# Patient Record
Sex: FEMALE | Race: ASIAN | NOT HISPANIC OR LATINO | URBAN - METROPOLITAN AREA
[De-identification: names, ages, dates, MRNs, and addresses within clinical notes are randomized per-mention and may not be internally consistent; named-entity substitution may affect disease eponyms.]

---

## 2023-09-11 ENCOUNTER — INPATIENT (INPATIENT)
Facility: HOSPITAL | Age: 68
LOS: 2 days | Discharge: HOME | DRG: 445 | End: 2023-09-14
Attending: SURGERY | Admitting: SURGERY
Payer: MEDICAID

## 2023-09-11 VITALS
OXYGEN SATURATION: 98 % | DIASTOLIC BLOOD PRESSURE: 81 MMHG | SYSTOLIC BLOOD PRESSURE: 156 MMHG | HEART RATE: 116 BPM | WEIGHT: 100.09 LBS | TEMPERATURE: 99 F | RESPIRATION RATE: 22 BRPM | HEIGHT: 63 IN

## 2023-09-11 DIAGNOSIS — K81.0 ACUTE CHOLECYSTITIS: ICD-10-CM

## 2023-09-11 LAB
ALBUMIN SERPL ELPH-MCNC: 2.6 G/DL — LOW (ref 3.3–5)
ALBUMIN SERPL ELPH-MCNC: 2.7 G/DL — LOW (ref 3.3–5)
ALBUMIN SERPL ELPH-MCNC: 3.1 G/DL — LOW (ref 3.3–5)
ALP SERPL-CCNC: 599 U/L — HIGH (ref 40–120)
ALP SERPL-CCNC: 601 U/L — HIGH (ref 40–120)
ALP SERPL-CCNC: 601 U/L — HIGH (ref 40–120)
ALT FLD-CCNC: 89 U/L — HIGH (ref 10–45)
ALT FLD-CCNC: 94 U/L — HIGH (ref 10–45)
ALT FLD-CCNC: 99 U/L — HIGH (ref 10–45)
ANION GAP SERPL CALC-SCNC: 12 MMOL/L — SIGNIFICANT CHANGE UP (ref 5–17)
ANION GAP SERPL CALC-SCNC: 15 MMOL/L — SIGNIFICANT CHANGE UP (ref 5–17)
ANION GAP SERPL CALC-SCNC: 18 MMOL/L — HIGH (ref 5–17)
ANISOCYTOSIS BLD QL: SLIGHT — SIGNIFICANT CHANGE UP
APPEARANCE UR: ABNORMAL
APTT BLD: 23.3 SEC — LOW (ref 24.5–35.6)
AST SERPL-CCNC: 192 U/L — HIGH (ref 10–40)
AST SERPL-CCNC: 215 U/L — HIGH (ref 10–40)
AST SERPL-CCNC: 247 U/L — HIGH (ref 10–40)
BACTERIA # UR AUTO: NEGATIVE — SIGNIFICANT CHANGE UP
BASE EXCESS BLDV CALC-SCNC: -0.9 MMOL/L — SIGNIFICANT CHANGE UP (ref -2–3)
BASE EXCESS BLDV CALC-SCNC: -4.9 MMOL/L — LOW (ref -2–3)
BASE EXCESS BLDV CALC-SCNC: -5.5 MMOL/L — LOW (ref -2–3)
BASOPHILS # BLD AUTO: 0 K/UL — SIGNIFICANT CHANGE UP (ref 0–0.2)
BASOPHILS NFR BLD AUTO: 0 % — SIGNIFICANT CHANGE UP (ref 0–2)
BILIRUB DIRECT SERPL-MCNC: 1.4 MG/DL — HIGH (ref 0–0.3)
BILIRUB DIRECT SERPL-MCNC: 2 MG/DL — HIGH (ref 0–0.3)
BILIRUB INDIRECT FLD-MCNC: 0.4 MG/DL — SIGNIFICANT CHANGE UP (ref 0.2–1)
BILIRUB INDIRECT FLD-MCNC: 0.5 MG/DL — SIGNIFICANT CHANGE UP (ref 0.2–1)
BILIRUB SERPL-MCNC: 1.8 MG/DL — HIGH (ref 0.2–1.2)
BILIRUB SERPL-MCNC: 1.9 MG/DL — HIGH (ref 0.2–1.2)
BILIRUB SERPL-MCNC: 2.4 MG/DL — HIGH (ref 0.2–1.2)
BILIRUB UR-MCNC: NEGATIVE — SIGNIFICANT CHANGE UP
BUN SERPL-MCNC: 12 MG/DL — SIGNIFICANT CHANGE UP (ref 7–23)
BUN SERPL-MCNC: 8 MG/DL — SIGNIFICANT CHANGE UP (ref 7–23)
BUN SERPL-MCNC: 9 MG/DL — SIGNIFICANT CHANGE UP (ref 7–23)
CA-I SERPL-SCNC: 1.09 MMOL/L — LOW (ref 1.15–1.33)
CA-I SERPL-SCNC: 1.1 MMOL/L — LOW (ref 1.15–1.33)
CA-I SERPL-SCNC: 1.17 MMOL/L — SIGNIFICANT CHANGE UP (ref 1.15–1.33)
CALCIUM SERPL-MCNC: 7.1 MG/DL — LOW (ref 8.4–10.5)
CALCIUM SERPL-MCNC: 7.5 MG/DL — LOW (ref 8.4–10.5)
CALCIUM SERPL-MCNC: 8.3 MG/DL — LOW (ref 8.4–10.5)
CHLORIDE BLDV-SCNC: 107 MMOL/L — SIGNIFICANT CHANGE UP (ref 96–108)
CHLORIDE BLDV-SCNC: 109 MMOL/L — HIGH (ref 96–108)
CHLORIDE BLDV-SCNC: 110 MMOL/L — HIGH (ref 96–108)
CHLORIDE SERPL-SCNC: 103 MMOL/L — SIGNIFICANT CHANGE UP (ref 96–108)
CHLORIDE SERPL-SCNC: 110 MMOL/L — HIGH (ref 96–108)
CHLORIDE SERPL-SCNC: 110 MMOL/L — HIGH (ref 96–108)
CO2 BLDV-SCNC: 21 MMOL/L — LOW (ref 22–26)
CO2 BLDV-SCNC: 22 MMOL/L — SIGNIFICANT CHANGE UP (ref 22–26)
CO2 BLDV-SCNC: 24 MMOL/L — SIGNIFICANT CHANGE UP (ref 22–26)
CO2 SERPL-SCNC: 17 MMOL/L — LOW (ref 22–31)
CO2 SERPL-SCNC: 18 MMOL/L — LOW (ref 22–31)
CO2 SERPL-SCNC: 19 MMOL/L — LOW (ref 22–31)
COLOR SPEC: YELLOW — SIGNIFICANT CHANGE UP
CREAT SERPL-MCNC: 0.49 MG/DL — LOW (ref 0.5–1.3)
CREAT SERPL-MCNC: 0.54 MG/DL — SIGNIFICANT CHANGE UP (ref 0.5–1.3)
CREAT SERPL-MCNC: 0.56 MG/DL — SIGNIFICANT CHANGE UP (ref 0.5–1.3)
DACRYOCYTES BLD QL SMEAR: SLIGHT — SIGNIFICANT CHANGE UP
DIFF PNL FLD: NEGATIVE — SIGNIFICANT CHANGE UP
EGFR: 100 ML/MIN/1.73M2 — SIGNIFICANT CHANGE UP
EGFR: 101 ML/MIN/1.73M2 — SIGNIFICANT CHANGE UP
EGFR: 103 ML/MIN/1.73M2 — SIGNIFICANT CHANGE UP
ELLIPTOCYTES BLD QL SMEAR: SIGNIFICANT CHANGE UP
EOSINOPHIL # BLD AUTO: 0 K/UL — SIGNIFICANT CHANGE UP (ref 0–0.5)
EOSINOPHIL NFR BLD AUTO: 0 % — SIGNIFICANT CHANGE UP (ref 0–6)
EPI CELLS # UR: 0 /HPF — SIGNIFICANT CHANGE UP
GAS PNL BLDV: 138 MMOL/L — SIGNIFICANT CHANGE UP (ref 136–145)
GAS PNL BLDV: 138 MMOL/L — SIGNIFICANT CHANGE UP (ref 136–145)
GAS PNL BLDV: 141 MMOL/L — SIGNIFICANT CHANGE UP (ref 136–145)
GAS PNL BLDV: SIGNIFICANT CHANGE UP
GLUCOSE BLDC GLUCOMTR-MCNC: 78 MG/DL — SIGNIFICANT CHANGE UP (ref 70–99)
GLUCOSE BLDC GLUCOMTR-MCNC: 78 MG/DL — SIGNIFICANT CHANGE UP (ref 70–99)
GLUCOSE BLDV-MCNC: 100 MG/DL — HIGH (ref 70–99)
GLUCOSE BLDV-MCNC: 133 MG/DL — HIGH (ref 70–99)
GLUCOSE BLDV-MCNC: 137 MG/DL — HIGH (ref 70–99)
GLUCOSE SERPL-MCNC: 140 MG/DL — HIGH (ref 70–99)
GLUCOSE SERPL-MCNC: 168 MG/DL — HIGH (ref 70–99)
GLUCOSE SERPL-MCNC: 98 MG/DL — SIGNIFICANT CHANGE UP (ref 70–99)
GLUCOSE UR QL: NEGATIVE — SIGNIFICANT CHANGE UP
HCO3 BLDV-SCNC: 20 MMOL/L — LOW (ref 22–29)
HCO3 BLDV-SCNC: 21 MMOL/L — LOW (ref 22–29)
HCO3 BLDV-SCNC: 23 MMOL/L — SIGNIFICANT CHANGE UP (ref 22–29)
HCT VFR BLD CALC: 24.9 % — LOW (ref 34.5–45)
HCT VFR BLD CALC: 26 % — LOW (ref 34.5–45)
HCT VFR BLD CALC: 26.6 % — LOW (ref 34.5–45)
HCT VFR BLDA CALC: 22 % — LOW (ref 34.5–46.5)
HCT VFR BLDA CALC: 24 % — LOW (ref 34.5–46.5)
HCT VFR BLDA CALC: 25 % — LOW (ref 34.5–46.5)
HGB BLD CALC-MCNC: 7.3 G/DL — LOW (ref 11.7–16.1)
HGB BLD CALC-MCNC: 7.9 G/DL — LOW (ref 11.7–16.1)
HGB BLD CALC-MCNC: 8.2 G/DL — LOW (ref 11.7–16.1)
HGB BLD-MCNC: 7.2 G/DL — LOW (ref 11.5–15.5)
HGB BLD-MCNC: 7.6 G/DL — LOW (ref 11.5–15.5)
HGB BLD-MCNC: 7.8 G/DL — LOW (ref 11.5–15.5)
HOROWITZ INDEX BLDV+IHG-RTO: 21 — SIGNIFICANT CHANGE UP
HOROWITZ INDEX BLDV+IHG-RTO: 28 — SIGNIFICANT CHANGE UP
HYALINE CASTS # UR AUTO: 3 /LPF — HIGH (ref 0–2)
INR BLD: 1.1 RATIO — SIGNIFICANT CHANGE UP (ref 0.85–1.18)
KETONES UR-MCNC: NEGATIVE — SIGNIFICANT CHANGE UP
LACTATE BLDV-MCNC: 1 MMOL/L — SIGNIFICANT CHANGE UP (ref 0.5–2)
LACTATE BLDV-MCNC: 1.7 MMOL/L — SIGNIFICANT CHANGE UP (ref 0.5–2)
LACTATE BLDV-MCNC: 3 MMOL/L — HIGH (ref 0.5–2)
LEUKOCYTE ESTERASE UR-ACNC: NEGATIVE — SIGNIFICANT CHANGE UP
LIDOCAIN IGE QN: 1031 U/L — HIGH (ref 7–60)
LYMPHOCYTES # BLD AUTO: 0 % — LOW (ref 13–44)
LYMPHOCYTES # BLD AUTO: 0 K/UL — LOW (ref 1–3.3)
MAGNESIUM SERPL-MCNC: 1.7 MG/DL — SIGNIFICANT CHANGE UP (ref 1.6–2.6)
MAGNESIUM SERPL-MCNC: 1.8 MG/DL — SIGNIFICANT CHANGE UP (ref 1.6–2.6)
MANUAL SMEAR VERIFICATION: SIGNIFICANT CHANGE UP
MCHC RBC-ENTMCNC: 22.6 PG — LOW (ref 27–34)
MCHC RBC-ENTMCNC: 22.8 PG — LOW (ref 27–34)
MCHC RBC-ENTMCNC: 23 PG — LOW (ref 27–34)
MCHC RBC-ENTMCNC: 28.9 GM/DL — LOW (ref 32–36)
MCHC RBC-ENTMCNC: 29.2 GM/DL — LOW (ref 32–36)
MCHC RBC-ENTMCNC: 29.3 GM/DL — LOW (ref 32–36)
MCV RBC AUTO: 77.4 FL — LOW (ref 80–100)
MCV RBC AUTO: 77.8 FL — LOW (ref 80–100)
MCV RBC AUTO: 79.6 FL — LOW (ref 80–100)
MICROCYTES BLD QL: SLIGHT — SIGNIFICANT CHANGE UP
MONOCYTES # BLD AUTO: 0.95 K/UL — HIGH (ref 0–0.9)
MONOCYTES NFR BLD AUTO: 6.2 % — SIGNIFICANT CHANGE UP (ref 2–14)
NEUTROPHILS # BLD AUTO: 14.32 K/UL — HIGH (ref 1.8–7.4)
NEUTROPHILS NFR BLD AUTO: 86.7 % — HIGH (ref 43–77)
NEUTS BAND # BLD: 7.1 % — SIGNIFICANT CHANGE UP (ref 0–8)
NITRITE UR-MCNC: NEGATIVE — SIGNIFICANT CHANGE UP
NRBC # BLD: 0 /100 WBCS — SIGNIFICANT CHANGE UP (ref 0–0)
NRBC # BLD: 0 /100 WBCS — SIGNIFICANT CHANGE UP (ref 0–0)
PCO2 BLDV: 36 MMHG — LOW (ref 39–42)
PCO2 BLDV: 39 MMHG — SIGNIFICANT CHANGE UP (ref 39–42)
PCO2 BLDV: 43 MMHG — HIGH (ref 39–42)
PH BLDV: 7.3 — LOW (ref 7.32–7.43)
PH BLDV: 7.32 — SIGNIFICANT CHANGE UP (ref 7.32–7.43)
PH BLDV: 7.42 — SIGNIFICANT CHANGE UP (ref 7.32–7.43)
PH UR: 6 — SIGNIFICANT CHANGE UP (ref 5–8)
PHOSPHATE SERPL-MCNC: 2.7 MG/DL — SIGNIFICANT CHANGE UP (ref 2.5–4.5)
PHOSPHATE SERPL-MCNC: 4 MG/DL — SIGNIFICANT CHANGE UP (ref 2.5–4.5)
PLAT MORPH BLD: NORMAL — SIGNIFICANT CHANGE UP
PLATELET # BLD AUTO: 253 K/UL — SIGNIFICANT CHANGE UP (ref 150–400)
PLATELET # BLD AUTO: 271 K/UL — SIGNIFICANT CHANGE UP (ref 150–400)
PLATELET # BLD AUTO: 342 K/UL — SIGNIFICANT CHANGE UP (ref 150–400)
PO2 BLDV: 28 MMHG — SIGNIFICANT CHANGE UP (ref 25–45)
PO2 BLDV: 38 MMHG — SIGNIFICANT CHANGE UP (ref 25–45)
PO2 BLDV: 46 MMHG — HIGH (ref 25–45)
POIKILOCYTOSIS BLD QL AUTO: SIGNIFICANT CHANGE UP
POTASSIUM BLDV-SCNC: 2.8 MMOL/L — CRITICAL LOW (ref 3.5–5.1)
POTASSIUM BLDV-SCNC: 3.1 MMOL/L — LOW (ref 3.5–5.1)
POTASSIUM BLDV-SCNC: 3.8 MMOL/L — SIGNIFICANT CHANGE UP (ref 3.5–5.1)
POTASSIUM SERPL-MCNC: 3 MMOL/L — LOW (ref 3.5–5.3)
POTASSIUM SERPL-MCNC: 3.8 MMOL/L — SIGNIFICANT CHANGE UP (ref 3.5–5.3)
POTASSIUM SERPL-MCNC: 3.8 MMOL/L — SIGNIFICANT CHANGE UP (ref 3.5–5.3)
POTASSIUM SERPL-SCNC: 3 MMOL/L — LOW (ref 3.5–5.3)
POTASSIUM SERPL-SCNC: 3.8 MMOL/L — SIGNIFICANT CHANGE UP (ref 3.5–5.3)
POTASSIUM SERPL-SCNC: 3.8 MMOL/L — SIGNIFICANT CHANGE UP (ref 3.5–5.3)
PROT SERPL-MCNC: 5.5 G/DL — LOW (ref 6–8.3)
PROT SERPL-MCNC: 5.7 G/DL — LOW (ref 6–8.3)
PROT SERPL-MCNC: 6.9 G/DL — SIGNIFICANT CHANGE UP (ref 6–8.3)
PROT UR-MCNC: ABNORMAL
PROTHROM AB SERPL-ACNC: 12.1 SEC — SIGNIFICANT CHANGE UP (ref 9.5–13)
RAPID RVP RESULT: SIGNIFICANT CHANGE UP
RBC # BLD: 3.13 M/UL — LOW (ref 3.8–5.2)
RBC # BLD: 3.36 M/UL — LOW (ref 3.8–5.2)
RBC # BLD: 3.42 M/UL — LOW (ref 3.8–5.2)
RBC # FLD: 18.3 % — HIGH (ref 10.3–14.5)
RBC # FLD: 18.4 % — HIGH (ref 10.3–14.5)
RBC # FLD: 18.6 % — HIGH (ref 10.3–14.5)
RBC BLD AUTO: ABNORMAL
RBC CASTS # UR COMP ASSIST: 2 /HPF — SIGNIFICANT CHANGE UP (ref 0–4)
RH IG SCN BLD-IMP: POSITIVE — SIGNIFICANT CHANGE UP
SAO2 % BLDV: 46.2 % — LOW (ref 67–88)
SAO2 % BLDV: 61 % — LOW (ref 67–88)
SAO2 % BLDV: 74.5 % — SIGNIFICANT CHANGE UP (ref 67–88)
SARS-COV-2 RNA SPEC QL NAA+PROBE: SIGNIFICANT CHANGE UP
SCHISTOCYTES BLD QL AUTO: SLIGHT — SIGNIFICANT CHANGE UP
SODIUM SERPL-SCNC: 138 MMOL/L — SIGNIFICANT CHANGE UP (ref 135–145)
SODIUM SERPL-SCNC: 141 MMOL/L — SIGNIFICANT CHANGE UP (ref 135–145)
SODIUM SERPL-SCNC: 143 MMOL/L — SIGNIFICANT CHANGE UP (ref 135–145)
SP GR SPEC: 1.01 — SIGNIFICANT CHANGE UP (ref 1.01–1.02)
SPHEROCYTES BLD QL SMEAR: SLIGHT — SIGNIFICANT CHANGE UP
UROBILINOGEN FLD QL: NEGATIVE — SIGNIFICANT CHANGE UP
WBC # BLD: 13.48 K/UL — HIGH (ref 3.8–10.5)
WBC # BLD: 15.27 K/UL — HIGH (ref 3.8–10.5)
WBC # BLD: 9.33 K/UL — SIGNIFICANT CHANGE UP (ref 3.8–10.5)
WBC # FLD AUTO: 13.48 K/UL — HIGH (ref 3.8–10.5)
WBC # FLD AUTO: 15.27 K/UL — HIGH (ref 3.8–10.5)
WBC # FLD AUTO: 9.33 K/UL — SIGNIFICANT CHANGE UP (ref 3.8–10.5)
WBC UR QL: 1 /HPF — SIGNIFICANT CHANGE UP (ref 0–5)

## 2023-09-11 PROCEDURE — ZZZZZ: CPT

## 2023-09-11 PROCEDURE — 74177 CT ABD & PELVIS W/CONTRAST: CPT | Mod: 26,MA

## 2023-09-11 PROCEDURE — 99285 EMERGENCY DEPT VISIT HI MDM: CPT

## 2023-09-11 PROCEDURE — 71045 X-RAY EXAM CHEST 1 VIEW: CPT | Mod: 26

## 2023-09-11 PROCEDURE — 47490 INCISION OF GALLBLADDER: CPT

## 2023-09-11 PROCEDURE — 99222 1ST HOSP IP/OBS MODERATE 55: CPT | Mod: GC

## 2023-09-11 RX ORDER — INFLUENZA VIRUS VACCINE 15; 15; 15; 15 UG/.5ML; UG/.5ML; UG/.5ML; UG/.5ML
0.7 SUSPENSION INTRAMUSCULAR ONCE
Refills: 0 | Status: DISCONTINUED | OUTPATIENT
Start: 2023-09-11 | End: 2023-09-14

## 2023-09-11 RX ORDER — MAGNESIUM SULFATE 500 MG/ML
2 VIAL (ML) INJECTION ONCE
Refills: 0 | Status: DISCONTINUED | OUTPATIENT
Start: 2023-09-11 | End: 2023-09-11

## 2023-09-11 RX ORDER — PIPERACILLIN AND TAZOBACTAM 4; .5 G/20ML; G/20ML
3.38 INJECTION, POWDER, LYOPHILIZED, FOR SOLUTION INTRAVENOUS EVERY 8 HOURS
Refills: 0 | Status: DISCONTINUED | OUTPATIENT
Start: 2023-09-11 | End: 2023-09-14

## 2023-09-11 RX ORDER — SODIUM CHLORIDE 9 MG/ML
1000 INJECTION INTRAMUSCULAR; INTRAVENOUS; SUBCUTANEOUS ONCE
Refills: 0 | Status: COMPLETED | OUTPATIENT
Start: 2023-09-11 | End: 2023-09-11

## 2023-09-11 RX ORDER — POTASSIUM CHLORIDE 20 MEQ
20 PACKET (EA) ORAL ONCE
Refills: 0 | Status: COMPLETED | OUTPATIENT
Start: 2023-09-11 | End: 2023-09-11

## 2023-09-11 RX ORDER — SODIUM CHLORIDE 9 MG/ML
1000 INJECTION, SOLUTION INTRAVENOUS
Refills: 0 | Status: DISCONTINUED | OUTPATIENT
Start: 2023-09-11 | End: 2023-09-13

## 2023-09-11 RX ORDER — INSULIN LISPRO 100/ML
VIAL (ML) SUBCUTANEOUS EVERY 6 HOURS
Refills: 0 | Status: DISCONTINUED | OUTPATIENT
Start: 2023-09-11 | End: 2023-09-11

## 2023-09-11 RX ORDER — ACETAMINOPHEN 500 MG
675 TABLET ORAL ONCE
Refills: 0 | Status: COMPLETED | OUTPATIENT
Start: 2023-09-11 | End: 2023-09-11

## 2023-09-11 RX ORDER — MAGNESIUM SULFATE 500 MG/ML
2 VIAL (ML) INJECTION ONCE
Refills: 0 | Status: COMPLETED | OUTPATIENT
Start: 2023-09-11 | End: 2023-09-11

## 2023-09-11 RX ORDER — POTASSIUM CHLORIDE 20 MEQ
10 PACKET (EA) ORAL
Refills: 0 | Status: COMPLETED | OUTPATIENT
Start: 2023-09-11 | End: 2023-09-11

## 2023-09-11 RX ORDER — VANCOMYCIN HCL 1 G
1000 VIAL (EA) INTRAVENOUS ONCE
Refills: 0 | Status: COMPLETED | OUTPATIENT
Start: 2023-09-11 | End: 2023-09-11

## 2023-09-11 RX ORDER — CALCIUM GLUCONATE 100 MG/ML
2 VIAL (ML) INTRAVENOUS ONCE
Refills: 0 | Status: COMPLETED | OUTPATIENT
Start: 2023-09-11 | End: 2023-09-11

## 2023-09-11 RX ORDER — CHLORHEXIDINE GLUCONATE 213 G/1000ML
1 SOLUTION TOPICAL
Refills: 0 | Status: DISCONTINUED | OUTPATIENT
Start: 2023-09-11 | End: 2023-09-14

## 2023-09-11 RX ORDER — PIPERACILLIN AND TAZOBACTAM 4; .5 G/20ML; G/20ML
3.38 INJECTION, POWDER, LYOPHILIZED, FOR SOLUTION INTRAVENOUS ONCE
Refills: 0 | Status: COMPLETED | OUTPATIENT
Start: 2023-09-11 | End: 2023-09-11

## 2023-09-11 RX ORDER — ACETAMINOPHEN 500 MG
500 TABLET ORAL EVERY 6 HOURS
Refills: 0 | Status: DISCONTINUED | OUTPATIENT
Start: 2023-09-11 | End: 2023-09-13

## 2023-09-11 RX ADMIN — Medication 100 MILLIEQUIVALENT(S): at 14:47

## 2023-09-11 RX ADMIN — Medication 675 MILLIGRAM(S): at 10:04

## 2023-09-11 RX ADMIN — Medication 25 GRAM(S): at 20:51

## 2023-09-11 RX ADMIN — Medication 100 MILLIEQUIVALENT(S): at 18:42

## 2023-09-11 RX ADMIN — PIPERACILLIN AND TAZOBACTAM 25 GRAM(S): 4; .5 INJECTION, POWDER, LYOPHILIZED, FOR SOLUTION INTRAVENOUS at 14:36

## 2023-09-11 RX ADMIN — SODIUM CHLORIDE 75 MILLILITER(S): 9 INJECTION, SOLUTION INTRAVENOUS at 20:52

## 2023-09-11 RX ADMIN — Medication 270 MILLIGRAM(S): at 08:22

## 2023-09-11 RX ADMIN — Medication 20 MILLIEQUIVALENT(S): at 23:50

## 2023-09-11 RX ADMIN — SODIUM CHLORIDE 1000 MILLILITER(S): 9 INJECTION INTRAMUSCULAR; INTRAVENOUS; SUBCUTANEOUS at 10:45

## 2023-09-11 RX ADMIN — PIPERACILLIN AND TAZOBACTAM 200 GRAM(S): 4; .5 INJECTION, POWDER, LYOPHILIZED, FOR SOLUTION INTRAVENOUS at 08:00

## 2023-09-11 RX ADMIN — Medication 100 MILLIEQUIVALENT(S): at 16:54

## 2023-09-11 RX ADMIN — PIPERACILLIN AND TAZOBACTAM 25 GRAM(S): 4; .5 INJECTION, POWDER, LYOPHILIZED, FOR SOLUTION INTRAVENOUS at 23:50

## 2023-09-11 RX ADMIN — Medication 200 GRAM(S): at 21:53

## 2023-09-11 RX ADMIN — SODIUM CHLORIDE 1000 MILLILITER(S): 9 INJECTION INTRAMUSCULAR; INTRAVENOUS; SUBCUTANEOUS at 06:32

## 2023-09-11 RX ADMIN — Medication 255 MILLIMOLE(S): at 18:42

## 2023-09-11 RX ADMIN — Medication 250 MILLIGRAM(S): at 09:59

## 2023-09-11 RX ADMIN — CHLORHEXIDINE GLUCONATE 1 APPLICATION(S): 213 SOLUTION TOPICAL at 14:37

## 2023-09-11 NOTE — ED PROVIDER NOTE - ATTENDING CONTRIBUTION TO CARE
67-year-old female history of scleroderma on meds from [place] doesnt remember name, pulmonary hypertension on oxygen at home presents to the ED D for 1 day of subjective fevers and chills, nausea and vomiting and diarrhea with generalized abdominal pain.  [place] recently moved [place] the [place] from [place] 2 months ago. patient denying any chest pain, shortness of breath, no diarrhea, no hematuria.    Const: nad  Eyes: no conjunctival injection, and symmetrical lids.  HEENT: Head NCAT, no lesions. Atraumatic external nose and ears. Moist MM.  Neck: Symmetric, trachea midline.   cards- rrr  RESP: Unlabored respiratory effort.    GI:   Midepigastric abdominal tenderness  MSK: Normocephalic/Atraumatic, Lower Extremities w/o calf tenderness or edema b/l. chronic lower extremity skin changes  Skin: facial telangiectasia  Neuro: CNs II-XII grossly intact. Motor & Sensation grossly intact.  Psych: Awake, Alert, & Oriented (AAO) x3. Appropriate mood and affect.    pt meets sepsis criteria, probable intra-abd source will get ct to evaluate, ua, cxr, will give fliuds, abx, cultures, check labs, pt possibly immunocompromised given ? meds for scleroderma, pt tba. will work up for fever of current uknown origin/    Pt [place] no PMD in the [place].

## 2023-09-11 NOTE — PROCEDURE NOTE - PROCEDURE FINDINGS AND DETAILS
technically successful US and fluoroscopic guided 8Fr percutaneous cholecystostomy catheter insertion. sample of fluid sent for microbiological analysis.

## 2023-09-11 NOTE — PRE-ANESTHESIA EVALUATION ADULT - NSANTHADDINFOFT_GEN_ALL_CORE
patient requesting that daughter be called and consent obtained through her daughter. Spoke to patient as well via .

## 2023-09-11 NOTE — ED PROVIDER NOTE - PHYSICAL EXAMINATION
Const: Well-nourished, Well-developed, appearing stated age.  Eyes: no conjunctival injection, and symmetrical lids.  HEENT: Head NCAT, no lesions. Atraumatic external nose and ears. Moist MM.  Neck: Symmetric, trachea midline.   RESP: Unlabored respiratory effort.    GI:   Midepigastric abdominal tenderness  MSK: Normocephalic/Atraumatic, Lower Extremities w/o calf tenderness or edema b/l.   Skin: Warm, dry and intact.  tell ectasias over face  Neuro: CNs II-XII grossly intact. Motor & Sensation grossly intact.  Psych: Awake, Alert, & Oriented (AAO) x3. Appropriate mood and affect.

## 2023-09-11 NOTE — ED ADULT NURSE NOTE - NS ED NURSE TRANSPORT WITH
Cardiac Monitor/Defib/ACLS/Rescue Kit/O2/BVM/oxygen/pulse ox/ACLS Rescue Kit SICU MD, EDT/Cardiac Monitor/Defib/ACLS/Rescue Kit/O2/BVM/oxygen/pulse ox/ACLS Rescue Kit

## 2023-09-11 NOTE — ED PROVIDER NOTE - OBJECTIVE STATEMENT
67-year-old female history of scleroderma, pulmonary hypertension on oxygen at home presents to the ED D for 1 day of subjective fevers and chills, nausea and vomiting and diarrhea with generalized abdominal pain.  Patient recently moved to the  from [place] 2 months ago. patient denying any chest pain, shortness of breath, no diarrhea, no hematuria.       #[id number] Judy

## 2023-09-11 NOTE — ED PROVIDER NOTE - CLINICAL SUMMARY MEDICAL DECISION MAKING FREE TEXT BOX
67-year-old female history of scleroderma, pulmonary hypertension on oxygen at home presents to the ED D for 1 day of subjective fevers and chills, nausea and vomiting and diarrhea with generalized abdominal pain.  Patient recently moved to the  from [place] 2 months ago. patient denying any chest pain, shortness of breath, no diarrhea, no hematuria.   we will get sepsis labs for patient as she is tachycardic and likely febrile.  We will hold on antibiotics to evaluate for viral versus  bacterial source.

## 2023-09-11 NOTE — ED ADULT TRIAGE NOTE - IDEAL BODY WEIGHT(KG)
ENDOSCOPY - EGD/COLONOSCOPY       ADULT CARE DISCHARGE  INSTRUCTIONS     Symptoms you may temporarily experience:      Sore Throat     Hoarseness     Bloating/Cramping     Dizziness     IV Irritation/tenderness     Gas or Belching     Slight fever     Small amount of blood in vomit or stool       Call Your Doctor for the following Problems: ______________________     ________________     Fever of 101 degrees or higher       Sharp abdominal  pain     Red streak up the arm from the IV site     Severe cramping        Large amount of blood in stool or vomit       Instructions for the next 24 hours after your Procedure:     Adult supervision     Do NOT drink any alcohol      Do not work today     NO important decisions     DO NOT sign any legal documents     You may shower/ bathe       DO NOT  DRIVE or operate machinery     Resume normal activity tomorrow       Discharge  Diet:     Avoid spicy/ greasy foods     Avoid any food that will cause more gas or bloating       *** Seek IMMEDIATE medical attention and call 911 if you develop symptoms such as:     Chest pain     Shortness of breath     Severe bleeding    
52

## 2023-09-11 NOTE — ED PROCEDURE NOTE - PROCEDURE ADDITIONAL DETAILS
Emergency Department Focused Ultrasound performed at patient's bedside for educational purposes. An appropriate follow up study is ordered. -Brielle Crespo MD

## 2023-09-11 NOTE — H&P ADULT - NSHPPHYSICALEXAM_GEN_ALL_CORE
GENERAL: Lethargic  HEENT: NC/AT  CV/LUNGS: Unlabored respirations, on 4L NC   ABDOMEN: Soft, tender to palpation in RUQ  EXTREMITIES: 2+ peripheral pulses bilaterally. Warm and well perfused

## 2023-09-11 NOTE — CONSULT NOTE ADULT - SUBJECTIVE AND OBJECTIVE BOX
Interventional Radiology  Evaluate for Procedure: perc lj    HPI: 67-year-old female history of scleroderma, pulmonary hypertension on oxygen at home presents to the ED for 1 day of subjective fevers and chills, nausea and vomiting and diarrhea with generalized abdominal pain. In the ED, patient febrile to 104, tachycardic to 116. Currently on 4L NC. Labs significant for WBC of 15, TBili 1.8, Alk Phos 601, Lipase 1000. Imaging demonstrating emphysematous cholecystitis. IR consulted for perc lj.     Allergies: No Known Allergies    Medications (Abx/Cardiac/Anticoagulation/Blood Products)  piperacillin/tazobactam IVPB...: 200 mL/Hr IV Intermittent (09-11 @ 08:00)  vancomycin  IVPB.: 250 mL/Hr IV Intermittent (09-11 @ 09:59)    Data:  144.8  37.8  T(C): 36.6  HR: 65  BP: 118/63  RR: 12  SpO2: 96%    -WBC 15.27 / HgB 7.8 / Hct 26.6 / Plt 342  -Na 138 / Cl 103 / BUN 12 / Glucose 168  -K 3.8 / CO2 17 / Cr 0.49  -ALT 89 / Alk Phos 601 / T.Bili 1.8    Radiology: CT reviewed    Assessment/Plan:  67-year-old female history of scleroderma, pulmonary hypertension on oxygen at home presents to the ED for 1 day of subjective fevers and chills, nausea and vomiting and diarrhea with generalized abdominal pain. In the ED, patient febrile to 104, tachycardic to 116. Currently on 4L NC. Labs significant for WBC of 15, TBili 1.8, Alk Phos 601, Lipase 1000. Imaging demonstrating emphysematous cholecystitis. IR consulted for perc lj.     - case reviewed and approved for perc lj today  - please place IR procedure order under NP Saleh  - please send STAT coags  - hold AC  - remain NPO  - d/w primary team

## 2023-09-11 NOTE — CHART NOTE - NSCHARTNOTEFT_GEN_A_CORE
Post Operative Check    Patient is post op from a percutaneous cholecystostomy tube placement with IR and is recovering well in the SICU. Patient reports feeling well. Only complaint is a "poking" feeling in the right upper quadrant (2/2 to tube). Patient tolerated 2 cups of water with no nausea or vomiting. Has not passed flatus or BM yet. Denies fevers, chills, SOB, CP.     Vitals    T(C): 36.7 (09-11-23 @ 20:00), Max: 40.3 (09-11-23 @ 06:34)  HR: 84 (09-11-23 @ 20:00) (49 - 116)  BP: 134/79 (09-11-23 @ 20:00) (99/58 - 156/81)  RR: 19 (09-11-23 @ 20:00) (12 - 23)  SpO2: 98% (09-11-23 @ 20:00) (96% - 100%)      09-11 @ 07:01  -  09-11 @ 20:18  --------------------------------------------------------  IN:    IV PiggyBack: 100 mL    IV PiggyBack: 550 mL    Lactated Ringers: 675 mL    Oral Fluid: 360 mL  Total IN: 1685 mL    OUT:    Drain (mL): 40 mL    Voided (mL): 500 mL  Total OUT: 540 mL    Total NET: 1145 mL      Labs                        7.2    13.48 )-----------( 253      ( 11 Sep 2023 13:10 )             24.9       CBC Full  -  ( 11 Sep 2023 13:10 )  WBC Count : 13.48 K/uL  Hemoglobin : 7.2 g/dL  Hematocrit : 24.9 %  Platelet Count - Automated : 253 K/uL  Mean Cell Volume : 79.6 fl  Mean Cell Hemoglobin : 23.0 pg  Mean Cell Hemoglobin Concentration : 28.9 gm/dL  Auto Neutrophil # : x  Auto Lymphocyte # : x  Auto Monocyte # : x  Auto Eosinophil # : x  Auto Basophil # : x  Auto Neutrophil % : x  Auto Lymphocyte % : x  Auto Monocyte % : x  Auto Eosinophil % : x  Auto Basophil % : x      Physical Exam  General: no acute distress  Abdomen: soft, minimal tenderness near perc lj tube, nondistended  Tube: 8Fr percutaneous cholecystostomy catheter in RUQ      Patient is a 67y old Female s/p percutaneous cholecystostomy tube placement by IR is now recovering well in SICU.    Plan:  - Care per SICU  - Green surgery following    Green surgery  [id number]

## 2023-09-11 NOTE — ED ADULT NURSE NOTE - NSFALLRISKINTERV_ED_ALL_ED

## 2023-09-11 NOTE — ED ADULT NURSE NOTE - ED STAT RN HANDOFF DETAILS
Report endorsed to justice lindsey RN. Safety checks completed this shift. Safety rounds completed hourly.  IV sites checked Q2+remains WDL. Medications administered as ordered with no signs/symptoms of adverse reactions. Fall & skin precautions in place. Any issues endorsed to oncoming RN for follow up.

## 2023-09-11 NOTE — ED ADULT NURSE NOTE - OBJECTIVE STATEMENT
Pt presents to the BIBA A&Ox4 primarily mandarin speaking complaining of nv x last night. As per daughter, pt ate a piece of bread and begain vomiting. Pt endorses abdominal pain, mid epigastric. Received on 2L NC by EMS, not usually on oxygen at home. Pt noted to be satting at 88% on 2L NC, increased o2 to 4L NC. Pt noted to hot to touch. Connected to on cardiac monitor at bedside at bedside. Denies chest pain, shortness of breath, bloody stools, hemoptysis.

## 2023-09-11 NOTE — PRE PROCEDURE NOTE - PRE PROCEDURE EVALUATION
Vascular & Interventional Radiology Pre-Procedure Note    Procedure Name: percutaneous cholecystostomy catheter insertion    HPI: 67y Female with acute cholecystitis and sepsis not a surgical candidate at this time presents for percutaneous cholecystostomy catheter insertion.    Allergies: No Known Allergies      Medications:   piperacillin/tazobactam IVPB..: 25 mL/Hr IV Intermittent (09-11 @ 14:36)  piperacillin/tazobactam IVPB...: 200 mL/Hr IV Intermittent (09-11 @ 08:00)  vancomycin  IVPB.: 250 mL/Hr IV Intermittent (09-11 @ 09:59)      Data:  Vital Signs Last 24 Hrs  T(C): 36.6 (11 Sep 2023 15:00), Max: 40.3 (11 Sep 2023 06:34)  T(F): 97.9 (11 Sep 2023 15:00), Max: 104.5 (11 Sep 2023 06:34)  HR: 59 (11 Sep 2023 15:00) (51 - 116)  BP: 138/79 (11 Sep 2023 15:00) (99/58 - 156/81)  BP(mean): 99 (11 Sep 2023 15:00) (70 - 99)  RR: 16 (11 Sep 2023 15:00) (12 - 23)  SpO2: 99% (11 Sep 2023 15:00) (96% - 100%)    Parameters below as of 11 Sep 2023 12:16  Patient On (Oxygen Delivery Method): nasal cannula  O2 Flow (L/min): 2      LABS:                        7.2    13.48 )-----------( 253      ( 11 Sep 2023 13:10 )             24.9     09-11    141  |  110<H>  |  9   ----------------------------<  140<H>  3.0<L>   |  19<L>  |  0.56    Ca    7.5<L>      11 Sep 2023 13:10  Phos  2.7     09-11  Mg     1.8     09-11    TPro  5.7<L>  /  Alb  2.6<L>  /  TBili  1.9<H>  /  DBili  1.4<H>  /  AST  215<H>  /  ALT  94<H>  /  AlkPhos  599<H>  09-11    PT/INR - ( 11 Sep 2023 13:10 )   PT: 12.1 sec;   INR: 1.10 ratio         PTT - ( 11 Sep 2023 13:10 )  PTT:23.3 sec    Plan:   -67y Female presents for percutaneous cholecystostomy catheter insertion  -Risks/Benefits/alternatives explained with the patient and HCP and witnessed informed consent obtained.

## 2023-09-11 NOTE — ED ADULT NURSE NOTE - CADM POA CENTRAL LINE
Patient calling about orders wanting to know when the orders for her Xray of her back will be in. Patient would like to know which labs she should have done first.      Please call back. Thank you.   No

## 2023-09-11 NOTE — CONSULT NOTE ADULT - SUBJECTIVE AND OBJECTIVE BOX
SICU Consultation Note  =====================================================  HPI: 67-year-old female history of scleroderma, pulmonary hypertension on 2L oxygen at home presents to the ED for 1 day of subjective fevers and chills, nausea and vomiting and diarrhea with generalized abdominal pain.  Patient denies chest pain, SOB, melana, dizziness, jaundice. Patient recently moved to the  from [place] 2 months ago.     In the ED, patient febrile to 104, tachycardic to 116.  on 4L NC. Labs significant for WBC of 15, TBili 1.8, Alk Phos 601, Lipase 1000, blood pressure soft, planned for perc lj with IR. SICU consulted for HD monitoring.          PAST MEDICAL & SURGICAL HISTORY:  H/O pulmonary hypertension      H/O scleroderma        Home Meds:   Allergies: No Known Allergies    Soc:   Advanced Directives: Presumed Full Code     ROS:    General: Non-Contributory  Skin/Breast: Non-Contributory  Ophthalmologic: Non-Contributory  ENMT: Non-Contributory  Respiratory and Thorax: Non-Contributory  Cardiovascular: Non-Contributory  Gastrointestinal: Non-Contributory  Genitourinary: Non-Contributory  Musculoskeletal: Non-Contributory  Neurological: Non-Contributory  Psychiatric: Non-Contributory  Hematology/Lymphatics: Non-Contributory  Endocrine: Non-Contributory  Allergic/Immunologic: Non-Contributory    CURRENT MEDICATIONS:   --------------------------------------------------------------------------------------  Neurologic Medications  acetaminophen   IVPB .. 500 milliGRAM(s) IV Intermittent every 6 hours PRN Temp greater or equal to 38C (100.4F), Mild Pain (1 - 3)    Respiratory Medications    Cardiovascular Medications    Gastrointestinal Medications  lactated ringers. 1000 milliLiter(s) IV Continuous <Continuous>  potassium chloride  10 mEq/100 mL IVPB 10 milliEquivalent(s) IV Intermittent every 1 hour  sodium phosphate 15 milliMole(s)/250 mL IVPB 15 milliMole(s) IV Intermittent once    Genitourinary Medications    Hematologic/Oncologic Medications  influenza  Vaccine (HIGH DOSE) 0.7 milliLiter(s) IntraMuscular once    Antimicrobial/Immunologic Medications  piperacillin/tazobactam IVPB.. 3.375 Gram(s) IV Intermittent every 8 hours    Endocrine/Metabolic Medications  insulin lispro (ADMELOG) corrective regimen sliding scale   SubCutaneous every 6 hours    Topical/Other Medications  chlorhexidine 2% Cloths 1 Application(s) Topical <User Schedule>    --------------------------------------------------------------------------------------    VITAL SIGNS, INS/OUTS (last 24 hours):  --------------------------------------------------------------------------------------  T(C): 36.6 (09-11-23 @ 15:15), Max: 40.3 (09-11-23 @ 06:34)  HR: 59 (09-11-23 @ 15:15) (51 - 116)  BP: 138/79 (09-11-23 @ 15:15) (99/58 - 156/81)  BP(mean): 99 (09-11-23 @ 15:15) (70 - 99)  ABP: --  ABP(mean): --  RR: 16 (09-11-23 @ 15:15) (12 - 23)  SpO2: 99% (09-11-23 @ 15:15) (96% - 100%)  Wt(kg): --  CVP(mm Hg): --  CI: --  CAPILLARY BLOOD GLUCOSE       N/A      09-11 @ 07:01  -  09-11 @ 16:03  --------------------------------------------------------  IN:    IV PiggyBack: 50 mL    IV PiggyBack: 100 mL    Lactated Ringers: 300 mL  Total IN: 450 mL    OUT:    Oral Fluid: 0 mL    Voided (mL): 300 mL  Total OUT: 300 mL    Total NET: 150 mL        --------------------------------------------------------------------------------------    EXAM:  General/Neuro    Exam: Normal, NAD, alert, oriented x 3, no focal deficits. PERRLA     Respiratory  Exam: Lungs clear to auscultation, Normal expansion/effort. on 4L NC  Mechanical Ventilation:     Cardiovascular  Exam: S1, S2.  Regular rate and rhythm.   Cardiac Rhythm: Normal Sinus Rhythm  ECHO:     GI  Exam: Abdomen soft, TTP in RUQ, no guarding/rebound  Current Diet:  NPO      Tubes/Lines/Drains   [x] Peripheral IV  [] Central Venous Line     	[] R	[] L	[] IJ	[] Fem	[] SC        Type:	    Date Placed:   [] Arterial Line		[] R	[] L	[] Fem	[] Rad	[] Ax	Date Placed:   [] PICC:         	[] Midline		[] Mediport           [] Urinary Catheter		Date Placed:     Extremities  Exam: Extremities warm, pink, well-perfused.        Derm:  Exam: Good skin turgor, no skin breakdown.     --------------------------------------------------------------------------------------  LABS:                        7.2    13.48 )-----------( 253      ( 11 Sep 2023 13:10 )             24.9     09-11    141  |  110<H>  |  9   ----------------------------<  140<H>  3.0<L>   |  19<L>  |  0.56    Ca    7.5<L>      11 Sep 2023 13:10  Phos  2.7     09-11  Mg     1.8     09-11    TPro  5.7<L>  /  Alb  2.6<L>  /  TBili  1.9<H>  /  DBili  1.4<H>  /  AST  215<H>  /  ALT  94<H>  /  AlkPhos  599<H>  09-11    Lactate:  09-11 @ 12:32  1.0  09-11 @ 08:26  1.7    PT/INR - ( 11 Sep 2023 13:10 )   PT: 12.1 sec;   INR: 1.10 ratio         PTT - ( 11 Sep 2023 13:10 )  PTT:23.3 sec          Urinalysis Basic - ( 11 Sep 2023 13:10 )    Color: x / Appearance: x / SG: x / pH: x  Gluc: 140 mg/dL / Ketone: x  / Bili: x / Urobili: x   Blood: x / Protein: x / Nitrite: x   Leuk Esterase: x / RBC: x / WBC x   Sq Epi: x / Non Sq Epi: x / Bacteria: x        IMAGING:    --------------------------------------------------------------------------------------    OTHER LABS    IMAGING RESULTS      ASSESSMENT:  67-year-old female history of scleroderma, pulmonary hypertension on oxygen at home presents to the ED for 1 day of subjective fevers and chills, nausea and vomiting and diarrhea with generalized abdominal pain. In the ED, patient febrile to 104, tachycardic to 116. Currently on 4L NC. Labs significant for WBC of 15, TBili 1.8, Alk Phos 601, Lipase 1000. Imaging demonstrating emphysematous cholecystitis. Planned for perc lj with IR. SICU consulted for HD monitoring.    PLAN:     Neurologic:   - AxOx3  - Pain control PRN    Respiratory:   - 4LNC, sat >92%  - Home O2 baseline 2L    Cardiovascular:   - MAPs > 65  - will require med rec post-IR  - no pressor requirements    Gastrointestinal/Nutrition: Emphysematous cholecytitis  - NPO for IR procedure  - Daily CMP    Renal/Genitourinary:   - LR @ 75    Hematologic:   - hold chem DVT ppx for IR   - SCDs    Infectious Disease:   - ertapenem 1g qd  - f/u bcx  - f/u Ucx    Lines/Tubes:  - PIV x2    Endocrine:   - ISS    Disposition: SICU    --------------------------------------------------------------------------------------    Critical Care Diagnoses:

## 2023-09-11 NOTE — ED ADULT NURSE REASSESSMENT NOTE - NS ED NURSE REASSESS COMMENT FT1
Report received from Arjun MEHTA. Patient changed into a gown, 2nd PIV obtained 20G RFA. Patient started on abx. ALEXIS Gutierrez made aware that patient has yet to receive tylenol. Patient straight cathed for urine using sterile technique. Second RN present to confirm sterility. Explained procedure as it was being done - Pt tolerated procedure well. Sterile specimens collected and sent to lab as ordered. drained aprox 300ml of urine. Comfort and safety provided.

## 2023-09-11 NOTE — H&P ADULT - NSHPLABSRESULTS_GEN_ALL_CORE
7.8    15.27 )-----------( 342      ( 11 Sep 2023 07:01 )             26.6     09-11    138  |  103  |  12  ----------------------------<  168<H>  3.8   |  17<L>  |  0.49<L>    Ca    8.3<L>      11 Sep 2023 07:01    TPro  6.9  /  Alb  3.1<L>  /  TBili  1.8<H>  /  DBili  x   /  AST  247<H>  /  ALT  89<H>  /  AlkPhos  601<H>  09-11      CAPILLARY BLOOD GLUCOSE      < from: CT Abdomen and Pelvis w/ IV Cont (09.11.23 @ 09:54) >    FINDINGS:  LOWER CHEST: Atherosclerotic changes of the aorta and coronary arteries.   Mild bibasilar subsegmental atelectasis. Small left pleural effusion.    LIVER: Normal morphology. Scattered hepatic cysts.  BILE DUCTS: Moderate amount of air within the cystic duct.  GALLBLADDER: Thickening/edema of the gallbladder wall. Small amount of   gas in the gallbladder lumen. Antidependent gas in the gallbladder wall   in the gallbladder at the fundus.    Within normal limits.  PANCREAS: Within normal limits.  ADRENALS:Within normal limits.  KIDNEYS/URETERS: Within normal limits.    Limited evaluation of the pelvis due to streak artifact.  BLADDER: Within normal limits.  REPRODUCTIVE ORGANS: Uterus and adnexa within normal limits.    BOWEL: No bowel obstruction. Appendix is normal. Mild colonic wall   thickening, likely reactive in etiology.  PERITONEUM: Small volume pelvic ascites.  VESSELS: Atherosclerotic changes.  RETROPERITONEUM/LYMPH NODES: No lymphadenopathy.  ABDOMINAL WALL: Within normal limits.  BONES: Degenerative changes. Bilateral total hip arthroplasties.    IMPRESSION:  Emphysematous cholecystitis.    Wall thickening of colon at the hepatic flexure, likely reactive.    Small volume pelvic ascites.    Findings were discussed by Dr. Barker with Dr. Feliciano on 9/11/2023   10:20 AM with read back confirmation.    < end of copied text >

## 2023-09-11 NOTE — H&P ADULT - ASSESSMENT
67-year-old female history of scleroderma, pulmonary hypertension on oxygen at home presents to the ED for 1 day of subjective fevers and chills, nausea and vomiting and diarrhea with generalized abdominal pain. In the ED, patient febrile to 104, tachycardic to 116. Currently on 4L NC. Labs significant for WBC of 15, TBili 1.8, Alk Phos 601, Lipase 1000. Imaging demonstrating emphysematous cholecystitis.     PLAN:  -Admit to Green Team surgery   -SICU consult   -Patient poor operative candidate, pulmonary HTN on O2 at home   -Needs medical and cardiac evaluation   -IR consult for perc lj   -NPO, IVF   -Abx: Zosyn   -Discussed with Dr. Robinson [place] Team, [id number]

## 2023-09-12 LAB
A1C WITH ESTIMATED AVERAGE GLUCOSE RESULT: 5.3 % — SIGNIFICANT CHANGE UP (ref 4–5.6)
ALBUMIN SERPL ELPH-MCNC: 2.2 G/DL — LOW (ref 3.3–5)
ALBUMIN SERPL ELPH-MCNC: 2.4 G/DL — LOW (ref 3.3–5)
ALP SERPL-CCNC: 507 U/L — HIGH (ref 40–120)
ALP SERPL-CCNC: 543 U/L — HIGH (ref 40–120)
ALT FLD-CCNC: 77 U/L — HIGH (ref 10–45)
ALT FLD-CCNC: 94 U/L — HIGH (ref 10–45)
ANION GAP SERPL CALC-SCNC: 11 MMOL/L — SIGNIFICANT CHANGE UP (ref 5–17)
ANION GAP SERPL CALC-SCNC: 12 MMOL/L — SIGNIFICANT CHANGE UP (ref 5–17)
APTT BLD: 28.9 SEC — SIGNIFICANT CHANGE UP (ref 24.5–35.6)
AST SERPL-CCNC: 109 U/L — HIGH (ref 10–40)
AST SERPL-CCNC: 172 U/L — HIGH (ref 10–40)
BASE EXCESS BLDV CALC-SCNC: -2.1 MMOL/L — LOW (ref -2–3)
BASE EXCESS BLDV CALC-SCNC: -3.3 MMOL/L — LOW (ref -2–3)
BASE EXCESS BLDV CALC-SCNC: -5.2 MMOL/L — LOW (ref -2–3)
BILIRUB DIRECT SERPL-MCNC: 2 MG/DL — HIGH (ref 0–0.3)
BILIRUB DIRECT SERPL-MCNC: 2.8 MG/DL — HIGH (ref 0–0.3)
BILIRUB INDIRECT FLD-MCNC: 0.6 MG/DL — SIGNIFICANT CHANGE UP (ref 0.2–1)
BILIRUB INDIRECT FLD-MCNC: 0.6 MG/DL — SIGNIFICANT CHANGE UP (ref 0.2–1)
BILIRUB SERPL-MCNC: 2.6 MG/DL — HIGH (ref 0.2–1.2)
BILIRUB SERPL-MCNC: 3.4 MG/DL — HIGH (ref 0.2–1.2)
BUN SERPL-MCNC: 6 MG/DL — LOW (ref 7–23)
BUN SERPL-MCNC: 7 MG/DL — SIGNIFICANT CHANGE UP (ref 7–23)
CA-I SERPL-SCNC: 1.18 MMOL/L — SIGNIFICANT CHANGE UP (ref 1.15–1.33)
CA-I SERPL-SCNC: 1.22 MMOL/L — SIGNIFICANT CHANGE UP (ref 1.15–1.33)
CA-I SERPL-SCNC: 1.22 MMOL/L — SIGNIFICANT CHANGE UP (ref 1.15–1.33)
CALCIUM SERPL-MCNC: 7.7 MG/DL — LOW (ref 8.4–10.5)
CALCIUM SERPL-MCNC: 7.8 MG/DL — LOW (ref 8.4–10.5)
CHLORIDE BLDV-SCNC: 108 MMOL/L — SIGNIFICANT CHANGE UP (ref 96–108)
CHLORIDE BLDV-SCNC: 109 MMOL/L — HIGH (ref 96–108)
CHLORIDE BLDV-SCNC: 109 MMOL/L — HIGH (ref 96–108)
CHLORIDE SERPL-SCNC: 107 MMOL/L — SIGNIFICANT CHANGE UP (ref 96–108)
CHLORIDE SERPL-SCNC: 108 MMOL/L — SIGNIFICANT CHANGE UP (ref 96–108)
CO2 BLDV-SCNC: 21 MMOL/L — LOW (ref 22–26)
CO2 BLDV-SCNC: 22 MMOL/L — SIGNIFICANT CHANGE UP (ref 22–26)
CO2 BLDV-SCNC: 24 MMOL/L — SIGNIFICANT CHANGE UP (ref 22–26)
CO2 SERPL-SCNC: 18 MMOL/L — LOW (ref 22–31)
CO2 SERPL-SCNC: 20 MMOL/L — LOW (ref 22–31)
CREAT SERPL-MCNC: 0.49 MG/DL — LOW (ref 0.5–1.3)
CREAT SERPL-MCNC: 0.57 MG/DL — SIGNIFICANT CHANGE UP (ref 0.5–1.3)
CULTURE RESULTS: NO GROWTH — SIGNIFICANT CHANGE UP
EGFR: 100 ML/MIN/1.73M2 — SIGNIFICANT CHANGE UP
EGFR: 103 ML/MIN/1.73M2 — SIGNIFICANT CHANGE UP
ESTIMATED AVERAGE GLUCOSE: 105 MG/DL — SIGNIFICANT CHANGE UP (ref 68–114)
GAS PNL BLDV: 135 MMOL/L — LOW (ref 136–145)
GAS PNL BLDV: 136 MMOL/L — SIGNIFICANT CHANGE UP (ref 136–145)
GAS PNL BLDV: 137 MMOL/L — SIGNIFICANT CHANGE UP (ref 136–145)
GAS PNL BLDV: SIGNIFICANT CHANGE UP
GLUCOSE BLDV-MCNC: 107 MG/DL — HIGH (ref 70–99)
GLUCOSE BLDV-MCNC: 115 MG/DL — HIGH (ref 70–99)
GLUCOSE BLDV-MCNC: 93 MG/DL — SIGNIFICANT CHANGE UP (ref 70–99)
GLUCOSE SERPL-MCNC: 102 MG/DL — HIGH (ref 70–99)
GLUCOSE SERPL-MCNC: 120 MG/DL — HIGH (ref 70–99)
GRAM STN FLD: SIGNIFICANT CHANGE UP
HCO3 BLDV-SCNC: 20 MMOL/L — LOW (ref 22–29)
HCO3 BLDV-SCNC: 21 MMOL/L — LOW (ref 22–29)
HCO3 BLDV-SCNC: 23 MMOL/L — SIGNIFICANT CHANGE UP (ref 22–29)
HCT VFR BLD CALC: 22.7 % — LOW (ref 34.5–45)
HCT VFR BLD CALC: 26.5 % — LOW (ref 34.5–45)
HCT VFR BLDA CALC: 21 % — CRITICAL LOW (ref 34.5–46.5)
HCT VFR BLDA CALC: 25 % — LOW (ref 34.5–46.5)
HCT VFR BLDA CALC: 27 % — LOW (ref 34.5–46.5)
HGB BLD CALC-MCNC: 6.9 G/DL — CRITICAL LOW (ref 11.7–16.1)
HGB BLD CALC-MCNC: 8.3 G/DL — LOW (ref 11.7–16.1)
HGB BLD CALC-MCNC: 9 G/DL — LOW (ref 11.7–16.1)
HGB BLD-MCNC: 6.8 G/DL — CRITICAL LOW (ref 11.5–15.5)
HGB BLD-MCNC: 7.8 G/DL — LOW (ref 11.5–15.5)
HOROWITZ INDEX BLDV+IHG-RTO: 21 — SIGNIFICANT CHANGE UP
HOROWITZ INDEX BLDV+IHG-RTO: 21 — SIGNIFICANT CHANGE UP
INR BLD: 1.17 RATIO — SIGNIFICANT CHANGE UP (ref 0.85–1.18)
LACTATE BLDV-MCNC: 0.8 MMOL/L — SIGNIFICANT CHANGE UP (ref 0.5–2)
LACTATE BLDV-MCNC: 2.9 MMOL/L — HIGH (ref 0.5–2)
LACTATE BLDV-MCNC: 5.7 MMOL/L — CRITICAL HIGH (ref 0.5–2)
LIDOCAIN IGE QN: 189 U/L — HIGH (ref 7–60)
MAGNESIUM SERPL-MCNC: 1.8 MG/DL — SIGNIFICANT CHANGE UP (ref 1.6–2.6)
MAGNESIUM SERPL-MCNC: 2.3 MG/DL — SIGNIFICANT CHANGE UP (ref 1.6–2.6)
MCHC RBC-ENTMCNC: 22.7 PG — LOW (ref 27–34)
MCHC RBC-ENTMCNC: 23.2 PG — LOW (ref 27–34)
MCHC RBC-ENTMCNC: 29.4 GM/DL — LOW (ref 32–36)
MCHC RBC-ENTMCNC: 30 GM/DL — LOW (ref 32–36)
MCV RBC AUTO: 75.7 FL — LOW (ref 80–100)
MCV RBC AUTO: 78.9 FL — LOW (ref 80–100)
NRBC # BLD: 0 /100 WBCS — SIGNIFICANT CHANGE UP (ref 0–0)
NRBC # BLD: 0 /100 WBCS — SIGNIFICANT CHANGE UP (ref 0–0)
PCO2 BLDV: 32 MMHG — LOW (ref 39–42)
PCO2 BLDV: 36 MMHG — LOW (ref 39–42)
PCO2 BLDV: 41 MMHG — SIGNIFICANT CHANGE UP (ref 39–42)
PH BLDV: 7.35 — SIGNIFICANT CHANGE UP (ref 7.32–7.43)
PH BLDV: 7.36 — SIGNIFICANT CHANGE UP (ref 7.32–7.43)
PH BLDV: 7.42 — SIGNIFICANT CHANGE UP (ref 7.32–7.43)
PHOSPHATE SERPL-MCNC: 1.5 MG/DL — LOW (ref 2.5–4.5)
PHOSPHATE SERPL-MCNC: 2.2 MG/DL — LOW (ref 2.5–4.5)
PLATELET # BLD AUTO: 247 K/UL — SIGNIFICANT CHANGE UP (ref 150–400)
PLATELET # BLD AUTO: 292 K/UL — SIGNIFICANT CHANGE UP (ref 150–400)
PO2 BLDV: 18 MMHG — LOW (ref 25–45)
PO2 BLDV: 20 MMHG — LOW (ref 25–45)
PO2 BLDV: 58 MMHG — HIGH (ref 25–45)
POTASSIUM BLDV-SCNC: 3.8 MMOL/L — SIGNIFICANT CHANGE UP (ref 3.5–5.1)
POTASSIUM BLDV-SCNC: 3.9 MMOL/L — SIGNIFICANT CHANGE UP (ref 3.5–5.1)
POTASSIUM BLDV-SCNC: 3.9 MMOL/L — SIGNIFICANT CHANGE UP (ref 3.5–5.1)
POTASSIUM SERPL-MCNC: 3.9 MMOL/L — SIGNIFICANT CHANGE UP (ref 3.5–5.3)
POTASSIUM SERPL-MCNC: 3.9 MMOL/L — SIGNIFICANT CHANGE UP (ref 3.5–5.3)
POTASSIUM SERPL-SCNC: 3.9 MMOL/L — SIGNIFICANT CHANGE UP (ref 3.5–5.3)
POTASSIUM SERPL-SCNC: 3.9 MMOL/L — SIGNIFICANT CHANGE UP (ref 3.5–5.3)
PROT SERPL-MCNC: 4.7 G/DL — LOW (ref 6–8.3)
PROT SERPL-MCNC: 5.2 G/DL — LOW (ref 6–8.3)
PROTHROM AB SERPL-ACNC: 12.2 SEC — SIGNIFICANT CHANGE UP (ref 9.5–13)
RBC # BLD: 3 M/UL — LOW (ref 3.8–5.2)
RBC # BLD: 3.36 M/UL — LOW (ref 3.8–5.2)
RBC # FLD: 18.3 % — HIGH (ref 10.3–14.5)
RBC # FLD: 18.4 % — HIGH (ref 10.3–14.5)
SAO2 % BLDV: 22.7 % — LOW (ref 67–88)
SAO2 % BLDV: 24.9 % — LOW (ref 67–88)
SAO2 % BLDV: 91.6 % — HIGH (ref 67–88)
SODIUM SERPL-SCNC: 138 MMOL/L — SIGNIFICANT CHANGE UP (ref 135–145)
SODIUM SERPL-SCNC: 138 MMOL/L — SIGNIFICANT CHANGE UP (ref 135–145)
SPECIMEN SOURCE: SIGNIFICANT CHANGE UP
SPECIMEN SOURCE: SIGNIFICANT CHANGE UP
WBC # BLD: 10.02 K/UL — SIGNIFICANT CHANGE UP (ref 3.8–10.5)
WBC # BLD: 8.22 K/UL — SIGNIFICANT CHANGE UP (ref 3.8–10.5)
WBC # FLD AUTO: 10.02 K/UL — SIGNIFICANT CHANGE UP (ref 3.8–10.5)
WBC # FLD AUTO: 8.22 K/UL — SIGNIFICANT CHANGE UP (ref 3.8–10.5)

## 2023-09-12 PROCEDURE — 71045 X-RAY EXAM CHEST 1 VIEW: CPT | Mod: 26

## 2023-09-12 RX ORDER — MAGNESIUM SULFATE 500 MG/ML
2 VIAL (ML) INJECTION ONCE
Refills: 0 | Status: COMPLETED | OUTPATIENT
Start: 2023-09-12 | End: 2023-09-12

## 2023-09-12 RX ORDER — ENOXAPARIN SODIUM 100 MG/ML
30 INJECTION SUBCUTANEOUS EVERY 24 HOURS
Refills: 0 | Status: DISCONTINUED | OUTPATIENT
Start: 2023-09-12 | End: 2023-09-14

## 2023-09-12 RX ORDER — POTASSIUM CHLORIDE 20 MEQ
40 PACKET (EA) ORAL ONCE
Refills: 0 | Status: COMPLETED | OUTPATIENT
Start: 2023-09-12 | End: 2023-09-12

## 2023-09-12 RX ORDER — ACETAMINOPHEN 500 MG
550 TABLET ORAL EVERY 6 HOURS
Refills: 0 | Status: COMPLETED | OUTPATIENT
Start: 2023-09-12 | End: 2023-09-12

## 2023-09-12 RX ADMIN — Medication 25 GRAM(S): at 15:15

## 2023-09-12 RX ADMIN — Medication 40 MILLIEQUIVALENT(S): at 15:16

## 2023-09-12 RX ADMIN — Medication 500 MILLIGRAM(S): at 06:00

## 2023-09-12 RX ADMIN — SODIUM CHLORIDE 75 MILLILITER(S): 9 INJECTION, SOLUTION INTRAVENOUS at 15:14

## 2023-09-12 RX ADMIN — Medication 255 MILLIMOLE(S): at 15:15

## 2023-09-12 RX ADMIN — Medication 200 MILLIGRAM(S): at 04:48

## 2023-09-12 RX ADMIN — CHLORHEXIDINE GLUCONATE 1 APPLICATION(S): 213 SOLUTION TOPICAL at 05:40

## 2023-09-12 RX ADMIN — PIPERACILLIN AND TAZOBACTAM 25 GRAM(S): 4; .5 INJECTION, POWDER, LYOPHILIZED, FOR SOLUTION INTRAVENOUS at 08:04

## 2023-09-12 RX ADMIN — PIPERACILLIN AND TAZOBACTAM 25 GRAM(S): 4; .5 INJECTION, POWDER, LYOPHILIZED, FOR SOLUTION INTRAVENOUS at 15:15

## 2023-09-12 NOTE — PROGRESS NOTE ADULT - SUBJECTIVE AND OBJECTIVE BOX
24 HOUR EVENTS  - IR perc lj  - Lipase downtrending      SUBJECTIVE/ROS:  [ ] A ten-point review of systems was otherwise negative except as noted.  [ ] Due to altered mental status/intubation, subjective information were not able to be obtained from the patient. History was obtained, to the extent possible, from review of the chart and collateral sources of information.      NEURO  Exam: awake, alert, oriented  Meds: acetaminophen   IVPB .. 500 milliGRAM(s) IV Intermittent every 6 hours PRN Temp greater or equal to 38C (100.4F), Mild Pain (1 - 3)  acetaminophen   IVPB .. 550 milliGRAM(s) IV Intermittent every 6 hours    [x] Adequacy of sedation and pain control has been assessed and adjusted      RESPIRATORY  RR: 17 (09-12-23 @ 04:00) (12 - 26)  SpO2: 94% (09-12-23 @ 04:00) (92% - 100%)  Wt(kg): --  Exam: unlabored, clear to auscultation bilaterally  Mechanical Ventilation:     [N/A] Extubation Readiness Assessed  Meds:       CARDIOVASCULAR  HR: 91 (09-12-23 @ 04:00) (49 - 116)  BP: 112/62 (09-12-23 @ 04:00) (99/58 - 156/81)  BP(mean): 83 (09-12-23 @ 04:00) (70 - 109)  ABP: --  ABP(mean): --  Wt(kg): --  CVP(cm H2O): --  VBG - ( 12 Sep 2023 02:45 )  pH: 7.42  /  pCO2: 32    /  pO2: 58    / HCO3: 21    / Base Excess: -3.3  /  SaO2: 91.6   Lactate: 0.8      Exam: regular rate and rhythm  Cardiac Rhythm: sinus  Perfusion     [x]Adequate   [ ]Inadequate  Mentation   [x]Normal       [ ]Reduced  Extremities  [x]Warm         [ ]Cool  Volume Status [ ]Hypervolemic [x]Euvolemic [ ]Hypovolemic  Meds:       GI/NUTRITION  Exam: soft, some tenderness at perc lj site, nondistended  Diet: CLD  Meds:     GENITOURINARY  I&O's Detail    09-11 @ 07:01  -  09-12 @ 04:53  --------------------------------------------------------  IN:    IV PiggyBack: 150 mL    IV PiggyBack: 650 mL    IV PiggyBack: 100 mL    Lactated Ringers: 1275 mL    Oral Fluid: 660 mL  Total IN: 2835 mL    OUT:    Drain (mL): 40 mL    Voided (mL): 1375 mL  Total OUT: 1415 mL    Total NET: 1420 mL        Weight (kg): 37.8 (09-11 @ 15:15)  09-12    138  |  107  |  7   ----------------------------<  120<H>  3.9   |  20<L>  |  0.57    Ca    7.8<L>      12 Sep 2023 03:06  Phos  2.2     09-12  Mg     2.3     09-12    TPro  5.2<L>  /  Alb  2.4<L>  /  TBili  2.6<H>  /  DBili  2.0<H>  /  AST  172<H>  /  ALT  94<H>  /  AlkPhos  543<H>  09-12    [ ] [place] catheter, indication: N/A  Meds: lactated ringers. 1000 milliLiter(s) IV Continuous <Continuous>        HEMATOLOGIC  Meds:   [x] VTE Prophylaxis                        6.8    10.02 )-----------( 292      ( 12 Sep 2023 03:07 )             22.7     PT/INR - ( 12 Sep 2023 03:06 )   PT: 12.2 sec;   INR: 1.17 ratio         PTT - ( 12 Sep 2023 03:06 )  PTT:28.9 sec  Transfusion     [ ] PRBC   [ ] Platelets   [ ] FFP   [ ] Cryoprecipitate      INFECTIOUS DISEASES  WBC Count: 10.02 K/uL (09-12 @ 03:07)  WBC Count: 9.33 K/uL (09-11 @ 21:13)  WBC Count: 13.48 K/uL (09-11 @ 13:10)  WBC Count: 15.27 K/uL (09-11 @ 07:01)    RECENT CULTURES:  Specimen Source: Bile Bile Fluid  Date/Time: 09-11 @ 17:44  Culture Results: --  Gram Stain:   polymorphonuclear leukocytes seen  Gram Variable Rods seen  by cytocentrifuge  Organism: --    Meds: influenza  Vaccine (HIGH DOSE) 0.7 milliLiter(s) IntraMuscular once  piperacillin/tazobactam IVPB.. 3.375 Gram(s) IV Intermittent every 8 hours        ENDOCRINE  CAPILLARY BLOOD GLUCOSE      POCT Blood Glucose.: 78 mg/dL (11 Sep 2023 19:21)  POCT Blood Glucose.: 78 mg/dL (11 Sep 2023 18:44)    Meds:       ACCESS DEVICES:  [ ] Peripheral IV  [ ] Central Venous Line	[ ] R	[ ] L	[ ] IJ	[ ] Fem	[ ] SC	Placed:   [ ] Arterial Line		[ ] R	[ ] L	[ ] Fem	[ ] Rad	[ ] Ax	Placed:   [ ] PICC:					[ ] Mediport  [ ] Urinary Catheter, Date Placed:   [x] Necessity of urinary, arterial, and venous catheters discussed    OTHER MEDICATIONS:  chlorhexidine 2% Cloths 1 Application(s) Topical <User Schedule>      CODE STATUS:      IMAGING:  < from: CT Abdomen and Pelvis w/ IV Cont (09.11.23 @ 09:54) >    ACC: [id number] EXAM:  CT ABDOMEN AND PELVIS IC   ORDERED BY: YIMI NY     PROCEDURE DATE:  09/11/2023          INTERPRETATION:  CLINICAL INFORMATION: Mid epigastric pain.    COMPARISON: None.    CONTRAST/COMPLICATIONS:  IV Contrast: Omnipaque 300  90 cc administered   10 cc discarded  Oral Contrast: NONE  Complications: None reported at time of study completion    PROCEDURE:  CT of the Abdomen and Pelvis was performed.  Sagittal and coronal reformats were performed.    FINDINGS:  LOWER CHEST: Atherosclerotic changes of the aorta and coronary arteries.   Mild bibasilar subsegmental atelectasis. Small left pleural effusion.    LIVER: Normal morphology. Scattered hepatic cysts.  BILE DUCTS: Moderate amount of air within the cystic duct.  GALLBLADDER: Thickening/edema of the gallbladder wall. Small amount of   gas in the gallbladder lumen. Antidependent gas in the gallbladder wall   in the gallbladder at the fundus.    Within normal limits.  PANCREAS: Within normal limits.  ADRENALS:Within normal limits.  KIDNEYS/URETERS: Within normal limits.    Limited evaluation of the pelvis due to streak artifact.  BLADDER: Within normal limits.  REPRODUCTIVE ORGANS: Uterus and adnexa within normal limits.    BOWEL: No bowel obstruction. Appendix is normal. Mild colonic wall   thickening, likely reactive in etiology.  PERITONEUM: Small volume pelvic ascites.  VESSELS: Atherosclerotic changes.  RETROPERITONEUM/LYMPH NODES: No lymphadenopathy.  ABDOMINAL WALL: Within normal limits.  BONES: Degenerative changes. Bilateral total hip arthroplasties.    IMPRESSION:  Emphysematous cholecystitis.    Wall thickening of colon at the hepatic flexure, likely reactive.    Small volume pelvic ascites.    Findings were discussed by Dr. Badillo with Dr. Feliciano on 9/11/2023   10:20 AM with read back confirmation.    --- End of Report ---          MONTY BADILLO MD; Resident Radiologist  This document has been electronically signed.  MARIYA HAHN MD; Attending Radiologist  This document has been electronically signed. Sep 11 2023 11:05AM    < end of copied text >  < from: Xray Chest 1 View- PORTABLE-Urgent (Xray Chest 1 View- PORTABLE-Urgent .) (09.11.23 @ 12:43) >    ACC: [id number] EXAM:  XR CHEST PORTABLE URGENT 1V   ORDERED BY: NATHANIEL CAIN     PROCEDURE DATE:  09/11/2023          INTERPRETATION:  EXAMINATION: XR CHEST URGENT    CLINICAL INDICATION: sepsis    TECHNIQUE: Single frontal, portable view of the chest was obtained.    COMPARISON: Chest x-ray None.    FINDINGS:  Thoracic aortic calcifications.  The heart is normal in size.  Left lower lung patchy opacity. Right lung is clear.  There is no pneumothorax or pleural effusion.  No acute bony abnormality.    IMPRESSION:  Left lower lung atelectasis or pneumonia    --- End of Report ---          BLAYNE WATT DO; Resident Radiologist  This document has been electronically signed.  CLIVE PIMENTEL MD; Attending Radiologist  This document has been electronically signed. Sep 11 2023  3:43PM    < end of copied text >

## 2023-09-12 NOTE — OCCUPATIONAL THERAPY INITIAL EVALUATION ADULT - PERTINENT HX OF CURRENT PROBLEM, REHAB EVAL
67-year-old female history of scleroderma, pulmonary hypertension on oxygen at home presents to the ED for 1 day of subjective fevers and chills, nausea and vomiting and diarrhea with generalized abdominal pain. In the ED, patient febrile to 104, tachycardic to 116. Currently on 4L NC. Labs significant for WBC of 15, TBili 1.8, Alk Phos 601, Lipase 1000. Imaging demonstrating emphysematous cholecystitis. Now s/p perc lj with IR on 9/11. Admitted to SICU for HD monitoring.  9/11 Chest Xray: Left lower lung atelectasis or pneumonia. 9/11 CT abd/pelvis: Emphysematous cholecystitis.Wall thickening of colon at the hepatic flexure, likely reactive.Small volume pelvic ascites. 67-year-old female history of scleroderma, pulmonary hypertension on oxygen at home presents to the ED for 1 day of subjective fevers and chills, nausea and vomiting and diarrhea with generalized abdominal pain. In the ED, patient febrile to 104, tachycardic to 116. Currently on 4L NC. Labs significant for WBC of 15, TBili 1.8, Alk Phos 601, Lipase 1000. Imaging demonstrating emphysematous cholecystitis. Now s/p perc lj with IR on 9/11. Admitted to SICU for HD monitoring.  9/11 Chest Xray: Left lower lung atelectasis or pneumonia. 9/11 CT abd/pelvis: Emphysematous cholecystitis. Wall thickening of colon at the hepatic flexure, likely reactive. Small volume pelvic ascites.

## 2023-09-12 NOTE — CHART NOTE - NSCHARTNOTEFT_GEN_A_CORE
SURGERY    Pt seen and examined after downgrade from SICU.  used. Patient denies any complaints. States that sometimes right side soreness near perc lj site when moving/coughing . Tolerating CLD with no nausea or vomiting. + flatus. - BM. Voiding.     ICU Vital Signs Last 24 Hrs  T(C): 36.4 (12 Sep 2023 18:00), Max: 38.6 (12 Sep 2023 04:00)  T(F): 97.5 (12 Sep 2023 18:00), Max: 101.5 (12 Sep 2023 04:00)  HR: 82 (12 Sep 2023 18:00) (67 - 101)  BP: 160/83 (12 Sep 2023 18:00) (96/56 - 172/79)  BP(mean): 109 (12 Sep 2023 17:00) (71 - 118)  ABP: --  ABP(mean): --  RR: 22 (12 Sep 2023 18:00) (14 - 32)  SpO2: 93% (12 Sep 2023 18:00) (90% - 98%)      I&O's Detail    11 Sep 2023 07:01  -  12 Sep 2023 07:00  --------------------------------------------------------  IN:    IV PiggyBack: 650 mL    IV PiggyBack: 100 mL    IV PiggyBack: 200 mL    Lactated Ringers: 1500 mL    Oral Fluid: 660 mL    PRBCs (Packed Red Blood Cells): 300 mL  Total IN: 3410 mL    OUT:    Drain (mL): 40 mL    Voided (mL): 1375 mL  Total OUT: 1415 mL    Total NET: 1995 mL      12 Sep 2023 07:01  -  12 Sep 2023 19:32  --------------------------------------------------------  IN:    IV PiggyBack: 50 mL    IV PiggyBack: 250 mL    IV PiggyBack: 175 mL    Lactated Ringers: 750 mL    Oral Fluid: 700 mL  Total IN: 1925 mL    OUT:    Drain (mL): 20 mL    Voided (mL): 403 mL  Total OUT: 423 mL    Total NET: 1502 mL          Physical exam: Pt sitting comfortably in bed in NAD  Chest- CTA bilaterally   CV- S1 & S2, RRR  Abdomen- Soft, non-tender, non-distended. (  ) BS    LABS:                        7.8    8.22  )-----------( 247      ( 12 Sep 2023 13:55 )             26.5     09-12    138  |  108  |  6<L>  ----------------------------<  102<H>  3.9   |  18<L>  |  0.49<L>    Ca    7.7<L>      12 Sep 2023 13:55  Phos  1.5     09-12  Mg     1.8     09-12    TPro  4.7<L>  /  Alb  2.2<L>  /  TBili  3.4<H>  /  DBili  2.8<H>  /  AST  109<H>  /  ALT  77<H>  /  AlkPhos  507<H>  09-12    PT/INR - ( 12 Sep 2023 03:06 )   PT: 12.2 sec;   INR: 1.17 ratio         PTT - ( 12 Sep 2023 03:06 )  PTT:28.9 sec  Urinalysis Basic - ( 12 Sep 2023 13:55 )    Color: x / Appearance: x / SG: x / pH: x  Gluc: 102 mg/dL / Ketone: x  / Bili: x / Urobili: x   Blood: x / Protein: x / Nitrite: x   Leuk Esterase: x / RBC: x / WBC x   Sq Epi: x / Non Sq Epi: x / Bacteria: x    Assessment and Plan:     67-year-old female history of scleroderma, pulmonary hypertension on oxygen at home presents to the ED for 1 day of subjective fevers and chills, nausea and vomiting and diarrhea with generalized abdominal pain. Labs significant for WBC of 15, TBili 1.8, Alk Phos 601, Lipase 1000. Imaging demonstrating emphysematous cholecystitis. Now s/p 9/11 percutaneous cholecystostomy tube placement by IR. Patient downgraded to 2 marj on 9/12 and is recovering well.     Plan:  - Zosyn  - Diet: CLD  - LR @ 75  - pain control  - Monitor GI function    Green surgery  [id number]. SURGERY    Pt seen and examined after downgrade from SICU.  used. Patient denies any complaints. States that sometimes right side soreness near perc lj site when moving/coughing . Tolerating CLD with no nausea or vomiting. + flatus. - BM. Voiding.     ICU Vital Signs Last 24 Hrs  T(C): 36.4 (12 Sep 2023 18:00), Max: 38.6 (12 Sep 2023 04:00)  T(F): 97.5 (12 Sep 2023 18:00), Max: 101.5 (12 Sep 2023 04:00)  HR: 82 (12 Sep 2023 18:00) (67 - 101)  BP: 160/83 (12 Sep 2023 18:00) (96/56 - 172/79)  BP(mean): 109 (12 Sep 2023 17:00) (71 - 118)  ABP: --  ABP(mean): --  RR: 22 (12 Sep 2023 18:00) (14 - 32)  SpO2: 93% (12 Sep 2023 18:00) (90% - 98%)      I&O's Detail    11 Sep 2023 07:01  -  12 Sep 2023 07:00  --------------------------------------------------------  IN:    IV PiggyBack: 650 mL    IV PiggyBack: 100 mL    IV PiggyBack: 200 mL    Lactated Ringers: 1500 mL    Oral Fluid: 660 mL    PRBCs (Packed Red Blood Cells): 300 mL  Total IN: 3410 mL    OUT:    Drain (mL): 40 mL    Voided (mL): 1375 mL  Total OUT: 1415 mL    Total NET: 1995 mL      12 Sep 2023 07:01  -  12 Sep 2023 19:32  --------------------------------------------------------  IN:    IV PiggyBack: 50 mL    IV PiggyBack: 250 mL    IV PiggyBack: 175 mL    Lactated Ringers: 750 mL    Oral Fluid: 700 mL  Total IN: 1925 mL    OUT:    Drain (mL): 20 mL    Voided (mL): 403 mL  Total OUT: 423 mL    Total NET: 1502 mL          Physical exam: Pt sitting comfortably in bed in NAD  Chest- CTA bilaterally   CV- S1 & S2, RRR  Abdomen- Soft, non-tender, non-distended. (  ) BS    LABS:                        7.8    8.22  )-----------( 247      ( 12 Sep 2023 13:55 )             26.5     09-12    138  |  108  |  6<L>  ----------------------------<  102<H>  3.9   |  18<L>  |  0.49<L>    Ca    7.7<L>      12 Sep 2023 13:55  Phos  1.5     09-12  Mg     1.8     09-12    TPro  4.7<L>  /  Alb  2.2<L>  /  TBili  3.4<H>  /  DBili  2.8<H>  /  AST  109<H>  /  ALT  77<H>  /  AlkPhos  507<H>  09-12    PT/INR - ( 12 Sep 2023 03:06 )   PT: 12.2 sec;   INR: 1.17 ratio         PTT - ( 12 Sep 2023 03:06 )  PTT:28.9 sec  Urinalysis Basic - ( 12 Sep 2023 13:55 )    Color: x / Appearance: x / SG: x / pH: x  Gluc: 102 mg/dL / Ketone: x  / Bili: x / Urobili: x   Blood: x / Protein: x / Nitrite: x   Leuk Esterase: x / RBC: x / WBC x   Sq Epi: x / Non Sq Epi: x / Bacteria: x    Assessment and Plan:     67-year-old female history of scleroderma, pulmonary hypertension on oxygen at home presents to the ED for 1 day of subjective fevers and chills, nausea and vomiting and diarrhea with generalized abdominal pain. Labs significant for WBC of 15, TBili 1.8, Alk Phos 601, Lipase 1000. Imaging demonstrating emphysematous cholecystitis. Now s/p 9/11 percutaneous cholecystostomy tube placement by IR. Patient downgraded to 2 marj on 9/12 and is recovering well.     Plan:  - Zosyn  - Diet: CLD  - LR @ 75  - pain control  - Monitor GI function  - restarted dvt ppx (ok per IR after 24 hours)    Green surgery  [id number].

## 2023-09-12 NOTE — PHYSICAL THERAPY INITIAL EVALUATION ADULT - GAIT DEVIATIONS NOTED, PT EVAL
antalgic gait pattern patient states 2/2 right ankle swelling started 1 month ago/decreased jeanette

## 2023-09-12 NOTE — PROGRESS NOTE ADULT - ASSESSMENT
67-year-old female history of scleroderma, pulmonary hypertension on oxygen at home presents to the ED for 1 day of subjective fevers and chills, nausea and vomiting and diarrhea with generalized abdominal pain. Labs significant for WBC of 15, TBili 1.8, Alk Phos 601, Lipase 1000. Imaging demonstrating emphysematous cholecystitis. Now s/p 9/11 percutaneous cholecystostomy tube placement by IR is now recovering well in SICU.    Plan:  - Zosyn  - Diet: CLD  - LR @ 75  - pain control  - rest of excellent care per SICU    Green surgery  [id number]. 67-year-old female history of scleroderma, pulmonary hypertension on oxygen at home presents to the ED for 1 day of subjective fevers and chills, nausea and vomiting and diarrhea with generalized abdominal pain. Labs significant for WBC of 15, TBili 1.8, Alk Phos 601, Lipase 1000. Imaging demonstrating emphysematous cholecystitis. Now s/p 9/11 percutaneous cholecystostomy tube placement by IR is now recovering well in SICU.    Plan:  - Zosyn  - Diet: CLD  - LR @ 75  - pain control  - Monitor GI function  - F/u CBC s/p transfusion  - Trend fever  - rest of excellent care per SICU    Green surgery  [id number].

## 2023-09-12 NOTE — PROGRESS NOTE ADULT - ASSESSMENT
67-year-old female history of scleroderma, pulmonary hypertension on oxygen at home presents to the ED for 1 day of subjective fevers and chills, nausea and vomiting and diarrhea with generalized abdominal pain. In the ED, patient febrile to 104, tachycardic to 116. Currently on 4L NC. Labs significant for WBC of 15, TBili 1.8, Alk Phos 601, Lipase 1000. Imaging demonstrating emphysematous cholecystitis. Now s/p perc lj with IR on 9/11. Admitted to SICU for HD monitoring.    PLAN  Neuro:  - pain: IV Tylenol   - lactate uptrending:3.0 (9/11)    Resp: pulmonary HTN on home O2  - 2L NC     CV:  - HDS    GI: emphysematous cholecystitis s/p IR perc lj 9/11  - CLD  - transaminitis, elevated Alkphos, Tbili, Lipase 1000+; improving    :  - LR 75cc/h  - staight cath x1 in the ED    Heme:  - holding chemical DVT ppx  - mechanical VTE ppx: SCDs    ID:  - empiric zosyn    Endo:  - SSI  - f/u HbA1c    Lines/Tubes/Drains:  PIV    Dispo: SICU  - will need  for emergency medicaid coverage    Daughter's phone #: [phone] (Mandarin-speaking only)  Medication list (limited because no English equivalent to Chinese medication)  - HTN medication (continues to take)  - Rheumatoid arthritis medication (ran out of recently, needs refill)  - Sclerosis maintenance medication (hasn't taken for 6 months)  - Pulmonary HTN medication (doesn't take anymore)  - Osteoporosis Ca and Vit D supplements (continues to take)  - Sleep medication (continues to take)  Patient is scheduled to have initial Family Medicine appointment on 11/29/2023 where [place]

## 2023-09-12 NOTE — OCCUPATIONAL THERAPY INITIAL EVALUATION ADULT - GENERAL OBSERVATIONS, REHAB EVAL
pt received semisupine in bed+ICU, IV, drain pt received semisupine in bed+ICU monitoring, IV, drain

## 2023-09-12 NOTE — PHYSICAL THERAPY INITIAL EVALUATION ADULT - ADDITIONAL COMMENTS
Patient lives in house with spouse and daughter, no stairs to enter, no stairs inside. Patient was independent with ADLS/IADLs, ambulates without assistive device, states she has a rolling walker at home she can use upon discharge if necessary

## 2023-09-12 NOTE — PHYSICAL THERAPY INITIAL EVALUATION ADULT - PERTINENT HX OF CURRENT PROBLEM, REHAB EVAL
67-year-old female history of scleroderma, pulmonary hypertension on oxygen at home presents to the ED for 1 day of subjective fevers and chills, nausea and vomiting and diarrhea with generalized abdominal pain. Labs significant for WBC of 15, TBili 1.8, Alk Phos 601, Lipase 1000. Imaging demonstrating emphysematous cholecystitis. Now s/p 9/11 percutaneous cholecystostomy tube placement by IR is now recovering well in SICU.  9/11/23: CT abdomen/pelvis: Emphysematous cholecystitis. Wall thickening of colon at the hepatic flexure, likely reactive. Small volume pelvic ascites.  Chest xray: Left lower lung atelectasis or pneumonia  IR s/p lj drain placement

## 2023-09-12 NOTE — PROGRESS NOTE ADULT - TIME BILLING
I saw and evaluated patient and agree with above note  s/p cholecystostomy tube yesterday  no sign of systemic toxicity  coordinating with primary team for possible transfer to the floor

## 2023-09-12 NOTE — PHYSICAL THERAPY INITIAL EVALUATION ADULT - CRITERIA FOR SKILLED THERAPEUTIC INTERVENTIONS
Appt Needed  
Attempt # 1  Outcome: Left Message   Comment: lvm with pt stating needs to make appt to see pcp for med review    
Due for an appointment with Dr Borja  
Patient scheduled for 7/27  
impairments found

## 2023-09-12 NOTE — PROGRESS NOTE ADULT - SUBJECTIVE AND OBJECTIVE BOX
Overnight: POC completed.     SUBJECTIVE: Patient seen and examined on Am rounds.  used. Patient endorses feeling well. Only complaint is a poking feeling on right side of abdomen. Tolerating CLD with no nausea or vomiting. - Flatus. - Bm. Voiding appropriately.     Vital Signs Last 24 Hrs  T(C): 36.7 (12 Sep 2023 00:00), Max: 40.3 (11 Sep 2023 06:34)  T(F): 98.1 (12 Sep 2023 00:00), Max: 104.5 (11 Sep 2023 06:34)  HR: 97 (12 Sep 2023 00:00) (49 - 116)  BP: 130/64 (12 Sep 2023 00:00) (99/58 - 156/81)  BP(mean): 92 (12 Sep 2023 00:00) (70 - 109)  RR: 19 (12 Sep 2023 00:00) (12 - 26)  SpO2: 94% (12 Sep 2023 00:00) (93% - 100%)    Parameters below as of 11 Sep 2023 20:00  Patient On (Oxygen Delivery Method): room air        I&O's Detail    11 Sep 2023 07:01  -  12 Sep 2023 00:49  --------------------------------------------------------  IN:    IV PiggyBack: 150 mL    IV PiggyBack: 650 mL    Lactated Ringers: 975 mL    Oral Fluid: 660 mL  Total IN: 2435 mL    OUT:    Drain (mL): 40 mL    Voided (mL): 1050 mL  Total OUT: 1090 mL    Total NET: 1345 mL      Physical Exam:  General Appearance: Appears well, NAD  Respiratory: No acute resp distress, no accesory muscle use  Abdomen: soft, minimal tenderness near perc lj tube, nondistended  Extremities: Grossly symmetric, SCD's in place   drains: 8Fr percutaneous cholecystostomy catheter in RUQ    LABS:                        7.6    9.33  )-----------( 271      ( 11 Sep 2023 21:13 )             26.0     09-11    143  |  110<H>  |  8   ----------------------------<  98  3.8   |  18<L>  |  0.54    Ca    7.1<L>      11 Sep 2023 21:13  Phos  4.0     09-11  Mg     1.7     09-11    TPro  5.5<L>  /  Alb  2.7<L>  /  TBili  2.4<H>  /  DBili  2.0<H>  /  AST  192<H>  /  ALT  99<H>  /  AlkPhos  601<H>  09-11    PT/INR - ( 11 Sep 2023 13:10 )   PT: 12.1 sec;   INR: 1.10 ratio         PTT - ( 11 Sep 2023 13:10 )  PTT:23.3 sec  Urinalysis Basic - ( 11 Sep 2023 21:13 )    Color: x / Appearance: x / SG: x / pH: x  Gluc: 98 mg/dL / Ketone: x  / Bili: x / Urobili: x   Blood: x / Protein: x / Nitrite: x   Leuk Esterase: x / RBC: x / WBC x   Sq Epi: x / Non Sq Epi: x / Bacteria: x        RADIOLOGY & ADDITIONAL STUDIES:   Overnight: POC completed. Patient Hgb downtrend 7.6 -> 6.8. SICU ordered 1 unit transfusion.    SUBJECTIVE: Patient seen and examined on Am rounds.  used. Patient endorses feeling well. Only complaint is a poking feeling on right side of abdomen. Tolerating CLD with no nausea or vomiting. - Flatus. - Bm. Voiding appropriately.     Vital Signs Last 24 Hrs  T(C): 36.7 (12 Sep 2023 00:00), Max: 40.3 (11 Sep 2023 06:34)  T(F): 98.1 (12 Sep 2023 00:00), Max: 104.5 (11 Sep 2023 06:34)  HR: 97 (12 Sep 2023 00:00) (49 - 116)  BP: 130/64 (12 Sep 2023 00:00) (99/58 - 156/81)  BP(mean): 92 (12 Sep 2023 00:00) (70 - 109)  RR: 19 (12 Sep 2023 00:00) (12 - 26)  SpO2: 94% (12 Sep 2023 00:00) (93% - 100%)    Parameters below as of 11 Sep 2023 20:00  Patient On (Oxygen Delivery Method): room air        I&O's Detail    11 Sep 2023 07:01  -  12 Sep 2023 00:49  --------------------------------------------------------  IN:    IV PiggyBack: 150 mL    IV PiggyBack: 650 mL    Lactated Ringers: 975 mL    Oral Fluid: 660 mL  Total IN: 2435 mL    OUT:    Drain (mL): 40 mL    Voided (mL): 1050 mL  Total OUT: 1090 mL    Total NET: 1345 mL      Physical Exam:  General Appearance: Appears well, NAD  Respiratory: No acute resp distress, no accesory muscle use  Abdomen: soft, minimal tenderness near perc lj tube, nondistended  Extremities: Grossly symmetric, SCD's in place   drains: 8Fr percutaneous cholecystostomy catheter in RUQ    LABS:                        7.6    9.33  )-----------( 271      ( 11 Sep 2023 21:13 )             26.0     09-11    143  |  110<H>  |  8   ----------------------------<  98  3.8   |  18<L>  |  0.54    Ca    7.1<L>      11 Sep 2023 21:13  Phos  4.0     09-11  Mg     1.7     09-11    TPro  5.5<L>  /  Alb  2.7<L>  /  TBili  2.4<H>  /  DBili  2.0<H>  /  AST  192<H>  /  ALT  99<H>  /  AlkPhos  601<H>  09-11    PT/INR - ( 11 Sep 2023 13:10 )   PT: 12.1 sec;   INR: 1.10 ratio         PTT - ( 11 Sep 2023 13:10 )  PTT:23.3 sec  Urinalysis Basic - ( 11 Sep 2023 21:13 )    Color: x / Appearance: x / SG: x / pH: x  Gluc: 98 mg/dL / Ketone: x  / Bili: x / Urobili: x   Blood: x / Protein: x / Nitrite: x   Leuk Esterase: x / RBC: x / WBC x   Sq Epi: x / Non Sq Epi: x / Bacteria: x        RADIOLOGY & ADDITIONAL STUDIES:   Overnight: POC completed. Patient Hgb downtrend 7.6 -> 6.8. SICU ordered 1 unit transfusion. Febrile 101.5.    SUBJECTIVE: Patient seen and examined on Am rounds. Patient appears well. Only complaint overnight is a poking feeling on right side of abdomen. Tolerating CLD with no nausea or vomiting.     Vital Signs Last 24 Hrs  T(C): 36.7 (12 Sep 2023 00:00), Max: 40.3 (11 Sep 2023 06:34)  T(F): 98.1 (12 Sep 2023 00:00), Max: 104.5 (11 Sep 2023 06:34)  HR: 97 (12 Sep 2023 00:00) (49 - 116)  BP: 130/64 (12 Sep 2023 00:00) (99/58 - 156/81)  BP(mean): 92 (12 Sep 2023 00:00) (70 - 109)  RR: 19 (12 Sep 2023 00:00) (12 - 26)  SpO2: 94% (12 Sep 2023 00:00) (93% - 100%)    Parameters below as of 11 Sep 2023 20:00  Patient On (Oxygen Delivery Method): room air        I&O's Detail    11 Sep 2023 07:01  -  12 Sep 2023 00:49  --------------------------------------------------------  IN:    IV PiggyBack: 150 mL    IV PiggyBack: 650 mL    Lactated Ringers: 975 mL    Oral Fluid: 660 mL  Total IN: 2435 mL    OUT:    Drain (mL): 40 mL    Voided (mL): 1050 mL  Total OUT: 1090 mL    Total NET: 1345 mL      Physical Exam:  General Appearance: Appears well, NAD  Respiratory: No acute resp distress, no accesory muscle use  Abdomen: soft, nontender, nondistended  Extremities: Grossly symmetric, SCD's in place   drains: 8Fr percutaneous cholecystostomy catheter in RUQ with serous fluid    LABS:                        7.6    9.33  )-----------( 271      ( 11 Sep 2023 21:13 )             26.0     09-11    143  |  110<H>  |  8   ----------------------------<  98  3.8   |  18<L>  |  0.54    Ca    7.1<L>      11 Sep 2023 21:13  Phos  4.0     09-11  Mg     1.7     09-11    TPro  5.5<L>  /  Alb  2.7<L>  /  TBili  2.4<H>  /  DBili  2.0<H>  /  AST  192<H>  /  ALT  99<H>  /  AlkPhos  601<H>  09-11    PT/INR - ( 11 Sep 2023 13:10 )   PT: 12.1 sec;   INR: 1.10 ratio         PTT - ( 11 Sep 2023 13:10 )  PTT:23.3 sec  Urinalysis Basic - ( 11 Sep 2023 21:13 )    Color: x / Appearance: x / SG: x / pH: x  Gluc: 98 mg/dL / Ketone: x  / Bili: x / Urobili: x   Blood: x / Protein: x / Nitrite: x   Leuk Esterase: x / RBC: x / WBC x   Sq Epi: x / Non Sq Epi: x / Bacteria: x        RADIOLOGY & ADDITIONAL STUDIES:

## 2023-09-12 NOTE — OCCUPATIONAL THERAPY INITIAL EVALUATION ADULT - ADDITIONAL COMMENTS
pt reports lives in private home with spouse and daughter, 0 steps to enter, no stairs inside, tub shower. Prior to admission Ind with ADLs/ambulation, no AD/DME. Owns RW.

## 2023-09-13 DIAGNOSIS — E78.00 PURE HYPERCHOLESTEROLEMIA, UNSPECIFIED: ICD-10-CM

## 2023-09-13 DIAGNOSIS — Z29.9 ENCOUNTER FOR PROPHYLACTIC MEASURES, UNSPECIFIED: ICD-10-CM

## 2023-09-13 DIAGNOSIS — M06.9 RHEUMATOID ARTHRITIS, UNSPECIFIED: ICD-10-CM

## 2023-09-13 DIAGNOSIS — I10 ESSENTIAL (PRIMARY) HYPERTENSION: ICD-10-CM

## 2023-09-13 DIAGNOSIS — K81.0 ACUTE CHOLECYSTITIS: ICD-10-CM

## 2023-09-13 DIAGNOSIS — Z87.39 PERSONAL HISTORY OF OTHER DISEASES OF THE MUSCULOSKELETAL SYSTEM AND CONNECTIVE TISSUE: ICD-10-CM

## 2023-09-13 LAB
-  AMIKACIN: SIGNIFICANT CHANGE UP
-  AMOXICILLIN/CLAVULANIC ACID: SIGNIFICANT CHANGE UP
-  AMPICILLIN/SULBACTAM: SIGNIFICANT CHANGE UP
-  AMPICILLIN: SIGNIFICANT CHANGE UP
-  AZTREONAM: SIGNIFICANT CHANGE UP
-  CEFAZOLIN: SIGNIFICANT CHANGE UP
-  CEFEPIME: SIGNIFICANT CHANGE UP
-  CEFOXITIN: SIGNIFICANT CHANGE UP
-  CEFTRIAXONE: SIGNIFICANT CHANGE UP
-  CIPROFLOXACIN: SIGNIFICANT CHANGE UP
-  ERTAPENEM: SIGNIFICANT CHANGE UP
-  GENTAMICIN: SIGNIFICANT CHANGE UP
-  IMIPENEM: SIGNIFICANT CHANGE UP
-  LEVOFLOXACIN: SIGNIFICANT CHANGE UP
-  MEROPENEM: SIGNIFICANT CHANGE UP
-  PIPERACILLIN/TAZOBACTAM: SIGNIFICANT CHANGE UP
-  TOBRAMYCIN: SIGNIFICANT CHANGE UP
-  TRIMETHOPRIM/SULFAMETHOXAZOLE: SIGNIFICANT CHANGE UP
ALBUMIN SERPL ELPH-MCNC: 2.4 G/DL — LOW (ref 3.3–5)
ALP SERPL-CCNC: 517 U/L — HIGH (ref 40–120)
ALT FLD-CCNC: 64 U/L — HIGH (ref 10–45)
ANION GAP SERPL CALC-SCNC: 10 MMOL/L — SIGNIFICANT CHANGE UP (ref 5–17)
AST SERPL-CCNC: 63 U/L — HIGH (ref 10–40)
BASE EXCESS BLDV CALC-SCNC: -1 MMOL/L — SIGNIFICANT CHANGE UP (ref -2–3)
BILIRUB SERPL-MCNC: 1.8 MG/DL — HIGH (ref 0.2–1.2)
BUN SERPL-MCNC: 8 MG/DL — SIGNIFICANT CHANGE UP (ref 7–23)
CA-I SERPL-SCNC: 1.15 MMOL/L — SIGNIFICANT CHANGE UP (ref 1.15–1.33)
CALCIUM SERPL-MCNC: 7.7 MG/DL — LOW (ref 8.4–10.5)
CHLORIDE BLDV-SCNC: 107 MMOL/L — SIGNIFICANT CHANGE UP (ref 96–108)
CHLORIDE SERPL-SCNC: 106 MMOL/L — SIGNIFICANT CHANGE UP (ref 96–108)
CO2 BLDV-SCNC: 25 MMOL/L — SIGNIFICANT CHANGE UP (ref 22–26)
CO2 SERPL-SCNC: 21 MMOL/L — LOW (ref 22–31)
CREAT SERPL-MCNC: 0.62 MG/DL — SIGNIFICANT CHANGE UP (ref 0.5–1.3)
EGFR: 98 ML/MIN/1.73M2 — SIGNIFICANT CHANGE UP
GAS PNL BLDV: 136 MMOL/L — SIGNIFICANT CHANGE UP (ref 136–145)
GAS PNL BLDV: SIGNIFICANT CHANGE UP
GAS PNL BLDV: SIGNIFICANT CHANGE UP
GLUCOSE BLDV-MCNC: 75 MG/DL — SIGNIFICANT CHANGE UP (ref 70–99)
GLUCOSE SERPL-MCNC: 82 MG/DL — SIGNIFICANT CHANGE UP (ref 70–99)
HCO3 BLDV-SCNC: 24 MMOL/L — SIGNIFICANT CHANGE UP (ref 22–29)
HCT VFR BLD CALC: 26.8 % — LOW (ref 34.5–45)
HCT VFR BLDA CALC: 24 % — LOW (ref 34.5–46.5)
HCV AB S/CO SERPL IA: 0.19 S/CO — SIGNIFICANT CHANGE UP (ref 0–0.99)
HCV AB SERPL-IMP: SIGNIFICANT CHANGE UP
HGB BLD CALC-MCNC: 8.1 G/DL — LOW (ref 11.7–16.1)
HGB BLD-MCNC: 8.1 G/DL — LOW (ref 11.5–15.5)
LACTATE BLDV-MCNC: 0.9 MMOL/L — SIGNIFICANT CHANGE UP (ref 0.5–2)
MAGNESIUM SERPL-MCNC: 2.4 MG/DL — SIGNIFICANT CHANGE UP (ref 1.6–2.6)
MCHC RBC-ENTMCNC: 23.6 PG — LOW (ref 27–34)
MCHC RBC-ENTMCNC: 30.2 GM/DL — LOW (ref 32–36)
MCV RBC AUTO: 78.1 FL — LOW (ref 80–100)
METHOD TYPE: SIGNIFICANT CHANGE UP
NRBC # BLD: 0 /100 WBCS — SIGNIFICANT CHANGE UP (ref 0–0)
PCO2 BLDV: 37 MMHG — LOW (ref 39–42)
PH BLDV: 7.41 — SIGNIFICANT CHANGE UP (ref 7.32–7.43)
PHOSPHATE SERPL-MCNC: 1.8 MG/DL — LOW (ref 2.5–4.5)
PLATELET # BLD AUTO: 268 K/UL — SIGNIFICANT CHANGE UP (ref 150–400)
PO2 BLDV: 95 MMHG — HIGH (ref 25–45)
POTASSIUM BLDV-SCNC: 4 MMOL/L — SIGNIFICANT CHANGE UP (ref 3.5–5.1)
POTASSIUM SERPL-MCNC: 4.2 MMOL/L — SIGNIFICANT CHANGE UP (ref 3.5–5.3)
POTASSIUM SERPL-SCNC: 4.2 MMOL/L — SIGNIFICANT CHANGE UP (ref 3.5–5.3)
PROT SERPL-MCNC: 5.4 G/DL — LOW (ref 6–8.3)
RBC # BLD: 3.43 M/UL — LOW (ref 3.8–5.2)
RBC # FLD: 18.2 % — HIGH (ref 10.3–14.5)
SAO2 % BLDV: 98.4 % — HIGH (ref 67–88)
SODIUM SERPL-SCNC: 137 MMOL/L — SIGNIFICANT CHANGE UP (ref 135–145)
WBC # BLD: 7 K/UL — SIGNIFICANT CHANGE UP (ref 3.8–10.5)
WBC # FLD AUTO: 7 K/UL — SIGNIFICANT CHANGE UP (ref 3.8–10.5)

## 2023-09-13 PROCEDURE — 99223 1ST HOSP IP/OBS HIGH 75: CPT

## 2023-09-13 RX ORDER — ACETAMINOPHEN 500 MG
650 TABLET ORAL EVERY 6 HOURS
Refills: 0 | Status: DISCONTINUED | OUTPATIENT
Start: 2023-09-13 | End: 2023-09-14

## 2023-09-13 RX ORDER — LOSARTAN POTASSIUM 100 MG/1
25 TABLET, FILM COATED ORAL DAILY
Refills: 0 | Status: DISCONTINUED | OUTPATIENT
Start: 2023-09-13 | End: 2023-09-14

## 2023-09-13 RX ORDER — SODIUM,POTASSIUM PHOSPHATES 278-250MG
1 POWDER IN PACKET (EA) ORAL ONCE
Refills: 0 | Status: DISCONTINUED | OUTPATIENT
Start: 2023-09-13 | End: 2023-09-13

## 2023-09-13 RX ADMIN — Medication 85 MILLIMOLE(S): at 10:47

## 2023-09-13 RX ADMIN — PIPERACILLIN AND TAZOBACTAM 25 GRAM(S): 4; .5 INJECTION, POWDER, LYOPHILIZED, FOR SOLUTION INTRAVENOUS at 00:08

## 2023-09-13 RX ADMIN — ENOXAPARIN SODIUM 30 MILLIGRAM(S): 100 INJECTION SUBCUTANEOUS at 06:42

## 2023-09-13 RX ADMIN — PIPERACILLIN AND TAZOBACTAM 25 GRAM(S): 4; .5 INJECTION, POWDER, LYOPHILIZED, FOR SOLUTION INTRAVENOUS at 06:41

## 2023-09-13 RX ADMIN — PIPERACILLIN AND TAZOBACTAM 25 GRAM(S): 4; .5 INJECTION, POWDER, LYOPHILIZED, FOR SOLUTION INTRAVENOUS at 21:46

## 2023-09-13 RX ADMIN — CHLORHEXIDINE GLUCONATE 1 APPLICATION(S): 213 SOLUTION TOPICAL at 06:00

## 2023-09-13 RX ADMIN — PIPERACILLIN AND TAZOBACTAM 25 GRAM(S): 4; .5 INJECTION, POWDER, LYOPHILIZED, FOR SOLUTION INTRAVENOUS at 13:12

## 2023-09-13 RX ADMIN — LOSARTAN POTASSIUM 25 MILLIGRAM(S): 100 TABLET, FILM COATED ORAL at 15:52

## 2023-09-13 RX ADMIN — Medication 4 MILLIGRAM(S): at 15:52

## 2023-09-13 RX ADMIN — Medication 650 MILLIGRAM(S): at 10:29

## 2023-09-13 RX ADMIN — Medication 650 MILLIGRAM(S): at 11:30

## 2023-09-13 NOTE — PROGRESS NOTE ADULT - ASSESSMENT
67-year-old female history of scleroderma, pulmonary hypertension on oxygen at home presents to the ED for 1 day of subjective fevers and chills, nausea and vomiting and diarrhea with generalized abdominal pain. Labs significant for WBC of 15, TBili 1.8, Alk Phos 601, Lipase 1000. Imaging demonstrating emphysematous cholecystitis. Now s/p 9/11 percutaneous cholecystostomy tube placement by IR. Downgraded from SICU on 9/12    Plan:  - Zosyn  - Diet: CLD, advance as tolerating  - LR @ 75  - pain control  - Monitor GI function  - OOB as tolerating    Green surgery  [id number].   67-year-old female history of scleroderma, pulmonary hypertension on oxygen at home presents to the ED for 1 day of subjective fevers and chills, nausea and vomiting and diarrhea with generalized abdominal pain. Labs significant for WBC of 15, TBili 1.8, Alk Phos 601, Lipase 1000. Imaging demonstrating emphysematous cholecystitis. Now s/p 9/11 percutaneous cholecystostomy tube placement by IR. Downgraded from SICU on 9/12.     Plan:  - Zosyn  - Diet: CLD, advance as tolerating  - LR @ 75, IVL as tolerating  - pain control  - Monitor GI function  - OOB as tolerating  - Trend t bili  - Formal med rec    Green surgery  [id number].

## 2023-09-13 NOTE — CONSULT NOTE ADULT - PROBLEM SELECTOR RECOMMENDATION 3
states she was on a methylprednisolone taper and now on once daily dosing  if this is true likely low dose at this point - primary team to clarify home medications   patient states if not taking medication she gets ankle swelling and joint pain - no active symptoms at this time  if on steroids would need to continue. if medrol dose pack and truly only taking once daily now then it may be at 4mg qd.  monitor bp states she was on a methylprednisolone taper and now on once daily dosing  if this is true likely low dose at this point - primary team to clarify home medications   patient states if not taking medication she gets ankle swelling and joint pain - no active symptoms at this time  if on steroids would need to continue. if medrol dose pack and truly only taking once daily now then it may be at 4mg qd. However, if patient on higher dose and for a prolonged time may need higher dosing in the setting of infection. currently bp not low. if hypotensive, would consider stress steroids   monitor bp

## 2023-09-13 NOTE — CONSULT NOTE ADULT - NSCONSULTADDITIONALINFOA_GEN_ALL_CORE
can restart vitamin c 500mg qd and folic acid 1mg qd can restart vitamin c 500mg qd and folic acid 1mg qd    please call with any questions  [phone] can restart vitamin c 500mg qd and folic acid 1mg qd    please call with any questions  [phone]

## 2023-09-13 NOTE — PROGRESS NOTE ADULT - SUBJECTIVE AND OBJECTIVE BOX
Overnight: Patient transferred down from SICU. Restarted DVT ppx after confirming with IR.     SUBJECTIVE: Patient seen and examined on Am rounds. Patient appears well. Endorses minimal abdominal soreness. Tolerating CLD with no nausea or vomiting. + flatus. - BM. Voiding appropriately.    Vital Signs Last 24 Hrs  T(C): 36.7 (13 Sep 2023 00:25), Max: 38.6 (12 Sep 2023 04:00)  T(F): 98 (13 Sep 2023 00:25), Max: 101.5 (12 Sep 2023 04:00)  HR: 90 (13 Sep 2023 00:25) (67 - 92)  BP: 159/89 (13 Sep 2023 00:25) (96/56 - 172/79)  BP(mean): 109 (12 Sep 2023 17:00) (71 - 118)  RR: 18 (13 Sep 2023 00:25) (15 - 32)  SpO2: 94% (13 Sep 2023 00:25) (90% - 96%)    Parameters below as of 13 Sep 2023 00:25  Patient On (Oxygen Delivery Method): room air        I&O's Detail    11 Sep 2023 07:01  -  12 Sep 2023 07:00  --------------------------------------------------------  IN:    IV PiggyBack: 650 mL    IV PiggyBack: 100 mL    IV PiggyBack: 200 mL    Lactated Ringers: 1500 mL    Oral Fluid: 660 mL    PRBCs (Packed Red Blood Cells): 300 mL  Total IN: 3410 mL    OUT:    Drain (mL): 40 mL    Voided (mL): 1375 mL  Total OUT: 1415 mL    Total NET: 1995 mL      12 Sep 2023 07:01  -  13 Sep 2023 01:21  --------------------------------------------------------  IN:    IV PiggyBack: 50 mL    IV PiggyBack: 250 mL    IV PiggyBack: 275 mL    Lactated Ringers: 750 mL    Oral Fluid: 800 mL  Total IN: 2125 mL    OUT:    Drain (mL): 20 mL    Voided (mL): 703 mL  Total OUT: 723 mL    Total NET: 1402 mL    Physical Exam:  General Appearance: Appears well, NAD  Respiratory: No acute resp distress, no accesory muscle use  Abdomen: soft, nontender, nondistended  Extremities: Grossly symmetric, SCD's in place   drains: 8Fr percutaneous cholecystostomy catheter in RUQ with serous fluid    LABS:                        7.8    8.22  )-----------( 247      ( 12 Sep 2023 13:55 )             26.5     09-12    138  |  108  |  6<L>  ----------------------------<  102<H>  3.9   |  18<L>  |  0.49<L>    Ca    7.7<L>      12 Sep 2023 13:55  Phos  1.5     09-12  Mg     1.8     09-12    TPro  4.7<L>  /  Alb  2.2<L>  /  TBili  3.4<H>  /  DBili  2.8<H>  /  AST  109<H>  /  ALT  77<H>  /  AlkPhos  507<H>  09-12    PT/INR - ( 12 Sep 2023 03:06 )   PT: 12.2 sec;   INR: 1.17 ratio         PTT - ( 12 Sep 2023 03:06 )  PTT:28.9 sec  Urinalysis Basic - ( 12 Sep 2023 13:55 )    Color: x / Appearance: x / SG: x / pH: x  Gluc: 102 mg/dL / Ketone: x  / Bili: x / Urobili: x   Blood: x / Protein: x / Nitrite: x   Leuk Esterase: x / RBC: x / WBC x   Sq Epi: x / Non Sq Epi: x / Bacteria: x        RADIOLOGY & ADDITIONAL STUDIES:   Overnight: Patient transferred down from SICU. Restarted DVT ppx after confirming with IR.     SUBJECTIVE: Patient seen and examined on Am rounds. Patient appears well. Endorses minimal abdominal soreness. Tolerating CLD with no nausea or vomiting.    Vital Signs Last 24 Hrs  T(C): 36.7 (13 Sep 2023 00:25), Max: 38.6 (12 Sep 2023 04:00)  T(F): 98 (13 Sep 2023 00:25), Max: 101.5 (12 Sep 2023 04:00)  HR: 90 (13 Sep 2023 00:25) (67 - 92)  BP: 159/89 (13 Sep 2023 00:25) (96/56 - 172/79)  BP(mean): 109 (12 Sep 2023 17:00) (71 - 118)  RR: 18 (13 Sep 2023 00:25) (15 - 32)  SpO2: 94% (13 Sep 2023 00:25) (90% - 96%)    Parameters below as of 13 Sep 2023 00:25  Patient On (Oxygen Delivery Method): room air        I&O's Detail    11 Sep 2023 07:01  -  12 Sep 2023 07:00  --------------------------------------------------------  IN:    IV PiggyBack: 650 mL    IV PiggyBack: 100 mL    IV PiggyBack: 200 mL    Lactated Ringers: 1500 mL    Oral Fluid: 660 mL    PRBCs (Packed Red Blood Cells): 300 mL  Total IN: 3410 mL    OUT:    Drain (mL): 40 mL    Voided (mL): 1375 mL  Total OUT: 1415 mL    Total NET: 1995 mL      12 Sep 2023 07:01  -  13 Sep 2023 01:21  --------------------------------------------------------  IN:    IV PiggyBack: 50 mL    IV PiggyBack: 250 mL    IV PiggyBack: 275 mL    Lactated Ringers: 750 mL    Oral Fluid: 800 mL  Total IN: 2125 mL    OUT:    Drain (mL): 20 mL    Voided (mL): 703 mL  Total OUT: 723 mL    Total NET: 1402 mL    Physical Exam:  General Appearance: Appears well, NAD  Respiratory: No acute resp distress, no accesory muscle use  Abdomen: soft, nontender, nondistended  Extremities: Grossly symmetric, SCD's in place   drains: 8Fr percutaneous cholecystostomy catheter in RUQ with serobilious fluid    LABS:                        7.8    8.22  )-----------( 247      ( 12 Sep 2023 13:55 )             26.5     09-12    138  |  108  |  6<L>  ----------------------------<  102<H>  3.9   |  18<L>  |  0.49<L>    Ca    7.7<L>      12 Sep 2023 13:55  Phos  1.5     09-12  Mg     1.8     09-12    TPro  4.7<L>  /  Alb  2.2<L>  /  TBili  3.4<H>  /  DBili  2.8<H>  /  AST  109<H>  /  ALT  77<H>  /  AlkPhos  507<H>  09-12    PT/INR - ( 12 Sep 2023 03:06 )   PT: 12.2 sec;   INR: 1.17 ratio         PTT - ( 12 Sep 2023 03:06 )  PTT:28.9 sec  Urinalysis Basic - ( 12 Sep 2023 13:55 )    Color: x / Appearance: x / SG: x / pH: x  Gluc: 102 mg/dL / Ketone: x  / Bili: x / Urobili: x   Blood: x / Protein: x / Nitrite: x   Leuk Esterase: x / RBC: x / WBC x   Sq Epi: x / Non Sq Epi: x / Bacteria: x        RADIOLOGY & ADDITIONAL STUDIES:

## 2023-09-13 NOTE — CONSULT NOTE ADULT - NSPROBSELRECBLANK_5_GEN
Body Location Override (Optional - Billing Will Still Be Based On Selected Body Map Location If Applicable): Right Back
Detail Level: Simple
Add 58644 Cpt? (Important Note: In 2017 The Use Of 95932 Is Being Tracked By Cms To Determine Future Global Period Reimbursement For Global Periods): no
DISPLAY PLAN FREE TEXT

## 2023-09-13 NOTE — CONSULT NOTE ADULT - PROBLEM SELECTOR RECOMMENDATION 2
unclear if being actively treated   primary team to clarify home medications - awaiting daughter to bring in med list

## 2023-09-13 NOTE — CONSULT NOTE ADULT - SUBJECTIVE AND OBJECTIVE BOX
[facility] Division of Hospital Medicine  Afia Cross DO  [employer]     ID Eddie José [id number]  HPI:   67F h/o scleroderma, rheumatoid arthritis, pulmonary hypertension but pt states not on O2 at home, high cholesterol presented with abdominal pain, nausea, vomiting, diarrhea with fevers and chills that began after eating "bad cake". She was found to have emphasematous cholecystitis and had a cholecystostomy tube placed by IR on 9/11 and started on Zosyn. Currently, she states she has no pain after taking the pain medication. She does admit to pain with eating earlier but has improved. She still has a poor appetite. She denies any nausea and has not vomited since admission. T101.5 yesturday since then has been afebrile. She does not know the name of her medications but was able to tell me she is on methylprednisolone (that was being tapered as outpatient) and a cholesterol medication at home. also takes vitamin c and folic acid. She came from [place] and brought her medications from there. She has not seen any physician in the [place].         PAST MEDICAL & SURGICAL HISTORY:  H/O pulmonary hypertension  H/O scleroderma  rheumatoid arthritis  high cholesterol           Review of Systems:   CONSTITUTIONAL: +fevers, +chills   EYES: No eye pain, visual disturbances  ENMT:  No difficulty hearing,   NECK: No pain or stiffness  RESPIRATORY: No cough, No shortness of breath  CARDIOVASCULAR: No chest pain, palpitations,  GASTROINTESTINAL: + RUQ abdominal pain. + nausea, +vomiting, poor appetite  GENITOURINARY: No dysuria,   NEUROLOGICAL: No headaches,   SKIN: No rashes  ENDOCRINE: No heat or cold intolerance  MUSCULOSKELETAL: No joint pain or swelling;   PSYCHIATRIC: No depression,   ALLERY AND IMMUNOLOGIC: No hives or eczema    Allergies    No Known Allergies        Social History: denies smoking or etoh use    FAMILY HISTORY:  Grandfather with "stomach cancer"  mother and father had heart disease       MEDICATIONS  (STANDING):  chlorhexidine 2% Cloths 1 Application(s) Topical <User Schedule>  enoxaparin Injectable 30 milliGRAM(s) SubCutaneous every 24 hours  influenza  Vaccine (HIGH DOSE) 0.7 milliLiter(s) IntraMuscular once  lactated ringers. 1000 milliLiter(s) (75 mL/Hr) IV Continuous <Continuous>  piperacillin/tazobactam IVPB.. 3.375 Gram(s) IV Intermittent every 8 hours    MEDICATIONS  (PRN):  acetaminophen     Tablet .. 650 milliGRAM(s) Oral every 6 hours PRN Mild Pain (1 - 3)      Vital Signs Last 24 Hrs  T(C): 37.1 (13 Sep 2023 08:59), Max: 37.2 (12 Sep 2023 22:00)  T(F): 98.7 (13 Sep 2023 08:59), Max: 98.9 (12 Sep 2023 22:00)  HR: 77 (13 Sep 2023 08:59) (72 - 90)  BP: 124/73 (13 Sep 2023 08:59) (124/73 - 172/79)  BP(mean): 109 (12 Sep 2023 17:00) (109 - 118)  RR: 18 (13 Sep 2023 08:59) (18 - 32)  SpO2: 97% (13 Sep 2023 08:59) (93% - 97%)    Parameters below as of 13 Sep 2023 08:59  Patient On (Oxygen Delivery Method): room air      CAPILLARY BLOOD GLUCOSE        I&O's Summary    12 Sep 2023 07:01  -  13 Sep 2023 07:00  --------------------------------------------------------  IN: 3325 mL / OUT: 1243 mL / NET: 2082 mL    13 Sep 2023 07:01  -  13 Sep 2023 11:50  --------------------------------------------------------  IN: 340 mL / OUT: 300 mL / NET: 40 mL        PHYSICAL EXAM:  GENERAL: NAD, breathing normal  EYES: conjunctiva and sclera clear  NECK: supple, No JVD  CHEST/LUNG: CTA b/l  HEART: S1 S2 RRR  ABDOMEN: +BS Soft, RUQ tenderness, no rebound or guarding, +cholecystostomy tube in place - without erythema  EXTREMITIES:  2+ DP Pulses, No c/c. no LE edema  NEUROLOGY: AAOx3, no facial droop, moving all extremities       LABS:                        8.1    7.00  )-----------( 268      ( 13 Sep 2023 07:12 )             26.8     09-13    137  |  106  |  8   ----------------------------<  82  4.2   |  21<L>  |  0.62    Ca    7.7<L>      13 Sep 2023 07:09  Phos  1.8     09-13  Mg     2.4     09-13    TPro  5.4<L>  /  Alb  2.4<L>  /  TBili  1.8<H>  /  DBili  x   /  AST  63<H>  /  ALT  64<H>  /  AlkPhos  517<H>  09-13    PT/INR - ( 12 Sep 2023 03:06 )   PT: 12.2 sec;   INR: 1.17 ratio         PTT - ( 12 Sep 2023 03:06 )  PTT:28.9 sec      Urinalysis Basic - ( 13 Sep 2023 07:09 )    Color: x / Appearance: x / SG: x / pH: x  Gluc: 82 mg/dL / Ketone: x  / Bili: x / Urobili: x   Blood: x / Protein: x / Nitrite: x   Leuk Esterase: x / RBC: x / WBC x   Sq Epi: x / Non Sq Epi: x / Bacteria: x        RADIOLOGY & ADDITIONAL TESTS:    Imaging Personally Reviewed: EKG tracing reviewed - NSR @91bpm, no acute ischemic changes  CT a/p reviewed - Emphysematous cholecystitis.  Wall thickening of colon at the hepatic flexure, likely reactive.  Small volume pelvic ascites.      Consultant(s) Notes Reviewed:      Care Discussed with Consultants/Other Providers:   [facility] Division of Hospital Medicine  Afia Cross DO  [employer]     ID Eddie José [phone]  HPI:   67F h/o scleroderma, rheumatoid arthritis, pulmonary hypertension but pt states not on O2 at home, high cholesterol presented with abdominal pain, nausea, vomiting, diarrhea with fevers and chills that began after eating "bad cake". She was found to have emphasematous cholecystitis and had a cholecystostomy tube placed by IR on 9/11 and started on Zosyn. Currently, she states she has no pain after taking the pain medication. She does admit to pain with eating earlier but has improved. She still has a poor appetite. She denies any nausea and has not vomited since admission. T101.5 yesturday since then has been afebrile. She does not know the name of her medications but was able to tell me she is on methylprednisolone (that was being tapered as outpatient) and a cholesterol medication at home. also takes vitamin c and folic acid. She came from [place] and brought her medications from there. She has not seen any physician in the [place]..        PAST MEDICAL & SURGICAL HISTORY:  H/O pulmonary hypertension  H/O scleroderma  rheumatoid arthritis  high cholesterol           Review of Systems:   CONSTITUTIONAL: +fevers, +chills   EYES: No eye pain, visual disturbances  ENMT:  No difficulty hearing,   NECK: No pain or stiffness  RESPIRATORY: No cough, No shortness of breath  CARDIOVASCULAR: No chest pain, palpitations,  GASTROINTESTINAL: + RUQ abdominal pain. + nausea, +vomiting, poor appetite  GENITOURINARY: No dysuria,   NEUROLOGICAL: No headaches,   SKIN: No rashes  ENDOCRINE: No heat or cold intolerance  MUSCULOSKELETAL: No joint pain or swelling;   PSYCHIATRIC: No depression,   ALLERY AND IMMUNOLOGIC: No hives or eczema    Allergies    No Known Allergies        Social History: denies smoking or etoh use    FAMILY HISTORY:  Grandfather with "stomach cancer"  mother and father had heart disease       MEDICATIONS  (STANDING):  chlorhexidine 2% Cloths 1 Application(s) Topical <User Schedule>  enoxaparin Injectable 30 milliGRAM(s) SubCutaneous every 24 hours  influenza  Vaccine (HIGH DOSE) 0.7 milliLiter(s) IntraMuscular once  lactated ringers. 1000 milliLiter(s) (75 mL/Hr) IV Continuous <Continuous>  piperacillin/tazobactam IVPB.. 3.375 Gram(s) IV Intermittent every 8 hours    MEDICATIONS  (PRN):  acetaminophen     Tablet .. 650 milliGRAM(s) Oral every 6 hours PRN Mild Pain (1 - 3)      Vital Signs Last 24 Hrs  T(C): 37.1 (13 Sep 2023 08:59), Max: 37.2 (12 Sep 2023 22:00)  T(F): 98.7 (13 Sep 2023 08:59), Max: 98.9 (12 Sep 2023 22:00)  HR: 77 (13 Sep 2023 08:59) (72 - 90)  BP: 124/73 (13 Sep 2023 08:59) (124/73 - 172/79)  BP(mean): 109 (12 Sep 2023 17:00) (109 - 118)  RR: 18 (13 Sep 2023 08:59) (18 - 32)  SpO2: 97% (13 Sep 2023 08:59) (93% - 97%)    Parameters below as of 13 Sep 2023 08:59  Patient On (Oxygen Delivery Method): room air      CAPILLARY BLOOD GLUCOSE        I&O's Summary    12 Sep 2023 07:01  -  13 Sep 2023 07:00  --------------------------------------------------------  IN: 3325 mL / OUT: 1243 mL / NET: 2082 mL    13 Sep 2023 07:01  -  13 Sep 2023 11:50  --------------------------------------------------------  IN: 340 mL / OUT: 300 mL / NET: 40 mL        PHYSICAL EXAM:  GENERAL: NAD, breathing normal  EYES: conjunctiva and sclera clear  NECK: supple, No JVD  CHEST/LUNG: CTA b/l  HEART: S1 S2 RRR  ABDOMEN: +BS Soft, RUQ tenderness, no rebound or guarding, +cholecystostomy tube in place - without erythema  EXTREMITIES:  2+ DP Pulses, No c/c. no LE edema  NEUROLOGY: AAOx3, no facial droop, moving all extremities       LABS:                        8.1    7.00  )-----------( 268      ( 13 Sep 2023 07:12 )             26.8     09-13    137  |  106  |  8   ----------------------------<  82  4.2   |  21<L>  |  0.62    Ca    7.7<L>      13 Sep 2023 07:09  Phos  1.8     09-13  Mg     2.4     09-13    TPro  5.4<L>  /  Alb  2.4<L>  /  TBili  1.8<H>  /  DBili  x   /  AST  63<H>  /  ALT  64<H>  /  AlkPhos  517<H>  09-13    PT/INR - ( 12 Sep 2023 03:06 )   PT: 12.2 sec;   INR: 1.17 ratio         PTT - ( 12 Sep 2023 03:06 )  PTT:28.9 sec      Urinalysis Basic - ( 13 Sep 2023 07:09 )    Color: x / Appearance: x / SG: x / pH: x  Gluc: 82 mg/dL / Ketone: x  / Bili: x / Urobili: x   Blood: x / Protein: x / Nitrite: x   Leuk Esterase: x / RBC: x / WBC x   Sq Epi: x / Non Sq Epi: x / Bacteria: x        RADIOLOGY & ADDITIONAL TESTS:    Imaging Personally Reviewed: EKG tracing reviewed - NSR @91bpm, no acute ischemic changes  CT a/p reviewed - Emphysematous cholecystitis.  Wall thickening of colon at the hepatic flexure, likely reactive.  Small volume pelvic ascites.      Consultant(s) Notes Reviewed:      Care Discussed with Consultants/Other Providers:   [facility] Division of Hospital Medicine  Afia Cross DO  [employer]     ID Eddie José [phone]  HPI:   67F h/o scleroderma, rheumatoid arthritis, pulmonary hypertension but pt states not on O2 at home, high cholesterol presented with abdominal pain, nausea, vomiting, diarrhea with fevers and chills that began after eating "bad cake". She was found to have emphasematous cholecystitis and had a cholecystostomy tube placed by IR on 9/11 and started on Zosyn. Currently, she states she has no pain after taking the pain medication. She does admit to pain with eating earlier but has improved. She still has a poor appetite. She denies any nausea and has not vomited since admission. T101.5 yesturday since then has been afebrile. She does not know the name of her medications but was able to tell me she is on methylprednisolone (that was being tapered as outpatient) and a cholesterol medication at home. also takes vitamin c and folic acid. She came from [place] and brought her medications from there. She has not seen any physician in the [place]..        PAST MEDICAL & SURGICAL HISTORY:  H/O pulmonary hypertension  H/O scleroderma  rheumatoid arthritis  high cholesterol           Review of Systems:   CONSTITUTIONAL: +fevers, +chills   EYES: No eye pain, visual disturbances  ENMT:  No difficulty hearing,   NECK: No pain or stiffness  RESPIRATORY: No cough, No shortness of breath  CARDIOVASCULAR: No chest pain, palpitations,  GASTROINTESTINAL: + RUQ abdominal pain. + nausea, +vomiting, poor appetite  GENITOURINARY: No dysuria,   NEUROLOGICAL: No headaches,   SKIN: No rashes  ENDOCRINE: No heat or cold intolerance  MUSCULOSKELETAL: No joint pain or swelling;   PSYCHIATRIC: No depression,   ALLERY AND IMMUNOLOGIC: No hives or eczema    Allergies    No Known Allergies        Social History: denies smoking or etoh use    FAMILY HISTORY:  Grandfather with "stomach cancer"  mother and father had heart disease       MEDICATIONS  (STANDING):  chlorhexidine 2% Cloths 1 Application(s) Topical <User Schedule>  enoxaparin Injectable 30 milliGRAM(s) SubCutaneous every 24 hours  influenza  Vaccine (HIGH DOSE) 0.7 milliLiter(s) IntraMuscular once  lactated ringers. 1000 milliLiter(s) (75 mL/Hr) IV Continuous <Continuous>  piperacillin/tazobactam IVPB.. 3.375 Gram(s) IV Intermittent every 8 hours    MEDICATIONS  (PRN):  acetaminophen     Tablet .. 650 milliGRAM(s) Oral every 6 hours PRN Mild Pain (1 - 3)      Vital Signs Last 24 Hrs  T(C): 37.1 (13 Sep 2023 08:59), Max: 37.2 (12 Sep 2023 22:00)  T(F): 98.7 (13 Sep 2023 08:59), Max: 98.9 (12 Sep 2023 22:00)  HR: 77 (13 Sep 2023 08:59) (72 - 90)  BP: 124/73 (13 Sep 2023 08:59) (124/73 - 172/79)  BP(mean): 109 (12 Sep 2023 17:00) (109 - 118)  RR: 18 (13 Sep 2023 08:59) (18 - 32)  SpO2: 97% (13 Sep 2023 08:59) (93% - 97%)    Parameters below as of 13 Sep 2023 08:59  Patient On (Oxygen Delivery Method): room air      CAPILLARY BLOOD GLUCOSE        I&O's Summary    12 Sep 2023 07:01  -  13 Sep 2023 07:00  --------------------------------------------------------  IN: 3325 mL / OUT: 1243 mL / NET: 2082 mL    13 Sep 2023 07:01  -  13 Sep 2023 11:50  --------------------------------------------------------  IN: 340 mL / OUT: 300 mL / NET: 40 mL        PHYSICAL EXAM:  GENERAL: NAD, breathing normal  EYES: conjunctiva and sclera clear  NECK: supple, No JVD  CHEST/LUNG: CTA b/l  HEART: S1 S2 RRR  ABDOMEN: +BS Soft, RUQ tenderness, no rebound or guarding, +cholecystostomy tube in place - without erythema  EXTREMITIES:  2+ DP Pulses, No c/c. no LE edema  NEUROLOGY: AAOx3, no facial droop, moving all extremities       LABS:                        8.1    7.00  )-----------( 268      ( 13 Sep 2023 07:12 )             26.8     09-13    137  |  106  |  8   ----------------------------<  82  4.2   |  21<L>  |  0.62    Ca    7.7<L>      13 Sep 2023 07:09  Phos  1.8     09-13  Mg     2.4     09-13    TPro  5.4<L>  /  Alb  2.4<L>  /  TBili  1.8<H>  /  DBili  x   /  AST  63<H>  /  ALT  64<H>  /  AlkPhos  517<H>  09-13    PT/INR - ( 12 Sep 2023 03:06 )   PT: 12.2 sec;   INR: 1.17 ratio         PTT - ( 12 Sep 2023 03:06 )  PTT:28.9 sec      Urinalysis Basic - ( 13 Sep 2023 07:09 )    Color: x / Appearance: x / SG: x / pH: x  Gluc: 82 mg/dL / Ketone: x  / Bili: x / Urobili: x   Blood: x / Protein: x / Nitrite: x   Leuk Esterase: x / RBC: x / WBC x   Sq Epi: x / Non Sq Epi: x / Bacteria: x        RADIOLOGY & ADDITIONAL TESTS:    Imaging Personally Reviewed: EKG tracing reviewed - NSR @91bpm, no acute ischemic changes  CT a/p reviewed - Emphysematous cholecystitis.  Wall thickening of colon at the hepatic flexure, likely reactive.  Small volume pelvic ascites.      Consultant(s) Notes Reviewed:      Care Discussed with Consultants/Other Providers: d/w Surgery resident clarifying home medications who states daughter is supposed to bring in medication list

## 2023-09-13 NOTE — CONSULT NOTE ADULT - ASSESSMENT
67F h/o scleroderma, rheumatoid arthritis, pulmonary hypertension but pt states not on O2 at home, high cholesterol a/w emphysematous cholecystis.    home medications she stated were her only medications:   methylprednisolone (does not know dose but states she takes once daily and was being tapered)  a cholesterol medication  vitamin c   folic acid    later when asked if she takes anything for high blood pressure she said yes but could not remember name of medication

## 2023-09-14 ENCOUNTER — TRANSCRIPTION ENCOUNTER (OUTPATIENT)
Age: 68
End: 2023-09-14

## 2023-09-14 VITALS
HEART RATE: 66 BPM | SYSTOLIC BLOOD PRESSURE: 150 MMHG | DIASTOLIC BLOOD PRESSURE: 77 MMHG | RESPIRATION RATE: 18 BRPM | OXYGEN SATURATION: 93 % | TEMPERATURE: 98 F

## 2023-09-14 LAB
ALBUMIN SERPL ELPH-MCNC: 2.4 G/DL — LOW (ref 3.3–5)
ALP SERPL-CCNC: 417 U/L — HIGH (ref 40–120)
ALT FLD-CCNC: 45 U/L — SIGNIFICANT CHANGE UP (ref 10–45)
ANION GAP SERPL CALC-SCNC: 10 MMOL/L — SIGNIFICANT CHANGE UP (ref 5–17)
AST SERPL-CCNC: 25 U/L — SIGNIFICANT CHANGE UP (ref 10–40)
BILIRUB DIRECT SERPL-MCNC: 0.3 MG/DL — SIGNIFICANT CHANGE UP (ref 0–0.3)
BILIRUB INDIRECT FLD-MCNC: 0.4 MG/DL — SIGNIFICANT CHANGE UP (ref 0.2–1)
BILIRUB SERPL-MCNC: 0.7 MG/DL — SIGNIFICANT CHANGE UP (ref 0.2–1.2)
BUN SERPL-MCNC: 8 MG/DL — SIGNIFICANT CHANGE UP (ref 7–23)
CALCIUM SERPL-MCNC: 7.7 MG/DL — LOW (ref 8.4–10.5)
CHLORIDE SERPL-SCNC: 113 MMOL/L — HIGH (ref 96–108)
CHOLEST SERPL-MCNC: 111 MG/DL — SIGNIFICANT CHANGE UP
CO2 SERPL-SCNC: 19 MMOL/L — LOW (ref 22–31)
CREAT SERPL-MCNC: 0.57 MG/DL — SIGNIFICANT CHANGE UP (ref 0.5–1.3)
EGFR: 100 ML/MIN/1.73M2 — SIGNIFICANT CHANGE UP
GLUCOSE SERPL-MCNC: 130 MG/DL — HIGH (ref 70–99)
HCT VFR BLD CALC: 29 % — LOW (ref 34.5–45)
HDLC SERPL-MCNC: 22 MG/DL — LOW
HGB BLD-MCNC: 8.5 G/DL — LOW (ref 11.5–15.5)
LIPID PNL WITH DIRECT LDL SERPL: 72 MG/DL — SIGNIFICANT CHANGE UP
MAGNESIUM SERPL-MCNC: 2.3 MG/DL — SIGNIFICANT CHANGE UP (ref 1.6–2.6)
MCHC RBC-ENTMCNC: 23.4 PG — LOW (ref 27–34)
MCHC RBC-ENTMCNC: 29.3 GM/DL — LOW (ref 32–36)
MCV RBC AUTO: 79.7 FL — LOW (ref 80–100)
NON HDL CHOLESTEROL: 89 MG/DL — SIGNIFICANT CHANGE UP
NRBC # BLD: 0 /100 WBCS — SIGNIFICANT CHANGE UP (ref 0–0)
PHOSPHATE SERPL-MCNC: 2.8 MG/DL — SIGNIFICANT CHANGE UP (ref 2.5–4.5)
PLATELET # BLD AUTO: 271 K/UL — SIGNIFICANT CHANGE UP (ref 150–400)
POTASSIUM SERPL-MCNC: 4.1 MMOL/L — SIGNIFICANT CHANGE UP (ref 3.5–5.3)
POTASSIUM SERPL-SCNC: 4.1 MMOL/L — SIGNIFICANT CHANGE UP (ref 3.5–5.3)
PROT SERPL-MCNC: 5.3 G/DL — LOW (ref 6–8.3)
RBC # BLD: 3.64 M/UL — LOW (ref 3.8–5.2)
RBC # FLD: 18.6 % — HIGH (ref 10.3–14.5)
SODIUM SERPL-SCNC: 142 MMOL/L — SIGNIFICANT CHANGE UP (ref 135–145)
TRIGL SERPL-MCNC: 85 MG/DL — SIGNIFICANT CHANGE UP
WBC # BLD: 6.26 K/UL — SIGNIFICANT CHANGE UP (ref 3.8–10.5)
WBC # FLD AUTO: 6.26 K/UL — SIGNIFICANT CHANGE UP (ref 3.8–10.5)

## 2023-09-14 PROCEDURE — 99238 HOSP IP/OBS DSCHRG MGMT 30/<: CPT | Mod: GC

## 2023-09-14 RX ORDER — ACETAMINOPHEN 500 MG
2 TABLET ORAL
Qty: 0 | Refills: 0 | DISCHARGE
Start: 2023-09-14

## 2023-09-14 RX ORDER — SODIUM,POTASSIUM PHOSPHATES 278-250MG
1 POWDER IN PACKET (EA) ORAL ONCE
Refills: 0 | Status: COMPLETED | OUTPATIENT
Start: 2023-09-14 | End: 2023-09-14

## 2023-09-14 RX ORDER — LOSARTAN POTASSIUM 100 MG/1
1 TABLET, FILM COATED ORAL
Qty: 0 | Refills: 0 | DISCHARGE
Start: 2023-09-14

## 2023-09-14 RX ORDER — IRBESARTAN 75 MG/1
1 TABLET ORAL
Qty: 0 | Refills: 0 | DISCHARGE
Start: 2023-09-14

## 2023-09-14 RX ADMIN — Medication 1 PACKET(S): at 10:34

## 2023-09-14 RX ADMIN — Medication 4 MILLIGRAM(S): at 05:38

## 2023-09-14 RX ADMIN — LOSARTAN POTASSIUM 25 MILLIGRAM(S): 100 TABLET, FILM COATED ORAL at 05:38

## 2023-09-14 RX ADMIN — PIPERACILLIN AND TAZOBACTAM 25 GRAM(S): 4; .5 INJECTION, POWDER, LYOPHILIZED, FOR SOLUTION INTRAVENOUS at 05:39

## 2023-09-14 RX ADMIN — ENOXAPARIN SODIUM 30 MILLIGRAM(S): 100 INJECTION SUBCUTANEOUS at 05:57

## 2023-09-14 NOTE — PROGRESS NOTE ADULT - ATTENDING COMMENTS
Comfortable.  Abdomen soft, NT, ND.  Gisele tube in place.  Stable.  D/c home on ABX x 1 week.  RTO in 3 weeks.
No events overnight.  Abdomen soft, NT, ND.  Gisele tube in place.   ADAT.   Medical consult.
Perc lj done for gangrenous cholecystitis causing sepsis.  Comfortable today.  Abdomen soft, ND, with mild RLQ tenderness.  Tube in place.  Continue ABBX.  Advance diet as tolerated.

## 2023-09-14 NOTE — DISCHARGE NOTE PROVIDER - NSDCMRMEDTOKEN_GEN_ALL_CORE_FT
irbesartan 75 mg oral tablet: 1 tab(s) orally once a day  methylPREDNISolone 4 mg oral tablet: 1 tab(s) orally once a day   acetaminophen 325 mg oral tablet: 2 tab(s) orally every 6 hours As needed Mild Pain (1 - 3)  losartan 25 mg oral tablet: 1 tab(s) orally once a day  methylPREDNISolone 4 mg oral tablet: 1 tab(s) orally once a day   acetaminophen 325 mg oral tablet: 2 tab(s) orally every 6 hours As needed Mild Pain (1 - 3)  amoxicillin-clavulanate 875 mg-125 mg oral tablet: 1 tab(s) orally 2 times a day  losartan 25 mg oral tablet: 1 tab(s) orally once a day  methylPREDNISolone 4 mg oral tablet: 1 tab(s) orally once a day   acetaminophen 325 mg oral tablet: 2 tab(s) orally every 6 hours As needed Mild Pain (1 - 3)  amoxicillin-clavulanate 875 mg-125 mg oral tablet: 1 tab(s) orally 2 times a day  irbesartan 75 mg oral tablet: 1 tab(s) orally once a day  methylPREDNISolone 4 mg oral tablet: 1 tab(s) orally once a day

## 2023-09-14 NOTE — DISCHARGE NOTE NURSING/CASE MANAGEMENT/SOCIAL WORK - PATIENT PORTAL LINK FT
You can access the FollowMyHealth Patient Portal offered by [facility] by registering at the following website: [email or web address]. By joining [facility]’s FollowMyHealth portal, you will also be able to view your health information using other applications (apps) compatible with our system.

## 2023-09-14 NOTE — DISCHARGE NOTE PROVIDER - NSDCCPCAREPLAN_GEN_ALL_CORE_FT
PRINCIPAL DISCHARGE DIAGNOSIS  Diagnosis: Emphysematous cholecystitis  Assessment and Plan of Treatment: antibiotics as prescribed  You had a percutaneous cholecysostomy tube placed by Dr. Franklin on 9/11 in Interventional Radiology (IR).   Monitor site for any sign or symptoms of infection (painful red skin, green/ yellow foul smelling discharge from the insertion site, fever, chills, leakage around drain site).   Flush drain with 5mL daily. If you meet resistance upon flushing, STOP and contact IR.   Empty drainage bag or bulb daily and record output. Once output is <10mL in a 24hr period or if there is a sudden decrease in output please contact IR to schedule  an appointment.  Drain care:  -Disconnect tubing (tube attached to bag/ bulb)  from the catheter (catheter going into skin)  -Clean catheter with alcohol swab  -Twist on the flush syringe to the catheter (be sure to push the air out of syringe)  -Flush catheter with 5mL (DO NOT pull back on syringe). If you meet resistance upon flushing, STOP and contact IR.   -Disconnect syringe from catheter and reconnect drainage bag or bulb.  Dressing care:  -Wash hands thoroughly with water and soap for at least 20 seconds.   -take off old dressing and clean around drain site with gauze soaked with warm water  -After cleaning the site, dry and replace dressing with a new gauze and tegaderm.   -Place one piece of gauze underneath the drain and another piece of gauze on top of drain.   -Apply tegaderm (clear dressing) on top.  -Change dressing every 3 days or when soiled  Follow up with surgeon to determine surgical candidacy for gallbladder surgery. If instructed by surgeon follow up in 4-6 weeks for cholecystostomy drain evaluation. Otherwise, follow up every 3 months for routine exchange. Call to make appt at [phone]  Please follow up with Dr. Bowers in 2 weeks please call to schedule an appointment  Please follow up with your regular medical doctor in 1 week, please call to schedule an appointment to discuss recent hospitalization       PRINCIPAL DISCHARGE DIAGNOSIS  Diagnosis: Emphysematous cholecystitis  Assessment and Plan of Treatment: antibiotics as prescribed  You had a percutaneous cholecysostomy tube placed by Dr. Franklin on 9/11 in Interventional Radiology (IR).   Monitor site for any sign or symptoms of infection (painful red skin, green/ yellow foul smelling discharge from the insertion site, fever, chills, leakage around drain site).   Flush drain with 5mL daily. If you meet resistance upon flushing, STOP and contact IR.   Empty drainage bag or bulb daily and record output. Once output is <10mL in a 24hr period or if there is a sudden decrease in output please contact IR to schedule  an appointment.  Drain care:  -Disconnect tubing (tube attached to bag/ bulb)  from the catheter (catheter going into skin)  -Clean catheter with alcohol swab  -Twist on the flush syringe to the catheter (be sure to push the air out of syringe)  -Flush catheter with 5mL (DO NOT pull back on syringe). If you meet resistance upon flushing, STOP and contact IR.   -Disconnect syringe from catheter and reconnect drainage bag or bulb.  Dressing care:  -Wash hands thoroughly with water and soap for at least 20 seconds.   -take off old dressing and clean around drain site with gauze soaked with warm water  -After cleaning the site, dry and replace dressing with a new gauze and tegaderm.   -Place one piece of gauze underneath the drain and another piece of gauze on top of drain.   -Apply tegaderm (clear dressing) on top.  -Change dressing every 3 days or when soiled  Follow up with surgeon to determine surgical candidacy for gallbladder surgery. If instructed by surgeon follow up in 4-6 weeks for cholecystostomy drain evaluation. Otherwise, follow up every 3 months for routine exchange. Call to make appt at [phone]  Please follow up in the surgery clinic [phone] in 2 weeks please call to schedule an appointment   Please follow up with your regular medical doctor in 1 week, please call to schedule an appointment to discuss recent hospitalization

## 2023-09-14 NOTE — DISCHARGE NOTE PROVIDER - NSFOLLOWUPCLINICS_GEN_ALL_ED_FT
[facility]  General Surgery  [address] [place] [address] - [address]rd [place] [address]  Phone: [phone]  Fax:   Follow Up Time: 2 weeks

## 2023-09-14 NOTE — PROGRESS NOTE ADULT - SUBJECTIVE AND OBJECTIVE BOX
Overnight: bile fluid prelim read + e coli    SUBJECTIVE: Patient seen and examined on AM rounds. Patient denies any complaints with abdominal soreness improving. Tolerating regular diet without nausea or vomiting. Voiding appropriately. Ambulating well. Denies fevers, chills, SOB, CP.      Vital Signs Last 24 Hrs  T(C): 36.7 (14 Sep 2023 00:15), Max: 37.1 (13 Sep 2023 08:59)  T(F): 98.1 (14 Sep 2023 00:15), Max: 98.7 (13 Sep 2023 08:59)  HR: 66 (14 Sep 2023 00:15) (61 - 77)  BP: 146/78 (14 Sep 2023 00:15) (124/73 - 146/78)  BP(mean): --  RR: 18 (14 Sep 2023 00:15) (18 - 18)  SpO2: 91% (14 Sep 2023 00:15) (91% - 97%)    Parameters below as of 14 Sep 2023 00:15  Patient On (Oxygen Delivery Method): room air        I&O's Detail    12 Sep 2023 07:01  -  13 Sep 2023 07:00  --------------------------------------------------------  IN:    IV PiggyBack: 50 mL    IV PiggyBack: 250 mL    IV PiggyBack: 375 mL    Lactated Ringers: 1650 mL    Oral Fluid: 1000 mL  Total IN: 3325 mL    OUT:    Drain (mL): 40 mL    Voided (mL): 1203 mL  Total OUT: 1243 mL    Total NET: 2082 mL      13 Sep 2023 07:01  -  14 Sep 2023 02:18  --------------------------------------------------------  IN:    IV PiggyBack: 500 mL    IV PiggyBack: 200 mL    Oral Fluid: 880 mL  Total IN: 1580 mL    OUT:    Drain (mL): 90 mL    Voided (mL): 2000 mL  Total OUT: 2090 mL    Total NET: -510 mL      Physical Exam:  General Appearance: Appears well, NAD  Respiratory: No acute resp distress, no accesory muscle use  Abdomen: soft, nontender, nondistended  Extremities: Grossly symmetric, SCD's in place   drains: 8Fr percutaneous cholecystostomy catheter in RUQ with serobilious fluid    LABS:                        8.1    7.00  )-----------( 268      ( 13 Sep 2023 07:12 )             26.8     09-13    137  |  106  |  8   ----------------------------<  82  4.2   |  21<L>  |  0.62    Ca    7.7<L>      13 Sep 2023 07:09  Phos  1.8     09-13  Mg     2.4     09-13    TPro  5.4<L>  /  Alb  2.4<L>  /  TBili  1.8<H>  /  DBili  x   /  AST  63<H>  /  ALT  64<H>  /  AlkPhos  517<H>  09-13    PT/INR - ( 12 Sep 2023 03:06 )   PT: 12.2 sec;   INR: 1.17 ratio         PTT - ( 12 Sep 2023 03:06 )  PTT:28.9 sec  Urinalysis Basic - ( 13 Sep 2023 07:09 )    Color: x / Appearance: x / SG: x / pH: x  Gluc: 82 mg/dL / Ketone: x  / Bili: x / Urobili: x   Blood: x / Protein: x / Nitrite: x   Leuk Esterase: x / RBC: x / WBC x   Sq Epi: x / Non Sq Epi: x / Bacteria: x        RADIOLOGY & ADDITIONAL STUDIES:   Overnight: bile fluid prelim read + e coli    SUBJECTIVE: Patient seen and examined on AM rounds. Patient denies pain. Tolerating regular diet without nausea or vomiting. Voiding appropriately. Ambulating well. Denies fevers, chills, SOB, CP.      Vital Signs Last 24 Hrs  T(C): 36.7 (14 Sep 2023 00:15), Max: 37.1 (13 Sep 2023 08:59)  T(F): 98.1 (14 Sep 2023 00:15), Max: 98.7 (13 Sep 2023 08:59)  HR: 66 (14 Sep 2023 00:15) (61 - 77)  BP: 146/78 (14 Sep 2023 00:15) (124/73 - 146/78)  BP(mean): --  RR: 18 (14 Sep 2023 00:15) (18 - 18)  SpO2: 91% (14 Sep 2023 00:15) (91% - 97%)    Parameters below as of 14 Sep 2023 00:15  Patient On (Oxygen Delivery Method): room air        I&O's Detail    12 Sep 2023 07:01  -  13 Sep 2023 07:00  --------------------------------------------------------  IN:    IV PiggyBack: 50 mL    IV PiggyBack: 250 mL    IV PiggyBack: 375 mL    Lactated Ringers: 1650 mL    Oral Fluid: 1000 mL  Total IN: 3325 mL    OUT:    Drain (mL): 40 mL    Voided (mL): 1203 mL  Total OUT: 1243 mL    Total NET: 2082 mL      13 Sep 2023 07:01  -  14 Sep 2023 02:18  --------------------------------------------------------  IN:    IV PiggyBack: 500 mL    IV PiggyBack: 200 mL    Oral Fluid: 880 mL  Total IN: 1580 mL    OUT:    Drain (mL): 90 mL    Voided (mL): 2000 mL  Total OUT: 2090 mL    Total NET: -510 mL      Physical Exam:  General Appearance: Appears well, NAD  Respiratory: No acute resp distress, no accessory muscle use  Abdomen: soft, nontender, nondistended  Extremities: Grossly symmetric, SCD's in place   drains: 8Fr percutaneous cholecystostomy catheter in RUQ with bilious fluid    LABS:                        8.1    7.00  )-----------( 268      ( 13 Sep 2023 07:12 )             26.8     09-13    137  |  106  |  8   ----------------------------<  82  4.2   |  21<L>  |  0.62    Ca    7.7<L>      13 Sep 2023 07:09  Phos  1.8     09-13  Mg     2.4     09-13    TPro  5.4<L>  /  Alb  2.4<L>  /  TBili  1.8<H>  /  DBili  x   /  AST  63<H>  /  ALT  64<H>  /  AlkPhos  517<H>  09-13    PT/INR - ( 12 Sep 2023 03:06 )   PT: 12.2 sec;   INR: 1.17 ratio         PTT - ( 12 Sep 2023 03:06 )  PTT:28.9 sec  Urinalysis Basic - ( 13 Sep 2023 07:09 )    Color: x / Appearance: x / SG: x / pH: x  Gluc: 82 mg/dL / Ketone: x  / Bili: x / Urobili: x   Blood: x / Protein: x / Nitrite: x   Leuk Esterase: x / RBC: x / WBC x   Sq Epi: x / Non Sq Epi: x / Bacteria: x        RADIOLOGY & ADDITIONAL STUDIES:

## 2023-09-14 NOTE — DISCHARGE NOTE PROVIDER - HOSPITAL COURSE
67-year-old female history of scleroderma, pulmonary hypertension on oxygen at home presents to the ED for 1 day of subjective fevers and chills, nausea and vomiting and diarrhea with generalized abdominal pain.  Patient denies chest pain, SOB, melana, dizziness, jaundice. Patient recently moved to the  from [place] 2 months ago.     In the ED, patient febrile to 104, tachycardic to 116. Currently on 4L NC. Labs significant for WBC of 15, TBili 1.8, Alk Phos 601, Lipase 1000      CT Emphysematous cholecystitis.    Wall thickening of colon at the hepatic flexure, likely reactive.    Small volume pelvic ascites.        Patient admitted to SICU for sepsis. IV ABx started.. Underwent percutaneous cholecystostomy tube by IR. Post procedure did well, pain resolved diet advanced. Patient tx to regular surgical floor. Seen by PT- no skilled needs  Home care arranged for drain care/ flushing   Patient discharged home to follow up with surgery, IR, medicine 67-year-old female history of scleroderma, pulmonary hypertension on oxygen at home presents to the ED for 1 day of subjective fevers and chills, nausea and vomiting and diarrhea with generalized abdominal pain.  Patient denies chest pain, SOB, melana, dizziness, jaundice. Patient recently moved to the  from [place] 2 months ago.     In the ED, patient febrile to 104, tachycardic to 116. Currently on 4L NC. Labs significant for WBC of 15, TBili 1.8, Alk Phos 601, Lipase 1000    CT Emphysematous cholecystitis.  Wall thickening of colon at the hepatic flexure, likely reactive.  Small volume pelvic ascites.    Patient was admitted under General Surgery for further evaluation and management. Patient was accepted into SICU for sepsis. IV ABx started. Underwent percutaneous cholecystostomy tube by IR on 9/11/23. Post procedure did well, pain resolved diet advanced. Patient tx to regular surgical floor on 9/12/23. Seen by PT- no skilled needs    Patient discharged home to follow up with surgery, IR, medicine 67-year-old female history of scleroderma, pulmonary hypertension on oxygen at home presents to the ED for 1 day of subjective fevers and chills, nausea and vomiting and diarrhea with generalized abdominal pain.  Patient denies chest pain, SOB, melana, dizziness, jaundice. Patient recently moved to the  from [place] 2 months ago.     In the ED, patient febrile to 104, tachycardic to 116. Currently on 4L NC. Labs significant for WBC of 15, TBili 1.8, Alk Phos 601, Lipase 1000    CT Emphysematous cholecystitis.  Wall thickening of colon at the hepatic flexure, likely reactive.  Small volume pelvic ascites.    Patient was admitted under General Surgery for further evaluation and management. Patient was accepted into SICU for sepsis. IV ABx started, and will be discharged with oral antibiotics. Underwent percutaneous cholecystostomy tube by IR on 9/11/23. Post procedure did well, pain resolved diet advanced. Patient tx to regular surgical floor on 9/12/23. Seen by PT- no skilled needs    Patient discharged home to follow up with surgery, IR, medicine

## 2023-09-14 NOTE — DISCHARGE NOTE NURSING/CASE MANAGEMENT/SOCIAL WORK - NSDCFUADDAPPT_GEN_ALL_CORE_FT
Please follow up with interventional radiology in about 4 weeks for a tube check please call [phone] to schedule

## 2023-09-14 NOTE — PROGRESS NOTE ADULT - ASSESSMENT
67-year-old female history of scleroderma, pulmonary hypertension on oxygen at home presents to the ED for 1 day of subjective fevers and chills, nausea and vomiting and diarrhea with generalized abdominal pain. Labs significant for WBC of 15, TBili 1.8, Alk Phos 601, Lipase 1000. Imaging demonstrating emphysematous cholecystitis. Now s/p 9/11 percutaneous cholecystostomy tube placement by IR. Downgraded from SICU on 9/12.     Plan:  - Zosyn  - Diet: reg  - DVT ppx Lov  - pain control  - Monitor GI function  - OOB as tolerating  - cont Trend t bili  - c/w home meds  - dispo: home    Green surgery  [id number].

## 2023-09-14 NOTE — DISCHARGE NOTE PROVIDER - NSDCFUADDAPPT_GEN_ALL_CORE_FT
Please follow up with interventional radiology in about 4 weeks for a tube check please call [phone] to schedule

## 2023-09-14 NOTE — DISCHARGE NOTE PROVIDER - NSFOLLOWUPCLINICSTOKEN_GEN_ALL_ED_FT
[facility] Hospitalist Progress Note      Subjective     Patient sleeping soundly. Did not awaken.    RN denies new issues. Appetite has been good. + BM. Pain well controlled    Review Of Systems    Unable to obtain    Objective     I/O's           Intake/Output Summary (Last 24 hours) at 9/7/2020 0632  Last data filed at 9/7/2020 0600  Gross per 24 hour   Intake 960 ml   Output 1300 ml   Net -340 ml       Last Recorded Vitals    Vitals with min/max:      Vital Last Value 24 Hour Range   Temperature 97.5 °F (36.4 °C) (09/07/20 0557) Temp  Min: 97.2 °F (36.2 °C)  Max: 97.5 °F (36.4 °C)   Pulse (!) 53 (09/07/20 0557) Pulse  Min: 53  Max: 58   Respiratory 17 (09/07/20 0557) Resp  Min: 16  Max: 17   Non-Invasive  Blood Pressure 128/77 (09/07/20 0557) BP  Min: 124/73  Max: 136/74   Pulse Oximetry 94 % (09/07/20 0557) SpO2  Min: 94 %  Max: 97 %   Arterial   Blood Pressure   No data recorded        Physical Exam  Vitals signs and nursing note reviewed.   Constitutional:       General: She is not in acute distress (sleeping soundly).     Appearance: Normal appearance.   Cardiovascular:      Rate and Rhythm: Normal rate and regular rhythm.      Pulses: Normal pulses.      Heart sounds: Normal heart sounds.   Pulmonary:      Effort: No respiratory distress (snoring quietly).   Abdominal:      General: There is no distension.      Palpations: Abdomen is soft.      Tenderness: There is no abdominal tenderness.         Labs       Recent Results (from the past 24 hour(s))   Prothrombin Time    Collection Time: 09/07/20 12:25 AM   Result Value Ref Range    PROTIME 27.8 (H) 9.7 - 11.8 sec    INR 2.6    Hemoglobin and Hematocrit    Collection Time: 09/07/20  5:14 AM   Result Value Ref Range    HGB 9.7 (L) 12.0 - 15.5 g/dL    HCT 30.6 (L) 36.0 - 46.5 %       Imaging      XR CHEST PA OR AP 1 VIEW   Final Result       As above         Electronically Signed by: JULIAN HAAS M.D.    Signed on: 8/18/2020 6:51 PM          XR TIBIA FIBULA 2 VIEWS  LEFT   Final Result       No acute injury suspected.         Electronically Signed by: JULIAN HAAS M.D.    Signed on: 8/18/2020 6:52 PM          XR KNEE MIN 4 VIEWS LEFT   Final Result   1.   No visualized new fracture or dislocation.   2.   Proximal tibial instrumentation as described.   3.   Osteopenia.         Electronically Signed by: JAG AMARO MD    Signed on: 8/18/2020 6:57 PM               Medications  Current Facility-Administered Medications   Medication Dose Route Frequency Provider Last Rate Last Dose   • vancomycin 1,250 mg in sodium chloride 0.9% 250 mL IVPB  1,250 mg Intravenous Q48H Nisa Holder  mL/hr at 09/07/20 0020 1,250 mg at 09/07/20 0020   • meropenem (MERREM) 1 g in sodium chloride 0.9 % 100 mL IVPB  1 g Intravenous Q12H COLTON Nisa Holder MD 33.3 mL/hr at 09/06/20 2059 1 g at 09/06/20 2059   • polyethylene glycol (MIRALAX) packet 17 g  17 g Oral Daily Marion Gibson MD   17 g at 09/06/20 0921   • senna (SENOKOT) 17.2 mg  2 tablet Oral Nightly Marion Gibson MD   17.2 mg at 09/06/20 2050   • tolterodine (DETROL LA) 24 hr capsule 2 mg  2 mg Oral Daily Harshad Bolden MD   2 mg at 09/06/20 0920   • WARFARIN - PHARMACIST MONITORED   Does not apply See Admin Instructions Harshad Bolden MD       • VANCOMYCIN - PHARMACIST MONITORED   Does not apply See Admin Instructions Marni Murillo DO       • acetaminophen (TYLENOL) tablet 650 mg  650 mg Oral Q6H PRN Harshad Bolden MD   650 mg at 09/06/20 0918   • traMADol (ULTRAM) tablet 50 mg  50 mg Oral Q6H PRN Harshad Bolden MD   50 mg at 09/07/20 0628   • sodium chloride 0.9 % injector flush 20 mL  20 mL Injection PRN Oma Wing MD       • sodium chloride 0.9 % injector flush 10 mL  10 mL Injection Q12H Oma Wing MD   10 mL at 09/06/20 2050   • sodium chloride 0.9 % flush bag 25 mL  25 mL Intravenous PRN Francesca Palomares, DO 25 mL/hr at 08/22/20 1658 25 mL at 08/22/20 1658   • atorvastatin (LIPITOR) tablet  10 mg  10 mg Oral Daily Francesca Ryan Marielle, DO   10 mg at 09/06/20 0921   • gabapentin (NEURONTIN) capsule 300 mg  300 mg Oral BID Francesca Ryan Palomares, DO   300 mg at 09/06/20 2050   • pantoprazole (PROTONIX) EC tablet 40 mg  40 mg Oral QAM AC Francesca Ryan Marielle, DO   40 mg at 09/07/20 0628   • rOPINIRole (REQUIP) tablet 1 mg  1 mg Oral QHS Francesca Ryan Palomares, DO   1 mg at 09/06/20 2050   • timolol (TIMOPTIC) 0.5 % ophthalmic solution 1 drop  1 drop Both Eyes Daily Francesca Ryan Marielle, DO   1 drop at 09/06/20 0921   • latanoprost (XALATAN) 0.005 % ophthalmic solution 1 drop  1 drop Both Eyes Nightly Francesca Palomares, DO   1 drop at 09/06/20 2050   • allopurinol (ZYLOPRIM) tablet 300 mg  300 mg Oral Daily Francesca Palomares, DO   300 mg at 09/06/20 0919          Assessment & Plan      * Exposed orthopaedic hardware (CMS/[facility])  Assessment & Plan  ID and wound care on board  Sp plastic surgery intervention      Anemia  Assessment & Plan  Stable on current regimen    Sacral decubitus ulcer  Assessment & Plan  Stable on current regimen  Wound care on board    Discharge planning issues  Assessment & Plan  Patient requires BABAK discharge but no more medicare days  SW working with son to get medicaid paperwork through  Patient is medically stable for discharge      Hyperlipidemia  Assessment & Plan  Continue atorvastatin    Benign hypertension  Assessment & Plan  Stable on current regimen    History of DVT (deep vein thrombosis)  Assessment & Plan  Continue monitoring INR  Pharmacy managing dosing            Betty Garcia DO, FACOI [facility] Hospitalist  9/7/2020 6:32 AM     [id number]:2 weeks|| ||00\01||False;

## 2023-09-14 NOTE — DISCHARGE NOTE PROVIDER - CARE PROVIDER_API CALL
Stacey Bowers  Colon/Rectal Surgery  [address] [place] [address], [address]  [place] [address]  Phone: [phone]  Fax: [phone]  Follow Up Time: 2 weeks

## 2023-09-16 LAB
CULTURE RESULTS: SIGNIFICANT CHANGE UP
ORGANISM # SPEC MICROSCOPIC CNT: SIGNIFICANT CHANGE UP
ORGANISM # SPEC MICROSCOPIC CNT: SIGNIFICANT CHANGE UP
SPECIMEN SOURCE: SIGNIFICANT CHANGE UP

## 2023-10-08 PROCEDURE — 82435 ASSAY OF BLOOD CHLORIDE: CPT

## 2023-10-08 PROCEDURE — 84295 ASSAY OF SERUM SODIUM: CPT

## 2023-10-08 PROCEDURE — 80061 LIPID PANEL: CPT

## 2023-10-08 PROCEDURE — 83735 ASSAY OF MAGNESIUM: CPT

## 2023-10-08 PROCEDURE — 83036 HEMOGLOBIN GLYCOSYLATED A1C: CPT

## 2023-10-08 PROCEDURE — 85610 PROTHROMBIN TIME: CPT

## 2023-10-08 PROCEDURE — 80048 BASIC METABOLIC PNL TOTAL CA: CPT

## 2023-10-08 PROCEDURE — 86850 RBC ANTIBODY SCREEN: CPT

## 2023-10-08 PROCEDURE — 87040 BLOOD CULTURE FOR BACTERIA: CPT

## 2023-10-08 PROCEDURE — 82803 BLOOD GASES ANY COMBINATION: CPT

## 2023-10-08 PROCEDURE — 87205 SMEAR GRAM STAIN: CPT

## 2023-10-08 PROCEDURE — 85730 THROMBOPLASTIN TIME PARTIAL: CPT

## 2023-10-08 PROCEDURE — 97166 OT EVAL MOD COMPLEX 45 MIN: CPT

## 2023-10-08 PROCEDURE — 36415 COLL VENOUS BLD VENIPUNCTURE: CPT

## 2023-10-08 PROCEDURE — 85014 HEMATOCRIT: CPT

## 2023-10-08 PROCEDURE — 87075 CULTR BACTERIA EXCEPT BLOOD: CPT

## 2023-10-08 PROCEDURE — 82248 BILIRUBIN DIRECT: CPT

## 2023-10-08 PROCEDURE — 96374 THER/PROPH/DIAG INJ IV PUSH: CPT

## 2023-10-08 PROCEDURE — 97162 PT EVAL MOD COMPLEX 30 MIN: CPT

## 2023-10-08 PROCEDURE — 80076 HEPATIC FUNCTION PANEL: CPT

## 2023-10-08 PROCEDURE — 84100 ASSAY OF PHOSPHORUS: CPT

## 2023-10-08 PROCEDURE — 82330 ASSAY OF CALCIUM: CPT

## 2023-10-08 PROCEDURE — 74177 CT ABD & PELVIS W/CONTRAST: CPT | Mod: MA

## 2023-10-08 PROCEDURE — 85025 COMPLETE CBC W/AUTO DIFF WBC: CPT

## 2023-10-08 PROCEDURE — 36430 TRANSFUSION BLD/BLD COMPNT: CPT

## 2023-10-08 PROCEDURE — 82962 GLUCOSE BLOOD TEST: CPT

## 2023-10-08 PROCEDURE — 0225U NFCT DS DNA&RNA 21 SARSCOV2: CPT

## 2023-10-08 PROCEDURE — 99285 EMERGENCY DEPT VISIT HI MDM: CPT | Mod: 25

## 2023-10-08 PROCEDURE — 83690 ASSAY OF LIPASE: CPT

## 2023-10-08 PROCEDURE — 87077 CULTURE AEROBIC IDENTIFY: CPT

## 2023-10-08 PROCEDURE — 87086 URINE CULTURE/COLONY COUNT: CPT

## 2023-10-08 PROCEDURE — 86900 BLOOD TYPING SEROLOGIC ABO: CPT

## 2023-10-08 PROCEDURE — 86923 COMPATIBILITY TEST ELECTRIC: CPT

## 2023-10-08 PROCEDURE — 87070 CULTURE OTHR SPECIMN AEROBIC: CPT

## 2023-10-08 PROCEDURE — 81001 URINALYSIS AUTO W/SCOPE: CPT

## 2023-10-08 PROCEDURE — 85027 COMPLETE CBC AUTOMATED: CPT

## 2023-10-08 PROCEDURE — C1729: CPT

## 2023-10-08 PROCEDURE — 87186 SC STD MICRODIL/AGAR DIL: CPT

## 2023-10-08 PROCEDURE — 85018 HEMOGLOBIN: CPT

## 2023-10-08 PROCEDURE — 71045 X-RAY EXAM CHEST 1 VIEW: CPT

## 2023-10-08 PROCEDURE — C1769: CPT

## 2023-10-08 PROCEDURE — 96375 TX/PRO/DX INJ NEW DRUG ADDON: CPT

## 2023-10-08 PROCEDURE — 86901 BLOOD TYPING SEROLOGIC RH(D): CPT

## 2023-10-08 PROCEDURE — 80053 COMPREHEN METABOLIC PANEL: CPT

## 2023-10-08 PROCEDURE — 86803 HEPATITIS C AB TEST: CPT

## 2023-10-08 PROCEDURE — 47490 INCISION OF GALLBLADDER: CPT

## 2023-10-08 PROCEDURE — P9016: CPT

## 2023-10-08 PROCEDURE — 82947 ASSAY GLUCOSE BLOOD QUANT: CPT

## 2023-10-08 PROCEDURE — 84132 ASSAY OF SERUM POTASSIUM: CPT

## 2023-10-08 PROCEDURE — 83605 ASSAY OF LACTIC ACID: CPT

## 2023-10-22 ENCOUNTER — INPATIENT (INPATIENT)
Facility: HOSPITAL | Age: 68
LOS: 14 days | Discharge: HOME | DRG: 357 | End: 2023-11-06
Attending: SURGERY | Admitting: STUDENT IN AN ORGANIZED HEALTH CARE EDUCATION/TRAINING PROGRAM
Payer: MEDICAID

## 2023-10-22 VITALS
HEIGHT: 57 IN | TEMPERATURE: 98 F | SYSTOLIC BLOOD PRESSURE: 149 MMHG | HEART RATE: 61 BPM | RESPIRATION RATE: 18 BRPM | DIASTOLIC BLOOD PRESSURE: 76 MMHG | OXYGEN SATURATION: 99 %

## 2023-10-22 DIAGNOSIS — T85.528A DISPLACEMENT OF OTHER GASTROINTESTINAL PROSTHETIC DEVICES, IMPLANTS AND GRAFTS, INITIAL ENCOUNTER: Chronic | ICD-10-CM

## 2023-10-22 DIAGNOSIS — I10 ESSENTIAL (PRIMARY) HYPERTENSION: ICD-10-CM

## 2023-10-22 DIAGNOSIS — T85.528A DISPLACEMENT OF OTHER GASTROINTESTINAL PROSTHETIC DEVICES, IMPLANTS AND GRAFTS, INITIAL ENCOUNTER: ICD-10-CM

## 2023-10-22 DIAGNOSIS — M34.9 SYSTEMIC SCLEROSIS, UNSPECIFIED: ICD-10-CM

## 2023-10-22 DIAGNOSIS — K63.89 OTHER SPECIFIED DISEASES OF INTESTINE: ICD-10-CM

## 2023-10-22 DIAGNOSIS — K81.0 ACUTE CHOLECYSTITIS: ICD-10-CM

## 2023-10-22 PROBLEM — Z87.39 PERSONAL HISTORY OF OTHER DISEASES OF THE MUSCULOSKELETAL SYSTEM AND CONNECTIVE TISSUE: Chronic | Status: ACTIVE | Noted: 2023-09-11

## 2023-10-22 PROBLEM — Z86.79 PERSONAL HISTORY OF OTHER DISEASES OF THE CIRCULATORY SYSTEM: Chronic | Status: ACTIVE | Noted: 2023-09-11

## 2023-10-22 LAB
ALBUMIN SERPL ELPH-MCNC: 3.2 G/DL — LOW (ref 3.3–5)
ALBUMIN SERPL ELPH-MCNC: 3.2 G/DL — LOW (ref 3.3–5)
ALP SERPL-CCNC: 98 U/L — SIGNIFICANT CHANGE UP (ref 40–120)
ALP SERPL-CCNC: 98 U/L — SIGNIFICANT CHANGE UP (ref 40–120)
ALT FLD-CCNC: 12 U/L — SIGNIFICANT CHANGE UP (ref 10–45)
ALT FLD-CCNC: 12 U/L — SIGNIFICANT CHANGE UP (ref 10–45)
ANION GAP SERPL CALC-SCNC: 14 MMOL/L — SIGNIFICANT CHANGE UP (ref 5–17)
ANION GAP SERPL CALC-SCNC: 14 MMOL/L — SIGNIFICANT CHANGE UP (ref 5–17)
ANISOCYTOSIS BLD QL: SLIGHT — SIGNIFICANT CHANGE UP
ANISOCYTOSIS BLD QL: SLIGHT — SIGNIFICANT CHANGE UP
APTT BLD: 26.3 SEC — SIGNIFICANT CHANGE UP (ref 24.5–35.6)
APTT BLD: 26.3 SEC — SIGNIFICANT CHANGE UP (ref 24.5–35.6)
AST SERPL-CCNC: 18 U/L — SIGNIFICANT CHANGE UP (ref 10–40)
AST SERPL-CCNC: 18 U/L — SIGNIFICANT CHANGE UP (ref 10–40)
BASOPHILS # BLD AUTO: 0 K/UL — SIGNIFICANT CHANGE UP (ref 0–0.2)
BASOPHILS # BLD AUTO: 0 K/UL — SIGNIFICANT CHANGE UP (ref 0–0.2)
BASOPHILS NFR BLD AUTO: 0 % — SIGNIFICANT CHANGE UP (ref 0–2)
BASOPHILS NFR BLD AUTO: 0 % — SIGNIFICANT CHANGE UP (ref 0–2)
BILIRUB SERPL-MCNC: 0.4 MG/DL — SIGNIFICANT CHANGE UP (ref 0.2–1.2)
BILIRUB SERPL-MCNC: 0.4 MG/DL — SIGNIFICANT CHANGE UP (ref 0.2–1.2)
BLD GP AB SCN SERPL QL: NEGATIVE — SIGNIFICANT CHANGE UP
BLD GP AB SCN SERPL QL: NEGATIVE — SIGNIFICANT CHANGE UP
BUN SERPL-MCNC: 19 MG/DL — SIGNIFICANT CHANGE UP (ref 7–23)
BUN SERPL-MCNC: 19 MG/DL — SIGNIFICANT CHANGE UP (ref 7–23)
CALCIUM SERPL-MCNC: 9.5 MG/DL — SIGNIFICANT CHANGE UP (ref 8.4–10.5)
CALCIUM SERPL-MCNC: 9.5 MG/DL — SIGNIFICANT CHANGE UP (ref 8.4–10.5)
CHLORIDE SERPL-SCNC: 107 MMOL/L — SIGNIFICANT CHANGE UP (ref 96–108)
CHLORIDE SERPL-SCNC: 107 MMOL/L — SIGNIFICANT CHANGE UP (ref 96–108)
CO2 SERPL-SCNC: 22 MMOL/L — SIGNIFICANT CHANGE UP (ref 22–31)
CO2 SERPL-SCNC: 22 MMOL/L — SIGNIFICANT CHANGE UP (ref 22–31)
CREAT SERPL-MCNC: 0.59 MG/DL — SIGNIFICANT CHANGE UP (ref 0.5–1.3)
CREAT SERPL-MCNC: 0.59 MG/DL — SIGNIFICANT CHANGE UP (ref 0.5–1.3)
DACRYOCYTES BLD QL SMEAR: SLIGHT — SIGNIFICANT CHANGE UP
DACRYOCYTES BLD QL SMEAR: SLIGHT — SIGNIFICANT CHANGE UP
EGFR: 98 ML/MIN/1.73M2 — SIGNIFICANT CHANGE UP
EGFR: 98 ML/MIN/1.73M2 — SIGNIFICANT CHANGE UP
EOSINOPHIL # BLD AUTO: 0.12 K/UL — SIGNIFICANT CHANGE UP (ref 0–0.5)
EOSINOPHIL # BLD AUTO: 0.12 K/UL — SIGNIFICANT CHANGE UP (ref 0–0.5)
EOSINOPHIL NFR BLD AUTO: 1.7 % — SIGNIFICANT CHANGE UP (ref 0–6)
EOSINOPHIL NFR BLD AUTO: 1.7 % — SIGNIFICANT CHANGE UP (ref 0–6)
GLUCOSE SERPL-MCNC: 94 MG/DL — SIGNIFICANT CHANGE UP (ref 70–99)
GLUCOSE SERPL-MCNC: 94 MG/DL — SIGNIFICANT CHANGE UP (ref 70–99)
HCT VFR BLD CALC: 24.9 % — LOW (ref 34.5–45)
HCT VFR BLD CALC: 24.9 % — LOW (ref 34.5–45)
HGB BLD-MCNC: 7.2 G/DL — LOW (ref 11.5–15.5)
HGB BLD-MCNC: 7.2 G/DL — LOW (ref 11.5–15.5)
INR BLD: 0.93 RATIO — SIGNIFICANT CHANGE UP (ref 0.85–1.18)
INR BLD: 0.93 RATIO — SIGNIFICANT CHANGE UP (ref 0.85–1.18)
LYMPHOCYTES # BLD AUTO: 0.86 K/UL — LOW (ref 1–3.3)
LYMPHOCYTES # BLD AUTO: 0.86 K/UL — LOW (ref 1–3.3)
LYMPHOCYTES # BLD AUTO: 12.2 % — LOW (ref 13–44)
LYMPHOCYTES # BLD AUTO: 12.2 % — LOW (ref 13–44)
MAGNESIUM SERPL-MCNC: 2 MG/DL — SIGNIFICANT CHANGE UP (ref 1.6–2.6)
MAGNESIUM SERPL-MCNC: 2 MG/DL — SIGNIFICANT CHANGE UP (ref 1.6–2.6)
MANUAL SMEAR VERIFICATION: SIGNIFICANT CHANGE UP
MANUAL SMEAR VERIFICATION: SIGNIFICANT CHANGE UP
MCHC RBC-ENTMCNC: 23 PG — LOW (ref 27–34)
MCHC RBC-ENTMCNC: 23 PG — LOW (ref 27–34)
MCHC RBC-ENTMCNC: 28.9 GM/DL — LOW (ref 32–36)
MCHC RBC-ENTMCNC: 28.9 GM/DL — LOW (ref 32–36)
MCV RBC AUTO: 79.6 FL — LOW (ref 80–100)
MCV RBC AUTO: 79.6 FL — LOW (ref 80–100)
MICROCYTES BLD QL: SLIGHT — SIGNIFICANT CHANGE UP
MICROCYTES BLD QL: SLIGHT — SIGNIFICANT CHANGE UP
MONOCYTES # BLD AUTO: 0.25 K/UL — SIGNIFICANT CHANGE UP (ref 0–0.9)
MONOCYTES # BLD AUTO: 0.25 K/UL — SIGNIFICANT CHANGE UP (ref 0–0.9)
MONOCYTES NFR BLD AUTO: 3.5 % — SIGNIFICANT CHANGE UP (ref 2–14)
MONOCYTES NFR BLD AUTO: 3.5 % — SIGNIFICANT CHANGE UP (ref 2–14)
MYELOCYTES NFR BLD: 0.9 % — HIGH (ref 0–0)
MYELOCYTES NFR BLD: 0.9 % — HIGH (ref 0–0)
NEUTROPHILS # BLD AUTO: 5.77 K/UL — SIGNIFICANT CHANGE UP (ref 1.8–7.4)
NEUTROPHILS # BLD AUTO: 5.77 K/UL — SIGNIFICANT CHANGE UP (ref 1.8–7.4)
NEUTROPHILS NFR BLD AUTO: 81.7 % — HIGH (ref 43–77)
NEUTROPHILS NFR BLD AUTO: 81.7 % — HIGH (ref 43–77)
OVALOCYTES BLD QL SMEAR: SLIGHT — SIGNIFICANT CHANGE UP
OVALOCYTES BLD QL SMEAR: SLIGHT — SIGNIFICANT CHANGE UP
PLAT MORPH BLD: NORMAL — SIGNIFICANT CHANGE UP
PLAT MORPH BLD: NORMAL — SIGNIFICANT CHANGE UP
PLATELET # BLD AUTO: 250 K/UL — SIGNIFICANT CHANGE UP (ref 150–400)
PLATELET # BLD AUTO: 250 K/UL — SIGNIFICANT CHANGE UP (ref 150–400)
POIKILOCYTOSIS BLD QL AUTO: SLIGHT — SIGNIFICANT CHANGE UP
POIKILOCYTOSIS BLD QL AUTO: SLIGHT — SIGNIFICANT CHANGE UP
POTASSIUM SERPL-MCNC: 4.9 MMOL/L — SIGNIFICANT CHANGE UP (ref 3.5–5.3)
POTASSIUM SERPL-MCNC: 4.9 MMOL/L — SIGNIFICANT CHANGE UP (ref 3.5–5.3)
POTASSIUM SERPL-SCNC: 4.9 MMOL/L — SIGNIFICANT CHANGE UP (ref 3.5–5.3)
POTASSIUM SERPL-SCNC: 4.9 MMOL/L — SIGNIFICANT CHANGE UP (ref 3.5–5.3)
PROT SERPL-MCNC: 6.7 G/DL — SIGNIFICANT CHANGE UP (ref 6–8.3)
PROT SERPL-MCNC: 6.7 G/DL — SIGNIFICANT CHANGE UP (ref 6–8.3)
PROTHROM AB SERPL-ACNC: 10.3 SEC — SIGNIFICANT CHANGE UP (ref 9.5–13)
PROTHROM AB SERPL-ACNC: 10.3 SEC — SIGNIFICANT CHANGE UP (ref 9.5–13)
RBC # BLD: 3.13 M/UL — LOW (ref 3.8–5.2)
RBC # BLD: 3.13 M/UL — LOW (ref 3.8–5.2)
RBC # FLD: 20.4 % — HIGH (ref 10.3–14.5)
RBC # FLD: 20.4 % — HIGH (ref 10.3–14.5)
RBC BLD AUTO: SIGNIFICANT CHANGE UP
RBC BLD AUTO: SIGNIFICANT CHANGE UP
RH IG SCN BLD-IMP: POSITIVE — SIGNIFICANT CHANGE UP
RH IG SCN BLD-IMP: POSITIVE — SIGNIFICANT CHANGE UP
SODIUM SERPL-SCNC: 143 MMOL/L — SIGNIFICANT CHANGE UP (ref 135–145)
SODIUM SERPL-SCNC: 143 MMOL/L — SIGNIFICANT CHANGE UP (ref 135–145)
WBC # BLD: 7.06 K/UL — SIGNIFICANT CHANGE UP (ref 3.8–10.5)
WBC # BLD: 7.06 K/UL — SIGNIFICANT CHANGE UP (ref 3.8–10.5)
WBC # FLD AUTO: 7.06 K/UL — SIGNIFICANT CHANGE UP (ref 3.8–10.5)
WBC # FLD AUTO: 7.06 K/UL — SIGNIFICANT CHANGE UP (ref 3.8–10.5)

## 2023-10-22 PROCEDURE — 71250 CT THORAX DX C-: CPT | Mod: 26

## 2023-10-22 PROCEDURE — 99285 EMERGENCY DEPT VISIT HI MDM: CPT

## 2023-10-22 PROCEDURE — 70450 CT HEAD/BRAIN W/O DYE: CPT | Mod: 26

## 2023-10-22 PROCEDURE — 76705 ECHO EXAM OF ABDOMEN: CPT | Mod: 26

## 2023-10-22 PROCEDURE — 99233 SBSQ HOSP IP/OBS HIGH 50: CPT

## 2023-10-22 PROCEDURE — 74177 CT ABD & PELVIS W/CONTRAST: CPT | Mod: 26,MA

## 2023-10-22 RX ORDER — IRBESARTAN 75 MG/1
1 TABLET ORAL
Refills: 0 | DISCHARGE

## 2023-10-22 RX ORDER — INFLUENZA VIRUS VACCINE 15; 15; 15; 15 UG/.5ML; UG/.5ML; UG/.5ML; UG/.5ML
0.7 SUSPENSION INTRAMUSCULAR ONCE
Refills: 0 | Status: DISCONTINUED | OUTPATIENT
Start: 2023-10-22 | End: 2023-11-06

## 2023-10-22 RX ORDER — LOSARTAN POTASSIUM 100 MG/1
25 TABLET, FILM COATED ORAL DAILY
Refills: 0 | Status: DISCONTINUED | OUTPATIENT
Start: 2023-10-22 | End: 2023-10-30

## 2023-10-22 RX ORDER — ONDANSETRON 8 MG/1
4 TABLET, FILM COATED ORAL EVERY 8 HOURS
Refills: 0 | Status: DISCONTINUED | OUTPATIENT
Start: 2023-10-22 | End: 2023-11-01

## 2023-10-22 RX ORDER — ACETAMINOPHEN 500 MG
650 TABLET ORAL EVERY 6 HOURS
Refills: 0 | Status: DISCONTINUED | OUTPATIENT
Start: 2023-10-22 | End: 2023-11-01

## 2023-10-22 RX ORDER — LANOLIN ALCOHOL/MO/W.PET/CERES
3 CREAM (GRAM) TOPICAL AT BEDTIME
Refills: 0 | Status: DISCONTINUED | OUTPATIENT
Start: 2023-10-22 | End: 2023-11-01

## 2023-10-22 RX ADMIN — LOSARTAN POTASSIUM 25 MILLIGRAM(S): 100 TABLET, FILM COATED ORAL at 18:34

## 2023-10-22 NOTE — H&P ADULT - HISTORY OF PRESENT ILLNESS
68F PMH scleroderma, pHTN, HTN with recent emphysematous cholecystitis s/p percutaneous cholecystectomy with IR on 9/11 presents following dislodged tube. Patient was discharged on 9/14 after procedure and course of zosyn, and has had unremarkable outpatient course. CTAP performed in ED revealed cholecystostomy tube with tip in region of contracted gallbladder Incidentally CT A/P revealed thickening of the ascending colon, with concern for underlying mass or malignancy. Surgery consult requested in ED, and recommending medicine admission.

## 2023-10-22 NOTE — H&P ADULT - NSHPPHYSICALEXAM_GEN_ALL_CORE
VITAL SIGNS:  T(C): 36.9 (10-22-23 @ 13:47), Max: 36.9 (10-22-23 @ 13:47)  T(F): 98.4 (10-22-23 @ 13:47), Max: 98.4 (10-22-23 @ 13:47)  HR: 55 (10-22-23 @ 10:04) (52 - 61)  BP: 127/79 (10-22-23 @ 10:04) (124/72 - 150/74)  RR: 19 (10-22-23 @ 10:04) (18 - 19)  SpO2: 95% (10-22-23 @ 10:04) (95% - 99%)  PHYSICAL EXAM:    Constitutional: WDWN resting comfortably in bed; NAD  Head: NC/AT  Eyes: PERRL, EOMI, anicteric sclera  ENT: no nasal discharge; uvula midline, no oropharyngeal erythema or exudates; MMM  Neck: supple; no JVD or thyromegaly  Respiratory: CTA B/L; no W/R/R, no retractions  Cardiac: +S1/S2; RRR; no M/R/G; PMI non-displaced  Gastrointestinal: abdomen soft, NT/ND; no rebound or guarding; +BSx4  Back: spine midline, no bony tenderness or step-offs; no CVAT B/L  Extremities: WWP, no clubbing or cyanosis; no peripheral edema  Musculoskeletal: NROM x4; no joint swelling, tenderness or erythema  Vascular: 2+ radial, femoral, DP/PT pulses B/L  Dermatologic: skin warm, dry and intact; no rashes, wounds, or scars  Lymphatic: no submandibular or cervical LAD  Neurologic: AAOx3; CNII-XII grossly intact; no focal deficits  Psychiatric: affect and characteristics of appearance, verbalizations, behaviors are appropriate

## 2023-10-22 NOTE — CONSULT NOTE ADULT - ASSESSMENT
68F hx scleroderma and pulmonary hypertension, s/p percutaneous cholecystostomy with IR on 9/11 for emphysematous cholecystitis, now presenting with partially dislodged per lj tube. CTAP with cholecystostomy tube with tip in the region of the contracted gallbladder, no acute intraabdominal pathology.    Recommendations:  - No acute surgical intervention at this time.  - Medicine admission.   - IR consult for tube check.  - Surgery will follow.    To be discussed with Dr. Young.    Green Team Surgery  Please page [phone] for all questions. Not available on [employer] Teams.   68F hx scleroderma and pulmonary hypertension, s/p percutaneous cholecystostomy with IR on 9/11 for emphysematous cholecystitis, now presenting with partially dislodged per lj tube. CTAP with cholecystostomy tube with tip in the region of the contracted gallbladder, no acute intraabdominal pathology. Of note, also noted was a short segment of marked colonic bowel wall thickening involving the ascending colon concerning for underlying mass/malignancy.     Recommendations:  - No acute surgical intervention at this time.  - Medicine admission.   - IR consult for tube check.  - Colonoscopy planning.  - Surgery will follow.    To be discussed with Dr. Young.    [facility]лекс[facility]р Team Surgery  Please page [phone] for all questions. Not available on [employer] Teams.

## 2023-10-22 NOTE — ED ADULT NURSE NOTE - NSHOSCREENINGQ1_ED_ALL_ED
Andrew Ortega \"Cliff\" is a 32 y.o. male evaluated via telephone on 9/7/2022 for follow up    [address] [place], [place] [address]   Below is the assessment and plan developed based on review of pertinent history, physical exam, labs, studies, and medications. 1. Adult ADHD  - Chronic, uncontrolled  - Start stimulant therapy with Adderall XR  - Monitor for medication SE including but not limited to headaches, appetite suppression and sleep disturbances  -     amphetamine-dextroamphetamine (ADDERALL XR) 10 MG extended release capsule; Take 1 capsule by mouth every morning for 7 days. , Disp-7 capsule, R-0Normal    2. Anxiety  - Worsening with Wellbutrin use  - D/c Wellbutrin  - Declines additional treatment at this time  - Consider use of quetiapine for treatment going forward given family hx of bipolar disorder and patient's reported history of frequent mood swings    Return in about 1 week (around 9/14/2022) for ADHD. PDMP Monitoring:    Last PDMP Kara March as Reviewed:  Review User Review Instant Review Result   Josiah Uday 9/7/2022  2:08 PM Reviewed PDMP [1]     Last Controlled Substance Monitoring Documentation      Flowsheet Row Telemedicine from 9/7/2022 in [facility]   Periodic Controlled Substance Monitoring No signs of potential drug abuse or diversion identified. , Assessed functional status. , Possible medication side effects, risk of tolerance/dependence & alternative treatments discussed. filed at 09/07/2022 1408          Urine Drug Screenings (1 yr)    No resulted procedures found. Medication Contract and Consent for Opioid Use Documents Filed        No documents found                    Subjective   2 week follow up of chronic conditions. Was started on Wellbutrin during last visit. Feels attention and reading comprehension has improved. Describes presence of mood swings and hot flashes over the last 4 days. Worsening anxiety throughout the day yesterday. \"Mental instability. \" Tremors. Clammy. Spouse reported concern to Rip Sissygenet. Morning illness has improved. Duration and intensity has improved. Zofran resolves nausea/vomiting after approximately 30 minutes. Review of Systems   Constitutional:  Positive for diaphoresis. Gastrointestinal:  Positive for nausea and vomiting. Neurological:  Positive for tremors. Psychiatric/Behavioral:  Positive for agitation, confusion and decreased concentration. Negative for self-injury, sleep disturbance and suicidal ideas. The patient is nervous/anxious. Objective   Patient-Reported Vitals  Full set of vital signs was not obtained d/t lack of proper equipment. Total Time: minutes: 11-20 minutes    Francisco Piper was evaluated through a synchronous (real-time) audio encounter. Patient identification was verified at the start of the visit. He (or guardian if applicable) is aware that this is a billable service, which includes applicable co-pays. This visit was conducted with the patient's (and/or legal guardian's) verbal consent. He has not had a related appointment within my department in the past 7 days or scheduled within the next 24 hours. The patient was located at Home: [address]. The provider was located at [facility] ([address] [place], [place] [address]): [phone]. Ba [address] [place] [address],  [address] [place] [address].     Note: not billable if this call serves to triage the patient into an appointment for the relevant concern    ORESTES Mendiola - CNP No

## 2023-10-22 NOTE — ED PROVIDER NOTE - PROGRESS NOTE DETAILS
CT abdomen pelvis shows flattened gallbladder, unable to elucidate whether percutaneous cholecystostomy tube is in place.  Will require IR tube study.  Notably new finding of mass of the ascending colon.  Admission for further oncologic work-up as well as IR to study.  ED admit submitted to Tamra

## 2023-10-22 NOTE — PATIENT PROFILE ADULT - FUNCTIONAL ASSESSMENT - BASIC MOBILITY 6.
3-calculated by average/Not able to assess (calculate score using [facility] averaging method)

## 2023-10-22 NOTE — ED ADULT NURSE NOTE - OBJECTIVE STATEMENT
Pt is a 67 yo f coming from home c/o gallbladder tube falling out. As per pt daughter at bedside, pt states that she had gallbladder surgery and was supposed to fu with outpatient within 2 weeks, but has not been able to see outpatient yet. Pt daughter states that her gallbladder tube fell out and the "area is red and swollen". PMH of pulmonary HTN, scleroderma. PT A,Ox4, ambulatory at baseline. Respirations even and unlabored, abd soft, nondistended and nontender, skin warm, dry, erythema noted to incision site, GIBSON. Denies HA, CP, SOB, n/v/d, fever, chills and urinary symptoms. Stretcher locked in lowest position, appropriate side rails up for safety, pt instructed to call for RN if anything needed.

## 2023-10-22 NOTE — PATIENT PROFILE ADULT - FALL HARM RISK - HARM RISK INTERVENTIONS
Assistance with ambulation/Assistance OOB with selected safe patient handling equipment/Communicate Risk of Fall with Harm to all staff/Discuss with provider need for PT consult/Monitor gait and stability/Reinforce activity limits and safety measures with patient and family/Tailored Fall Risk Interventions/Visual Cue: Yellow wristband and red socks/Bed in lowest position, wheels locked, appropriate side rails in place/Call bell, personal items and telephone in reach/Instruct patient to call for assistance before getting out of bed or chair/Non-slip footwear when patient is out of bed/[place] to call system/Physically safe environment - no spills, clutter or unnecessary equipment/Purposeful Proactive Rounding/Room/bathroom lighting operational, light cord in reach

## 2023-10-22 NOTE — ED ADULT NURSE REASSESSMENT NOTE - NS ED NURSE REASSESS COMMENT FT1
ID #[id number] name: Lj: Informed pt that she cannot eat at this time due to her diagnosis. ALEXIS Saldaña informed this morning that pt wants to eat. No distress. Alert and orientedX4.

## 2023-10-22 NOTE — H&P ADULT - TIME BILLING
History and physical  Review of labs  Personal review of CT A/P  Review of surgery recommendations and note  Personally consulted gastroenterology  Medication reconciliation  Use of

## 2023-10-22 NOTE — H&P ADULT - NSHPLABSRESULTS_GEN_ALL_CORE
I have personally reviewed the CT A/P. Drain appears to be in gall blader. Large stool burden. Possible ascending colon thickening behind stool burden of ascending colon with region of hypoattenuation in the liver (as marked)

## 2023-10-22 NOTE — ED PROVIDER NOTE - CLINICAL SUMMARY MEDICAL DECISION MAKING FREE TEXT BOX
68-year-old female with history of emphysematous cholecystitis presents with concern for percutaneous cholecystostomy tube displacement.  Labs, CT A/P ordered.  Surgery consulted.

## 2023-10-22 NOTE — ED ADULT NURSE REASSESSMENT NOTE - NS ED NURSE REASSESS COMMENT FT1
Pt is able to move herself on the stretcher. Pt is alert and orientedX4. Spoke to her via . Pt's sacral area Intact. Percutaneous cholecystomy draining yellowish greenish fluid.

## 2023-10-22 NOTE — ED PROVIDER NOTE - PHYSICAL EXAMINATION
GENERAL: NAD  HEAD: normocephalic, atraumatic  HEENT: normal conjunctiva, oral mucosa moist, uvula midline, neck supple  CARDIAC: regular rate and rhythm, normal S1S2, no appreciable murmurs  PULM: speaking in full sentences, normal breath sounds, clear to ascultation bilaterally, no rales, rhonchi, wheezing  GI: abdomen nondistended, soft, nontender, perc lj in place  : no CVA tenderness b/l, no suprapubic tenderness  NEURO: moving all 4 extremities, no focal deficits, normal speech, AOx3  MSK: no peripheral edema, no calf tenderness b/l  SKIN: well-perfused, extremities warm  PSYCH: appropriate mood and affect

## 2023-10-22 NOTE — H&P ADULT - PROBLEM SELECTOR PLAN 1
Incidentally found to have thickening of the ascending colon wall on admission, as well as possible mass in liver.   - GI consulted for possible colonoscopy, will see patient in AM

## 2023-10-22 NOTE — ED PROVIDER NOTE - OBJECTIVE STATEMENT
68-year-old female with a history of emphysematous cholecystitis presents with concern for percutaneous cholecystostomy tube displacement.  Patient presented to the hospital in September of this year with sepsis and emphysematous cholecystitis.  At that time IR placed a percutaneous cholecystostomy tube that was followed by surgery.  Patient's daughter and patient were changing dressing on her percutaneous cholecystostomy tube earlier this evening when they noticed that the suture had become  from the skin and that the tube was about 4 cm outside of the body.  They were concerned that the tube had come out of her abdomen and presented to the emergency department for further evaluation.  They note that the tube had been draining about 200 cc of fluid earlier today before it was displaced.  Otherwise patient has no headache, chest pain, shortness of breath, N/V/D/abdominal pain, dysuria, peripheral edema, fever/chills.

## 2023-10-22 NOTE — ED PROVIDER NOTE - ATTENDING CONTRIBUTION TO CARE
MD Resendez:  patient seen and evaluated personally.   I agree with the History & Physical,  Impression & Plan other than what was detailed in my note.  MD Resendez  68-year-old female history of scleroderma, pulmonary hypertension on oxygen, recent dx of emphesematous cholecystitis, s/p per lj placed is now here because string became loose and she noticed that the tube came out about three inches, no f/c no vomiting, no pain, afebrile vitals stable  non toxic well appearing, NC/AT,  conjunctiva non conjected, sclera anicteric, moist mucous membranes, neck supple, heart sounds, normal, no mrg, lungs cta b/l no wrr, abd soft non distended w/ no tenderness, pinkish tissue surrounding tube in ruq, , no visual deformities of extremities, axox3, , normal mood and affect, plan for cbc, cmp, ct abd pelvis, likely will need surg consult readmission.

## 2023-10-22 NOTE — ED ADULT NURSE NOTE - MUSCULOSKELETAL ASSESSMENT
[facility] team:  Please address positive urine culture.  Please treat UTI if patient is symptomatic or has an upcoming procedure. Please remind patient to have INR checked if they are on Coumadin. Please do not use quinolone antibiotic if patient is on theophy  lline. Please order a proof of cure followup urine culture in 3-4 weeks if patient is treated.  
- - -

## 2023-10-22 NOTE — H&P ADULT - PROBLEM SELECTOR PLAN 2
As seen on September hospitalization. Status post course of zosyn at that time, and with perc lj. Drain partially removed and subsequently replaced prior to admission  - IR consult to ensure no further measures needed

## 2023-10-22 NOTE — H&P ADULT - PROBLEM SELECTOR PLAN 3
Patient takes irbesartan 75 mg PO Qd as an outpatient   - Continue as losartan 25 mg PO Qd while inpatient

## 2023-10-22 NOTE — CONSULT NOTE ADULT - SUBJECTIVE AND OBJECTIVE BOX
General Surgery Consult  Consulting surgical team: green surgery  Consulting attending: SIRENA    HPI:  68F hx scleroderma and pulmonary hypertension, s/p percutaneous cholecystostomy with IR on 9/11 for emphysematous cholecystitis, now presenting with partially dislodged per lj tube. The patient and her daughter report the tube had been in place as initially placed until yesterday, when they noticed it had been partially dislodged. They pushed the tube back in partially, however they state it was always within the abdominal wall. They report that the quality and quantity of discharge have not changed. Deny fever, chills, vomiting, nausea, diarrhea, pain.    In ED, vital signs stable. CTAP with cholecystostomy tube with tip in the region of the contracted gallbladder, no acute intraabdominal pathology.    PAST MEDICAL HISTORY:  H/O pulmonary hypertension  H/O scleroderma    PAST SURGICAL HISTORY:  B/l hip replacements    MEDICATIONS:    ALLERGIES:  No Known Allergies      VITALS & I/Os:  Vital Signs Last 24 Hrs  T(C): 36.6 (22 Oct 2023 03:25), Max: 36.6 (22 Oct 2023 03:25)  T(F): 97.9 (22 Oct 2023 03:25), Max: 97.9 (22 Oct 2023 03:25)  HR: 56 (22 Oct 2023 03:25) (56 - 61)  BP: 150/74 (22 Oct 2023 03:25) (149/76 - 150/74)  BP(mean): --  RR: 18 (22 Oct 2023 03:25) (18 - 18)  SpO2: 95% (22 Oct 2023 03:25) (95% - 99%)    Parameters below as of 22 Oct 2023 03:25  Patient On (Oxygen Delivery Method): room air        I&O's Summary      PHYSICAL EXAM:  General: Not acutely distressed  Respiratory: Nonlabored respirations on room air  Cardiovascular: Pulse present  Abdominal: Soft, nondistended, nontender. No rebound or guarding. No organomegaly, no palpable mass. Perc lj tube in place with straw colored output, protruding past point where had previously been sutured in place to skin, with suture still wrapped around tube, however not in skin. Surrounding skin minimally erythematous   Extremities: Warm    LABS:                        7.2    7.06  )-----------( 250      ( 22 Oct 2023 02:40 )             24.9     10-22    143  |  107  |  19  ----------------------------<  94  4.9   |  22  |  0.59    Ca    9.5      22 Oct 2023 02:40  Mg     2.0     10-22    TPro  6.7  /  Alb  3.2<L>  /  TBili  0.4  /  DBili  x   /  AST  18  /  ALT  12  /  AlkPhos  98  10-22    Lactate:              Urinalysis Basic - ( 22 Oct 2023 02:40 )    Color: x / Appearance: x / SG: x / pH: x  Gluc: 94 mg/dL / Ketone: x  / Bili: x / Urobili: x   Blood: x / Protein: x / Nitrite: x   Leuk Esterase: x / RBC: x / WBC x   Sq Epi: x / Non Sq Epi: x / Bacteria: x        IMAGING:                                                                                               General Surgery Consult  Consulting surgical team: green surgery  Consulting attending: SIRENA    HPI:  68F hx scleroderma and pulmonary hypertension, s/p percutaneous cholecystostomy with IR on 9/11 for emphysematous cholecystitis, now presenting with partially dislodged per lj tube. The patient and her daughter report ( ID no. [id number]) that the tube had been in place as initially placed until yesterday, when they noticed it had been partially dislodged. They pushed the tube back in partially, however they state it was always within the abdominal wall. They report that the quality and quantity of discharge have not changed. Deny fever, chills, vomiting, nausea, diarrhea, pain.    In ED, vital signs stable. CTAP with cholecystostomy tube with tip in the region of the contracted gallbladder, no acute intraabdominal pathology.    PAST MEDICAL HISTORY:  H/O pulmonary hypertension  H/O scleroderma    PAST SURGICAL HISTORY:  B/l hip replacements    MEDICATIONS:    ALLERGIES:  No Known Allergies      VITALS & I/Os:  Vital Signs Last 24 Hrs  T(C): 36.6 (22 Oct 2023 03:25), Max: 36.6 (22 Oct 2023 03:25)  T(F): 97.9 (22 Oct 2023 03:25), Max: 97.9 (22 Oct 2023 03:25)  HR: 56 (22 Oct 2023 03:25) (56 - 61)  BP: 150/74 (22 Oct 2023 03:25) (149/76 - 150/74)  BP(mean): --  RR: 18 (22 Oct 2023 03:25) (18 - 18)  SpO2: 95% (22 Oct 2023 03:25) (95% - 99%)    Parameters below as of 22 Oct 2023 03:25  Patient On (Oxygen Delivery Method): room air        I&O's Summary      PHYSICAL EXAM:  General: Not acutely distressed  Respiratory: Nonlabored respirations on room air  Cardiovascular: Pulse present  Abdominal: Soft, nondistended, nontender. No rebound or guarding. No organomegaly, no palpable mass. Perc lj tube in place with straw colored output, protruding past point where had previously been sutured in place to skin, with suture still wrapped around tube, however not in skin. Surrounding skin minimally erythematous   Extremities: Warm    LABS:                        7.2    7.06  )-----------( 250      ( 22 Oct 2023 02:40 )             24.9     10-22    143  |  107  |  19  ----------------------------<  94  4.9   |  22  |  0.59    Ca    9.5      22 Oct 2023 02:40  Mg     2.0     10-22    TPro  6.7  /  Alb  3.2<L>  /  TBili  0.4  /  DBili  x   /  AST  18  /  ALT  12  /  AlkPhos  98  10-22    Lactate:              Urinalysis Basic - ( 22 Oct 2023 02:40 )    Color: x / Appearance: x / SG: x / pH: x  Gluc: 94 mg/dL / Ketone: x  / Bili: x / Urobili: x   Blood: x / Protein: x / Nitrite: x   Leuk Esterase: x / RBC: x / WBC x   Sq Epi: x / Non Sq Epi: x / Bacteria: x        IMAGING:                                                                                               General Surgery Consult  Consulting surgical team: green surgery  Consulting attending: SIRENA    HPI:  68F hx scleroderma and pulmonary hypertension, s/p percutaneous cholecystostomy with IR on 9/11 for emphysematous cholecystitis, now presenting with partially dislodged per lj tube. The patient and her daughter report ( ID no. [id number]) that the tube had been in place as initially placed until yesterday, when they noticed it had been partially dislodged. They pushed the tube back in partially, however they state it was always within the abdominal wall. They report that the quality and quantity of discharge have not changed. Reports having a daily BM, well formed, without blood. Also passing flatus. Denies recent unintentional weight loss, fatigue, or shortness of breath. Denies fever, chills, vomiting, nausea, diarrhea, pain. Of note, the patient currently has emergency medicaid only, and is concerned that some of her treatment may not be covered. She mentioned she has never had a colonoscopy before, because of lack of insurance coverage.    In ED, vital signs stable. CTAP with cholecystostomy tube with tip in the region of the contracted gallbladder, as well ascending colon inflammation c/f potential malignancy, no acute intraabdominal pathology.    PAST MEDICAL HISTORY:  H/O pulmonary hypertension  H/O scleroderma    PAST SURGICAL HISTORY:  B/l hip replacements    MEDICATIONS:    ALLERGIES:  No Known Allergies      VITALS & I/Os:  Vital Signs Last 24 Hrs  T(C): 36.6 (22 Oct 2023 03:25), Max: 36.6 (22 Oct 2023 03:25)  T(F): 97.9 (22 Oct 2023 03:25), Max: 97.9 (22 Oct 2023 03:25)  HR: 56 (22 Oct 2023 03:25) (56 - 61)  BP: 150/74 (22 Oct 2023 03:25) (149/76 - 150/74)  BP(mean): --  RR: 18 (22 Oct 2023 03:25) (18 - 18)  SpO2: 95% (22 Oct 2023 03:25) (95% - 99%)    Parameters below as of 22 Oct 2023 03:25  Patient On (Oxygen Delivery Method): room air        I&O's Summary      PHYSICAL EXAM:  General: Not acutely distressed  Respiratory: Nonlabored respirations on room air  Cardiovascular: Pulse present  Abdominal: Soft, nondistended, nontender. No rebound or guarding. No organomegaly, no palpable mass. Perc lj tube in place with straw colored output, protruding past point where had previously been sutured in place to skin, with suture still wrapped around tube, however not in skin. Surrounding skin minimally erythematous   Extremities: Warm    LABS:                        7.2    7.06  )-----------( 250      ( 22 Oct 2023 02:40 )             24.9     10-22    143  |  107  |  19  ----------------------------<  94  4.9   |  22  |  0.59    Ca    9.5      22 Oct 2023 02:40  Mg     2.0     10-22    TPro  6.7  /  Alb  3.2<L>  /  TBili  0.4  /  DBili  x   /  AST  18  /  ALT  12  /  AlkPhos  98  10-22    Lactate:              Urinalysis Basic - ( 22 Oct 2023 02:40 )    Color: x / Appearance: x / SG: x / pH: x  Gluc: 94 mg/dL / Ketone: x  / Bili: x / Urobili: x   Blood: x / Protein: x / Nitrite: x   Leuk Esterase: x / RBC: x / WBC x   Sq Epi: x / Non Sq Epi: x / Bacteria: x        IMAGING:                                                                                               General Surgery Consult  Consulting surgical team: green surgery  Consulting attending: SIRENA    HPI:  68F hx scleroderma and pulmonary hypertension, s/p percutaneous cholecystostomy with IR on 9/11 for emphysematous cholecystitis, now presenting with partially dislodged per lj tube. The patient and her daughter report ( ID no. [id number]) that the tube had been in place as initially placed until yesterday, when they noticed it had been partially dislodged. They pushed the tube back in partially, however they state it was always within the abdominal wall. They report that the quality and quantity of discharge have not changed. Reports having a daily BM, well formed, without blood. Also passing flatus. Denies recent unintentional weight loss, fatigue, or shortness of breath. Denies fever, chills, vomiting, nausea, diarrhea, pain. Of note, the patient currently has emergency Medicaid only, and is concerned that some of her treatment may not be covered. She mentioned she has never had a colonoscopy before, because it wasn't covered by her insurance.    In ED, vital signs stable. WBC, Tbili, LFTs wnl. CTAP with cholecystostomy tube with tip in the region of the contracted gallbladder, as well ascending colon inflammation c/f potential malignancy, no acute intraabdominal pathology.    PAST MEDICAL HISTORY:  H/O pulmonary hypertension  H/O scleroderma    PAST SURGICAL HISTORY:  B/l hip replacements    MEDICATIONS:    ALLERGIES:  No Known Allergies      VITALS & I/Os:  Vital Signs Last 24 Hrs  T(C): 36.6 (22 Oct 2023 03:25), Max: 36.6 (22 Oct 2023 03:25)  T(F): 97.9 (22 Oct 2023 03:25), Max: 97.9 (22 Oct 2023 03:25)  HR: 56 (22 Oct 2023 03:25) (56 - 61)  BP: 150/74 (22 Oct 2023 03:25) (149/76 - 150/74)  BP(mean): --  RR: 18 (22 Oct 2023 03:25) (18 - 18)  SpO2: 95% (22 Oct 2023 03:25) (95% - 99%)    Parameters below as of 22 Oct 2023 03:25  Patient On (Oxygen Delivery Method): room air        I&O's Summary      PHYSICAL EXAM:  General: Not acutely distressed  Respiratory: Nonlabored respirations on room air  Cardiovascular: Pulse present  Abdominal: Soft, nondistended, nontender. No rebound or guarding. No organomegaly, no palpable mass. Perc lj tube in place with straw colored output, protruding past point where had previously been sutured in place to skin, with suture still wrapped around tube, however not in skin. Surrounding skin minimally erythematous   Extremities: Warm    LABS:                        7.2    7.06  )-----------( 250      ( 22 Oct 2023 02:40 )             24.9     10-22    143  |  107  |  19  ----------------------------<  94  4.9   |  22  |  0.59    Ca    9.5      22 Oct 2023 02:40  Mg     2.0     10-22    TPro  6.7  /  Alb  3.2<L>  /  TBili  0.4  /  DBili  x   /  AST  18  /  ALT  12  /  AlkPhos  98  10-22    Lactate:              Urinalysis Basic - ( 22 Oct 2023 02:40 )    Color: x / Appearance: x / SG: x / pH: x  Gluc: 94 mg/dL / Ketone: x  / Bili: x / Urobili: x   Blood: x / Protein: x / Nitrite: x   Leuk Esterase: x / RBC: x / WBC x   Sq Epi: x / Non Sq Epi: x / Bacteria: x        IMAGING:

## 2023-10-22 NOTE — ED PROVIDER NOTE - CARE PLAN
1 Principal Discharge DX:	Migration of percutaneous cholecystostomy tube  Secondary Diagnosis:	Colonic mass

## 2023-10-22 NOTE — H&P ADULT - ASSESSMENT
68F PMH scleroderma, pHTN, HTN, HLD, presents with partially dislodged percutaneous cholecystostomy tube, which was replaced, however found to have significant thickening of the ascending colon and possible lesion in the liver. Now admitted for possible colonoscopy for workup.

## 2023-10-23 DIAGNOSIS — D63.8 ANEMIA IN OTHER CHRONIC DISEASES CLASSIFIED ELSEWHERE: ICD-10-CM

## 2023-10-23 LAB
ANION GAP SERPL CALC-SCNC: 10 MMOL/L — SIGNIFICANT CHANGE UP (ref 5–17)
ANION GAP SERPL CALC-SCNC: 10 MMOL/L — SIGNIFICANT CHANGE UP (ref 5–17)
BUN SERPL-MCNC: 17 MG/DL — SIGNIFICANT CHANGE UP (ref 7–23)
BUN SERPL-MCNC: 17 MG/DL — SIGNIFICANT CHANGE UP (ref 7–23)
CALCIUM SERPL-MCNC: 8.7 MG/DL — SIGNIFICANT CHANGE UP (ref 8.4–10.5)
CALCIUM SERPL-MCNC: 8.7 MG/DL — SIGNIFICANT CHANGE UP (ref 8.4–10.5)
CHLORIDE SERPL-SCNC: 104 MMOL/L — SIGNIFICANT CHANGE UP (ref 96–108)
CHLORIDE SERPL-SCNC: 104 MMOL/L — SIGNIFICANT CHANGE UP (ref 96–108)
CHOLEST SERPL-MCNC: 119 MG/DL — SIGNIFICANT CHANGE UP
CHOLEST SERPL-MCNC: 119 MG/DL — SIGNIFICANT CHANGE UP
CO2 SERPL-SCNC: 26 MMOL/L — SIGNIFICANT CHANGE UP (ref 22–31)
CO2 SERPL-SCNC: 26 MMOL/L — SIGNIFICANT CHANGE UP (ref 22–31)
CREAT SERPL-MCNC: 0.94 MG/DL — SIGNIFICANT CHANGE UP (ref 0.5–1.3)
CREAT SERPL-MCNC: 0.94 MG/DL — SIGNIFICANT CHANGE UP (ref 0.5–1.3)
EGFR: 66 ML/MIN/1.73M2 — SIGNIFICANT CHANGE UP
EGFR: 66 ML/MIN/1.73M2 — SIGNIFICANT CHANGE UP
GLUCOSE SERPL-MCNC: 80 MG/DL — SIGNIFICANT CHANGE UP (ref 70–99)
GLUCOSE SERPL-MCNC: 80 MG/DL — SIGNIFICANT CHANGE UP (ref 70–99)
HCT VFR BLD CALC: 24.6 % — LOW (ref 34.5–45)
HCT VFR BLD CALC: 24.6 % — LOW (ref 34.5–45)
HCT VFR BLD CALC: 24.8 % — LOW (ref 34.5–45)
HCT VFR BLD CALC: 24.8 % — LOW (ref 34.5–45)
HDLC SERPL-MCNC: 40 MG/DL — LOW
HDLC SERPL-MCNC: 40 MG/DL — LOW
HGB BLD-MCNC: 7 G/DL — CRITICAL LOW (ref 11.5–15.5)
HGB BLD-MCNC: 7 G/DL — CRITICAL LOW (ref 11.5–15.5)
HGB BLD-MCNC: 7.1 G/DL — LOW (ref 11.5–15.5)
HGB BLD-MCNC: 7.1 G/DL — LOW (ref 11.5–15.5)
LIPID PNL WITH DIRECT LDL SERPL: 54 MG/DL — SIGNIFICANT CHANGE UP
LIPID PNL WITH DIRECT LDL SERPL: 54 MG/DL — SIGNIFICANT CHANGE UP
MCHC RBC-ENTMCNC: 22.6 PG — LOW (ref 27–34)
MCHC RBC-ENTMCNC: 22.6 PG — LOW (ref 27–34)
MCHC RBC-ENTMCNC: 22.7 PG — LOW (ref 27–34)
MCHC RBC-ENTMCNC: 22.7 PG — LOW (ref 27–34)
MCHC RBC-ENTMCNC: 28.2 GM/DL — LOW (ref 32–36)
MCHC RBC-ENTMCNC: 28.2 GM/DL — LOW (ref 32–36)
MCHC RBC-ENTMCNC: 28.9 GM/DL — LOW (ref 32–36)
MCHC RBC-ENTMCNC: 28.9 GM/DL — LOW (ref 32–36)
MCV RBC AUTO: 78.3 FL — LOW (ref 80–100)
MCV RBC AUTO: 78.3 FL — LOW (ref 80–100)
MCV RBC AUTO: 80.5 FL — SIGNIFICANT CHANGE UP (ref 80–100)
MCV RBC AUTO: 80.5 FL — SIGNIFICANT CHANGE UP (ref 80–100)
NON HDL CHOLESTEROL: 78 MG/DL — SIGNIFICANT CHANGE UP
NON HDL CHOLESTEROL: 78 MG/DL — SIGNIFICANT CHANGE UP
NRBC # BLD: 0 /100 WBCS — SIGNIFICANT CHANGE UP (ref 0–0)
PLATELET # BLD AUTO: 238 K/UL — SIGNIFICANT CHANGE UP (ref 150–400)
PLATELET # BLD AUTO: 238 K/UL — SIGNIFICANT CHANGE UP (ref 150–400)
PLATELET # BLD AUTO: 239 K/UL — SIGNIFICANT CHANGE UP (ref 150–400)
PLATELET # BLD AUTO: 239 K/UL — SIGNIFICANT CHANGE UP (ref 150–400)
POTASSIUM SERPL-MCNC: 4 MMOL/L — SIGNIFICANT CHANGE UP (ref 3.5–5.3)
POTASSIUM SERPL-MCNC: 4 MMOL/L — SIGNIFICANT CHANGE UP (ref 3.5–5.3)
POTASSIUM SERPL-SCNC: 4 MMOL/L — SIGNIFICANT CHANGE UP (ref 3.5–5.3)
POTASSIUM SERPL-SCNC: 4 MMOL/L — SIGNIFICANT CHANGE UP (ref 3.5–5.3)
RBC # BLD: 3.08 M/UL — LOW (ref 3.8–5.2)
RBC # BLD: 3.08 M/UL — LOW (ref 3.8–5.2)
RBC # BLD: 3.14 M/UL — LOW (ref 3.8–5.2)
RBC # BLD: 3.14 M/UL — LOW (ref 3.8–5.2)
RBC # FLD: 20.4 % — HIGH (ref 10.3–14.5)
SODIUM SERPL-SCNC: 140 MMOL/L — SIGNIFICANT CHANGE UP (ref 135–145)
SODIUM SERPL-SCNC: 140 MMOL/L — SIGNIFICANT CHANGE UP (ref 135–145)
TRIGL SERPL-MCNC: 140 MG/DL — SIGNIFICANT CHANGE UP
TRIGL SERPL-MCNC: 140 MG/DL — SIGNIFICANT CHANGE UP
WBC # BLD: 6.92 K/UL — SIGNIFICANT CHANGE UP (ref 3.8–10.5)
WBC # BLD: 6.92 K/UL — SIGNIFICANT CHANGE UP (ref 3.8–10.5)
WBC # BLD: 6.99 K/UL — SIGNIFICANT CHANGE UP (ref 3.8–10.5)
WBC # BLD: 6.99 K/UL — SIGNIFICANT CHANGE UP (ref 3.8–10.5)
WBC # FLD AUTO: 6.92 K/UL — SIGNIFICANT CHANGE UP (ref 3.8–10.5)
WBC # FLD AUTO: 6.92 K/UL — SIGNIFICANT CHANGE UP (ref 3.8–10.5)
WBC # FLD AUTO: 6.99 K/UL — SIGNIFICANT CHANGE UP (ref 3.8–10.5)
WBC # FLD AUTO: 6.99 K/UL — SIGNIFICANT CHANGE UP (ref 3.8–10.5)

## 2023-10-23 PROCEDURE — 99223 1ST HOSP IP/OBS HIGH 75: CPT

## 2023-10-23 PROCEDURE — 99232 SBSQ HOSP IP/OBS MODERATE 35: CPT

## 2023-10-23 RX ORDER — SOD SULF/SODIUM/NAHCO3/KCL/PEG
2000 SOLUTION, RECONSTITUTED, ORAL ORAL ONCE
Refills: 0 | Status: COMPLETED | OUTPATIENT
Start: 2023-10-23 | End: 2023-10-23

## 2023-10-23 RX ADMIN — LOSARTAN POTASSIUM 25 MILLIGRAM(S): 100 TABLET, FILM COATED ORAL at 05:01

## 2023-10-23 RX ADMIN — Medication 2000 MILLILITER(S): at 21:00

## 2023-10-23 NOTE — CONSULT NOTE ADULT - SUBJECTIVE AND OBJECTIVE BOX
Chief Complaint:  Patient is a 68y old  Female who presents with a chief complaint of Dislodged percutaneous lj tube (23 Oct 2023 10:13)      Date of service: 10-23-23 @ 16:56    HPI:    The patient is a 68 year old female with scleroderma presenting with dislodged cholecystostomy tube.    The patient denies dysphagia, nausea and vomiting, abdominal pain, diarrhea, unintentional weight loss, change in bowel habits or NSAID use.    GI consulted for abnormal CT of the colon.    Allergies:  No Known Allergies      Home Medications:    Hospital Medications:  acetaminophen     Tablet .. 650 milliGRAM(s) Oral every 6 hours PRN  aluminum hydroxide/magnesium hydroxide/simethicone Suspension 30 milliLiter(s) Oral every 4 hours PRN  influenza  Vaccine (HIGH DOSE) 0.7 milliLiter(s) IntraMuscular once  losartan 25 milliGRAM(s) Oral daily  melatonin 3 milliGRAM(s) Oral at bedtime PRN  ondansetron Injectable 4 milliGRAM(s) IV Push every 8 hours PRN  polyethylene glycol/electrolyte Solution 2000 milliLiter(s) Oral once      PMHX/PSHX:  No pertinent past medical history    H/O pulmonary hypertension    H/O pulmonary hypertension    H/O scleroderma    Migration of percutaneous cholecystostomy tube        Family history:      Social History:   Denies ethanol use.  Denies illicit drug use.    ROS:     General:  No wt loss, fevers, chills, night sweats, fatigue,   Eyes:  Good vision, no reported pain  ENT:  No sore throat, pain, runny nose, dysphagia  CV:  No pain, palpitations, hypo/hypertension  Resp:  No dyspnea, cough, tachypnea, wheezing  GI:  See HPI  :  No pain, bleeding, incontinence, nocturia  Muscle:  No pain, weakness  Neuro:  No weakness, tingling, memory problems  Psych:  No fatigue, insomnia, mood problems, depression  Endocrine:  No polyuria, polydipsia, cold/heat intolerance  Heme:  No petechiae, ecchymosis, easy bruisability  Integumentary:  No rash, edema      PHYSICAL EXAM:     GENERAL:  Appears stated age, well-groomed, well-nourished, no distress  HEENT:  NC/AT,  conjunctivae anicteric, clear and pink,   NECK: supple, trachea midline  CHEST:  Full & symmetric excursion, no increased effort, breath sounds clear  HEART:  Regular rhythm, no JVD  ABDOMEN:  Soft, non-tender, non-distended, normoactive bowel sounds,  no masses , no hepatosplenomegaly  EXTREMITIES:  no cyanosis,clubbing or edema  SKIN:  No rash, erythema, or, ecchymoses, no jaundice  NEURO:  Alert, non-focal, no asterixis  PSYCH: Appropriate affect, oriented to place and time  RECTAL: Deferred      Vital Signs:  Vital Signs Last 24 Hrs  T(C): 37.2 (23 Oct 2023 16:53), Max: 37.4 (23 Oct 2023 12:00)  T(F): 99 (23 Oct 2023 16:53), Max: 99.3 (23 Oct 2023 12:00)  HR: 85 (23 Oct 2023 16:53) (72 - 85)  BP: 112/71 (23 Oct 2023 16:53) (108/62 - 126/76)  BP(mean): --  RR: 18 (23 Oct 2023 16:53) (18 - 18)  SpO2: 96% (23 Oct 2023 16:53) (95% - 97%)    Parameters below as of 23 Oct 2023 16:53  Patient On (Oxygen Delivery Method): room air      Daily Height in cm: 144.78 (23 Oct 2023 08:52)    Daily     LABS: Labs personally reviewed by me:                        7.1    6.92  )-----------( 239      ( 23 Oct 2023 10:54 )             24.6     10-23    140  |  104  |  17  ----------------------------<  80  4.0   |  26  |  0.94    Ca    8.7      23 Oct 2023 07:04  Mg     2.0     10-22    TPro  6.7  /  Alb  3.2<L>  /  TBili  0.4  /  DBili  x   /  AST  18  /  ALT  12  /  AlkPhos  98  10-22    LIVER FUNCTIONS - ( 22 Oct 2023 02:40 )  Alb: 3.2 g/dL / Pro: 6.7 g/dL / ALK PHOS: 98 U/L / ALT: 12 U/L / AST: 18 U/L / GGT: x           PT/INR - ( 22 Oct 2023 04:09 )   PT: 10.3 sec;   INR: 0.93 ratio         PTT - ( 22 Oct 2023 04:09 )  PTT:26.3 sec  Urinalysis Basic - ( 23 Oct 2023 07:04 )    Color: x / Appearance: x / SG: x / pH: x  Gluc: 80 mg/dL / Ketone: x  / Bili: x / Urobili: x   Blood: x / Protein: x / Nitrite: x   Leuk Esterase: x / RBC: x / WBC x   Sq Epi: x / Non Sq Epi: x / Bacteria: x          Imaging personally reviewed by me:

## 2023-10-23 NOTE — PROGRESS NOTE ADULT - NSPROGADDITIONALINFOA_GEN_ALL_CORE
Time-based billing (NON-critical care).     45 minutes spent on total encounter. The necessity of the time spent during the encounter on this date of service was due to:     - Reviewing, and interpreting labs, testing, and imaging.  - Independently obtaining a review of systems and performing a physical exam  - Reviewing prior records and where necessary, outpatient records.  - Counselling and educating patient regarding interpretation of aforementioned items and plan of care.        d/w ACP

## 2023-10-23 NOTE — PROGRESS NOTE ADULT - SUBJECTIVE AND OBJECTIVE BOX
[facility] Daily Progress Note  =====================================================    OVERNIGHT EVENTS: NAEO    SUBJECTIVE: Patient seen and examined at bedside on AM rounds.     ALLERGIES:  No Known Allergies      --------------------------------------------------------------------------------------    MEDICATIONS:    Neurologic Medications  acetaminophen     Tablet .. 650 milliGRAM(s) Oral every 6 hours PRN Temp greater or equal to 38C (100.4F), Mild Pain (1 - 3)  melatonin 3 milliGRAM(s) Oral at bedtime PRN Insomnia  ondansetron Injectable 4 milliGRAM(s) IV Push every 8 hours PRN Nausea and/or Vomiting    Respiratory Medications    Cardiovascular Medications  losartan 25 milliGRAM(s) Oral daily    Gastrointestinal Medications  aluminum hydroxide/magnesium hydroxide/simethicone Suspension 30 milliLiter(s) Oral every 4 hours PRN Dyspepsia    Genitourinary Medications    Hematologic/Oncologic Medications  influenza  Vaccine (HIGH DOSE) 0.7 milliLiter(s) IntraMuscular once    Antimicrobial/Immunologic Medications    Endocrine/Metabolic Medications    Topical/Other Medications    --------------------------------------------------------------------------------------    VITAL SIGNS:  T(C): 36.6 (10-23-23 @ 04:18), Max: 36.9 (10-22-23 @ 13:47)  HR: 72 (10-23-23 @ 04:18) (52 - 72)  BP: 118/74 (10-23-23 @ 04:18) (118/74 - 147/81)  RR: 18 (10-23-23 @ 04:18) (18 - 19)  SpO2: 95% (10-23-23 @ 04:18) (95% - 100%)  --------------------------------------------------------------------------------------    INS AND OUTS:    10-22-23 @ 07:01  -  10-23-23 @ 05:56  --------------------------------------------------------  IN: 720 mL / OUT: 100 mL / NET: 620 mL      --------------------------------------------------------------------------------------      EXAM    General: NAD, resting in bed comfortably.  Cardiac: regular rate, warm and well perfused  Respiratory: Nonlabored respirations, normal cw expansion.  Abdomen: soft, nontender, nondistended. ___ incision is c/d/i, ostomy, mickey MENDEZ.   Extremities: normal strength, FROM, no deformities    --------------------------------------------------------------------------------------    LABS

## 2023-10-23 NOTE — PROGRESS NOTE ADULT - SUBJECTIVE AND OBJECTIVE BOX
[facility] DAILY PROGRESS NOTE    OVERNIGHT EVENTS: NAEO    SUBJECTIVE: Patient seen and examined at bedside on AM rounds. Patient otherwise denies nausea, vomiting, chest pain, shortness of breath.     OBJECTIVE:  Vital Signs Last 24 Hrs  T(C): 36.6 (23 Oct 2023 04:18), Max: 36.9 (22 Oct 2023 13:47)  T(F): 97.8 (23 Oct 2023 04:18), Max: 98.4 (22 Oct 2023 13:47)  HR: 72 (23 Oct 2023 04:18) (57 - 72)  BP: 118/74 (23 Oct 2023 04:18) (118/74 - 147/81)  BP(mean): --  RR: 18 (23 Oct 2023 04:18) (18 - 18)  SpO2: 95% (23 Oct 2023 04:18) (95% - 100%)    Parameters below as of 23 Oct 2023 04:18  Patient On (Oxygen Delivery Method): room air      Daily Height in cm: 144.78 (23 Oct 2023 08:52)    Daily   SONY:   OUT: 100 mL    STANDING  influenza  Vaccine (HIGH DOSE) 0.7 milliLiter(s) IntraMuscular once  losartan 25 milliGRAM(s) Oral daily    PRN  acetaminophen     Tablet .. 650 milliGRAM(s) Oral every 6 hours PRN Temp greater or equal to 38C (100.4F), Mild Pain (1 - 3)  aluminum hydroxide/magnesium hydroxide/simethicone Suspension 30 milliLiter(s) Oral every 4 hours PRN Dyspepsia  melatonin 3 milliGRAM(s) Oral at bedtime PRN Insomnia  ondansetron Injectable 4 milliGRAM(s) IV Push every 8 hours PRN Nausea and/or Vomiting      Labs:  140  |  104  |  17  ----------------------------<  80    (10-23)  4.0   |  26  |  0.94          Ca    8.7      10-23  Mg    x  Phos  x          Urinalysis Basic - ( 23 Oct 2023 07:04 )    Color: x / Appearance: x / SG: x / pH: x  Gluc: 80 mg/dL / Ketone: x  / Bili: x / Urobili: x   Blood: x / Protein: x / Nitrite: x   Leuk Esterase: x / RBC: x / WBC x   Sq Epi: x / Non Sq Epi: x / Bacteria: x    Urinalysis Basic - ( 23 Oct 2023 07:04 )    Color: x / Appearance: x / SG: x / pH: x  Gluc: 80 mg/dL / Ketone: x  / Bili: x / Urobili: x   Blood: x / Protein: x / Nitrite: x   Leuk Esterase: x / RBC: x / WBC x   Sq Epi: x / Non Sq Epi: x / Bacteria: x    Physical Exam:  General: Not acutely distressed  Respiratory: Nonlabored respirations on room air  Cardiovascular: Pulse present  Abdominal: Soft, nondistended, nontender. No rebound or guarding. No organomegaly, no palpable mass. Perc lj tube in place with straw colored output, protruding past point where had previously been sutured in place to skin, with suture still wrapped around tube, however not in skin. Surrounding skin minimally erythematous   Extremities: Warm   [facility] DAILY PROGRESS NOTE    OVERNIGHT EVENTS: NAEO    SUBJECTIVE: Patient seen and examined at bedside on AM rounds. [employer] translation services used. Patient denies ongoing abdominal pain, nausea, vomiting, diarrhea, fevers, chills, chest pain, or shortness of breath, but endorses constipation. Is currently (+/+). Denies any recent weight loss or bloody bowel movements. States that she has chronic anemia and was told years ago it was due to iron deficiency. Endorses a colonoscopy that was done back in [place] over 10 years ago.      OBJECTIVE:  Vital Signs Last 24 Hrs  T(C): 36.6 (23 Oct 2023 04:18), Max: 36.9 (22 Oct 2023 13:47)  T(F): 97.8 (23 Oct 2023 04:18), Max: 98.4 (22 Oct 2023 13:47)  HR: 72 (23 Oct 2023 04:18) (57 - 72)  BP: 118/74 (23 Oct 2023 04:18) (118/74 - 147/81)  BP(mean): --  RR: 18 (23 Oct 2023 04:18) (18 - 18)  SpO2: 95% (23 Oct 2023 04:18) (95% - 100%)    Parameters below as of 23 Oct 2023 04:18  Patient On (Oxygen Delivery Method): room air      Daily Height in cm: 144.78 (23 Oct 2023 08:52)    Daily   SONY:   OUT: 100 mL    STANDING  influenza  Vaccine (HIGH DOSE) 0.7 milliLiter(s) IntraMuscular once  losartan 25 milliGRAM(s) Oral daily    PRN  acetaminophen     Tablet .. 650 milliGRAM(s) Oral every 6 hours PRN Temp greater or equal to 38C (100.4F), Mild Pain (1 - 3)  aluminum hydroxide/magnesium hydroxide/simethicone Suspension 30 milliLiter(s) Oral every 4 hours PRN Dyspepsia  melatonin 3 milliGRAM(s) Oral at bedtime PRN Insomnia  ondansetron Injectable 4 milliGRAM(s) IV Push every 8 hours PRN Nausea and/or Vomiting      Labs:  140  |  104  |  17  ----------------------------<  80    (10-23)  4.0   |  26  |  0.94          Ca    8.7      10-23  Mg    x  Phos  x          Urinalysis Basic - ( 23 Oct 2023 07:04 )    Color: x / Appearance: x / SG: x / pH: x  Gluc: 80 mg/dL / Ketone: x  / Bili: x / Urobili: x   Blood: x / Protein: x / Nitrite: x   Leuk Esterase: x / RBC: x / WBC x   Sq Epi: x / Non Sq Epi: x / Bacteria: x    Urinalysis Basic - ( 23 Oct 2023 07:04 )    Color: x / Appearance: x / SG: x / pH: x  Gluc: 80 mg/dL / Ketone: x  / Bili: x / Urobili: x   Blood: x / Protein: x / Nitrite: x   Leuk Esterase: x / RBC: x / WBC x   Sq Epi: x / Non Sq Epi: x / Bacteria: x    Physical Exam:  General: Not acutely distressed  Respiratory: Nonlabored respirations on room air  Cardiovascular: Pulse present  Abdominal: Soft, nondistended, nontender. No rebound or guarding. No organomegaly, no palpable mass. R abdominal perc lj tube in place with brown liquid output. Surrounding skin minimally erythematous with no evidence of fluctuance or purulence.  Extremities: Warm   [facility] DAILY PROGRESS NOTE    OVERNIGHT EVENTS: NAEO    SUBJECTIVE: Patient seen and examined at bedside on AM rounds. Mandarin translation services used. Patient denies ongoing abdominal pain, nausea, vomiting, diarrhea, fevers, chills, chest pain, or shortness of breath, but endorses constipation. Is currently (+/+). Denies any recent weight loss or bloody bowel movements. States that she has chronic anemia and was told years ago it was due to iron deficiency. Endorses an EGD that was done back in [place] over 10 years ago.      OBJECTIVE:  Vital Signs Last 24 Hrs  T(C): 36.6 (23 Oct 2023 04:18), Max: 36.9 (22 Oct 2023 13:47)  T(F): 97.8 (23 Oct 2023 04:18), Max: 98.4 (22 Oct 2023 13:47)  HR: 72 (23 Oct 2023 04:18) (57 - 72)  BP: 118/74 (23 Oct 2023 04:18) (118/74 - 147/81)  BP(mean): --  RR: 18 (23 Oct 2023 04:18) (18 - 18)  SpO2: 95% (23 Oct 2023 04:18) (95% - 100%)    Parameters below as of 23 Oct 2023 04:18  Patient On (Oxygen Delivery Method): room air      Daily Height in cm: 144.78 (23 Oct 2023 08:52)    Daily   SONY:   OUT: 100 mL    STANDING  influenza  Vaccine (HIGH DOSE) 0.7 milliLiter(s) IntraMuscular once  losartan 25 milliGRAM(s) Oral daily    PRN  acetaminophen     Tablet .. 650 milliGRAM(s) Oral every 6 hours PRN Temp greater or equal to 38C (100.4F), Mild Pain (1 - 3)  aluminum hydroxide/magnesium hydroxide/simethicone Suspension 30 milliLiter(s) Oral every 4 hours PRN Dyspepsia  melatonin 3 milliGRAM(s) Oral at bedtime PRN Insomnia  ondansetron Injectable 4 milliGRAM(s) IV Push every 8 hours PRN Nausea and/or Vomiting      Labs:  140  |  104  |  17  ----------------------------<  80    (10-23)  4.0   |  26  |  0.94          Ca    8.7      10-23  Mg    x  Phos  x          Urinalysis Basic - ( 23 Oct 2023 07:04 )    Color: x / Appearance: x / SG: x / pH: x  Gluc: 80 mg/dL / Ketone: x  / Bili: x / Urobili: x   Blood: x / Protein: x / Nitrite: x   Leuk Esterase: x / RBC: x / WBC x   Sq Epi: x / Non Sq Epi: x / Bacteria: x    Urinalysis Basic - ( 23 Oct 2023 07:04 )    Color: x / Appearance: x / SG: x / pH: x  Gluc: 80 mg/dL / Ketone: x  / Bili: x / Urobili: x   Blood: x / Protein: x / Nitrite: x   Leuk Esterase: x / RBC: x / WBC x   Sq Epi: x / Non Sq Epi: x / Bacteria: x    Physical Exam:  General: Not acutely distressed  Respiratory: Nonlabored respirations on room air  Cardiovascular: Pulse present  Abdominal: Soft, nondistended, nontender. No rebound or guarding. No organomegaly, no palpable mass. R abdominal perc lj tube in place with brown liquid output. Surrounding skin minimally erythematous with no evidence of fluctuance or purulence.  Extremities: Warm

## 2023-10-23 NOTE — PROGRESS NOTE ADULT - PROBLEM SELECTOR PLAN 5
Initial Hgb 7.2. On prior hospitalization, Hgb 6-8  No sign of active bleeding.   Maintain active T&S.  Transfuse if Hgb <7.  Work-up of abdominal mass as above.

## 2023-10-23 NOTE — PROGRESS NOTE ADULT - PROBLEM SELECTOR PLAN 2
As seen on September hospitalization. Status post course of zosyn at that time, and with perc lj. Drain partially removed and subsequently replaced prior to admission  - IR consult to ensure no further measures needed As seen on September hospitalization. Status post course of Zosyn at that time, and with perc lj. Drain partially removed and subsequently replaced prior to admission  - IR consult to ensure no further measures needed

## 2023-10-23 NOTE — PROGRESS NOTE ADULT - SUBJECTIVE AND OBJECTIVE BOX
[facility] Division of Hospital Medicine  Jennifer Nicolas DO  Pager (M-F, 8A-5P): MS Teams PREFERRED      SUBJECTIVE / OVERNIGHT EVENTS:  ADDITIONAL REVIEW OF SYSTEMS:    MEDICATIONS  (STANDING):  influenza  Vaccine (HIGH DOSE) 0.7 milliLiter(s) IntraMuscular once  losartan 25 milliGRAM(s) Oral daily    MEDICATIONS  (PRN):  acetaminophen     Tablet .. 650 milliGRAM(s) Oral every 6 hours PRN Temp greater or equal to 38C (100.4F), Mild Pain (1 - 3)  aluminum hydroxide/magnesium hydroxide/simethicone Suspension 30 milliLiter(s) Oral every 4 hours PRN Dyspepsia  melatonin 3 milliGRAM(s) Oral at bedtime PRN Insomnia  ondansetron Injectable 4 milliGRAM(s) IV Push every 8 hours PRN Nausea and/or Vomiting      I&O's Summary    22 Oct 2023 07:01  -  23 Oct 2023 07:00  --------------------------------------------------------  IN: 720 mL / OUT: 100 mL / NET: 620 mL        PHYSICAL EXAM:  Vital Signs Last 24 Hrs  T(C): 36.6 (23 Oct 2023 04:18), Max: 36.9 (22 Oct 2023 13:47)  T(F): 97.8 (23 Oct 2023 04:18), Max: 98.4 (22 Oct 2023 13:47)  HR: 72 (23 Oct 2023 04:18) (55 - 72)  BP: 118/74 (23 Oct 2023 04:18) (118/74 - 147/81)  BP(mean): --  RR: 18 (23 Oct 2023 04:18) (18 - 19)  SpO2: 95% (23 Oct 2023 04:18) (95% - 100%)    Parameters below as of 23 Oct 2023 04:18  Patient On (Oxygen Delivery Method): room air          LABS:                        7.0    6.99  )-----------( 238      ( 23 Oct 2023 07:04 )             24.8     10-23    140  |  104  |  17  ----------------------------<  80  4.0   |  26  |  0.94    Ca    8.7      23 Oct 2023 07:04  Mg     2.0     10-22    TPro  6.7  /  Alb  3.2<L>  /  TBili  0.4  /  DBili  x   /  AST  18  /  ALT  12  /  AlkPhos  98  10-22    PT/INR - ( 22 Oct 2023 04:09 )   PT: 10.3 sec;   INR: 0.93 ratio         PTT - ( 22 Oct 2023 04:09 )  PTT:26.3 sec      Urinalysis Basic - ( 23 Oct 2023 07:04 )    Color: x / Appearance: x / SG: x / pH: x  Gluc: 80 mg/dL / Ketone: x  / Bili: x / Urobili: x   Blood: x / Protein: x / Nitrite: x   Leuk Esterase: x / RBC: x / WBC x   Sq Epi: x / Non Sq Epi: x / Bacteria: x          RADIOLOGY & ADDITIONAL TESTS:  Results Reviewed:   Imaging Personally Reviewed:  Electrocardiogram Personally Reviewed:    COORDINATION OF CARE:  Care Discussed with Consultants/Other Providers [Y/N]: Y  Prior or Outpatient Records Reviewed [Y/N]: Y   [facility] Division of Hospital Medicine  Jennifer DO Fidencio  Pager (M-F, 8A-5P): MS Teams PREFERRED      SUBJECTIVE / OVERNIGHT EVENTS: Patient appears comfortable - denies fever, chills, chest pain, palpitations, SOB, N/V, abdominal pain. She was able to tolerate solid food. Denies melena, hematochezia, hematuria, hemoptysis.   ADDITIONAL REVIEW OF SYSTEMS:    MEDICATIONS  (STANDING):  influenza  Vaccine (HIGH DOSE) 0.7 milliLiter(s) IntraMuscular once  losartan 25 milliGRAM(s) Oral daily    MEDICATIONS  (PRN):  acetaminophen     Tablet .. 650 milliGRAM(s) Oral every 6 hours PRN Temp greater or equal to 38C (100.4F), Mild Pain (1 - 3)  aluminum hydroxide/magnesium hydroxide/simethicone Suspension 30 milliLiter(s) Oral every 4 hours PRN Dyspepsia  melatonin 3 milliGRAM(s) Oral at bedtime PRN Insomnia  ondansetron Injectable 4 milliGRAM(s) IV Push every 8 hours PRN Nausea and/or Vomiting      I&O's Summary    22 Oct 2023 07:01  -  23 Oct 2023 07:00  --------------------------------------------------------  IN: 720 mL / OUT: 100 mL / NET: 620 mL        PHYSICAL EXAM:  Vital Signs Last 24 Hrs  T(C): 36.6 (23 Oct 2023 04:18), Max: 36.9 (22 Oct 2023 13:47)  T(F): 97.8 (23 Oct 2023 04:18), Max: 98.4 (22 Oct 2023 13:47)  HR: 72 (23 Oct 2023 04:18) (55 - 72)  BP: 118/74 (23 Oct 2023 04:18) (118/74 - 147/81)  BP(mean): --  RR: 18 (23 Oct 2023 04:18) (18 - 19)  SpO2: 95% (23 Oct 2023 04:18) (95% - 100%)    Parameters below as of 23 Oct 2023 04:18  Patient On (Oxygen Delivery Method): room air    Constitutional: WDWN resting comfortably in bed; NAD  Head: NC/AT  Eyes: Anicteric sclera  ENT: MMM  Neck: Supple, midline  Respiratory: CTA B/L; no W/R/R   Cardiac: +S1/S2; RRR; no M/R/G   Gastrointestinal: abdomen soft, NT/ND; no rebound or guarding; +BSx4  Extremities: No peripheral edema  Neurologic: AAOx3; CNII-XII grossly intact; no focal deficits  Psychiatric: affect and characteristics of appearance, verbalizations, behaviors are appropriate      LABS:                        7.0    6.99  )-----------( 238      ( 23 Oct 2023 07:04 )             24.8     10-23    140  |  104  |  17  ----------------------------<  80  4.0   |  26  |  0.94    Ca    8.7      23 Oct 2023 07:04  Mg     2.0     10-22    TPro  6.7  /  Alb  3.2<L>  /  TBili  0.4  /  DBili  x   /  AST  18  /  ALT  12  /  AlkPhos  98  10-22    PT/INR - ( 22 Oct 2023 04:09 )   PT: 10.3 sec;   INR: 0.93 ratio         PTT - ( 22 Oct 2023 04:09 )  PTT:26.3 sec      Urinalysis Basic - ( 23 Oct 2023 07:04 )    Color: x / Appearance: x / SG: x / pH: x  Gluc: 80 mg/dL / Ketone: x  / Bili: x / Urobili: x   Blood: x / Protein: x / Nitrite: x   Leuk Esterase: x / RBC: x / WBC x   Sq Epi: x / Non Sq Epi: x / Bacteria: x          RADIOLOGY & ADDITIONAL TESTS:  Results Reviewed:   Imaging Personally Reviewed:  Electrocardiogram Personally Reviewed:    COORDINATION OF CARE:  Care Discussed with Consultants/Other Providers [Y/N]: Y  Prior or Outpatient Records Reviewed [Y/N]: Y

## 2023-10-23 NOTE — PROGRESS NOTE ADULT - ATTENDING COMMENTS
I saw and examined the pt today at 10:30 am with the team present using the  above.  Denies abd pain.  Denies rectal bleeding.  Reports hard stool, hard to evacuate.  + weight loss over several months which she associates with the scleroderma.  Abdomen soft, NT, ND.  Perc lj tube in place draining bile.  CT with abnormal area of the ascending colon and liver lesion.  GI consult.  MRI of the liver.  CEA level.  Will follow.

## 2023-10-23 NOTE — PROGRESS NOTE ADULT - ASSESSMENT
68F hx scleroderma and pulmonary hypertension, s/p percutaneous cholecystostomy with IR on 9/11 for emphysematous cholecystitis, now presenting with partially dislodged per lj tube. CTAP with cholecystostomy tube with tip in the region of the contracted gallbladder, no acute intraabdominal pathology. Of note, also noted was a short segment of marked colonic bowel wall thickening involving the ascending colon concerning for underlying mass/malignancy.     Recommendations:  - No acute surgical intervention at this time.  - IR consult for tube check.  - Colonoscopy planning.  - Appreciate care per primary team (Medicine)  - Surgery will follow.    [facility] Surgery  [id number] 68F hx scleroderma and pulmonary hypertension, s/p percutaneous cholecystostomy with IR on 9/11 for emphysematous cholecystitis, now presenting with partially dislodged per lj tube. CTAP with cholecystostomy tube with tip in the region of the contracted gallbladder, no acute intraabdominal pathology. Of note, also noted was a short segment of marked colonic bowel wall thickening involving the ascending colon concerning for underlying mass/malignancy.     Recommendations:  - No acute surgical intervention at this time  - Obtain CA level  - Please obtain Liver MRI  - GI recommendations appreciated  - Appreciate care per primary team (Medicine)  - Surgery will follow  - Colonoscopy planning    [facility] Surgery  [id number] 68F hx scleroderma and pulmonary hypertension, s/p percutaneous cholecystostomy with IR on 9/11 for emphysematous cholecystitis, now presenting with partially dislodged per lj tube. CTAP with cholecystostomy tube with tip in the region of the contracted gallbladder, no acute intraabdominal pathology. Of note, also noted was a short segment of marked colonic bowel wall thickening involving the ascending colon concerning for underlying mass/malignancy.     Recommendations:  - No acute surgical intervention at this time  - Obtain CEA level  - Please obtain Liver MRI  - GI recommendations appreciated  - Appreciate care per primary team (Medicine)  - Surgery will follow  - Colonoscopy planning    [facility] Surgery  [id number]

## 2023-10-23 NOTE — CONSULT NOTE ADULT - ASSESSMENT
68 year old female with scleroderma, anemia, abnormal CT of the colon  -plan for colonoscopy tomorrow  -would transfuse 1 unit RBC for anesthesia threshold hemoglobin prior to procedure

## 2023-10-23 NOTE — PROGRESS NOTE ADULT - PROBLEM SELECTOR PLAN 1
Incidentally found to have thickening of the ascending colon wall on admission, as well as possible mass in liver.   - GI consulted for possible colonoscopy, will see patient in AM Incidentally found to have thickening of the ascending colon wall on admission, as well as possible mass in liver.   - GI consulted for possible colonoscopy, follow-up recommendations

## 2023-10-23 NOTE — PROGRESS NOTE ADULT - ASSESSMENT
68F PMH scleroderma, pHTN, HTN, HLD, presents with partially dislodged percutaneous cholecystostomy tube, which was replaced, however found to have significant thickening of the ascending colon and possible lesion in the liver. Now admitted for possible colonoscopy for workup.     Plan:   - GI consulted for possible colonoscopy  - IR consult to ensure no further measures for perc lj tube needed  - Continue home BP medications  - Regular diet  - Care per primary  - Surgery will follow    Green Team

## 2023-10-23 NOTE — CHART NOTE - NSCHARTNOTEFT_GEN_A_CORE
68F PMH scleroderma and pHTN s/p perc lj 9/11 for emphysematous cholecystitis presenting for partially dislodged cholecystostomy.     - CT chest 10/22 reviewed, cholecystostomy in place, draining bilious fluid, flushing appropriately.   - Record drainage   - Forward flush only 10cc NS q24h    --  Arnel Henry MD, PGY-3  Vascular and Interventional Radiology   Available on [employer] Teams    - Non-emergent consults: Place IR consult order in [place]  - Emergent issues (pager): [facility] [phone]; [facility] [phone]; [phone]  - Scheduling questions: [facility] [phone]; [facility] [phone]  - Clinic/outpatient booking: [facility] [phone]; [facility] [phone]

## 2023-10-24 ENCOUNTER — RESULT REVIEW (OUTPATIENT)
Age: 68
End: 2023-10-24

## 2023-10-24 LAB
ALBUMIN SERPL ELPH-MCNC: 3.2 G/DL — LOW (ref 3.3–5)
ALBUMIN SERPL ELPH-MCNC: 3.2 G/DL — LOW (ref 3.3–5)
ALP SERPL-CCNC: 119 U/L — SIGNIFICANT CHANGE UP (ref 40–120)
ALP SERPL-CCNC: 119 U/L — SIGNIFICANT CHANGE UP (ref 40–120)
ALT FLD-CCNC: 14 U/L — SIGNIFICANT CHANGE UP (ref 10–45)
ALT FLD-CCNC: 14 U/L — SIGNIFICANT CHANGE UP (ref 10–45)
ANION GAP SERPL CALC-SCNC: 13 MMOL/L — SIGNIFICANT CHANGE UP (ref 5–17)
ANION GAP SERPL CALC-SCNC: 13 MMOL/L — SIGNIFICANT CHANGE UP (ref 5–17)
AST SERPL-CCNC: 16 U/L — SIGNIFICANT CHANGE UP (ref 10–40)
AST SERPL-CCNC: 16 U/L — SIGNIFICANT CHANGE UP (ref 10–40)
BILIRUB SERPL-MCNC: 0.7 MG/DL — SIGNIFICANT CHANGE UP (ref 0.2–1.2)
BILIRUB SERPL-MCNC: 0.7 MG/DL — SIGNIFICANT CHANGE UP (ref 0.2–1.2)
BUN SERPL-MCNC: 13 MG/DL — SIGNIFICANT CHANGE UP (ref 7–23)
BUN SERPL-MCNC: 13 MG/DL — SIGNIFICANT CHANGE UP (ref 7–23)
CALCIUM SERPL-MCNC: 8.7 MG/DL — SIGNIFICANT CHANGE UP (ref 8.4–10.5)
CALCIUM SERPL-MCNC: 8.7 MG/DL — SIGNIFICANT CHANGE UP (ref 8.4–10.5)
CEA SERPL-MCNC: 57.5 NG/ML — HIGH (ref 0–3.8)
CEA SERPL-MCNC: 57.5 NG/ML — HIGH (ref 0–3.8)
CHLORIDE SERPL-SCNC: 108 MMOL/L — SIGNIFICANT CHANGE UP (ref 96–108)
CHLORIDE SERPL-SCNC: 108 MMOL/L — SIGNIFICANT CHANGE UP (ref 96–108)
CO2 SERPL-SCNC: 24 MMOL/L — SIGNIFICANT CHANGE UP (ref 22–31)
CO2 SERPL-SCNC: 24 MMOL/L — SIGNIFICANT CHANGE UP (ref 22–31)
CREAT SERPL-MCNC: 0.68 MG/DL — SIGNIFICANT CHANGE UP (ref 0.5–1.3)
CREAT SERPL-MCNC: 0.68 MG/DL — SIGNIFICANT CHANGE UP (ref 0.5–1.3)
EGFR: 95 ML/MIN/1.73M2 — SIGNIFICANT CHANGE UP
EGFR: 95 ML/MIN/1.73M2 — SIGNIFICANT CHANGE UP
GLUCOSE SERPL-MCNC: 97 MG/DL — SIGNIFICANT CHANGE UP (ref 70–99)
GLUCOSE SERPL-MCNC: 97 MG/DL — SIGNIFICANT CHANGE UP (ref 70–99)
HCT VFR BLD CALC: 30.5 % — LOW (ref 34.5–45)
HCT VFR BLD CALC: 30.5 % — LOW (ref 34.5–45)
HCT VFR BLD CALC: 32.5 % — LOW (ref 34.5–45)
HCT VFR BLD CALC: 32.5 % — LOW (ref 34.5–45)
HGB BLD-MCNC: 9.1 G/DL — LOW (ref 11.5–15.5)
HGB BLD-MCNC: 9.1 G/DL — LOW (ref 11.5–15.5)
HGB BLD-MCNC: 9.7 G/DL — LOW (ref 11.5–15.5)
HGB BLD-MCNC: 9.7 G/DL — LOW (ref 11.5–15.5)
MAGNESIUM SERPL-MCNC: 2.1 MG/DL — SIGNIFICANT CHANGE UP (ref 1.6–2.6)
MAGNESIUM SERPL-MCNC: 2.1 MG/DL — SIGNIFICANT CHANGE UP (ref 1.6–2.6)
MCHC RBC-ENTMCNC: 23.7 PG — LOW (ref 27–34)
MCHC RBC-ENTMCNC: 23.7 PG — LOW (ref 27–34)
MCHC RBC-ENTMCNC: 23.8 PG — LOW (ref 27–34)
MCHC RBC-ENTMCNC: 23.8 PG — LOW (ref 27–34)
MCHC RBC-ENTMCNC: 29.8 GM/DL — LOW (ref 32–36)
MCV RBC AUTO: 79.5 FL — LOW (ref 80–100)
MCV RBC AUTO: 79.5 FL — LOW (ref 80–100)
MCV RBC AUTO: 79.6 FL — LOW (ref 80–100)
MCV RBC AUTO: 79.6 FL — LOW (ref 80–100)
NRBC # BLD: 0 /100 WBCS — SIGNIFICANT CHANGE UP (ref 0–0)
PHOSPHATE SERPL-MCNC: 3.5 MG/DL — SIGNIFICANT CHANGE UP (ref 2.5–4.5)
PHOSPHATE SERPL-MCNC: 3.5 MG/DL — SIGNIFICANT CHANGE UP (ref 2.5–4.5)
PLATELET # BLD AUTO: 231 K/UL — SIGNIFICANT CHANGE UP (ref 150–400)
PLATELET # BLD AUTO: 231 K/UL — SIGNIFICANT CHANGE UP (ref 150–400)
PLATELET # BLD AUTO: 257 K/UL — SIGNIFICANT CHANGE UP (ref 150–400)
PLATELET # BLD AUTO: 257 K/UL — SIGNIFICANT CHANGE UP (ref 150–400)
POTASSIUM SERPL-MCNC: 3.9 MMOL/L — SIGNIFICANT CHANGE UP (ref 3.5–5.3)
POTASSIUM SERPL-MCNC: 3.9 MMOL/L — SIGNIFICANT CHANGE UP (ref 3.5–5.3)
POTASSIUM SERPL-SCNC: 3.9 MMOL/L — SIGNIFICANT CHANGE UP (ref 3.5–5.3)
POTASSIUM SERPL-SCNC: 3.9 MMOL/L — SIGNIFICANT CHANGE UP (ref 3.5–5.3)
PROT SERPL-MCNC: 6.3 G/DL — SIGNIFICANT CHANGE UP (ref 6–8.3)
PROT SERPL-MCNC: 6.3 G/DL — SIGNIFICANT CHANGE UP (ref 6–8.3)
RBC # BLD: 3.83 M/UL — SIGNIFICANT CHANGE UP (ref 3.8–5.2)
RBC # BLD: 3.83 M/UL — SIGNIFICANT CHANGE UP (ref 3.8–5.2)
RBC # BLD: 4.09 M/UL — SIGNIFICANT CHANGE UP (ref 3.8–5.2)
RBC # BLD: 4.09 M/UL — SIGNIFICANT CHANGE UP (ref 3.8–5.2)
RBC # FLD: 19 % — HIGH (ref 10.3–14.5)
SODIUM SERPL-SCNC: 145 MMOL/L — SIGNIFICANT CHANGE UP (ref 135–145)
SODIUM SERPL-SCNC: 145 MMOL/L — SIGNIFICANT CHANGE UP (ref 135–145)
WBC # BLD: 6.59 K/UL — SIGNIFICANT CHANGE UP (ref 3.8–10.5)
WBC # BLD: 6.59 K/UL — SIGNIFICANT CHANGE UP (ref 3.8–10.5)
WBC # BLD: 7.12 K/UL — SIGNIFICANT CHANGE UP (ref 3.8–10.5)
WBC # BLD: 7.12 K/UL — SIGNIFICANT CHANGE UP (ref 3.8–10.5)
WBC # FLD AUTO: 6.59 K/UL — SIGNIFICANT CHANGE UP (ref 3.8–10.5)
WBC # FLD AUTO: 6.59 K/UL — SIGNIFICANT CHANGE UP (ref 3.8–10.5)
WBC # FLD AUTO: 7.12 K/UL — SIGNIFICANT CHANGE UP (ref 3.8–10.5)
WBC # FLD AUTO: 7.12 K/UL — SIGNIFICANT CHANGE UP (ref 3.8–10.5)

## 2023-10-24 PROCEDURE — 99232 SBSQ HOSP IP/OBS MODERATE 35: CPT

## 2023-10-24 PROCEDURE — 88305 TISSUE EXAM BY PATHOLOGIST: CPT | Mod: 26

## 2023-10-24 DEVICE — NET RETRV ROT ROTH 2.5MMX230CM: Type: IMPLANTABLE DEVICE | Status: FUNCTIONAL

## 2023-10-24 RX ADMIN — LOSARTAN POTASSIUM 25 MILLIGRAM(S): 100 TABLET, FILM COATED ORAL at 06:25

## 2023-10-24 NOTE — PROGRESS NOTE ADULT - SUBJECTIVE AND OBJECTIVE BOX
[facility] Division of Hospital Medicine  Jennifer Nicolas DO  Pager (M-F, 8A-5P): MS Teams PREFERRED      SUBJECTIVE / OVERNIGHT EVENTS:  ADDITIONAL REVIEW OF SYSTEMS:    MEDICATIONS  (STANDING):  influenza  Vaccine (HIGH DOSE) 0.7 milliLiter(s) IntraMuscular once  losartan 25 milliGRAM(s) Oral daily    MEDICATIONS  (PRN):  acetaminophen     Tablet .. 650 milliGRAM(s) Oral every 6 hours PRN Temp greater or equal to 38C (100.4F), Mild Pain (1 - 3)  aluminum hydroxide/magnesium hydroxide/simethicone Suspension 30 milliLiter(s) Oral every 4 hours PRN Dyspepsia  melatonin 3 milliGRAM(s) Oral at bedtime PRN Insomnia  ondansetron Injectable 4 milliGRAM(s) IV Push every 8 hours PRN Nausea and/or Vomiting      I&O's Summary    23 Oct 2023 07:01  -  24 Oct 2023 07:00  --------------------------------------------------------  IN: 520 mL / OUT: 275 mL / NET: 245 mL        PHYSICAL EXAM:  Vital Signs Last 24 Hrs  T(C): 36.7 (24 Oct 2023 05:43), Max: 37.4 (23 Oct 2023 12:00)  T(F): 98.1 (24 Oct 2023 05:43), Max: 99.3 (23 Oct 2023 12:00)  HR: 73 (24 Oct 2023 05:43) (73 - 90)  BP: 122/72 (24 Oct 2023 05:43) (108/62 - 123/74)  BP(mean): --  RR: 18 (24 Oct 2023 05:43) (18 - 18)  SpO2: 96% (24 Oct 2023 05:43) (95% - 97%)    Parameters below as of 24 Oct 2023 05:43  Patient On (Oxygen Delivery Method): room air    Constitutional: WDWN resting comfortably in bed; NAD  Head: NC/AT  Eyes: Anicteric sclera  ENT: MMM  Neck: Supple, midline  Respiratory: CTA B/L; no W/R/R   Cardiac: +S1/S2; RRR; no M/R/G   Gastrointestinal: abdomen soft, NT/ND; no rebound or guarding; +BSx4  Extremities: No peripheral edema  Neurologic: AAOx3; CNII-XII grossly intact; no focal deficits  Psychiatric: affect and characteristics of appearance, verbalizations, behaviors are appropriate          LABS:                        9.7    7.12  )-----------( 257      ( 24 Oct 2023 00:18 )             32.5     10-23    140  |  104  |  17  ----------------------------<  80  4.0   |  26  |  0.94    Ca    8.7      23 Oct 2023 07:04            Urinalysis Basic - ( 23 Oct 2023 07:04 )    Color: x / Appearance: x / SG: x / pH: x  Gluc: 80 mg/dL / Ketone: x  / Bili: x / Urobili: x   Blood: x / Protein: x / Nitrite: x   Leuk Esterase: x / RBC: x / WBC x   Sq Epi: x / Non Sq Epi: x / Bacteria: x          RADIOLOGY & ADDITIONAL TESTS:  Results Reviewed:   Imaging Personally Reviewed:  Electrocardiogram Personally Reviewed:    COORDINATION OF CARE:  Care Discussed with Consultants/Other Providers [Y/N]: Y  Prior or Outpatient Records Reviewed [Y/N]: Y   [facility] Division of Hospital Medicine  Jennifer DO Fidencio  Pager (M-F, 8A-5P): MS Teams PREFERRED      SUBJECTIVE / OVERNIGHT EVENTS: No acute events overnight. Patient with acute complaints - she had bowel prep for colonoscopy yesterday. Denies hematochezia, melena.   ADDITIONAL REVIEW OF SYSTEMS:    MEDICATIONS  (STANDING):  influenza  Vaccine (HIGH DOSE) 0.7 milliLiter(s) IntraMuscular once  losartan 25 milliGRAM(s) Oral daily    MEDICATIONS  (PRN):  acetaminophen     Tablet .. 650 milliGRAM(s) Oral every 6 hours PRN Temp greater or equal to 38C (100.4F), Mild Pain (1 - 3)  aluminum hydroxide/magnesium hydroxide/simethicone Suspension 30 milliLiter(s) Oral every 4 hours PRN Dyspepsia  melatonin 3 milliGRAM(s) Oral at bedtime PRN Insomnia  ondansetron Injectable 4 milliGRAM(s) IV Push every 8 hours PRN Nausea and/or Vomiting      I&O's Summary    23 Oct 2023 07:01  -  24 Oct 2023 07:00  --------------------------------------------------------  IN: 520 mL / OUT: 275 mL / NET: 245 mL        PHYSICAL EXAM:  Vital Signs Last 24 Hrs  T(C): 36.7 (24 Oct 2023 05:43), Max: 37.4 (23 Oct 2023 12:00)  T(F): 98.1 (24 Oct 2023 05:43), Max: 99.3 (23 Oct 2023 12:00)  HR: 73 (24 Oct 2023 05:43) (73 - 90)  BP: 122/72 (24 Oct 2023 05:43) (108/62 - 123/74)  BP(mean): --  RR: 18 (24 Oct 2023 05:43) (18 - 18)  SpO2: 96% (24 Oct 2023 05:43) (95% - 97%)    Parameters below as of 24 Oct 2023 05:43  Patient On (Oxygen Delivery Method): room air    Constitutional: WDWN resting comfortably in bed; NAD  Head: NC/AT  Eyes: Anicteric sclera  ENT: MMM  Neck: Supple, midline  Respiratory: CTA B/L; no W/R/R   Cardiac: +S1/S2; RRR; no M/R/G   Gastrointestinal: abdomen soft, NT/ND; no rebound or guarding; +BSx4  Extremities: No peripheral edema  Neurologic: AAOx3; CNII-XII grossly intact; no focal deficits  Psychiatric: affect and characteristics of appearance, verbalizations, behaviors are appropriate          LABS:                        9.7    7.12  )-----------( 257      ( 24 Oct 2023 00:18 )             32.5     10-23    140  |  104  |  17  ----------------------------<  80  4.0   |  26  |  0.94    Ca    8.7      23 Oct 2023 07:04            Urinalysis Basic - ( 23 Oct 2023 07:04 )    Color: x / Appearance: x / SG: x / pH: x  Gluc: 80 mg/dL / Ketone: x  / Bili: x / Urobili: x   Blood: x / Protein: x / Nitrite: x   Leuk Esterase: x / RBC: x / WBC x   Sq Epi: x / Non Sq Epi: x / Bacteria: x          RADIOLOGY & ADDITIONAL TESTS:  Results Reviewed:   Imaging Personally Reviewed:  Electrocardiogram Personally Reviewed:    COORDINATION OF CARE:  Care Discussed with Consultants/Other Providers [Y/N]: Y  Prior or Outpatient Records Reviewed [Y/N]: Y   [facility] Division of Hospital Medicine  Jennifer DO Fidencio  Pager (M-F, 8A-5P): MS Teams PREFERRED      SUBJECTIVE / OVERNIGHT EVENTS: No acute events overnight. Patient with acute complaints - she had bowel prep for colonoscopy yesterday. Denies hematochezia, melena.   ADDITIONAL REVIEW OF SYSTEMS:    MEDICATIONS  (STANDING):  influenza  Vaccine (HIGH DOSE) 0.7 milliLiter(s) IntraMuscular once  losartan 25 milliGRAM(s) Oral daily    MEDICATIONS  (PRN):  acetaminophen     Tablet .. 650 milliGRAM(s) Oral every 6 hours PRN Temp greater or equal to 38C (100.4F), Mild Pain (1 - 3)  aluminum hydroxide/magnesium hydroxide/simethicone Suspension 30 milliLiter(s) Oral every 4 hours PRN Dyspepsia  melatonin 3 milliGRAM(s) Oral at bedtime PRN Insomnia  ondansetron Injectable 4 milliGRAM(s) IV Push every 8 hours PRN Nausea and/or Vomiting      I&O's Summary    23 Oct 2023 07:01  -  24 Oct 2023 07:00  --------------------------------------------------------  IN: 520 mL / OUT: 275 mL / NET: 245 mL        PHYSICAL EXAM:  Vital Signs Last 24 Hrs  T(C): 36.7 (24 Oct 2023 05:43), Max: 37.4 (23 Oct 2023 12:00)  T(F): 98.1 (24 Oct 2023 05:43), Max: 99.3 (23 Oct 2023 12:00)  HR: 73 (24 Oct 2023 05:43) (73 - 90)  BP: 122/72 (24 Oct 2023 05:43) (108/62 - 123/74)  BP(mean): --  RR: 18 (24 Oct 2023 05:43) (18 - 18)  SpO2: 96% (24 Oct 2023 05:43) (95% - 97%)    Parameters below as of 24 Oct 2023 05:43  Patient On (Oxygen Delivery Method): room air    Constitutional: WDWN resting comfortably in bed; NAD  Head: NC/AT  Eyes: Anicteric sclera  ENT: MMM  Neck: Supple, midline  Respiratory: CTA B/L; no W/R/R   Cardiac: +S1/S2; RRR; no M/R/G   Gastrointestinal: Abdomen soft, NT/ND; no rebound or guarding; +BSx4; percutaneous cholecystostomy tube in place   Extremities: No peripheral edema  Neurologic: AAOx3; CNII-XII grossly intact; no focal deficits  Psychiatric: affect and characteristics of appearance, verbalizations, behaviors are appropriate          LABS:                        9.7    7.12  )-----------( 257      ( 24 Oct 2023 00:18 )             32.5     10-23    140  |  104  |  17  ----------------------------<  80  4.0   |  26  |  0.94    Ca    8.7      23 Oct 2023 07:04            Urinalysis Basic - ( 23 Oct 2023 07:04 )    Color: x / Appearance: x / SG: x / pH: x  Gluc: 80 mg/dL / Ketone: x  / Bili: x / Urobili: x   Blood: x / Protein: x / Nitrite: x   Leuk Esterase: x / RBC: x / WBC x   Sq Epi: x / Non Sq Epi: x / Bacteria: x          RADIOLOGY & ADDITIONAL TESTS:  Results Reviewed:   Imaging Personally Reviewed:  Electrocardiogram Personally Reviewed:    COORDINATION OF CARE:  Care Discussed with Consultants/Other Providers [Y/N]: Y  Prior or Outpatient Records Reviewed [Y/N]: Y

## 2023-10-24 NOTE — PROGRESS NOTE ADULT - PROBLEM SELECTOR PLAN 1
Incidentally found to have thickening of the ascending colon wall on admission, as well as possible mass in liver.   - GI consulted - appreciated recommendations. Planning for colonoscopy today.

## 2023-10-24 NOTE — PRE PROCEDURE NOTE - PRE PROCEDURE EVALUATION
Attending Physician:   toyin                         Procedure:colon    Indication for Procedure:abnormal ct colon  ________________________________________________________  PAST MEDICAL & SURGICAL HISTORY:  H/O pulmonary hypertension      H/O scleroderma      Migration of percutaneous cholecystostomy tube        ALLERGIES:  No Known Allergies    HOME MEDICATIONS:  irbesartan 75 mg oral tablet: 1 tab(s) orally once a day    AICD/PPM: [ ] yes   [ ] no    PERTINENT LAB DATA:                        9.1    6.59  )-----------( 231      ( 24 Oct 2023 07:13 )             30.5     10-24    145  |  108  |  13  ----------------------------<  97  3.9   |  24  |  0.68    Ca    8.7      24 Oct 2023 07:14  Phos  3.5     10-24  Mg     2.1     10-24    TPro  6.3  /  Alb  3.2<L>  /  TBili  0.7  /  DBili  x   /  AST  16  /  ALT  14  /  AlkPhos  119  10-24                PHYSICAL EXAMINATION:    Height (cm): 144.8  Weight (kg): 38.6  BMI (kg/m2): 18.4  BSA (m2): 1.25T(C): 36.1  HR: 69  BP: 128/69  RR: 28  SpO2: 98%    Constitutional: NAD  HEENT: PERRLA, EOMI,    Neck:  No JVD  Respiratory: CTAB/L  Cardiovascular: S1 and S2  Gastrointestinal: BS+, soft, NT/ND  Extremities: No peripheral edema  Neurological: A/O x 3, no focal deficits  Psychiatric: Normal mood, normal affect  Skin: No rashes    ASA Class: I [ ]  II [ ]  III x[ ]  IV [ ]    COMMENTS:    The patient is a suitable candidate for the planned procedure unless box checked [ ]  No, explain:

## 2023-10-24 NOTE — PROGRESS NOTE ADULT - PROBLEM SELECTOR PLAN 5
Initial Hgb 7.2. On prior hospitalization, Hgb 6-8  No sign of active bleeding.   Maintain active T&S.  Transfuse if Hgb <7.  s/p 1 unit pRBC on 10/24 in anticipation of colonoscopy. Hgb responded appropriately.   Work-up of abdominal mass as above.

## 2023-10-24 NOTE — PROGRESS NOTE ADULT - SUBJECTIVE AND OBJECTIVE BOX
Chief Complaint:  Patient is a 68y old  Female who presents with a chief complaint of Dislodged percutaneous jl tube (24 Oct 2023 07:48)      Date of service 10-24-23 @ 17:08      Interval Events:   s/p bowel prep  Hospital Medications:  acetaminophen     Tablet .. 650 milliGRAM(s) Oral every 6 hours PRN  aluminum hydroxide/magnesium hydroxide/simethicone Suspension 30 milliLiter(s) Oral every 4 hours PRN  influenza  Vaccine (HIGH DOSE) 0.7 milliLiter(s) IntraMuscular once  losartan 25 milliGRAM(s) Oral daily  melatonin 3 milliGRAM(s) Oral at bedtime PRN  ondansetron Injectable 4 milliGRAM(s) IV Push every 8 hours PRN        Review of Systems:  General:  No wt loss, fevers, chills, night sweats, fatigue,   Eyes:  Good vision, no reported pain  ENT:  No sore throat, pain, runny nose, dysphagia  CV:  No pain, palpitations, hypo/hypertension  Resp:  No dyspnea, cough, tachypnea, wheezing  GI:  See HPI  :  No pain, bleeding, incontinence, nocturia  Muscle:  No pain, weakness  Neuro:  No weakness, tingling, memory problems  Psych:  No fatigue, insomnia, mood problems, depression  Endocrine:  No polyuria, polydipsia, cold/heat intolerance  Heme:  No petechiae, ecchymosis, easy bruisability  Integumentary:  No rash, edema    PHYSICAL EXAM:   Vital Signs:  Vital Signs Last 24 Hrs  T(C): 36.1 (24 Oct 2023 16:26), Max: 37.4 (23 Oct 2023 20:44)  T(F): 96.9 (24 Oct 2023 14:37), Max: 99.3 (23 Oct 2023 20:44)  HR: 67 (24 Oct 2023 16:27) (64 - 90)  BP: 119/58 (24 Oct 2023 16:27) (100/65 - 128/69)  BP(mean): --  RR: 13 (24 Oct 2023 16:27) (12 - 28)  SpO2: 98% (24 Oct 2023 16:27) (95% - 100%)    Parameters below as of 24 Oct 2023 16:27  Patient On (Oxygen Delivery Method): room air      Daily Height in cm: 144.78 (24 Oct 2023 16:26)    Daily       PHYSICAL EXAM:     GENERAL:  Appears stated age, well-groomed, well-nourished, no distress  HEENT:  NC/AT,  conjunctivae anicteric, clear and pink,   NECK: supple, trachea midline  CHEST:  Full & symmetric excursion, no increased effort, breath sounds clear  HEART:  Regular rhythm, no JVD  ABDOMEN:  Soft, non-tender, non-distended, normoactive bowel sounds,  no masses , no hepatosplenomegaly  EXTREMITIES:  no cyanosis,clubbing or edema  SKIN:  No rash, erythema, or, ecchymoses, no jaundice  NEURO:  Alert, non-focal, no asterixis  PSYCH: Appropriate affect, oriented to place and time  RECTAL: Deferred      LABS Personally reviewed by me:                        9.1    6.59  )-----------( 231      ( 24 Oct 2023 07:13 )             30.5     Mean Cell Volume: 79.6 fl (10-24-23 @ 07:13)    10-24    145  |  108  |  13  ----------------------------<  97  3.9   |  24  |  0.68    Ca    8.7      24 Oct 2023 07:14  Phos  3.5     10-24  Mg     2.1     10-24    TPro  6.3  /  Alb  3.2<L>  /  TBili  0.7  /  DBili  x   /  AST  16  /  ALT  14  /  AlkPhos  119  10-24    LIVER FUNCTIONS - ( 24 Oct 2023 07:14 )  Alb: 3.2 g/dL / Pro: 6.3 g/dL / ALK PHOS: 119 U/L / ALT: 14 U/L / AST: 16 U/L / GGT: x             Urinalysis Basic - ( 24 Oct 2023 07:14 )    Color: x / Appearance: x / SG: x / pH: x  Gluc: 97 mg/dL / Ketone: x  / Bili: x / Urobili: x   Blood: x / Protein: x / Nitrite: x   Leuk Esterase: x / RBC: x / WBC x   Sq Epi: x / Non Sq Epi: x / Bacteria: x                              9.1    6.59  )-----------( 231      ( 24 Oct 2023 07:13 )             30.5                         9.7    7.12  )-----------( 257      ( 24 Oct 2023 00:18 )             32.5                         7.1    6.92  )-----------( 239      ( 23 Oct 2023 10:54 )             24.6                         7.0    6.99  )-----------( 238      ( 23 Oct 2023 07:04 )             24.8                         7.2    7.06  )-----------( 250      ( 22 Oct 2023 02:40 )             24.9       Imaging personally reviewed by me:

## 2023-10-24 NOTE — PROGRESS NOTE ADULT - NSPROGADDITIONALINFOA_GEN_ALL_CORE
Time-based billing (NON-critical care).      minutes spent on total encounter. The necessity of the time spent during the encounter on this date of service was due to:     - Reviewing, and interpreting labs, testing, and imaging.  - Independently obtaining a review of systems and performing a physical exam  - Reviewing prior records and where necessary, outpatient records.  - Counselling and educating patient regarding interpretation of aforementioned items and plan of care.        d/w ACP Time-based billing (NON-critical care).     39 minutes spent on total encounter. The necessity of the time spent during the encounter on this date of service was due to:     - Reviewing, and interpreting labs, testing, and imaging.  - Independently obtaining a review of systems and performing a physical exam  - Reviewing prior records and where necessary, outpatient records.  - Counselling and educating patient regarding interpretation of aforementioned items and plan of care.        d/w ACP

## 2023-10-24 NOTE — PROGRESS NOTE ADULT - PROBLEM SELECTOR PLAN 4
NTD at this time. Given comorbid pHTN, likely will need cardiac evaluation if surgery is indicated. NTD at this time. Given comorbid pHTN, likely will need cardiac evaluation if surgery is indicated.  Check TTE

## 2023-10-24 NOTE — PROGRESS NOTE ADULT - PROBLEM SELECTOR PLAN 2
As seen on September hospitalization. Status post course of Zosyn at that time, and with perc lj. Drain partially removed and subsequently replaced prior to admission  - Appreciate IR evaluation.

## 2023-10-24 NOTE — PROGRESS NOTE ADULT - ASSESSMENT
Comfortable.  Abdomen soft, NT, ND.  Perc lj tube in place.  Awaiting colonoscopy.  Recommend liver MRI.  METHOD 6/>             Q7   []Yes    []No                Q8   []Yes    []No                     Q9   [x]Yes    []No    Plan:   Pt was evaluated and examined. Patient was given personalized discharge instructions. Nails 1-10 were debrided sharply in length and thickness with a nipper and , without incident. Pt will follow up in 9 weeks or sooner if any problems arise. Diagnosis was discussed with the pt and all of their questions were answered in detail. Proper foot hygiene and care was discussed with the pt. Informed patient on proper diabetic foot care and importance of tight glycemic control. Patient to check feet daily and contact the office with any questions/problems/concerns. Other comorbidity noted and will be managed by PCP. Diabetic foot examination performed this visit. The exam included neurological sensory exam, a 10-g monofilament and pinprick sensation, vibration using a 128-Hz tuning fork, ankle reflexes, visual skin inspection, vascular exam including assessment of pedal pulses, orthopedic exam for deformities, and shoe inspection. Increased risk factors noted on the diabetic foot exam include decreased sensory exam and peripheral neuropathy. Shoegear inspected and found to be appropriate size and wear.       Electronically signed by Pricila Grady DPM on 10/11/2023 at 12:02 PM

## 2023-10-25 ENCOUNTER — NON-APPOINTMENT (OUTPATIENT)
Age: 68
End: 2023-10-25

## 2023-10-25 DIAGNOSIS — K76.9 LIVER DISEASE, UNSPECIFIED: ICD-10-CM

## 2023-10-25 LAB
ANION GAP SERPL CALC-SCNC: 12 MMOL/L — SIGNIFICANT CHANGE UP (ref 5–17)
ANION GAP SERPL CALC-SCNC: 12 MMOL/L — SIGNIFICANT CHANGE UP (ref 5–17)
BUN SERPL-MCNC: 15 MG/DL — SIGNIFICANT CHANGE UP (ref 7–23)
BUN SERPL-MCNC: 15 MG/DL — SIGNIFICANT CHANGE UP (ref 7–23)
CALCIUM SERPL-MCNC: 9 MG/DL — SIGNIFICANT CHANGE UP (ref 8.4–10.5)
CALCIUM SERPL-MCNC: 9 MG/DL — SIGNIFICANT CHANGE UP (ref 8.4–10.5)
CHLORIDE SERPL-SCNC: 106 MMOL/L — SIGNIFICANT CHANGE UP (ref 96–108)
CHLORIDE SERPL-SCNC: 106 MMOL/L — SIGNIFICANT CHANGE UP (ref 96–108)
CO2 SERPL-SCNC: 22 MMOL/L — SIGNIFICANT CHANGE UP (ref 22–31)
CO2 SERPL-SCNC: 22 MMOL/L — SIGNIFICANT CHANGE UP (ref 22–31)
CREAT SERPL-MCNC: 0.7 MG/DL — SIGNIFICANT CHANGE UP (ref 0.5–1.3)
CREAT SERPL-MCNC: 0.7 MG/DL — SIGNIFICANT CHANGE UP (ref 0.5–1.3)
EGFR: 94 ML/MIN/1.73M2 — SIGNIFICANT CHANGE UP
EGFR: 94 ML/MIN/1.73M2 — SIGNIFICANT CHANGE UP
GLUCOSE SERPL-MCNC: 80 MG/DL — SIGNIFICANT CHANGE UP (ref 70–99)
GLUCOSE SERPL-MCNC: 80 MG/DL — SIGNIFICANT CHANGE UP (ref 70–99)
HCT VFR BLD CALC: 29.8 % — LOW (ref 34.5–45)
HCT VFR BLD CALC: 29.8 % — LOW (ref 34.5–45)
HGB BLD-MCNC: 8.8 G/DL — LOW (ref 11.5–15.5)
HGB BLD-MCNC: 8.8 G/DL — LOW (ref 11.5–15.5)
MCHC RBC-ENTMCNC: 23.4 PG — LOW (ref 27–34)
MCHC RBC-ENTMCNC: 23.4 PG — LOW (ref 27–34)
MCHC RBC-ENTMCNC: 29.5 GM/DL — LOW (ref 32–36)
MCHC RBC-ENTMCNC: 29.5 GM/DL — LOW (ref 32–36)
MCV RBC AUTO: 79.3 FL — LOW (ref 80–100)
MCV RBC AUTO: 79.3 FL — LOW (ref 80–100)
NRBC # BLD: 0 /100 WBCS — SIGNIFICANT CHANGE UP (ref 0–0)
NRBC # BLD: 0 /100 WBCS — SIGNIFICANT CHANGE UP (ref 0–0)
PLATELET # BLD AUTO: 220 K/UL — SIGNIFICANT CHANGE UP (ref 150–400)
PLATELET # BLD AUTO: 220 K/UL — SIGNIFICANT CHANGE UP (ref 150–400)
POTASSIUM SERPL-MCNC: 4.1 MMOL/L — SIGNIFICANT CHANGE UP (ref 3.5–5.3)
POTASSIUM SERPL-MCNC: 4.1 MMOL/L — SIGNIFICANT CHANGE UP (ref 3.5–5.3)
POTASSIUM SERPL-SCNC: 4.1 MMOL/L — SIGNIFICANT CHANGE UP (ref 3.5–5.3)
POTASSIUM SERPL-SCNC: 4.1 MMOL/L — SIGNIFICANT CHANGE UP (ref 3.5–5.3)
RBC # BLD: 3.76 M/UL — LOW (ref 3.8–5.2)
RBC # BLD: 3.76 M/UL — LOW (ref 3.8–5.2)
RBC # FLD: 19.2 % — HIGH (ref 10.3–14.5)
RBC # FLD: 19.2 % — HIGH (ref 10.3–14.5)
SODIUM SERPL-SCNC: 140 MMOL/L — SIGNIFICANT CHANGE UP (ref 135–145)
SODIUM SERPL-SCNC: 140 MMOL/L — SIGNIFICANT CHANGE UP (ref 135–145)
WBC # BLD: 5.89 K/UL — SIGNIFICANT CHANGE UP (ref 3.8–10.5)
WBC # BLD: 5.89 K/UL — SIGNIFICANT CHANGE UP (ref 3.8–10.5)
WBC # FLD AUTO: 5.89 K/UL — SIGNIFICANT CHANGE UP (ref 3.8–10.5)
WBC # FLD AUTO: 5.89 K/UL — SIGNIFICANT CHANGE UP (ref 3.8–10.5)

## 2023-10-25 PROCEDURE — 99233 SBSQ HOSP IP/OBS HIGH 50: CPT

## 2023-10-25 PROCEDURE — 47531 INJECTION FOR CHOLANGIOGRAM: CPT

## 2023-10-25 PROCEDURE — 99232 SBSQ HOSP IP/OBS MODERATE 35: CPT

## 2023-10-25 PROCEDURE — 72197 MRI PELVIS W/O & W/DYE: CPT | Mod: 26

## 2023-10-25 PROCEDURE — 74183 MRI ABD W/O CNTR FLWD CNTR: CPT | Mod: 26

## 2023-10-25 PROCEDURE — 93306 TTE W/DOPPLER COMPLETE: CPT | Mod: 26

## 2023-10-25 RX ADMIN — LOSARTAN POTASSIUM 25 MILLIGRAM(S): 100 TABLET, FILM COATED ORAL at 05:21

## 2023-10-25 NOTE — PROGRESS NOTE ADULT - ASSESSMENT
68F PMH scleroderma, pHTN, HTN, HLD, presents with partially dislodged percutaneous cholecystostomy tube, which was replaced, however found to have significant thickening of the ascending colon and possible lesion in the liver. Now admitted for colonoscopy/malignancy work-up.

## 2023-10-25 NOTE — PROCEDURE NOTE - PLAN
- gravity drainage, monitor output   - f/u with surgery re potential capping trial   - forward flush 10cc NS qD

## 2023-10-25 NOTE — PROGRESS NOTE ADULT - PROBLEM SELECTOR PLAN 1
Incidentally found to have thickening of the ascending colon wall on admission, as well as possible mass in liver.   - GI consulted - appreciated recommendations. Colonoscopy showed a large malignant appearing mass was found in the ascending colon, approximately 5cm distal to the cecum. The mass was circumferential. The mass measured four cm in length.  Follow-up biopsy     Appreciate colorectal surgery - anticipate colectomy and laparoscopic cholecystectomy.

## 2023-10-25 NOTE — PROGRESS NOTE ADULT - ASSESSMENT
Colonoscopy findings of malignant appearing ascending colon mass noted.  No events overnight.  Comfortable.  She needs Cardiology evaluation for perioperative risk assessment.  Looking to do laparoscopic right colectomy ++ lap lj) in this admission.  Colonoscopy findings of malignant appearing ascending colon mass noted.  No events overnight.  Comfortable.  She needs Cardiology evaluation for perioperative risk assessment.  Looking to do laparoscopic right colectomy (+ lap lj) in this admission.

## 2023-10-25 NOTE — PROGRESS NOTE ADULT - SUBJECTIVE AND OBJECTIVE BOX
[facility] Division of Hospital Medicine  Jennifer Nicolas DO  Pager (M-F, 8A-5P): MS Teams PREFERRED      SUBJECTIVE / OVERNIGHT EVENTS:  ADDITIONAL REVIEW OF SYSTEMS:    MEDICATIONS  (STANDING):  influenza  Vaccine (HIGH DOSE) 0.7 milliLiter(s) IntraMuscular once  losartan 25 milliGRAM(s) Oral daily    MEDICATIONS  (PRN):  acetaminophen     Tablet .. 650 milliGRAM(s) Oral every 6 hours PRN Temp greater or equal to 38C (100.4F), Mild Pain (1 - 3)  aluminum hydroxide/magnesium hydroxide/simethicone Suspension 30 milliLiter(s) Oral every 4 hours PRN Dyspepsia  melatonin 3 milliGRAM(s) Oral at bedtime PRN Insomnia  ondansetron Injectable 4 milliGRAM(s) IV Push every 8 hours PRN Nausea and/or Vomiting      I&O's Summary    24 Oct 2023 07:01  -  25 Oct 2023 07:00  --------------------------------------------------------  IN: 200 mL / OUT: 110 mL / NET: 90 mL        PHYSICAL EXAM:  Vital Signs Last 24 Hrs  T(C): 36.8 (25 Oct 2023 04:17), Max: 36.8 (24 Oct 2023 11:44)  T(F): 98.2 (25 Oct 2023 04:17), Max: 98.3 (24 Oct 2023 11:44)  HR: 63 (25 Oct 2023 04:17) (63 - 73)  BP: 131/73 (25 Oct 2023 04:17) (100/65 - 131/73)  BP(mean): --  RR: 18 (25 Oct 2023 04:17) (12 - 28)  SpO2: 98% (25 Oct 2023 04:17) (96% - 100%)    Parameters below as of 25 Oct 2023 04:17  Patient On (Oxygen Delivery Method): room air    Constitutional: WDWN resting comfortably in bed; NAD  Head: NC/AT  Eyes: Anicteric sclera  ENT: MMM  Neck: Supple, midline  Respiratory: CTA B/L; no W/R/R   Cardiac: +S1/S2; RRR; no M/R/G   Gastrointestinal: Abdomen soft, NT/ND; no rebound or guarding; +BSx4; percutaneous cholecystostomy tube in place   Extremities: No peripheral edema  Neurologic: AAOx3; CNII-XII grossly intact; no focal deficits  Psychiatric: affect and characteristics of appearance, verbalizations, behaviors are appropriate        LABS:                        8.8    5.89  )-----------( 220      ( 25 Oct 2023 07:26 )             29.8     10-25    140  |  106  |  15  ----------------------------<  80  4.1   |  22  |  0.70    Ca    9.0      25 Oct 2023 07:33  Phos  3.5     10-24  Mg     2.1     10-24    TPro  6.3  /  Alb  3.2<L>  /  TBili  0.7  /  DBili  x   /  AST  16  /  ALT  14  /  AlkPhos  119  10-24          Urinalysis Basic - ( 25 Oct 2023 07:33 )    Color: x / Appearance: x / SG: x / pH: x  Gluc: 80 mg/dL / Ketone: x  / Bili: x / Urobili: x   Blood: x / Protein: x / Nitrite: x   Leuk Esterase: x / RBC: x / WBC x   Sq Epi: x / Non Sq Epi: x / Bacteria: x          RADIOLOGY & ADDITIONAL TESTS:  Results Reviewed:   Imaging Personally Reviewed:  Electrocardiogram Personally Reviewed:    COORDINATION OF CARE:  Care Discussed with Consultants/Other Providers [Y/N]: Y  Prior or Outpatient Records Reviewed [Y/N]: IRASEMA   [facility] Division of Hospital Medicine  Jennifer Nicolas DO  Pager (M-F, 8A-5P): MS Teams PREFERRED    History obtained in Mandarin as this is a shared language with provider.   SUBJECTIVE / OVERNIGHT EVENTS: Patient denies fever, chills, chest pain, palpitations, N/V, abdominal pain, hematochezia, melena.  ADDITIONAL REVIEW OF SYSTEMS:    MEDICATIONS  (STANDING):  influenza  Vaccine (HIGH DOSE) 0.7 milliLiter(s) IntraMuscular once  losartan 25 milliGRAM(s) Oral daily    MEDICATIONS  (PRN):  acetaminophen     Tablet .. 650 milliGRAM(s) Oral every 6 hours PRN Temp greater or equal to 38C (100.4F), Mild Pain (1 - 3)  aluminum hydroxide/magnesium hydroxide/simethicone Suspension 30 milliLiter(s) Oral every 4 hours PRN Dyspepsia  melatonin 3 milliGRAM(s) Oral at bedtime PRN Insomnia  ondansetron Injectable 4 milliGRAM(s) IV Push every 8 hours PRN Nausea and/or Vomiting      I&O's Summary    24 Oct 2023 07:01  -  25 Oct 2023 07:00  --------------------------------------------------------  IN: 200 mL / OUT: 110 mL / NET: 90 mL        PHYSICAL EXAM:  Vital Signs Last 24 Hrs  T(C): 36.8 (25 Oct 2023 04:17), Max: 36.8 (24 Oct 2023 11:44)  T(F): 98.2 (25 Oct 2023 04:17), Max: 98.3 (24 Oct 2023 11:44)  HR: 63 (25 Oct 2023 04:17) (63 - 73)  BP: 131/73 (25 Oct 2023 04:17) (100/65 - 131/73)  BP(mean): --  RR: 18 (25 Oct 2023 04:17) (12 - 28)  SpO2: 98% (25 Oct 2023 04:17) (96% - 100%)    Parameters below as of 25 Oct 2023 04:17  Patient On (Oxygen Delivery Method): room air    Constitutional: WDWN resting comfortably in bed; NAD  Head: NC/AT  Eyes: Anicteric sclera  ENT: MMM  Neck: Supple, midline  Respiratory: CTA B/L; no W/R/R   Cardiac: +S1/S2; RRR; no M/R/G   Gastrointestinal: Abdomen soft, NT/ND; no rebound or guarding; +BSx4; percutaneous cholecystostomy tube in place   Extremities: No peripheral edema  Neurologic: AAOx3; CNII-XII grossly intact; no focal deficits  Psychiatric: affect and characteristics of appearance, verbalizations, behaviors are appropriate        LABS:                        8.8    5.89  )-----------( 220      ( 25 Oct 2023 07:26 )             29.8     10-25    140  |  106  |  15  ----------------------------<  80  4.1   |  22  |  0.70    Ca    9.0      25 Oct 2023 07:33  Phos  3.5     10-24  Mg     2.1     10-24    TPro  6.3  /  Alb  3.2<L>  /  TBili  0.7  /  DBili  x   /  AST  16  /  ALT  14  /  AlkPhos  119  10-24          Urinalysis Basic - ( 25 Oct 2023 07:33 )    Color: x / Appearance: x / SG: x / pH: x  Gluc: 80 mg/dL / Ketone: x  / Bili: x / Urobili: x   Blood: x / Protein: x / Nitrite: x   Leuk Esterase: x / RBC: x / WBC x   Sq Epi: x / Non Sq Epi: x / Bacteria: x          RADIOLOGY & ADDITIONAL TESTS:  Results Reviewed:   Imaging Personally Reviewed:  Electrocardiogram Personally Reviewed:    COORDINATION OF CARE:  Care Discussed with Consultants/Other Providers [Y/N]: Y  Prior or Outpatient Records Reviewed [Y/N]: Y

## 2023-10-25 NOTE — PROGRESS NOTE ADULT - SUBJECTIVE AND OBJECTIVE BOX
Chief Complaint:  Patient is a 68y old  Female who presents with a chief complaint of Dislodged percutaneous lj tube (24 Oct 2023 07:48)      Date of service 10-24-23 @ 17:08      Interval Events:   no complaints   Hospital Medications:  acetaminophen     Tablet .. 650 milliGRAM(s) Oral every 6 hours PRN  aluminum hydroxide/magnesium hydroxide/simethicone Suspension 30 milliLiter(s) Oral every 4 hours PRN  influenza  Vaccine (HIGH DOSE) 0.7 milliLiter(s) IntraMuscular once  losartan 25 milliGRAM(s) Oral daily  melatonin 3 milliGRAM(s) Oral at bedtime PRN  ondansetron Injectable 4 milliGRAM(s) IV Push every 8 hours PRN        Review of Systems:  General:  No wt loss, fevers, chills, night sweats, fatigue,   Eyes:  Good vision, no reported pain  ENT:  No sore throat, pain, runny nose, dysphagia  CV:  No pain, palpitations, hypo/hypertension  Resp:  No dyspnea, cough, tachypnea, wheezing  GI:  See HPI  :  No pain, bleeding, incontinence, nocturia  Muscle:  No pain, weakness  Neuro:  No weakness, tingling, memory problems  Psych:  No fatigue, insomnia, mood problems, depression  Endocrine:  No polyuria, polydipsia, cold/heat intolerance  Heme:  No petechiae, ecchymosis, easy bruisability  Integumentary:  No rash, edema    PHYSICAL EXAM:   Vital Signs:  Vital Signs Last 24 Hrs  T(C): 36.1 (24 Oct 2023 16:26), Max: 37.4 (23 Oct 2023 20:44)  T(F): 96.9 (24 Oct 2023 14:37), Max: 99.3 (23 Oct 2023 20:44)  HR: 67 (24 Oct 2023 16:27) (64 - 90)  BP: 119/58 (24 Oct 2023 16:27) (100/65 - 128/69)  BP(mean): --  RR: 13 (24 Oct 2023 16:27) (12 - 28)  SpO2: 98% (24 Oct 2023 16:27) (95% - 100%)    Parameters below as of 24 Oct 2023 16:27  Patient On (Oxygen Delivery Method): room air      Daily Height in cm: 144.78 (24 Oct 2023 16:26)    Daily       PHYSICAL EXAM:     GENERAL:  Appears stated age, well-groomed, well-nourished, no distress  HEENT:  NC/AT,  conjunctivae anicteric, clear and pink,   NECK: supple, trachea midline  CHEST:  Full & symmetric excursion, no increased effort, breath sounds clear  HEART:  Regular rhythm, no JVD  ABDOMEN:  Soft, non-tender, non-distended, normoactive bowel sounds,  no masses , no hepatosplenomegaly  EXTREMITIES:  no cyanosis,clubbing or edema  SKIN:  No rash, erythema, or, ecchymoses, no jaundice  NEURO:  Alert, non-focal, no asterixis  PSYCH: Appropriate affect, oriented to place and time  RECTAL: Deferred      LABS Personally reviewed by me:                        9.1    6.59  )-----------( 231      ( 24 Oct 2023 07:13 )             30.5     Mean Cell Volume: 79.6 fl (10-24-23 @ 07:13)    10-24    145  |  108  |  13  ----------------------------<  97  3.9   |  24  |  0.68    Ca    8.7      24 Oct 2023 07:14  Phos  3.5     10-24  Mg     2.1     10-24    TPro  6.3  /  Alb  3.2<L>  /  TBili  0.7  /  DBili  x   /  AST  16  /  ALT  14  /  AlkPhos  119  10-24    LIVER FUNCTIONS - ( 24 Oct 2023 07:14 )  Alb: 3.2 g/dL / Pro: 6.3 g/dL / ALK PHOS: 119 U/L / ALT: 14 U/L / AST: 16 U/L / GGT: x             Urinalysis Basic - ( 24 Oct 2023 07:14 )    Color: x / Appearance: x / SG: x / pH: x  Gluc: 97 mg/dL / Ketone: x  / Bili: x / Urobili: x   Blood: x / Protein: x / Nitrite: x   Leuk Esterase: x / RBC: x / WBC x   Sq Epi: x / Non Sq Epi: x / Bacteria: x                              9.1    6.59  )-----------( 231      ( 24 Oct 2023 07:13 )             30.5                         9.7    7.12  )-----------( 257      ( 24 Oct 2023 00:18 )             32.5                         7.1    6.92  )-----------( 239      ( 23 Oct 2023 10:54 )             24.6                         7.0    6.99  )-----------( 238      ( 23 Oct 2023 07:04 )             24.8                         7.2    7.06  )-----------( 250      ( 22 Oct 2023 02:40 )             24.9       Imaging personally reviewed by me:

## 2023-10-25 NOTE — PROCEDURE NOTE - PROCEDURE FINDINGS AND DETAILS
Successful fluoroscopic guided cholecystostomy tube check. Contrast injection demonstrated a patent cystic and common bile duct. Suture fell out of skin so drain was resutured using silk. Pt tolerated the procedure well without immediate complication.

## 2023-10-25 NOTE — PROGRESS NOTE ADULT - PROBLEM SELECTOR PLAN 4
NTD at this time. Given comorbid pHTN, preoperative risk assessment prior to surgery.  Cardiology consulted for preoperative asseessment, follow-up recommendations.  Check TTE

## 2023-10-25 NOTE — PRE PROCEDURE NOTE - PRE PROCEDURE EVALUATION
Interventional Radiology    HPI: 68F PMH scleroderma and pHTN s/p perc lj 9/11 for emphysematous cholecystitis presenting for partially dislodged cholecystostomy. In place on recent CT and flushing, continuing to drain. IR consulted for routine tube check.     Allergies: No Known Allergies    Medications (Abx/Cardiac/Anticoagulation/Blood Products)  losartan: 25 milliGRAM(s) Oral (10-25 @ 05:21)    Data:  144.8  38.6  T(C): 37.1  HR: 61  BP: 133/78  RR: 18  SpO2: 97%    Exam  General: No acute distress  Chest: Non labored breathing  Abdomen: Non-distended  Extremities: No swelling, warm    -WBC 5.89 / HgB 8.8 / Hct 29.8 / Plt 220  -Na 140 / Cl 106 / BUN 15 / Glucose 80  -K 4.1 / CO2 22 / Cr 0.70  -ALT -- / Alk Phos -- / T.Bili --  -INR0.93    Imaging:   - relevant imaging reviewed     Plan:     -- IR will plan for percutaneous cholecystostomy tube check   -- Relevant imaging and labs were reviewed.   -- Risks, benefits, and alternatives were explained to the patient and informed consent was obtained.

## 2023-10-25 NOTE — PROGRESS NOTE ADULT - NSPROGADDITIONALINFOA_GEN_ALL_CORE
Time-based billing (NON-critical care).      minutes spent on total encounter. The necessity of the time spent during the encounter on this date of service was due to:     - Reviewing, and interpreting labs, testing, and imaging.  - Independently obtaining a review of systems and performing a physical exam  - Reviewing prior records and where necessary, outpatient records.  - Counselling and educating patient regarding interpretation of aforementioned items and plan of care.        d/w ACP Time-based billing (NON-critical care).     51 minutes spent on total encounter. The necessity of the time spent during the encounter on this date of service was due to:     - Reviewing, and interpreting labs, testing, and imaging.  - Independently obtaining a review of systems and performing a physical exam  - Reviewing prior records and where necessary, outpatient records.  - Counselling and educating patient regarding interpretation of aforementioned items and plan of care.    Discussed findings of colonoscopy extensively with patient.     d/w ACP Time-based billing (NON-critical care).     51 minutes spent on total encounter. The necessity of the time spent during the encounter on this date of service was due to:     - Reviewing, and interpreting labs, testing, and imaging.  - Independently obtaining a review of systems and performing a physical exam  - Reviewing prior records and where necessary, outpatient records.  - Counselling and educating patient regarding interpretation of aforementioned items and plan of care.    10/25: Discussed findings of colonoscopy extensively with patient. Discussed plan of care going forward - answered all questions appropriately. Offered to discuss with her daughter - patient deferred at this time as her daughter is at work.     d/w ACP

## 2023-10-25 NOTE — CHART NOTE - NSCHARTNOTEFT_GEN_A_CORE
The covering night team confirmed with pt's daughter that the b/l hip replacements (2018, and 2021) completed in [place] are MRI compatible.  (Shadi Maradiaga, daughter, [phone]) The covering night team confirmed with pt's daughter that the patient's b/l hip replacements (2018, and 2021) completed in [place] are MRI compatible.  (Shadi Maradiaga, daughter, [phone])

## 2023-10-25 NOTE — CONSULT NOTE ADULT - SUBJECTIVE AND OBJECTIVE BOX
DATE OF SERVICE: 10-25-23 @ 16:26    CHIEF COMPLAINT:Patient is a 68y old  Female who presents with a chief complaint of Dislodged percutaneous lj tube (25 Oct 2023 08:56)      HISTORY OF PRESENT ILLNESS:  68F PMH scleroderma, pHTN, HTN with recent emphysematous cholecystitis s/p percutaneous cholecystectomy with IR on 9/11 presents following dislodged tube. Patient was discharged on 9/14 after procedure and course of zosyn, and has had unremarkable outpatient course. CTAP performed in ED revealed cholecystostomy tube with tip in region of contracted gallbladder Incidentally CT A/P revealed thickening of the ascending colon, with concern for underlying mass or malignancy. Surgery consult requested in ED, and recommending medicine admission.  (22 Oct 2023 14:09)      PAST MEDICAL & SURGICAL HISTORY:  H/O pulmonary hypertension      H/O scleroderma      Migration of percutaneous cholecystostomy tube              MEDICATIONS:  losartan 25 milliGRAM(s) Oral daily        acetaminophen     Tablet .. 650 milliGRAM(s) Oral every 6 hours PRN  melatonin 3 milliGRAM(s) Oral at bedtime PRN  ondansetron Injectable 4 milliGRAM(s) IV Push every 8 hours PRN    aluminum hydroxide/magnesium hydroxide/simethicone Suspension 30 milliLiter(s) Oral every 4 hours PRN      influenza  Vaccine (HIGH DOSE) 0.7 milliLiter(s) IntraMuscular once      FAMILY HISTORY:      Non-contributory    SOCIAL HISTORY:    [-  ] Tobacco  [- ] Drugs  [- ] Alcohol    Allergies    No Known Allergies    Intolerances    	    REVIEW OF SYSTEMS:  CONSTITUTIONAL: No fever  EYES: No eye pain, visual disturbances, or discharge  ENMT:  No difficulty hearing, tinnitus  NECK: No pain or stiffness  RESPIRATORY: No cough, wheezing,  CARDIOVASCULAR: No chest pain, palpitations, passing out, dizziness, or leg swelling  GASTROINTESTINAL:  No nausea, vomiting, diarrhea or constipation. No melena.  GENITOURINARY: No dysuria, hematuria  NEUROLOGICAL: No stroke like symptoms  SKIN: No burning or lesions   ENDOCRINE: No heat or cold intolerance  MUSCULOSKELETAL: No joint pain or swelling  PSYCHIATRIC: No  anxiety, mood swings  HEME/LYMPH: No bleeding gums  ALLERGY AND IMMUNOLOGIC: No hives or eczema	    All other ROS negative    PHYSICAL EXAM:  T(C): 37.1 (10-25-23 @ 15:40), Max: 37.3 (10-25-23 @ 11:27)  HR: 61 (10-25-23 @ 15:40) (60 - 67)  BP: 133/78 (10-25-23 @ 15:40) (115/65 - 133/78)  RR: 18 (10-25-23 @ 15:40) (13 - 18)  SpO2: 97% (10-25-23 @ 15:40) (96% - 98%)  Wt(kg): --  I&O's Summary    24 Oct 2023 07:01  -  25 Oct 2023 07:00  --------------------------------------------------------  IN: 200 mL / OUT: 110 mL / NET: 90 mL    25 Oct 2023 07:01  -  25 Oct 2023 16:26  --------------------------------------------------------  IN: 480 mL / OUT: 0 mL / NET: 480 mL        Appearance: Normal	  HEENT:   Normal oral mucosa, EOMI	  Cardiovascular:  S1 S2, No JVD,    Respiratory: Lungs clear to auscultation	  Psychiatry: Alert  Gastrointestinal:  Soft, Non-tender, + BS	  Skin: No rashes   Neurologic: Non-focal  Extremities:  No edema  Vascular: Peripheral pulses palpable    	    	  	  CARDIAC MARKERS:  Labs personally reviewed by me                                  8.8    5.89  )-----------( 220      ( 25 Oct 2023 07:26 )             29.8     10-25    140  |  106  |  15  ----------------------------<  80  4.1   |  22  |  0.70    Ca    9.0      25 Oct 2023 07:33  Phos  3.5     10-24  Mg     2.1     10-24    TPro  6.3  /  Alb  3.2<L>  /  TBili  0.7  /  DBili  x   /  AST  16  /  ALT  14  /  AlkPhos  119  10-24          EKG: Personally reviewed by me - SB at 55bpm  Radiology: Personally reviewed by me -     < from: TTE W or WO Ultrasound Enhancing Agent (10.25.23 @ 13:09) >   1. Left ventricular systolic function is normal with an ejection fraction of 66 % by Lundberg's method of disks.   2. Normal left ventricular diastolic function.   3. Normal right ventricular cavity size and systolic function.   4. Fibrocalcific aortic valve sclerosis without stenosis.   5. No prior echocardiogram is available for comparison.    < end of copied text >      Assessment /Plan: Mandarin ID# [id number]    68F PMH scleroderma, pHTN, HTN with recent emphysematous cholecystitis s/p percutaneous cholecystectomy with IR on 9/11 presents following dislodged tube. Patient was discharged on 9/14 after procedure and course of zosyn, and has had unremarkable outpatient course. CT A/P revealed thickening of the ascending colon, with concern for underlying mass or malignancy. s/p colonoscopy also suspicious for malignancy. Patient denies CP, SOB or palpitations.    1. Cardiac Risk Stratification  - Patient with hx of scleroderma and pulm HTN but reports average to excellent functional capacity.   - Does not utilize home oxygen. Denies CP or SOB.  - Not in decompensated HF  - No hx of tachy ang arrhythmia. ECG SB with no ischemia noted.  - No hx of severe AS/MS. TTE with no valvular dysfunction.  - TTE with preserved EF, no WMA, normal LV and RV function and size  - Patient is mod risk for mod risk colorectal surgery if needed. No contraindication to proceed.    2. Pulmonary Hypertension  - TTE shows preserved EF, no pulm HTN and normal RV size and function  - Does not use supplemental oxygen    3. Scleroderma  - Not currently on therapy    4. Hypertension  - c/w losartan 25mg PO daily (therapeutic interchange for irbesartan 75mg PO daily)    Differential diagnosis and plan of care discussed with patient after the evaluation. Counseling on diet, nutritional counseling, weight management, exercise and medication compliance was done.   Advanced care planning/advanced directives discussed with patient/family. DNR status including forceful chest compressions to attempt to restart the heart, ventilator support/artificial breathing, electric shock, artificial nutrition, health care proxy, Molst form all discussed with pt. Pt wishes to consider. More than fifteen minutes spent on discussing advanced directives.     America LERNER-LESLIE Moreno, DO [facility]  Cardiovascular Medicine  [address], [address]  Available for call or text via [employer] TE[employer]  Office [phone]   DATE OF SERVICE: 10-25-23 @ 16:26    CHIEF COMPLAINT:Patient is a 68y old  Female who presents with a chief complaint of Dislodged percutaneous lj tube (25 Oct 2023 08:56)      HISTORY OF PRESENT ILLNESS:  68F PMH scleroderma, pHTN, HTN with recent emphysematous cholecystitis s/p percutaneous cholecystectomy with IR on 9/11 presents following dislodged tube. Patient was discharged on 9/14 after procedure and course of zosyn, and has had unremarkable outpatient course. CTAP performed in ED revealed cholecystostomy tube with tip in region of contracted gallbladder Incidentally CT A/P revealed thickening of the ascending colon, with concern for underlying mass or malignancy. Surgery consult requested in ED, and recommending medicine admission.  (22 Oct 2023 14:09)      PAST MEDICAL & SURGICAL HISTORY:  H/O pulmonary hypertension      H/O scleroderma      Migration of percutaneous cholecystostomy tube              MEDICATIONS:  losartan 25 milliGRAM(s) Oral daily        acetaminophen     Tablet .. 650 milliGRAM(s) Oral every 6 hours PRN  melatonin 3 milliGRAM(s) Oral at bedtime PRN  ondansetron Injectable 4 milliGRAM(s) IV Push every 8 hours PRN    aluminum hydroxide/magnesium hydroxide/simethicone Suspension 30 milliLiter(s) Oral every 4 hours PRN      influenza  Vaccine (HIGH DOSE) 0.7 milliLiter(s) IntraMuscular once      FAMILY HISTORY:      Non-contributory    SOCIAL HISTORY:    [-  ] Tobacco  [- ] Drugs  [- ] Alcohol    Allergies    No Known Allergies    Intolerances    	    REVIEW OF SYSTEMS:  CONSTITUTIONAL: No fever  EYES: No eye pain, visual disturbances, or discharge  ENMT:  No difficulty hearing, tinnitus  NECK: No pain or stiffness  RESPIRATORY: No cough, wheezing,  CARDIOVASCULAR: No chest pain, palpitations, passing out, dizziness, or leg swelling  GASTROINTESTINAL:  No nausea, vomiting, diarrhea or constipation. No melena.  GENITOURINARY: No dysuria, hematuria  NEUROLOGICAL: No stroke like symptoms  SKIN: No burning or lesions   ENDOCRINE: No heat or cold intolerance  MUSCULOSKELETAL: No joint pain or swelling  PSYCHIATRIC: No  anxiety, mood swings  HEME/LYMPH: No bleeding gums  ALLERGY AND IMMUNOLOGIC: No hives or eczema	    All other ROS negative    PHYSICAL EXAM:  T(C): 37.1 (10-25-23 @ 15:40), Max: 37.3 (10-25-23 @ 11:27)  HR: 61 (10-25-23 @ 15:40) (60 - 67)  BP: 133/78 (10-25-23 @ 15:40) (115/65 - 133/78)  RR: 18 (10-25-23 @ 15:40) (13 - 18)  SpO2: 97% (10-25-23 @ 15:40) (96% - 98%)  Wt(kg): --  I&O's Summary    24 Oct 2023 07:01  -  25 Oct 2023 07:00  --------------------------------------------------------  IN: 200 mL / OUT: 110 mL / NET: 90 mL    25 Oct 2023 07:01  -  25 Oct 2023 16:26  --------------------------------------------------------  IN: 480 mL / OUT: 0 mL / NET: 480 mL        Appearance: Normal	  HEENT:   Normal oral mucosa, EOMI	  Cardiovascular:  S1 S2, No JVD,    Respiratory: Lungs clear to auscultation	  Psychiatry: Alert  Gastrointestinal:  Soft, Non-tender, + BS	  Skin: No rashes   Neurologic: Non-focal  Extremities:  No edema  Vascular: Peripheral pulses palpable    	    	  	  CARDIAC MARKERS:  Labs personally reviewed by me                                  8.8    5.89  )-----------( 220      ( 25 Oct 2023 07:26 )             29.8     10-25    140  |  106  |  15  ----------------------------<  80  4.1   |  22  |  0.70    Ca    9.0      25 Oct 2023 07:33  Phos  3.5     10-24  Mg     2.1     10-24    TPro  6.3  /  Alb  3.2<L>  /  TBili  0.7  /  DBili  x   /  AST  16  /  ALT  14  /  AlkPhos  119  10-24          EKG: Personally reviewed by me - SB at 55bpm  Radiology: Personally reviewed by me -     < from: TTE W or WO Ultrasound Enhancing Agent (10.25.23 @ 13:09) >   1. Left ventricular systolic function is normal with an ejection fraction of 66 % by Lundberg's method of disks.   2. Normal left ventricular diastolic function.   3. Normal right ventricular cavity size and systolic function.   4. Fibrocalcific aortic valve sclerosis without stenosis.   5. No prior echocardiogram is available for comparison.    < end of copied text >      Assessment /Plan: Mandarin ID# [id number]    68F PMH scleroderma, pHTN, HTN with recent emphysematous cholecystitis s/p percutaneous cholecystectomy with IR on 9/11 presents following dislodged tube. Patient was discharged on 9/14 after procedure and course of zosyn, and has had unremarkable outpatient course. CT A/P revealed thickening of the ascending colon, with concern for underlying mass or malignancy. s/p colonoscopy also suspicious for malignancy. Patient denies CP, SOB or palpitations.    1. Cardiac Risk Stratification  - Patient with hx of scleroderma and pulm HTN but reports average to excellent functional capacity.   - Does not utilize home oxygen. Denies CP or SOB.  - Not in decompensated HF  - No hx of tachy ang arrhythmia. ECG SB with no ischemia noted.  - No hx of severe AS/MS. TTE with no valvular dysfunction.  - TTE with preserved EF, no WMA, normal LV and RV function and size  - Patient is mod risk for mod risk colorectal surgery if needed. No contraindication to proceed.    2. Pulmonary Hypertension  - TTE shows preserved EF, no pulm HTN and normal RV size and function  - Does not use supplemental oxygen    3. Scleroderma  - Not currently on therapy    4. Hypertension  - c/w losartan 25mg PO daily (therapeutic interchange for irbesartan 75mg PO daily)          Differential diagnosis and plan of care discussed with patient after the evaluation. Counseling on diet, nutritional counseling, weight management, exercise and medication compliance was done.   Advanced care planning/advanced directives discussed with patient/family. DNR status including forceful chest compressions to attempt to restart the heart, ventilator support/artificial breathing, electric shock, artificial nutrition, health care proxy, Molst form all discussed with pt. Pt wishes to consider. More than fifteen minutes spent on discussing advanced directives.     America LERNER-LESLIE Moreno, DO [facility]  Cardiovascular Medicine  [address], [address]  Available for call or text via [employer] TEAMs  Office [phone]

## 2023-10-26 ENCOUNTER — NON-APPOINTMENT (OUTPATIENT)
Age: 68
End: 2023-10-26

## 2023-10-26 LAB
BILIRUB SERPL-MCNC: 0.6 MG/DL — SIGNIFICANT CHANGE UP (ref 0.2–1.2)
BILIRUB SERPL-MCNC: 0.6 MG/DL — SIGNIFICANT CHANGE UP (ref 0.2–1.2)
CREAT SERPL-MCNC: 0.69 MG/DL — SIGNIFICANT CHANGE UP (ref 0.5–1.3)
CREAT SERPL-MCNC: 0.69 MG/DL — SIGNIFICANT CHANGE UP (ref 0.5–1.3)
EGFR: 94 ML/MIN/1.73M2 — SIGNIFICANT CHANGE UP
EGFR: 94 ML/MIN/1.73M2 — SIGNIFICANT CHANGE UP
HCT VFR BLD CALC: 31.4 % — LOW (ref 34.5–45)
HCT VFR BLD CALC: 31.4 % — LOW (ref 34.5–45)
HGB BLD-MCNC: 9.3 G/DL — LOW (ref 11.5–15.5)
HGB BLD-MCNC: 9.3 G/DL — LOW (ref 11.5–15.5)
INR BLD: 0.93 RATIO — SIGNIFICANT CHANGE UP (ref 0.85–1.18)
INR BLD: 0.93 RATIO — SIGNIFICANT CHANGE UP (ref 0.85–1.18)
MCHC RBC-ENTMCNC: 23.8 PG — LOW (ref 27–34)
MCHC RBC-ENTMCNC: 23.8 PG — LOW (ref 27–34)
MCHC RBC-ENTMCNC: 29.6 GM/DL — LOW (ref 32–36)
MCHC RBC-ENTMCNC: 29.6 GM/DL — LOW (ref 32–36)
MCV RBC AUTO: 80.5 FL — SIGNIFICANT CHANGE UP (ref 80–100)
MCV RBC AUTO: 80.5 FL — SIGNIFICANT CHANGE UP (ref 80–100)
MELD SCORE WITH DIALYSIS: 20 POINTS — SIGNIFICANT CHANGE UP
MELD SCORE WITH DIALYSIS: 20 POINTS — SIGNIFICANT CHANGE UP
MELD SCORE WITHOUT DIALYSIS: 6 POINTS — SIGNIFICANT CHANGE UP
MELD SCORE WITHOUT DIALYSIS: 6 POINTS — SIGNIFICANT CHANGE UP
NRBC # BLD: 0 /100 WBCS — SIGNIFICANT CHANGE UP (ref 0–0)
NRBC # BLD: 0 /100 WBCS — SIGNIFICANT CHANGE UP (ref 0–0)
PLATELET # BLD AUTO: 218 K/UL — SIGNIFICANT CHANGE UP (ref 150–400)
PLATELET # BLD AUTO: 218 K/UL — SIGNIFICANT CHANGE UP (ref 150–400)
PROTHROM AB SERPL-ACNC: 9.8 SEC — SIGNIFICANT CHANGE UP (ref 9.5–13)
PROTHROM AB SERPL-ACNC: 9.8 SEC — SIGNIFICANT CHANGE UP (ref 9.5–13)
RBC # BLD: 3.9 M/UL — SIGNIFICANT CHANGE UP (ref 3.8–5.2)
RBC # BLD: 3.9 M/UL — SIGNIFICANT CHANGE UP (ref 3.8–5.2)
RBC # FLD: 19.3 % — HIGH (ref 10.3–14.5)
RBC # FLD: 19.3 % — HIGH (ref 10.3–14.5)
SODIUM SERPL-SCNC: 139 MMOL/L — SIGNIFICANT CHANGE UP (ref 135–145)
SODIUM SERPL-SCNC: 139 MMOL/L — SIGNIFICANT CHANGE UP (ref 135–145)
WBC # BLD: 6.42 K/UL — SIGNIFICANT CHANGE UP (ref 3.8–10.5)
WBC # BLD: 6.42 K/UL — SIGNIFICANT CHANGE UP (ref 3.8–10.5)
WBC # FLD AUTO: 6.42 K/UL — SIGNIFICANT CHANGE UP (ref 3.8–10.5)
WBC # FLD AUTO: 6.42 K/UL — SIGNIFICANT CHANGE UP (ref 3.8–10.5)

## 2023-10-26 PROCEDURE — 93016 CV STRESS TEST SUPVJ ONLY: CPT

## 2023-10-26 PROCEDURE — 99233 SBSQ HOSP IP/OBS HIGH 50: CPT

## 2023-10-26 PROCEDURE — 78452 HT MUSCLE IMAGE SPECT MULT: CPT | Mod: 26

## 2023-10-26 PROCEDURE — 93018 CV STRESS TEST I&R ONLY: CPT

## 2023-10-26 RX ADMIN — LOSARTAN POTASSIUM 25 MILLIGRAM(S): 100 TABLET, FILM COATED ORAL at 05:05

## 2023-10-26 NOTE — PROGRESS NOTE ADULT - SUBJECTIVE AND OBJECTIVE BOX
Chief Complaint:  Patient is a 68y old  Female who presents with a chief complaint of Dislodged percutaneous lj tube (24 Oct 2023 07:48)      Date of service 10-24-23 @ 17:08      Interval Events:   resting comfortably   no new gi complaints           Hospital Medications:  acetaminophen     Tablet .. 650 milliGRAM(s) Oral every 6 hours PRN  aluminum hydroxide/magnesium hydroxide/simethicone Suspension 30 milliLiter(s) Oral every 4 hours PRN  influenza  Vaccine (HIGH DOSE) 0.7 milliLiter(s) IntraMuscular once  losartan 25 milliGRAM(s) Oral daily  melatonin 3 milliGRAM(s) Oral at bedtime PRN  ondansetron Injectable 4 milliGRAM(s) IV Push every 8 hours PRN        Review of Systems:  General:  No wt loss, fevers, chills, night sweats, fatigue,   Eyes:  Good vision, no reported pain  ENT:  No sore throat, pain, runny nose, dysphagia  CV:  No pain, palpitations, hypo/hypertension  Resp:  No dyspnea, cough, tachypnea, wheezing  GI:  See HPI  :  No pain, bleeding, incontinence, nocturia  Muscle:  No pain, weakness  Neuro:  No weakness, tingling, memory problems  Psych:  No fatigue, insomnia, mood problems, depression  Endocrine:  No polyuria, polydipsia, cold/heat intolerance  Heme:  No petechiae, ecchymosis, easy bruisability  Integumentary:  No rash, edema    PHYSICAL EXAM:   Vital Signs Last 24 Hrs  T(C): 36.9 (26 Oct 2023 12:14), Max: 37.2 (26 Oct 2023 04:27)  T(F): 98.4 (26 Oct 2023 12:14), Max: 99 (26 Oct 2023 04:27)  HR: 74 (26 Oct 2023 12:14) (61 - 90)  BP: 111/72 (26 Oct 2023 12:14) (111/72 - 133/78)  BP(mean): --  RR: 18 (26 Oct 2023 12:14) (18 - 18)  SpO2: 99% (26 Oct 2023 12:14) (97% - 99%)    Parameters below as of 26 Oct 2023 12:14  Patient On (Oxygen Delivery Method): room air          PHYSICAL EXAM:     GENERAL:  Appears stated age, well-groomed, well-nourished, no distress  HEENT:  NC/AT,  conjunctivae anicteric, clear and pink,   NECK: supple, trachea midline  CHEST:  Full & symmetric excursion, no increased effort, breath sounds clear  HEART:  Regular rhythm, no JVD  ABDOMEN:  Soft, non-tender, non-distended, normoactive bowel sounds,  no masses , no hepatosplenomegaly  EXTREMITIES:  no cyanosis,clubbing or edema  SKIN:  No rash, erythema, or, ecchymoses, no jaundice  NEURO:  Alert, non-focal, no asterixis  PSYCH: Appropriate affect, oriented to place and time  RECTAL: Deferred      LABS Personally reviewed by me:  LABS:                        9.3    6.42  )-----------( 218      ( 26 Oct 2023 07:00 )             31.4     10-26    139  |  x   |  x   ----------------------------<  x   x    |  x   |  0.69    Ca    9.0      25 Oct 2023 07:33    TPro  x   /  Alb  x   /  TBili  0.6  /  DBili  x   /  AST  x   /  ALT  x   /  AlkPhos  x   10-26    PT/INR - ( 26 Oct 2023 06:57 )   PT: 9.8 sec;   INR: 0.93 ratio           Urinalysis Basic - ( 25 Oct 2023 07:33 )    Color: x / Appearance: x / SG: x / pH: x  Gluc: 80 mg/dL / Ketone: x  / Bili: x / Urobili: x   Blood: x / Protein: x / Nitrite: x   Leuk Esterase: x / RBC: x / WBC x   Sq Epi: x / Non Sq Epi: x / Bacteria: x        Imaging personally reviewed by me:

## 2023-10-26 NOTE — PROGRESS NOTE ADULT - NSPROGADDITIONALINFOA_GEN_ALL_CORE
Time-based billing (NON-critical care).      minutes spent on total encounter. The necessity of the time spent during the encounter on this date of service was due to:     - Reviewing, and interpreting labs, testing, and imaging.  - Independently obtaining a review of systems and performing a physical exam  - Reviewing prior records and where necessary, outpatient records.  - Counselling and educating patient regarding interpretation of aforementioned items and plan of care.      d/w ACP Time-based billing (NON-critical care).     52 minutes spent on total encounter. The necessity of the time spent during the encounter on this date of service was due to:     - Reviewing, and interpreting labs, testing, and imaging.  - Independently obtaining a review of systems and performing a physical exam  - Reviewing prior records and where necessary, outpatient records.  - Counselling and educating patient regarding interpretation of aforementioned items and plan of care.      d/w ACP

## 2023-10-26 NOTE — PROGRESS NOTE ADULT - SUBJECTIVE AND OBJECTIVE BOX
[facility] Division of Hospital Medicine  Jennifer Nicolas DO  Pager (M-F, 8A-5P): MS Teams PREFERRED      SUBJECTIVE / OVERNIGHT EVENTS:  ADDITIONAL REVIEW OF SYSTEMS:    MEDICATIONS  (STANDING):  influenza  Vaccine (HIGH DOSE) 0.7 milliLiter(s) IntraMuscular once  losartan 25 milliGRAM(s) Oral daily    MEDICATIONS  (PRN):  acetaminophen     Tablet .. 650 milliGRAM(s) Oral every 6 hours PRN Temp greater or equal to 38C (100.4F), Mild Pain (1 - 3)  aluminum hydroxide/magnesium hydroxide/simethicone Suspension 30 milliLiter(s) Oral every 4 hours PRN Dyspepsia  melatonin 3 milliGRAM(s) Oral at bedtime PRN Insomnia  ondansetron Injectable 4 milliGRAM(s) IV Push every 8 hours PRN Nausea and/or Vomiting      I&O's Summary    25 Oct 2023 07:01  -  26 Oct 2023 07:00  --------------------------------------------------------  IN: 630 mL / OUT: 50 mL / NET: 580 mL        PHYSICAL EXAM:  Vital Signs Last 24 Hrs  T(C): 37.2 (26 Oct 2023 04:27), Max: 37.3 (25 Oct 2023 11:27)  T(F): 99 (26 Oct 2023 04:27), Max: 99.1 (25 Oct 2023 11:27)  HR: 65 (26 Oct 2023 04:27) (60 - 90)  BP: 120/65 (26 Oct 2023 04:27) (120/65 - 133/78)  BP(mean): --  RR: 18 (26 Oct 2023 04:27) (18 - 18)  SpO2: 97% (26 Oct 2023 04:27) (97% - 98%)    Parameters below as of 26 Oct 2023 04:27  Patient On (Oxygen Delivery Method): room air    Constitutional: WDWN resting comfortably in bed; NAD  Head: NC/AT  Eyes: Anicteric sclera  ENT: MMM  Neck: Supple, midline  Respiratory: CTA B/L; no W/R/R   Cardiac: +S1/S2; RRR; no M/R/G   Gastrointestinal: Abdomen soft, NT/ND; no rebound or guarding; +BSx4; percutaneous cholecystostomy tube in place   Extremities: No peripheral edema  Neurologic: AAOx3; CNII-XII grossly intact; no focal deficits  Psychiatric: affect and characteristics of appearance, verbalizations, behaviors are appropriate      LABS:                        9.3    6.42  )-----------( 218      ( 26 Oct 2023 07:00 )             31.4     10-26    139  |  x   |  x   ----------------------------<  x   x    |  x   |  0.69    Ca    9.0      25 Oct 2023 07:33    TPro  x   /  Alb  x   /  TBili  0.6  /  DBili  x   /  AST  x   /  ALT  x   /  AlkPhos  x   10-26    PT/INR - ( 26 Oct 2023 06:57 )   PT: 9.8 sec;   INR: 0.93 ratio               Urinalysis Basic - ( 25 Oct 2023 07:33 )    Color: x / Appearance: x / SG: x / pH: x  Gluc: 80 mg/dL / Ketone: x  / Bili: x / Urobili: x   Blood: x / Protein: x / Nitrite: x   Leuk Esterase: x / RBC: x / WBC x   Sq Epi: x / Non Sq Epi: x / Bacteria: x          RADIOLOGY & ADDITIONAL TESTS:  Results Reviewed:   Imaging Personally Reviewed:  Electrocardiogram Personally Reviewed:    COORDINATION OF CARE:  Care Discussed with Consultants/Other Providers [Y/N]: Y  Prior or Outpatient Records Reviewed [Y/N]: Y   [facility] Division of Hospital Medicine  Jennifer Nicolas DO  Pager (M-F, 8A-5P): MS Teams PREFERRED    History obtained in Mandarin as shared language with provider.   SUBJECTIVE / OVERNIGHT EVENTS: No acute events overnight. Patient was tearful - stressed about findings of colonoscopy. She states that only her daughter knows of her hospitalization - she has not told any of her family back in [place] due to fear of worrying them. She is understandably overwhelmed with medical issues so far.     ADDITIONAL REVIEW OF SYSTEMS:    MEDICATIONS  (STANDING):  influenza  Vaccine (HIGH DOSE) 0.7 milliLiter(s) IntraMuscular once  losartan 25 milliGRAM(s) Oral daily    MEDICATIONS  (PRN):  acetaminophen     Tablet .. 650 milliGRAM(s) Oral every 6 hours PRN Temp greater or equal to 38C (100.4F), Mild Pain (1 - 3)  aluminum hydroxide/magnesium hydroxide/simethicone Suspension 30 milliLiter(s) Oral every 4 hours PRN Dyspepsia  melatonin 3 milliGRAM(s) Oral at bedtime PRN Insomnia  ondansetron Injectable 4 milliGRAM(s) IV Push every 8 hours PRN Nausea and/or Vomiting      I&O's Summary    25 Oct 2023 07:01  -  26 Oct 2023 07:00  --------------------------------------------------------  IN: 630 mL / OUT: 50 mL / NET: 580 mL        PHYSICAL EXAM:  Vital Signs Last 24 Hrs  T(C): 37.2 (26 Oct 2023 04:27), Max: 37.3 (25 Oct 2023 11:27)  T(F): 99 (26 Oct 2023 04:27), Max: 99.1 (25 Oct 2023 11:27)  HR: 65 (26 Oct 2023 04:27) (60 - 90)  BP: 120/65 (26 Oct 2023 04:27) (120/65 - 133/78)  BP(mean): --  RR: 18 (26 Oct 2023 04:27) (18 - 18)  SpO2: 97% (26 Oct 2023 04:27) (97% - 98%)    Parameters below as of 26 Oct 2023 04:27  Patient On (Oxygen Delivery Method): room air    Constitutional: WDWN resting comfortably in bed; NAD  Head: NC/AT  Eyes: Anicteric sclera  ENT: MMM  Neck: Supple, midline  Respiratory: CTA B/L; no W/R/R   Cardiac: +S1/S2; RRR; no M/R/G   Gastrointestinal: Abdomen soft, NT/ND; no rebound or guarding; +BSx4; percutaneous cholecystostomy tube in place   Extremities: No peripheral edema  Neurologic: AAOx3; CNII-XII grossly intact; no focal deficits  Psychiatric: affect and characteristics of appearance, verbalizations, behaviors are appropriate      LABS:                        9.3    6.42  )-----------( 218      ( 26 Oct 2023 07:00 )             31.4     10-26    139  |  x   |  x   ----------------------------<  x   x    |  x   |  0.69    Ca    9.0      25 Oct 2023 07:33    TPro  x   /  Alb  x   /  TBili  0.6  /  DBili  x   /  AST  x   /  ALT  x   /  AlkPhos  x   10-26    PT/INR - ( 26 Oct 2023 06:57 )   PT: 9.8 sec;   INR: 0.93 ratio               Urinalysis Basic - ( 25 Oct 2023 07:33 )    Color: x / Appearance: x / SG: x / pH: x  Gluc: 80 mg/dL / Ketone: x  / Bili: x / Urobili: x   Blood: x / Protein: x / Nitrite: x   Leuk Esterase: x / RBC: x / WBC x   Sq Epi: x / Non Sq Epi: x / Bacteria: x          RADIOLOGY & ADDITIONAL TESTS:  Results Reviewed:   Imaging Personally Reviewed:  Electrocardiogram Personally Reviewed:    COORDINATION OF CARE:  Care Discussed with Consultants/Other Providers [Y/N]: Y  Prior or Outpatient Records Reviewed [Y/N]: Y

## 2023-10-26 NOTE — PROGRESS NOTE ADULT - SUBJECTIVE AND OBJECTIVE BOX
DATE OF SERVICE: 10-26-23 @ 16:02    Patient is a 68y old  Female who presents with a chief complaint of Dislodged percutaneous lj tube (26 Oct 2023 12:57)      INTERVAL HISTORY: Feels ok.     REVIEW OF SYSTEMS:  CONSTITUTIONAL: No weakness  EYES/ENT: No visual changes;  No throat pain   NECK: No pain or stiffness  RESPIRATORY: No cough, wheezing; No shortness of breath  CARDIOVASCULAR: No chest pain or palpitations  GASTROINTESTINAL: No abdominal  pain. No nausea, vomiting, or hematemesis  GENITOURINARY: No dysuria, frequency or hematuria  NEUROLOGICAL: No stroke like symptoms  SKIN: No rashes    	  MEDICATIONS:  losartan 25 milliGRAM(s) Oral daily        PHYSICAL EXAM:  T(C): 36.9 (10-26-23 @ 12:14), Max: 37.2 (10-26-23 @ 04:27)  HR: 74 (10-26-23 @ 12:14) (63 - 90)  BP: 111/72 (10-26-23 @ 12:14) (111/72 - 128/72)  RR: 18 (10-26-23 @ 12:14) (18 - 18)  SpO2: 99% (10-26-23 @ 12:14) (97% - 99%)  Wt(kg): --  I&O's Summary    25 Oct 2023 07:01  -  26 Oct 2023 07:00  --------------------------------------------------------  IN: 630 mL / OUT: 50 mL / NET: 580 mL    26 Oct 2023 07:01  -  26 Oct 2023 16:02  --------------------------------------------------------  IN: 480 mL / OUT: 70 mL / NET: 410 mL      Height (cm): 144.8 (10-26 @ 08:15)  Weight (kg): 38.6 (10-26 @ 08:15)  BMI (kg/m2): 18.4 (10-26 @ 08:15)  BSA (m2): 1.25 (10-26 @ 08:15)    Appearance: In no distress	  HEENT:    PERRL, EOMI	  Cardiovascular:  S1 S2, No JVD  Respiratory: Lungs clear to auscultation	  Gastrointestinal:  Soft, Non-tender, + BS	  Vascularature:  No edema of LE  Psychiatric: Appropriate affect   Neuro: no acute focal deficits                               9.3    6.42  )-----------( 218      ( 26 Oct 2023 07:00 )             31.4     10-26    139  |  x   |  x   ----------------------------<  x   x    |  x   |  0.69    Ca    9.0      25 Oct 2023 07:33    TPro  x   /  Alb  x   /  TBili  0.6  /  DBili  x   /  AST  x   /  ALT  x   /  AlkPhos  x   10-26        Labs personally reviewed      ASSESSMENT/PLAN: 	    68F PMH scleroderma, pHTN, HTN with recent emphysematous cholecystitis s/p percutaneous cholecystectomy with IR on 9/11 presents following dislodged tube. Patient was discharged on 9/14 after procedure and course of zosyn, and has had unremarkable outpatient course. CT A/P revealed thickening of the ascending colon, with concern for underlying mass or malignancy. s/p colonoscopy also suspicious for malignancy. Patient denies CP, SOB or palpitations.    1. Cardiac Risk Stratification  - Patient with hx of scleroderma and pulm HTN but reports average to excellent functional capacity.   - Does not utilize home oxygen. Denies CP or SOB.  - Not in decompensated HF  - No hx of tachy ang arrhythmia. ECG SB with no ischemia noted.  - No hx of severe AS/MS. TTE with no valvular dysfunction.  - TTE with preserved EF, no WMA, normal LV and RV function and size  - Patient is mod risk for mod risk colorectal surgery if needed. No contraindication to proceed pending ischemic eval with NST.    2. Pulmonary Hypertension  - TTE shows preserved EF, no pulm HTN and normal RV size and function  - Does not use supplemental oxygen    3. Scleroderma  - Not currently on therapy    4. Hypertension  - c/w losartan 25mg PO daily (therapeutic interchange for irbesartan 75mg PO daily)          America Gerber, AG-NP   Ramsey Moreno DO [facility]  Cardiovascular Medicine  [address], [address]  Available through call or text on [employer] TEAMs  Office: [phone]

## 2023-10-26 NOTE — PROGRESS NOTE ADULT - PROBLEM SELECTOR PLAN 1
Incidentally found to have thickening of the ascending colon wall on admission, as well as possible mass in liver.   - GI consulted - appreciated recommendations. Colonoscopy showed a large malignant appearing mass was found in the ascending colon, approximately 5cm distal to the cecum. The mass was circumferential. The mass measured four cm in length.  Follow-up biopsy     Appreciate colorectal surgery - anticipate colectomy and laparoscopic cholecystectomy on Monday 10/30

## 2023-10-26 NOTE — PROGRESS NOTE ADULT - PROBLEM SELECTOR PLAN 6
CT: 1.8 cm hypoattenuating right hepatic lesion measuring IV than fluid density and additional low-attenuation lesions too small to accurately characterize.  Follow-up MR Abdomen w/ and w/o contrast    DVT Ppx: Hold pharmacologic prophylaxis due to anemia CT: 1.8 cm hypoattenuating right hepatic lesion measuring IV than fluid density and additional low-attenuation lesions too small to accurately characterize.  MR Abdomen:  A few T2 hyperintense, rim-enhancing liver lesions are suspicious for mucinous liver metastases. Largest lesion measures 2.5 x 2.0 cm within the right hepatic lobe and demonstrates heterogeneous internal   enhancement. The lesion within segment 4A/8 measures 0.8 cm (5, 8) and a lesion within segment 2/3 measures 0.9 cm (5, 9).      DVT Ppx: Hold pharmacologic prophylaxis due to anemia

## 2023-10-26 NOTE — PROGRESS NOTE ADULT - ATTENDING COMMENTS
I saw and examined the pt on 10/26 at 10:05 am using .  I discussed dx of colon mass and recommended lap right colectomy, plus concomitant cholecystectomy for the chronic cholecystitis/episode of gangrenous cholecystitis back in September.   BMI 18 - pt with malnutrition - The pt reports some weight loss although the amount is unclear.   She also reports she is able to ambulate w/o issues however she has been staying in bed for the last month or so after reading on the Internet that she should be resting regarding her legs (?? arthritis, ? scleroderma).  Plan is for surgery in this admission with preop optimization - pt transferred to the surgical service - I instructed the pt to ambulate as much as possible.  PT consult.  Protein shakes tid.   All questions answered.

## 2023-10-26 NOTE — PROGRESS NOTE ADULT - ASSESSMENT
68F hx scleroderma and pulmonary hypertension, s/p percutaneous cholecystostomy with IR on 9/11 for emphysematous cholecystitis, now presenting with partially dislodged per lj tube. CTAP with cholecystostomy tube with tip in the region of the contracted gallbladder, no acute intraabdominal pathology. Of note, also noted was a short segment of marked colonic bowel wall thickening involving the ascending colon concerning for underlying mass/malignancy.     Recommendations:  - Appreciate cardiology recs: patient is moderate risk for moderate risk surgery, no contraindication to surgery  - Surgical planning for possible operation Monday  - F/u liver MRI final read  - Perc lj tube placement confirmed by IR tube study on 10/25  - Colonoscopy shows likely malignancy in ascending colon  - CEA level elevated: 57  - GI recommendations appreciated  - Appreciate care per primary team (Medicine)      [facility]  [id number]   68F hx scleroderma and pulmonary hypertension, s/p percutaneous cholecystostomy with IR on 9/11 for emphysematous cholecystitis, now presenting with partially dislodged per lj tube. CTAP with cholecystostomy tube with tip in the region of the contracted gallbladder, no acute intraabdominal pathology. Of note, also noted was a short segment of marked colonic bowel wall thickening involving the ascending colon concerning for underlying mass/malignancy.     Recommendations:  - Appreciate cardiology recs: patient is moderate risk for moderate risk surgery, no contraindication to surgery  - Await nuclear Stress Test Results   - Surgical planning for possible operation Monday  - F/u liver MRI final read  - Perc lj tube placement confirmed by IR tube study on 10/25  - Colonoscopy shows likely malignancy in ascending colon  - CEA level elevated: 57  - GI recommendations appreciated  - Appreciate care per primary team (Medicine)      [place] [facility]  [id number]

## 2023-10-26 NOTE — PROGRESS NOTE ADULT - PROBLEM SELECTOR PLAN 4
NTD at this time. Not on any therapy at home for scleroderma.   Given comorbid pHTN, preoperative risk assessment prior to surgery.  Cardiology consulted for preoperative assessment, appreciate recommendations. No contraindications to proceed with surgery.  TTE - Normal LVSF, EF 66% NTD at this time. Not on any therapy at home for scleroderma.   Given comorbid pHTN, preoperative risk assessment prior to surgery.  Cardiology consulted for preoperative assessment, appreciate recommendations. No contraindications to proceed with surgery pending nuclear stress test.   Patient denies chest pain, dyspnea, palpitations. Able to tolerate brisk walking and activities of daily living.   TTE - Normal LVSF, EF 66%

## 2023-10-26 NOTE — PROGRESS NOTE ADULT - SUBJECTIVE AND OBJECTIVE BOX
Surgery Progress Note    S: Patient seen and examined. No acute events overnight. Reports tolerating diet without nausea, vomiting, passing flatus, having bowel movements, voiding without issues, have been ambulating and out of bed. Denies fever, chills, SOB, chest pain.     O:  Physical Exam:  General: Not acutely distressed  Respiratory: Nonlabored respirations on room air  Cardiovascular: Pulse present  Abdominal: Soft, nondistended, nontender. No rebound or guarding. No organomegaly, no palpable mass. R abdominal perc lj tube in place with brown liquid output. Surrounding skin minimally erythematous with no evidence of fluctuance or purulence.  Extremities: Warm    Vital Signs Last 24 Hrs  T(C): 37.2 (26 Oct 2023 04:27), Max: 37.3 (25 Oct 2023 11:27)  T(F): 99 (26 Oct 2023 04:27), Max: 99.1 (25 Oct 2023 11:27)  HR: 65 (26 Oct 2023 04:27) (60 - 90)  BP: 120/65 (26 Oct 2023 04:27) (120/65 - 133/78)  BP(mean): --  RR: 18 (26 Oct 2023 04:27) (18 - 18)  SpO2: 97% (26 Oct 2023 04:27) (97% - 98%)    Parameters below as of 26 Oct 2023 04:27  Patient On (Oxygen Delivery Method): room air        I&O's Detail    24 Oct 2023 07:01  -  25 Oct 2023 07:00  --------------------------------------------------------  IN:    Oral Fluid: 200 mL  Total IN: 200 mL    OUT:    Drain (mL): 110 mL  Total OUT: 110 mL    Total NET: 90 mL      25 Oct 2023 07:01  -  26 Oct 2023 05:39  --------------------------------------------------------  IN:    Oral Fluid: 480 mL  Total IN: 480 mL    OUT:  Total OUT: 0 mL    Total NET: 480 mL                                8.8    5.89  )-----------( 220      ( 25 Oct 2023 07:26 )             29.8       10-25    140  |  106  |  15  ----------------------------<  80  4.1   |  22  |  0.70    Ca    9.0      25 Oct 2023 07:33  Phos  3.5     10-24  Mg     2.1     10-24    TPro  6.3  /  Alb  3.2<L>  /  TBili  0.7  /  DBili  x   /  AST  16  /  ALT  14  /  AlkPhos  119  10-24

## 2023-10-26 NOTE — PROGRESS NOTE ADULT - PROBLEM SELECTOR PLAN 2
As seen on September hospitalization. Status post course of Zosyn at that time, and with perc lj. Drain partially removed and subsequently replaced prior to admission  - Appreciate IR evaluation -s/p percutaneous cholecystostomy tube check 10/25.

## 2023-10-26 NOTE — CHART NOTE - NSCHARTNOTEFT_GEN_A_CORE
68F hx scleroderma and pulmonary hypertension, s/p percutaneous cholecystostomy with IR on 9/11 for emphysematous cholecystitis, now presenting with partially dislodged per lj tube. CTAP with cholecystostomy tube with tip in the region of the contracted gallbladder, no acute intraabdominal pathology. Of note, also noted was a short segment of marked colonic bowel wall thickening involving the ascending colon concerning for underlying mass/malignancy.     A med rec was done in the afternoon with [facility] . According to patient, she takes 3 medications at home, some of which do not have American equivalents:  1. dexamethasone-acetate, 2 tabs every day, unknown dosage, for her arthritis pain, last dose was Saturday, had been taking for 10 days prior to Saturday, self-prescribed by daughter who had some lying around  2. irbesartan, 1 tab a day, unknown dosage, for her blood pressure, last taken prior to hospital admission  3. esteren, 1 tab every other day, unknown dosage, to help her sleep, last taken prior to hospital admission    Patient also reports that she was unable to have her cardiac stress test today because she accidentally drank 2 sips of coffee in the morning. Patient made aware that she is not to drink any coffee tomorrow and will likely have stress test rescheduled for tomorrow.    Patient is in no acute distress, denies arthritic pain, passing flatus, having bowel movements, no nausea or vomiting, no pain.    [facility]  [id number]

## 2023-10-27 LAB
ANION GAP SERPL CALC-SCNC: 11 MMOL/L — SIGNIFICANT CHANGE UP (ref 5–17)
ANION GAP SERPL CALC-SCNC: 11 MMOL/L — SIGNIFICANT CHANGE UP (ref 5–17)
BUN SERPL-MCNC: 18 MG/DL — SIGNIFICANT CHANGE UP (ref 7–23)
BUN SERPL-MCNC: 18 MG/DL — SIGNIFICANT CHANGE UP (ref 7–23)
CALCIUM SERPL-MCNC: 8.6 MG/DL — SIGNIFICANT CHANGE UP (ref 8.4–10.5)
CALCIUM SERPL-MCNC: 8.6 MG/DL — SIGNIFICANT CHANGE UP (ref 8.4–10.5)
CHLORIDE SERPL-SCNC: 104 MMOL/L — SIGNIFICANT CHANGE UP (ref 96–108)
CHLORIDE SERPL-SCNC: 104 MMOL/L — SIGNIFICANT CHANGE UP (ref 96–108)
CO2 SERPL-SCNC: 22 MMOL/L — SIGNIFICANT CHANGE UP (ref 22–31)
CO2 SERPL-SCNC: 22 MMOL/L — SIGNIFICANT CHANGE UP (ref 22–31)
CREAT SERPL-MCNC: 0.66 MG/DL — SIGNIFICANT CHANGE UP (ref 0.5–1.3)
CREAT SERPL-MCNC: 0.66 MG/DL — SIGNIFICANT CHANGE UP (ref 0.5–1.3)
EGFR: 95 ML/MIN/1.73M2 — SIGNIFICANT CHANGE UP
EGFR: 95 ML/MIN/1.73M2 — SIGNIFICANT CHANGE UP
GLUCOSE SERPL-MCNC: 92 MG/DL — SIGNIFICANT CHANGE UP (ref 70–99)
GLUCOSE SERPL-MCNC: 92 MG/DL — SIGNIFICANT CHANGE UP (ref 70–99)
HCT VFR BLD CALC: 30.2 % — LOW (ref 34.5–45)
HCT VFR BLD CALC: 30.2 % — LOW (ref 34.5–45)
HCT VFR BLD CALC: 30.5 % — LOW (ref 34.5–45)
HCT VFR BLD CALC: 30.5 % — LOW (ref 34.5–45)
HGB BLD-MCNC: 9.1 G/DL — LOW (ref 11.5–15.5)
MAGNESIUM SERPL-MCNC: 1.9 MG/DL — SIGNIFICANT CHANGE UP (ref 1.6–2.6)
MAGNESIUM SERPL-MCNC: 1.9 MG/DL — SIGNIFICANT CHANGE UP (ref 1.6–2.6)
MCHC RBC-ENTMCNC: 23.6 PG — LOW (ref 27–34)
MCHC RBC-ENTMCNC: 23.6 PG — LOW (ref 27–34)
MCHC RBC-ENTMCNC: 23.8 PG — LOW (ref 27–34)
MCHC RBC-ENTMCNC: 23.8 PG — LOW (ref 27–34)
MCHC RBC-ENTMCNC: 29.8 GM/DL — LOW (ref 32–36)
MCHC RBC-ENTMCNC: 29.8 GM/DL — LOW (ref 32–36)
MCHC RBC-ENTMCNC: 30.1 GM/DL — LOW (ref 32–36)
MCHC RBC-ENTMCNC: 30.1 GM/DL — LOW (ref 32–36)
MCV RBC AUTO: 78.9 FL — LOW (ref 80–100)
MCV RBC AUTO: 78.9 FL — LOW (ref 80–100)
MCV RBC AUTO: 79.2 FL — LOW (ref 80–100)
MCV RBC AUTO: 79.2 FL — LOW (ref 80–100)
NRBC # BLD: 0 /100 WBCS — SIGNIFICANT CHANGE UP (ref 0–0)
PHOSPHATE SERPL-MCNC: 2.7 MG/DL — SIGNIFICANT CHANGE UP (ref 2.5–4.5)
PHOSPHATE SERPL-MCNC: 2.7 MG/DL — SIGNIFICANT CHANGE UP (ref 2.5–4.5)
PLATELET # BLD AUTO: 218 K/UL — SIGNIFICANT CHANGE UP (ref 150–400)
PLATELET # BLD AUTO: 218 K/UL — SIGNIFICANT CHANGE UP (ref 150–400)
PLATELET # BLD AUTO: 227 K/UL — SIGNIFICANT CHANGE UP (ref 150–400)
PLATELET # BLD AUTO: 227 K/UL — SIGNIFICANT CHANGE UP (ref 150–400)
POTASSIUM SERPL-MCNC: 4.1 MMOL/L — SIGNIFICANT CHANGE UP (ref 3.5–5.3)
POTASSIUM SERPL-MCNC: 4.1 MMOL/L — SIGNIFICANT CHANGE UP (ref 3.5–5.3)
POTASSIUM SERPL-SCNC: 4.1 MMOL/L — SIGNIFICANT CHANGE UP (ref 3.5–5.3)
POTASSIUM SERPL-SCNC: 4.1 MMOL/L — SIGNIFICANT CHANGE UP (ref 3.5–5.3)
RBC # BLD: 3.83 M/UL — SIGNIFICANT CHANGE UP (ref 3.8–5.2)
RBC # BLD: 3.83 M/UL — SIGNIFICANT CHANGE UP (ref 3.8–5.2)
RBC # BLD: 3.85 M/UL — SIGNIFICANT CHANGE UP (ref 3.8–5.2)
RBC # BLD: 3.85 M/UL — SIGNIFICANT CHANGE UP (ref 3.8–5.2)
RBC # FLD: 19.4 % — HIGH (ref 10.3–14.5)
RBC # FLD: 19.4 % — HIGH (ref 10.3–14.5)
RBC # FLD: 19.5 % — HIGH (ref 10.3–14.5)
RBC # FLD: 19.5 % — HIGH (ref 10.3–14.5)
SODIUM SERPL-SCNC: 137 MMOL/L — SIGNIFICANT CHANGE UP (ref 135–145)
SODIUM SERPL-SCNC: 137 MMOL/L — SIGNIFICANT CHANGE UP (ref 135–145)
SURGICAL PATHOLOGY STUDY: SIGNIFICANT CHANGE UP
SURGICAL PATHOLOGY STUDY: SIGNIFICANT CHANGE UP
WBC # BLD: 7.1 K/UL — SIGNIFICANT CHANGE UP (ref 3.8–10.5)
WBC # BLD: 7.1 K/UL — SIGNIFICANT CHANGE UP (ref 3.8–10.5)
WBC # BLD: 8.95 K/UL — SIGNIFICANT CHANGE UP (ref 3.8–10.5)
WBC # BLD: 8.95 K/UL — SIGNIFICANT CHANGE UP (ref 3.8–10.5)
WBC # FLD AUTO: 7.1 K/UL — SIGNIFICANT CHANGE UP (ref 3.8–10.5)
WBC # FLD AUTO: 7.1 K/UL — SIGNIFICANT CHANGE UP (ref 3.8–10.5)
WBC # FLD AUTO: 8.95 K/UL — SIGNIFICANT CHANGE UP (ref 3.8–10.5)
WBC # FLD AUTO: 8.95 K/UL — SIGNIFICANT CHANGE UP (ref 3.8–10.5)

## 2023-10-27 PROCEDURE — 71045 X-RAY EXAM CHEST 1 VIEW: CPT | Mod: 26

## 2023-10-27 PROCEDURE — 99223 1ST HOSP IP/OBS HIGH 75: CPT | Mod: GC

## 2023-10-27 PROCEDURE — 99232 SBSQ HOSP IP/OBS MODERATE 35: CPT

## 2023-10-27 RX ORDER — POLYETHYLENE GLYCOL 3350 17 G/17G
17 POWDER, FOR SOLUTION ORAL
Refills: 0 | Status: DISCONTINUED | OUTPATIENT
Start: 2023-10-27 | End: 2023-10-27

## 2023-10-27 RX ORDER — ENOXAPARIN SODIUM 100 MG/ML
40 INJECTION SUBCUTANEOUS EVERY 24 HOURS
Refills: 0 | Status: DISCONTINUED | OUTPATIENT
Start: 2023-10-27 | End: 2023-10-30

## 2023-10-27 RX ORDER — POLYETHYLENE GLYCOL 3350 17 G/17G
17 POWDER, FOR SOLUTION ORAL
Refills: 0 | Status: DISCONTINUED | OUTPATIENT
Start: 2023-10-27 | End: 2023-10-31

## 2023-10-27 RX ADMIN — ENOXAPARIN SODIUM 40 MILLIGRAM(S): 100 INJECTION SUBCUTANEOUS at 13:35

## 2023-10-27 RX ADMIN — Medication 650 MILLIGRAM(S): at 18:18

## 2023-10-27 RX ADMIN — POLYETHYLENE GLYCOL 3350 17 GRAM(S): 17 POWDER, FOR SOLUTION ORAL at 17:31

## 2023-10-27 RX ADMIN — LOSARTAN POTASSIUM 25 MILLIGRAM(S): 100 TABLET, FILM COATED ORAL at 06:08

## 2023-10-27 NOTE — PROGRESS NOTE ADULT - SUBJECTIVE AND OBJECTIVE BOX
DATE OF SERVICE: 10-27-23 @ 14:46    Patient is a 68y old  Female who presents with a chief complaint of Dislodged percutaneous lj tube (27 Oct 2023 12:55)      INTERVAL HISTORY: Feels ok. [place]  ID#[id number] [place]    REVIEW OF SYSTEMS:  CONSTITUTIONAL: No weakness  EYES/ENT: No visual changes;  No throat pain   NECK: No pain or stiffness  RESPIRATORY: No cough, wheezing; No shortness of breath  CARDIOVASCULAR: No chest pain or palpitations  GASTROINTESTINAL: No abdominal  pain. No nausea, vomiting, or hematemesis  GENITOURINARY: No dysuria, frequency or hematuria  NEUROLOGICAL: No stroke like symptoms  SKIN: No rashes    	  MEDICATIONS:  losartan 25 milliGRAM(s) Oral daily        PHYSICAL EXAM:  T(C): 36.4 (10-27-23 @ 12:40), Max: 37.6 (10-26-23 @ 19:33)  HR: 101 (10-27-23 @ 14:24) (72 - 101)  BP: 115/74 (10-27-23 @ 14:24) (115/74 - 127/74)  RR: 18 (10-27-23 @ 12:40) (18 - 18)  SpO2: 98% (10-27-23 @ 14:24) (93% - 99%)  Wt(kg): --  I&O's Summary    26 Oct 2023 07:01  -  27 Oct 2023 07:00  --------------------------------------------------------  IN: 720 mL / OUT: 150 mL / NET: 570 mL    27 Oct 2023 07:01  -  27 Oct 2023 14:46  --------------------------------------------------------  IN: 240 mL / OUT: 40 mL / NET: 200 mL      Height (cm): 144.8 (10-27 @ 12:55)  Weight (kg): 38.6 (10-27 @ 12:55)  BMI (kg/m2): 18.4 (10-27 @ 12:55)  BSA (m2): 1.25 (10-27 @ 12:55)    Appearance: In no distress	  HEENT:    PERRL, EOMI	  Cardiovascular:  S1 S2, No JVD  Respiratory: Lungs clear to auscultation	  Gastrointestinal:  Soft, Non-tender, + BS	  Vascularature:  No edema of LE  Psychiatric: Appropriate affect   Neuro: no acute focal deficits                               9.1    7.10  )-----------( 218      ( 27 Oct 2023 07:06 )             30.5     10-27    137  |  104  |  18  ----------------------------<  92  4.1   |  22  |  0.66    Ca    8.6      27 Oct 2023 07:06  Phos  2.7     10-27  Mg     1.9     10-27    TPro  x   /  Alb  x   /  TBili  0.6  /  DBili  x   /  AST  x   /  ALT  x   /  AlkPhos  x   10-26        Labs personally reviewed      ASSESSMENT/PLAN: 	      68F PMH scleroderma, pHTN, HTN with recent emphysematous cholecystitis s/p percutaneous cholecystectomy with IR on 9/11 presents following dislodged tube. Patient was discharged on 9/14 after procedure and course of zosyn, and has had unremarkable outpatient course. CT A/P revealed thickening of the ascending colon, with concern for underlying mass or malignancy. s/p colonoscopy also suspicious for malignancy. Patient denies CP, SOB or palpitations.    1. Cardiac Risk Stratification  - Patient with hx of scleroderma and pulm HTN but reports average to excellent functional capacity.   - Does not utilize home oxygen. Denies CP or SOB.  - Not in decompensated HF  - No hx of tachy ang arrhythmia. ECG SB with no ischemia noted.  - No hx of severe AS/MS. TTE with no valvular dysfunction.  - TTE with preserved EF, no WMA, normal LV and RV function and size  - Patient is mod risk for mod risk colorectal surgery if needed. No contraindication to proceed pending ischemic eval with NST.    2. Pulmonary Hypertension  - TTE shows preserved EF, no pulm HTN and normal RV size and function  - Does not use supplemental oxygen    3. Scleroderma  - Not currently on therapy    4. Hypertension  - c/w losartan 25mg PO daily (therapeutic interchange for irbesartan 75mg PO daily)        NATA José,  [facility]  Cardiovascular Medicine  [address] [place] [address], [address]  Available through call or text on [employer] TEAM[employer]  Office: [phone]

## 2023-10-27 NOTE — PHYSICAL THERAPY INITIAL EVALUATION ADULT - PERTINENT HX OF CURRENT PROBLEM, REHAB EVAL
67 yo F with recent emphysematous cholecystitis s/p percutaneous cholecystectomy with IR on 9/11 presents following dislodged tube. Patient was discharged on 9/14 after procedure and course of zosyn, and has had unremarkable outpatient course. CTAP performed in ED revealed cholecystostomy tube with tip in region of contracted gallbladder Incidentally CT A/P revealed thickening of the ascending colon, with concern for underlying mass or malignancy. Surgery consult requested in ED, and recommending medicine admission. CT Abd 10/22: Cholecystostomy tube with tip in the region of the contracted gallbladder fundus. If clinical suspicion for dislodgment is present follow-up tube check should be obtained. Short segment marked colonic bowel wall thickening involving the ascending colon concerning for underlying mass/malignancy. Follow-up colonoscopy is recommended for definitive diagnosis.1.8 cm hepatic lesion, indeterminant. Additional low-attenuation lesions too small to clearly characterize. Dr. Box discussed the above findings with Dr. Charles at 3:56 AM on 10/22/2023 with read back. 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 Ischemic white matter disease and atrophy not atypical for age. Intracranial atherosclerosis. No lesion is identified strongly suspicious for metastasis. Note that contrast-enhanced imaging CT or MR technique would be required to evaluate for metastatic disease at sufficient sensitivity. CT Chest 10/22: Nonspecific 0.9 cm short axis high right paratracheal node; attention on follow-up imaging. No suspicious pulmonary nodules. Indeterminate ill-defined hepatic lesions, as better assessed on earlier same day abdominal CT. MRI Abd 10/25: A few liver lesions are highly suspicious for mucinous hepatic metastases, largest measuring 2.5 cm within the right hepatic lobe.

## 2023-10-27 NOTE — CONSULT NOTE ADULT - ASSESSMENT
This patient is a 67yo lady with PHM of scleroderma, pulmonary htn, htn, hld, recent hospitalization for cholecystitis s/p percutaneous cholecystostomy by IR, who presented with dislodged biliary tube, found to have imaging findings concerning for ascending colon mass with hepatic metastases, s/p perc lj tube check on 10/23, and s/p colonoscopy and biopsy on 10/24/23, pending OR for resection of colonic mass.     #Colon mass with liver metastases:  - We discussed with the patient and her daughter at bedside that presence of colonic mass and liver lesions are concerning for metastatic colon cancer. We are awaiting her pathology results for confirmation.   - Patient is planned for OR for surgical resection of colonic mass on 10/24/23.  - Patient will require close follow up upon discharge from the hospital with [facility]. Patient will not be initiated on systemic therapy during this hospitalization.    Note not finalized until signed by attending.   Please do not hesitate to page with questions.     Avril Crespo MD PGY5   [phone]  Hematology-Oncology Fellow  WEEKENDS- Please call  to page on-call fellow The patient is a 69 yo F with PMH of scleroderma, pulmonary htn, htn, hld, recent hospitalization for cholecystitis s/p percutaneous cholecystostomy by IR, who presented with dislodged biliary tube, found to have imaging findings concerning for ascending colon mass with hepatic metastases, s/p perc lj tube check on 10/23, and s/p colonoscopy and biopsy on 10/24/23, pending OR for resection of colonic mass.     #Colon mass with liver metastases:  - We discussed with the patient and her daughter at bedside that presence of colonic mass and liver lesions are concerning for metastatic colon cancer. We are awaiting her pathology results for confirmation.   - Patient is planned for OR for surgical resection of colonic mass on 10/31/23.  - Patient will require close follow up upon discharge from the hospital with [facility]. Patient will not be initiated on systemic therapy during this hospitalization.    Please do not hesitate to page with questions.     Avril Crespo MD PGY5   [phone]  Hematology-Oncology Fellow  WEEKENDS- Please call  to page on-call fellow

## 2023-10-27 NOTE — DIETITIAN INITIAL EVALUATION ADULT - HEIGHT FOR BMI (INCHES)
Pts sat on 2LPM was 94% at rest. Pt stood sat dropped to 92%  on 2lpm . Pt then placed in chair sat dropped to 90% . 11

## 2023-10-27 NOTE — CONSULT NOTE ADULT - SUBJECTIVE AND OBJECTIVE BOX
HPI:  PAST MEDICAL & SURGICAL HISTORY:  H/O pulmonary hypertension      H/O scleroderma      Migration of percutaneous cholecystostomy tube        FAMILY HISTORY:    Social History:  Denies smoking or drinking.   Has daughter. (22 Oct 2023 14:09)        REVIEW OF SYSTEMS  CONSTITUTIONAL: No fever, no chills, no fatigue  EYES: No eye pain, no vision changes  ENMT:  No difficulty hearing, no throat pain  RESPIRATORY: No cough,  No shortness of breath  CARDIOVASCULAR: No chest pain, no palpitations  GASTROINTESTINAL: No abdominal pain, no nausea, no vomiting, no diarrhea, no constipation,  GENITOURINARY: No dysuria, no hematuria  NEUROLOGICAL: No numbness , no loss of strength  SKIN: No itching, no rashes,  HEME/LYMPH: No easy bruising, bleeding      >>> <<<>>> <<<  >>> <<<  Allergies  Allergies    No Known Allergies    Intolerances        Medications  MEDICATIONS  (STANDING):  enoxaparin Injectable 40 milliGRAM(s) SubCutaneous every 24 hours  influenza  Vaccine (HIGH DOSE) 0.7 milliLiter(s) IntraMuscular once  losartan 25 milliGRAM(s) Oral daily  polyethylene glycol 3350 17 Gram(s) Oral two times a day    MEDICATIONS  (PRN):  acetaminophen     Tablet .. 650 milliGRAM(s) Oral every 6 hours PRN Temp greater or equal to 38C (100.4F), Mild Pain (1 - 3)  aluminum hydroxide/magnesium hydroxide/simethicone Suspension 30 milliLiter(s) Oral every 4 hours PRN Dyspepsia  melatonin 3 milliGRAM(s) Oral at bedtime PRN Insomnia  ondansetron Injectable 4 milliGRAM(s) IV Push every 8 hours PRN Nausea and/or Vomiting      PHYSICAL EXAM:  GENERAL: NAD, well-groomed  HEAD:  Atraumatic, Normocephalic  EYES: EOMI, PERRLA, conjunctiva and sclera clear  ENMT: No oropharyngeal exudates, Moist mucous membranes  NECK: Supple, no cervical lymphadenopathy  NERVOUS SYSTEM:  alert and conversant, moving all extremities spontaneously   CHEST/LUNG: Clear to auscultation bilaterally; no rhonchi  HEART: Regular rate and rhythm; No murmurs  ABDOMEN: Soft, Nontender, Nondistended  EXTREMITIES:  2+ radial Pulses, No cyanosis or edema  SKIN: warm, dry    LABS:                        9.1    7.10  )-----------( 218      ( 27 Oct 2023 07:06 )             30.5     10-27    137  |  104  |  18  ----------------------------<  92  4.1   |  22  |  0.66    Ca    8.6      27 Oct 2023 07:06  Phos  2.7     10-27  Mg     1.9     10-27    TPro  x   /  Alb  x   /  TBili  0.6  /  DBili  x   /  AST  x   /  ALT  x   /  AlkPhos  x   10-26    PT/INR - ( 26 Oct 2023 06:57 )   PT: 9.8 sec;   INR: 0.93 ratio           Urinalysis Basic - ( 27 Oct 2023 07:06 )    Color: x / Appearance: x / SG: x / pH: x  Gluc: 92 mg/dL / Ketone: x  / Bili: x / Urobili: x   Blood: x / Protein: x / Nitrite: x   Leuk Esterase: x / RBC: x / WBC x   Sq Epi: x / Non Sq Epi: x / Bacteria: x        RADIOLOGY & ADDITIONAL STUDIES:  PATHOLOGY:

## 2023-10-27 NOTE — DIETITIAN INITIAL EVALUATION ADULT - REASON
Patient reports eating well PTA w/ stable BW for several years PTA w/ no significant changes reported, currently w/ fair-good PO intake/appetite during admission, will monitor parameters

## 2023-10-27 NOTE — DIETITIAN INITIAL EVALUATION ADULT - ENERGY INTAKE
Patient endorses fair-good PO intake/appetite currently, reports being hungry this AM after needing to undergo colonoscopy and need for stress test. Patient reports did not try Ensure Plus HP yet (was added in afternoon yesterday, currently on her tray this AM) but wants to try and consume Ensure Plus HP. Fair (50-75%)

## 2023-10-27 NOTE — DIETITIAN NUTRITION RISK NOTIFICATION - TREATMENT: THE FOLLOWING DIET HAS BEEN RECOMMENDED
Diet, DASH/TLC:   Sodium & Cholesterol Restricted  Caffeine Free (NOCAFF)  Supplement Feeding Modality:  Oral  Ensure Enlive Cans or Servings Per Day:  1       Frequency:  Three Times a day (10-26-23 @ 15:01) [Active]

## 2023-10-27 NOTE — DIETITIAN INITIAL EVALUATION ADULT - NS FNS DIET ORDER
Diet, DASH/TLC:   Sodium & Cholesterol Restricted  Caffeine Free (NOCAFF)  Supplement Feeding Modality:  Oral  Ensure Enlive Cans or Servings Per Day:  1       Frequency:  Three Times a day (10-26-23 @ 15:01)

## 2023-10-27 NOTE — PROGRESS NOTE ADULT - PROBLEM SELECTOR PLAN 4
NTD at this time. Not on any therapy at home for scleroderma.   Given comorbid pHTN, preoperative risk assessment prior to surgery.  Cardiology consulted for preoperative assessment, appreciate recommendations.   TTE - Normal LVSF, EF 66%    Patient denies chest pain, dyspnea, palpitations. Able to tolerate brisk walking and activities of daily living.   Based on RCRI, class II risk, 6.0 % 30-day risk of death, MI, or cardiac arrest  No contraindications to proceed with surgery pending nuclear stress test.

## 2023-10-27 NOTE — DIETITIAN INITIAL EVALUATION ADULT - REASON FOR ADMISSION
Displacement of other gastrointestinal prosthetic devices, implants and grafts, initial encounter    Per chart, patient is a 69 y/o female with PMH including scleroderma, pHTN, recent emphysematous cholecystitis (s/p percutaneous cholecystectomy w/ IR 9/11/2023). Patient presents to [facility] w/ dislodged perc lj tube. Upon CT A/P during initila presentation, revealed thickening of ascending colon, c/f underlying mass or malignancy, surgery consulted and recommending medicine admission per MD.

## 2023-10-27 NOTE — DIETITIAN INITIAL EVALUATION ADULT - ADD RECOMMEND
1. continue current diet as tolerated of: DASH/TLC, no caffeine diet  2. encourage PO intake, protein source with each meal as tolerated  3. continue Ensure Plus HP TID as ordered to help provide extra calories and protein  4. monitor PO intake, weight trend, electrolytes, blood glucose levels, labs, BMs

## 2023-10-27 NOTE — CONSULT NOTE ADULT - SUBJECTIVE AND OBJECTIVE BOX
HPI:  Mandarin  used.    This patient is a 67yo lady with PHM of scleroderma, pulmonary htn, htn, hld, recent hospitalization for cholecystitis s/p percutaneous cholecystostomy by IR, who presented with dislodged biliary tube.   From 9/11-9/14/23, patient was hospitalized for emphysematous cholecystitis. She was treated with zosyn and percutaneous cholecystostomy tube drainage. She returned because the tube was dislodged. While in the ED, CT CAP with IVC was performed which found a short segment of marked colonic bowel wall thickening involving the ascending colon concerning for underlying mass/ malignancy, and 1.8cm hepatic lesion which was indeterminate, and 0.9 cm nonspecific R paratracheal node, and ill defined hepatic lesions.  On 10/24/23, patient had tube check by IR and itw as flushing appropriately.   The patient underwent colonoscopy on 10/24/23 which found large malignant appearing mass in ascending colon 5cm distal to cecum. Mass is circumferential- 4cm. Biopsy result is pending.   MRI w/wo contrast found a few liver lesions are highly suspicious for mucinous hepatic metastases, largest measuring 2.5 cm within the right hepatic lobe.  Surgical oncology team was consulted and patient is planned for OR Monday.    Patient is accompanied today by  her daughter. Patient denies any complaints at this time.   CEA was 57 high!!     CT AP w/ IVC:   IMPRESSION:  Cholecystostomy tube with tip in the region of the contracted gallbladder   fundus. If clinical suspicion for dislodgment is present follow-up tube   check should be obtained.    Short segment marked colonic bowel wall thickening involving the   ascending colon concerning for underlying mass/malignancy. Follow-up   colonoscopy is recommended for definitive diagnosis.    1.8 cm hepatic lesion, indeterminant. Additionallow-attenuation lesions   too small to clearly characterize.    CT chest:   Nonspecific 0.9 cm short axis high right paratracheal node; attention on   follow-up imaging.    No suspicious pulmonary nodules.    Indeterminate ill-defined hepatic lesions, as better assessed on earlier   same day abdominal CT.    CTH: 1. 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 Ischemic white matter disease and atrophy not atypical for age.    3. Intracranial atherosclerosis    4. Nolesion is identified strongly suspicious for metastasis. Note that   contrast-enhanced imaging CT or MR technique would be required to   evaluate for metastatic disease at sufficient sensitivity    MRI Abd/pelvis: 10/25/23  IMPRESSION:  A few liver lesions are highly suspicious for mucinous hepatic   metastases, largest measuring 2.5 cm within the right hepatic lobe.      PAST MEDICAL & SURGICAL HISTORY:  H/O pulmonary hypertension  H/O scleroderma  Migration of percutaneous cholecystostomy tube    FAMILY HISTORY:    Social History:  Denies smoking or drinking.   Has daughter. (22 Oct 2023 14:09)    REVIEW OF SYSTEMS  CONSTITUTIONAL: No fever, no chills, no fatigue  EYES: No eye pain, no vision changes  ENMT:  No difficulty hearing, no throat pain  RESPIRATORY: No cough,  No shortness of breath  CARDIOVASCULAR: No chest pain, no palpitations  GASTROINTESTINAL: No abdominal pain now, no nausea, no vomiting, no diarrhea, no constipation,  GENITOURINARY: No dysuria, no hematuria  NEUROLOGICAL: No numbness , no loss of strength  SKIN: No itching, no rashes,  HEME/LYMPH: No easy bruising, bleeding    >>> <<<>>> <<<  Allergies    No Known Allergies    Intolerances    Medications  MEDICATIONS  (STANDING):  enoxaparin Injectable 40 milliGRAM(s) SubCutaneous every 24 hours  influenza  Vaccine (HIGH DOSE) 0.7 milliLiter(s) IntraMuscular once  losartan 25 milliGRAM(s) Oral daily  polyethylene glycol 3350 17 Gram(s) Oral two times a day    MEDICATIONS  (PRN):  acetaminophen     Tablet .. 650 milliGRAM(s) Oral every 6 hours PRN Temp greater or equal to 38C (100.4F), Mild Pain (1 - 3)  aluminum hydroxide/magnesium hydroxide/simethicone Suspension 30 milliLiter(s) Oral every 4 hours PRN Dyspepsia  melatonin 3 milliGRAM(s) Oral at bedtime PRN Insomnia  ondansetron Injectable 4 milliGRAM(s) IV Push every 8 hours PRN Nausea and/or Vomiting    PHYSICAL EXAM:  GENERAL: NAD, well-groomed  HEAD:  Atraumatic, Normocephalic  EYES: EOMI, PERRLA, conjunctiva and sclera clear  ENMT: No oropharyngeal exudates, Moist mucous membranes  NECK: Supple, no cervical lymphadenopathy  NERVOUS SYSTEM:  alert and conversant, moving all extremities spontaneously   CHEST/LUNG: Clear to auscultation bilaterally; no rhonchi  HEART: Regular rate and rhythm; No murmurs  ABDOMEN: Soft, Nontender, + perc biliary drain in place collecting dark greenish bile  EXTREMITIES:  2+ radial Pulses, No cyanosis or edema  SKIN: warm, dry    LABS:                        9.1    8.95  )-----------( 227      ( 27 Oct 2023 18:50 )             30.2     10-27    137  |  104  |  18  ----------------------------<  92  4.1   |  22  |  0.66    Ca    8.6      27 Oct 2023 07:06  Phos  2.7     10-27  Mg     1.9     10-27    TPro  x   /  Alb  x   /  TBili  0.6  /  DBili  x   /  AST  x   /  ALT  x   /  AlkPhos  x   10-26    PT/INR - ( 26 Oct 2023 06:57 )   PT: 9.8 sec;   INR: 0.93 ratio           Urinalysis Basic - ( 27 Oct 2023 07:06 )    Color: x / Appearance: x / SG: x / pH: x  Gluc: 92 mg/dL / Ketone: x  / Bili: x / Urobili: x   Blood: x / Protein: x / Nitrite: x   Leuk Esterase: x / RBC: x / WBC x   Sq Epi: x / Non Sq Epi: x / Bacteria: x        RADIOLOGY & ADDITIONAL STUDIES:  PATHOLOGY:     HPI:  Mandarin  used.    This patient is a 69 yo F with PMH of scleroderma, pulmonary htn, htn, hld, recent hospitalization for cholecystitis s/p percutaneous cholecystostomy by IR, who presented with dislodged biliary tube.   From 9/11-9/14/23, patient was hospitalized for emphysematous cholecystitis. She was treated with zosyn and percutaneous cholecystostomy tube drainage. She returned because the tube was dislodged. While in the ED, CT CAP with IVC was performed which found a short segment of marked colonic bowel wall thickening involving the ascending colon concerning for underlying mass/ malignancy, and 1.8cm hepatic lesion which was indeterminate, and 0.9 cm nonspecific R paratracheal node, and ill defined hepatic lesions.  On 10/24/23, patient had tube check by IR and itw as flushing appropriately.   The patient underwent colonoscopy on 10/24/23 which found large malignant appearing mass in ascending colon 5cm distal to cecum. Mass is circumferential- 4cm. Biopsy result is pending.   MRI w/wo contrast found a few liver lesions are highly suspicious for mucinous hepatic metastases, largest measuring 2.5 cm within the right hepatic lobe.  Surgical oncology team was consulted and patient is planned for OR Monday.    Patient is accompanied today by  her daughter. Patient denies any complaints at this time.   CEA was 57 high!!     CT AP w/ IVC:   IMPRESSION:  Cholecystostomy tube with tip in the region of the contracted gallbladder   fundus. If clinical suspicion for dislodgment is present follow-up tube   check should be obtained.    Short segment marked colonic bowel wall thickening involving the   ascending colon concerning for underlying mass/malignancy. Follow-up   colonoscopy is recommended for definitive diagnosis.    1.8 cm hepatic lesion, indeterminant. Additionallow-attenuation lesions   too small to clearly characterize.    CT chest:   Nonspecific 0.9 cm short axis high right paratracheal node; attention on   follow-up imaging.    No suspicious pulmonary nodules.    Indeterminate ill-defined hepatic lesions, as better assessed on earlier   same day abdominal CT.    CTH: 1. 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 Ischemic white matter disease and atrophy not atypical for age.    3. Intracranial atherosclerosis    4. Nolesion is identified strongly suspicious for metastasis. Note that   contrast-enhanced imaging CT or MR technique would be required to   evaluate for metastatic disease at sufficient sensitivity    MRI Abd/pelvis: 10/25/23  IMPRESSION:  A few liver lesions are highly suspicious for mucinous hepatic   metastases, largest measuring 2.5 cm within the right hepatic lobe.      PAST MEDICAL & SURGICAL HISTORY:  H/O pulmonary hypertension  H/O scleroderma  Migration of percutaneous cholecystostomy tube    FAMILY HISTORY:    Social History:  Denies smoking or drinking.   Has daughter. (22 Oct 2023 14:09)    REVIEW OF SYSTEMS  CONSTITUTIONAL: No fever, no chills, no fatigue  EYES: No eye pain, no vision changes  ENMT:  No difficulty hearing, no throat pain  RESPIRATORY: No cough,  No shortness of breath  CARDIOVASCULAR: No chest pain, no palpitations  GASTROINTESTINAL: No abdominal pain now, no nausea, no vomiting, no diarrhea, no constipation,  GENITOURINARY: No dysuria, no hematuria  NEUROLOGICAL: No numbness , no loss of strength  SKIN: No itching, no rashes,  HEME/LYMPH: No easy bruising, bleeding    >>> <<<>>> <<<  Allergies    No Known Allergies    Intolerances    Medications  MEDICATIONS  (STANDING):  enoxaparin Injectable 40 milliGRAM(s) SubCutaneous every 24 hours  influenza  Vaccine (HIGH DOSE) 0.7 milliLiter(s) IntraMuscular once  losartan 25 milliGRAM(s) Oral daily  polyethylene glycol 3350 17 Gram(s) Oral two times a day    MEDICATIONS  (PRN):  acetaminophen     Tablet .. 650 milliGRAM(s) Oral every 6 hours PRN Temp greater or equal to 38C (100.4F), Mild Pain (1 - 3)  aluminum hydroxide/magnesium hydroxide/simethicone Suspension 30 milliLiter(s) Oral every 4 hours PRN Dyspepsia  melatonin 3 milliGRAM(s) Oral at bedtime PRN Insomnia  ondansetron Injectable 4 milliGRAM(s) IV Push every 8 hours PRN Nausea and/or Vomiting    PHYSICAL EXAM:  GENERAL: NAD, well-groomed  HEAD:  Atraumatic, Normocephalic  EYES: EOMI, PERRLA, conjunctiva and sclera clear  ENMT: No oropharyngeal exudates, Moist mucous membranes  NECK: Supple, no cervical lymphadenopathy  NERVOUS SYSTEM:  alert and conversant, moving all extremities spontaneously   CHEST/LUNG: Clear to auscultation bilaterally; no rhonchi  HEART: Regular rate and rhythm; No murmurs  ABDOMEN: Soft, Nontender, + perc biliary drain in place collecting dark greenish bile  EXTREMITIES:  2+ radial Pulses, No cyanosis or edema  SKIN: warm, dry    LABS:                        9.1    8.95  )-----------( 227      ( 27 Oct 2023 18:50 )             30.2     10-27    137  |  104  |  18  ----------------------------<  92  4.1   |  22  |  0.66    Ca    8.6      27 Oct 2023 07:06  Phos  2.7     10-27  Mg     1.9     10-27    TPro  x   /  Alb  x   /  TBili  0.6  /  DBili  x   /  AST  x   /  ALT  x   /  AlkPhos  x   10-26    PT/INR - ( 26 Oct 2023 06:57 )   PT: 9.8 sec;   INR: 0.93 ratio           Urinalysis Basic - ( 27 Oct 2023 07:06 )    Color: x / Appearance: x / SG: x / pH: x  Gluc: 92 mg/dL / Ketone: x  / Bili: x / Urobili: x   Blood: x / Protein: x / Nitrite: x   Leuk Esterase: x / RBC: x / WBC x   Sq Epi: x / Non Sq Epi: x / Bacteria: x        RADIOLOGY & ADDITIONAL STUDIES:  PATHOLOGY:

## 2023-10-27 NOTE — DIETITIAN INITIAL EVALUATION ADULT - PERTINENT MEDS FT
MEDICATIONS  (STANDING):  enoxaparin Injectable 40 milliGRAM(s) SubCutaneous every 24 hours  influenza  Vaccine (HIGH DOSE) 0.7 milliLiter(s) IntraMuscular once  losartan 25 milliGRAM(s) Oral daily  polyethylene glycol 3350 17 Gram(s) Oral two times a day    MEDICATIONS  (PRN):  acetaminophen     Tablet .. 650 milliGRAM(s) Oral every 6 hours PRN Temp greater or equal to 38C (100.4F), Mild Pain (1 - 3)  aluminum hydroxide/magnesium hydroxide/simethicone Suspension 30 milliLiter(s) Oral every 4 hours PRN Dyspepsia  melatonin 3 milliGRAM(s) Oral at bedtime PRN Insomnia  ondansetron Injectable 4 milliGRAM(s) IV Push every 8 hours PRN Nausea and/or Vomiting

## 2023-10-27 NOTE — PROGRESS NOTE ADULT - ASSESSMENT
68F hx scleroderma and pulmonary hypertension, s/p percutaneous cholecystostomy with IR on 9/11 for emphysematous cholecystitis, now presenting with partially dislodged per lj tube. CTAP with cholecystostomy tube with tip in the region of the contracted gallbladder, no acute intraabdominal pathology. Of note, also noted was a short segment of marked colonic bowel wall thickening involving the ascending colon concerning for underlying mass/malignancy.     Recommendations:  - Appreciate cardiology recs: patient is moderate risk for moderate risk surgery, no contraindication to surgery  - Await nuclear Stress Test Results   - Surgical planning for possible operation Monday  - F/u liver MRI final read  - Perc lj tube placement confirmed by IR tube study on 10/25  - Colonoscopy shows likely malignancy in ascending colon  - CEA level elevated: 57  - GI recommendations appreciated  - Appreciate care per primary team (Medicine)      [facility] Surgery  [id number]   68F hx scleroderma and pulmonary hypertension, s/p percutaneous cholecystostomy with IR on 9/11 for emphysematous cholecystitis, now presenting with partially dislodged per lj tube. CTAP with cholecystostomy tube with tip in the region of the contracted gallbladder, no acute intraabdominal pathology. Of note, also noted was a short segment of marked colonic bowel wall thickening involving the ascending colon concerning for underlying mass/malignancy.     Recommendations:  - Appreciate cardiology recs: patient is moderate risk for moderate risk surgery, no contraindication to surgery  - Nuclear Stress Test today, f/u results  - Surgical planning for OR Monday: BID Miralax  - Consult Oncology for liver mets seen on MRI  - Perc lj tube placement confirmed by IR tube study on 10/25  - Colonoscopy shows likely malignancy in ascending colon  - CEA level elevated: 57  - GI recommendations appreciated  - Appreciate care per primary team (Medicine)      [facility] Surgery  [id number]

## 2023-10-27 NOTE — CHART NOTE - NSCHARTNOTEFT_GEN_A_CORE
I discussed the pt with Dr Juares (head of GI Oncology).  Pt with ascending colon Ca causing anemia (Hgb 7 on admission) and liver mets.  Plan is for resection in this admission and then outpt Oncology f/u to address the liver mets.

## 2023-10-27 NOTE — PROGRESS NOTE ADULT - SUBJECTIVE AND OBJECTIVE BOX
[facility] TE[facility] Surgery Daily Progress Note  =====================================================    INTERVAL EVENTS: NAEO    SUBJECTIVE: Patient seen and examined at bedside on AM rounds. Patient reports that they're feeling well. Denies fever, chills, N/V, chest pain, SOB    ALLERGIES:  No Known Allergies      --------------------------------------------------------------------------------------    MEDICATIONS:    Neurologic Medications  acetaminophen     Tablet .. 650 milliGRAM(s) Oral every 6 hours PRN Temp greater or equal to 38C (100.4F), Mild Pain (1 - 3)  melatonin 3 milliGRAM(s) Oral at bedtime PRN Insomnia  ondansetron Injectable 4 milliGRAM(s) IV Push every 8 hours PRN Nausea and/or Vomiting    Respiratory Medications    Cardiovascular Medications  losartan 25 milliGRAM(s) Oral daily    Gastrointestinal Medications  aluminum hydroxide/magnesium hydroxide/simethicone Suspension 30 milliLiter(s) Oral every 4 hours PRN Dyspepsia    Genitourinary Medications    Hematologic/Oncologic Medications  influenza  Vaccine (HIGH DOSE) 0.7 milliLiter(s) IntraMuscular once    Antimicrobial/Immunologic Medications    Endocrine/Metabolic Medications    Topical/Other Medications    --------------------------------------------------------------------------------------    VITAL SIGNS:  T(C): 36.8 (10-27-23 @ 05:38), Max: 37.6 (10-26-23 @ 19:33)  HR: 82 (10-27-23 @ 05:38) (72 - 82)  BP: 120/71 (10-27-23 @ 05:38) (111/72 - 127/74)  RR: 18 (10-27-23 @ 05:38) (18 - 18)  SpO2: 98% (10-27-23 @ 05:38) (98% - 99%)  --------------------------------------------------------------------------------------    INS AND OUTS:    10-25-23 @ 07:01  -  10-26-23 @ 07:00  --------------------------------------------------------  IN: 630 mL / OUT: 50 mL / NET: 580 mL    10-26-23 @ 07:01  -  10-27-23 @ 05:53  --------------------------------------------------------  IN: 720 mL / OUT: 150 mL / NET: 570 mL      --------------------------------------------------------------------------------------      EXAM    General: NAD, resting in bed comfortably.  Cardiac: regular rate, warm and well perfused  Respiratory: Nonlabored respirations, normal cw expansion.  Abdomen: soft, nontender, nondistended. ___ incision is c/d/i, ostomy, NGT, arevalo.   Extremities: normal strength, FROM, no deformities    --------------------------------------------------------------------------------------    LABS       [facility] TE[facility] Surgery Daily Progress Note  =====================================================    INTERVAL EVENTS: NAEO    SUBJECTIVE: Patient seen and examined at bedside on AM rounds. Patient reports that they're feeling well. Denies fever, chills, N/V, chest pain, SOB    ALLERGIES:  No Known Allergies      --------------------------------------------------------------------------------------    MEDICATIONS:    Neurologic Medications  acetaminophen     Tablet .. 650 milliGRAM(s) Oral every 6 hours PRN Temp greater or equal to 38C (100.4F), Mild Pain (1 - 3)  melatonin 3 milliGRAM(s) Oral at bedtime PRN Insomnia  ondansetron Injectable 4 milliGRAM(s) IV Push every 8 hours PRN Nausea and/or Vomiting    Respiratory Medications    Cardiovascular Medications  losartan 25 milliGRAM(s) Oral daily    Gastrointestinal Medications  aluminum hydroxide/magnesium hydroxide/simethicone Suspension 30 milliLiter(s) Oral every 4 hours PRN Dyspepsia    Genitourinary Medications    Hematologic/Oncologic Medications  influenza  Vaccine (HIGH DOSE) 0.7 milliLiter(s) IntraMuscular once    Antimicrobial/Immunologic Medications    Endocrine/Metabolic Medications    Topical/Other Medications    --------------------------------------------------------------------------------------    VITAL SIGNS:  T(C): 36.8 (10-27-23 @ 05:38), Max: 37.6 (10-26-23 @ 19:33)  HR: 82 (10-27-23 @ 05:38) (72 - 82)  BP: 120/71 (10-27-23 @ 05:38) (111/72 - 127/74)  RR: 18 (10-27-23 @ 05:38) (18 - 18)  SpO2: 98% (10-27-23 @ 05:38) (98% - 99%)  --------------------------------------------------------------------------------------    INS AND OUTS:    10-25-23 @ 07:01  -  10-26-23 @ 07:00  --------------------------------------------------------  IN: 630 mL / OUT: 50 mL / NET: 580 mL    10-26-23 @ 07:01  -  10-27-23 @ 05:53  --------------------------------------------------------  IN: 720 mL / OUT: 150 mL / NET: 570 mL      --------------------------------------------------------------------------------------      EXAM    General: NAD, resting in bed comfortably.  Cardiac: regular rate, warm and well perfused  Respiratory: Nonlabored respirations, normal cw expansion.  Abdomen: soft, nontender, nondistended. Perc lj tube continues to drain fluid  Extremities: normal strength, FROM, no deformities    --------------------------------------------------------------------------------------    LABS

## 2023-10-27 NOTE — PROGRESS NOTE ADULT - SUBJECTIVE AND OBJECTIVE BOX
[facility] Division of Hospital Medicine  Jennifer Nicolas DO  Pager (M-F, 8A-5P): MS Teams PREFERRED      SUBJECTIVE / OVERNIGHT EVENTS:  ADDITIONAL REVIEW OF SYSTEMS:    MEDICATIONS  (STANDING):  enoxaparin Injectable 40 milliGRAM(s) SubCutaneous every 24 hours  influenza  Vaccine (HIGH DOSE) 0.7 milliLiter(s) IntraMuscular once  losartan 25 milliGRAM(s) Oral daily  polyethylene glycol 3350 17 Gram(s) Oral two times a day    MEDICATIONS  (PRN):  acetaminophen     Tablet .. 650 milliGRAM(s) Oral every 6 hours PRN Temp greater or equal to 38C (100.4F), Mild Pain (1 - 3)  aluminum hydroxide/magnesium hydroxide/simethicone Suspension 30 milliLiter(s) Oral every 4 hours PRN Dyspepsia  melatonin 3 milliGRAM(s) Oral at bedtime PRN Insomnia  ondansetron Injectable 4 milliGRAM(s) IV Push every 8 hours PRN Nausea and/or Vomiting      I&O's Summary    26 Oct 2023 07:01  -  27 Oct 2023 07:00  --------------------------------------------------------  IN: 720 mL / OUT: 150 mL / NET: 570 mL    27 Oct 2023 07:01  -  27 Oct 2023 12:56  --------------------------------------------------------  IN: 0 mL / OUT: 40 mL / NET: -40 mL        PHYSICAL EXAM:  Vital Signs Last 24 Hrs  T(C): 36.4 (27 Oct 2023 12:40), Max: 37.6 (26 Oct 2023 19:33)  T(F): 97.5 (27 Oct 2023 12:40), Max: 99.7 (26 Oct 2023 19:33)  HR: 90 (27 Oct 2023 12:40) (72 - 90)  BP: 120/70 (27 Oct 2023 12:40) (120/70 - 127/74)  BP(mean): --  RR: 18 (27 Oct 2023 12:40) (18 - 18)  SpO2: 99% (27 Oct 2023 12:40) (93% - 99%)    Parameters below as of 27 Oct 2023 12:40  Patient On (Oxygen Delivery Method): room air    Constitutional: WDWN resting comfortably in bed; NAD  Head: NC/AT  Eyes: Anicteric sclera  ENT: MMM  Neck: Supple, midline  Respiratory: CTA B/L; no W/R/R   Cardiac: +S1/S2; RRR; no M/R/G   Gastrointestinal: Abdomen soft, NT/ND; no rebound or guarding; +BSx4; percutaneous cholecystostomy tube in place   Extremities: No peripheral edema  Neurologic: AAOx3; CNII-XII grossly intact; no focal deficits  Psychiatric: Affect and characteristics of appearance, verbalizations, behaviors are appropriate          LABS:                        9.1    7.10  )-----------( 218      ( 27 Oct 2023 07:06 )             30.5     10-27    137  |  104  |  18  ----------------------------<  92  4.1   |  22  |  0.66    Ca    8.6      27 Oct 2023 07:06  Phos  2.7     10-27  Mg     1.9     10-27    TPro  x   /  Alb  x   /  TBili  0.6  /  DBili  x   /  AST  x   /  ALT  x   /  AlkPhos  x   10-26    PT/INR - ( 26 Oct 2023 06:57 )   PT: 9.8 sec;   INR: 0.93 ratio               Urinalysis Basic - ( 27 Oct 2023 07:06 )    Color: x / Appearance: x / SG: x / pH: x  Gluc: 92 mg/dL / Ketone: x  / Bili: x / Urobili: x   Blood: x / Protein: x / Nitrite: x   Leuk Esterase: x / RBC: x / WBC x   Sq Epi: x / Non Sq Epi: x / Bacteria: x          RADIOLOGY & ADDITIONAL TESTS:  Results Reviewed:   Imaging Personally Reviewed:  Electrocardiogram Personally Reviewed:    COORDINATION OF CARE:  Care Discussed with Consultants/Other Providers [Y/N]: Y  Prior or Outpatient Records Reviewed [Y/N]: Y   [facility] Division of Hospital Medicine  Jennifer DO Fidencio  Pager (M-F, 8A-5P): MS Teams PREFERRED      SUBJECTIVE / OVERNIGHT EVENTS: No acute events overnight. Patient just returned from stress test. She reports she had bowel movement - no hematochezia. Denies abdominal pain, N/V, chest pain, dyspnea.   ADDITIONAL REVIEW OF SYSTEMS:    MEDICATIONS  (STANDING):  enoxaparin Injectable 40 milliGRAM(s) SubCutaneous every 24 hours  influenza  Vaccine (HIGH DOSE) 0.7 milliLiter(s) IntraMuscular once  losartan 25 milliGRAM(s) Oral daily  polyethylene glycol 3350 17 Gram(s) Oral two times a day    MEDICATIONS  (PRN):  acetaminophen     Tablet .. 650 milliGRAM(s) Oral every 6 hours PRN Temp greater or equal to 38C (100.4F), Mild Pain (1 - 3)  aluminum hydroxide/magnesium hydroxide/simethicone Suspension 30 milliLiter(s) Oral every 4 hours PRN Dyspepsia  melatonin 3 milliGRAM(s) Oral at bedtime PRN Insomnia  ondansetron Injectable 4 milliGRAM(s) IV Push every 8 hours PRN Nausea and/or Vomiting      I&O's Summary    26 Oct 2023 07:01  -  27 Oct 2023 07:00  --------------------------------------------------------  IN: 720 mL / OUT: 150 mL / NET: 570 mL    27 Oct 2023 07:01  -  27 Oct 2023 12:56  --------------------------------------------------------  IN: 0 mL / OUT: 40 mL / NET: -40 mL        PHYSICAL EXAM:  Vital Signs Last 24 Hrs  T(C): 36.4 (27 Oct 2023 12:40), Max: 37.6 (26 Oct 2023 19:33)  T(F): 97.5 (27 Oct 2023 12:40), Max: 99.7 (26 Oct 2023 19:33)  HR: 90 (27 Oct 2023 12:40) (72 - 90)  BP: 120/70 (27 Oct 2023 12:40) (120/70 - 127/74)  BP(mean): --  RR: 18 (27 Oct 2023 12:40) (18 - 18)  SpO2: 99% (27 Oct 2023 12:40) (93% - 99%)    Parameters below as of 27 Oct 2023 12:40  Patient On (Oxygen Delivery Method): room air    Constitutional: WDWN resting comfortably in bed; NAD  Head: NC/AT  Eyes: Anicteric sclera  ENT: MMM  Neck: Supple, midline  Respiratory: CTA B/L; no W/R/R   Cardiac: +S1/S2; RRR; no M/R/G   Gastrointestinal: Abdomen soft, NT/ND; no rebound or guarding; +BSx4; percutaneous cholecystostomy tube in place   Extremities: No peripheral edema  Neurologic: AAOx3; CNII-XII grossly intact; no focal deficits  Psychiatric: Affect and characteristics of appearance, verbalizations, behaviors are appropriate          LABS:                        9.1    7.10  )-----------( 218      ( 27 Oct 2023 07:06 )             30.5     10-27    137  |  104  |  18  ----------------------------<  92  4.1   |  22  |  0.66    Ca    8.6      27 Oct 2023 07:06  Phos  2.7     10-27  Mg     1.9     10-27    TPro  x   /  Alb  x   /  TBili  0.6  /  DBili  x   /  AST  x   /  ALT  x   /  AlkPhos  x   10-26    PT/INR - ( 26 Oct 2023 06:57 )   PT: 9.8 sec;   INR: 0.93 ratio               Urinalysis Basic - ( 27 Oct 2023 07:06 )    Color: x / Appearance: x / SG: x / pH: x  Gluc: 92 mg/dL / Ketone: x  / Bili: x / Urobili: x   Blood: x / Protein: x / Nitrite: x   Leuk Esterase: x / RBC: x / WBC x   Sq Epi: x / Non Sq Epi: x / Bacteria: x          RADIOLOGY & ADDITIONAL TESTS:  Results Reviewed:   Imaging Personally Reviewed:  Electrocardiogram Personally Reviewed:    COORDINATION OF CARE:  Care Discussed with Consultants/Other Providers [Y/N]: Y  Prior or Outpatient Records Reviewed [Y/N]: Y

## 2023-10-27 NOTE — DIETITIAN INITIAL EVALUATION ADULT - NUTRITIONGOAL OUTCOME1
- patient will consume >75% of meals during hospital admission to help meet estimated nutritional needs and work towards getting to normal weight range for BMI

## 2023-10-27 NOTE — PROGRESS NOTE ADULT - NSPROGADDITIONALINFOA_GEN_ALL_CORE
Time-based billing (NON-critical care).     41 minutes spent on total encounter. The necessity of the time spent during the encounter on this date of service was due to:     - Reviewing, and interpreting labs, testing, and imaging.  - Independently obtaining a review of systems and performing a physical exam  - Reviewing prior records and where necessary, outpatient records.  - Counselling and educating patient regarding interpretation of aforementioned items and plan of care.      d/w primary surgical team.

## 2023-10-27 NOTE — DIETITIAN INITIAL EVALUATION ADULT - OTHER INFO
NKFA per patient. Patient reports her UBW is normally 40kg w/ no significant changes for several years now. Current BW of 38.6kg on admission noted (though is a stated weight), request updated weight of patient when appropriate. Height of 4'9" on file noted; patient reports her height is normally around 150cm/4'11".    Elevated CEA noted. Undergoing w/u for ascending colon mass c/f malignancy, for anticipated surgery on Monday 10/30 per chart. Electrolytes currently WNL. Zofran noted.

## 2023-10-27 NOTE — DIETITIAN INITIAL EVALUATION ADULT - PERTINENT LABORATORY DATA
10-27    137  |  104  |  18  ----------------------------<  92  4.1   |  22  |  0.66    Ca    8.6      27 Oct 2023 07:06  Phos  2.7     10-27  Mg     1.9     10-27    TPro  x   /  Alb  x   /  TBili  0.6  /  DBili  x   /  AST  x   /  ALT  x   /  AlkPhos  x   10-26  A1C with Estimated Average Glucose Result: 5.3 % (09-11-23 @ 13:10)

## 2023-10-27 NOTE — DIETITIAN INITIAL EVALUATION ADULT - ORAL INTAKE PTA/DIET HISTORY
Patient reports she has been eating well PTA w/ no recent issues. Denied chewing/swallowing impairment or nausea/vomiting. Reports she has had a stable (but low for her height) bodyweight for several years PTA that has remained unchanged. No digestive disruptions/changes reported w/ recent cholecystitis and perc lj tube placement.

## 2023-10-27 NOTE — DIETITIAN INITIAL EVALUATION ADULT - PERSON TAUGHT/METHOD
adequate caloric/protein intake w/ food sources reviewed, role of oral nutrition supplements, nutrition goals during admission/and Mandarin  [facility] #[id number] for Mandarin translation during educational part of interview/verbal instruction/teach back - (Patient repeats in own words)/patient instructed

## 2023-10-27 NOTE — DIETITIAN INITIAL EVALUATION ADULT - REASON INDICATOR FOR ASSESSMENT
Consult for "assessment, education"  Source: chart, patient, Mandarin interpreters Neha #[id number] and Sarah #[id number] for Mandarin translation (connection dropped prison through interview, accessed second  for second half of interview)  Chart reviewed, events noted

## 2023-10-27 NOTE — PHYSICAL THERAPY INITIAL EVALUATION ADULT - ADDITIONAL COMMENTS
pt lives in private home with family. Pt denies any stairs to enter or inside. Prior to admission, pt was I with all functional mobility and ADLs without AD.

## 2023-10-27 NOTE — PROGRESS NOTE ADULT - PROBLEM SELECTOR PLAN 6
CT: 1.8 cm hypoattenuating right hepatic lesion measuring IV than fluid density and additional low-attenuation lesions too small to accurately characterize.  MR Abdomen:  A few T2 hyperintense, rim-enhancing liver lesions are suspicious for mucinous liver metastases. Largest lesion measures 2.5 x 2.0 cm within the right hepatic lobe and demonstrates heterogeneous internal   enhancement. The lesion within segment 4A/8 measures 0.8 cm (5, 8) and a lesion within segment 2/3 measures 0.9 cm (5, 9).  Heme-Onc consulted, follow-up recommendations.    DVT Ppx: Hold pharmacologic prophylaxis due to anemia

## 2023-10-28 DIAGNOSIS — C18.9 MALIGNANT NEOPLASM OF COLON, UNSPECIFIED: ICD-10-CM

## 2023-10-28 LAB
ANION GAP SERPL CALC-SCNC: 11 MMOL/L — SIGNIFICANT CHANGE UP (ref 5–17)
ANION GAP SERPL CALC-SCNC: 11 MMOL/L — SIGNIFICANT CHANGE UP (ref 5–17)
APPEARANCE UR: CLEAR — SIGNIFICANT CHANGE UP
APPEARANCE UR: CLEAR — SIGNIFICANT CHANGE UP
BILIRUB UR-MCNC: NEGATIVE — SIGNIFICANT CHANGE UP
BILIRUB UR-MCNC: NEGATIVE — SIGNIFICANT CHANGE UP
BUN SERPL-MCNC: 22 MG/DL — SIGNIFICANT CHANGE UP (ref 7–23)
BUN SERPL-MCNC: 22 MG/DL — SIGNIFICANT CHANGE UP (ref 7–23)
CALCIUM SERPL-MCNC: 8.8 MG/DL — SIGNIFICANT CHANGE UP (ref 8.4–10.5)
CALCIUM SERPL-MCNC: 8.8 MG/DL — SIGNIFICANT CHANGE UP (ref 8.4–10.5)
CHLORIDE SERPL-SCNC: 104 MMOL/L — SIGNIFICANT CHANGE UP (ref 96–108)
CHLORIDE SERPL-SCNC: 104 MMOL/L — SIGNIFICANT CHANGE UP (ref 96–108)
CO2 SERPL-SCNC: 20 MMOL/L — LOW (ref 22–31)
CO2 SERPL-SCNC: 20 MMOL/L — LOW (ref 22–31)
COLOR SPEC: YELLOW — SIGNIFICANT CHANGE UP
COLOR SPEC: YELLOW — SIGNIFICANT CHANGE UP
CREAT SERPL-MCNC: 0.68 MG/DL — SIGNIFICANT CHANGE UP (ref 0.5–1.3)
CREAT SERPL-MCNC: 0.68 MG/DL — SIGNIFICANT CHANGE UP (ref 0.5–1.3)
DIFF PNL FLD: NEGATIVE — SIGNIFICANT CHANGE UP
DIFF PNL FLD: NEGATIVE — SIGNIFICANT CHANGE UP
EGFR: 95 ML/MIN/1.73M2 — SIGNIFICANT CHANGE UP
EGFR: 95 ML/MIN/1.73M2 — SIGNIFICANT CHANGE UP
GLUCOSE SERPL-MCNC: 89 MG/DL — SIGNIFICANT CHANGE UP (ref 70–99)
GLUCOSE SERPL-MCNC: 89 MG/DL — SIGNIFICANT CHANGE UP (ref 70–99)
GLUCOSE UR QL: NEGATIVE — SIGNIFICANT CHANGE UP
GLUCOSE UR QL: NEGATIVE — SIGNIFICANT CHANGE UP
HCT VFR BLD CALC: 32.2 % — LOW (ref 34.5–45)
HCT VFR BLD CALC: 32.2 % — LOW (ref 34.5–45)
HGB BLD-MCNC: 8.9 G/DL — LOW (ref 11.5–15.5)
HGB BLD-MCNC: 8.9 G/DL — LOW (ref 11.5–15.5)
KETONES UR-MCNC: NEGATIVE — SIGNIFICANT CHANGE UP
KETONES UR-MCNC: NEGATIVE — SIGNIFICANT CHANGE UP
LEUKOCYTE ESTERASE UR-ACNC: NEGATIVE — SIGNIFICANT CHANGE UP
LEUKOCYTE ESTERASE UR-ACNC: NEGATIVE — SIGNIFICANT CHANGE UP
MAGNESIUM SERPL-MCNC: 2.2 MG/DL — SIGNIFICANT CHANGE UP (ref 1.6–2.6)
MAGNESIUM SERPL-MCNC: 2.2 MG/DL — SIGNIFICANT CHANGE UP (ref 1.6–2.6)
MCHC RBC-ENTMCNC: 23.5 PG — LOW (ref 27–34)
MCHC RBC-ENTMCNC: 23.5 PG — LOW (ref 27–34)
MCHC RBC-ENTMCNC: 27.6 GM/DL — LOW (ref 32–36)
MCHC RBC-ENTMCNC: 27.6 GM/DL — LOW (ref 32–36)
MCV RBC AUTO: 85.2 FL — SIGNIFICANT CHANGE UP (ref 80–100)
MCV RBC AUTO: 85.2 FL — SIGNIFICANT CHANGE UP (ref 80–100)
NITRITE UR-MCNC: NEGATIVE — SIGNIFICANT CHANGE UP
NITRITE UR-MCNC: NEGATIVE — SIGNIFICANT CHANGE UP
NRBC # BLD: 0 /100 WBCS — SIGNIFICANT CHANGE UP (ref 0–0)
NRBC # BLD: 0 /100 WBCS — SIGNIFICANT CHANGE UP (ref 0–0)
PH UR: 5 — SIGNIFICANT CHANGE UP (ref 5–8)
PH UR: 5 — SIGNIFICANT CHANGE UP (ref 5–8)
PHOSPHATE SERPL-MCNC: 4 MG/DL — SIGNIFICANT CHANGE UP (ref 2.5–4.5)
PHOSPHATE SERPL-MCNC: 4 MG/DL — SIGNIFICANT CHANGE UP (ref 2.5–4.5)
PLATELET # BLD AUTO: 193 K/UL — SIGNIFICANT CHANGE UP (ref 150–400)
PLATELET # BLD AUTO: 193 K/UL — SIGNIFICANT CHANGE UP (ref 150–400)
POTASSIUM SERPL-MCNC: 5.2 MMOL/L — SIGNIFICANT CHANGE UP (ref 3.5–5.3)
POTASSIUM SERPL-MCNC: 5.2 MMOL/L — SIGNIFICANT CHANGE UP (ref 3.5–5.3)
POTASSIUM SERPL-SCNC: 5.2 MMOL/L — SIGNIFICANT CHANGE UP (ref 3.5–5.3)
POTASSIUM SERPL-SCNC: 5.2 MMOL/L — SIGNIFICANT CHANGE UP (ref 3.5–5.3)
PROT UR-MCNC: NEGATIVE — SIGNIFICANT CHANGE UP
PROT UR-MCNC: NEGATIVE — SIGNIFICANT CHANGE UP
RBC # BLD: 3.78 M/UL — LOW (ref 3.8–5.2)
RBC # BLD: 3.78 M/UL — LOW (ref 3.8–5.2)
RBC # FLD: 19.6 % — HIGH (ref 10.3–14.5)
RBC # FLD: 19.6 % — HIGH (ref 10.3–14.5)
SODIUM SERPL-SCNC: 135 MMOL/L — SIGNIFICANT CHANGE UP (ref 135–145)
SODIUM SERPL-SCNC: 135 MMOL/L — SIGNIFICANT CHANGE UP (ref 135–145)
SP GR SPEC: 1.02 — SIGNIFICANT CHANGE UP (ref 1.01–1.02)
SP GR SPEC: 1.02 — SIGNIFICANT CHANGE UP (ref 1.01–1.02)
UROBILINOGEN FLD QL: NEGATIVE — SIGNIFICANT CHANGE UP
UROBILINOGEN FLD QL: NEGATIVE — SIGNIFICANT CHANGE UP
WBC # BLD: 8.11 K/UL — SIGNIFICANT CHANGE UP (ref 3.8–10.5)
WBC # BLD: 8.11 K/UL — SIGNIFICANT CHANGE UP (ref 3.8–10.5)
WBC # FLD AUTO: 8.11 K/UL — SIGNIFICANT CHANGE UP (ref 3.8–10.5)
WBC # FLD AUTO: 8.11 K/UL — SIGNIFICANT CHANGE UP (ref 3.8–10.5)

## 2023-10-28 PROCEDURE — 99232 SBSQ HOSP IP/OBS MODERATE 35: CPT

## 2023-10-28 PROCEDURE — 99223 1ST HOSP IP/OBS HIGH 75: CPT | Mod: GC

## 2023-10-28 PROCEDURE — 93970 EXTREMITY STUDY: CPT | Mod: 26

## 2023-10-28 RX ORDER — PIPERACILLIN AND TAZOBACTAM 4; .5 G/20ML; G/20ML
3.38 INJECTION, POWDER, LYOPHILIZED, FOR SOLUTION INTRAVENOUS EVERY 8 HOURS
Refills: 0 | Status: DISCONTINUED | OUTPATIENT
Start: 2023-10-28 | End: 2023-10-30

## 2023-10-28 RX ORDER — PIPERACILLIN AND TAZOBACTAM 4; .5 G/20ML; G/20ML
3.38 INJECTION, POWDER, LYOPHILIZED, FOR SOLUTION INTRAVENOUS ONCE
Refills: 0 | Status: COMPLETED | OUTPATIENT
Start: 2023-10-28 | End: 2023-10-28

## 2023-10-28 RX ADMIN — PIPERACILLIN AND TAZOBACTAM 200 GRAM(S): 4; .5 INJECTION, POWDER, LYOPHILIZED, FOR SOLUTION INTRAVENOUS at 14:44

## 2023-10-28 RX ADMIN — POLYETHYLENE GLYCOL 3350 17 GRAM(S): 17 POWDER, FOR SOLUTION ORAL at 06:46

## 2023-10-28 RX ADMIN — PIPERACILLIN AND TAZOBACTAM 25 GRAM(S): 4; .5 INJECTION, POWDER, LYOPHILIZED, FOR SOLUTION INTRAVENOUS at 18:49

## 2023-10-28 RX ADMIN — ENOXAPARIN SODIUM 40 MILLIGRAM(S): 100 INJECTION SUBCUTANEOUS at 13:49

## 2023-10-28 RX ADMIN — POLYETHYLENE GLYCOL 3350 17 GRAM(S): 17 POWDER, FOR SOLUTION ORAL at 18:52

## 2023-10-28 RX ADMIN — LOSARTAN POTASSIUM 25 MILLIGRAM(S): 100 TABLET, FILM COATED ORAL at 06:46

## 2023-10-28 NOTE — PROGRESS NOTE ADULT - SUBJECTIVE AND OBJECTIVE BOX
DATE OF SERVICE: 10-28-23 @ 12:01    Patient is a 68y old  Female who presents with a chief complaint of Dislodged percutaneous lj tube (28 Oct 2023 10:48)      INTERVAL HISTORY:     REVIEW OF SYSTEMS:  CONSTITUTIONAL: No weakness  EYES/ENT: No visual changes;  No throat pain   NECK: No pain or stiffness  RESPIRATORY: No cough, wheezing; No shortness of breath  CARDIOVASCULAR: No chest pain or palpitations  GASTROINTESTINAL: No abdominal  pain. No nausea, vomiting, or hematemesis  GENITOURINARY: No dysuria, frequency or hematuria  NEUROLOGICAL: No stroke like symptoms  SKIN: No rashes    TELEMETRY Personally reviewed:  	  MEDICATIONS:  losartan 25 milliGRAM(s) Oral daily        PHYSICAL EXAM:  T(C): 36.9 (10-28-23 @ 08:45), Max: 38.6 (10-27-23 @ 17:47)  HR: 71 (10-28-23 @ 08:45) (71 - 101)  BP: 101/66 (10-28-23 @ 08:45) (101/66 - 120/70)  RR: 18 (10-28-23 @ 08:45) (17 - 18)  SpO2: 96% (10-28-23 @ 08:45) (96% - 99%)  Wt(kg): --  I&O's Summary    27 Oct 2023 07:01  -  28 Oct 2023 07:00  --------------------------------------------------------  IN: 720 mL / OUT: 141 mL / NET: 579 mL    28 Oct 2023 07:01  -  28 Oct 2023 12:01  --------------------------------------------------------  IN: 240 mL / OUT: 0 mL / NET: 240 mL      Height (cm): 144.8 (10-28 @ 08:03)  Weight (kg): 38.6 (10-28 @ 08:03)  BMI (kg/m2): 18.4 (10-28 @ 08:03)  BSA (m2): 1.25 (10-28 @ 08:03)    Appearance: In no distress	  HEENT:    PERRL, EOMI	  Cardiovascular:  S1 S2, No JVD  Respiratory: Lungs clear to auscultation	  Gastrointestinal:  Soft, Non-tender, + BS	  Vascularature:  No edema of LE  Psychiatric: Appropriate affect   Neuro: no acute focal deficits                               8.9    8.11  )-----------( 193      ( 28 Oct 2023 06:51 )             32.2     10-28    135  |  104  |  22  ----------------------------<  89  5.2   |  20<L>  |  0.68    Ca    8.8      28 Oct 2023 06:51  Phos  4.0     10-28  Mg     2.2     10-28    Echo 10/25/23   1. Left ventricular systolic function is normal with an ejection fraction of 66 % by Lundberg's method of disks.   2. Normal left ventricular diastolic function.   3. Normal right ventricular cavity size and systolic function.   4. Fibrocalcific aortic valve sclerosis without stenosis.   5. No prior echocardiogram is available for comparison.      NST 10/26/23    1. Normal myocardial perfusion scan, with no evidence of infarction or inducible ischemia.   2. Stress electrocardiogram: No ischemic ST segment changes.   3. Normal left ventricular regional wall motion.        Labs personally reviewed      ASSESSMENT/PLAN: 	    68F PMH scleroderma, pHTN, HTN with recent emphysematous cholecystitis s/p percutaneous cholecystectomy with IR on 9/11 presents following dislodged tube. Patient was discharged on 9/14 after procedure and course of zosyn, and has had unremarkable outpatient course. CT A/P revealed thickening of the ascending colon, with concern for underlying mass or malignancy. s/p colonoscopy also suspicious for malignancy. Patient denies CP, SOB or palpitations.    1. Cardiac Risk Stratification  - Patient with hx of scleroderma and pulm HTN but reports average to excellent functional capacity.   - Does not utilize home oxygen. Denies CP or SOB.  - Not in decompensated HF  - No hx of tachy ang arrhythmia. ECG SB with no ischemia noted.  - No hx of severe AS/MS. TTE with no valvular dysfunction.  - TTE with preserved EF, no WMA, normal LV and RV function and size  - Patient is mod risk for mod risk colorectal surgery if needed. No contraindication to proceed pending ischemic eval with NST.  - NST wnl as above     2. Pulmonary Hypertension  - TTE as above shows preserved EF, no pulm HTN and normal RV size and function  - Does not use supplemental oxygen    3. Scleroderma  - Not currently on therapy    4. Hypertension  - c/w losartan 25mg PO daily (therapeutic interchange for irbesartan 75mg PO daily)    5. Consult Oncology for liver mets seen on MRI  - Perc lj tube placement confirmed by IR tube study on 10/25  - Colonoscopy shows likely malignancy in ascending colon  - CEA level elevated: 57              JONATAN Juárez DO [facility]  Cardiovascular Medicine  [address], [address]  Available through call or text on [employer] TEAMs  Office: [phone]   DATE OF SERVICE: 10-28-23 @ 12:01    Patient is a 68y old  Female who presents with a chief complaint of Dislodged percutaneous lj tube (28 Oct 2023 10:48)      INTERVAL HISTORY:     REVIEW OF SYSTEMS:  CONSTITUTIONAL: No weakness  EYES/ENT: No visual changes;  No throat pain   NECK: No pain or stiffness  RESPIRATORY: No cough, wheezing; No shortness of breath  CARDIOVASCULAR: No chest pain or palpitations  GASTROINTESTINAL: No abdominal  pain. No nausea, vomiting, or hematemesis  GENITOURINARY: No dysuria, frequency or hematuria  NEUROLOGICAL: No stroke like symptoms  SKIN: No rashes    TELEMETRY Personally reviewed:  	  MEDICATIONS:  losartan 25 milliGRAM(s) Oral daily        PHYSICAL EXAM:  T(C): 36.9 (10-28-23 @ 08:45), Max: 38.6 (10-27-23 @ 17:47)  HR: 71 (10-28-23 @ 08:45) (71 - 101)  BP: 101/66 (10-28-23 @ 08:45) (101/66 - 120/70)  RR: 18 (10-28-23 @ 08:45) (17 - 18)  SpO2: 96% (10-28-23 @ 08:45) (96% - 99%)  Wt(kg): --  I&O's Summary    27 Oct 2023 07:01  -  28 Oct 2023 07:00  --------------------------------------------------------  IN: 720 mL / OUT: 141 mL / NET: 579 mL    28 Oct 2023 07:01  -  28 Oct 2023 12:01  --------------------------------------------------------  IN: 240 mL / OUT: 0 mL / NET: 240 mL      Height (cm): 144.8 (10-28 @ 08:03)  Weight (kg): 38.6 (10-28 @ 08:03)  BMI (kg/m2): 18.4 (10-28 @ 08:03)  BSA (m2): 1.25 (10-28 @ 08:03)    Appearance: In no distress	  HEENT:    PERRL, EOMI	  Cardiovascular:  S1 S2, No JVD  Respiratory: Lungs clear to auscultation	  Gastrointestinal:  Soft, Non-tender, + BS	  Vascularature:  No edema of LE  Psychiatric: Appropriate affect   Neuro: no acute focal deficits                               8.9    8.11  )-----------( 193      ( 28 Oct 2023 06:51 )             32.2     10-28    135  |  104  |  22  ----------------------------<  89  5.2   |  20<L>  |  0.68    Ca    8.8      28 Oct 2023 06:51  Phos  4.0     10-28  Mg     2.2     10-28    Echo 10/25/23   1. Left ventricular systolic function is normal with an ejection fraction of 66 % by Lundberg's method of disks.   2. Normal left ventricular diastolic function.   3. Normal right ventricular cavity size and systolic function.   4. Fibrocalcific aortic valve sclerosis without stenosis.   5. No prior echocardiogram is available for comparison.      NST 10/26/23    1. Normal myocardial perfusion scan, with no evidence of infarction or inducible ischemia.   2. Stress electrocardiogram: No ischemic ST segment changes.   3. Normal left ventricular regional wall motion.        Labs personally reviewed      ASSESSMENT/PLAN: 	    68F PMH scleroderma, pHTN, HTN with recent emphysematous cholecystitis s/p percutaneous cholecystectomy with IR on 9/11 presents following dislodged tube. Patient was discharged on 9/14 after procedure and course of zosyn, and has had unremarkable outpatient course. CT A/P revealed thickening of the ascending colon, with concern for underlying mass or malignancy. s/p colonoscopy also suspicious for malignancy. Patient denies CP, SOB or palpitations.    1. Cardiac Risk Stratification  - Patient with hx of scleroderma and pulm HTN but reports average to excellent functional capacity.   - Does not utilize home oxygen. Denies CP or SOB.  - Not in decompensated HF  - No hx of tachy ang arrhythmia. ECG SB with no ischemia noted.  - No hx of severe AS/MS. TTE with no valvular dysfunction.  - TTE with preserved EF, no WMA, normal LV and RV function and size  - Patient is mod risk for mod risk colorectal surgery if needed. No contraindication to proceed pending ischemic eval with NST.  - NST wnl as above     2. Pulmonary Hypertension  - TTE as above shows preserved EF, no pulm HTN and normal RV size and function  - Does not use supplemental oxygen    3. Scleroderma  - Not currently on therapy    4. Hypertension  - c/w losartan 25mg PO daily (therapeutic interchange for irbesartan 75mg PO daily)    5. Consult Oncology for liver mets seen on MRI  - Perc lj tube placement confirmed by IR tube study on 10/25  - Colonoscopy shows likely malignancy in ascending colon  - CEA level elevated: 57              JONATAN Juárez DO [facility]  Cardiovascular Medicine  [address], [address]  Available through call or text on [employer] TEAMs  Office: [phone]

## 2023-10-28 NOTE — PROGRESS NOTE ADULT - PROBLEM SELECTOR PLAN 5
NTD at this time. Not on any therapy at home for scleroderma.   Given comorbid pHTN, preoperative risk assessment prior to surgery.  Cardiology consulted for preoperative assessment, appreciate recommendations.   TTE - Normal LVSF, EF 66%  Nuclear Stress: Normal myocardial perfusion scan, with no evidence of infarction or inducible ischemia.  Patient denies chest pain, dyspnea, palpitations. Able to tolerate brisk walking and activities of daily living.   Based on RCRI, class II risk, 6.0 % 30-day risk of death, MI, or cardiac arrest  No contraindications to proceed with colectomy/lap cholecystectomy. NTD at this time. Not on any therapy at home for scleroderma.   Given comorbid pHTN, preoperative risk assessment prior to surgery.  Cardiology consulted for preoperative assessment, appreciate recommendations.   TTE - Normal LVSF, EF 66%  Nuclear Stress: Normal myocardial perfusion scan, with no evidence of infarction or inducible ischemia.  Patient denies chest pain, dyspnea, palpitations. Able to tolerate brisk walking and activities of daily living.   Based on RCRI, class II risk, 6.0 % 30-day risk of death, MI, or cardiac arrest    No contraindications to proceed with colectomy/lap cholecystectomy.

## 2023-10-28 NOTE — PROGRESS NOTE ADULT - NSPROGADDITIONALINFOA_GEN_ALL_CORE
Time-based billing (NON-critical care).      minutes spent on total encounter. The necessity of the time spent during the encounter on this date of service was due to:     - Reviewing, and interpreting labs, testing, and imaging.  - Independently obtaining a review of systems and performing a physical exam  - Reviewing prior records and where necessary, outpatient records.  - Counselling and educating patient regarding interpretation of aforementioned items and plan of care.      d/w primary surgical team. Time-based billing (NON-critical care).     40 minutes spent on total encounter. The necessity of the time spent during the encounter on this date of service was due to:     - Reviewing, and interpreting labs, testing, and imaging.  - Independently obtaining a review of systems and performing a physical exam  - Reviewing prior records and where necessary, outpatient records.  - Counselling and educating patient regarding interpretation of aforementioned items and plan of care.    10/28- Discussed pathology results with patient - adenocarcinoma in colon with metastasis in liver. Patient reports she told the rest of her family about the malignancy and feels better now that she does not have to keep it a secret. Emotional support provided.

## 2023-10-28 NOTE — CONSULT NOTE ADULT - CONSULT REASON
colonic mass
Perc lj tube dislodgement
Colon cancer
Scleroderma management
Hx of Scleroderma, Pulm HTN, Cardiac Risk Stratification
Abnormal CT

## 2023-10-28 NOTE — PROGRESS NOTE ADULT - ASSESSMENT
68F hx scleroderma and pulmonary hypertension, s/p percutaneous cholecystostomy with IR on 9/11 for emphysematous cholecystitis, now presenting with partially dislodged per lj tube. CTAP with cholecystostomy tube with tip in the region of the contracted gallbladder, no acute intraabdominal pathology. Of note, also noted was a short segment of marked colonic bowel wall thickening involving the ascending colon concerning for underlying mass/malignancy.     Recommendations:  - Appreciate cardiology recs: patient is moderate risk for moderate risk surgery, no contraindication to surgery  - Nuclear Stress Test completed -> No ischemic ST segment changes  - Surgical planning for OR Weds: BID Miralax  - Consult Oncology for liver mets seen on MRI  - Perc lj tube placement confirmed by IR tube study on 10/25  - Colonoscopy shows likely malignancy in ascending colon  - CEA level elevated: 57  - Abx: Zosyn  - f/u UA and Urine culture results    [employer] [facility]  [id number]   68F hx scleroderma and pulmonary hypertension, s/p percutaneous cholecystostomy with IR on 9/11 for emphysematous cholecystitis, now presenting with partially dislodged per lj tube. CTAP with cholecystostomy tube with tip in the region of the contracted gallbladder, no acute intraabdominal pathology. Of note, also noted was a short segment of marked colonic bowel wall thickening involving the ascending colon concerning for underlying mass/malignancy.     Recommendations:  - Appreciate cardiology recs: patient is moderate risk for moderate risk surgery, no contraindication to surgery  - Nuclear Stress Test completed -> No ischemic ST segment changes  - Surgical planning for OR Weds: BID Miralax  - Consult Oncology for liver mets seen on MRI  - Perc lj tube placement confirmed by IR tube study on 10/25  - Colonoscopy shows likely malignancy in ascending colon  - CEA level elevated: 57  - Abx: Zosyn  - f/u UA and Urine culture results  - Rheumatology recommendations appreciated regarding patient's scleroderma    [employer] [facility]  [id number]

## 2023-10-28 NOTE — PROGRESS NOTE ADULT - SUBJECTIVE AND OBJECTIVE BOX
Chief Complaint:  Patient is a 68y old  Female who presents with a chief complaint of Dislodged percutaneous lj tube       Date of service 10-28-23        Interval Events:    no new gi complaints       Review of Systems:  General:  No wt loss, fevers, chills, night sweats, fatigue,   Eyes:  Good vision, no reported pain  ENT:  No sore throat, pain, runny nose, dysphagia  CV:  No pain, palpitations, hypo/hypertension  Resp:  No dyspnea, cough, tachypnea, wheezing  GI:  See HPI  :  No pain, bleeding, incontinence, nocturia  Muscle:  No pain, weakness  Neuro:  No weakness, tingling, memory problems  Psych:  No fatigue, insomnia, mood problems, depression  Endocrine:  No polyuria, polydipsia, cold/heat intolerance  Heme:  No petechiae, ecchymosis, easy bruisability  Integumentary:  No rash, edema         acetaminophen     Tablet .. 650 milliGRAM(s) Oral every 6 hours PRN  aluminum hydroxide/magnesium hydroxide/simethicone Suspension 30 milliLiter(s) Oral every 4 hours PRN  enoxaparin Injectable 40 milliGRAM(s) SubCutaneous every 24 hours  influenza  Vaccine (HIGH DOSE) 0.7 milliLiter(s) IntraMuscular once  losartan 25 milliGRAM(s) Oral daily  melatonin 3 milliGRAM(s) Oral at bedtime PRN  ondansetron Injectable 4 milliGRAM(s) IV Push every 8 hours PRN  piperacillin/tazobactam IVPB. 3.375 Gram(s) IV Intermittent once  piperacillin/tazobactam IVPB.- 3.375 Gram(s) IV Intermittent once  piperacillin/tazobactam IVPB.. 3.375 Gram(s) IV Intermittent every 8 hours  polyethylene glycol 3350 17 Gram(s) Oral two times a day                            8.9    8.11  )-----------( 193      ( 28 Oct 2023 06:51 )             32.2       Hemoglobin: 8.9 g/dL (10-28 @ 06:51)  Hemoglobin: 9.1 g/dL (10-27 @ 18:50)  Hemoglobin: 9.1 g/dL (10-27 @ 07:06)  Hemoglobin: 9.3 g/dL (10-26 @ 07:00)  Hemoglobin: 8.8 g/dL (10-25 @ 07:26)      10-28    135  |  104  |  22  ----------------------------<  89  5.2   |  20<L>  |  0.68    Ca    8.8      28 Oct 2023 06:51  Phos  4.0     10-28  Mg     2.2     10-28      Creatinine Trend: 0.68<--, 0.66<--, 0.69<--, 0.70<--, 0.68<--, 0.94<--    COAGS:           T(C): 36.9 (10-28-23 @ 08:45), Max: 38.6 (10-27-23 @ 17:47)  HR: 71 (10-28-23 @ 08:45) (71 - 101)  BP: 101/66 (10-28-23 @ 08:45) (101/66 - 120/70)  RR: 18 (10-28-23 @ 08:45) (17 - 18)  SpO2: 96% (10-28-23 @ 08:45) (96% - 99%)  Wt(kg): --    I&O's Summary    27 Oct 2023 07:01  -  28 Oct 2023 07:00  --------------------------------------------------------  IN: 720 mL / OUT: 141 mL / NET: 579 mL      PHYSICAL EXAM:     GENERAL:  Appears stated age, well-groomed, well-nourished, no distress  HEENT:  NC/AT,  conjunctivae anicteric, clear and pink,   NECK: supple, trachea midline  CHEST:  Full & symmetric excursion, no increased effort, breath sounds clear  HEART:  Regular rhythm, no JVD  ABDOMEN:  Soft, non-tender, non-distended, normoactive bowel sounds,  no masses , no hepatosplenomegaly  EXTREMITIES:  no cyanosis,clubbing or edema  SKIN:  No rash, erythema, or, ecchymoses, no jaundice  NEURO:  Alert, non-focal, no asterixis  PSYCH: Appropriate affect, oriented to place and time  RECTAL: Deferred

## 2023-10-28 NOTE — PROGRESS NOTE ADULT - PROBLEM SELECTOR PLAN 1
Incidentally found to have thickening of the ascending colon wall on admission, as well as possible mass in liver.   - GI consulted - appreciated recommendations. Colonoscopy showed a large malignant appearing mass was found in the ascending colon, approximately 5cm distal to the cecum. The mass was circumferential. The mass measured four cm in length.  Pathology: Invasive adenocarcinoma, moderately differentiated with mucinous feature  MR Abdomen:  A few T2 hyperintense, rim-enhancing liver lesions are suspicious for mucinous liver metastases. Largest lesion measures 2.5 x 2.0 cm within the right hepatic lobe and demonstrates heterogeneous internal   enhancement. The lesion within segment 4A/8 measures 0.8 cm (5, 8) and a lesion within segment 2/3 measures 0.9 cm (5, 9).  Heme-Onc consulted, appreciate recs - Patient will not be initiated on systemic therapy during this hospitalization.  Appreciate colorectal surgery - anticipate colectomy and laparoscopic cholecystectomy on Monday 10/30

## 2023-10-28 NOTE — CONSULT NOTE ADULT - REASON FOR ADMISSION
Dislodged percutaneous lj tube

## 2023-10-28 NOTE — CONSULT NOTE ADULT - ASSESSMENT
68F PMH scleroderma, pHTN, HTN with recent emphysematous cholecystitis s/p percutaneous cholecystectomy with IR on 9/11 presents following dislodged tube, now found to have ascending colon mass c/f invasive carcinoma requiring surgical intervention. Rheumatology consulted for scleroderma management.     #Scleroderma  #Hx of Pulm HTN      #Colon Cancer  -Management per primary  68F PMH scleroderma, pHTN, HTN with recent emphysematous cholecystitis s/p percutaneous cholecystectomy with IR on 9/11 presents following dislodged tube, now found to have ascending colon mass c/f invasive carcinoma requiring surgical intervention. Rheumatology consulted for scleroderma management.     #Scleroderma  #Hx of Pulm HTN    Plan  -Will order scleroderma abs, none on file since pt does not follow with [facility] for scleroderma management  -After surgery, please monitor pt's blood pressure. Though scleroderma renal crisis is rare, can be precipitated by surgery. If pt noted to have increasing BP and/or hematuria     #Colon Cancer  -Management per primary  68F PMH scleroderma, pHTN, HTN with recent emphysematous cholecystitis s/p percutaneous cholecystectomy with IR on 9/11 presents following dislodged tube, now found to have ascending colon mass c/f invasive carcinoma requiring surgical intervention. Rheumatology consulted for scleroderma management.     #Scleroderma  #Hx of Pulm HTN  -Physical exam evidence of scleroderma with  telangiectasias and digital ulceration from Raynaud's   -Underwent TTE and cardiology evaluation for pulmonary HTN. Showed preserved EF, no pulm HTN and normal RV size and function. Pt does not require supplemental oxygen.   -Scleroderma can pose complications with airway access due to narrow aperture, wound healing if pt has skin thickening involvement, raynaud's complications during procedure in setting of colder temperatures, and potential (but rare) sclerodermal renal crisis     Plan  -Will order scleroderma abs, none on file since pt does not follow with [facility] for scleroderma management  -Would recommend pt continue on losartan sal[place]-operatively. Would ultimately recommend a CCB for Raynaud's treatment, however due to soft BPs, will hold off for now.   -After surgery, please monitor pt's blood pressure. Though sclerodermal renal crisis is rare, can be precipitated by surgery. If pt noted to have increasing BP, increase in Cr, hematuria, please page rheumatology service.   -Please avoid steroids in this pt to avoid precipitation sclerodermal renal crisis   -Unfortunately, there are no disease modifying treatments for scleroderma- treatment options are based off of symptoms management (I.e CCB for Raynaud's, pulm HTN treatment). Thus, no further immunosuppressive therapy recommended at this time    KHOA Moore., WOLFGANG Adair., Chapo, ANA MARÍAJ. et al. An Update on Systemic Sclerosis and its Perioperative Management. Curr Anesthesiol Rep 10, 512–521 (2020). [email or web address]    #Colon Cancer  -Management per primary     Discussed with Dr. Valerie Vasquez MD   PGY-4  Reachable on TEAMS  Pager [phone]  Rheumatology Fellow 68F PMH scleroderma, pHTN, HTN with recent emphysematous cholecystitis s/p percutaneous cholecystectomy with IR on 9/11 presents following dislodged tube, now found to have ascending colon mass c/f invasive carcinoma requiring surgical intervention. Rheumatology consulted for scleroderma management.     #Scleroderma  #Hx of Pulm HTN  -Physical exam evidence of scleroderma with  telangiectasias and digital ulceration from Raynaud's   -Underwent TTE and cardiology evaluation for pulmonary HTN. Showed preserved EF, no pulm HTN and normal RV size and function. Pt does not require supplemental oxygen.   -Scleroderma can pose complications with airway access due to narrow aperture, wound healing if pt has skin thickening involvement, raynaud's complications during procedure in setting of colder temperatures, and potential (but rare) sclerodermal renal crisis     Plan  -Will order scleroderma abs, none on file since pt does not follow with [facility] for scleroderma management  -Would recommend pt continue on losartan salina-operatively. Would ultimately recommend a CCB for Raynaud's treatment, however due to low BPs, will hold off for now.    - goal bp in scleroderm is sbp < 110 given arterial htn is a know risk for scl renal crisis.   -After surgery, please monitor pt's blood pressure. Though sclerodermal renal crisis is rare, can be precipitated by surgery. If pt noted to have increasing BP, increase in Cr, hematuria, please page rheumatology service.   -Please avoid steroids in this pt to avoid precipitation sclerodermal renal crisis   -Unfortunately, there are no disease modifying treatments for scleroderma- treatment options are based off of symptoms management (I.e CCB for Raynaud's, pulm HTN treatment). Thus, no further immunosuppressive therapy recommended at this time    KHOA Moore., IAIN Adair, KARYN Cowan. et al. An Update on Systemic Sclerosis and its Perioperative Management. Curr Anesthesiol Rep 10, 512–521 (2020). [email or web address]    #Colon Cancer  -Management per primary     Discussed with Dr. Valerie Vasquez MD   PGY-4  Reachable on TEAMS  Pager [phone]  Rheumatology Fellow

## 2023-10-28 NOTE — CONSULT NOTE ADULT - CONSULT REQUESTED DATE/TIME
23-Oct-2023 16:56
25-Oct-2023 16:25
27-Oct-2023 09:01
22-Oct-2023 04:46
27-Oct-2023 19:40
28-Oct-2023 12:48

## 2023-10-28 NOTE — PROGRESS NOTE ADULT - SUBJECTIVE AND OBJECTIVE BOX
[facility] Division of Hospital Medicine  Jennifer Nicolas DO  Pager (M-F, 8A-5P): MS Teams PREFERRED      SUBJECTIVE / OVERNIGHT EVENTS:  ADDITIONAL REVIEW OF SYSTEMS:    MEDICATIONS  (STANDING):  enoxaparin Injectable 40 milliGRAM(s) SubCutaneous every 24 hours  influenza  Vaccine (HIGH DOSE) 0.7 milliLiter(s) IntraMuscular once  losartan 25 milliGRAM(s) Oral daily  polyethylene glycol 3350 17 Gram(s) Oral two times a day    MEDICATIONS  (PRN):  acetaminophen     Tablet .. 650 milliGRAM(s) Oral every 6 hours PRN Temp greater or equal to 38C (100.4F), Mild Pain (1 - 3)  aluminum hydroxide/magnesium hydroxide/simethicone Suspension 30 milliLiter(s) Oral every 4 hours PRN Dyspepsia  melatonin 3 milliGRAM(s) Oral at bedtime PRN Insomnia  ondansetron Injectable 4 milliGRAM(s) IV Push every 8 hours PRN Nausea and/or Vomiting      I&O's Summary    27 Oct 2023 07:01  -  28 Oct 2023 07:00  --------------------------------------------------------  IN: 720 mL / OUT: 141 mL / NET: 579 mL        PHYSICAL EXAM:  Vital Signs Last 24 Hrs  T(C): 36.6 (28 Oct 2023 04:39), Max: 38.6 (27 Oct 2023 17:47)  T(F): 97.9 (28 Oct 2023 04:39), Max: 101.4 (27 Oct 2023 17:47)  HR: 80 (28 Oct 2023 04:39) (80 - 101)  BP: 102/66 (28 Oct 2023 04:39) (102/66 - 126/76)  BP(mean): --  RR: 18 (28 Oct 2023 04:39) (17 - 18)  SpO2: 98% (28 Oct 2023 04:39) (93% - 99%)    Parameters below as of 28 Oct 2023 04:39  Patient On (Oxygen Delivery Method): room air    Constitutional: WDWN resting comfortably in bed; NAD  Head: NC/AT  Eyes: Anicteric sclera  ENT: MMM  Neck: Supple, midline  Respiratory: CTA B/L; no W/R/R   Cardiac: +S1/S2; RRR; no M/R/G   Gastrointestinal: Abdomen soft, NT/ND; no rebound or guarding; +BSx4; percutaneous cholecystostomy tube in place   Extremities: No peripheral edema  Neurologic: AAOx3; CNII-XII grossly intact; no focal deficits  Psychiatric: Affect and characteristics of appearance, verbalizations, behaviors are appropriate        LABS:                        8.9    8.11  )-----------( 193      ( 28 Oct 2023 06:51 )             32.2     10-28    135  |  104  |  22  ----------------------------<  89  5.2   |  20<L>  |  0.68    Ca    8.8      28 Oct 2023 06:51  Phos  4.0     10-28  Mg     2.2     10-28            Urinalysis Basic - ( 28 Oct 2023 06:51 )    Color: x / Appearance: x / SG: x / pH: x  Gluc: 89 mg/dL / Ketone: x  / Bili: x / Urobili: x   Blood: x / Protein: x / Nitrite: x   Leuk Esterase: x / RBC: x / WBC x   Sq Epi: x / Non Sq Epi: x / Bacteria: x          RADIOLOGY & ADDITIONAL TESTS:  Results Reviewed:   Imaging Personally Reviewed:  Electrocardiogram Personally Reviewed:    COORDINATION OF CARE:  Care Discussed with Consultants/Other Providers [Y/N]: Y  Prior or Outpatient Records Reviewed [Y/N]: Y   [facility] Division of Hospital Medicine  Jennifer Nicolas DO  Pager (M-F, 8A-5P): MS Teams PREFERRED      SUBJECTIVE / OVERNIGHT EVENTS: No acute events overnight. Patient tolerating regular diet. No acute complaints.   ADDITIONAL REVIEW OF SYSTEMS:    MEDICATIONS  (STANDING):  enoxaparin Injectable 40 milliGRAM(s) SubCutaneous every 24 hours  influenza  Vaccine (HIGH DOSE) 0.7 milliLiter(s) IntraMuscular once  losartan 25 milliGRAM(s) Oral daily  polyethylene glycol 3350 17 Gram(s) Oral two times a day    MEDICATIONS  (PRN):  acetaminophen     Tablet .. 650 milliGRAM(s) Oral every 6 hours PRN Temp greater or equal to 38C (100.4F), Mild Pain (1 - 3)  aluminum hydroxide/magnesium hydroxide/simethicone Suspension 30 milliLiter(s) Oral every 4 hours PRN Dyspepsia  melatonin 3 milliGRAM(s) Oral at bedtime PRN Insomnia  ondansetron Injectable 4 milliGRAM(s) IV Push every 8 hours PRN Nausea and/or Vomiting      I&O's Summary    27 Oct 2023 07:01  -  28 Oct 2023 07:00  --------------------------------------------------------  IN: 720 mL / OUT: 141 mL / NET: 579 mL        PHYSICAL EXAM:  Vital Signs Last 24 Hrs  T(C): 36.6 (28 Oct 2023 04:39), Max: 38.6 (27 Oct 2023 17:47)  T(F): 97.9 (28 Oct 2023 04:39), Max: 101.4 (27 Oct 2023 17:47)  HR: 80 (28 Oct 2023 04:39) (80 - 101)  BP: 102/66 (28 Oct 2023 04:39) (102/66 - 126/76)  BP(mean): --  RR: 18 (28 Oct 2023 04:39) (17 - 18)  SpO2: 98% (28 Oct 2023 04:39) (93% - 99%)    Parameters below as of 28 Oct 2023 04:39  Patient On (Oxygen Delivery Method): room air    Constitutional: WDWN resting comfortably in bed; NAD  Head: NC/AT  Eyes: Anicteric sclera  ENT: MMM  Neck: Supple, midline  Respiratory: CTA B/L; no W/R/R   Cardiac: +S1/S2; RRR; no M/R/G   Gastrointestinal: Abdomen soft, NT/ND; no rebound or guarding; +BSx4; percutaneous cholecystostomy tube in place   Extremities: No peripheral edema  Neurologic: AAOx3; CNII-XII grossly intact; no focal deficits  Psychiatric: Affect and characteristics of appearance, verbalizations, behaviors are appropriate        LABS:                        8.9    8.11  )-----------( 193      ( 28 Oct 2023 06:51 )             32.2     10-28    135  |  104  |  22  ----------------------------<  89  5.2   |  20<L>  |  0.68    Ca    8.8      28 Oct 2023 06:51  Phos  4.0     10-28  Mg     2.2     10-28            Urinalysis Basic - ( 28 Oct 2023 06:51 )    Color: x / Appearance: x / SG: x / pH: x  Gluc: 89 mg/dL / Ketone: x  / Bili: x / Urobili: x   Blood: x / Protein: x / Nitrite: x   Leuk Esterase: x / RBC: x / WBC x   Sq Epi: x / Non Sq Epi: x / Bacteria: x          RADIOLOGY & ADDITIONAL TESTS:  Results Reviewed:   Imaging Personally Reviewed:  Electrocardiogram Personally Reviewed:    COORDINATION OF CARE:  Care Discussed with Consultants/Other Providers [Y/N]: Y  Prior or Outpatient Records Reviewed [Y/N]: Y

## 2023-10-28 NOTE — PROGRESS NOTE ADULT - SUBJECTIVE AND OBJECTIVE BOX
[facility] DAILY PROGRESS NOTE    INTERVAL EVENTS: NAEO    SUBJECTIVE: Patient seen and examined at bedside on AM rounds. Patient reports that they're feeling well. Denies fever, chills, N/V, chest pain, SOB. Denies any pain with urination.    OBJECTIVE:  Vital Signs Last 24 Hrs  T(C): 36.6 (28 Oct 2023 04:39), Max: 38.6 (27 Oct 2023 17:47)  T(F): 97.9 (28 Oct 2023 04:39), Max: 101.4 (27 Oct 2023 17:47)  HR: 80 (28 Oct 2023 04:39) (80 - 101)  BP: 102/66 (28 Oct 2023 04:39) (102/66 - 120/70)  BP(mean): --  RR: 18 (28 Oct 2023 04:39) (17 - 18)  SpO2: 98% (28 Oct 2023 04:39) (96% - 99%)    Parameters below as of 28 Oct 2023 04:39  Patient On (Oxygen Delivery Method): room air    STANDING  enoxaparin Injectable 40 milliGRAM(s) SubCutaneous every 24 hours  influenza  Vaccine (HIGH DOSE) 0.7 milliLiter(s) IntraMuscular once  losartan 25 milliGRAM(s) Oral daily  piperacillin/tazobactam IVPB. 3.375 Gram(s) IV Intermittent once  piperacillin/tazobactam IVPB.- 3.375 Gram(s) IV Intermittent once  piperacillin/tazobactam IVPB.. 3.375 Gram(s) IV Intermittent every 8 hours  polyethylene glycol 3350 17 Gram(s) Oral two times a day    PRN  acetaminophen     Tablet .. 650 milliGRAM(s) Oral every 6 hours PRN Temp greater or equal to 38C (100.4F), Mild Pain (1 - 3)  aluminum hydroxide/magnesium hydroxide/simethicone Suspension 30 milliLiter(s) Oral every 4 hours PRN Dyspepsia  melatonin 3 milliGRAM(s) Oral at bedtime PRN Insomnia  ondansetron Injectable 4 milliGRAM(s) IV Push every 8 hours PRN Nausea and/or Vomiting      Labs:  135  |  104  |  22  ----------------------------<  89    (10-28)  5.2   |  20<L>  |  0.68          Ca    8.8      10-28  Mg    2.2  Phos  4.0          Urinalysis Basic - ( 28 Oct 2023 06:51 )    Color: x / Appearance: x / SG: x / pH: x  Gluc: 89 mg/dL / Ketone: x  / Bili: x / Urobili: x   Blood: x / Protein: x / Nitrite: x   Leuk Esterase: x / RBC: x / WBC x   Sq Epi: x / Non Sq Epi: x / Bacteria: x      Urinalysis Basic - ( 28 Oct 2023 06:51 )    Color: x / Appearance: x / SG: x / pH: x  Gluc: 89 mg/dL / Ketone: x  / Bili: x / Urobili: x   Blood: x / Protein: x / Nitrite: x   Leuk Esterase: x / RBC: x / WBC x   Sq Epi: x / Non Sq Epi: x / Bacteria: x    Physical Exam:  General: NAD, resting in bed comfortably.  Cardiac: regular rate, warm and well perfused  Respiratory: Nonlabored respirations, normal cw expansion.  Abdomen: soft, nontender, nondistended. Perc lj tube continues to drain fluid  Extremities: normal strength, FROM, no deformities

## 2023-10-28 NOTE — CONSULT NOTE ADULT - SUBJECTIVE AND OBJECTIVE BOX
NATALIA VELA  [id number]    HISTORY OF PRESENT ILLNESS:  HPI:  68F PMH scleroderma, pHTN, HTN with recent emphysematous cholecystitis s/p percutaneous cholecystectomy with IR on 9/11 presents following dislodged tube. Patient was discharged on 9/14 after procedure and course of zosyn, and has had unremarkable outpatient course. CTAP performed in ED revealed cholecystostomy tube with tip in region of contracted gallbladder Incidentally CT A/P revealed thickening of the ascending colon, with concern for underlying mass or malignancy. Surgery consult requested in ED, and recommending medicine admission.  (22 Oct 2023 14:09)    Rheumatology consulted for scleroderma management.     Brief hospital course  Found to have CT imaging c/f colon mass. Underwent colonoscopy that shows ascending colon mass, pathology c/f invasive adenocarcinoma  MRI with c/f Liver mets  CEA level elevated 57  colorectal surgery following, plan for bowel resection and cholecystectomy.   Underwent TTE and cardiology evaluation for pulmonary HTN. Showed preserved EF, no pulm HTN and normal RV size and function. Pt does not require supplemental oxygen.   Pt currently not on therapy for scleroderma       Rheum ROS  See above    MEDICATIONS  (STANDING):  enoxaparin Injectable 40 milliGRAM(s) SubCutaneous every 24 hours  influenza  Vaccine (HIGH DOSE) 0.7 milliLiter(s) IntraMuscular once  losartan 25 milliGRAM(s) Oral daily  piperacillin/tazobactam IVPB. 3.375 Gram(s) IV Intermittent once  piperacillin/tazobactam IVPB.- 3.375 Gram(s) IV Intermittent once  piperacillin/tazobactam IVPB.. 3.375 Gram(s) IV Intermittent every 8 hours  polyethylene glycol 3350 17 Gram(s) Oral two times a day    MEDICATIONS  (PRN):  acetaminophen     Tablet .. 650 milliGRAM(s) Oral every 6 hours PRN Temp greater or equal to 38C (100.4F), Mild Pain (1 - 3)  aluminum hydroxide/magnesium hydroxide/simethicone Suspension 30 milliLiter(s) Oral every 4 hours PRN Dyspepsia  melatonin 3 milliGRAM(s) Oral at bedtime PRN Insomnia  ondansetron Injectable 4 milliGRAM(s) IV Push every 8 hours PRN Nausea and/or Vomiting      Allergies    No Known Allergies    Intolerances        PERTINENT MEDICATION HISTORY:    SOCIAL HISTORY:  OCCUPATION:  TRAVEL HISTORY:    FAMILY HISTORY:      Vital Signs Last 24 Hrs  T(C): 36.9 (28 Oct 2023 08:45), Max: 38.6 (27 Oct 2023 17:47)  T(F): 98.4 (28 Oct 2023 08:45), Max: 101.4 (27 Oct 2023 17:47)  HR: 71 (28 Oct 2023 08:45) (71 - 101)  BP: 101/66 (28 Oct 2023 08:45) (101/66 - 115/74)  BP(mean): --  RR: 18 (28 Oct 2023 08:45) (17 - 18)  SpO2: 96% (28 Oct 2023 08:45) (96% - 98%)    Parameters below as of 28 Oct 2023 08:45  Patient On (Oxygen Delivery Method): room air        Physical Exam:  General: No apparent distress  HEENT: EOMI, MMM  CVS: +S1/S2, RRR, no murmurs/rubs/gallops  Resp: CTA b/l. No crackles/wheezing  GI: Soft, NT/ND +BS  MSK: no swelling/warmth/erythema of the joints of the UE/LE  Neuro: AAOx3  Skin: no visible rashes    LABS:                        8.9    8.11  )-----------( 193      ( 28 Oct 2023 06:51 )             32.2     10-28    135  |  104  |  22  ----------------------------<  89  5.2   |  20<L>  |  0.68    Ca    8.8      28 Oct 2023 06:51  Phos  4.0     10-28  Mg     2.2     10-28        Urinalysis Basic - ( 28 Oct 2023 06:51 )    Color: x / Appearance: x / SG: x / pH: x  Gluc: 89 mg/dL / Ketone: x  / Bili: x / Urobili: x   Blood: x / Protein: x / Nitrite: x   Leuk Esterase: x / RBC: x / WBC x   Sq Epi: x / Non Sq Epi: x / Bacteria: x      Rheumatology Work Up      RADIOLOGY & ADDITIONAL STUDIES:  < from: CT Abdomen and Pelvis w/ IV Cont (10.22.23 @ 03:03) >  IMPRESSION:  Cholecystostomy tube with tip in the region of the contracted gallbladder   fundus. If clinical suspicion for dislodgment is present follow-up tube   check should be obtained.    Short segment marked colonic bowel wall thickening involving the   ascending colon concerning for underlying mass/malignancy. Follow-up   colonoscopy is recommended for definitive diagnosis.    1.8 cm hepatic lesion, indeterminant. Additionallow-attenuation lesions   too small to clearly characterize.    < end of copied text >     NATALIA VELA  [id number]    HISTORY OF PRESENT ILLNESS:  HPI:  68F PMH scleroderma, pHTN, HTN with recent emphysematous cholecystitis s/p percutaneous cholecystectomy with IR on 9/11 presents following dislodged tube. Patient was discharged on 9/14 after procedure and course of zosyn, and has had unremarkable outpatient course. CTAP performed in ED revealed cholecystostomy tube with tip in region of contracted gallbladder Incidentally CT A/P revealed thickening of the ascending colon, with concern for underlying mass or malignancy. Surgery consult requested in ED, and recommending medicine admission.  (22 Oct 2023 14:09)    Mandarin  Archana ID # [id number] used for history     Rheumatology consulted for scleroderma management.     Brief hospital course  Found to have CT imaging c/f colon mass. Underwent colonoscopy that shows ascending colon mass, pathology c/f invasive adenocarcinoma  MRI with c/f Liver mets  CEA level elevated 57  colorectal surgery following, plan for bowel resection and cholecystectomy.   Underwent TTE and cardiology evaluation for pulmonary HTN. Showed preserved EF, no pulm HTN and normal RV size and function. Pt does not require supplemental oxygen.   Pt currently not on therapy for scleroderma     Per note left bedside by pt's daughter and pt:  Scleroderma hx  -dx 20 years ago, presented with pulmonary HTN and arthritis. Also has telangiectasias, significant Raynaud's c/b digital ulcers.   -Was previously following with a rheumatologist in [place], however can't remember the last time she saw them. Also was on a medication in [place], but states production was stopped for it? As well, it did not improve her symptoms.     Endorsing R knee pain, ankle pain, Raynaud's symptoms with ulceration, and thinning of her lips. Denies skin thickening on her face or digits, denies SOB. Pt previously had acid reflux symptoms, which she on longer endorses  Denies hx of scleroderma renal crisis, currently on losartan for BP management.       Rheum ROS  See above    MEDICATIONS  (STANDING):  enoxaparin Injectable 40 milliGRAM(s) SubCutaneous every 24 hours  influenza  Vaccine (HIGH DOSE) 0.7 milliLiter(s) IntraMuscular once  losartan 25 milliGRAM(s) Oral daily  piperacillin/tazobactam IVPB. 3.375 Gram(s) IV Intermittent once  piperacillin/tazobactam IVPB.- 3.375 Gram(s) IV Intermittent once  piperacillin/tazobactam IVPB.. 3.375 Gram(s) IV Intermittent every 8 hours  polyethylene glycol 3350 17 Gram(s) Oral two times a day    MEDICATIONS  (PRN):  acetaminophen     Tablet .. 650 milliGRAM(s) Oral every 6 hours PRN Temp greater or equal to 38C (100.4F), Mild Pain (1 - 3)  aluminum hydroxide/magnesium hydroxide/simethicone Suspension 30 milliLiter(s) Oral every 4 hours PRN Dyspepsia  melatonin 3 milliGRAM(s) Oral at bedtime PRN Insomnia  ondansetron Injectable 4 milliGRAM(s) IV Push every 8 hours PRN Nausea and/or Vomiting      Allergies    No Known Allergies    Intolerances        PERTINENT MEDICATION HISTORY:    SOCIAL HISTORY:  OCCUPATION:  TRAVEL HISTORY:    FAMILY HISTORY:      Vital Signs Last 24 Hrs  T(C): 36.9 (28 Oct 2023 08:45), Max: 38.6 (27 Oct 2023 17:47)  T(F): 98.4 (28 Oct 2023 08:45), Max: 101.4 (27 Oct 2023 17:47)  HR: 71 (28 Oct 2023 08:45) (71 - 101)  BP: 101/66 (28 Oct 2023 08:45) (101/66 - 115/74)  BP(mean): --  RR: 18 (28 Oct 2023 08:45) (17 - 18)  SpO2: 96% (28 Oct 2023 08:45) (96% - 98%)    Parameters below as of 28 Oct 2023 08:45  Patient On (Oxygen Delivery Method): room air        Physical Exam:  General: NAD, cachectic   HEENT: EOMI, narrow oral aperture   CVS: +S1/S2, RRR, no murmurs  Resp: CTA b/l  GI: non-distended   MSK: no synovitis on exam   Neuro: AAOx3  Skin:  telangiectasias on face, chest and digits. Tips of digits with evidence of healed ulceration, no significant skin thickening c/f sclerodactyly     LABS:                        8.9    8.11  )-----------( 193      ( 28 Oct 2023 06:51 )             32.2     10-28    135  |  104  |  22  ----------------------------<  89  5.2   |  20<L>  |  0.68    Ca    8.8      28 Oct 2023 06:51  Phos  4.0     10-28  Mg     2.2     10-28        Urinalysis Basic - ( 28 Oct 2023 06:51 )    Color: x / Appearance: x / SG: x / pH: x  Gluc: 89 mg/dL / Ketone: x  / Bili: x / Urobili: x   Blood: x / Protein: x / Nitrite: x   Leuk Esterase: x / RBC: x / WBC x   Sq Epi: x / Non Sq Epi: x / Bacteria: x      Rheumatology Work Up      RADIOLOGY & ADDITIONAL STUDIES:  < from: CT Abdomen and Pelvis w/ IV Cont (10.22.23 @ 03:03) >  IMPRESSION:  Cholecystostomy tube with tip in the region of the contracted gallbladder   fundus. If clinical suspicion for dislodgment is present follow-up tube   check should be obtained.    Short segment marked colonic bowel wall thickening involving the   ascending colon concerning for underlying mass/malignancy. Follow-up   colonoscopy is recommended for definitive diagnosis.    1.8 cm hepatic lesion, indeterminant. Additionallow-attenuation lesions   too small to clearly characterize.    < end of copied text >     NATALIA VELA  [id number]    HISTORY OF PRESENT ILLNESS:  HPI:  68F PMH scleroderma, pHTN, HTN with recent emphysematous cholecystitis s/p percutaneous cholecystectomy with IR on 9/11 presents following dislodged tube. Patient was discharged on 9/14 after procedure and course of zosyn, and has had unremarkable outpatient course. CTAP performed in ED revealed cholecystostomy tube with tip in region of contracted gallbladder Incidentally CT A/P revealed thickening of the ascending colon, with concern for underlying mass or malignancy. Surgery consult requested in ED, and recommending medicine admission.  (22 Oct 2023 14:09)    Mandarin  Archana ID # [id number] used for history     Rheumatology consulted for scleroderma management.     Brief hospital course  Found to have CT imaging c/f colon mass. Underwent colonoscopy that shows ascending colon mass, pathology c/f invasive adenocarcinoma  MRI with c/f Liver mets  CEA level elevated 57  colorectal surgery following, plan for bowel resection and cholecystectomy.   Underwent TTE and cardiology evaluation for pulmonary HTN. Showed preserved EF, no pulm HTN and normal RV size and function. Pt does not require supplemental oxygen.   Pt currently not on therapy for scleroderma     Per note left bedside by pt's daughter and pt:  Scleroderma hx  -dx 20 years ago, presented with pulmonary HTN and arthritis. Also has telangiectasias, significant Raynaud's c/b digital ulcers.   -Was previously following with a rheumatologist in [place], however can't remember the last time she saw them. Also was on a medication in [place], but states production was stopped for it? As well, it did not improve her symptoms.  - denies kidney involvement of scleroderma     Endorsing R knee pain, ankle pain, Raynaud's symptoms with ulceration, and thinning of her lips. Denies skin thickening on her face or digits, denies SOB. Pt previously had acid reflux symptoms, which she on longer endorses  Denies hx of scleroderma renal crisis, currently on losartan for BP management.       Rheum ROS  See above    MEDICATIONS  (STANDING):  enoxaparin Injectable 40 milliGRAM(s) SubCutaneous every 24 hours  influenza  Vaccine (HIGH DOSE) 0.7 milliLiter(s) IntraMuscular once  losartan 25 milliGRAM(s) Oral daily  piperacillin/tazobactam IVPB. 3.375 Gram(s) IV Intermittent once  piperacillin/tazobactam IVPB.- 3.375 Gram(s) IV Intermittent once  piperacillin/tazobactam IVPB.. 3.375 Gram(s) IV Intermittent every 8 hours  polyethylene glycol 3350 17 Gram(s) Oral two times a day    MEDICATIONS  (PRN):  acetaminophen     Tablet .. 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       Vital Signs Last 24 Hrs  T(C): 36.9 (28 Oct 2023 08:45), Max: 38.6 (27 Oct 2023 17:47)  T(F): 98.4 (28 Oct 2023 08:45), Max: 101.4 (27 Oct 2023 17:47)  HR: 71 (28 Oct 2023 08:45) (71 - 101)  BP: 101/66 (28 Oct 2023 08:45) (101/66 - 115/74)  RR: 18 (28 Oct 2023 08:45) (17 - 18)  SpO2: 96% (28 Oct 2023 08:45) (96% - 98%)    Parameters below as of 28 Oct 2023 08:45  Patient On (Oxygen Delivery Method): room air        Physical Exam:  General: NAD, cachectic   HEENT: EOMI, narrow oral aperture   CVS: +S1/S2, RRR, no murmurs  Resp: CTA b/l  GI: non-distended   MSK: no synovitis on exam   Neuro: AAOx3  Skin:  telangiectasias on face, chest and digits. Tips of digits with evidence of healed ulceration, no significant skin thickening c/f sclerodactyly     LABS:                        8.9    8.11  )-----------( 193      ( 28 Oct 2023 06:51 )             32.2     10-28    135  |  104  |  22  ----------------------------<  89  5.2   |  20<L>  |  0.68    Ca    8.8      28 Oct 2023 06:51  Phos  4.0     10-28  Mg     2.2     10-28        Urinalysis Basic - ( 28 Oct 2023 06:51 )    Color: x / Appearance: x / SG: x / pH: x  Gluc: 89 mg/dL / Ketone: x  / Bili: x / Urobili: x   Blood: x / Protein: x / Nitrite: x   Leuk Esterase: x / RBC: x / WBC x   Sq Epi: x / Non Sq Epi: x / Bacteria: x      Rheumatology Work Up      RADIOLOGY & ADDITIONAL STUDIES:  < from: CT Abdomen and Pelvis w/ IV Cont (10.22.23 @ 03:03) >  IMPRESSION:  Cholecystostomy tube with tip in the region of the contracted gallbladder   fundus. If clinical suspicion for dislodgment is present follow-up tube   check should be obtained.    Short segment marked colonic bowel wall thickening involving the   ascending colon concerning for underlying mass/malignancy. Follow-up   colonoscopy is recommended for definitive diagnosis.    1.8 cm hepatic lesion, indeterminant. Additionallow-attenuation lesions   too small to clearly characterize.    < end of copied text >

## 2023-10-28 NOTE — PROGRESS NOTE ADULT - ASSESSMENT
Ascending colon mass  -noted on colonoscopy   -Planned for resection on wed 11/1  -surgical care appreciated

## 2023-10-28 NOTE — CONSULT NOTE ADULT - ATTENDING COMMENTS
67 yo F with PMH of scleroderma, pulmonary htn, htn, hld, recent hospitalization for cholecystitis s/p percutaneous cholecystostomy by IR, who presented with dislodged biliary tube, found to have imaging findings concerning for ascending colon mass with hepatic metastases, s/p perc lj tube check on 10/23, and s/p colonoscopy and biopsy on 10/24/23. Patient is planned for OR for surgical resection of colonic mass on 10/31/23.  Patient will be referred for further management at the [facility] following discharge.
agree with above with modifications     Amalia Padilla MD  [phone]

## 2023-10-29 LAB
ANION GAP SERPL CALC-SCNC: 11 MMOL/L — SIGNIFICANT CHANGE UP (ref 5–17)
ANION GAP SERPL CALC-SCNC: 11 MMOL/L — SIGNIFICANT CHANGE UP (ref 5–17)
BUN SERPL-MCNC: 27 MG/DL — HIGH (ref 7–23)
BUN SERPL-MCNC: 27 MG/DL — HIGH (ref 7–23)
CALCIUM SERPL-MCNC: 8.7 MG/DL — SIGNIFICANT CHANGE UP (ref 8.4–10.5)
CALCIUM SERPL-MCNC: 8.7 MG/DL — SIGNIFICANT CHANGE UP (ref 8.4–10.5)
CHLORIDE SERPL-SCNC: 102 MMOL/L — SIGNIFICANT CHANGE UP (ref 96–108)
CHLORIDE SERPL-SCNC: 102 MMOL/L — SIGNIFICANT CHANGE UP (ref 96–108)
CO2 SERPL-SCNC: 24 MMOL/L — SIGNIFICANT CHANGE UP (ref 22–31)
CO2 SERPL-SCNC: 24 MMOL/L — SIGNIFICANT CHANGE UP (ref 22–31)
CREAT SERPL-MCNC: 0.86 MG/DL — SIGNIFICANT CHANGE UP (ref 0.5–1.3)
CREAT SERPL-MCNC: 0.86 MG/DL — SIGNIFICANT CHANGE UP (ref 0.5–1.3)
CULTURE RESULTS: SIGNIFICANT CHANGE UP
CULTURE RESULTS: SIGNIFICANT CHANGE UP
EGFR: 74 ML/MIN/1.73M2 — SIGNIFICANT CHANGE UP
EGFR: 74 ML/MIN/1.73M2 — SIGNIFICANT CHANGE UP
GLUCOSE SERPL-MCNC: 91 MG/DL — SIGNIFICANT CHANGE UP (ref 70–99)
GLUCOSE SERPL-MCNC: 91 MG/DL — SIGNIFICANT CHANGE UP (ref 70–99)
HCT VFR BLD CALC: 24.4 % — LOW (ref 34.5–45)
HCT VFR BLD CALC: 24.4 % — LOW (ref 34.5–45)
HCT VFR BLD CALC: 28.1 % — LOW (ref 34.5–45)
HCT VFR BLD CALC: 28.1 % — LOW (ref 34.5–45)
HGB BLD-MCNC: 7.5 G/DL — LOW (ref 11.5–15.5)
HGB BLD-MCNC: 7.5 G/DL — LOW (ref 11.5–15.5)
HGB BLD-MCNC: 8.1 G/DL — LOW (ref 11.5–15.5)
HGB BLD-MCNC: 8.1 G/DL — LOW (ref 11.5–15.5)
MAGNESIUM SERPL-MCNC: 2.2 MG/DL — SIGNIFICANT CHANGE UP (ref 1.6–2.6)
MAGNESIUM SERPL-MCNC: 2.2 MG/DL — SIGNIFICANT CHANGE UP (ref 1.6–2.6)
MCHC RBC-ENTMCNC: 23.3 PG — LOW (ref 27–34)
MCHC RBC-ENTMCNC: 23.3 PG — LOW (ref 27–34)
MCHC RBC-ENTMCNC: 24.3 PG — LOW (ref 27–34)
MCHC RBC-ENTMCNC: 24.3 PG — LOW (ref 27–34)
MCHC RBC-ENTMCNC: 28.8 GM/DL — LOW (ref 32–36)
MCHC RBC-ENTMCNC: 28.8 GM/DL — LOW (ref 32–36)
MCHC RBC-ENTMCNC: 30.7 GM/DL — LOW (ref 32–36)
MCHC RBC-ENTMCNC: 30.7 GM/DL — LOW (ref 32–36)
MCV RBC AUTO: 79 FL — LOW (ref 80–100)
MCV RBC AUTO: 79 FL — LOW (ref 80–100)
MCV RBC AUTO: 81 FL — SIGNIFICANT CHANGE UP (ref 80–100)
MCV RBC AUTO: 81 FL — SIGNIFICANT CHANGE UP (ref 80–100)
NRBC # BLD: 0 /100 WBCS — SIGNIFICANT CHANGE UP (ref 0–0)
PHOSPHATE SERPL-MCNC: 3.2 MG/DL — SIGNIFICANT CHANGE UP (ref 2.5–4.5)
PHOSPHATE SERPL-MCNC: 3.2 MG/DL — SIGNIFICANT CHANGE UP (ref 2.5–4.5)
PLATELET # BLD AUTO: 194 K/UL — SIGNIFICANT CHANGE UP (ref 150–400)
PLATELET # BLD AUTO: 194 K/UL — SIGNIFICANT CHANGE UP (ref 150–400)
PLATELET # BLD AUTO: 206 K/UL — SIGNIFICANT CHANGE UP (ref 150–400)
PLATELET # BLD AUTO: 206 K/UL — SIGNIFICANT CHANGE UP (ref 150–400)
POTASSIUM SERPL-MCNC: 4.5 MMOL/L — SIGNIFICANT CHANGE UP (ref 3.5–5.3)
POTASSIUM SERPL-MCNC: 4.5 MMOL/L — SIGNIFICANT CHANGE UP (ref 3.5–5.3)
POTASSIUM SERPL-SCNC: 4.5 MMOL/L — SIGNIFICANT CHANGE UP (ref 3.5–5.3)
POTASSIUM SERPL-SCNC: 4.5 MMOL/L — SIGNIFICANT CHANGE UP (ref 3.5–5.3)
RBC # BLD: 3.09 M/UL — LOW (ref 3.8–5.2)
RBC # BLD: 3.09 M/UL — LOW (ref 3.8–5.2)
RBC # BLD: 3.47 M/UL — LOW (ref 3.8–5.2)
RBC # BLD: 3.47 M/UL — LOW (ref 3.8–5.2)
RBC # FLD: 19.2 % — HIGH (ref 10.3–14.5)
RBC # FLD: 19.2 % — HIGH (ref 10.3–14.5)
RBC # FLD: 19.4 % — HIGH (ref 10.3–14.5)
RBC # FLD: 19.4 % — HIGH (ref 10.3–14.5)
SODIUM SERPL-SCNC: 137 MMOL/L — SIGNIFICANT CHANGE UP (ref 135–145)
SODIUM SERPL-SCNC: 137 MMOL/L — SIGNIFICANT CHANGE UP (ref 135–145)
SPECIMEN SOURCE: SIGNIFICANT CHANGE UP
SPECIMEN SOURCE: SIGNIFICANT CHANGE UP
WBC # BLD: 6.79 K/UL — SIGNIFICANT CHANGE UP (ref 3.8–10.5)
WBC # BLD: 6.79 K/UL — SIGNIFICANT CHANGE UP (ref 3.8–10.5)
WBC # BLD: 6.86 K/UL — SIGNIFICANT CHANGE UP (ref 3.8–10.5)
WBC # BLD: 6.86 K/UL — SIGNIFICANT CHANGE UP (ref 3.8–10.5)
WBC # FLD AUTO: 6.79 K/UL — SIGNIFICANT CHANGE UP (ref 3.8–10.5)
WBC # FLD AUTO: 6.79 K/UL — SIGNIFICANT CHANGE UP (ref 3.8–10.5)
WBC # FLD AUTO: 6.86 K/UL — SIGNIFICANT CHANGE UP (ref 3.8–10.5)
WBC # FLD AUTO: 6.86 K/UL — SIGNIFICANT CHANGE UP (ref 3.8–10.5)

## 2023-10-29 PROCEDURE — 99232 SBSQ HOSP IP/OBS MODERATE 35: CPT

## 2023-10-29 PROCEDURE — 93010 ELECTROCARDIOGRAM REPORT: CPT

## 2023-10-29 PROCEDURE — 74018 RADEX ABDOMEN 1 VIEW: CPT | Mod: 26

## 2023-10-29 RX ADMIN — POLYETHYLENE GLYCOL 3350 17 GRAM(S): 17 POWDER, FOR SOLUTION ORAL at 19:41

## 2023-10-29 RX ADMIN — PIPERACILLIN AND TAZOBACTAM 25 GRAM(S): 4; .5 INJECTION, POWDER, LYOPHILIZED, FOR SOLUTION INTRAVENOUS at 10:52

## 2023-10-29 RX ADMIN — LOSARTAN POTASSIUM 25 MILLIGRAM(S): 100 TABLET, FILM COATED ORAL at 05:40

## 2023-10-29 RX ADMIN — PIPERACILLIN AND TAZOBACTAM 25 GRAM(S): 4; .5 INJECTION, POWDER, LYOPHILIZED, FOR SOLUTION INTRAVENOUS at 19:36

## 2023-10-29 RX ADMIN — ENOXAPARIN SODIUM 40 MILLIGRAM(S): 100 INJECTION SUBCUTANEOUS at 15:10

## 2023-10-29 RX ADMIN — POLYETHYLENE GLYCOL 3350 17 GRAM(S): 17 POWDER, FOR SOLUTION ORAL at 05:40

## 2023-10-29 RX ADMIN — PIPERACILLIN AND TAZOBACTAM 25 GRAM(S): 4; .5 INJECTION, POWDER, LYOPHILIZED, FOR SOLUTION INTRAVENOUS at 01:33

## 2023-10-29 NOTE — PROGRESS NOTE ADULT - NSPROGADDITIONALINFOA_GEN_ALL_CORE
Time-based billing (NON-critical care).      minutes spent on total encounter. The necessity of the time spent during the encounter on this date of service was due to:     - Reviewing, and interpreting labs, testing, and imaging.  - Independently obtaining a review of systems and performing a physical exam  - Reviewing prior records and where necessary, outpatient records.  - Counselling and educating patient regarding interpretation of aforementioned items and plan of care. Time-based billing (NON-critical care).     40 minutes spent on total encounter. The necessity of the time spent during the encounter on this date of service was due to:     - Reviewing, and interpreting labs, testing, and imaging.  - Independently obtaining a review of systems and performing a physical exam  - Reviewing prior records and where necessary, outpatient records.  - Counselling and educating patient regarding interpretation of aforementioned items and plan of care.    Will sign off. Please call if any further questions/concerns.   Discussed with surgical team.

## 2023-10-29 NOTE — PROGRESS NOTE ADULT - SUBJECTIVE AND OBJECTIVE BOX
GREEN TEAM SURGERY DAILY PROGRESS NOTE    INTERVAL EVENTS: NAEO    SUBJECTIVE: Patient seen and examined at bedside on AM rounds. Patient reports that they're feeling well. Denies fever, chills, N/V, chest pain, SOB.     OBJECTIVE:  Vital Signs Last 24 Hrs  T(C): 37.1 (28 Oct 2023 20:54), Max: 37.7 (28 Oct 2023 16:13)  T(F): 98.7 (28 Oct 2023 20:54), Max: 99.8 (28 Oct 2023 16:13)  HR: 89 (28 Oct 2023 20:54) (71 - 92)  BP: 104/61 (28 Oct 2023 20:54) (91/57 - 104/61)  BP(mean): --  RR: 18 (28 Oct 2023 20:54) (18 - 18)  SpO2: 96% (28 Oct 2023 20:54) (95% - 98%)    Parameters below as of 28 Oct 2023 20:54  Patient On (Oxygen Delivery Method): room air    STANDING  enoxaparin Injectable 40 milliGRAM(s) SubCutaneous every 24 hours  influenza  Vaccine (HIGH DOSE) 0.7 milliLiter(s) IntraMuscular once  losartan 25 milliGRAM(s) Oral daily  piperacillin/tazobactam IVPB.. 3.375 Gram(s) IV Intermittent every 8 hours  polyethylene glycol 3350 17 Gram(s) Oral two times a day    PRN  acetaminophen     Tablet .. 650 milliGRAM(s) Oral every 6 hours PRN Temp greater or equal to 38C (100.4F), Mild Pain (1 - 3)  aluminum hydroxide/magnesium hydroxide/simethicone Suspension 30 milliLiter(s) Oral every 4 hours PRN Dyspepsia  melatonin 3 milliGRAM(s) Oral at bedtime PRN Insomnia  ondansetron Injectable 4 milliGRAM(s) IV Push every 8 hours PRN Nausea and/or Vomiting      Labs:  135  |  104  |  22  ----------------------------<  89    (10-28)  5.2   |  20<L>  |  0.68          Ca    8.8      10-28  Mg    2.2  Phos  4.0          Urinalysis Basic - ( 28 Oct 2023 15:35 )    Color: Yellow / Appearance: Clear / S.016 / pH: x  Gluc: x / Ketone: Negative  / Bili: Negative / Urobili: Negative   Blood: x / Protein: Negative / Nitrite: Negative   Leuk Esterase: Negative / RBC: x / WBC x   Sq Epi: x / Non Sq Epi: x / Bacteria: x    Physical Exam:  General: NAD, resting in bed comfortably.  Cardiac: regular rate, warm and well perfused  Respiratory: Nonlabored respirations, normal cw expansion.  Abdomen: soft, nontender, nondistended. Perc lj tube continues to drain fluid  Extremities: normal strength, FROM, no deformities   GREEN TEAM SURGERY DAILY PROGRESS NOTE    INTERVAL EVENTS: NAEO    SUBJECTIVE: Patient seen and examined at bedside on AM rounds. Patient reports that they're feeling well. She continues to pass flatus and have non-bloody bowel movements and denies abdominal pain. Patient asked if surgery would still be Monday, but team informed patient that it had been moved to Wednesday, patient expressed understanding. Denies fever, chills, N/V, chest pain, SOB.     OBJECTIVE:  Vital Signs Last 24 Hrs  T(C): 37.1 (28 Oct 2023 20:54), Max: 37.7 (28 Oct 2023 16:13)  T(F): 98.7 (28 Oct 2023 20:54), Max: 99.8 (28 Oct 2023 16:13)  HR: 89 (28 Oct 2023 20:54) (71 - 92)  BP: 104/61 (28 Oct 2023 20:54) (91/57 - 104/61)  BP(mean): --  RR: 18 (28 Oct 2023 20:54) (18 - 18)  SpO2: 96% (28 Oct 2023 20:54) (95% - 98%)    Parameters below as of 28 Oct 2023 20:54  Patient On (Oxygen Delivery Method): room air    STANDING  enoxaparin Injectable 40 milliGRAM(s) SubCutaneous every 24 hours  influenza  Vaccine (HIGH DOSE) 0.7 milliLiter(s) IntraMuscular once  losartan 25 milliGRAM(s) Oral daily  piperacillin/tazobactam IVPB.. 3.375 Gram(s) IV Intermittent every 8 hours  polyethylene glycol 3350 17 Gram(s) Oral two times a day    PRN  acetaminophen     Tablet .. 650 milliGRAM(s) Oral every 6 hours PRN Temp greater or equal to 38C (100.4F), Mild Pain (1 - 3)  aluminum hydroxide/magnesium hydroxide/simethicone Suspension 30 milliLiter(s) Oral every 4 hours PRN Dyspepsia  melatonin 3 milliGRAM(s) Oral at bedtime PRN Insomnia  ondansetron Injectable 4 milliGRAM(s) IV Push every 8 hours PRN Nausea and/or Vomiting      Labs:  135  |  104  |  22  ----------------------------<  89    (10-28)  5.2   |  20<L>  |  0.68          Ca    8.8      10-28  Mg    2.2  Phos  4.0          Urinalysis Basic - ( 28 Oct 2023 15:35 )    Color: Yellow / Appearance: Clear / S.016 / pH: x  Gluc: x / Ketone: Negative  / Bili: Negative / Urobili: Negative   Blood: x / Protein: Negative / Nitrite: Negative   Leuk Esterase: Negative / RBC: x / WBC x   Sq Epi: x / Non Sq Epi: x / Bacteria: x    Physical Exam:  General: NAD, resting in bed comfortably.  Cardiac: regular rate, warm and well perfused  Respiratory: Nonlabored respirations, normal cw expansion.  Abdomen: soft, nontender, nondistended. Perc lj tube continues to drain fluid  Extremities: normal strength, FROM, no deformities

## 2023-10-29 NOTE — PROGRESS NOTE ADULT - PROBLEM SELECTOR PLAN 3
As seen on September hospitalization. Status post course of Zosyn at that time, and with perc lj. Drain partially removed and subsequently replaced prior to admission  - Appreciate IR evaluation -s/p percutaneous cholecystostomy tube check 10/25.
Patient takes irbesartan 75 mg PO Qd as an outpatient   - Continue as losartan 25 mg PO Qd while inpatient
As seen on September hospitalization. Status post course of Zosyn at that time, and with perc lj. Drain partially removed and subsequently replaced prior to admission  - Appreciate IR evaluation -s/p percutaneous cholecystostomy tube check 10/25.
Patient takes irbesartan 75 mg PO Qd as an outpatient   - Continue as losartan 25 mg PO Qd while inpatient
Patient takes irbesartan 75 mg PO Qd as an outpatient   - Continue as losartan 25 mg PO Qd while inpatient

## 2023-10-29 NOTE — PROGRESS NOTE ADULT - SUBJECTIVE AND OBJECTIVE BOX
DATE OF SERVICE: 10-29-23 @ 09:48    Patient is a 68y old  Female who presents with a chief complaint of Dislodged percutaneous lj tube (29 Oct 2023 09:27)      INTERVAL HISTORY:     REVIEW OF SYSTEMS:  CONSTITUTIONAL: No weakness  EYES/ENT: No visual changes;  No throat pain   NECK: No pain or stiffness  RESPIRATORY: No cough, wheezing; No shortness of breath  CARDIOVASCULAR: No chest pain or palpitations  GASTROINTESTINAL: No abdominal  pain. No nausea, vomiting, or hematemesis  GENITOURINARY: No dysuria, frequency or hematuria  NEUROLOGICAL: No stroke like symptoms  SKIN: No rashes    TELEMETRY Personally reviewed:  	  MEDICATIONS:  losartan 25 milliGRAM(s) Oral daily        PHYSICAL EXAM:  T(C): 36.9 (10-29-23 @ 09:13), Max: 37.7 (10-28-23 @ 16:13)  HR: 81 (10-29-23 @ 09:13) (81 - 92)  BP: 108/71 (10-29-23 @ 09:14) (91/57 - 108/71)  RR: 18 (10-29-23 @ 09:13) (18 - 18)  SpO2: 94% (10-29-23 @ 09:13) (94% - 97%)  Wt(kg): --  I&O's Summary    28 Oct 2023 07:01  -  29 Oct 2023 07:00  --------------------------------------------------------  IN: 1260 mL / OUT: 80 mL / NET: 1180 mL    29 Oct 2023 07:01  -  29 Oct 2023 09:48  --------------------------------------------------------  IN: 240 mL / OUT: 0 mL / NET: 240 mL      Height (cm): 144.8 (10-29 @ 08:07)  Weight (kg): 38.6 (10-29 @ 08:07)  BMI (kg/m2): 18.4 (10-29 @ 08:07)  BSA (m2): 1.25 (10-29 @ 08:07)    Appearance: In no distress	  HEENT:    PERRL, EOMI	  Cardiovascular:  S1 S2, No JVD  Respiratory: Lungs clear to auscultation	  Gastrointestinal:  Soft, Non-tender, + BS	  Vascularature:  No edema of LE  Psychiatric: Appropriate affect   Neuro: no acute focal deficits                               8.1    6.79  )-----------( 206      ( 29 Oct 2023 07:12 )             28.1     10-29    137  |  102  |  27<H>  ----------------------------<  91  4.5   |  24  |  0.86    Ca    8.7      29 Oct 2023 07:09  Phos  3.2     10-29  Mg     2.2     10-29          Labs personally reviewed      ASSESSMENT/PLAN: 	  Echo 10/25/23   1. Left ventricular systolic function is normal with an ejection fraction of 66 % by Lundberg's method of disks.   2. Normal left ventricular diastolic function.   3. Normal right ventricular cavity size and systolic function.   4. Fibrocalcific aortic valve sclerosis without stenosis.   5. No prior echocardiogram is available for comparison.      NST 10/26/23    1. Normal myocardial perfusion scan, with no evidence of infarction or inducible ischemia.   2. Stress electrocardiogram: No ischemic ST segment changes.   3. Normal left ventricular regional wall motion.        Labs personally reviewed      ASSESSMENT/PLAN: 	    68F PMH scleroderma, pHTN, HTN with recent emphysematous cholecystitis s/p percutaneous cholecystectomy with IR on 9/11 presents following dislodged tube. Patient was discharged on 9/14 after procedure and course of zosyn, and has had unremarkable outpatient course. CT A/P revealed thickening of the ascending colon, with concern for underlying mass or malignancy. s/p colonoscopy also suspicious for malignancy. Patient denies CP, SOB or palpitations.    1. Cardiac Risk Stratification  - Patient with hx of scleroderma and pulm HTN but reports average to excellent functional capacity.   - Does not utilize home oxygen. Denies CP or SOB.  - Not in decompensated HF  - No hx of tachy ang arrhythmia. ECG SB with no ischemia noted.  - No hx of severe AS/MS. TTE with no valvular dysfunction.  - TTE with preserved EF, no WMA, normal LV and RV function and size  - Patient is mod risk for mod risk colorectal surgery if needed. No contraindication to proceed pending ischemic eval with NST.  - NST wnl as above     2. Pulmonary Hypertension  - TTE as above shows preserved EF, no pulm HTN and normal RV size and function  - Does not use supplemental oxygen    3. Scleroderma  - Not currently on therapy    4. Hypertension  - c/w losartan 25mg PO daily (therapeutic interchange for irbesartan 75mg PO daily)    5. Ascending Colon Mass  Perc lj tube placement confirmed by IR tube study on 10/25  Recent Colonoscopy shows likely malignancy in ascending colon  CEA level elevated: 57  Ascending colon mass, path c/w adenocarcinoma  -Planned for resection on wed 11/1 as well as cholecystectomy  -surgical care appreciated                        JONATAN Juárez DO [facility]  Cardiovascular Medicine  [address] [place] [address], [address]  Available through call or text on [employer] TEAMs  Office: [phone]   DATE OF SERVICE: 10-29-23 @ 09:48    Patient is a 68y old  Female who presents with a chief complaint of Dislodged percutaneous lj tube (29 Oct 2023 09:27)      INTERVAL HISTORY:     REVIEW OF SYSTEMS:  CONSTITUTIONAL: No weakness  EYES/ENT: No visual changes;  No throat pain   NECK: No pain or stiffness  RESPIRATORY: No cough, wheezing; No shortness of breath  CARDIOVASCULAR: No chest pain or palpitations  GASTROINTESTINAL: No abdominal  pain. No nausea, vomiting, or hematemesis  GENITOURINARY: No dysuria, frequency or hematuria  NEUROLOGICAL: No stroke like symptoms  SKIN: No rashes    TELEMETRY Personally reviewed:  	  MEDICATIONS:  losartan 25 milliGRAM(s) Oral daily        PHYSICAL EXAM:  T(C): 36.9 (10-29-23 @ 09:13), Max: 37.7 (10-28-23 @ 16:13)  HR: 81 (10-29-23 @ 09:13) (81 - 92)  BP: 108/71 (10-29-23 @ 09:14) (91/57 - 108/71)  RR: 18 (10-29-23 @ 09:13) (18 - 18)  SpO2: 94% (10-29-23 @ 09:13) (94% - 97%)  Wt(kg): --  I&O's Summary    28 Oct 2023 07:01  -  29 Oct 2023 07:00  --------------------------------------------------------  IN: 1260 mL / OUT: 80 mL / NET: 1180 mL    29 Oct 2023 07:01  -  29 Oct 2023 09:48  --------------------------------------------------------  IN: 240 mL / OUT: 0 mL / NET: 240 mL      Height (cm): 144.8 (10-29 @ 08:07)  Weight (kg): 38.6 (10-29 @ 08:07)  BMI (kg/m2): 18.4 (10-29 @ 08:07)  BSA (m2): 1.25 (10-29 @ 08:07)    Appearance: In no distress	  HEENT:    PERRL, EOMI	  Cardiovascular:  S1 S2, No JVD  Respiratory: Lungs clear to auscultation	  Gastrointestinal:  Soft, Non-tender, + BS	  Vascularature:  No edema of LE  Psychiatric: Appropriate affect   Neuro: no acute focal deficits                               8.1    6.79  )-----------( 206      ( 29 Oct 2023 07:12 )             28.1     10-29    137  |  102  |  27<H>  ----------------------------<  91  4.5   |  24  |  0.86    Ca    8.7      29 Oct 2023 07:09  Phos  3.2     10-29  Mg     2.2     10-29          Labs personally reviewed      ASSESSMENT/PLAN: 	  Echo 10/25/23   1. Left ventricular systolic function is normal with an ejection fraction of 66 % by Lundberg's method of disks.   2. Normal left ventricular diastolic function.   3. Normal right ventricular cavity size and systolic function.   4. Fibrocalcific aortic valve sclerosis without stenosis.   5. No prior echocardiogram is available for comparison.      NST 10/26/23    1. Normal myocardial perfusion scan, with no evidence of infarction or inducible ischemia.   2. Stress electrocardiogram: No ischemic ST segment changes.   3. Normal left ventricular regional wall motion.        Labs personally reviewed      ASSESSMENT/PLAN: 	    68F PMH scleroderma, pHTN, HTN with recent emphysematous cholecystitis s/p percutaneous cholecystectomy with IR on 9/11 presents following dislodged tube. Patient was discharged on 9/14 after procedure and course of zosyn, and has had unremarkable outpatient course. CT A/P revealed thickening of the ascending colon, with concern for underlying mass or malignancy. s/p colonoscopy also suspicious for malignancy. Patient denies CP, SOB or palpitations.    1. Cardiac Risk Stratification  - Patient with hx of scleroderma and pulm HTN but reports average to excellent functional capacity.   - Does not utilize home oxygen. Denies CP or SOB.  - Not in decompensated HF  - No hx of tachy ang arrhythmia. ECG SB with no ischemia noted.  - No hx of severe AS/MS. TTE with no valvular dysfunction.  - TTE with preserved EF, no WMA, normal LV and RV function and size  - Patient is mod risk for mod risk colorectal surgery if needed. No contraindication to proceed pending ischemic eval with NST.  - NST normal    2. Pulmonary Hypertension  - TTE as above shows preserved EF, no pulm HTN and normal RV size and function  - Does not use supplemental oxygen    3. Scleroderma  - Not currently on therapy    4. Hypertension  - c/w losartan 25mg PO daily (therapeutic interchange for irbesartan 75mg PO daily)    5. Ascending Colon Mass  Perc lj tube placement confirmed by IR tube study on 10/25  Recent Colonoscopy shows likely malignancy in ascending colon  CEA level elevated: 57  Ascending colon mass, path c/w adenocarcinoma  -Planned for resection on wed 11/1 as well as cholecystectomy  -surgical care appreciated              JONATAN Juárez DO [facility]  Cardiovascular Medicine  [address] [facility], [address]  Available through call or text on [employer] TEAMs  Office: [phone]

## 2023-10-29 NOTE — PROGRESS NOTE ADULT - PROBLEM SELECTOR PROBLEM 1
Intestinal mass
Adenocarcinoma of colon metastatic to liver
Intestinal mass
Adenocarcinoma of colon metastatic to liver

## 2023-10-29 NOTE — PROGRESS NOTE ADULT - ASSESSMENT
Ascending colon mass  -noted on colonoscopy   -Planned for resection on wed 11/1 as well as cholecystectomy  -surgical care appreciated Ascending colon mass, path c/w adenocarcinoma  -noted on colonoscopy   -Planned for resection on wed 11/1 as well as cholecystectomy  -surgical care appreciated

## 2023-10-29 NOTE — PROGRESS NOTE ADULT - PROBLEM SELECTOR PROBLEM 3
HTN (hypertension)
HTN (hypertension)
Emphysematous cholecystitis
Emphysematous cholecystitis
HTN (hypertension)

## 2023-10-29 NOTE — PROGRESS NOTE ADULT - PROBLEM SELECTOR PROBLEM 2
Intestinal mass
Intestinal mass
Emphysematous cholecystitis

## 2023-10-29 NOTE — PROGRESS NOTE ADULT - PROBLEM SELECTOR PROBLEM 5
Anemia of chronic disease
Scleroderma
Anemia of chronic disease
Scleroderma
Anemia of chronic disease

## 2023-10-29 NOTE — PROGRESS NOTE ADULT - ASSESSMENT
68F hx scleroderma and pulmonary hypertension, s/p percutaneous cholecystostomy with IR on 9/11 for emphysematous cholecystitis, now presenting with partially dislodged per lj tube. CTAP with cholecystostomy tube with tip in the region of the contracted gallbladder, no acute intraabdominal pathology. Of note, also noted was a short segment of marked colonic bowel wall thickening involving the ascending colon concerning for underlying mass/malignancy.     Recommendations:  - Surgical planning for OR Weds(11/1) for colectomy and laparoscopic cholecystectomy  - Cardiology reccomendations appreciated -> patient is moderate risk for moderate risk surgery, no contraindication to surgery  - Oncology recommendations appreciated -> OR for surgical resection w/ surgical team and [facility]. No systemic therapy this admission  - Perc lj tube placement confirmed by IR tube study on 10/25  - c/w Zosyn  - Rheumatology recommendations appreciated -> Goal BP SBP <110, avoid steroids to prevent precipitation sclerodermal renal crisis. No further immunosuppressive therapy at this time    [employer] [facility]  [id number] 68F hx scleroderma and pulmonary hypertension, s/p percutaneous cholecystostomy with IR on 9/11 for emphysematous cholecystitis, now presenting with partially dislodged per lj tube. CTAP with cholecystostomy tube with tip in the region of the contracted gallbladder, no acute intraabdominal pathology. Of note, also noted was a short segment of marked colonic bowel wall thickening involving the ascending colon concerning for underlying mass/malignancy.     Recommendations:  - Surgical planning for OR Weds(11/1) for colectomy and laparoscopic cholecystectomy  - Cardiology recommendations appreciated -> patient is moderate risk for moderate risk surgery, no contraindication to surgery (echo and cardiac stress test show no abnormalities)  - Medicine recommendations appreciated -> patient cleared for surgery from medicine perspective, signed off 10/29  - Oncology recommendations appreciated -> OR for surgical resection w/ surgical team and close outpatient follow-up with [facility]. No systemic therapy this admission  - Perc lj tube placement confirmed by IR tube study on 10/25  - c/w Zosyn  - Rheumatology recommendations appreciated -> Goal BP SBP <110, avoid steroids to prevent precipitation sclerodermal renal crisis. No further immunosuppressive therapy at this time  - Dispo: patient's family currently working with  to obtain emergency medicaid, which may affect where patient may follow-up upon discharge, TBD    [facility]  [id number]

## 2023-10-29 NOTE — PROGRESS NOTE ADULT - SUBJECTIVE AND OBJECTIVE BOX
[facility] Division of Hospital Medicine  Jennifer Nicolas DO  Pager (M-F, 8A-5P): MS Teams PREFERRED      SUBJECTIVE / OVERNIGHT EVENTS:  ADDITIONAL REVIEW OF SYSTEMS:    MEDICATIONS  (STANDING):  enoxaparin Injectable 40 milliGRAM(s) SubCutaneous every 24 hours  influenza  Vaccine (HIGH DOSE) 0.7 milliLiter(s) IntraMuscular once  losartan 25 milliGRAM(s) Oral daily  piperacillin/tazobactam IVPB.. 3.375 Gram(s) IV Intermittent every 8 hours  polyethylene glycol 3350 17 Gram(s) Oral two times a day    MEDICATIONS  (PRN):  acetaminophen     Tablet .. 650 milliGRAM(s) Oral every 6 hours PRN Temp greater or equal to 38C (100.4F), Mild Pain (1 - 3)  aluminum hydroxide/magnesium hydroxide/simethicone Suspension 30 milliLiter(s) Oral every 4 hours PRN Dyspepsia  melatonin 3 milliGRAM(s) Oral at bedtime PRN Insomnia  ondansetron Injectable 4 milliGRAM(s) IV Push every 8 hours PRN Nausea and/or Vomiting      I&O's Summary    28 Oct 2023 07:01  -  29 Oct 2023 07:00  --------------------------------------------------------  IN: 1260 mL / OUT: 80 mL / NET: 1180 mL        PHYSICAL EXAM:  Vital Signs Last 24 Hrs  T(C): 36.7 (29 Oct 2023 04:47), Max: 37.7 (28 Oct 2023 16:13)  T(F): 98.1 (29 Oct 2023 04:47), Max: 99.8 (28 Oct 2023 16:13)  HR: 85 (29 Oct 2023 04:47) (71 - 92)  BP: 104/72 (29 Oct 2023 04:47) (91/57 - 104/72)  BP(mean): --  RR: 18 (29 Oct 2023 04:47) (18 - 18)  SpO2: 96% (29 Oct 2023 04:47) (95% - 97%)    Parameters below as of 29 Oct 2023 04:47  Patient On (Oxygen Delivery Method): room air    Constitutional: WDWN resting comfortably in bed; NAD  Head: NC/AT  Eyes: Anicteric sclera  ENT: MMM  Neck: Supple, midline  Respiratory: CTA B/L; no W/R/R   Cardiac: +S1/S2; RRR; no M/R/G   Gastrointestinal: Abdomen soft, NT/ND; no rebound or guarding; +BSx4; percutaneous cholecystostomy tube in place   Extremities: No peripheral edema  Neurologic: AAOx3; CNII-XII grossly intact; no focal deficits  Psychiatric: Affect and characteristics of appearance, verbalizations, behaviors are appropriate          LABS:                        8.1    6.79  )-----------( 206      ( 29 Oct 2023 07:12 )             28.1     10-29    137  |  102  |  27<H>  ----------------------------<  91  4.5   |  24  |  0.86    Ca    8.7      29 Oct 2023 07:09  Phos  3.2     10-29  Mg     2.2     10-29            Urinalysis Basic - ( 29 Oct 2023 07:09 )    Color: x / Appearance: x / SG: x / pH: x  Gluc: 91 mg/dL / Ketone: x  / Bili: x / Urobili: x   Blood: x / Protein: x / Nitrite: x   Leuk Esterase: x / RBC: x / WBC x   Sq Epi: x / Non Sq Epi: x / Bacteria: x        Culture - Blood (collected 27 Oct 2023 18:42)  Source: .Blood Blood  Preliminary Report (28 Oct 2023 22:02):    No growth at 24 hours    Culture - Blood (collected 27 Oct 2023 18:37)  Source: .Blood Blood-Peripheral  Preliminary Report (28 Oct 2023 22:02):    No growth at 24 hours        RADIOLOGY & ADDITIONAL TESTS:  Results Reviewed:   Imaging Personally Reviewed:  Electrocardiogram Personally Reviewed:    COORDINATION OF CARE:  Care Discussed with Consultants/Other Providers [Y/N]: Y  Prior or Outpatient Records Reviewed [Y/N]: Y   [facility] Division of Hospital Medicine  Jennifer DO Fidencio  Pager (M-F, 8A-5P): MS Teams PREFERRED      SUBJECTIVE / OVERNIGHT EVENTS: No acute events overnight. Patient with no acute complaints - denies chest pain, palpitations, dyspnea, N/V, abdominal pain, hematuria, hematochezia, melena. She said she had one bowel movement yesterday.   ADDITIONAL REVIEW OF SYSTEMS:    MEDICATIONS  (STANDING):  enoxaparin Injectable 40 milliGRAM(s) SubCutaneous every 24 hours  influenza  Vaccine (HIGH DOSE) 0.7 milliLiter(s) IntraMuscular once  losartan 25 milliGRAM(s) Oral daily  piperacillin/tazobactam IVPB.. 3.375 Gram(s) IV Intermittent every 8 hours  polyethylene glycol 3350 17 Gram(s) Oral two times a day    MEDICATIONS  (PRN):  acetaminophen     Tablet .. 650 milliGRAM(s) Oral every 6 hours PRN Temp greater or equal to 38C (100.4F), Mild Pain (1 - 3)  aluminum hydroxide/magnesium hydroxide/simethicone Suspension 30 milliLiter(s) Oral every 4 hours PRN Dyspepsia  melatonin 3 milliGRAM(s) Oral at bedtime PRN Insomnia  ondansetron Injectable 4 milliGRAM(s) IV Push every 8 hours PRN Nausea and/or Vomiting      I&O's Summary    28 Oct 2023 07:01  -  29 Oct 2023 07:00  --------------------------------------------------------  IN: 1260 mL / OUT: 80 mL / NET: 1180 mL        PHYSICAL EXAM:  Vital Signs Last 24 Hrs  T(C): 36.7 (29 Oct 2023 04:47), Max: 37.7 (28 Oct 2023 16:13)  T(F): 98.1 (29 Oct 2023 04:47), Max: 99.8 (28 Oct 2023 16:13)  HR: 85 (29 Oct 2023 04:47) (71 - 92)  BP: 104/72 (29 Oct 2023 04:47) (91/57 - 104/72)  BP(mean): --  RR: 18 (29 Oct 2023 04:47) (18 - 18)  SpO2: 96% (29 Oct 2023 04:47) (95% - 97%)    Parameters below as of 29 Oct 2023 04:47  Patient On (Oxygen Delivery Method): room air    Constitutional: WDWN resting comfortably in bed; NAD  Head: NC/AT  Eyes: Anicteric sclera  ENT: MMM  Neck: Supple, midline  Respiratory: CTA B/L; no W/R/R   Cardiac: +S1/S2; RRR; no M/R/G   Gastrointestinal: Abdomen soft, NT/ND; no rebound or guarding; +BSx4; percutaneous cholecystostomy tube in place   Extremities: No peripheral edema  Neurologic: AAOx3; CNII-XII grossly intact; no focal deficits  Psychiatric: Affect and characteristics of appearance, verbalizations, behaviors are appropriate          LABS:                        8.1    6.79  )-----------( 206      ( 29 Oct 2023 07:12 )             28.1     10-29    137  |  102  |  27<H>  ----------------------------<  91  4.5   |  24  |  0.86    Ca    8.7      29 Oct 2023 07:09  Phos  3.2     10-29  Mg     2.2     10-29            Urinalysis Basic - ( 29 Oct 2023 07:09 )    Color: x / Appearance: x / SG: x / pH: x  Gluc: 91 mg/dL / Ketone: x  / Bili: x / Urobili: x   Blood: x / Protein: x / Nitrite: x   Leuk Esterase: x / RBC: x / WBC x   Sq Epi: x / Non Sq Epi: x / Bacteria: x        Culture - Blood (collected 27 Oct 2023 18:42)  Source: .Blood Blood  Preliminary Report (28 Oct 2023 22:02):    No growth at 24 hours    Culture - Blood (collected 27 Oct 2023 18:37)  Source: .Blood Blood-Peripheral  Preliminary Report (28 Oct 2023 22:02):    No growth at 24 hours        RADIOLOGY & ADDITIONAL TESTS:  Results Reviewed:   Imaging Personally Reviewed:  Electrocardiogram Personally Reviewed:    COORDINATION OF CARE:  Care Discussed with Consultants/Other Providers [Y/N]: Y  Prior or Outpatient Records Reviewed [Y/N]: Y

## 2023-10-29 NOTE — PROGRESS NOTE ADULT - PROBLEM SELECTOR PLAN 1
Incidentally found to have thickening of the ascending colon wall on admission, as well as possible mass in liver.   - GI consulted - appreciated recommendations. Colonoscopy showed a large malignant appearing mass was found in the ascending colon, approximately 5cm distal to the cecum. The mass was circumferential. The mass measured four cm in length.  Pathology: Invasive adenocarcinoma, moderately differentiated with mucinous feature  MR Abdomen:  A few T2 hyperintense, rim-enhancing liver lesions are suspicious for mucinous liver metastases. Largest lesion measures 2.5 x 2.0 cm within the right hepatic lobe and demonstrates heterogeneous internal   enhancement. The lesion within segment 4A/8 measures 0.8 cm (5, 8) and a lesion within segment 2/3 measures 0.9 cm (5, 9).  Heme-Onc consulted, appreciate recs - Patient will not be initiated on systemic therapy during this hospitalization.  Appreciate colorectal surgery - anticipate colectomy and laparoscopic cholecystectomy. Incidentally found to have thickening of the ascending colon wall on admission, as well as possible mass in liver.   GI consulted - appreciated recommendations. Colonoscopy showed a large malignant appearing mass was found in the ascending colon, approximately 5cm distal to the cecum. The mass was circumferential. The mass measured four cm in length.  Pathology: Invasive adenocarcinoma, moderately differentiated with mucinous feature  MR Abdomen:  A few T2 hyperintense, rim-enhancing liver lesions are suspicious for mucinous liver metastases. Largest lesion measures 2.5 x 2.0 cm within the right hepatic lobe and demonstrates heterogeneous internal   enhancement. The lesion within segment 4A/8 measures 0.8 cm (5, 8) and a lesion within segment 2/3 measures 0.9 cm (5, 9).  Heme-Onc consulted, appreciate recs - Patient will not be initiated on systemic therapy during this hospitalization.  Appreciate colorectal surgery - anticipate colectomy and laparoscopic cholecystectomy.

## 2023-10-29 NOTE — PROGRESS NOTE ADULT - PROBLEM SELECTOR PLAN 5
NTD at this time. Not on any therapy at home for scleroderma.   Given comorbid pHTN, preoperative risk assessment prior to surgery.  Cardiology consulted for preoperative assessment, appreciate recommendations.   TTE - Normal LVSF, EF 66%  Nuclear Stress: Normal myocardial perfusion scan, with no evidence of infarction or inducible ischemia.  Patient denies chest pain, dyspnea, palpitations. Able to tolerate brisk walking and activities of daily living.   Based on RCRI, class II risk, 6.0 % 30-day risk of death, MI, or cardiac arrest    No contraindications to proceed with colectomy/lap cholecystectomy.

## 2023-10-30 LAB
ANION GAP SERPL CALC-SCNC: 10 MMOL/L — SIGNIFICANT CHANGE UP (ref 5–17)
ANION GAP SERPL CALC-SCNC: 10 MMOL/L — SIGNIFICANT CHANGE UP (ref 5–17)
ANION GAP SERPL CALC-SCNC: 11 MMOL/L — SIGNIFICANT CHANGE UP (ref 5–17)
ANION GAP SERPL CALC-SCNC: 11 MMOL/L — SIGNIFICANT CHANGE UP (ref 5–17)
BUN SERPL-MCNC: 27 MG/DL — HIGH (ref 7–23)
BUN SERPL-MCNC: 27 MG/DL — HIGH (ref 7–23)
BUN SERPL-MCNC: 30 MG/DL — HIGH (ref 7–23)
BUN SERPL-MCNC: 30 MG/DL — HIGH (ref 7–23)
CALCIUM SERPL-MCNC: 8.2 MG/DL — LOW (ref 8.4–10.5)
CALCIUM SERPL-MCNC: 8.2 MG/DL — LOW (ref 8.4–10.5)
CALCIUM SERPL-MCNC: 8.8 MG/DL — SIGNIFICANT CHANGE UP (ref 8.4–10.5)
CALCIUM SERPL-MCNC: 8.8 MG/DL — SIGNIFICANT CHANGE UP (ref 8.4–10.5)
CHLORIDE SERPL-SCNC: 102 MMOL/L — SIGNIFICANT CHANGE UP (ref 96–108)
CHLORIDE SERPL-SCNC: 102 MMOL/L — SIGNIFICANT CHANGE UP (ref 96–108)
CHLORIDE SERPL-SCNC: 103 MMOL/L — SIGNIFICANT CHANGE UP (ref 96–108)
CHLORIDE SERPL-SCNC: 103 MMOL/L — SIGNIFICANT CHANGE UP (ref 96–108)
CO2 SERPL-SCNC: 23 MMOL/L — SIGNIFICANT CHANGE UP (ref 22–31)
CREAT SERPL-MCNC: 0.77 MG/DL — SIGNIFICANT CHANGE UP (ref 0.5–1.3)
CREAT SERPL-MCNC: 0.77 MG/DL — SIGNIFICANT CHANGE UP (ref 0.5–1.3)
CREAT SERPL-MCNC: 0.89 MG/DL — SIGNIFICANT CHANGE UP (ref 0.5–1.3)
CREAT SERPL-MCNC: 0.89 MG/DL — SIGNIFICANT CHANGE UP (ref 0.5–1.3)
EGFR: 71 ML/MIN/1.73M2 — SIGNIFICANT CHANGE UP
EGFR: 71 ML/MIN/1.73M2 — SIGNIFICANT CHANGE UP
EGFR: 84 ML/MIN/1.73M2 — SIGNIFICANT CHANGE UP
EGFR: 84 ML/MIN/1.73M2 — SIGNIFICANT CHANGE UP
ENA SCL70 AB SER-ACNC: <0.2 AI — SIGNIFICANT CHANGE UP
ENA SCL70 AB SER-ACNC: <0.2 AI — SIGNIFICANT CHANGE UP
GLUCOSE SERPL-MCNC: 103 MG/DL — HIGH (ref 70–99)
GLUCOSE SERPL-MCNC: 103 MG/DL — HIGH (ref 70–99)
GLUCOSE SERPL-MCNC: 92 MG/DL — SIGNIFICANT CHANGE UP (ref 70–99)
GLUCOSE SERPL-MCNC: 92 MG/DL — SIGNIFICANT CHANGE UP (ref 70–99)
HCT VFR BLD CALC: 25.8 % — LOW (ref 34.5–45)
HCT VFR BLD CALC: 25.8 % — LOW (ref 34.5–45)
HGB BLD-MCNC: 7.8 G/DL — LOW (ref 11.5–15.5)
HGB BLD-MCNC: 7.8 G/DL — LOW (ref 11.5–15.5)
LACTATE BLDV-MCNC: 1.4 MMOL/L — SIGNIFICANT CHANGE UP (ref 0.5–2)
LACTATE BLDV-MCNC: 1.4 MMOL/L — SIGNIFICANT CHANGE UP (ref 0.5–2)
MAGNESIUM SERPL-MCNC: 2.2 MG/DL — SIGNIFICANT CHANGE UP (ref 1.6–2.6)
MAGNESIUM SERPL-MCNC: 2.2 MG/DL — SIGNIFICANT CHANGE UP (ref 1.6–2.6)
MAGNESIUM SERPL-MCNC: 2.5 MG/DL — SIGNIFICANT CHANGE UP (ref 1.6–2.6)
MAGNESIUM SERPL-MCNC: 2.5 MG/DL — SIGNIFICANT CHANGE UP (ref 1.6–2.6)
MCHC RBC-ENTMCNC: 23.9 PG — LOW (ref 27–34)
MCHC RBC-ENTMCNC: 23.9 PG — LOW (ref 27–34)
MCHC RBC-ENTMCNC: 30.2 GM/DL — LOW (ref 32–36)
MCHC RBC-ENTMCNC: 30.2 GM/DL — LOW (ref 32–36)
MCV RBC AUTO: 79.1 FL — LOW (ref 80–100)
MCV RBC AUTO: 79.1 FL — LOW (ref 80–100)
NRBC # BLD: 0 /100 WBCS — SIGNIFICANT CHANGE UP (ref 0–0)
NRBC # BLD: 0 /100 WBCS — SIGNIFICANT CHANGE UP (ref 0–0)
PHOSPHATE SERPL-MCNC: 3 MG/DL — SIGNIFICANT CHANGE UP (ref 2.5–4.5)
PHOSPHATE SERPL-MCNC: 3 MG/DL — SIGNIFICANT CHANGE UP (ref 2.5–4.5)
PHOSPHATE SERPL-MCNC: 3.5 MG/DL — SIGNIFICANT CHANGE UP (ref 2.5–4.5)
PHOSPHATE SERPL-MCNC: 3.5 MG/DL — SIGNIFICANT CHANGE UP (ref 2.5–4.5)
PLATELET # BLD AUTO: 211 K/UL — SIGNIFICANT CHANGE UP (ref 150–400)
PLATELET # BLD AUTO: 211 K/UL — SIGNIFICANT CHANGE UP (ref 150–400)
POTASSIUM SERPL-MCNC: 4.3 MMOL/L — SIGNIFICANT CHANGE UP (ref 3.5–5.3)
POTASSIUM SERPL-MCNC: 4.3 MMOL/L — SIGNIFICANT CHANGE UP (ref 3.5–5.3)
POTASSIUM SERPL-MCNC: 4.6 MMOL/L — SIGNIFICANT CHANGE UP (ref 3.5–5.3)
POTASSIUM SERPL-MCNC: 4.6 MMOL/L — SIGNIFICANT CHANGE UP (ref 3.5–5.3)
POTASSIUM SERPL-SCNC: 4.3 MMOL/L — SIGNIFICANT CHANGE UP (ref 3.5–5.3)
POTASSIUM SERPL-SCNC: 4.3 MMOL/L — SIGNIFICANT CHANGE UP (ref 3.5–5.3)
POTASSIUM SERPL-SCNC: 4.6 MMOL/L — SIGNIFICANT CHANGE UP (ref 3.5–5.3)
POTASSIUM SERPL-SCNC: 4.6 MMOL/L — SIGNIFICANT CHANGE UP (ref 3.5–5.3)
RBC # BLD: 3.26 M/UL — LOW (ref 3.8–5.2)
RBC # BLD: 3.26 M/UL — LOW (ref 3.8–5.2)
RBC # FLD: 19.3 % — HIGH (ref 10.3–14.5)
RBC # FLD: 19.3 % — HIGH (ref 10.3–14.5)
SODIUM SERPL-SCNC: 135 MMOL/L — SIGNIFICANT CHANGE UP (ref 135–145)
SODIUM SERPL-SCNC: 135 MMOL/L — SIGNIFICANT CHANGE UP (ref 135–145)
SODIUM SERPL-SCNC: 137 MMOL/L — SIGNIFICANT CHANGE UP (ref 135–145)
SODIUM SERPL-SCNC: 137 MMOL/L — SIGNIFICANT CHANGE UP (ref 135–145)
WBC # BLD: 5.44 K/UL — SIGNIFICANT CHANGE UP (ref 3.8–10.5)
WBC # BLD: 5.44 K/UL — SIGNIFICANT CHANGE UP (ref 3.8–10.5)
WBC # FLD AUTO: 5.44 K/UL — SIGNIFICANT CHANGE UP (ref 3.8–10.5)
WBC # FLD AUTO: 5.44 K/UL — SIGNIFICANT CHANGE UP (ref 3.8–10.5)

## 2023-10-30 PROCEDURE — 99232 SBSQ HOSP IP/OBS MODERATE 35: CPT | Mod: 57

## 2023-10-30 RX ORDER — LOSARTAN POTASSIUM 100 MG/1
25 TABLET, FILM COATED ORAL DAILY
Refills: 0 | Status: DISCONTINUED | OUTPATIENT
Start: 2023-10-30 | End: 2023-10-30

## 2023-10-30 RX ORDER — NEOMYCIN SULFATE 500 MG/1
500 TABLET ORAL EVERY 8 HOURS
Refills: 0 | Status: COMPLETED | OUTPATIENT
Start: 2023-10-31 | End: 2023-11-01

## 2023-10-30 RX ORDER — LOSARTAN POTASSIUM 100 MG/1
25 TABLET, FILM COATED ORAL DAILY
Refills: 0 | Status: DISCONTINUED | OUTPATIENT
Start: 2023-10-30 | End: 2023-11-01

## 2023-10-30 RX ORDER — METRONIDAZOLE 500 MG
250 TABLET ORAL EVERY 8 HOURS
Refills: 0 | Status: COMPLETED | OUTPATIENT
Start: 2023-10-31 | End: 2023-11-01

## 2023-10-30 RX ORDER — POLYETHYLENE GLYCOL 3350 17 G/17G
17 POWDER, FOR SOLUTION ORAL
Refills: 0 | Status: COMPLETED | OUTPATIENT
Start: 2023-10-31 | End: 2023-10-31

## 2023-10-30 RX ADMIN — POLYETHYLENE GLYCOL 3350 17 GRAM(S): 17 POWDER, FOR SOLUTION ORAL at 06:23

## 2023-10-30 RX ADMIN — POLYETHYLENE GLYCOL 3350 17 GRAM(S): 17 POWDER, FOR SOLUTION ORAL at 18:06

## 2023-10-30 RX ADMIN — PIPERACILLIN AND TAZOBACTAM 25 GRAM(S): 4; .5 INJECTION, POWDER, LYOPHILIZED, FOR SOLUTION INTRAVENOUS at 03:17

## 2023-10-30 RX ADMIN — LOSARTAN POTASSIUM 25 MILLIGRAM(S): 100 TABLET, FILM COATED ORAL at 06:22

## 2023-10-30 RX ADMIN — PIPERACILLIN AND TAZOBACTAM 25 GRAM(S): 4; .5 INJECTION, POWDER, LYOPHILIZED, FOR SOLUTION INTRAVENOUS at 18:05

## 2023-10-30 RX ADMIN — ENOXAPARIN SODIUM 40 MILLIGRAM(S): 100 INJECTION SUBCUTANEOUS at 15:56

## 2023-10-30 RX ADMIN — PIPERACILLIN AND TAZOBACTAM 25 GRAM(S): 4; .5 INJECTION, POWDER, LYOPHILIZED, FOR SOLUTION INTRAVENOUS at 10:57

## 2023-10-30 NOTE — PROGRESS NOTE ADULT - SUBJECTIVE AND OBJECTIVE BOX
[facility] Surgery Daily Progress Note  =====================================================    INTERVAL EVENTS: Patient tachycardic to the low 100's in the evening, BP 98/52. Patient reports no chest pain, shortness of breath or light-headedness. EKG performed x2 and showed sinus tachycardia. CBC demonstrated hemoglobin trending down from 10/29.    SUBJECTIVE: Patient seen and examined at bedside on AM rounds. Patient reports that they're feeling well. Denies fever, chills, N/V, chest pain, SOB    ALLERGIES:  No Known Allergies      --------------------------------------------------------------------------------------    MEDICATIONS:    Neurologic Medications  acetaminophen     Tablet .. 650 milliGRAM(s) Oral every 6 hours PRN Temp greater or equal to 38C (100.4F), Mild Pain (1 - 3)  melatonin 3 milliGRAM(s) Oral at bedtime PRN Insomnia  ondansetron Injectable 4 milliGRAM(s) IV Push every 8 hours PRN Nausea and/or Vomiting    Respiratory Medications    Cardiovascular Medications  losartan 25 milliGRAM(s) Oral daily    Gastrointestinal Medications  aluminum hydroxide/magnesium hydroxide/simethicone Suspension 30 milliLiter(s) Oral every 4 hours PRN Dyspepsia  polyethylene glycol 3350 17 Gram(s) Oral two times a day    Genitourinary Medications    Hematologic/Oncologic Medications  enoxaparin Injectable 40 milliGRAM(s) SubCutaneous every 24 hours  influenza  Vaccine (HIGH DOSE) 0.7 milliLiter(s) IntraMuscular once    Antimicrobial/Immunologic Medications  piperacillin/tazobactam IVPB.. 3.375 Gram(s) IV Intermittent every 8 hours    Endocrine/Metabolic Medications    Topical/Other Medications    --------------------------------------------------------------------------------------    VITAL SIGNS:  T(C): 36.9 (10-29-23 @ 21:41), Max: 37 (10-29-23 @ 19:31)  HR: 108 (10-29-23 @ 21:41) (81 - 108)  BP: 98/62 (10-29-23 @ 21:41) (80/40 - 108/71)  RR: 18 (10-29-23 @ 21:41) (18 - 18)  SpO2: 94% (10-29-23 @ 21:41) (93% - 99%)  --------------------------------------------------------------------------------------    INS AND OUTS:    10-28-23 @ 07:01  -  10-29-23 @ 07:00  --------------------------------------------------------  IN: 1260 mL / OUT: 80 mL / NET: 1180 mL    10-29-23 @ 07:01  -  10-30-23 @ 00:43  --------------------------------------------------------  IN: 880 mL / OUT: 105 mL / NET: 775 mL      --------------------------------------------------------------------------------------      EXAM    Physical Exam:  General: NAD, resting in bed comfortably.  Cardiac: regular rate, warm and well perfused  Respiratory: Nonlabored respirations, normal cw expansion.  Abdomen: soft, nontender, nondistended. Perc lj tube continues to drain fluid  Extremities: normal strength, FROM, no deformities    --------------------------------------------------------------------------------------    LABS

## 2023-10-30 NOTE — PROGRESS NOTE ADULT - ATTENDING COMMENTS
No events overnight.  Abdomen soft, NT, ND.  Complete 3 days of empiric Zosyn for episode of fever of unknown etiology.  Scheduled for lap right colectomy and lj Wedn 7:30.  Prep tomorrow

## 2023-10-30 NOTE — PROGRESS NOTE ADULT - ASSESSMENT
Ascending colon mass, path c/w adenocarcinoma  -noted on colonoscopy   -Planned for resection on wed 11/1 as well as cholecystectomy  -surgical care appreciated

## 2023-10-30 NOTE — PROGRESS NOTE ADULT - ASSESSMENT
68F hx scleroderma and pulmonary hypertension, s/p percutaneous cholecystostomy with IR on 9/11 for emphysematous cholecystitis, now presenting with partially dislodged per lj tube. CTAP with cholecystostomy tube with tip in the region of the contracted gallbladder, no acute intraabdominal pathology. Of note, also noted was a short segment of marked colonic bowel wall thickening involving the ascending colon concerning for underlying mass/malignancy.     Recommendations:  - Regular/DASH diet  - Surgical planning for OR Weds (11/1) for colectomy and laparoscopic cholecystectomy  - Cardiology recommendations appreciated -> patient is moderate risk for moderate risk surgery, no contraindication to surgery (echo and cardiac stress test show no abnormalities)  - Medicine recommendations appreciated -> patient cleared for surgery from medicine perspective, signed off 10/29  - Oncology recommendations appreciated -> OR for surgical resection w/ surgical team and close outpatient follow-up with [email or web address] No systemic therapy this admission  - Perc lj tube placement confirmed by IR tube study on 10/25  - c/w Zosyn  - Rheumatology recommendations appreciated -> Goal BP SBP <110, avoid steroids to prevent precipitation sclerodermal renal crisis. No further immunosuppressive therapy at this time  - Dispo: patient's family currently working with  to obtain emergency medicaid, which may affect where patient may follow-up upon discharge, ANSLEY    [facility]  [id number]

## 2023-10-30 NOTE — PROGRESS NOTE ADULT - SUBJECTIVE AND OBJECTIVE BOX
DATE OF SERVICE: 10-30-23 @ 11:09    Patient is a 68y old  Female who presents with a chief complaint of Dislodged percutaneous lj tube (30 Oct 2023 00:43)      INTERVAL HISTORY: Feels well, denies SOB palpitations (mandarin  used)    REVIEW OF SYSTEMS:  CONSTITUTIONAL: No weakness  EYES/ENT: No visual changes;  No throat pain   NECK: No pain or stiffness  RESPIRATORY: No cough, wheezing; No shortness of breath  CARDIOVASCULAR: No chest pain or palpitations  GASTROINTESTINAL: No abdominal  pain. No nausea, vomiting, or hematemesis  GENITOURINARY: No dysuria, frequency or hematuria  NEUROLOGICAL: No stroke like symptoms  SKIN: No rashes      	  MEDICATIONS:  losartan 25 milliGRAM(s) Oral daily        PHYSICAL EXAM:  T(C): 36.8 (10-30-23 @ 09:27), Max: 37 (10-29-23 @ 19:31)  HR: 82 (10-30-23 @ 09:27) (81 - 108)  BP: 99/65 (10-30-23 @ 09:27) (80/40 - 104/69)  RR: 18 (10-30-23 @ 09:27) (18 - 18)  SpO2: 97% (10-30-23 @ 09:27) (93% - 99%)  Wt(kg): --  I&O's Summary    29 Oct 2023 07:01  -  30 Oct 2023 07:00  --------------------------------------------------------  IN: 880 mL / OUT: 705 mL / NET: 175 mL    30 Oct 2023 07:01  -  30 Oct 2023 11:09  --------------------------------------------------------  IN: 440 mL / OUT: 200 mL / NET: 240 mL          Appearance: In no distress	  HEENT:    PERRL, EOMI	  Cardiovascular:  S1 S2, No JVD  Respiratory: Lungs clear to auscultation	  Gastrointestinal:  Soft, Non-tender, + BS	  Vascularature:  No edema of LE  Psychiatric: Appropriate affect   Neuro: no acute focal deficits                               7.8    5.44  )-----------( 211      ( 30 Oct 2023 07:16 )             25.8     10-30    137  |  103  |  27<H>  ----------------------------<  92  4.3   |  23  |  0.77    Ca    8.8      30 Oct 2023 07:12  Phos  3.5     10-30  Mg     2.5     10-30          Labs personally reviewed      ASSESSMENT/PLAN: 	  68F PMH scleroderma, pHTN, HTN with recent emphysematous cholecystitis s/p percutaneous cholecystectomy with IR on 9/11 presents following dislodged tube. Patient was discharged on 9/14 after procedure and course of zosyn, and has had unremarkable outpatient course. CT A/P revealed thickening of the ascending colon, with concern for underlying mass or malignancy. s/p colonoscopy also suspicious for malignancy. Patient denies CP, SOB or palpitations.    1. Cardiac Risk Stratification  - Patient with hx of scleroderma and pulm HTN but reports average to excellent functional capacity.   - Does not utilize home oxygen. Denies CP or SOB.  - Not in decompensated HF  - No hx of tachy ang arrhythmia. ECG SB with no ischemia noted.  - No hx of severe AS/MS. TTE with no valvular dysfunction.  - TTE with preserved EF, no WMA, normal LV and RV function and size  - Patient is mod risk for mod risk colorectal surgery if needed. No contraindication to proceed pending ischemic eval with NST.  - NST normal    2. Pulmonary Hypertension  - TTE as above shows preserved EF, no pulm HTN and normal RV size and function  - Does not use supplemental oxygen    3. Scleroderma  - Not currently on therapy    4. Hypertension  - c/w losartan 25mg PO daily (therapeutic interchange for irbesartan 75mg PO daily)    5. Ascending Colon Mass  Perc lj tube placement confirmed by IR tube study on 10/25  Recent Colonoscopy shows likely malignancy in ascending colon  CEA level elevated: 57  Ascending colon mass, path c/w adenocarcinoma  -Planned for resection on wed 11/1 as well as cholecystectomy  -surgical care appreciated            NICO Levine DO [facility]  Cardiovascular Medicine  [address], [address]  Available via call or text on [employer]  Office: [phone]

## 2023-10-31 LAB
ANION GAP SERPL CALC-SCNC: 10 MMOL/L — SIGNIFICANT CHANGE UP (ref 5–17)
ANION GAP SERPL CALC-SCNC: 10 MMOL/L — SIGNIFICANT CHANGE UP (ref 5–17)
APTT BLD: 33.2 SEC — SIGNIFICANT CHANGE UP (ref 24.5–35.6)
APTT BLD: 33.2 SEC — SIGNIFICANT CHANGE UP (ref 24.5–35.6)
BLD GP AB SCN SERPL QL: NEGATIVE — SIGNIFICANT CHANGE UP
BLD GP AB SCN SERPL QL: NEGATIVE — SIGNIFICANT CHANGE UP
BLD GP AB SCN SERPL QL: POSITIVE — SIGNIFICANT CHANGE UP
BLD GP AB SCN SERPL QL: POSITIVE — SIGNIFICANT CHANGE UP
BUN SERPL-MCNC: 26 MG/DL — HIGH (ref 7–23)
BUN SERPL-MCNC: 26 MG/DL — HIGH (ref 7–23)
CALCIUM SERPL-MCNC: 9 MG/DL — SIGNIFICANT CHANGE UP (ref 8.4–10.5)
CALCIUM SERPL-MCNC: 9 MG/DL — SIGNIFICANT CHANGE UP (ref 8.4–10.5)
CENTROMERE AB SER-ACNC: <0.2 AI — SIGNIFICANT CHANGE UP
CENTROMERE AB SER-ACNC: <0.2 AI — SIGNIFICANT CHANGE UP
CHLORIDE SERPL-SCNC: 104 MMOL/L — SIGNIFICANT CHANGE UP (ref 96–108)
CHLORIDE SERPL-SCNC: 104 MMOL/L — SIGNIFICANT CHANGE UP (ref 96–108)
CO2 SERPL-SCNC: 23 MMOL/L — SIGNIFICANT CHANGE UP (ref 22–31)
CO2 SERPL-SCNC: 23 MMOL/L — SIGNIFICANT CHANGE UP (ref 22–31)
CREAT SERPL-MCNC: 0.79 MG/DL — SIGNIFICANT CHANGE UP (ref 0.5–1.3)
CREAT SERPL-MCNC: 0.79 MG/DL — SIGNIFICANT CHANGE UP (ref 0.5–1.3)
EGFR: 81 ML/MIN/1.73M2 — SIGNIFICANT CHANGE UP
EGFR: 81 ML/MIN/1.73M2 — SIGNIFICANT CHANGE UP
FERRITIN SERPL-MCNC: 95 NG/ML — SIGNIFICANT CHANGE UP (ref 13–330)
FERRITIN SERPL-MCNC: 95 NG/ML — SIGNIFICANT CHANGE UP (ref 13–330)
GLUCOSE SERPL-MCNC: 90 MG/DL — SIGNIFICANT CHANGE UP (ref 70–99)
GLUCOSE SERPL-MCNC: 90 MG/DL — SIGNIFICANT CHANGE UP (ref 70–99)
HAPTOGLOB SERPL-MCNC: 365 MG/DL — HIGH (ref 34–200)
HAPTOGLOB SERPL-MCNC: 365 MG/DL — HIGH (ref 34–200)
HCT VFR BLD CALC: 28 % — LOW (ref 34.5–45)
HCT VFR BLD CALC: 28 % — LOW (ref 34.5–45)
HGB BLD-MCNC: 8.2 G/DL — LOW (ref 11.5–15.5)
HGB BLD-MCNC: 8.2 G/DL — LOW (ref 11.5–15.5)
INR BLD: 0.9 RATIO — SIGNIFICANT CHANGE UP (ref 0.85–1.18)
INR BLD: 0.9 RATIO — SIGNIFICANT CHANGE UP (ref 0.85–1.18)
IRON SATN MFR SERPL: 21 UG/DL — LOW (ref 30–160)
IRON SATN MFR SERPL: 21 UG/DL — LOW (ref 30–160)
IRON SATN MFR SERPL: 8 % — LOW (ref 14–50)
IRON SATN MFR SERPL: 8 % — LOW (ref 14–50)
LDH SERPL L TO P-CCNC: 142 U/L — SIGNIFICANT CHANGE UP (ref 50–242)
LDH SERPL L TO P-CCNC: 142 U/L — SIGNIFICANT CHANGE UP (ref 50–242)
MAGNESIUM SERPL-MCNC: 2.4 MG/DL — SIGNIFICANT CHANGE UP (ref 1.6–2.6)
MAGNESIUM SERPL-MCNC: 2.4 MG/DL — SIGNIFICANT CHANGE UP (ref 1.6–2.6)
MCHC RBC-ENTMCNC: 23.8 PG — LOW (ref 27–34)
MCHC RBC-ENTMCNC: 23.8 PG — LOW (ref 27–34)
MCHC RBC-ENTMCNC: 29.3 GM/DL — LOW (ref 32–36)
MCHC RBC-ENTMCNC: 29.3 GM/DL — LOW (ref 32–36)
MCV RBC AUTO: 81.2 FL — SIGNIFICANT CHANGE UP (ref 80–100)
MCV RBC AUTO: 81.2 FL — SIGNIFICANT CHANGE UP (ref 80–100)
NRBC # BLD: 0 /100 WBCS — SIGNIFICANT CHANGE UP (ref 0–0)
NRBC # BLD: 0 /100 WBCS — SIGNIFICANT CHANGE UP (ref 0–0)
PHOSPHATE SERPL-MCNC: 3.6 MG/DL — SIGNIFICANT CHANGE UP (ref 2.5–4.5)
PHOSPHATE SERPL-MCNC: 3.6 MG/DL — SIGNIFICANT CHANGE UP (ref 2.5–4.5)
PLATELET # BLD AUTO: 235 K/UL — SIGNIFICANT CHANGE UP (ref 150–400)
PLATELET # BLD AUTO: 235 K/UL — SIGNIFICANT CHANGE UP (ref 150–400)
POTASSIUM SERPL-MCNC: 4.9 MMOL/L — SIGNIFICANT CHANGE UP (ref 3.5–5.3)
POTASSIUM SERPL-MCNC: 4.9 MMOL/L — SIGNIFICANT CHANGE UP (ref 3.5–5.3)
POTASSIUM SERPL-SCNC: 4.9 MMOL/L — SIGNIFICANT CHANGE UP (ref 3.5–5.3)
POTASSIUM SERPL-SCNC: 4.9 MMOL/L — SIGNIFICANT CHANGE UP (ref 3.5–5.3)
PROTHROM AB SERPL-ACNC: 9.9 SEC — SIGNIFICANT CHANGE UP (ref 9.5–13)
PROTHROM AB SERPL-ACNC: 9.9 SEC — SIGNIFICANT CHANGE UP (ref 9.5–13)
RBC # BLD: 3.45 M/UL — LOW (ref 3.8–5.2)
RBC # BLD: 3.45 M/UL — LOW (ref 3.8–5.2)
RBC # FLD: 19.3 % — HIGH (ref 10.3–14.5)
RBC # FLD: 19.3 % — HIGH (ref 10.3–14.5)
RH IG SCN BLD-IMP: POSITIVE — SIGNIFICANT CHANGE UP
SODIUM SERPL-SCNC: 137 MMOL/L — SIGNIFICANT CHANGE UP (ref 135–145)
SODIUM SERPL-SCNC: 137 MMOL/L — SIGNIFICANT CHANGE UP (ref 135–145)
TIBC SERPL-MCNC: 248 UG/DL — SIGNIFICANT CHANGE UP (ref 220–430)
TIBC SERPL-MCNC: 248 UG/DL — SIGNIFICANT CHANGE UP (ref 220–430)
UIBC SERPL-MCNC: 227 UG/DL — SIGNIFICANT CHANGE UP (ref 110–370)
UIBC SERPL-MCNC: 227 UG/DL — SIGNIFICANT CHANGE UP (ref 110–370)
WBC # BLD: 5.58 K/UL — SIGNIFICANT CHANGE UP (ref 3.8–10.5)
WBC # BLD: 5.58 K/UL — SIGNIFICANT CHANGE UP (ref 3.8–10.5)
WBC # FLD AUTO: 5.58 K/UL — SIGNIFICANT CHANGE UP (ref 3.8–10.5)
WBC # FLD AUTO: 5.58 K/UL — SIGNIFICANT CHANGE UP (ref 3.8–10.5)

## 2023-10-31 PROCEDURE — 99233 SBSQ HOSP IP/OBS HIGH 50: CPT | Mod: 57

## 2023-10-31 RX ADMIN — POLYETHYLENE GLYCOL 3350 17 GRAM(S): 17 POWDER, FOR SOLUTION ORAL at 06:38

## 2023-10-31 RX ADMIN — NEOMYCIN SULFATE 500 MILLIGRAM(S): 500 TABLET ORAL at 20:04

## 2023-10-31 RX ADMIN — POLYETHYLENE GLYCOL 3350 17 GRAM(S): 17 POWDER, FOR SOLUTION ORAL at 14:13

## 2023-10-31 RX ADMIN — POLYETHYLENE GLYCOL 3350 17 GRAM(S): 17 POWDER, FOR SOLUTION ORAL at 16:37

## 2023-10-31 RX ADMIN — POLYETHYLENE GLYCOL 3350 17 GRAM(S): 17 POWDER, FOR SOLUTION ORAL at 21:01

## 2023-10-31 RX ADMIN — POLYETHYLENE GLYCOL 3350 17 GRAM(S): 17 POWDER, FOR SOLUTION ORAL at 11:29

## 2023-10-31 RX ADMIN — Medication 250 MILLIGRAM(S): at 06:37

## 2023-10-31 RX ADMIN — POLYETHYLENE GLYCOL 3350 17 GRAM(S): 17 POWDER, FOR SOLUTION ORAL at 17:38

## 2023-10-31 RX ADMIN — NEOMYCIN SULFATE 500 MILLIGRAM(S): 500 TABLET ORAL at 06:39

## 2023-10-31 RX ADMIN — POLYETHYLENE GLYCOL 3350 17 GRAM(S): 17 POWDER, FOR SOLUTION ORAL at 05:22

## 2023-10-31 RX ADMIN — LOSARTAN POTASSIUM 25 MILLIGRAM(S): 100 TABLET, FILM COATED ORAL at 05:22

## 2023-10-31 RX ADMIN — POLYETHYLENE GLYCOL 3350 17 GRAM(S): 17 POWDER, FOR SOLUTION ORAL at 19:26

## 2023-10-31 RX ADMIN — POLYETHYLENE GLYCOL 3350 17 GRAM(S): 17 POWDER, FOR SOLUTION ORAL at 09:26

## 2023-10-31 RX ADMIN — Medication 250 MILLIGRAM(S): at 20:04

## 2023-10-31 NOTE — PROGRESS NOTE ADULT - ASSESSMENT
Ascending colon mass, path c/w adenocarcinoma  -noted on colonoscopy   -Planned for resection on wed 11/1 as well as cholecystectomy  -rectal bleed d/t colon mass, transfuse as needed  -surgical care appreciated

## 2023-10-31 NOTE — PROGRESS NOTE ADULT - ASSESSMENT
68F hx scleroderma and pulmonary hypertension, s/p percutaneous cholecystostomy with IR on 9/11 for emphysematous cholecystitis, now presenting with partially dislodged per lj tube. CTAP with cholecystostomy tube with tip in the region of the contracted gallbladder, no acute intraabdominal pathology. Of note, also noted was a short segment of marked colonic bowel wall thickening involving the ascending colon concerning for underlying mass/malignancy.     Recommendations:  - Regular/DASH diet  - Surgical planning for OR Weds (11/1) for colectomy and laparoscopic cholecystectomy  - Cardiology recommendations appreciated -> patient is moderate risk for moderate risk surgery, no contraindication to surgery (echo and cardiac stress test show no abnormalities)  - Medicine recommendations appreciated -> patient cleared for surgery from medicine perspective, signed off 10/29  - Oncology recommendations appreciated -> OR for surgical resection w/ surgical team and close outpatient follow-up with [facility]. No systemic therapy this admission  - Perc lj tube placement confirmed by IR tube study on 10/25  - c/w Zosyn  - Rheumatology recommendations appreciated -> Goal BP SBP <110, avoid steroids to prevent precipitation sclerodermal renal crisis. No further immunosuppressive therapy at this time  - Dispo: patient's family currently working with  to obtain emergency medicaid, which may affect where patient may follow-up upon discharge, ANSLEY    [facility]  [id number] 68F hx scleroderma and pulmonary hypertension, s/p percutaneous cholecystostomy with IR on 9/11 for emphysematous cholecystitis, now presenting with partially dislodged per lj tube. CTAP with cholecystostomy tube with tip in the region of the contracted gallbladder, no acute intraabdominal pathology. Of note, also noted was a short segment of marked colonic bowel wall thickening involving the ascending colon concerning for underlying mass/malignancy.     Recommendations:  - ERP pre-operative management for OR Weds (11/1) for colectomy and laparoscopic cholecystectomy       - 8 doses of miralax       - Full liquid diet       - PO neomycin and flagyl  - Cardiology recommendations: patient is moderate risk for moderate risk surgery, no contraindication to surgery (echo and cardiac stress test show no abnormalities)  - Medicine recommendations appreciated: patient cleared for surgery from medicine perspective, signed off 10/29  - Oncology recommendations appreciated: OR for surgical resection w/ surgical team and close outpatient follow-up with [facility]. No systemic therapy this admission  - Perc lj tube placement confirmed by IR tube study on 10/25  - Rheumatology recommendations appreciated -> Goal BP SBP <110, avoid steroids to prevent precipitation sclerodermal renal crisis. No further immunosuppressive therapy at this time  - Dispo: patient's family currently working with  to obtain emergency medicaid, which may affect where patient may follow-up upon discharge, ANSLEY    [facility]  [id number]

## 2023-10-31 NOTE — PROGRESS NOTE ADULT - SUBJECTIVE AND OBJECTIVE BOX
[facility] Daily Progress Note  =====================================================    SUMMARY: 68F w/ colon mass going to OR Wednesday for colon resection and cholecystectomy    INTERVAL EVENTS: Patient had two loose bowel movements, and the second one appeared bloody. Patient asymptomatic, vital signs stable. Antibiotics and DVT prophylaxis discontinued.     SUBJECTIVE: Patient seen and examined at bedside on AM rounds. Patient reports that they're feeling well. Denies fever, chills, N/V, chest pain, SOB    ALLERGIES:  No Known Allergies      --------------------------------------------------------------------------------------    MEDICATIONS:    Neurologic Medications  acetaminophen     Tablet .. 650 milliGRAM(s) Oral every 6 hours PRN Temp greater or equal to 38C (100.4F), Mild Pain (1 - 3)  melatonin 3 milliGRAM(s) Oral at bedtime PRN Insomnia  ondansetron Injectable 4 milliGRAM(s) IV Push every 8 hours PRN Nausea and/or Vomiting    Respiratory Medications    Cardiovascular Medications  losartan 25 milliGRAM(s) Oral daily    Gastrointestinal Medications  aluminum hydroxide/magnesium hydroxide/simethicone Suspension 30 milliLiter(s) Oral every 4 hours PRN Dyspepsia  polyethylene glycol 3350 17 Gram(s) Oral two times a day  polyethylene glycol 3350 17 Gram(s) Oral <User Schedule>    Genitourinary Medications    Hematologic/Oncologic Medications  influenza  Vaccine (HIGH DOSE) 0.7 milliLiter(s) IntraMuscular once    Antimicrobial/Immunologic Medications  metroNIDAZOLE    Tablet 250 milliGRAM(s) Oral every 8 hours  neomycin 500 milliGRAM(s) Oral every 8 hours    Endocrine/Metabolic Medications    Topical/Other Medications    --------------------------------------------------------------------------------------    VITAL SIGNS:  T(C): 37.1 (10-31-23 @ 00:37), Max: 37.1 (10-31-23 @ 00:37)  HR: 86 (10-31-23 @ 00:37) (78 - 90)  BP: 110/69 (10-31-23 @ 00:37) (99/65 - 119/55)  RR: 18 (10-31-23 @ 00:37) (18 - 18)  SpO2: 96% (10-31-23 @ 00:37) (96% - 98%)  --------------------------------------------------------------------------------------    INS AND OUTS:    10-29-23 @ 07:01  -  10-30-23 @ 07:00  --------------------------------------------------------  IN: 880 mL / OUT: 705 mL / NET: 175 mL    10-30-23 @ 07:01  -  10-31-23 @ 03:33  --------------------------------------------------------  IN: 900 mL / OUT: 430 mL / NET: 470 mL      --------------------------------------------------------------------------------------      EXAM    General: NAD, resting in bed comfortably.  Cardiac: regular rate, warm and well perfused  Respiratory: Nonlabored respirations, normal cw expansion.  Abdomen: soft, nontender, nondistended  Extremities: normal strength, FROM, no deformities    --------------------------------------------------------------------------------------    LABS

## 2023-10-31 NOTE — PROGRESS NOTE ADULT - SUBJECTIVE AND OBJECTIVE BOX
DATE OF SERVICE: 10-31-23 @ 12:14    Patient is a 68y old  Female who presents with a chief complaint of Dislodged percutaneous lj tube (31 Oct 2023 09:40)      INTERVAL HISTORY: Feels well, reports abdominal pain improved    REVIEW OF SYSTEMS:  CONSTITUTIONAL: No weakness  EYES/ENT: No visual changes;  No throat pain   NECK: No pain or stiffness  RESPIRATORY: No cough, wheezing; No shortness of breath  CARDIOVASCULAR: No chest pain or palpitations  GASTROINTESTINAL: No abdominal  pain. No nausea, vomiting, or hematemesis  GENITOURINARY: No dysuria, frequency or hematuria  NEUROLOGICAL: No stroke like symptoms  SKIN: No rashes      	  MEDICATIONS:  losartan 25 milliGRAM(s) Oral daily        PHYSICAL EXAM:  T(C): 36.3 (10-31-23 @ 09:19), Max: 37.1 (10-31-23 @ 00:37)  HR: 82 (10-31-23 @ 09:19) (78 - 90)  BP: 117/72 (10-31-23 @ 09:19) (101/66 - 119/55)  RR: 18 (10-31-23 @ 09:19) (18 - 18)  SpO2: 97% (10-31-23 @ 09:19) (96% - 98%)  Wt(kg): --  I&O's Summary    30 Oct 2023 07:01  -  31 Oct 2023 07:00  --------------------------------------------------------  IN: 1000 mL / OUT: 430 mL / NET: 570 mL    31 Oct 2023 07:01  -  31 Oct 2023 12:14  --------------------------------------------------------  IN: 200 mL / OUT: 50 mL / NET: 150 mL          Appearance: In no distress	  HEENT:    PERRL, EOMI	  Cardiovascular:  S1 S2, No JVD  Respiratory: Lungs clear to auscultation	  Gastrointestinal:  Soft, Non-tender, + BS	  Vascularature:  No edema of LE  Psychiatric: Appropriate affect   Neuro: no acute focal deficits                               8.2    5.58  )-----------( 235      ( 31 Oct 2023 06:41 )             28.0     10-31    137  |  104  |  26<H>  ----------------------------<  90  4.9   |  23  |  0.79    Ca    9.0      31 Oct 2023 06:43  Phos  3.6     10-31  Mg     2.4     10-31          Labs personally reviewed      ASSESSMENT/PLAN: 	  68F PMH scleroderma, pHTN, HTN with recent emphysematous cholecystitis s/p percutaneous cholecystectomy with IR on 9/11 presents following dislodged tube. Patient was discharged on 9/14 after procedure and course of zosyn, and has had unremarkable outpatient course. CT A/P revealed thickening of the ascending colon, with concern for underlying mass or malignancy. s/p colonoscopy also suspicious for malignancy. Patient denies CP, SOB or palpitations.    1. Cardiac Risk Stratification  - Patient with hx of scleroderma and pulm HTN but reports average to excellent functional capacity.   - Does not utilize home oxygen. Denies CP or SOB.  - Not in decompensated HF  - No hx of tachy ang arrhythmia. ECG SB with no ischemia noted.  - No hx of severe AS/MS. TTE with no valvular dysfunction.  - TTE with preserved EF, no WMA, normal LV and RV function and size  - Patient is mod risk for mod risk colorectal surgery if needed. No contraindication to proceed pending ischemic eval with NST.  - NST normal    2. Pulmonary Hypertension  - TTE as above shows preserved EF, no pulm HTN and normal RV size and function  - Does not use supplemental oxygen    3. Scleroderma  - Not currently on therapy    4. Hypertension  - c/w losartan 25mg PO daily (therapeutic interchange for irbesartan 75mg PO daily)    5. Ascending Colon Mass  Perc lj tube placement confirmed by IR tube study on 10/25  Recent Colonoscopy shows likely malignancy in ascending colon  CEA level elevated: 57  Ascending colon mass, path c/w adenocarcinoma  -Planned for resection on wed 11/1 as well as cholecystectomy  -surgical care appreciated          NICO Levine DO [facility]  Cardiovascular Medicine  [address], [address]  Available via call or text on [employer]  Office: [phone]

## 2023-10-31 NOTE — PROGRESS NOTE ADULT - ATTENDING COMMENTS
I saw and examined the pt today at 8:05 am using video  name Familia, # [id number].  Feels well. Denies abd pain. Had BM with BRB on TP.  Abdomen soft, NT, ND.  Gisele tube in place.  I discussed plan for OR tomorrow, lap R colectomy, lap gisele, possible open. I discussed details of the procedure, R/B/A and expectations of recovery. All questions answered.  Will do ERP prep today with Miralax and oral ABX prep.   Hold pharmaceutical DVT prophylaxis for possibility of intrathecal Duramorph tomorrow. I saw and examined the pt today at 8:05 am using video  name Familia, # [id number].  Feels well. Denies abd pain. Had BM with BRB on TP.  Abdomen soft, NT, ND.  Gisele tube in place.  I discussed plan for OR tomorrow, lap R colectomy, lap gisele, possible open. I discussed details of the procedure, R/B/A and expectations of recovery. All questions answered.  Will do ERP prep today with Miralax and oral ABX prep.   Hold pharmaceutical DVT prophylaxis for possibility of intrathecal Duramorph tomorrow.  H/h down trending d/t blood loss from the pt's colon malignancy - will transfuse one unit of PRBCs in preparation for OR tomorrow (team also d/w Anesthesia).

## 2023-10-31 NOTE — PRE PROCEDURE NOTE - PRE PROCEDURE EVALUATION
PRE-OPERATIVE PROCEDURE NOTE    Patient Age: 68y    Patient Gender: female    Procedure: Laparoscopic right hemicolectomy and cholecystectomy, possible open    Attending Surgeon: Dr. Stacey Bowers    Diagnosis/Indication: Colonic mass    Pertinent labs:                      8.2    5.58  )-----------( 235      ( 31 Oct 2023 06:41 )             28.0       10-31    137  |  104  |  26<H>  ----------------------------<  90  4.9   |  23  |  0.79    Ca    9.0      31 Oct 2023 06:43  Phos  3.6     10-31  Mg     2.4     10-31        PT/INR - ( 31 Oct 2023 11:54 )   PT: 9.9 sec;   INR: 0.90 ratio         PTT - ( 31 Oct 2023 11:54 )  PTT:33.2 sec    Patient and Family Aware ? Yes    Plan:  - Consent obtained  - Preop labs ordered  - NPO at midnight, will start IVF @ 80cc/hr   - COVID Negative    [facility]  [id number]                                       PRE-OPERATIVE PROCEDURE NOTE    Patient Age: 68y    Patient Gender: female    Procedure: Laparoscopic right hemicolectomy and cholecystectomy, possible open    Attending Surgeon: Dr. Stacey Bowers    Diagnosis/Indication: Colonic mass    Pertinent labs:                      8.2    5.58  )-----------( 235      ( 31 Oct 2023 06:41 )             28.0       10-31    137  |  104  |  26<H>  ----------------------------<  90  4.9   |  23  |  0.79    Ca    9.0      31 Oct 2023 06:43  Phos  3.6     10-31  Mg     2.4     10-31        PT/INR - ( 31 Oct 2023 11:54 )   PT: 9.9 sec;   INR: 0.90 ratio         PTT - ( 31 Oct 2023 11:54 )  PTT:33.2 sec    Patient and Family Aware ? Yes    Plan:  - Consent obtained  - Preop labs ordered  - NPO at midnight, will start IVF @ 80cc/hr       [facility]  [id number]    Addendum  I saw and examined the pt, agree with above, preop for lap R colectomy and lj.

## 2023-10-31 NOTE — PROVIDER CONTACT NOTE (OTHER) - BACKGROUND
9/11 had a cholecystomy dislodged. pt came in 10/20 with abd pain, colonoscopy was done and mass biopsied.

## 2023-10-31 NOTE — PROGRESS NOTE ADULT - SUBJECTIVE AND OBJECTIVE BOX
INTERVAL HPI/OVERNIGHT EVENTS:    pt with bloody stools overnight, hgb stable   free of abdominal pain    MEDICATIONS  (STANDING):  influenza  Vaccine (HIGH DOSE) 0.7 milliLiter(s) IntraMuscular once  losartan 25 milliGRAM(s) Oral daily  metroNIDAZOLE    Tablet 250 milliGRAM(s) Oral every 8 hours  neomycin 500 milliGRAM(s) Oral every 8 hours  polyethylene glycol 3350 17 Gram(s) Oral two times a day  polyethylene glycol 3350 17 Gram(s) Oral <User Schedule>    MEDICATIONS  (PRN):  acetaminophen     Tablet .. 650 milliGRAM(s) Oral every 6 hours PRN Temp greater or equal to 38C (100.4F), Mild Pain (1 - 3)  aluminum hydroxide/magnesium hydroxide/simethicone Suspension 30 milliLiter(s) Oral every 4 hours PRN Dyspepsia  melatonin 3 milliGRAM(s) Oral at bedtime PRN Insomnia  ondansetron Injectable 4 milliGRAM(s) IV Push every 8 hours PRN Nausea and/or Vomiting      Allergies    No Known Allergies    Intolerances        Review of Systems:    General:  No wt loss, fevers, chills, night sweats, fatigue   Eyes:  Good vision, no reported pain  ENT:  No sore throat, pain, runny nose, dysphagia  CV:  No pain, palpitations, hypo/hypertension  Resp:  No dyspnea, cough, tachypnea, wheezing  GI:  No pain, No nausea, No vomiting, No diarrhea, No constipation, No weight loss, No fever, No pruritis, No rectal bleeding, No melena, No dysphagia  :  No pain, bleeding, incontinence, nocturia  Muscle:  No pain, weakness  Neuro:  No weakness, tingling, memory problems  Psych:  No fatigue, insomnia, mood problems, depression  Endocrine:  No polyuria, polydypsia, cold/heat intolerance  Heme:  No petechiae, ecchymosis, easy bruisability  Skin:  No rash, tattoos, scars, edema      Vital Signs Last 24 Hrs  T(C): 36.3 (31 Oct 2023 09:19), Max: 37.1 (31 Oct 2023 00:37)  T(F): 97.3 (31 Oct 2023 09:19), Max: 98.7 (31 Oct 2023 00:37)  HR: 82 (31 Oct 2023 09:19) (78 - 90)  BP: 117/72 (31 Oct 2023 09:19) (101/66 - 119/55)  BP(mean): --  RR: 18 (31 Oct 2023 09:19) (18 - 18)  SpO2: 97% (31 Oct 2023 09:19) (96% - 98%)    Parameters below as of 31 Oct 2023 09:19  Patient On (Oxygen Delivery Method): room air        PHYSICAL EXAM:    Constitutional: NAD  HEENT: EOMI, throat clear  Neck: No LAD, supple  Respiratory: CTA and P  Cardiovascular: S1 and S2, RRR, no M  Gastrointestinal: BS+, soft, NT/ND, neg HSM,  Extremities: No peripheral edema, neg clubbing, cyanosis  Vascular: 2+ peripheral pulses  Neurological: A/O x 3, no focal deficits  Psychiatric: Normal mood, normal affect  Skin: No rashes      LABS:                        8.2    5.58  )-----------( 235      ( 31 Oct 2023 06:41 )             28.0     10-31    137  |  104  |  26<H>  ----------------------------<  90  4.9   |  23  |  0.79    Ca    9.0      31 Oct 2023 06:43  Phos  3.6     10-31  Mg     2.4     10-31        Urinalysis Basic - ( 31 Oct 2023 06:43 )    Color: x / Appearance: x / SG: x / pH: x  Gluc: 90 mg/dL / Ketone: x  / Bili: x / Urobili: x   Blood: x / Protein: x / Nitrite: x   Leuk Esterase: x / RBC: x / WBC x   Sq Epi: x / Non Sq Epi: x / Bacteria: x        RADIOLOGY & ADDITIONAL TESTS:

## 2023-11-01 ENCOUNTER — RESULT REVIEW (OUTPATIENT)
Age: 68
End: 2023-11-01

## 2023-11-01 ENCOUNTER — TRANSCRIPTION ENCOUNTER (OUTPATIENT)
Age: 68
End: 2023-11-01

## 2023-11-01 ENCOUNTER — APPOINTMENT (OUTPATIENT)
Dept: SURGERY | Facility: HOSPITAL | Age: 68
End: 2023-11-01

## 2023-11-01 LAB
ANION GAP SERPL CALC-SCNC: 11 MMOL/L — SIGNIFICANT CHANGE UP (ref 5–17)
ANION GAP SERPL CALC-SCNC: 11 MMOL/L — SIGNIFICANT CHANGE UP (ref 5–17)
ANION GAP SERPL CALC-SCNC: 16 MMOL/L — SIGNIFICANT CHANGE UP (ref 5–17)
ANION GAP SERPL CALC-SCNC: 16 MMOL/L — SIGNIFICANT CHANGE UP (ref 5–17)
BUN SERPL-MCNC: 18 MG/DL — SIGNIFICANT CHANGE UP (ref 7–23)
BUN SERPL-MCNC: 18 MG/DL — SIGNIFICANT CHANGE UP (ref 7–23)
BUN SERPL-MCNC: 22 MG/DL — SIGNIFICANT CHANGE UP (ref 7–23)
BUN SERPL-MCNC: 22 MG/DL — SIGNIFICANT CHANGE UP (ref 7–23)
CALCIUM SERPL-MCNC: 8.7 MG/DL — SIGNIFICANT CHANGE UP (ref 8.4–10.5)
CALCIUM SERPL-MCNC: 8.7 MG/DL — SIGNIFICANT CHANGE UP (ref 8.4–10.5)
CALCIUM SERPL-MCNC: 9.3 MG/DL — SIGNIFICANT CHANGE UP (ref 8.4–10.5)
CALCIUM SERPL-MCNC: 9.3 MG/DL — SIGNIFICANT CHANGE UP (ref 8.4–10.5)
CHLORIDE SERPL-SCNC: 104 MMOL/L — SIGNIFICANT CHANGE UP (ref 96–108)
CHLORIDE SERPL-SCNC: 104 MMOL/L — SIGNIFICANT CHANGE UP (ref 96–108)
CHLORIDE SERPL-SCNC: 105 MMOL/L — SIGNIFICANT CHANGE UP (ref 96–108)
CHLORIDE SERPL-SCNC: 105 MMOL/L — SIGNIFICANT CHANGE UP (ref 96–108)
CO2 SERPL-SCNC: 19 MMOL/L — LOW (ref 22–31)
CO2 SERPL-SCNC: 19 MMOL/L — LOW (ref 22–31)
CO2 SERPL-SCNC: 22 MMOL/L — SIGNIFICANT CHANGE UP (ref 22–31)
CO2 SERPL-SCNC: 22 MMOL/L — SIGNIFICANT CHANGE UP (ref 22–31)
CREAT SERPL-MCNC: 0.63 MG/DL — SIGNIFICANT CHANGE UP (ref 0.5–1.3)
CREAT SERPL-MCNC: 0.63 MG/DL — SIGNIFICANT CHANGE UP (ref 0.5–1.3)
CREAT SERPL-MCNC: 0.68 MG/DL — SIGNIFICANT CHANGE UP (ref 0.5–1.3)
CREAT SERPL-MCNC: 0.68 MG/DL — SIGNIFICANT CHANGE UP (ref 0.5–1.3)
CULTURE RESULTS: SIGNIFICANT CHANGE UP
EGFR: 95 ML/MIN/1.73M2 — SIGNIFICANT CHANGE UP
EGFR: 95 ML/MIN/1.73M2 — SIGNIFICANT CHANGE UP
EGFR: 97 ML/MIN/1.73M2 — SIGNIFICANT CHANGE UP
EGFR: 97 ML/MIN/1.73M2 — SIGNIFICANT CHANGE UP
GLUCOSE SERPL-MCNC: 170 MG/DL — HIGH (ref 70–99)
GLUCOSE SERPL-MCNC: 170 MG/DL — HIGH (ref 70–99)
GLUCOSE SERPL-MCNC: 92 MG/DL — SIGNIFICANT CHANGE UP (ref 70–99)
GLUCOSE SERPL-MCNC: 92 MG/DL — SIGNIFICANT CHANGE UP (ref 70–99)
HCT VFR BLD CALC: 30.4 % — LOW (ref 34.5–45)
HCT VFR BLD CALC: 30.4 % — LOW (ref 34.5–45)
HCT VFR BLD CALC: 35.6 % — SIGNIFICANT CHANGE UP (ref 34.5–45)
HCT VFR BLD CALC: 35.6 % — SIGNIFICANT CHANGE UP (ref 34.5–45)
HGB BLD-MCNC: 10.7 G/DL — LOW (ref 11.5–15.5)
HGB BLD-MCNC: 10.7 G/DL — LOW (ref 11.5–15.5)
HGB BLD-MCNC: 9.3 G/DL — LOW (ref 11.5–15.5)
HGB BLD-MCNC: 9.3 G/DL — LOW (ref 11.5–15.5)
MAGNESIUM SERPL-MCNC: 2.1 MG/DL — SIGNIFICANT CHANGE UP (ref 1.6–2.6)
MAGNESIUM SERPL-MCNC: 2.1 MG/DL — SIGNIFICANT CHANGE UP (ref 1.6–2.6)
MAGNESIUM SERPL-MCNC: 2.3 MG/DL — SIGNIFICANT CHANGE UP (ref 1.6–2.6)
MAGNESIUM SERPL-MCNC: 2.3 MG/DL — SIGNIFICANT CHANGE UP (ref 1.6–2.6)
MCHC RBC-ENTMCNC: 24.5 PG — LOW (ref 27–34)
MCHC RBC-ENTMCNC: 30.1 GM/DL — LOW (ref 32–36)
MCHC RBC-ENTMCNC: 30.1 GM/DL — LOW (ref 32–36)
MCHC RBC-ENTMCNC: 30.6 GM/DL — LOW (ref 32–36)
MCHC RBC-ENTMCNC: 30.6 GM/DL — LOW (ref 32–36)
MCV RBC AUTO: 80.2 FL — SIGNIFICANT CHANGE UP (ref 80–100)
MCV RBC AUTO: 80.2 FL — SIGNIFICANT CHANGE UP (ref 80–100)
MCV RBC AUTO: 81.7 FL — SIGNIFICANT CHANGE UP (ref 80–100)
MCV RBC AUTO: 81.7 FL — SIGNIFICANT CHANGE UP (ref 80–100)
NRBC # BLD: 0 /100 WBCS — SIGNIFICANT CHANGE UP (ref 0–0)
PHOSPHATE SERPL-MCNC: 4.1 MG/DL — SIGNIFICANT CHANGE UP (ref 2.5–4.5)
PHOSPHATE SERPL-MCNC: 4.1 MG/DL — SIGNIFICANT CHANGE UP (ref 2.5–4.5)
PHOSPHATE SERPL-MCNC: 4.9 MG/DL — HIGH (ref 2.5–4.5)
PHOSPHATE SERPL-MCNC: 4.9 MG/DL — HIGH (ref 2.5–4.5)
PLATELET # BLD AUTO: 241 K/UL — SIGNIFICANT CHANGE UP (ref 150–400)
PLATELET # BLD AUTO: 241 K/UL — SIGNIFICANT CHANGE UP (ref 150–400)
PLATELET # BLD AUTO: 250 K/UL — SIGNIFICANT CHANGE UP (ref 150–400)
PLATELET # BLD AUTO: 250 K/UL — SIGNIFICANT CHANGE UP (ref 150–400)
POTASSIUM SERPL-MCNC: 4.3 MMOL/L — SIGNIFICANT CHANGE UP (ref 3.5–5.3)
POTASSIUM SERPL-MCNC: 4.3 MMOL/L — SIGNIFICANT CHANGE UP (ref 3.5–5.3)
POTASSIUM SERPL-MCNC: 4.7 MMOL/L — SIGNIFICANT CHANGE UP (ref 3.5–5.3)
POTASSIUM SERPL-MCNC: 4.7 MMOL/L — SIGNIFICANT CHANGE UP (ref 3.5–5.3)
POTASSIUM SERPL-SCNC: 4.3 MMOL/L — SIGNIFICANT CHANGE UP (ref 3.5–5.3)
POTASSIUM SERPL-SCNC: 4.3 MMOL/L — SIGNIFICANT CHANGE UP (ref 3.5–5.3)
POTASSIUM SERPL-SCNC: 4.7 MMOL/L — SIGNIFICANT CHANGE UP (ref 3.5–5.3)
POTASSIUM SERPL-SCNC: 4.7 MMOL/L — SIGNIFICANT CHANGE UP (ref 3.5–5.3)
RBC # BLD: 3.79 M/UL — LOW (ref 3.8–5.2)
RBC # BLD: 3.79 M/UL — LOW (ref 3.8–5.2)
RBC # BLD: 4.36 M/UL — SIGNIFICANT CHANGE UP (ref 3.8–5.2)
RBC # BLD: 4.36 M/UL — SIGNIFICANT CHANGE UP (ref 3.8–5.2)
RBC # FLD: 18.1 % — HIGH (ref 10.3–14.5)
RBC # FLD: 18.1 % — HIGH (ref 10.3–14.5)
RBC # FLD: 18.3 % — HIGH (ref 10.3–14.5)
RBC # FLD: 18.3 % — HIGH (ref 10.3–14.5)
SODIUM SERPL-SCNC: 138 MMOL/L — SIGNIFICANT CHANGE UP (ref 135–145)
SODIUM SERPL-SCNC: 138 MMOL/L — SIGNIFICANT CHANGE UP (ref 135–145)
SODIUM SERPL-SCNC: 139 MMOL/L — SIGNIFICANT CHANGE UP (ref 135–145)
SODIUM SERPL-SCNC: 139 MMOL/L — SIGNIFICANT CHANGE UP (ref 135–145)
SPECIMEN SOURCE: SIGNIFICANT CHANGE UP
WBC # BLD: 4.77 K/UL — SIGNIFICANT CHANGE UP (ref 3.8–10.5)
WBC # BLD: 4.77 K/UL — SIGNIFICANT CHANGE UP (ref 3.8–10.5)
WBC # BLD: 8.07 K/UL — SIGNIFICANT CHANGE UP (ref 3.8–10.5)
WBC # BLD: 8.07 K/UL — SIGNIFICANT CHANGE UP (ref 3.8–10.5)
WBC # FLD AUTO: 4.77 K/UL — SIGNIFICANT CHANGE UP (ref 3.8–10.5)
WBC # FLD AUTO: 4.77 K/UL — SIGNIFICANT CHANGE UP (ref 3.8–10.5)
WBC # FLD AUTO: 8.07 K/UL — SIGNIFICANT CHANGE UP (ref 3.8–10.5)
WBC # FLD AUTO: 8.07 K/UL — SIGNIFICANT CHANGE UP (ref 3.8–10.5)

## 2023-11-01 PROCEDURE — 44205 LAP COLECTOMY PART W/ILEUM: CPT

## 2023-11-01 PROCEDURE — 88309 TISSUE EXAM BY PATHOLOGIST: CPT | Mod: 26

## 2023-11-01 PROCEDURE — 86077 PHYS BLOOD BANK SERV XMATCH: CPT

## 2023-11-01 PROCEDURE — 88307 TISSUE EXAM BY PATHOLOGIST: CPT | Mod: 26

## 2023-11-01 PROCEDURE — 88341 IMHCHEM/IMCYTCHM EA ADD ANTB: CPT | Mod: 26

## 2023-11-01 PROCEDURE — 88342 IMHCHEM/IMCYTCHM 1ST ANTB: CPT | Mod: 26

## 2023-11-01 PROCEDURE — 47562 LAPAROSCOPIC CHOLECYSTECTOMY: CPT

## 2023-11-01 PROCEDURE — 88304 TISSUE EXAM BY PATHOLOGIST: CPT | Mod: 26

## 2023-11-01 DEVICE — LIGATING CLIPS WECK HEMOLOK POLYMER MEDIUM-LARGE (GREEN) 6: Type: IMPLANTABLE DEVICE | Site: ABDOMINAL | Status: FUNCTIONAL

## 2023-11-01 DEVICE — CLIP APPLIER ETHICON LIGAMAX 5MM: Type: IMPLANTABLE DEVICE | Site: ABDOMINAL | Status: FUNCTIONAL

## 2023-11-01 DEVICE — STAPLER COVIDIEN TRI-STAPLE 60MM TAN RELOAD: Type: IMPLANTABLE DEVICE | Site: ABDOMINAL | Status: FUNCTIONAL

## 2023-11-01 DEVICE — VISTASEAL FIBRIN HUMAN 4ML: Type: IMPLANTABLE DEVICE | Site: ABDOMINAL | Status: FUNCTIONAL

## 2023-11-01 DEVICE — STAPLER COVIDIEN TRI-STAPLE 45MM TAN RELOAD: Type: IMPLANTABLE DEVICE | Site: ABDOMINAL | Status: FUNCTIONAL

## 2023-11-01 DEVICE — STAPLER COVIDIEN GIA 80-3.0MM PURPLE RELOAD: Type: IMPLANTABLE DEVICE | Site: ABDOMINAL | Status: FUNCTIONAL

## 2023-11-01 DEVICE — LIGATING CLIPS WECK HEMOLOK POLYMER LARGE (PURPLE) 6: Type: IMPLANTABLE DEVICE | Site: ABDOMINAL | Status: FUNCTIONAL

## 2023-11-01 DEVICE — STAPLER COVIDIEN GIA 80-3.0MM PURPLE: Type: IMPLANTABLE DEVICE | Site: ABDOMINAL | Status: FUNCTIONAL

## 2023-11-01 DEVICE — STAPLER COVIDIEN TA 90 BLUE: Type: IMPLANTABLE DEVICE | Site: ABDOMINAL | Status: FUNCTIONAL

## 2023-11-01 DEVICE — ARISTA 1GR: Type: IMPLANTABLE DEVICE | Site: ABDOMINAL | Status: FUNCTIONAL

## 2023-11-01 RX ORDER — CELECOXIB 200 MG/1
400 CAPSULE ORAL ONCE
Refills: 0 | Status: COMPLETED | OUTPATIENT
Start: 2023-11-01 | End: 2024-09-29

## 2023-11-01 RX ORDER — ONDANSETRON 8 MG/1
4 TABLET, FILM COATED ORAL EVERY 6 HOURS
Refills: 0 | Status: DISCONTINUED | OUTPATIENT
Start: 2023-11-01 | End: 2023-11-06

## 2023-11-01 RX ORDER — ONDANSETRON 8 MG/1
4 TABLET, FILM COATED ORAL EVERY 6 HOURS
Refills: 0 | Status: DISCONTINUED | OUTPATIENT
Start: 2023-11-01 | End: 2023-11-01

## 2023-11-01 RX ORDER — NALBUPHINE HYDROCHLORIDE 10 MG/ML
2.5 INJECTION, SOLUTION INTRAMUSCULAR; INTRAVENOUS; SUBCUTANEOUS EVERY 6 HOURS
Refills: 0 | Status: DISCONTINUED | OUTPATIENT
Start: 2023-11-01 | End: 2023-11-02

## 2023-11-01 RX ORDER — MORPHINE SULFATE 50 MG/1
0.1 CAPSULE, EXTENDED RELEASE ORAL ONCE
Refills: 0 | Status: DISCONTINUED | OUTPATIENT
Start: 2023-11-01 | End: 2023-11-02

## 2023-11-01 RX ORDER — LANOLIN ALCOHOL/MO/W.PET/CERES
3 CREAM (GRAM) TOPICAL AT BEDTIME
Refills: 0 | Status: DISCONTINUED | OUTPATIENT
Start: 2023-11-01 | End: 2023-11-06

## 2023-11-01 RX ORDER — LOSARTAN POTASSIUM 100 MG/1
25 TABLET, FILM COATED ORAL DAILY
Refills: 0 | Status: DISCONTINUED | OUTPATIENT
Start: 2023-11-01 | End: 2023-11-04

## 2023-11-01 RX ORDER — NALOXONE HYDROCHLORIDE 4 MG/.1ML
0.1 SPRAY NASAL
Refills: 0 | Status: DISCONTINUED | OUTPATIENT
Start: 2023-11-01 | End: 2023-11-02

## 2023-11-01 RX ORDER — OXYCODONE HYDROCHLORIDE 5 MG/1
2.5 TABLET ORAL EVERY 6 HOURS
Refills: 0 | Status: DISCONTINUED | OUTPATIENT
Start: 2023-11-01 | End: 2023-11-06

## 2023-11-01 RX ORDER — HYDROMORPHONE HYDROCHLORIDE 2 MG/ML
0.25 INJECTION INTRAMUSCULAR; INTRAVENOUS; SUBCUTANEOUS
Refills: 0 | Status: DISCONTINUED | OUTPATIENT
Start: 2023-11-01 | End: 2023-11-01

## 2023-11-01 RX ORDER — OXYCODONE HYDROCHLORIDE 5 MG/1
5 TABLET ORAL EVERY 4 HOURS
Refills: 0 | Status: DISCONTINUED | OUTPATIENT
Start: 2023-11-01 | End: 2023-11-06

## 2023-11-01 RX ORDER — ACETAMINOPHEN 500 MG
1000 TABLET ORAL EVERY 6 HOURS
Refills: 0 | Status: DISCONTINUED | OUTPATIENT
Start: 2023-11-01 | End: 2023-11-01

## 2023-11-01 RX ORDER — HEPARIN SODIUM 5000 [USP'U]/ML
5000 INJECTION INTRAVENOUS; SUBCUTANEOUS EVERY 8 HOURS
Refills: 0 | Status: DISCONTINUED | OUTPATIENT
Start: 2023-11-01 | End: 2023-11-02

## 2023-11-01 RX ORDER — CELECOXIB 200 MG/1
400 CAPSULE ORAL ONCE
Refills: 0 | Status: DISCONTINUED | OUTPATIENT
Start: 2023-11-01 | End: 2023-11-01

## 2023-11-01 RX ORDER — OXYCODONE HYDROCHLORIDE 5 MG/1
2.5 TABLET ORAL EVERY 4 HOURS
Refills: 0 | Status: DISCONTINUED | OUTPATIENT
Start: 2023-11-01 | End: 2023-11-01

## 2023-11-01 RX ORDER — ONDANSETRON 8 MG/1
4 TABLET, FILM COATED ORAL ONCE
Refills: 0 | Status: DISCONTINUED | OUTPATIENT
Start: 2023-11-01 | End: 2023-11-01

## 2023-11-01 RX ORDER — SODIUM CHLORIDE 9 MG/ML
1000 INJECTION, SOLUTION INTRAVENOUS
Refills: 0 | Status: DISCONTINUED | OUTPATIENT
Start: 2023-11-01 | End: 2023-11-02

## 2023-11-01 RX ORDER — ACETAMINOPHEN 500 MG
600 TABLET ORAL EVERY 6 HOURS
Refills: 0 | Status: COMPLETED | OUTPATIENT
Start: 2023-11-01 | End: 2023-11-02

## 2023-11-01 RX ORDER — CELECOXIB 200 MG/1
40 CAPSULE ORAL ONCE
Refills: 0 | Status: DISCONTINUED | OUTPATIENT
Start: 2023-11-01 | End: 2023-11-01

## 2023-11-01 RX ADMIN — SODIUM CHLORIDE 40 MILLILITER(S): 9 INJECTION, SOLUTION INTRAVENOUS at 15:47

## 2023-11-01 RX ADMIN — Medication 250 MILLIGRAM(S): at 04:08

## 2023-11-01 RX ADMIN — HEPARIN SODIUM 5000 UNIT(S): 5000 INJECTION INTRAVENOUS; SUBCUTANEOUS at 15:47

## 2023-11-01 RX ADMIN — LOSARTAN POTASSIUM 25 MILLIGRAM(S): 100 TABLET, FILM COATED ORAL at 05:12

## 2023-11-01 RX ADMIN — Medication 600 MILLIGRAM(S): at 23:37

## 2023-11-01 RX ADMIN — NEOMYCIN SULFATE 500 MILLIGRAM(S): 500 TABLET ORAL at 04:08

## 2023-11-01 RX ADMIN — Medication 240 MILLIGRAM(S): at 23:07

## 2023-11-01 RX ADMIN — Medication 1 ENEMA: at 06:09

## 2023-11-01 RX ADMIN — HEPARIN SODIUM 5000 UNIT(S): 5000 INJECTION INTRAVENOUS; SUBCUTANEOUS at 23:07

## 2023-11-01 NOTE — BRIEF OPERATIVE NOTE - OPERATION/FINDINGS
perc lj tube removed, lap cholecystectomy preformed in standard fashion, critical view obtained and green hemolocks triply clipped cystic artery and purple hemolocks clipped triply cystic duct  gallbladder removed in endocatch bag  R colon mobilized along white line of toldt, lesser sac entered laparoscopically  R colon exteriorized through small extended periumbilical incision  transverse colon and terminal ileum divided with endo MISHA stapler  mesentery including high ligation of ileocolic vessels with gold endo MISHA staplers  ileocolic anastomosis with MISHA 80mm purple and TA 90 blue  additional metal clips placed along staple line with good hemostasis  crotch stitch x 3 with 3-0 silk suture  kiel placed in GB fossa and R paracolic gutter  vistaseal on anastomosis staple line  fascia closed with running #1 looped PDS  skin closed primarily with penrose for periumbilical incision

## 2023-11-01 NOTE — BRIEF OPERATIVE NOTE - NSICDXBRIEFPROCEDURE_GEN_ALL_CORE_FT
PROCEDURES:  Laparoscopic cholecystectomy 01-Nov-2023 13:09:28  Tammie Franklin  Right hemicolectomy 01-Nov-2023 13:09:37  Tammie Franklin

## 2023-11-01 NOTE — BRIEF OPERATIVE NOTE - NSICDXBRIEFPREOP_GEN_ALL_CORE_FT
PRE-OP DIAGNOSIS:  Colon cancer 01-Nov-2023 13:09:49  Tammie Franklin  Cholecystitis 01-Nov-2023 13:09:57  Tammie Franklin

## 2023-11-01 NOTE — PRE-ANESTHESIA EVALUATION ADULT - NSANTHOSAYNRD_GEN_A_CORE
No. JOSE LUIS screening performed.  STOP BANG Legend: 0-2 = LOW Risk; 3-4 = INTERMEDIATE Risk; 5-8 = HIGH Risk

## 2023-11-01 NOTE — PROGRESS NOTE ADULT - ASSESSMENT
68F hx scleroderma and pulmonary hypertension, s/p percutaneous cholecystostomy with IR on 9/11 for emphysematous cholecystitis, now presenting with partially dislodged per lj tube. CTAP with cholecystostomy tube with tip in the region of the contracted gallbladder, no acute intraabdominal pathology. Of note, also noted was a short segment of marked colonic bowel wall thickening involving the ascending colon concerning for underlying mass/malignancy.     Plan:  - CLD until 2 hours prior to OR per ERP  - ERP pre-operative management for OR Weds (11/1) for colectomy and laparoscopic cholecystectomy       - Fleet enema x2 this AM  - Cardiology recommendations: patient is moderate risk for moderate risk surgery, no contraindication to surgery (echo and cardiac stress test show no abnormalities)  - Medicine recommendations appreciated: patient cleared for surgery from medicine perspective, signed off 10/29  - Oncology recommendations appreciated: OR for surgical resection w/ surgical team and close outpatient follow-up with [facility]. No systemic therapy this admission  - Perc lj tube placement confirmed by IR tube study on 10/25  - Rheumatology recommendations appreciated -> Goal BP SBP <110, avoid steroids to prevent precipitation sclerodermal renal crisis. No further immunosuppressive therapy at this time  - Dispo: patient's family currently working with  to obtain emergency medicaid, which may affect where patient may follow-up upon discharge, TBD    [facility]  [id number]

## 2023-11-01 NOTE — CHART NOTE - NSCHARTNOTEFT_GEN_A_CORE
SURGERY POST-OPERATIVE PROCEDURE NOTE    PROCEDURE:     SUBJECTIVE:  Patient seen and evaluated at the bedside.     SOB:  [ ] YES [ ] NO  Chest Discomfort: [ ] YES [ ] NO    Nausea: [ ] YES [ ] NO           Vomiting: [ ] YES [ ] NO  Flatus: [ ] YES [ ] NO             Bowel Movement: [ ] YES [ ] NO  Void: [ ]YES [ ]No         Pain Control Adequate: [ ] YES [ ] NO    OBJECTIVE:   Vital Signs Last 24 Hrs  T(C): 36 (01 Nov 2023 16:45), Max: 36.9 (01 Nov 2023 03:35)  T(F): 96.8 (01 Nov 2023 16:45), Max: 98.4 (01 Nov 2023 03:35)  HR: 58 (01 Nov 2023 16:45) (57 - 89)  BP: 109/57 (01 Nov 2023 16:45) (95/54 - 130/79)  BP(mean): 78 (01 Nov 2023 16:45) (71 - 98)  RR: 15 (01 Nov 2023 16:45) (14 - 18)  SpO2: 100% (01 Nov 2023 16:45) (95% - 100%)    Parameters below as of 01 Nov 2023 16:45  Patient On (Oxygen Delivery Method): nasal cannula      I&O's Summary    31 Oct 2023 07:01  -  01 Nov 2023 07:00  --------------------------------------------------------  IN: 950 mL / OUT: 815 mL / NET: 135 mL    01 Nov 2023 07:01  -  01 Nov 2023 17:02  --------------------------------------------------------  IN: 120 mL / OUT: 230 mL / NET: -110 mL      I&O's Detail    31 Oct 2023 07:01  -  01 Nov 2023 07:00  --------------------------------------------------------  IN:    Oral Fluid: 650 mL    PRBCs (Packed Red Blood Cells): 300 mL  Total IN: 950 mL    OUT:    Drain (mL): 140 mL    Voided (mL): 675 mL  Total OUT: 815 mL    Total NET: 135 mL      01 Nov 2023 07:01  -  01 Nov 2023 17:02  --------------------------------------------------------  IN:    Lactated Ringers: 120 mL  Total IN: 120 mL    OUT:    Indwelling Catheter - Urethral (mL): 230 mL  Total OUT: 230 mL    Total NET: -110 mL            Labs:                        10.7   8.07  )-----------( 250      ( 01 Nov 2023 14:30 )             35.6     11-01    139  |  104  |  18  ----------------------------<  170<H>  4.3   |  19<L>  |  0.68    Ca    8.7      01 Nov 2023 14:30  Phos  4.9     11-01  Mg     2.3     11-01      PT/INR - ( 31 Oct 2023 11:54 )   PT: 9.9 sec;   INR: 0.90 ratio         PTT - ( 31 Oct 2023 11:54 )  PTT:33.2 sec  Urinalysis Basic - ( 01 Nov 2023 14:30 )    Color: x / Appearance: x / SG: x / pH: x  Gluc: 170 mg/dL / Ketone: x  / Bili: x / Urobili: x   Blood: x / Protein: x / Nitrite: x   Leuk Esterase: x / RBC: x / WBC x   Sq Epi: x / Non Sq Epi: x / Bacteria: x        MEDICATIONS  (STANDING):  acetaminophen   IVPB .. 600 milliGRAM(s) IV Intermittent every 6 hours  heparin   Injectable 5000 Unit(s) SubCutaneous every 8 hours  influenza  Vaccine (HIGH DOSE) 0.7 milliLiter(s) IntraMuscular once  lactated ringers. 1000 milliLiter(s) (40 mL/Hr) IV Continuous <Continuous>  losartan 25 milliGRAM(s) Oral daily  morphine PF Spinal 0.1 milliGRAM(s) IntraThecal. once    MEDICATIONS  (PRN):  melatonin 3 milliGRAM(s) Oral at bedtime PRN Insomnia  nalbuphine Injectable 2.5 milliGRAM(s) IV Push every 6 hours PRN Pruritus  naloxone Injectable 0.1 milliGRAM(s) IV Push every 3 minutes PRN For ANY of the following changes in patient status:  A. RR LESS THAN 10 breaths per minute, B. Oxygen saturation LESS THAN 90%, C. Sedation score of 6  ondansetron Injectable 4 milliGRAM(s) IV Push every 6 hours PRN Nausea and/or Vomiting  oxyCODONE    IR 2.5 milliGRAM(s) Oral every 6 hours PRN Moderate Pain (4 - 6)  oxyCODONE    IR 5 milliGRAM(s) Oral every 4 hours PRN Severe Pain (7 - 10)        Physical Exam:  General: well developed, well nourished, NAD  Cardiovascular: appears well perfused  Respiratory: respirations non labored  Gastrointestinal: soft, nontender, nondistended  Extremities: FROM, warm  Neurological: A+Ox3    ASSESSMENT AND PLAN:  ASSESSMENT:  68y Female  s/p       PLAN:   - Labs:  - Diet:   - IVF:   - Activity- OOB with assistance  - Pain medication as needed   - Incentive spirometry   - DVT PPX:   - Dispo: SURGERY POST-OPERATIVE PROCEDURE NOTE    PROCEDURE: Laparoscopic cholecystectomy and right hemicolectomy for cholecystitis and colonic mass.    SUBJECTIVE:  Patient seen and evaluated at the bedside. Resting comfortably in bed, family at bedside. [facility]    SOB:  [ ] YES [x] NO  Chest Discomfort: [ ] YES [x] NO    Nausea: [ ] YES [x] NO           Vomiting: [ ] YES [x] NO  Flatus: [ ] YES [x] NO             Bowel Movement: [ ] YES [x] NO  Void: [ ]YES [x]No         Pain Control Adequate: [x] YES [ ] NO    OBJECTIVE:   Vital Signs Last 24 Hrs  T(C): 36 (01 Nov 2023 16:45), Max: 36.9 (01 Nov 2023 03:35)  T(F): 96.8 (01 Nov 2023 16:45), Max: 98.4 (01 Nov 2023 03:35)  HR: 58 (01 Nov 2023 16:45) (57 - 89)  BP: 109/57 (01 Nov 2023 16:45) (95/54 - 130/79)  BP(mean): 78 (01 Nov 2023 16:45) (71 - 98)  RR: 15 (01 Nov 2023 16:45) (14 - 18)  SpO2: 100% (01 Nov 2023 16:45) (95% - 100%)    Parameters below as of 01 Nov 2023 16:45  Patient On (Oxygen Delivery Method): nasal cannula      I&O's Summary    31 Oct 2023 07:01  -  01 Nov 2023 07:00  --------------------------------------------------------  IN: 950 mL / OUT: 815 mL / NET: 135 mL    01 Nov 2023 07:01  -  01 Nov 2023 17:02  --------------------------------------------------------  IN: 120 mL / OUT: 230 mL / NET: -110 mL      I&O's Detail    31 Oct 2023 07:01  -  01 Nov 2023 07:00  --------------------------------------------------------  IN:    Oral Fluid: 650 mL    PRBCs (Packed Red Blood Cells): 300 mL  Total IN: 950 mL    OUT:    Drain (mL): 140 mL    Voided (mL): 675 mL  Total OUT: 815 mL    Total NET: 135 mL      01 Nov 2023 07:01  -  01 Nov 2023 17:02  --------------------------------------------------------  IN:    Lactated Ringers: 120 mL  Total IN: 120 mL    OUT:    Indwelling Catheter - Urethral (mL): 230 mL  Total OUT: 230 mL    Total NET: -110 mL        Labs:                        10.7   8.07  )-----------( 250      ( 01 Nov 2023 14:30 )             35.6     11-01    139  |  104  |  18  ----------------------------<  170<H>  4.3   |  19<L>  |  0.68    Ca    8.7      01 Nov 2023 14:30  Phos  4.9     11-01  Mg     2.3     11-01      PT/INR - ( 31 Oct 2023 11:54 )   PT: 9.9 sec;   INR: 0.90 ratio         PTT - ( 31 Oct 2023 11:54 )  PTT:33.2 sec  Urinalysis Basic - ( 01 Nov 2023 14:30 )    Color: x / Appearance: x / SG: x / pH: x  Gluc: 170 mg/dL / Ketone: x  / Bili: x / Urobili: x   Blood: x / Protein: x / Nitrite: x   Leuk Esterase: x / RBC: x / WBC x   Sq Epi: x / Non Sq Epi: x / Bacteria: x      MEDICATIONS  (STANDING):  acetaminophen   IVPB .. 600 milliGRAM(s) IV Intermittent every 6 hours  heparin   Injectable 5000 Unit(s) SubCutaneous every 8 hours  influenza  Vaccine (HIGH DOSE) 0.7 milliLiter(s) IntraMuscular once  lactated ringers. 1000 milliLiter(s) (40 mL/Hr) IV Continuous <Continuous>  losartan 25 milliGRAM(s) Oral daily  morphine PF Spinal 0.1 milliGRAM(s) IntraThecal. once    MEDICATIONS  (PRN):  melatonin 3 milliGRAM(s) Oral at bedtime PRN Insomnia  nalbuphine Injectable 2.5 milliGRAM(s) IV Push every 6 hours PRN Pruritus  naloxone Injectable 0.1 milliGRAM(s) IV Push every 3 minutes PRN For ANY of the following changes in patient status:  A. RR LESS THAN 10 breaths per minute, B. Oxygen saturation LESS THAN 90%, C. Sedation score of 6  ondansetron Injectable 4 milliGRAM(s) IV Push every 6 hours PRN Nausea and/or Vomiting  oxyCODONE    IR 2.5 milliGRAM(s) Oral every 6 hours PRN Moderate Pain (4 - 6)  oxyCODONE    IR 5 milliGRAM(s) Oral every 4 hours PRN Severe Pain (7 - 10)      Physical Exam:  General: well developed, well nourished, NAD  Cardiovascular: appears well perfused  Respiratory: respirations non labored. On 2L-NC. Equal chest rise bilaterally.  Gastrointestinal: soft, nondistended, appropriately tender to palpation. Port site incision c/d/i. Umbilical incision with sanguinous strikethrough. dressing intact w/o any seepage.  Extremities: FROM, warm  Neurological: A+Ox3    ASSESSMENT:  68y Female s/p Laparoscopic cholecystectomy and right hemicolectomy for cholecystitis and colonic mass.    PLAN:   - ERP  - Labs: am Labs  - Diet: CLD  - IVF: LR 40mL/hr  - Activity- OOB with assistance  - Pain medication as needed   - Incentive spirometry   - DVT PPX: SQH  - Dispo: PACU to floor    [facility]  [id number]

## 2023-11-01 NOTE — PROGRESS NOTE ADULT - SUBJECTIVE AND OBJECTIVE BOX
DATE OF SERVICE: 11-01-23 @ 17:49    Patient is a 68y old  Female who presents with a chief complaint of Dislodged percutaneous lj tube (01 Nov 2023 01:31)      INTERVAL HISTORY:   In no acute distress.     REVIEW OF SYSTEMS:  CONSTITUTIONAL: No weakness  EYES/ENT: No visual changes;  No throat pain   NECK: No pain or stiffness  RESPIRATORY: No cough, wheezing; No shortness of breath  CARDIOVASCULAR: No chest pain or palpitations  GASTROINTESTINAL: No abdominal  pain. No nausea, vomiting, or hematemesis  GENITOURINARY: No dysuria, frequency or hematuria  NEUROLOGICAL: No stroke like symptoms  SKIN: No rashes    	  MEDICATIONS:  losartan 25 milliGRAM(s) Oral daily        PHYSICAL EXAM:  T(C): 36 (11-01-23 @ 16:45), Max: 36.9 (11-01-23 @ 03:35)  HR: 58 (11-01-23 @ 16:45) (57 - 89)  BP: 109/57 (11-01-23 @ 16:45) (95/54 - 130/79)  RR: 15 (11-01-23 @ 16:45) (14 - 18)  SpO2: 100% (11-01-23 @ 16:45) (95% - 100%)  Wt(kg): --  I&O's Summary    31 Oct 2023 07:01  -  01 Nov 2023 07:00  --------------------------------------------------------  IN: 950 mL / OUT: 815 mL / NET: 135 mL    01 Nov 2023 07:01  -  01 Nov 2023 17:49  --------------------------------------------------------  IN: 120 mL / OUT: 230 mL / NET: -110 mL      Height (cm): 144.8 (11-01 @ 08:48)  Weight (kg): 38.6 (11-01 @ 08:48)  BMI (kg/m2): 18.4 (11-01 @ 08:48)  BSA (m2): 1.25 (11-01 @ 08:48)    Appearance: In no distress	  HEENT:    PERRL, EOMI	  Cardiovascular:  S1 S2, No JVD  Respiratory: Lungs clear to auscultation	  Gastrointestinal:  Soft, Non-tender, + BS	  Vascularature:  No edema of LE  Psychiatric: Appropriate affect   Neuro: no acute focal deficits                               10.7   8.07  )-----------( 250      ( 01 Nov 2023 14:30 )             35.6     11-01    139  |  104  |  18  ----------------------------<  170<H>  4.3   |  19<L>  |  0.68    Ca    8.7      01 Nov 2023 14:30  Phos  4.9     11-01  Mg     2.3     11-01          Labs personally reviewed      ASSESSMENT/PLAN: 	    68F PMH scleroderma, pHTN, HTN with recent emphysematous cholecystitis s/p percutaneous cholecystectomy with IR on 9/11 presents following dislodged tube. Patient was discharged on 9/14 after procedure and course of zosyn, and has had unremarkable outpatient course. CT A/P revealed thickening of the ascending colon, with concern for underlying mass or malignancy. s/p colonoscopy also suspicious for malignancy. Patient denies CP, SOB or palpitations.    1. Cardiac Risk Stratification  - Patient with hx of scleroderma and pulm HTN but reports average to excellent functional capacity.   - Does not utilize home oxygen. Denies CP or SOB.  - Not in decompensated HF  - No hx of tachy ang arrhythmia. ECG SB with no ischemia noted.  - No hx of severe AS/MS. TTE with no valvular dysfunction.  - TTE with preserved EF, no WMA, normal LV and RV function and size  - Patient is mod risk for mod risk colorectal surgery if needed. No contraindication to proceed.   - Tolerated surgery well.     2. Pulmonary Hypertension  - TTE as above shows preserved EF, no pulm HTN and normal RV size and function  - Does not use supplemental oxygen    3. Scleroderma  - Not currently on therapy    4. Hypertension  - c/w losartan 25mg PO daily (therapeutic interchange for irbesartan 75mg PO daily)    5. Ascending Colon Mass  Perc lj tube placement confirmed by IR tube study on 10/25  Recent Colonoscopy shows likely malignancy in ascending colon  CEA level elevated: 57  Ascending colon mass, path c/w adenocarcinoma  -s/p resection on wed 11/1 as well as cholecystectomy          LUIZ José-JONATAN Moreno DO [facility]  Cardiovascular Medicine  [address] [place] [address], [address]  Available through call or text on [employer]  Office: [phone]

## 2023-11-01 NOTE — PROGRESS NOTE ADULT - SUBJECTIVE AND OBJECTIVE BOX
[facility] TE[facility] Surgery Daily Progress Note  =====================================================  SUMMARY: 68F w/ colon mass going to OR Wednesday for colon resection and cholecystectomy    INTERVAL EVENTS: NAEO    SUBJECTIVE: Patient seen and examined at bedside on AM rounds. Patient reports that they're feeling well. Denies fever, chills, N/V, chest pain, SOB    ALLERGIES:  No Known Allergies      --------------------------------------------------------------------------------------    MEDICATIONS:    Neurologic Medications  acetaminophen     Tablet .. 650 milliGRAM(s) Oral every 6 hours PRN Temp greater or equal to 38C (100.4F), Mild Pain (1 - 3)  melatonin 3 milliGRAM(s) Oral at bedtime PRN Insomnia  ondansetron Injectable 4 milliGRAM(s) IV Push every 8 hours PRN Nausea and/or Vomiting    Respiratory Medications    Cardiovascular Medications  losartan 25 milliGRAM(s) Oral daily    Gastrointestinal Medications  aluminum hydroxide/magnesium hydroxide/simethicone Suspension 30 milliLiter(s) Oral every 4 hours PRN Dyspepsia    Genitourinary Medications    Hematologic/Oncologic Medications  influenza  Vaccine (HIGH DOSE) 0.7 milliLiter(s) IntraMuscular once    Antimicrobial/Immunologic Medications  metroNIDAZOLE    Tablet 250 milliGRAM(s) Oral every 8 hours  neomycin 500 milliGRAM(s) Oral every 8 hours    Endocrine/Metabolic Medications    Topical/Other Medications    --------------------------------------------------------------------------------------    VITAL SIGNS:  T(C): 36.8 (11-01-23 @ 01:00), Max: 36.9 (10-31-23 @ 04:13)  HR: 85 (11-01-23 @ 01:00) (81 - 89)  BP: 107/66 (11-01-23 @ 01:00) (100/61 - 120/70)  RR: 16 (11-01-23 @ 01:00) (16 - 18)  SpO2: 98% (11-01-23 @ 01:00) (96% - 98%)  --------------------------------------------------------------------------------------    INS AND OUTS:    10-30-23 @ 07:01  -  10-31-23 @ 07:00  --------------------------------------------------------  IN: 1000 mL / OUT: 430 mL / NET: 570 mL    10-31-23 @ 07:01  -  11-01-23 @ 01:31  --------------------------------------------------------  IN: 500 mL / OUT: 775 mL / NET: -275 mL      --------------------------------------------------------------------------------------      EXAM    General: NAD, resting in bed comfortably.  Cardiac: regular rate, warm and well perfused  Respiratory: Nonlabored respirations, normal cw expansion.  Abdomen: soft, nontender, nondistended  Extremities: normal strength, FROM, no deformities    --------------------------------------------------------------------------------------    LABS

## 2023-11-02 LAB
ANION GAP SERPL CALC-SCNC: 15 MMOL/L — SIGNIFICANT CHANGE UP (ref 5–17)
ANION GAP SERPL CALC-SCNC: 15 MMOL/L — SIGNIFICANT CHANGE UP (ref 5–17)
BUN SERPL-MCNC: 17 MG/DL — SIGNIFICANT CHANGE UP (ref 7–23)
BUN SERPL-MCNC: 17 MG/DL — SIGNIFICANT CHANGE UP (ref 7–23)
CALCIUM SERPL-MCNC: 8.4 MG/DL — SIGNIFICANT CHANGE UP (ref 8.4–10.5)
CALCIUM SERPL-MCNC: 8.4 MG/DL — SIGNIFICANT CHANGE UP (ref 8.4–10.5)
CHLORIDE SERPL-SCNC: 104 MMOL/L — SIGNIFICANT CHANGE UP (ref 96–108)
CHLORIDE SERPL-SCNC: 104 MMOL/L — SIGNIFICANT CHANGE UP (ref 96–108)
CO2 SERPL-SCNC: 20 MMOL/L — LOW (ref 22–31)
CO2 SERPL-SCNC: 20 MMOL/L — LOW (ref 22–31)
CREAT SERPL-MCNC: 0.65 MG/DL — SIGNIFICANT CHANGE UP (ref 0.5–1.3)
CREAT SERPL-MCNC: 0.65 MG/DL — SIGNIFICANT CHANGE UP (ref 0.5–1.3)
EGFR: 96 ML/MIN/1.73M2 — SIGNIFICANT CHANGE UP
EGFR: 96 ML/MIN/1.73M2 — SIGNIFICANT CHANGE UP
GLUCOSE SERPL-MCNC: 93 MG/DL — SIGNIFICANT CHANGE UP (ref 70–99)
GLUCOSE SERPL-MCNC: 93 MG/DL — SIGNIFICANT CHANGE UP (ref 70–99)
HCT VFR BLD CALC: 33 % — LOW (ref 34.5–45)
HCT VFR BLD CALC: 33 % — LOW (ref 34.5–45)
HGB BLD-MCNC: 9.6 G/DL — LOW (ref 11.5–15.5)
HGB BLD-MCNC: 9.6 G/DL — LOW (ref 11.5–15.5)
MAGNESIUM SERPL-MCNC: 2.3 MG/DL — SIGNIFICANT CHANGE UP (ref 1.6–2.6)
MAGNESIUM SERPL-MCNC: 2.3 MG/DL — SIGNIFICANT CHANGE UP (ref 1.6–2.6)
MCHC RBC-ENTMCNC: 24.4 PG — LOW (ref 27–34)
MCHC RBC-ENTMCNC: 24.4 PG — LOW (ref 27–34)
MCHC RBC-ENTMCNC: 29.1 GM/DL — LOW (ref 32–36)
MCHC RBC-ENTMCNC: 29.1 GM/DL — LOW (ref 32–36)
MCV RBC AUTO: 83.8 FL — SIGNIFICANT CHANGE UP (ref 80–100)
MCV RBC AUTO: 83.8 FL — SIGNIFICANT CHANGE UP (ref 80–100)
NRBC # BLD: 0 /100 WBCS — SIGNIFICANT CHANGE UP (ref 0–0)
NRBC # BLD: 0 /100 WBCS — SIGNIFICANT CHANGE UP (ref 0–0)
PHOSPHATE SERPL-MCNC: 4.5 MG/DL — SIGNIFICANT CHANGE UP (ref 2.5–4.5)
PHOSPHATE SERPL-MCNC: 4.5 MG/DL — SIGNIFICANT CHANGE UP (ref 2.5–4.5)
PLATELET # BLD AUTO: 251 K/UL — SIGNIFICANT CHANGE UP (ref 150–400)
PLATELET # BLD AUTO: 251 K/UL — SIGNIFICANT CHANGE UP (ref 150–400)
POTASSIUM SERPL-MCNC: 4.7 MMOL/L — SIGNIFICANT CHANGE UP (ref 3.5–5.3)
POTASSIUM SERPL-MCNC: 4.7 MMOL/L — SIGNIFICANT CHANGE UP (ref 3.5–5.3)
POTASSIUM SERPL-SCNC: 4.7 MMOL/L — SIGNIFICANT CHANGE UP (ref 3.5–5.3)
POTASSIUM SERPL-SCNC: 4.7 MMOL/L — SIGNIFICANT CHANGE UP (ref 3.5–5.3)
RBC # BLD: 3.94 M/UL — SIGNIFICANT CHANGE UP (ref 3.8–5.2)
RBC # BLD: 3.94 M/UL — SIGNIFICANT CHANGE UP (ref 3.8–5.2)
RBC # FLD: 18.4 % — HIGH (ref 10.3–14.5)
RBC # FLD: 18.4 % — HIGH (ref 10.3–14.5)
RNAP III AB SER-ACNC: 25 UNITS — HIGH (ref 0–19)
RNAP III AB SER-ACNC: 25 UNITS — HIGH (ref 0–19)
SODIUM SERPL-SCNC: 139 MMOL/L — SIGNIFICANT CHANGE UP (ref 135–145)
SODIUM SERPL-SCNC: 139 MMOL/L — SIGNIFICANT CHANGE UP (ref 135–145)
WBC # BLD: 7.19 K/UL — SIGNIFICANT CHANGE UP (ref 3.8–10.5)
WBC # BLD: 7.19 K/UL — SIGNIFICANT CHANGE UP (ref 3.8–10.5)
WBC # FLD AUTO: 7.19 K/UL — SIGNIFICANT CHANGE UP (ref 3.8–10.5)
WBC # FLD AUTO: 7.19 K/UL — SIGNIFICANT CHANGE UP (ref 3.8–10.5)

## 2023-11-02 RX ORDER — ACETAMINOPHEN 500 MG
600 TABLET ORAL EVERY 6 HOURS
Refills: 0 | Status: DISCONTINUED | OUTPATIENT
Start: 2023-11-02 | End: 2023-11-03

## 2023-11-02 RX ORDER — ACETAMINOPHEN 500 MG
1000 TABLET ORAL EVERY 6 HOURS
Refills: 0 | Status: DISCONTINUED | OUTPATIENT
Start: 2023-11-02 | End: 2023-11-02

## 2023-11-02 RX ORDER — HEPARIN SODIUM 5000 [USP'U]/ML
5000 INJECTION INTRAVENOUS; SUBCUTANEOUS EVERY 12 HOURS
Refills: 0 | Status: DISCONTINUED | OUTPATIENT
Start: 2023-11-02 | End: 2023-11-06

## 2023-11-02 RX ADMIN — Medication 240 MILLIGRAM(S): at 23:17

## 2023-11-02 RX ADMIN — Medication 240 MILLIGRAM(S): at 11:15

## 2023-11-02 RX ADMIN — OXYCODONE HYDROCHLORIDE 2.5 MILLIGRAM(S): 5 TABLET ORAL at 20:20

## 2023-11-02 RX ADMIN — HEPARIN SODIUM 5000 UNIT(S): 5000 INJECTION INTRAVENOUS; SUBCUTANEOUS at 17:16

## 2023-11-02 RX ADMIN — Medication 240 MILLIGRAM(S): at 05:08

## 2023-11-02 RX ADMIN — OXYCODONE HYDROCHLORIDE 2.5 MILLIGRAM(S): 5 TABLET ORAL at 20:58

## 2023-11-02 RX ADMIN — LOSARTAN POTASSIUM 25 MILLIGRAM(S): 100 TABLET, FILM COATED ORAL at 05:08

## 2023-11-02 RX ADMIN — Medication 600 MILLIGRAM(S): at 05:38

## 2023-11-02 RX ADMIN — Medication 600 MILLIGRAM(S): at 23:35

## 2023-11-02 RX ADMIN — Medication 240 MILLIGRAM(S): at 17:10

## 2023-11-02 NOTE — PROGRESS NOTE ADULT - SUBJECTIVE AND OBJECTIVE BOX
Day 1 of Anesthesia Pain Management Service    SUBJECTIVE: OK  Pain Scale Score:          [X] Refer to charted pain scores    THERAPY:    s/p    100 mcg PF morphine on 11\1\2023      MEDICATIONS  (STANDING):  acetaminophen   IVPB .. 600 milliGRAM(s) IV Intermittent every 6 hours  heparin   Injectable 5000 Unit(s) SubCutaneous every 12 hours  influenza  Vaccine (HIGH DOSE) 0.7 milliLiter(s) IntraMuscular once  lactated ringers. 1000 milliLiter(s) (40 mL/Hr) IV Continuous <Continuous>  losartan 25 milliGRAM(s) Oral daily  morphine PF Spinal 0.1 milliGRAM(s) IntraThecal. once    MEDICATIONS  (PRN):  melatonin 3 milliGRAM(s) Oral at bedtime PRN Insomnia  nalbuphine Injectable 2.5 milliGRAM(s) IV Push every 6 hours PRN Pruritus  naloxone Injectable 0.1 milliGRAM(s) IV Push every 3 minutes PRN For ANY of the following changes in patient status:  A. RR LESS THAN 10 breaths per minute, B. Oxygen saturation LESS THAN 90%, C. Sedation score of 6  ondansetron Injectable 4 milliGRAM(s) IV Push every 6 hours PRN Nausea and/or Vomiting  oxyCODONE    IR 5 milliGRAM(s) Oral every 4 hours PRN Severe Pain (7 - 10)  oxyCODONE    IR 2.5 milliGRAM(s) Oral every 6 hours PRN Moderate Pain (4 - 6)      OBJECTIVE:    Sedation:        	[X] Alert	 [ ] Drowsy	[ ] Arousable      [ ] Asleep       [ ] Unresponsive    Side Effects:	[X] None 	[ ] Nausea	[ ] Vomiting         [ ] Pruritus  		[ ] Weakness            [ ] Numbness	          [ ] Other:    Vital Signs Last 24 Hrs  T(C): 36.7 (02 Nov 2023 05:04), Max: 36.7 (02 Nov 2023 05:04)  T(F): 98.1 (02 Nov 2023 05:04), Max: 98.1 (02 Nov 2023 05:04)  HR: 68 (02 Nov 2023 05:04) (57 - 75)  BP: 114/66 (02 Nov 2023 05:04) (95/54 - 133/75)  BP(mean): 78 (01 Nov 2023 16:45) (71 - 98)  RR: 18 (02 Nov 2023 05:04) (14 - 18)  SpO2: 100% (02 Nov 2023 05:04) (95% - 100%)    Parameters below as of 02 Nov 2023 05:04  Patient On (Oxygen Delivery Method): nasal cannula  O2 Flow (L/min): 2      ASSESSMENT/ PLAN  [X] Patient transitioned to prn analgesics  [X] Pain management per primary service, pain service to sign off   [X]Documentation and Verification of current medications

## 2023-11-02 NOTE — PROGRESS NOTE ADULT - SUBJECTIVE AND OBJECTIVE BOX
INTERVAL HPI/OVERNIGHT EVENTS:    sitting comfortably in chair this morning   pain well controlled  no gi fxn    MEDICATIONS  (STANDING):  heparin   Injectable 5000 Unit(s) SubCutaneous every 12 hours  influenza  Vaccine (HIGH DOSE) 0.7 milliLiter(s) IntraMuscular once  lactated ringers. 1000 milliLiter(s) (40 mL/Hr) IV Continuous <Continuous>  losartan 25 milliGRAM(s) Oral daily    MEDICATIONS  (PRN):  melatonin 3 milliGRAM(s) Oral at bedtime PRN Insomnia  ondansetron Injectable 4 milliGRAM(s) IV Push every 6 hours PRN Nausea and/or Vomiting  oxyCODONE    IR 5 milliGRAM(s) Oral every 4 hours PRN Severe Pain (7 - 10)  oxyCODONE    IR 2.5 milliGRAM(s) Oral every 6 hours PRN Moderate Pain (4 - 6)      Allergies    No Known Allergies    Intolerances        Review of Systems:    General:  No wt loss, fevers, chills, night sweats, fatigue   Eyes:  Good vision, no reported pain  ENT:  No sore throat, pain, runny nose, dysphagia  CV:  No pain, palpitations, hypo/hypertension  Resp:  No dyspnea, cough, tachypnea, wheezing  GI:  No pain, No nausea, No vomiting, No diarrhea, No constipation, No weight loss, No fever, No pruritis, No rectal bleeding, No melena, No dysphagia  :  No pain, bleeding, incontinence, nocturia  Muscle:  No pain, weakness  Neuro:  No weakness, tingling, memory problems  Psych:  No fatigue, insomnia, mood problems, depression  Endocrine:  No polyuria, polydypsia, cold/heat intolerance  Heme:  No petechiae, ecchymosis, easy bruisability  Skin:  No rash, tattoos, scars, edema      Vital Signs Last 24 Hrs  T(C): 36.6 (02 Nov 2023 09:39), Max: 36.7 (02 Nov 2023 05:04)  T(F): 97.8 (02 Nov 2023 09:39), Max: 98.1 (02 Nov 2023 05:04)  HR: 63 (02 Nov 2023 09:39) (57 - 75)  BP: 151/78 (02 Nov 2023 09:39) (95/54 - 151/78)  BP(mean): 78 (01 Nov 2023 16:45) (71 - 98)  RR: 18 (02 Nov 2023 09:39) (14 - 18)  SpO2: 100% (02 Nov 2023 09:39) (96% - 100%)    Parameters below as of 02 Nov 2023 09:39  Patient On (Oxygen Delivery Method): nasal cannula  O2 Flow (L/min): 2      PHYSICAL EXAM:    Constitutional: NAD  HEENT: EOMI, throat clear  Neck: No LAD, supple  Respiratory: CTA and P  Cardiovascular: S1 and S2, RRR, no M  Gastrointestinal: BS+, soft, NT/ND, neg HSM, +ostomy  Extremities: No peripheral edema, neg clubbing, cyanosis  Vascular: 2+ peripheral pulses  Neurological: A/O x 3, no focal deficits  Psychiatric: Normal mood, normal affect  Skin: No rashes      LABS:                        9.6    7.19  )-----------( 251      ( 02 Nov 2023 06:55 )             33.0     11-02    139  |  104  |  17  ----------------------------<  93  4.7   |  20<L>  |  0.65    Ca    8.4      02 Nov 2023 06:55  Phos  4.5     11-02  Mg     2.3     11-02      PT/INR - ( 31 Oct 2023 11:54 )   PT: 9.9 sec;   INR: 0.90 ratio         PTT - ( 31 Oct 2023 11:54 )  PTT:33.2 sec  Urinalysis Basic - ( 02 Nov 2023 06:55 )    Color: x / Appearance: x / SG: x / pH: x  Gluc: 93 mg/dL / Ketone: x  / Bili: x / Urobili: x   Blood: x / Protein: x / Nitrite: x   Leuk Esterase: x / RBC: x / WBC x   Sq Epi: x / Non Sq Epi: x / Bacteria: x        RADIOLOGY & ADDITIONAL TESTS:

## 2023-11-02 NOTE — PROGRESS NOTE ADULT - SUBJECTIVE AND OBJECTIVE BOX
POST OP DAY  1  s/p    SUBJECTIVE:  I'm ok.    PAIN SCALE SCORE: [x] Refer to charted pain scores    THERAPY:  [ x ] Spinal morphine   [  ] Epidural morphine   [  ] IV PCA Hydromorphone 1 mg/ml    OBJECTIVE:  Comfortable Appearing    SEDATION SCORE:	  [ x ] Alert	    [  ] Drowsy        [  ] Arousable	[  ] Asleep	[  ] Unresponsive    Side Effects:	  [ x ] None	     [  ] Nausea        [  ] Pruritus        [  ] Weakness   [  ] Numbness        ASSESSMENT/ PLAN   [   ] Discontinue         [  ] Continue    [ x ] Change to prn Analgesics as per primary service.    DOCUMENTATION & VERIFICATION OF CURRENT MEDS [ x ] Done    COMMENTS: No Headache.

## 2023-11-02 NOTE — PROGRESS NOTE ADULT - SUBJECTIVE AND OBJECTIVE BOX
DATE OF SERVICE: 11-02-23 @ 17:56    Patient is a 68y old  Female who presents with a chief complaint of Dislodged percutaneous lj tube (02 Nov 2023 11:39)      INTERVAL HISTORY:     REVIEW OF SYSTEMS:  CONSTITUTIONAL: No weakness  EYES/ENT: No visual changes;  No throat pain   NECK: No pain or stiffness  RESPIRATORY: No cough, wheezing; No shortness of breath  CARDIOVASCULAR: No chest pain or palpitations  GASTROINTESTINAL: No abdominal  pain. No nausea, vomiting, or hematemesis  GENITOURINARY: No dysuria, frequency or hematuria  NEUROLOGICAL: No stroke like symptoms  SKIN: No rashes      MEDICATIONS:  losartan 25 milliGRAM(s) Oral daily        PHYSICAL EXAM:  T(C): 37.1 (11-02-23 @ 16:20), Max: 37.1 (11-02-23 @ 16:20)  HR: 67 (11-02-23 @ 16:20) (59 - 69)  BP: 158/76 (11-02-23 @ 16:20) (101/54 - 158/76)  RR: 18 (11-02-23 @ 16:20) (18 - 18)  SpO2: 98% (11-02-23 @ 16:20) (98% - 100%)  Wt(kg): --  I&O's Summary    01 Nov 2023 07:01  -  02 Nov 2023 07:00  --------------------------------------------------------  IN: 120 mL / OUT: 730 mL / NET: -610 mL    02 Nov 2023 07:01  -  02 Nov 2023 17:56  --------------------------------------------------------  IN: 360 mL / OUT: 650 mL / NET: -290 mL          Appearance: In no distress	  HEENT:    PERRL, EOMI	  Cardiovascular:  S1 S2, No JVD  Respiratory: Lungs clear to auscultation	  Gastrointestinal:  Soft, Non-tender, + BS	  Vascularature:  No edema of LE  Psychiatric: Appropriate affect   Neuro: no acute focal deficits                               9.6    7.19  )-----------( 251      ( 02 Nov 2023 06:55 )             33.0     11-02    139  |  104  |  17  ----------------------------<  93  4.7   |  20<L>  |  0.65    Ca    8.4      02 Nov 2023 06:55  Phos  4.5     11-02  Mg     2.3     11-02          Labs personally reviewed  ASSESSMENT/PLAN: 	  68F PMH scleroderma, pHTN, HTN with recent emphysematous cholecystitis s/p percutaneous cholecystectomy with IR on 9/11 presents following dislodged tube. Patient was discharged on 9/14 after procedure and course of zosyn, and has had unremarkable outpatient course. CT A/P revealed thickening of the ascending colon, with concern for underlying mass or malignancy. s/p colonoscopy also suspicious for malignancy. Patient currently denies all/any CP, SOB or palpitations.    1. Cardiac Risk Stratification  - Patient with hx of scleroderma and pulm HTN but reports average to excellent functional capacity.   - Does not utilize home oxygen. Denies CP or SOB.  - Not in decompensated HF  - No hx of tachy ang arrhythmia. ECG SB with no ischemia noted.  - No hx of severe AS/MS. TTE with no valvular dysfunction.  - TTE with preserved EF, no WMA, normal LV and RV function and size  - Patient is mod risk for mod risk colorectal surgery if needed. No contraindication to proceed.   - Tolerated surgery well, abdominal dressing C/D/I     2. Pulmonary Hypertension  - TTE as above shows preserved EF, no pulm HTN and normal RV size and function  - Does not use supplemental oxygen  -RA 02 Sat 98%     3. Scleroderma  - Not currently on therapy    4. Hypertension - uncontrolled   - If b/p uncontrolled with current home b/p medication consider up titrating Losartan to 50mg   - c/w losartan 25mg PO daily (therapeutic interchange for irbesartan 75mg PO daily)  -Potassium WNL     5. Ascending Colon Mass  Perc lj tube placement confirmed by IR tube study on 10/25  Recent Colonoscopy shows likely malignancy in ascending colon  CEA level elevated: 57  Ascending colon mass, path c/w adenocarcinoma  -s/p resection on wed 11/1 as well as cholecystectomy  - Patient doing well at this time , dressing c/d/i            JONATAN Juárez, DO [facility]  Cardiovascular Medicine  [address], [address]  Available through call or text on [employer] TEAM[employer]  Office: [phone]

## 2023-11-02 NOTE — PROGRESS NOTE ADULT - SUBJECTIVE AND OBJECTIVE BOX
[facility] TE[facility] Surgery Daily Progress Note  =====================================================  SUMMARY: 68F w/ colon mass going to OR Wednesday for colon resection and cholecystectomy    INTERVAL EVENTS: NAEO    SUBJECTIVE: Patient seen and examined at bedside on AM rounds. Patient reports that they're feeling well. Denies fever, chills, N/V, chest pain, SOB  ALLERGIES:  No Known Allergies      --------------------------------------------------------------------------------------    MEDICATIONS:    Neurologic Medications  acetaminophen   IVPB .. 600 milliGRAM(s) IV Intermittent every 6 hours  melatonin 3 milliGRAM(s) Oral at bedtime PRN Insomnia  morphine PF Spinal 0.1 milliGRAM(s) IntraThecal. once  nalbuphine Injectable 2.5 milliGRAM(s) IV Push every 6 hours PRN Pruritus  ondansetron Injectable 4 milliGRAM(s) IV Push every 6 hours PRN Nausea and/or Vomiting  oxyCODONE    IR 5 milliGRAM(s) Oral every 4 hours PRN Severe Pain (7 - 10)  oxyCODONE    IR 2.5 milliGRAM(s) Oral every 6 hours PRN Moderate Pain (4 - 6)    Respiratory Medications    Cardiovascular Medications  losartan 25 milliGRAM(s) Oral daily    Gastrointestinal Medications  lactated ringers. 1000 milliLiter(s) IV Continuous <Continuous>    Genitourinary Medications    Hematologic/Oncologic Medications  heparin   Injectable 5000 Unit(s) SubCutaneous every 8 hours  influenza  Vaccine (HIGH DOSE) 0.7 milliLiter(s) IntraMuscular once    Antimicrobial/Immunologic Medications    Endocrine/Metabolic Medications    Topical/Other Medications  naloxone Injectable 0.1 milliGRAM(s) IV Push every 3 minutes PRN For ANY of the following changes in patient status:  A. RR LESS THAN 10 breaths per minute, B. Oxygen saturation LESS THAN 90%, C. Sedation score of 6    --------------------------------------------------------------------------------------    VITAL SIGNS:  T(C): 36.3 (11-02-23 @ 00:14), Max: 36.9 (11-01-23 @ 03:35)  HR: 69 (11-02-23 @ 00:14) (57 - 77)  BP: 101/54 (11-02-23 @ 00:14) (95/54 - 133/75)  RR: 18 (11-02-23 @ 00:14) (14 - 18)  SpO2: 99% (11-02-23 @ 00:14) (95% - 100%)  --------------------------------------------------------------------------------------    INS AND OUTS:    10-31-23 @ 07:01  -  11-01-23 @ 07:00  --------------------------------------------------------  IN: 950 mL / OUT: 815 mL / NET: 135 mL    11-01-23 @ 07:01  -  11-02-23 @ 02:52  --------------------------------------------------------  IN: 120 mL / OUT: 580 mL / NET: -460 mL      --------------------------------------------------------------------------------------      EXAM    General: NAD, resting in bed comfortably.  Cardiac: regular rate, warm and well perfused  Respiratory: Nonlabored respirations, normal cw expansion.  Abdomen: soft, nontender, nondistended. ___ incision is c/d/i, VANESSA fierro foley.   Extremities: normal strength, FROM, no deformities    --------------------------------------------------------------------------------------    LABS

## 2023-11-02 NOTE — CHART NOTE - NSCHARTNOTEFT_GEN_A_CORE
Nutrition Follow Up Note  Patient seen for: nutrition consult, ERP, low fiber diet education     Chart reviewed, events noted.  This is a " 68F hx scleroderma and pulmonary hypertension, s/p percutaneous cholecystostomy with IR on 9/11 for emphysematous cholecystitis, now presenting with partially dislodged per lj tube. CTAP with cholecystostomy tube with tip in the region of the contracted gallbladder, no acute intraabdominal pathology. Of note, also noted was a short segment of marked colonic bowel wall thickening involving the ascending colon concerning for underlying mass/malignancy." s/p Laparoscopic cholecystectomy, Right hemicolectomy.       Source: [x] Patient: Mandarin  ID [id number]      [x] EMR        [] RN        [] Family at bedside       [] Other:    -If unable to interview patient: [] Trach/Vent/BiPAP  [] Disoriented/confused/inappropriate to interview    Diet Order:   Diet, Low Fiber:   Ensure Surgery Cans or Servings Per Day:  1     Special Instructions for Nursing:  Advance diet to low fiber with 1 ensure surgery if patient tolerates 1 clear liquid tray (11-01-23)  Diet, Clear Liquid:   Ensure Clear Cans or Servings Per Day:  1     Special Instructions for Nursing:  To include 1 Ensure clear, 1 Lemon Ice, 2 cups of hot water to make tea or broth.  Caffeinated coffee is permissible (11-01-23)    - Is current order appropriate/adequate? [x] Yes  []  No:     - PO intake :   [] >75%  Adequate    [x] 50-75%  Fair       [] <50%  Poor--pt on clear liquid diet ,    - Nutrition-related concerns:  -pt currently on clear liquid diet, drinking tea and high protein apple juice.   -no acute GI distress noted, denies nausea, emesis.   -discussed typical diet advancement, pt receptive to education     GI:  Last BM __11/1_.   Bowel Regimen? [] Yes   [x] No      Weights:   dosing weight is 38.6Kg (11/1)    Nutritionally Pertinent MEDICATIONS  (STANDING):  lactated ringers.  losartan    Pertinent Labs: 11-02 @ 06:55: Na 139, BUN 17, Cr 0.65, BG 93, K+ 4.7, Phos 4.5, Mg 2.3, Alk Phos --, ALT/SGPT --, AST/SGOT --, HbA1c --  11-01 @ 14:30: Na 139, BUN 18, Cr 0.68, <H>, K+ 4.3, Phos 4.9<H>, Mg 2.3, Alk Phos --, ALT/SGPT --, AST/SGOT --, HbA1c --    A1C with Estimated Average Glucose Result: 5.3 % (09-11-23 @ 13:10)    Finger Sticks:      Skin per nursing documentation: free of pressure injuries per nursing flow sheets   Edema: none     Estimated Needs:   [x] no change since previous assessment  [] recalculated:     Previous Nutrition Diagnosis: Underweight  Nutrition Diagnosis is: [x] ongoing  [] resolved [] not applicable     New Nutrition Diagnosis: Food and Nutrition Related Knowledge Deficit related to lack of exposure to previous diet education as evidenced by pt unfamiliar with low fiber diet, with diet related questions.     Nutrition Care Plan:  [x] In Progress  [] Achieved  [] Not applicable    Nutrition Interventions:     Education Provided:       [x] Yes:  [] No: Provided low-fiber nutrition therapy including importance of avoiding  fiber rich foods, fresh fruits/vegetables, whole grains, and added fiber in processed foods. Discussed chewing foods well and adequate hydration and protein intake. Discussed gradual reintroduction of fiber back into diet once cleared by MD. Pt verbalized understanding and accepted written handout. Patient with no nutrition-related questions at this time. Made aware RD remains available as needed.        Recommendations:         [x] Recommend Low fiber diet with Ensure Surgery 1x daily      [x] Diet education provided, reinforce as needed.       Monitoring and Evaluation:   Continue to monitor nutritional intake, tolerance to diet prescription, weights, labs, skin integrity      RD remains available upon request and will follow up per protocol  Astrid Shaikh RD, CDN, Available on Teams Nutrition Follow Up Note  Patient seen for: nutrition consult, ERP, low fiber diet education     Chart reviewed, events noted.  This is a " 68F hx scleroderma and pulmonary hypertension, s/p percutaneous cholecystostomy with IR on 9/11 for emphysematous cholecystitis, now presenting with partially dislodged per lj tube. CTAP with cholecystostomy tube with tip in the region of the contracted gallbladder, no acute intraabdominal pathology. Of note, also noted was a short segment of marked colonic bowel wall thickening involving the ascending colon concerning for underlying mass/malignancy." s/p Laparoscopic cholecystectomy, Right hemicolectomy.       Source: [x] Patient: Mandarin  ID [id number]      [x] EMR        [] RN        [] Family at bedside       [] Other:    -If unable to interview patient: [] Trach/Vent/BiPAP  [] Disoriented/confused/inappropriate to interview    Diet Order:   Diet, Low Fiber:   Ensure Surgery Cans or Servings Per Day:  1     Special Instructions for Nursing:  Advance diet to low fiber with 1 ensure surgery if patient tolerates 1 clear liquid tray (11-01-23)  Diet, Clear Liquid:   Ensure Clear Cans or Servings Per Day:  1     Special Instructions for Nursing:  To include 1 Ensure clear, 1 Lemon Ice, 2 cups of hot water to make tea or broth.  Caffeinated coffee is permissible (11-01-23)    - Is current order appropriate/adequate? [x] Yes  []  No:     - PO intake :   [] >75%  Adequate    [x] 50-75%  Fair       [] <50%  Poor--pt on clear liquid diet ,    - Nutrition-related concerns:  -pt currently on clear liquid diet, drinking tea and high protein apple juice.   -no acute GI distress noted, denies nausea, emesis.   -discussed typical diet advancement, pt receptive to education     GI:  Last BM __11/1_.   Bowel Regimen? [] Yes   [x] No      Weights:   dosing weight is 38.6Kg (11/1)    Nutritionally Pertinent MEDICATIONS  (STANDING):  lactated ringers.  losartan    Pertinent Labs: 11-02 @ 06:55: Na 139, BUN 17, Cr 0.65, BG 93, K+ 4.7, Phos 4.5, Mg 2.3, Alk Phos --, ALT/SGPT --, AST/SGOT --, HbA1c --  11-01 @ 14:30: Na 139, BUN 18, Cr 0.68, <H>, K+ 4.3, Phos 4.9<H>, Mg 2.3, Alk Phos --, ALT/SGPT --, AST/SGOT --, HbA1c --    A1C with Estimated Average Glucose Result: 5.3 % (09-11-23 @ 13:10)    Finger Sticks:      Skin per nursing documentation: free of pressure injuries per nursing flow sheets   Edema: none     Estimated Needs:   [x] no change since previous assessment  [] recalculated:     Previous Nutrition Diagnosis: Underweight  Nutrition Diagnosis is: [x] ongoing  [] resolved [] not applicable     New Nutrition Diagnosis: Food and Nutrition Related Knowledge Deficit related to lack of exposure to previous diet education as evidenced by pt unfamiliar with low fiber diet, with diet related questions.     Nutrition Care Plan:  [x] In Progress  [] Achieved  [] Not applicable    Nutrition Interventions:     Education Provided:       [x] Yes:  [] No: Provided low-fiber nutrition therapy including importance of avoiding  fiber rich foods, fresh fruits/vegetables, whole grains, and added fiber in processed foods. Discussed chewing foods well and adequate hydration and protein intake. Discussed gradual reintroduction of fiber back into diet once cleared by MD. Pt verbalized understanding and accepted written handout. Patient with no nutrition-related questions at this time. Made aware RD remains available as needed.        Recommendations:         [x] Recommend Low fiber diet with Ensure Surgery 1x daily      [x] Diet education provided, reinforce as needed. Written information provided in English and Mandarin       Monitoring and Evaluation:   Continue to monitor nutritional intake, tolerance to diet prescription, weights, labs, skin integrity      RD remains available upon request and will follow up per protocol  Astrid Shaikh RD, CDN, Available on Teams

## 2023-11-02 NOTE — PROGRESS NOTE ADULT - ASSESSMENT
Ascending colon mass, path c/w adenocarcinoma  -s/p cholecystectomy and right hemicolectomy, 11/1/23  -monitor return of gi fxn   -pain control as needed  -surgical care appreciated

## 2023-11-02 NOTE — PROGRESS NOTE ADULT - ATTENDING COMMENTS
I saw & examined the pt today at 7:45 am with a video .  Reports no pain, N/V.  Abdomen soft, NT, ND.  Labs stable.  Stable.  Ambulate.  Continue ERP.  D/c [place].

## 2023-11-03 LAB
ANION GAP SERPL CALC-SCNC: 9 MMOL/L — SIGNIFICANT CHANGE UP (ref 5–17)
ANION GAP SERPL CALC-SCNC: 9 MMOL/L — SIGNIFICANT CHANGE UP (ref 5–17)
BUN SERPL-MCNC: 13 MG/DL — SIGNIFICANT CHANGE UP (ref 7–23)
BUN SERPL-MCNC: 13 MG/DL — SIGNIFICANT CHANGE UP (ref 7–23)
CALCIUM SERPL-MCNC: 8.6 MG/DL — SIGNIFICANT CHANGE UP (ref 8.4–10.5)
CALCIUM SERPL-MCNC: 8.6 MG/DL — SIGNIFICANT CHANGE UP (ref 8.4–10.5)
CHLORIDE SERPL-SCNC: 103 MMOL/L — SIGNIFICANT CHANGE UP (ref 96–108)
CHLORIDE SERPL-SCNC: 103 MMOL/L — SIGNIFICANT CHANGE UP (ref 96–108)
CO2 SERPL-SCNC: 24 MMOL/L — SIGNIFICANT CHANGE UP (ref 22–31)
CO2 SERPL-SCNC: 24 MMOL/L — SIGNIFICANT CHANGE UP (ref 22–31)
CREAT SERPL-MCNC: 0.5 MG/DL — SIGNIFICANT CHANGE UP (ref 0.5–1.3)
CREAT SERPL-MCNC: 0.5 MG/DL — SIGNIFICANT CHANGE UP (ref 0.5–1.3)
EGFR: 102 ML/MIN/1.73M2 — SIGNIFICANT CHANGE UP
EGFR: 102 ML/MIN/1.73M2 — SIGNIFICANT CHANGE UP
GLUCOSE SERPL-MCNC: 104 MG/DL — HIGH (ref 70–99)
GLUCOSE SERPL-MCNC: 104 MG/DL — HIGH (ref 70–99)
HCT VFR BLD CALC: 31.1 % — LOW (ref 34.5–45)
HCT VFR BLD CALC: 31.1 % — LOW (ref 34.5–45)
HGB BLD-MCNC: 9.4 G/DL — LOW (ref 11.5–15.5)
HGB BLD-MCNC: 9.4 G/DL — LOW (ref 11.5–15.5)
MAGNESIUM SERPL-MCNC: 1.9 MG/DL — SIGNIFICANT CHANGE UP (ref 1.6–2.6)
MAGNESIUM SERPL-MCNC: 1.9 MG/DL — SIGNIFICANT CHANGE UP (ref 1.6–2.6)
MCHC RBC-ENTMCNC: 24.9 PG — LOW (ref 27–34)
MCHC RBC-ENTMCNC: 24.9 PG — LOW (ref 27–34)
MCHC RBC-ENTMCNC: 30.2 GM/DL — LOW (ref 32–36)
MCHC RBC-ENTMCNC: 30.2 GM/DL — LOW (ref 32–36)
MCV RBC AUTO: 82.3 FL — SIGNIFICANT CHANGE UP (ref 80–100)
MCV RBC AUTO: 82.3 FL — SIGNIFICANT CHANGE UP (ref 80–100)
NRBC # BLD: 0 /100 WBCS — SIGNIFICANT CHANGE UP (ref 0–0)
NRBC # BLD: 0 /100 WBCS — SIGNIFICANT CHANGE UP (ref 0–0)
PHOSPHATE SERPL-MCNC: 2.1 MG/DL — LOW (ref 2.5–4.5)
PHOSPHATE SERPL-MCNC: 2.1 MG/DL — LOW (ref 2.5–4.5)
PLATELET # BLD AUTO: 290 K/UL — SIGNIFICANT CHANGE UP (ref 150–400)
PLATELET # BLD AUTO: 290 K/UL — SIGNIFICANT CHANGE UP (ref 150–400)
POTASSIUM SERPL-MCNC: 4 MMOL/L — SIGNIFICANT CHANGE UP (ref 3.5–5.3)
POTASSIUM SERPL-MCNC: 4 MMOL/L — SIGNIFICANT CHANGE UP (ref 3.5–5.3)
POTASSIUM SERPL-SCNC: 4 MMOL/L — SIGNIFICANT CHANGE UP (ref 3.5–5.3)
POTASSIUM SERPL-SCNC: 4 MMOL/L — SIGNIFICANT CHANGE UP (ref 3.5–5.3)
RBC # BLD: 3.78 M/UL — LOW (ref 3.8–5.2)
RBC # BLD: 3.78 M/UL — LOW (ref 3.8–5.2)
RBC # FLD: 18.6 % — HIGH (ref 10.3–14.5)
RBC # FLD: 18.6 % — HIGH (ref 10.3–14.5)
SODIUM SERPL-SCNC: 136 MMOL/L — SIGNIFICANT CHANGE UP (ref 135–145)
SODIUM SERPL-SCNC: 136 MMOL/L — SIGNIFICANT CHANGE UP (ref 135–145)
WBC # BLD: 8.53 K/UL — SIGNIFICANT CHANGE UP (ref 3.8–10.5)
WBC # BLD: 8.53 K/UL — SIGNIFICANT CHANGE UP (ref 3.8–10.5)
WBC # FLD AUTO: 8.53 K/UL — SIGNIFICANT CHANGE UP (ref 3.8–10.5)
WBC # FLD AUTO: 8.53 K/UL — SIGNIFICANT CHANGE UP (ref 3.8–10.5)

## 2023-11-03 PROCEDURE — 74018 RADEX ABDOMEN 1 VIEW: CPT | Mod: 26

## 2023-11-03 PROCEDURE — 99232 SBSQ HOSP IP/OBS MODERATE 35: CPT

## 2023-11-03 RX ORDER — ACETAMINOPHEN 500 MG
650 TABLET ORAL EVERY 6 HOURS
Refills: 0 | Status: DISCONTINUED | OUTPATIENT
Start: 2023-11-03 | End: 2023-11-06

## 2023-11-03 RX ORDER — DEXTROSE MONOHYDRATE, SODIUM CHLORIDE, AND POTASSIUM CHLORIDE 50; .745; 4.5 G/1000ML; G/1000ML; G/1000ML
1000 INJECTION, SOLUTION INTRAVENOUS
Refills: 0 | Status: DISCONTINUED | OUTPATIENT
Start: 2023-11-03 | End: 2023-11-05

## 2023-11-03 RX ADMIN — Medication 650 MILLIGRAM(S): at 23:06

## 2023-11-03 RX ADMIN — Medication 650 MILLIGRAM(S): at 18:53

## 2023-11-03 RX ADMIN — Medication 650 MILLIGRAM(S): at 05:52

## 2023-11-03 RX ADMIN — HEPARIN SODIUM 5000 UNIT(S): 5000 INJECTION INTRAVENOUS; SUBCUTANEOUS at 18:08

## 2023-11-03 RX ADMIN — HEPARIN SODIUM 5000 UNIT(S): 5000 INJECTION INTRAVENOUS; SUBCUTANEOUS at 05:52

## 2023-11-03 RX ADMIN — Medication 650 MILLIGRAM(S): at 06:22

## 2023-11-03 RX ADMIN — Medication 650 MILLIGRAM(S): at 12:45

## 2023-11-03 RX ADMIN — Medication 650 MILLIGRAM(S): at 18:08

## 2023-11-03 RX ADMIN — Medication 650 MILLIGRAM(S): at 12:16

## 2023-11-03 RX ADMIN — Medication 85 MILLIMOLE(S): at 12:29

## 2023-11-03 RX ADMIN — LOSARTAN POTASSIUM 25 MILLIGRAM(S): 100 TABLET, FILM COATED ORAL at 05:52

## 2023-11-03 RX ADMIN — DEXTROSE MONOHYDRATE, SODIUM CHLORIDE, AND POTASSIUM CHLORIDE 40 MILLILITER(S): 50; .745; 4.5 INJECTION, SOLUTION INTRAVENOUS at 21:32

## 2023-11-03 NOTE — PROGRESS NOTE ADULT - SUBJECTIVE AND OBJECTIVE BOX
DATE OF SERVICE: 11-03-23 @ 20:43    Patient is a 68y old  Female who presents with a chief complaint of Dislodged percutaneous lj tube (03 Nov 2023 03:42)      INTERVAL HISTORY: feels ok    TELEMETRY Personally reviewed: no events  	  MEDICATIONS:  losartan 25 milliGRAM(s) Oral daily        PHYSICAL EXAM:  T(C): 36.9 (11-03-23 @ 20:26), Max: 37.1 (11-03-23 @ 19:54)  HR: 74 (11-03-23 @ 20:26) (65 - 79)  BP: 175/93 (11-03-23 @ 20:26) (133/72 - 175/93)  RR: 18 (11-03-23 @ 20:26) (18 - 18)  SpO2: 96% (11-03-23 @ 20:26) (96% - 97%)  Wt(kg): --  I&O's Summary    02 Nov 2023 07:01  -  03 Nov 2023 07:00  --------------------------------------------------------  IN: 660 mL / OUT: 1900 mL / NET: -1240 mL    03 Nov 2023 07:01  -  03 Nov 2023 20:43  --------------------------------------------------------  IN: 700 mL / OUT: 775 mL / NET: -75 mL          Appearance: In no distress	  HEENT:    PERRL, EOMI	  Cardiovascular:  S1 S2, No JVD  Respiratory: Lungs clear to auscultation	  Gastrointestinal:  Soft, Non-tender, + BS	  Vascularature:  No edema of LE  Psychiatric: Appropriate affect   Neuro: no acute focal deficits                               9.4    8.53  )-----------( 290      ( 03 Nov 2023 07:12 )             31.1     11-03    136  |  103  |  13  ----------------------------<  104<H>  4.0   |  24  |  0.50    Ca    8.6      03 Nov 2023 07:09  Phos  2.1     11-03  Mg     1.9     11-03          Labs personally reviewed      ASSESSMENT/PLAN: 	  68F PMH scleroderma, pHTN, HTN with recent emphysematous cholecystitis s/p percutaneous cholecystectomy with IR on 9/11 presents following dislodged tube. Patient was discharged on 9/14 after procedure and course of zosyn, and has had unremarkable outpatient course. CT A/P revealed thickening of the ascending colon, with concern for underlying mass or malignancy. s/p colonoscopy also suspicious for malignancy. Patient currently denies all/any CP, SOB or palpitations.    1. Cardiac Risk Stratification  - Patient with hx of scleroderma and pulm HTN but reports average to excellent functional capacity.   - Does not utilize home oxygen. Denies CP or SOB.  - Not in decompensated HF  - No hx of tachy ang arrhythmia. ECG SB with no ischemia noted.  - No hx of severe AS/MS. TTE with no valvular dysfunction.  - TTE with preserved EF, no WMA, normal LV and RV function and size  - Patient is mod risk for mod risk colorectal surgery if needed. No contraindication to proceed.   - Tolerated surgery well, abdominal dressing C/D/I     2. Pulmonary Hypertension  - TTE as above shows preserved EF, no pulm HTN and normal RV size and function  - Does not use supplemental oxygen  -RA 02 Sat 98%     3. Scleroderma  - Not currently on therapy    4. Hypertension - uncontrolled   - If b/p uncontrolled with current home b/p medication consider up titrating Losartan to 50mg   - c/w losartan 25mg PO daily (therapeutic interchange for irbesartan 75mg PO daily)  -Potassium WNL     5. Ascending Colon Mass  Perc lj tube placement confirmed by IR tube study on 10/25  Recent Colonoscopy shows likely malignancy in ascending colon  CEA level elevated: 57  Ascending colon mass, path c/w adenocarcinoma  -s/p resection on wed 11/1 as well as cholecystectomy  - Patient doing well at this time , dressing c/d/i        Rmasey Moreno DO [facility]  Cardiovascular Medicine  [address], [address]  Office: [phone]  Available via Text/call on [employer] Teams

## 2023-11-03 NOTE — PROGRESS NOTE ADULT - SUBJECTIVE AND OBJECTIVE BOX
[facility] TEAM Surgery Daily Progress Note  =====================================================  SUMMARY: 68F POD2 right hemicolectomy and cholecystectomy for right colon mass and hx of perc lj tube.    INTERVAL EVENTS: NAEO. [place] removed and patient passed ToV.     SUBJECTIVE: Patient seen and examined at bedside on AM rounds. Patient reports that they're feeling well. Denies fever, chills, N/V, chest pain, SOB    ALLERGIES:  No Known Allergies      --------------------------------------------------------------------------------------    MEDICATIONS:    Neurologic Medications  acetaminophen   IVPB .. 600 milliGRAM(s) IV Intermittent every 6 hours  melatonin 3 milliGRAM(s) Oral at bedtime PRN Insomnia  ondansetron Injectable 4 milliGRAM(s) IV Push every 6 hours PRN Nausea and/or Vomiting  oxyCODONE    IR 2.5 milliGRAM(s) Oral every 6 hours PRN Moderate Pain (4 - 6)  oxyCODONE    IR 5 milliGRAM(s) Oral every 4 hours PRN Severe Pain (7 - 10)    Respiratory Medications    Cardiovascular Medications  losartan 25 milliGRAM(s) Oral daily    Gastrointestinal Medications    Genitourinary Medications    Hematologic/Oncologic Medications  heparin   Injectable 5000 Unit(s) SubCutaneous every 12 hours  influenza  Vaccine (HIGH DOSE) 0.7 milliLiter(s) IntraMuscular once    Antimicrobial/Immunologic Medications    Endocrine/Metabolic Medications    Topical/Other Medications    --------------------------------------------------------------------------------------    VITAL SIGNS:  T(C): 36.9 (11-03-23 @ 00:25), Max: 37.1 (11-02-23 @ 16:20)  HR: 73 (11-03-23 @ 00:25) (59 - 78)  BP: 143/79 (11-03-23 @ 00:25) (114/66 - 158/76)  RR: 18 (11-03-23 @ 00:25) (18 - 18)  SpO2: 96% (11-03-23 @ 00:25) (96% - 100%)  --------------------------------------------------------------------------------------    INS AND OUTS:    11-01-23 @ 07:01  -  11-02-23 @ 07:00  --------------------------------------------------------  IN: 120 mL / OUT: 730 mL / NET: -610 mL    11-02-23 @ 07:01  -  11-03-23 @ 03:43  --------------------------------------------------------  IN: 610 mL / OUT: 1150 mL / NET: -540 mL      --------------------------------------------------------------------------------------      EXAM    General: NAD, resting in bed comfortably.  Cardiac: regular rate, warm and well perfused  Respiratory: Nonlabored respirations, normal cw expansion.  Abdomen: soft, nontender, nondistended, incision c/d/i  Extremities: normal strength, FROM, no deformities    --------------------------------------------------------------------------------------    LABS       [facility] TEAM Surgery Daily Progress Note  =====================================================  SUMMARY: 68F POD2 right hemicolectomy and cholecystectomy for right colon mass and hx of perc lj tube.    INTERVAL EVENTS: NAEO. [place] removed and patient passed ToV.     SUBJECTIVE: Patient seen and examined at bedside on AM rounds. Patient reports that they're feeling well. Denies fever, chills, N/V, chest pain, SOB    ALLERGIES:  No Known Allergies      --------------------------------------------------------------------------------------    MEDICATIONS:    Neurologic Medications  acetaminophen   IVPB .. 600 milliGRAM(s) IV Intermittent every 6 hours  melatonin 3 milliGRAM(s) Oral at bedtime PRN Insomnia  ondansetron Injectable 4 milliGRAM(s) IV Push every 6 hours PRN Nausea and/or Vomiting  oxyCODONE    IR 2.5 milliGRAM(s) Oral every 6 hours PRN Moderate Pain (4 - 6)  oxyCODONE    IR 5 milliGRAM(s) Oral every 4 hours PRN Severe Pain (7 - 10)    Respiratory Medications    Cardiovascular Medications  losartan 25 milliGRAM(s) Oral daily    Gastrointestinal Medications    Genitourinary Medications    Hematologic/Oncologic Medications  heparin   Injectable 5000 Unit(s) SubCutaneous every 12 hours  influenza  Vaccine (HIGH DOSE) 0.7 milliLiter(s) IntraMuscular once    Antimicrobial/Immunologic Medications    Endocrine/Metabolic Medications    Topical/Other Medications    --------------------------------------------------------------------------------------    VITAL SIGNS:  T(C): 36.9 (11-03-23 @ 00:25), Max: 37.1 (11-02-23 @ 16:20)  HR: 73 (11-03-23 @ 00:25) (59 - 78)  BP: 143/79 (11-03-23 @ 00:25) (114/66 - 158/76)  RR: 18 (11-03-23 @ 00:25) (18 - 18)  SpO2: 96% (11-03-23 @ 00:25) (96% - 100%)  --------------------------------------------------------------------------------------    INS AND OUTS:    11-01-23 @ 07:01  -  11-02-23 @ 07:00  --------------------------------------------------------  IN: 120 mL / OUT: 730 mL / NET: -610 mL    11-02-23 @ 07:01  -  11-03-23 @ 03:43  --------------------------------------------------------  IN: 610 mL / OUT: 1150 mL / NET: -540 mL      --------------------------------------------------------------------------------------      EXAM    General: NAD, resting in bed comfortably.  Cardiac: regular rate, warm and well perfused  Respiratory: Nonlabored respirations, normal cw expansion.  Abdomen: soft, nontender, mildly distended, incision c/d/i  Extremities: normal strength, FROM, no deformities    --------------------------------------------------------------------------------------    LABS       [facility] TEAM Surgery Daily Progress Note  =====================================================  SUMMARY: 68F POD2 right hemicolectomy and cholecystectomy for right colon mass and hx of perc lj tube.    INTERVAL EVENTS: NAEO. [place] removed and patient passed ToV.     SUBJECTIVE: Patient seen and examined at bedside on AM rounds. Patient reports that they're feeling well. Patient said she did not walk yesterday because she was too cold. Denies fever, chills, vomiting (but does very mild nausea), chest pain, SOB    ALLERGIES:  No Known Allergies      --------------------------------------------------------------------------------------    MEDICATIONS:    Neurologic Medications  acetaminophen   IVPB .. 600 milliGRAM(s) IV Intermittent every 6 hours  melatonin 3 milliGRAM(s) Oral at bedtime PRN Insomnia  ondansetron Injectable 4 milliGRAM(s) IV Push every 6 hours PRN Nausea and/or Vomiting  oxyCODONE    IR 2.5 milliGRAM(s) Oral every 6 hours PRN Moderate Pain (4 - 6)  oxyCODONE    IR 5 milliGRAM(s) Oral every 4 hours PRN Severe Pain (7 - 10)    Respiratory Medications    Cardiovascular Medications  losartan 25 milliGRAM(s) Oral daily    Gastrointestinal Medications    Genitourinary Medications    Hematologic/Oncologic Medications  heparin   Injectable 5000 Unit(s) SubCutaneous every 12 hours  influenza  Vaccine (HIGH DOSE) 0.7 milliLiter(s) IntraMuscular once    Antimicrobial/Immunologic Medications    Endocrine/Metabolic Medications    Topical/Other Medications    --------------------------------------------------------------------------------------    VITAL SIGNS:  T(C): 36.9 (11-03-23 @ 00:25), Max: 37.1 (11-02-23 @ 16:20)  HR: 73 (11-03-23 @ 00:25) (59 - 78)  BP: 143/79 (11-03-23 @ 00:25) (114/66 - 158/76)  RR: 18 (11-03-23 @ 00:25) (18 - 18)  SpO2: 96% (11-03-23 @ 00:25) (96% - 100%)  --------------------------------------------------------------------------------------    INS AND OUTS:    11-01-23 @ 07:01  -  11-02-23 @ 07:00  --------------------------------------------------------  IN: 120 mL / OUT: 730 mL / NET: -610 mL    11-02-23 @ 07:01  -  11-03-23 @ 03:43  --------------------------------------------------------  IN: 610 mL / OUT: 1150 mL / NET: -540 mL      --------------------------------------------------------------------------------------      EXAM    General: NAD, resting in bed comfortably.  Cardiac: regular rate, warm and well perfused  Respiratory: Nonlabored respirations, normal cw expansion.  Abdomen: soft, nontender, mildly distended, incision c/d/i  Extremities: normal strength, FROM, no deformities    --------------------------------------------------------------------------------------    LABS

## 2023-11-03 NOTE — PROGRESS NOTE ADULT - ASSESSMENT
68F hx scleroderma and pulmonary hypertension POD2 right hemicolectomy and cholecystectomy for right colon mass and hx of perc lj tube for emphysematous cholecystitis. Patient progressing very well on ERP. Advanced to low fiber diet and arevalo removed. Patient requiring minimal narcotics for analgesia.     Plan:  - ERP  - Pain medications PRN  - Diet: LRD  - DVT: SCD's & SQH  - Close outpatient follow-up with [facility]. No systemic therapy this admission  - Rheumatology recommendations appreciated -> Goal BP SBP <110, avoid steroids to prevent precipitation sclerodermal renal crisis. No further immunosuppressive therapy at this time  - Dispo: patient's family currently working with  to obtain emergency medicaid, which may affect where patient may follow-up upon discharge, TBD    [facility] Team Surgery  [id number]   68F hx scleroderma and pulmonary hypertension POD2 right hemicolectomy and cholecystectomy for right colon mass and hx of perc lj tube for emphysematous cholecystitis. Patient progressing very well on ERP. Advanced to low fiber diet and arevalo removed. Patient requiring minimal narcotics for analgesia.     Plan:  - F/u AXR iso mild abdominal distention  - Pain medications PRN  - Diet: sips of CLD while AROBF  - mIVF 40cc/h  - DVT: SCD's & SQH  - OOBA with PT  - Close outpatient follow-up with [facility]. No systemic therapy this admission  - Rheumatology recommendations appreciated -> Goal BP SBP <110, avoid steroids to prevent precipitation sclerodermal renal crisis. No further immunosuppressive therapy at this time  - Dispo: patient's family currently working with  to obtain emergency medicaid, which may affect where patient may follow-up upon discharge, TBD    [facility] Team Surgery  [id number]

## 2023-11-03 NOTE — PROGRESS NOTE ADULT - ATTENDING COMMENTS
Had nausea earlier.  Denies pain, reports mild nausea currently.  Did not ambulate yesterday, said "it was too cold".  Abdomen soft, NT, distended.  Diet back to sips of clears only.  AXR.  Ambulate today, emphasized need to do so with pt again.

## 2023-11-04 LAB
ANION GAP SERPL CALC-SCNC: 10 MMOL/L — SIGNIFICANT CHANGE UP (ref 5–17)
ANION GAP SERPL CALC-SCNC: 10 MMOL/L — SIGNIFICANT CHANGE UP (ref 5–17)
BUN SERPL-MCNC: 8 MG/DL — SIGNIFICANT CHANGE UP (ref 7–23)
BUN SERPL-MCNC: 8 MG/DL — SIGNIFICANT CHANGE UP (ref 7–23)
CALCIUM SERPL-MCNC: 9 MG/DL — SIGNIFICANT CHANGE UP (ref 8.4–10.5)
CALCIUM SERPL-MCNC: 9 MG/DL — SIGNIFICANT CHANGE UP (ref 8.4–10.5)
CHLORIDE SERPL-SCNC: 105 MMOL/L — SIGNIFICANT CHANGE UP (ref 96–108)
CHLORIDE SERPL-SCNC: 105 MMOL/L — SIGNIFICANT CHANGE UP (ref 96–108)
CO2 SERPL-SCNC: 26 MMOL/L — SIGNIFICANT CHANGE UP (ref 22–31)
CO2 SERPL-SCNC: 26 MMOL/L — SIGNIFICANT CHANGE UP (ref 22–31)
CREAT SERPL-MCNC: 0.67 MG/DL — SIGNIFICANT CHANGE UP (ref 0.5–1.3)
CREAT SERPL-MCNC: 0.67 MG/DL — SIGNIFICANT CHANGE UP (ref 0.5–1.3)
EGFR: 95 ML/MIN/1.73M2 — SIGNIFICANT CHANGE UP
EGFR: 95 ML/MIN/1.73M2 — SIGNIFICANT CHANGE UP
GLUCOSE SERPL-MCNC: 93 MG/DL — SIGNIFICANT CHANGE UP (ref 70–99)
GLUCOSE SERPL-MCNC: 93 MG/DL — SIGNIFICANT CHANGE UP (ref 70–99)
HCT VFR BLD CALC: 31.4 % — LOW (ref 34.5–45)
HCT VFR BLD CALC: 31.4 % — LOW (ref 34.5–45)
HGB BLD-MCNC: 9.3 G/DL — LOW (ref 11.5–15.5)
HGB BLD-MCNC: 9.3 G/DL — LOW (ref 11.5–15.5)
MAGNESIUM SERPL-MCNC: 2.1 MG/DL — SIGNIFICANT CHANGE UP (ref 1.6–2.6)
MAGNESIUM SERPL-MCNC: 2.1 MG/DL — SIGNIFICANT CHANGE UP (ref 1.6–2.6)
MCHC RBC-ENTMCNC: 24.7 PG — LOW (ref 27–34)
MCHC RBC-ENTMCNC: 24.7 PG — LOW (ref 27–34)
MCHC RBC-ENTMCNC: 29.6 GM/DL — LOW (ref 32–36)
MCHC RBC-ENTMCNC: 29.6 GM/DL — LOW (ref 32–36)
MCV RBC AUTO: 83.3 FL — SIGNIFICANT CHANGE UP (ref 80–100)
MCV RBC AUTO: 83.3 FL — SIGNIFICANT CHANGE UP (ref 80–100)
NRBC # BLD: 0 /100 WBCS — SIGNIFICANT CHANGE UP (ref 0–0)
NRBC # BLD: 0 /100 WBCS — SIGNIFICANT CHANGE UP (ref 0–0)
PHOSPHATE SERPL-MCNC: 2.6 MG/DL — SIGNIFICANT CHANGE UP (ref 2.5–4.5)
PHOSPHATE SERPL-MCNC: 2.6 MG/DL — SIGNIFICANT CHANGE UP (ref 2.5–4.5)
PLATELET # BLD AUTO: 331 K/UL — SIGNIFICANT CHANGE UP (ref 150–400)
PLATELET # BLD AUTO: 331 K/UL — SIGNIFICANT CHANGE UP (ref 150–400)
POTASSIUM SERPL-MCNC: 4.2 MMOL/L — SIGNIFICANT CHANGE UP (ref 3.5–5.3)
POTASSIUM SERPL-MCNC: 4.2 MMOL/L — SIGNIFICANT CHANGE UP (ref 3.5–5.3)
POTASSIUM SERPL-SCNC: 4.2 MMOL/L — SIGNIFICANT CHANGE UP (ref 3.5–5.3)
POTASSIUM SERPL-SCNC: 4.2 MMOL/L — SIGNIFICANT CHANGE UP (ref 3.5–5.3)
RBC # BLD: 3.77 M/UL — LOW (ref 3.8–5.2)
RBC # BLD: 3.77 M/UL — LOW (ref 3.8–5.2)
RBC # FLD: 19 % — HIGH (ref 10.3–14.5)
RBC # FLD: 19 % — HIGH (ref 10.3–14.5)
SODIUM SERPL-SCNC: 141 MMOL/L — SIGNIFICANT CHANGE UP (ref 135–145)
SODIUM SERPL-SCNC: 141 MMOL/L — SIGNIFICANT CHANGE UP (ref 135–145)
WBC # BLD: 6.89 K/UL — SIGNIFICANT CHANGE UP (ref 3.8–10.5)
WBC # BLD: 6.89 K/UL — SIGNIFICANT CHANGE UP (ref 3.8–10.5)
WBC # FLD AUTO: 6.89 K/UL — SIGNIFICANT CHANGE UP (ref 3.8–10.5)
WBC # FLD AUTO: 6.89 K/UL — SIGNIFICANT CHANGE UP (ref 3.8–10.5)

## 2023-11-04 RX ORDER — POTASSIUM PHOSPHATE, MONOBASIC POTASSIUM PHOSPHATE, DIBASIC 236; 224 MG/ML; MG/ML
15 INJECTION, SOLUTION INTRAVENOUS ONCE
Refills: 0 | Status: COMPLETED | OUTPATIENT
Start: 2023-11-04 | End: 2023-11-04

## 2023-11-04 RX ORDER — LOSARTAN POTASSIUM 100 MG/1
50 TABLET, FILM COATED ORAL DAILY
Refills: 0 | Status: DISCONTINUED | OUTPATIENT
Start: 2023-11-04 | End: 2023-11-06

## 2023-11-04 RX ORDER — LOSARTAN POTASSIUM 100 MG/1
50 TABLET, FILM COATED ORAL DAILY
Refills: 0 | Status: DISCONTINUED | OUTPATIENT
Start: 2023-11-04 | End: 2023-11-04

## 2023-11-04 RX ADMIN — Medication 650 MILLIGRAM(S): at 00:06

## 2023-11-04 RX ADMIN — Medication 650 MILLIGRAM(S): at 23:30

## 2023-11-04 RX ADMIN — HEPARIN SODIUM 5000 UNIT(S): 5000 INJECTION INTRAVENOUS; SUBCUTANEOUS at 18:59

## 2023-11-04 RX ADMIN — Medication 650 MILLIGRAM(S): at 12:49

## 2023-11-04 RX ADMIN — Medication 650 MILLIGRAM(S): at 05:20

## 2023-11-04 RX ADMIN — Medication 650 MILLIGRAM(S): at 18:57

## 2023-11-04 RX ADMIN — POTASSIUM PHOSPHATE, MONOBASIC POTASSIUM PHOSPHATE, DIBASIC 62.5 MILLIMOLE(S): 236; 224 INJECTION, SOLUTION INTRAVENOUS at 13:02

## 2023-11-04 RX ADMIN — HEPARIN SODIUM 5000 UNIT(S): 5000 INJECTION INTRAVENOUS; SUBCUTANEOUS at 05:20

## 2023-11-04 RX ADMIN — Medication 650 MILLIGRAM(S): at 18:24

## 2023-11-04 RX ADMIN — Medication 650 MILLIGRAM(S): at 13:10

## 2023-11-04 RX ADMIN — LOSARTAN POTASSIUM 25 MILLIGRAM(S): 100 TABLET, FILM COATED ORAL at 05:20

## 2023-11-04 NOTE — PROGRESS NOTE ADULT - SUBJECTIVE AND OBJECTIVE BOX
GREEN TEAM Surgery Daily Progress Note  =====================================================  SUMMARY: 68F POD3 right hemicolectomy and cholecystectomy for right colon mass and hx of perc lj tube.    INTERVAL EVENTS: NAEO    SUBJECTIVE: Patient seen and examined at bedside on AM rounds. Patient reports that they're feeling well. Denies fever, chills, vomiting, nausea, chest pain, SOB    ALLERGIES:  No Known Allergies      --------------------------------------------------------------------------------------    MEDICATIONS:    Neurologic Medications  acetaminophen     Tablet .. 650 milliGRAM(s) Oral every 6 hours  melatonin 3 milliGRAM(s) Oral at bedtime PRN Insomnia  ondansetron Injectable 4 milliGRAM(s) IV Push every 6 hours PRN Nausea and/or Vomiting  oxyCODONE    IR 2.5 milliGRAM(s) Oral every 6 hours PRN Moderate Pain (4 - 6)  oxyCODONE    IR 5 milliGRAM(s) Oral every 4 hours PRN Severe Pain (7 - 10)    Respiratory Medications    Cardiovascular Medications  losartan 25 milliGRAM(s) Oral daily    Gastrointestinal Medications  dextrose 5% + sodium chloride 0.45% with potassium chloride 20 mEq/L 1000 milliLiter(s) IV Continuous <Continuous>    Genitourinary Medications    Hematologic/Oncologic Medications  heparin   Injectable 5000 Unit(s) SubCutaneous every 12 hours  influenza  Vaccine (HIGH DOSE) 0.7 milliLiter(s) IntraMuscular once    Antimicrobial/Immunologic Medications    Endocrine/Metabolic Medications    Topical/Other Medications    --------------------------------------------------------------------------------------    VITAL SIGNS:  T(C): 36.4 (11-04-23 @ 04:05), Max: 37.1 (11-03-23 @ 19:54)  HR: 64 (11-04-23 @ 04:05) (63 - 79)  BP: 155/89 (11-04-23 @ 04:05) (133/72 - 179/97)  RR: 18 (11-04-23 @ 04:05) (18 - 18)  SpO2: 97% (11-04-23 @ 04:05) (96% - 97%)  --------------------------------------------------------------------------------------    INS AND OUTS:    11-02-23 @ 07:01  -  11-03-23 @ 07:00  --------------------------------------------------------  IN: 660 mL / OUT: 1900 mL / NET: -1240 mL    11-03-23 @ 07:01  -  11-04-23 @ 04:59  --------------------------------------------------------  IN: 700 mL / OUT: 1605 mL / NET: -905 mL      --------------------------------------------------------------------------------------      EXAM    General: NAD, resting in bed comfortably.  Cardiac: regular rate, warm and well perfused  Respiratory: Nonlabored respirations, normal cw expansion.  Abdomen: soft, nontender, nondistended, incision c/d/i  Extremities: normal strength, FROM, no deformities    --------------------------------------------------------------------------------------    LABS       GREEN TEAM Surgery Daily Progress Note  =====================================================  SUMMARY: 68F POD3 right hemicolectomy and cholecystectomy for right colon mass and hx of perc lj tube.    INTERVAL EVENTS: NAEO    SUBJECTIVE: Patient seen and examined at bedside on AM rounds. Patient reports that they're feeling well. Noted some transient pain when she greeted team, resolved quickly. Tolerating diet with no nausea or vomiting but has still not passed flatus or had bowel movement. Denies fever, chills, chest pain, SOB.    ALLERGIES:  No Known Allergies      --------------------------------------------------------------------------------------    MEDICATIONS:    Neurologic Medications  acetaminophen     Tablet .. 650 milliGRAM(s) Oral every 6 hours  melatonin 3 milliGRAM(s) Oral at bedtime PRN Insomnia  ondansetron Injectable 4 milliGRAM(s) IV Push every 6 hours PRN Nausea and/or Vomiting  oxyCODONE    IR 2.5 milliGRAM(s) Oral every 6 hours PRN Moderate Pain (4 - 6)  oxyCODONE    IR 5 milliGRAM(s) Oral every 4 hours PRN Severe Pain (7 - 10)    Respiratory Medications    Cardiovascular Medications  losartan 25 milliGRAM(s) Oral daily    Gastrointestinal Medications  dextrose 5% + sodium chloride 0.45% with potassium chloride 20 mEq/L 1000 milliLiter(s) IV Continuous <Continuous>    Genitourinary Medications    Hematologic/Oncologic Medications  heparin   Injectable 5000 Unit(s) SubCutaneous every 12 hours  influenza  Vaccine (HIGH DOSE) 0.7 milliLiter(s) IntraMuscular once    Antimicrobial/Immunologic Medications    Endocrine/Metabolic Medications    Topical/Other Medications    --------------------------------------------------------------------------------------    VITAL SIGNS:  T(C): 36.4 (11-04-23 @ 04:05), Max: 37.1 (11-03-23 @ 19:54)  HR: 64 (11-04-23 @ 04:05) (63 - 79)  BP: 155/89 (11-04-23 @ 04:05) (133/72 - 179/97)  RR: 18 (11-04-23 @ 04:05) (18 - 18)  SpO2: 97% (11-04-23 @ 04:05) (96% - 97%)  --------------------------------------------------------------------------------------    INS AND OUTS:    11-02-23 @ 07:01  -  11-03-23 @ 07:00  --------------------------------------------------------  IN: 660 mL / OUT: 1900 mL / NET: -1240 mL    11-03-23 @ 07:01  -  11-04-23 @ 04:59  --------------------------------------------------------  IN: 700 mL / OUT: 1605 mL / NET: -905 mL      --------------------------------------------------------------------------------------      EXAM    General: NAD, resting in bed comfortably.  Cardiac: regular rate, warm and well perfused  Respiratory: Nonlabored respirations, normal cw expansion, voice sounds a little phlegmy  Abdomen: soft, nontender, mildly distended, incision c/d/i  Extremities: normal strength, FROM, no deformities    --------------------------------------------------------------------------------------    LABS

## 2023-11-04 NOTE — PROGRESS NOTE ADULT - ATTENDING COMMENTS
Surgery Attending    Pt seen and examined; agree w above resident A&P  No GI function yet; continue clear liquids for now

## 2023-11-04 NOTE — PROGRESS NOTE ADULT - SUBJECTIVE AND OBJECTIVE BOX
DATE OF SERVICE: 11-04-23 @ 08:52    Patient is a 68y old  Female who presents with a chief complaint of Dislodged percutaneous lj tube (04 Nov 2023 04:58)      INTERVAL HISTORY:     REVIEW OF SYSTEMS:  CONSTITUTIONAL: No weakness  EYES/ENT: No visual changes;  No throat pain   NECK: No pain or stiffness  RESPIRATORY: No cough, wheezing; No shortness of breath  CARDIOVASCULAR: No chest pain or palpitations  GASTROINTESTINAL: No abdominal  pain. No nausea, vomiting, or hematemesis  GENITOURINARY: No dysuria, frequency or hematuria  NEUROLOGICAL: No stroke like symptoms  SKIN: No rashes    TELEMETRY Personally reviewed:  	  MEDICATIONS:  losartan 25 milliGRAM(s) Oral daily        PHYSICAL EXAM:  T(C): 36.4 (11-04-23 @ 04:05), Max: 37.1 (11-03-23 @ 19:54)  HR: 64 (11-04-23 @ 04:05) (63 - 79)  BP: 155/89 (11-04-23 @ 04:05) (133/72 - 179/97)  RR: 18 (11-04-23 @ 04:05) (18 - 18)  SpO2: 97% (11-04-23 @ 04:05) (96% - 97%)  Wt(kg): --  I&O's Summary    03 Nov 2023 07:01  -  04 Nov 2023 07:00  --------------------------------------------------------  IN: 1180 mL / OUT: 1605 mL / NET: -425 mL          Appearance: In no distress	  HEENT:    PERRL, EOMI	  Cardiovascular:  S1 S2, No JVD  Respiratory: Lungs clear to auscultation	  Gastrointestinal:  Soft, Non-tender, + BS	  Vascularature:  No edema of LE  Psychiatric: Appropriate affect   Neuro: no acute focal deficits                               9.3    6.89  )-----------( 331      ( 04 Nov 2023 07:13 )             31.4     11-04    141  |  105  |  8   ----------------------------<  93  4.2   |  26  |  0.67    Ca    9.0      04 Nov 2023 07:06  Phos  2.6     11-04  Mg     2.1     11-04          Labs personally reviewed      ASSESSMENT/PLAN: 	  68F PMH scleroderma, pHTN, HTN with recent emphysematous cholecystitis s/p percutaneous cholecystectomy with IR on 9/11 presents following dislodged tube.    1. Cardiac Risk Stratification  - Patient with hx of scleroderma and pulm HTN but reports average to excellent functional capacity.   - Does not utilize home oxygen. Denies CP or SOB.  - Not in decompensated HF  - No hx of tachy ang arrhythmia. ECG SB with no ischemia noted.  - No hx of severe AS/MS. TTE with no valvular dysfunction.  - TTE with preserved EF, no WMA, normal LV and RV function and size  - Patient is mod risk for mod risk colorectal surgery if needed. No contraindication to proceed.   - Tolerated surgery well, abdominal dressing C/D/I     2. Pulmonary Hypertension  - TTE as above shows preserved EF, no pulm HTN and normal RV size and function  - Does not use supplemental oxygen  -RA 02 Sat 98%     3. Scleroderma  - Not currently on therapy    4. Hypertension - uncontrolled   -Consider increasing losartan 25mg PO daily to 50mg (BMP wnl)     5. Ascending Colon Mass  Perc lj tube placement confirmed by IR tube study on 10/25  Recent Colonoscopy shows likely malignancy in ascending colon  CEA level elevated: 57  Ascending colon mass, path c/w adenocarcinoma  -s/p resection on wed 11/1 as well as cholecystectomy  - Patient doing well at this time , dressing c/d/i  -f/u Oncology               Connor Hughes, JONATAN Moreno DO [facility]  Cardiovascular Medicine  [address] [place] [address], [address]  Available through call or text on [employer]  Office: [phone]

## 2023-11-04 NOTE — PROGRESS NOTE ADULT - ASSESSMENT
68F hx scleroderma and pulmonary hypertension POD3 right hemicolectomy and cholecystectomy for right colon mass and hx of perc lj tube for emphysematous cholecystitis. Patient progressing very well on ERP. Advanced to low fiber diet and arevalo removed. Patient requiring minimal narcotics for analgesia.     Plan:  - Pain medications PRN  - Diet: sips of CLD while AROBF  - mIVF 40cc/h  - DVT: SCD's & SQH  - OOBA with PT  - Close outpatient follow-up with [facility]. No systemic therapy this admission  - Rheumatology recommendations appreciated -> Goal BP SBP <110, avoid steroids to prevent precipitation sclerodermal renal crisis. No further immunosuppressive therapy at this time  - Dispo: patient's family currently working with  to obtain emergency medicaid, which may affect where patient may follow-up upon discharge, TBD    Green Team Surgery  [id number] 68F hx scleroderma and pulmonary hypertension POD3 right hemicolectomy and cholecystectomy for right colon mass and hx of perc lj tube for emphysematous cholecystitis. Patient is doing well on sips of CLD while awaiting return of bowel function, is ambulating out of bed regularly, and pain is well controlled requiring no narcotics for analgesia.     Plan:  - Pain medications PRN  - Diet: sips of CLD while AROBF  - mIVF 40cc/h  - DVT: SCD's & SQH  - OOBA with PT  - Close outpatient follow-up with [facility]. No systemic therapy this admission  - Rheumatology recommendations appreciated -> Goal BP SBP <110, avoid steroids to prevent precipitation sclerodermal renal crisis. No further immunosuppressive therapy at this time  - Dispo: patient's family currently working with  to obtain emergency medicaid, which may affect where patient may follow-up upon discharge, TBD    Green Team Surgery  [id number]

## 2023-11-05 LAB
ANION GAP SERPL CALC-SCNC: 9 MMOL/L — SIGNIFICANT CHANGE UP (ref 5–17)
ANION GAP SERPL CALC-SCNC: 9 MMOL/L — SIGNIFICANT CHANGE UP (ref 5–17)
BUN SERPL-MCNC: 9 MG/DL — SIGNIFICANT CHANGE UP (ref 7–23)
BUN SERPL-MCNC: 9 MG/DL — SIGNIFICANT CHANGE UP (ref 7–23)
CALCIUM SERPL-MCNC: 9.6 MG/DL — SIGNIFICANT CHANGE UP (ref 8.4–10.5)
CALCIUM SERPL-MCNC: 9.6 MG/DL — SIGNIFICANT CHANGE UP (ref 8.4–10.5)
CHLORIDE SERPL-SCNC: 106 MMOL/L — SIGNIFICANT CHANGE UP (ref 96–108)
CHLORIDE SERPL-SCNC: 106 MMOL/L — SIGNIFICANT CHANGE UP (ref 96–108)
CO2 SERPL-SCNC: 25 MMOL/L — SIGNIFICANT CHANGE UP (ref 22–31)
CO2 SERPL-SCNC: 25 MMOL/L — SIGNIFICANT CHANGE UP (ref 22–31)
CREAT SERPL-MCNC: 0.7 MG/DL — SIGNIFICANT CHANGE UP (ref 0.5–1.3)
CREAT SERPL-MCNC: 0.7 MG/DL — SIGNIFICANT CHANGE UP (ref 0.5–1.3)
EGFR: 94 ML/MIN/1.73M2 — SIGNIFICANT CHANGE UP
EGFR: 94 ML/MIN/1.73M2 — SIGNIFICANT CHANGE UP
GLUCOSE SERPL-MCNC: 92 MG/DL — SIGNIFICANT CHANGE UP (ref 70–99)
GLUCOSE SERPL-MCNC: 92 MG/DL — SIGNIFICANT CHANGE UP (ref 70–99)
HCT VFR BLD CALC: 33.7 % — LOW (ref 34.5–45)
HCT VFR BLD CALC: 33.7 % — LOW (ref 34.5–45)
HGB BLD-MCNC: 10 G/DL — LOW (ref 11.5–15.5)
HGB BLD-MCNC: 10 G/DL — LOW (ref 11.5–15.5)
MAGNESIUM SERPL-MCNC: 2 MG/DL — SIGNIFICANT CHANGE UP (ref 1.6–2.6)
MAGNESIUM SERPL-MCNC: 2 MG/DL — SIGNIFICANT CHANGE UP (ref 1.6–2.6)
MCHC RBC-ENTMCNC: 24.6 PG — LOW (ref 27–34)
MCHC RBC-ENTMCNC: 24.6 PG — LOW (ref 27–34)
MCHC RBC-ENTMCNC: 29.7 GM/DL — LOW (ref 32–36)
MCHC RBC-ENTMCNC: 29.7 GM/DL — LOW (ref 32–36)
MCV RBC AUTO: 82.8 FL — SIGNIFICANT CHANGE UP (ref 80–100)
MCV RBC AUTO: 82.8 FL — SIGNIFICANT CHANGE UP (ref 80–100)
NRBC # BLD: 0 /100 WBCS — SIGNIFICANT CHANGE UP (ref 0–0)
NRBC # BLD: 0 /100 WBCS — SIGNIFICANT CHANGE UP (ref 0–0)
PHOSPHATE SERPL-MCNC: 3.3 MG/DL — SIGNIFICANT CHANGE UP (ref 2.5–4.5)
PHOSPHATE SERPL-MCNC: 3.3 MG/DL — SIGNIFICANT CHANGE UP (ref 2.5–4.5)
PLATELET # BLD AUTO: 363 K/UL — SIGNIFICANT CHANGE UP (ref 150–400)
PLATELET # BLD AUTO: 363 K/UL — SIGNIFICANT CHANGE UP (ref 150–400)
POTASSIUM SERPL-MCNC: 4.3 MMOL/L — SIGNIFICANT CHANGE UP (ref 3.5–5.3)
POTASSIUM SERPL-MCNC: 4.3 MMOL/L — SIGNIFICANT CHANGE UP (ref 3.5–5.3)
POTASSIUM SERPL-SCNC: 4.3 MMOL/L — SIGNIFICANT CHANGE UP (ref 3.5–5.3)
POTASSIUM SERPL-SCNC: 4.3 MMOL/L — SIGNIFICANT CHANGE UP (ref 3.5–5.3)
RBC # BLD: 4.07 M/UL — SIGNIFICANT CHANGE UP (ref 3.8–5.2)
RBC # BLD: 4.07 M/UL — SIGNIFICANT CHANGE UP (ref 3.8–5.2)
RBC # FLD: 18.9 % — HIGH (ref 10.3–14.5)
RBC # FLD: 18.9 % — HIGH (ref 10.3–14.5)
SODIUM SERPL-SCNC: 140 MMOL/L — SIGNIFICANT CHANGE UP (ref 135–145)
SODIUM SERPL-SCNC: 140 MMOL/L — SIGNIFICANT CHANGE UP (ref 135–145)
WBC # BLD: 4.99 K/UL — SIGNIFICANT CHANGE UP (ref 3.8–10.5)
WBC # BLD: 4.99 K/UL — SIGNIFICANT CHANGE UP (ref 3.8–10.5)
WBC # FLD AUTO: 4.99 K/UL — SIGNIFICANT CHANGE UP (ref 3.8–10.5)
WBC # FLD AUTO: 4.99 K/UL — SIGNIFICANT CHANGE UP (ref 3.8–10.5)

## 2023-11-05 RX ADMIN — HEPARIN SODIUM 5000 UNIT(S): 5000 INJECTION INTRAVENOUS; SUBCUTANEOUS at 18:02

## 2023-11-05 RX ADMIN — Medication 650 MILLIGRAM(S): at 19:00

## 2023-11-05 RX ADMIN — Medication 650 MILLIGRAM(S): at 23:40

## 2023-11-05 RX ADMIN — Medication 650 MILLIGRAM(S): at 00:30

## 2023-11-05 RX ADMIN — Medication 650 MILLIGRAM(S): at 05:18

## 2023-11-05 RX ADMIN — Medication 650 MILLIGRAM(S): at 18:01

## 2023-11-05 RX ADMIN — LOSARTAN POTASSIUM 50 MILLIGRAM(S): 100 TABLET, FILM COATED ORAL at 05:18

## 2023-11-05 RX ADMIN — Medication 650 MILLIGRAM(S): at 12:41

## 2023-11-05 RX ADMIN — Medication 650 MILLIGRAM(S): at 11:41

## 2023-11-05 RX ADMIN — HEPARIN SODIUM 5000 UNIT(S): 5000 INJECTION INTRAVENOUS; SUBCUTANEOUS at 07:20

## 2023-11-05 NOTE — PROGRESS NOTE ADULT - NUTRITIONAL ASSESSMENT
This patient has been assessed with a concern for Malnutrition and has been determined to have a diagnosis/diagnoses of Underweight (BMI < 19).    This patient is being managed with:   Diet Clear Liquid-  Entered: Nov  3 2023  7:24AM  
This patient has been assessed with a concern for Malnutrition and has been determined to have a diagnosis/diagnoses of Underweight (BMI < 19).    This patient is being managed with:   Diet DASH/TLC-  Sodium & Cholesterol Restricted  Supplement Feeding Modality:  Oral  Ensure Enlive Cans or Servings Per Day:  2       Frequency:  Three Times a day  Entered: Oct 28 2023  7:12AM  
This patient has been assessed with a concern for Malnutrition and has been determined to have a diagnosis/diagnoses of Underweight (BMI < 19).    This patient is being managed with:   Diet DASH/TLC-  Sodium & Cholesterol Restricted  Supplement Feeding Modality:  Oral  Ensure Enlive Cans or Servings Per Day:  2       Frequency:  Three Times a day  Entered: Oct 28 2023  7:12AM  
This patient has been assessed with a concern for Malnutrition and has been determined to have a diagnosis/diagnoses of Underweight (BMI < 19).    This patient is being managed with:   Diet Clear Liquid-  Ensure Clear Cans or Servings Per Day:  1     Special Instructions for Nursing:  To include 1 Ensure clear 1 Lemon Ice 2 cups of hot water to make tea or broth.  Caffeinated coffee is permissible  Entered: Nov 1 2023  1:45PM  
This patient has been assessed with a concern for Malnutrition and has been determined to have a diagnosis/diagnoses of Underweight (BMI < 19).    This patient is being managed with:   Diet DASH/TLC-  Sodium & Cholesterol Restricted  Supplement Feeding Modality:  Oral  Ensure Enlive Cans or Servings Per Day:  2       Frequency:  Three Times a day  Entered: Oct 28 2023  7:12AM  
This patient has been assessed with a concern for Malnutrition and has been determined to have a diagnosis/diagnoses of Underweight (BMI < 19).    This patient is being managed with:   Diet Low Fiber-  Ensure Surgery Cans or Servings Per Day:  1     Special Instructions for Nursing:  Advance diet to low fiber with 1 ensure surgery if patient tolerates 1 clear liquid tray  Entered: Nov 2 2023  1:37PM  
This patient has been assessed with a concern for Malnutrition and has been determined to have a diagnosis/diagnoses of Underweight (BMI < 19).    This patient is being managed with:   Diet Clear Liquid-  Entered: Nov  3 2023  7:24AM  
This patient has been assessed with a concern for Malnutrition and has been determined to have a diagnosis/diagnoses of Underweight (BMI < 19).    This patient is being managed with:   Diet DASH/TLC-  Sodium & Cholesterol Restricted  Supplement Feeding Modality:  Oral  Ensure Enlive Cans or Servings Per Day:  2       Frequency:  Three Times a day  Entered: Oct 28 2023  7:12AM  
This patient has been assessed with a concern for Malnutrition and has been determined to have a diagnosis/diagnoses of Underweight (BMI < 19).    This patient is being managed with:   Diet DASH/TLC-  Sodium & Cholesterol Restricted  Supplement Feeding Modality:  Oral  Ensure Enlive Cans or Servings Per Day:  2       Frequency:  Three Times a day  Entered: Oct 28 2023  7:12AM  
This patient has been assessed with a concern for Malnutrition and has been determined to have a diagnosis/diagnoses of Underweight (BMI < 19).    This patient is being managed with:   Diet DASH/TLC-  Sodium & Cholesterol Restricted  Supplement Feeding Modality:  Oral  Ensure Enlive Cans or Servings Per Day:  2       Frequency:  Three Times a day  Entered: Oct 29 2023 12:16PM  
This patient has been assessed with a concern for Malnutrition and has been determined to have a diagnosis/diagnoses of Underweight (BMI < 19).    This patient is being managed with:   Diet DASH/TLC-  Sodium & Cholesterol Restricted  Supplement Feeding Modality:  Oral  Ensure Enlive Cans or Servings Per Day:  2       Frequency:  Three Times a day  Entered: Oct 29 2023 12:16PM  
This patient has been assessed with a concern for Malnutrition and has been determined to have a diagnosis/diagnoses of Underweight (BMI < 19).    This patient is being managed with:   Diet Full Liquid-  Entered: Oct 31 2023  5:13AM  
This patient has been assessed with a concern for Malnutrition and has been determined to have a diagnosis/diagnoses of Underweight (BMI < 19).    This patient is being managed with:   Diet DASH/TLC-  Sodium & Cholesterol Restricted  Caffeine Free (NOCAFF)  Supplement Feeding Modality:  Oral  Ensure Enlive Cans or Servings Per Day:  1       Frequency:  Three Times a day  Entered: Oct 26 2023  3:00PM  
This patient has been assessed with a concern for Malnutrition and has been determined to have a diagnosis/diagnoses of Underweight (BMI < 19).    This patient is being managed with:   Diet DASH/TLC-  Sodium & Cholesterol Restricted  Supplement Feeding Modality:  Oral  Ensure Enlive Cans or Servings Per Day:  2       Frequency:  Three Times a day  Entered: Oct 28 2023  7:12AM  
This patient has been assessed with a concern for Malnutrition and has been determined to have a diagnosis/diagnoses of Underweight (BMI < 19).    This patient is being managed with:   Diet DASH/TLC-  Sodium & Cholesterol Restricted  Supplement Feeding Modality:  Oral  Ensure Enlive Cans or Servings Per Day:  3       Frequency:  Three Times a day  Entered: Oct 28 2023  9:23PM

## 2023-11-05 NOTE — PROVIDER CONTACT NOTE (OTHER) - ACTION/TREATMENT ORDERED:
MD aware and notified. MD will look over patient's chart. No new interventions at this time.
MD orderd STAT EKG and came to bedside to assess pt. will continue with plan of care.
provider contacted, came to assess at bedside, no new interventions, continue plan of care. Pt went for walk around unit
provider contacted, provider came to bedside to assess, will continue plan of care and monitor or changes.

## 2023-11-05 NOTE — PROVIDER CONTACT NOTE (OTHER) - ASSESSMENT
Pt A&Ox4, all VSS, no complaints of pain and discomfort, pt complains of bloating and "can't eat because of the bloating"
A&Ox4, mandarin speaking, all VSS.
on assessment pt is showing no signs of distress. all other VSS. pt is resting comfortably in bed, talking with daughter.
Patient hypertensive in assessment /97 HR 70. All other vital signs stable at this time. Patient c/o slight abdominal pain 4/10 po Tylenol given as ordered. patient has no c/o chest pain or distress. Patient has history of HTN but only ordered for BP meds at 6 am.

## 2023-11-05 NOTE — PROGRESS NOTE ADULT - ASSESSMENT
68F hx scleroderma and pulmonary hypertension POD3 right hemicolectomy and cholecystectomy for right colon mass and hx of perc lj tube for emphysematous cholecystitis. Patient is doing well on sips of CLD while awaiting return of bowel function, is ambulating out of bed regularly, and pain is well controlled requiring no narcotics for analgesia.     Plan:  - Pain medications PRN  - Diet: sips of CLD while AROBF  - mIVF 40cc/h  - Losartan 50mg per cardiology recommendations  - DVT: SCD's & SQH  - OOBA with PT  - Incentive spirometer  - Close outpatient follow-up with [facility]. No systemic therapy this admission  - Rheumatology recommendations appreciated -> Goal BP SBP <110, avoid steroids to prevent precipitation sclerodermal renal crisis. No further immunosuppressive therapy at this time  - Dispo: patient's family currently working with  to obtain emergency medicaid, which may affect where patient may follow-up upon discharge, TBD    [facility]  [id number] 68F hx scleroderma and pulmonary hypertension POD4 right hemicolectomy and cholecystectomy for right colon mass and hx of perc lj tube for emphysematous cholecystitis. Patient is doing well on sips of CLD, without nausea or vomiting, while awaiting return of bowel function.    Plan:  - Pain medications PRN  - Diet: sips of CLD while AROBF  - mIVF 40cc/h  - Losartan 50mg per cardiology recommendations  - DVT: SCD's & SQH  - OOBA with PT  - Incentive spirometer  - Close outpatient follow-up with [facility]. No systemic therapy this admission  - Rheumatology recommendations appreciated -> Goal BP SBP <110, avoid steroids to prevent precipitation sclerodermal renal crisis. No further immunosuppressive therapy at this time  - Dispo: patient's family currently working with  to obtain emergency medicaid, which may affect where patient may follow-up upon discharge, TBD    [facility]  [id number]

## 2023-11-05 NOTE — PROGRESS NOTE ADULT - SUBJECTIVE AND OBJECTIVE BOX
DATE OF SERVICE: 11-05-23 @ 10:55    Patient is a 68y old  Female who presents with a chief complaint of Dislodged percutaneous lj tube (05 Nov 2023 01:43)      INTERVAL HISTORY: Feels ok.     REVIEW OF SYSTEMS:  CONSTITUTIONAL: No weakness  EYES/ENT: No visual changes;  No throat pain   NECK: No pain or stiffness  RESPIRATORY: No cough, wheezing; No shortness of breath  CARDIOVASCULAR: No chest pain or palpitations  GASTROINTESTINAL: No abdominal  pain. No nausea, vomiting, or hematemesis  GENITOURINARY: No dysuria, frequency or hematuria  NEUROLOGICAL: No stroke like symptoms  SKIN: No rashes    	  MEDICATIONS:  losartan 50 milliGRAM(s) Oral daily        PHYSICAL EXAM:  T(C): 36.3 (11-05-23 @ 09:23), Max: 36.7 (11-04-23 @ 16:19)  HR: 64 (11-05-23 @ 09:23) (61 - 72)  BP: 137/80 (11-05-23 @ 09:23) (137/80 - 172/84)  RR: 18 (11-05-23 @ 09:23) (18 - 18)  SpO2: 98% (11-05-23 @ 09:23) (96% - 100%)  Wt(kg): --  I&O's Summary    04 Nov 2023 08:01  -  05 Nov 2023 07:00  --------------------------------------------------------  IN: 800 mL / OUT: 800 mL / NET: 0 mL          Appearance: In no distress	  HEENT:    PERRL, EOMI	  Cardiovascular:  S1 S2, No JVD  Respiratory: Lungs clear to auscultation	  Gastrointestinal:  Soft, Non-tender, + BS	  Vascularature:  No edema of LE  Psychiatric: Appropriate affect   Neuro: no acute focal deficits                               10.0   4.99  )-----------( 363      ( 05 Nov 2023 06:36 )             33.7     11-05    140  |  106  |  9   ----------------------------<  92  4.3   |  25  |  0.70    Ca    9.6      05 Nov 2023 06:37  Phos  3.3     11-05  Mg     2.0     11-05          Labs personally reviewed      ASSESSMENT/PLAN: 	    68F PMH scleroderma, pHTN, HTN with recent emphysematous cholecystitis s/p percutaneous cholecystectomy with IR on 9/11 presents following dislodged tube.    1. Cardiac Risk Stratification  - Patient with hx of scleroderma and pulm HTN but reports average to excellent functional capacity.   - Does not utilize home oxygen. Denies CP or SOB.  - Not in decompensated HF  - No hx of tachy ang arrhythmia. ECG SB with no ischemia noted.  - No hx of severe AS/MS. TTE with no valvular dysfunction.  - TTE with preserved EF, no WMA, normal LV and RV function and size  - Patient is mod risk for mod risk colorectal surgery if needed. No contraindication to proceed.   - Tolerated surgery well, abdominal dressing C/D/I     2. Pulmonary Hypertension  - TTE as above shows preserved EF, no pulm HTN and normal RV size and function  - Does not use supplemental oxygen  -RA 02 Sat 98%     3. Scleroderma  - Not currently on therapy    4. Hypertension - uncontrolled   - Losartan increased to 50mg PO daily    5. Ascending Colon Mass  Perc lj tube placement confirmed by IR tube study on 10/25  Recent Colonoscopy shows likely malignancy in ascending colon  CEA level elevated: 57  Ascending colon mass, path c/w adenocarcinoma  -s/p resection on wed 11/1 as well as cholecystectomy  - Patient doing well at this time , dressing c/d/i  -f/u Oncology       America Gerber, LUIZ-NP   Ramsey Moreno DO [facility]  Cardiovascular Medicine  [address] [place] [address], [address]  Available through call or text on [employer]  Office: [phone]

## 2023-11-05 NOTE — PROGRESS NOTE ADULT - SUBJECTIVE AND OBJECTIVE BOX
[facility] TE[facility] Surgery Daily Progress Note  =====================================================  SUMMARY: 68F POD4 right hemicolectomy and cholecystectomy for right colon mass and hx of perc lj tube.    INTERVAL EVENTS: NAEO    SUBJECTIVE: Patient seen and examined at bedside on AM rounds. Patient reports that they're feeling well. Tolerating diet with no nausea or vomiting but has still not passed flatus or had bowel movement. Patient continues to be out of bed ambulating and uses incentive spirometer regularly. Denies fever, chills, chest pain, SOB.    ALLERGIES:  No Known Allergies     O:  Physical Exam:  Gen: Laying in bed, NAD  HEENT: atrumatic, EMOI  Resp: Unlabored breathing  Abd: soft, nontender, nondistended, no rebound or guarding.  Ext: Moves 4 extremities spontaneously    Vital Signs Last 24 Hrs  T(C): 36.7 (05 Nov 2023 00:31), Max: 36.7 (04 Nov 2023 16:19)  T(F): 98.1 (05 Nov 2023 00:31), Max: 98.1 (04 Nov 2023 20:27)  HR: 69 (05 Nov 2023 00:31) (60 - 72)  BP: 160/85 (05 Nov 2023 00:31) (147/89 - 165/88)  BP(mean): --  RR: 18 (05 Nov 2023 00:31) (18 - 18)  SpO2: 98% (05 Nov 2023 00:31) (97% - 100%)    Parameters below as of 05 Nov 2023 00:31  Patient On (Oxygen Delivery Method): room air        I&O's Detail    03 Nov 2023 07:01  -  04 Nov 2023 07:00  --------------------------------------------------------  IN:    dextrose 5% + sodium chloride 0.45% w/ Additives: 480 mL    Oral Fluid: 700 mL  Total IN: 1180 mL    OUT:    Voided (mL): 1605 mL  Total OUT: 1605 mL    Total NET: -425 mL      04 Nov 2023 08:01  -  05 Nov 2023 01:44  --------------------------------------------------------  IN:    dextrose 5% + sodium chloride 0.45% w/ Additives: 320 mL    Oral Fluid: 480 mL  Total IN: 800 mL    OUT:    Voided (mL): 500 mL  Total OUT: 500 mL    Total NET: 300 mL                                9.3    6.89  )-----------( 331      ( 04 Nov 2023 07:13 )             31.4       11-04    141  |  105  |  8   ----------------------------<  93  4.2   |  26  |  0.67    Ca    9.0      04 Nov 2023 07:06  Phos  2.6     11-04  Mg     2.1     11-04         [facility] TEAM Surgery Daily Progress Note  =====================================================  SUMMARY: 68F POD4 right hemicolectomy and cholecystectomy for right colon mass and hx of perc lj tube.    INTERVAL EVENTS: NAEO    SUBJECTIVE: Patient seen and examined at bedside on AM rounds. Patient reports that they're feeling well. Tolerating diet with no nausea or vomiting but has still not passed flatus or had bowel movement. Patient continues to be out of bed ambulating and uses incentive spirometer regularly. Denies fever, chills, chest pain, SOB.    ALLERGIES:  No Known Allergies     O:  Physical Exam:  Gen: Laying in bed, NAD  HEENT: atrumatic, EMOI  Resp: Unlabored breathing  Abd: soft, nontender, nondistended, no rebound or guarding.  Ext: Moves 4 extremities spontaneously    Vital Signs Last 24 Hrs  T(C): 36.7 (05 Nov 2023 00:31), Max: 36.7 (04 Nov 2023 16:19)  T(F): 98.1 (05 Nov 2023 00:31), Max: 98.1 (04 Nov 2023 20:27)  HR: 69 (05 Nov 2023 00:31) (60 - 72)  BP: 160/85 (05 Nov 2023 00:31) (147/89 - 165/88)  BP(mean): --  RR: 18 (05 Nov 2023 00:31) (18 - 18)  SpO2: 98% (05 Nov 2023 00:31) (97% - 100%)    Parameters below as of 05 Nov 2023 00:31  Patient On (Oxygen Delivery Method): room air        I&O's Detail    03 Nov 2023 07:01  -  04 Nov 2023 07:00  --------------------------------------------------------  IN:    dextrose 5% + sodium chloride 0.45% w/ Additives: 480 mL    Oral Fluid: 700 mL  Total IN: 1180 mL    OUT:    Voided (mL): 1605 mL  Total OUT: 1605 mL    Total NET: -425 mL      04 Nov 2023 08:01  -  05 Nov 2023 01:44  --------------------------------------------------------  IN:    dextrose 5% + sodium chloride 0.45% w/ Additives: 320 mL    Oral Fluid: 480 mL  Total IN: 800 mL    OUT:    Voided (mL): 500 mL  Total OUT: 500 mL    Total NET: 300 mL                            9.3    6.89  )-----------( 331      ( 04 Nov 2023 07:13 )             31.4       11-04    141  |  105  |  8   ----------------------------<  93  4.2   |  26  |  0.67    Ca    9.0      04 Nov 2023 07:06  Phos  2.6     11-04  Mg     2.1     11-04

## 2023-11-05 NOTE — PROVIDER CONTACT NOTE (OTHER) - RECOMMENDATIONS
MD aware and notified.
call provider, walk around the unit
MD made aware
contact provider, continue plan of care

## 2023-11-05 NOTE — PROVIDER CONTACT NOTE (OTHER) - SITUATION
pts BP is 98/62 HR is 108
Pt had a bloody BM
patient hypertensive (179/97)
pt abdomen is distended and hard on palpation

## 2023-11-06 ENCOUNTER — TRANSCRIPTION ENCOUNTER (OUTPATIENT)
Age: 68
End: 2023-11-06

## 2023-11-06 VITALS
OXYGEN SATURATION: 98 % | DIASTOLIC BLOOD PRESSURE: 70 MMHG | SYSTOLIC BLOOD PRESSURE: 106 MMHG | RESPIRATION RATE: 18 BRPM | HEART RATE: 76 BPM | TEMPERATURE: 98 F

## 2023-11-06 LAB
ANION GAP SERPL CALC-SCNC: 14 MMOL/L — SIGNIFICANT CHANGE UP (ref 5–17)
ANION GAP SERPL CALC-SCNC: 14 MMOL/L — SIGNIFICANT CHANGE UP (ref 5–17)
BUN SERPL-MCNC: 18 MG/DL — SIGNIFICANT CHANGE UP (ref 7–23)
BUN SERPL-MCNC: 18 MG/DL — SIGNIFICANT CHANGE UP (ref 7–23)
CALCIUM SERPL-MCNC: 9.9 MG/DL — SIGNIFICANT CHANGE UP (ref 8.4–10.5)
CALCIUM SERPL-MCNC: 9.9 MG/DL — SIGNIFICANT CHANGE UP (ref 8.4–10.5)
CHLORIDE SERPL-SCNC: 103 MMOL/L — SIGNIFICANT CHANGE UP (ref 96–108)
CHLORIDE SERPL-SCNC: 103 MMOL/L — SIGNIFICANT CHANGE UP (ref 96–108)
CO2 SERPL-SCNC: 24 MMOL/L — SIGNIFICANT CHANGE UP (ref 22–31)
CO2 SERPL-SCNC: 24 MMOL/L — SIGNIFICANT CHANGE UP (ref 22–31)
CREAT SERPL-MCNC: 1.03 MG/DL — SIGNIFICANT CHANGE UP (ref 0.5–1.3)
CREAT SERPL-MCNC: 1.03 MG/DL — SIGNIFICANT CHANGE UP (ref 0.5–1.3)
EGFR: 59 ML/MIN/1.73M2 — LOW
EGFR: 59 ML/MIN/1.73M2 — LOW
GLUCOSE SERPL-MCNC: 102 MG/DL — HIGH (ref 70–99)
GLUCOSE SERPL-MCNC: 102 MG/DL — HIGH (ref 70–99)
HCT VFR BLD CALC: 38 % — SIGNIFICANT CHANGE UP (ref 34.5–45)
HCT VFR BLD CALC: 38 % — SIGNIFICANT CHANGE UP (ref 34.5–45)
HGB BLD-MCNC: 11.1 G/DL — LOW (ref 11.5–15.5)
HGB BLD-MCNC: 11.1 G/DL — LOW (ref 11.5–15.5)
MAGNESIUM SERPL-MCNC: 2 MG/DL — SIGNIFICANT CHANGE UP (ref 1.6–2.6)
MAGNESIUM SERPL-MCNC: 2 MG/DL — SIGNIFICANT CHANGE UP (ref 1.6–2.6)
MCHC RBC-ENTMCNC: 24 PG — LOW (ref 27–34)
MCHC RBC-ENTMCNC: 24 PG — LOW (ref 27–34)
MCHC RBC-ENTMCNC: 29.2 GM/DL — LOW (ref 32–36)
MCHC RBC-ENTMCNC: 29.2 GM/DL — LOW (ref 32–36)
MCV RBC AUTO: 82.1 FL — SIGNIFICANT CHANGE UP (ref 80–100)
MCV RBC AUTO: 82.1 FL — SIGNIFICANT CHANGE UP (ref 80–100)
NRBC # BLD: 0 /100 WBCS — SIGNIFICANT CHANGE UP (ref 0–0)
NRBC # BLD: 0 /100 WBCS — SIGNIFICANT CHANGE UP (ref 0–0)
PHOSPHATE SERPL-MCNC: 4.4 MG/DL — SIGNIFICANT CHANGE UP (ref 2.5–4.5)
PHOSPHATE SERPL-MCNC: 4.4 MG/DL — SIGNIFICANT CHANGE UP (ref 2.5–4.5)
PLATELET # BLD AUTO: 493 K/UL — HIGH (ref 150–400)
PLATELET # BLD AUTO: 493 K/UL — HIGH (ref 150–400)
POTASSIUM SERPL-MCNC: 4.4 MMOL/L — SIGNIFICANT CHANGE UP (ref 3.5–5.3)
POTASSIUM SERPL-MCNC: 4.4 MMOL/L — SIGNIFICANT CHANGE UP (ref 3.5–5.3)
POTASSIUM SERPL-SCNC: 4.4 MMOL/L — SIGNIFICANT CHANGE UP (ref 3.5–5.3)
POTASSIUM SERPL-SCNC: 4.4 MMOL/L — SIGNIFICANT CHANGE UP (ref 3.5–5.3)
RBC # BLD: 4.63 M/UL — SIGNIFICANT CHANGE UP (ref 3.8–5.2)
RBC # BLD: 4.63 M/UL — SIGNIFICANT CHANGE UP (ref 3.8–5.2)
RBC # FLD: 19.7 % — HIGH (ref 10.3–14.5)
RBC # FLD: 19.7 % — HIGH (ref 10.3–14.5)
SODIUM SERPL-SCNC: 141 MMOL/L — SIGNIFICANT CHANGE UP (ref 135–145)
SODIUM SERPL-SCNC: 141 MMOL/L — SIGNIFICANT CHANGE UP (ref 135–145)
WBC # BLD: 6.96 K/UL — SIGNIFICANT CHANGE UP (ref 3.8–10.5)
WBC # BLD: 6.96 K/UL — SIGNIFICANT CHANGE UP (ref 3.8–10.5)
WBC # FLD AUTO: 6.96 K/UL — SIGNIFICANT CHANGE UP (ref 3.8–10.5)
WBC # FLD AUTO: 6.96 K/UL — SIGNIFICANT CHANGE UP (ref 3.8–10.5)

## 2023-11-06 PROCEDURE — 83550 IRON BINDING TEST: CPT

## 2023-11-06 PROCEDURE — 88304 TISSUE EXAM BY PATHOLOGIST: CPT

## 2023-11-06 PROCEDURE — 85610 PROTHROMBIN TIME: CPT

## 2023-11-06 PROCEDURE — 83010 ASSAY OF HAPTOGLOBIN QUANT: CPT

## 2023-11-06 PROCEDURE — 83735 ASSAY OF MAGNESIUM: CPT

## 2023-11-06 PROCEDURE — P9016: CPT

## 2023-11-06 PROCEDURE — 88307 TISSUE EXAM BY PATHOLOGIST: CPT

## 2023-11-06 PROCEDURE — 86923 COMPATIBILITY TEST ELECTRIC: CPT

## 2023-11-06 PROCEDURE — 71250 CT THORAX DX C-: CPT | Mod: MA

## 2023-11-06 PROCEDURE — 74018 RADEX ABDOMEN 1 VIEW: CPT

## 2023-11-06 PROCEDURE — 93017 CV STRESS TEST TRACING ONLY: CPT

## 2023-11-06 PROCEDURE — 84100 ASSAY OF PHOSPHORUS: CPT

## 2023-11-06 PROCEDURE — 83605 ASSAY OF LACTIC ACID: CPT

## 2023-11-06 PROCEDURE — 93005 ELECTROCARDIOGRAM TRACING: CPT

## 2023-11-06 PROCEDURE — 86850 RBC ANTIBODY SCREEN: CPT

## 2023-11-06 PROCEDURE — 88309 TISSUE EXAM BY PATHOLOGIST: CPT

## 2023-11-06 PROCEDURE — 80053 COMPREHEN METABOLIC PANEL: CPT

## 2023-11-06 PROCEDURE — 72197 MRI PELVIS W/O & W/DYE: CPT

## 2023-11-06 PROCEDURE — 87086 URINE CULTURE/COLONY COUNT: CPT

## 2023-11-06 PROCEDURE — 83516 IMMUNOASSAY NONANTIBODY: CPT

## 2023-11-06 PROCEDURE — A9500: CPT

## 2023-11-06 PROCEDURE — 85730 THROMBOPLASTIN TIME PARTIAL: CPT

## 2023-11-06 PROCEDURE — 82378 CARCINOEMBRYONIC ANTIGEN: CPT

## 2023-11-06 PROCEDURE — 76705 ECHO EXAM OF ABDOMEN: CPT

## 2023-11-06 PROCEDURE — 36430 TRANSFUSION BLD/BLD COMPNT: CPT

## 2023-11-06 PROCEDURE — 36415 COLL VENOUS BLD VENIPUNCTURE: CPT

## 2023-11-06 PROCEDURE — 93970 EXTREMITY STUDY: CPT

## 2023-11-06 PROCEDURE — 81003 URINALYSIS AUTO W/O SCOPE: CPT

## 2023-11-06 PROCEDURE — 93306 TTE W/DOPPLER COMPLETE: CPT

## 2023-11-06 PROCEDURE — 80048 BASIC METABOLIC PNL TOTAL CA: CPT

## 2023-11-06 PROCEDURE — 86880 COOMBS TEST DIRECT: CPT

## 2023-11-06 PROCEDURE — 88342 IMHCHEM/IMCYTCHM 1ST ANTB: CPT

## 2023-11-06 PROCEDURE — 87040 BLOOD CULTURE FOR BACTERIA: CPT

## 2023-11-06 PROCEDURE — 82728 ASSAY OF FERRITIN: CPT

## 2023-11-06 PROCEDURE — 86235 NUCLEAR ANTIGEN ANTIBODY: CPT

## 2023-11-06 PROCEDURE — 97161 PT EVAL LOW COMPLEX 20 MIN: CPT

## 2023-11-06 PROCEDURE — 74177 CT ABD & PELVIS W/CONTRAST: CPT | Mod: MA

## 2023-11-06 PROCEDURE — 86255 FLUORESCENT ANTIBODY SCREEN: CPT

## 2023-11-06 PROCEDURE — 86922 COMPATIBILITY TEST ANTIGLOB: CPT

## 2023-11-06 PROCEDURE — 88305 TISSUE EXAM BY PATHOLOGIST: CPT

## 2023-11-06 PROCEDURE — 47531 INJECTION FOR CHOLANGIOGRAM: CPT

## 2023-11-06 PROCEDURE — 70450 CT HEAD/BRAIN W/O DYE: CPT | Mod: MA

## 2023-11-06 PROCEDURE — 86901 BLOOD TYPING SEROLOGIC RH(D): CPT

## 2023-11-06 PROCEDURE — T1013: CPT

## 2023-11-06 PROCEDURE — C9399: CPT

## 2023-11-06 PROCEDURE — 88341 IMHCHEM/IMCYTCHM EA ADD ANTB: CPT

## 2023-11-06 PROCEDURE — 71045 X-RAY EXAM CHEST 1 VIEW: CPT

## 2023-11-06 PROCEDURE — 74183 MRI ABD W/O CNTR FLWD CNTR: CPT

## 2023-11-06 PROCEDURE — 97110 THERAPEUTIC EXERCISES: CPT

## 2023-11-06 PROCEDURE — 97116 GAIT TRAINING THERAPY: CPT

## 2023-11-06 PROCEDURE — 99285 EMERGENCY DEPT VISIT HI MDM: CPT

## 2023-11-06 PROCEDURE — 86870 RBC ANTIBODY IDENTIFICATION: CPT

## 2023-11-06 PROCEDURE — 80061 LIPID PANEL: CPT

## 2023-11-06 PROCEDURE — 83615 LACTATE (LD) (LDH) ENZYME: CPT

## 2023-11-06 PROCEDURE — 78452 HT MUSCLE IMAGE SPECT MULT: CPT

## 2023-11-06 PROCEDURE — 86900 BLOOD TYPING SEROLOGIC ABO: CPT

## 2023-11-06 PROCEDURE — 83540 ASSAY OF IRON: CPT

## 2023-11-06 PROCEDURE — C1889: CPT

## 2023-11-06 PROCEDURE — 85027 COMPLETE CBC AUTOMATED: CPT

## 2023-11-06 PROCEDURE — 85025 COMPLETE CBC W/AUTO DIFF WBC: CPT

## 2023-11-06 RX ORDER — OXYCODONE HYDROCHLORIDE 5 MG/1
2.5 TABLET ORAL
Qty: 0 | Refills: 0 | DISCHARGE
Start: 2023-11-06

## 2023-11-06 RX ORDER — OXYCODONE HYDROCHLORIDE 5 MG/1
1 TABLET ORAL
Qty: 10 | Refills: 0
Start: 2023-11-06 | End: 2023-11-07

## 2023-11-06 RX ORDER — ACETAMINOPHEN 500 MG
2 TABLET ORAL
Qty: 0 | Refills: 0 | DISCHARGE
Start: 2023-11-06

## 2023-11-06 RX ADMIN — Medication 650 MILLIGRAM(S): at 00:00

## 2023-11-06 RX ADMIN — Medication 650 MILLIGRAM(S): at 12:12

## 2023-11-06 RX ADMIN — Medication 650 MILLIGRAM(S): at 05:07

## 2023-11-06 RX ADMIN — HEPARIN SODIUM 5000 UNIT(S): 5000 INJECTION INTRAVENOUS; SUBCUTANEOUS at 05:07

## 2023-11-06 RX ADMIN — LOSARTAN POTASSIUM 50 MILLIGRAM(S): 100 TABLET, FILM COATED ORAL at 05:07

## 2023-11-06 RX ADMIN — Medication 650 MILLIGRAM(S): at 17:13

## 2023-11-06 RX ADMIN — Medication 650 MILLIGRAM(S): at 13:12

## 2023-11-06 RX ADMIN — Medication 650 MILLIGRAM(S): at 05:59

## 2023-11-06 NOTE — DISCHARGE NOTE PROVIDER - CARE PROVIDERS DIRECT ADDRESSES
[email or web address]

## 2023-11-06 NOTE — DISCHARGE NOTE PROVIDER - CARE PROVIDER_API CALL
Stacey Bowers  Colon/Rectal Surgery  [address]  [place] [address]  Phone: [phone]  Fax: [phone]  Follow Up Time: 1 week

## 2023-11-06 NOTE — PROGRESS NOTE ADULT - REASON FOR ADMISSION
Dislodged percutaneous lj tube

## 2023-11-06 NOTE — PROGRESS NOTE ADULT - ATTENDING COMMENTS
I saw her using a video .  She feels well.  Minimal pain.  + flatus, BMs.  Abdomen soft, NT, ND.  Continue LRD.  Home later today.   RTO next week.

## 2023-11-06 NOTE — PROGRESS NOTE ADULT - ASSESSMENT
68F hx scleroderma and pulmonary hypertension POD4 right hemicolectomy and cholecystectomy for right colon mass and hx of perc lj tube for emphysematous cholecystitis. Patient is tolerating low fiber diet and waiting ROBF, is ambulating out of bed regularly, and pain is well controlled requiring no narcotics for analgesia.     Plan:  - AROBF  - Pain medications PRN  - Diet: low fiber diet  - mIVF 40cc/h  - DVT: SCD's & SQH  - OOBA with PT  - Close outpatient follow-up with [facility]. No systemic therapy this admission  - Rheumatology recommendations appreciated -> Goal BP SBP <110, avoid steroids to prevent precipitation sclerodermal renal crisis. No further immunosuppressive therapy at this time  - Dispo: patient's family currently working with  to obtain emergency medicaid, which may affect where patient may follow-up upon discharge, TBD    [facility]  [id number]

## 2023-11-06 NOTE — PROGRESS NOTE ADULT - PROVIDER SPECIALTY LIST ADULT
Cardiology
Colorectal Surgery
Gastroenterology
Surgery
Cardiology
Colorectal Surgery
Gastroenterology
Hospitalist
Surgery
Anesthesia
Cardiology
Colorectal Surgery
Gastroenterology
Pain Medicine
Surgery
Colorectal Surgery
Gastroenterology
Surgery
Gastroenterology
Hospitalist

## 2023-11-06 NOTE — DISCHARGE NOTE PROVIDER - NSDCMRMEDTOKEN_GEN_ALL_CORE_FT
irbesartan 75 mg oral tablet: 1 tab(s) orally once a day   acetaminophen 325 mg oral tablet: 2 tab(s) orally every 6 hours as needed for  mild pain  irbesartan 75 mg oral tablet: 1 tab(s) orally once a day  oxyCODONE: 2.5 milligram(s) orally every 4 hours as needed for  moderate pain  oxyCODONE 5 mg oral tablet: 1 tab(s) orally every 4 hours as needed for Severe Pain (7 - 10) MDD: 6

## 2023-11-06 NOTE — DISCHARGE NOTE PROVIDER - NSDCCPCAREPLAN_GEN_ALL_CORE_FT
PRINCIPAL DISCHARGE DIAGNOSIS  Diagnosis: Colonic mass  Assessment and Plan of Treatment: low residue diet- avoid raw fruits and vegetables,  thoroughly cooked vegetables that are soft and easily mashed with a fork are ok to eat. Bananas are also ok to eat.  You may shower, remove dressing over middle incision prior to showering. Let soap and water run over incision, pat dry after and replace dry gauze with paper tape, the little white bandaids called steristrips will fall off on own in 1-2 weeks   Notfiy Dr. Bowers if develop fever, chills, worsening abdominal pain, nausea/vomiting, puruelent drainage from wound        SECONDARY DISCHARGE DIAGNOSES  Diagnosis: Colonic mass  Assessment and Plan of Treatment:      PRINCIPAL DISCHARGE DIAGNOSIS  Diagnosis: Colonic mass  Assessment and Plan of Treatment: low residue diet- avoid raw fruits and vegetables,  thoroughly cooked vegetables that are soft and easily mashed with a fork are ok to eat. Bananas are also ok to eat.  You may shower, remove dressing over middle incision prior to showering. Let soap and water run over incision, pat dry after and replace dry gauze with paper tape, the little white bandaids called steristrips will fall off on own in 1-2 weeks   Notfiy Dr. Bowers if develop fever, chills, worsening abdominal pain, nausea/vomiting, puruelent drainage from wound  please follow up with Dr. Bowers in 1 week please call to schedule an appointment   Please follow up with your regular medical doctor in 1 week, please call to schedule an appointment to discuss recent hospitalization  please follow up with oncology in 1-2 weeks please call to schedule an appointment   take tylenol every 6 hours as needed for mild pain   you juan take oxycodone 2.5mg (1/2 tab) as needed every 4 hours for moderate breakthrough pain or oxycodone 5mg (1 tab) every 4 hours as needed for severe breakthrough pain        SECONDARY DISCHARGE DIAGNOSES  Diagnosis: Colonic mass  Assessment and Plan of Treatment:

## 2023-11-06 NOTE — PROGRESS NOTE ADULT - SUBJECTIVE AND OBJECTIVE BOX
DATE OF SERVICE: 11-06-23 @ 15:15    Patient is a 68y old  Female who presents with a chief complaint of Dislodged percutaneous lj tube (06 Nov 2023 07:42)      INTERVAL HISTORY: Reports abdominal discomfort, denies chest pain and SOB    REVIEW OF SYSTEMS:  CONSTITUTIONAL: No weakness  EYES/ENT: No visual changes;  No throat pain   NECK: No pain or stiffness  RESPIRATORY: No cough, wheezing; No shortness of breath  CARDIOVASCULAR: No chest pain or palpitations  GASTROINTESTINAL: No abdominal  pain. No nausea, vomiting, or hematemesis  GENITOURINARY: No dysuria, frequency or hematuria  NEUROLOGICAL: No stroke like symptoms  SKIN: No rashes    	  MEDICATIONS:  losartan 50 milliGRAM(s) Oral daily        PHYSICAL EXAM:  T(C): 36.7 (11-06-23 @ 12:17), Max: 36.7 (11-06-23 @ 04:47)  HR: 91 (11-06-23 @ 12:17) (63 - 91)  BP: 104/70 (11-06-23 @ 12:17) (104/70 - 145/87)  RR: 18 (11-06-23 @ 12:17) (18 - 18)  SpO2: 99% (11-06-23 @ 12:17) (97% - 100%)  Wt(kg): --  I&O's Summary    05 Nov 2023 07:01  -  06 Nov 2023 07:00  --------------------------------------------------------  IN: 200 mL / OUT: 0 mL / NET: 200 mL          Appearance: In no distress	  HEENT:    PERRL, EOMI	  Cardiovascular:  S1 S2, No JVD  Respiratory: Lungs clear to auscultation	  Gastrointestinal:  Soft, Non-tender, + BS	  Vascularature:  No edema of LE  Psychiatric: Appropriate affect   Neuro: no acute focal deficits                               11.1   6.96  )-----------( 493      ( 06 Nov 2023 07:08 )             38.0     11-06    141  |  103  |  18  ----------------------------<  102<H>  4.4   |  24  |  1.03    Ca    9.9      06 Nov 2023 07:06  Phos  4.4     11-06  Mg     2.0     11-06          Labs personally reviewed      ASSESSMENT/PLAN: 	  68F PMH scleroderma, pHTN, HTN with recent emphysematous cholecystitis s/p percutaneous cholecystectomy with IR on 9/11 presents following dislodged tube.    1. Cardiac Risk Stratification  - Patient with hx of scleroderma and pulm HTN but reports average to excellent functional capacity.   - Does not utilize home oxygen. Denies CP or SOB.  - Not in decompensated HF  - No hx of tachy ang arrhythmia. ECG SB with no ischemia noted.  - No hx of severe AS/MS. TTE with no valvular dysfunction.  - TTE with preserved EF, no WMA, normal LV and RV function and size  - Patient is mod risk for mod risk colorectal surgery if needed. No contraindication to proceed.   - Tolerated surgery well, abdominal dressing C/D/I     2. Pulmonary Hypertension  - TTE as above shows preserved EF, no pulm HTN and normal RV size and function  - Does not use supplemental oxygen  -RA 02 Sat 98%     3. Scleroderma  - Not currently on therapy    4. Hypertension - uncontrolled   - Losartan increased to 50mg PO daily    5. Ascending Colon Mass  Perc lj tube placement confirmed by IR tube study on 10/25  Recent Colonoscopy shows likely malignancy in ascending colon  CEA level elevated: 57  Ascending colon mass, path c/w adenocarcinoma  -s/p resection on wed 11/1 as well as cholecystectomy  - Patient doing well at this time , dressing c/d/i  -f/u Oncology               Kacey Hutchins, YANN-LUIS Moreno DO [facility]  Cardiovascular Medicine  [address] [place] [address], [address]  Available via call or text on [employer]  Office: [phone]

## 2023-11-06 NOTE — DISCHARGE NOTE PROVIDER - NSRESEARCHGRANT_OVERRIDEREC_GEN_A_CORE
risk low patient Sikhism, no si/hi, minor symptoms overall, future oriented supportive family 
This is a surgical and/or non-medical patient.

## 2023-11-06 NOTE — DISCHARGE NOTE NURSING/CASE MANAGEMENT/SOCIAL WORK - PATIENT PORTAL LINK FT
You can access the FollowMyHealth Patient Portal offered by [facility] by registering at the following website: [email or web address]. By joining [employer]’s FollowMyHealth portal, you will also be able to view your health information using other applications (apps) compatible with our system.

## 2023-11-06 NOTE — DISCHARGE NOTE PROVIDER - NSFOLLOWUPCLINICS_GEN_ALL_ED_FT
[facility]  Hematology/Oncology  [address] [id number]  Phone: [phone]  Fax:   Follow Up Time: 2 weeks

## 2023-11-06 NOTE — DISCHARGE NOTE PROVIDER - HOSPITAL COURSE
68F PMH scleroderma, pHTN, HTN with recent emphysematous cholecystitis s/p percutaneous cholecystectomy with IR on 9/11 presents following dislodged tube. Patient was discharged on 9/14 after procedure and course of zosyn, and has had unremarkable outpatient course. CTAP performed in ED revealed cholecystostomy tube with tip in region of contracted gallbladder Incidentally CT A/P revealed thickening of the ascending colon, with concern for underlying mass or malignancy. Surgery consult requested in ED, and recommending medicine admission.     IR consulted for perc choletube check-  Tube check demonstrates appropriately positioned   cholecystostomy tube with patent cystic duct and common bile ducts,    GI consulted for colonoscopy - A large malignant appearing mass was found in the ascending colon, approximately 5cm distal        to the cecum. The mass was circumferential. The mass measured four cm in length. Biopsies        were taken with a cold forceps for histology.    MRI A few liver lesions are highly suspicious for mucinous hepatic   metastases, largest measuring 2.5 cm within the right hepatic lobe.    Ascending colon Ca causing anemia (Hgb 7 on admission) and liver mets.  Plan is for resection in this admission and then outpt Oncology f/u to address the liver mets.       68F PMH scleroderma, pHTN, HTN with recent emphysematous cholecystitis s/p percutaneous cholecystectomy with IR on 9/11 presents following dislodged tube. Patient was discharged on 9/14 after procedure and course of zosyn, and has had unremarkable outpatient course. CTAP performed in ED revealed cholecystostomy tube with tip in region of contracted gallbladder Incidentally CT A/P revealed thickening of the ascending colon, with concern for underlying mass or malignancy. Surgery consult requested in ED, and recommending medicine admission.     IR consulted for perc choletube check-  Tube check demonstrates appropriately positioned   cholecystostomy tube with patent cystic duct and common bile ducts,    GI consulted for colonoscopy - A large malignant appearing mass was found in the ascending colon, approximately 5cm distal        to the cecum. The mass was circumferential. The mass measured four cm in length. Biopsies        were taken with a cold forceps for histology.    MRI A few liver lesions are highly suspicious for mucinous hepatic   metastases, largest measuring 2.5 cm within the right hepatic lobe.    Ascending colon Ca causing anemia (Hgb 7 on admission) and liver mets.  Plan is for resection in this admission and then outpt Oncology f/u to address the liver mets.    Patient taken to OR underwent lap right hemicolectomy, lap cholecystectomy.     Post op followed ERP.     Patient seen by PT - recommend home PT     Discharged home once having GI function.

## 2023-11-06 NOTE — PROGRESS NOTE ADULT - SUBJECTIVE AND OBJECTIVE BOX
[facility] DAILY PROGRESS NOTE    SUBJECTIVE:     Overnight: NAEO. AVSS. Tolerating LFD. Endorsed some abdominal discomfort before bed. +/- gas/BM. Pt has been ambulating halls to try and stimulate bowels.  Patient seen and evaluated on AM rounds. ***.   Patient otherwise denies nausea, vomiting, chest pain, shortness of breath     OBJECTIVE:  Vital Signs Last 24 Hrs  T(C): 36.6 (06 Nov 2023 00:43), Max: 36.6 (05 Nov 2023 04:23)  T(F): 97.8 (06 Nov 2023 00:43), Max: 97.9 (05 Nov 2023 04:23)  HR: 63 (06 Nov 2023 00:43) (63 - 81)  BP: 145/86 (06 Nov 2023 00:43) (119/79 - 172/84)  BP(mean): --  RR: 18 (06 Nov 2023 00:43) (18 - 18)  SpO2: 98% (06 Nov 2023 00:43) (96% - 99%)    Parameters below as of 06 Nov 2023 00:43  Patient On (Oxygen Delivery Method): room air      STANDING  acetaminophen     Tablet .. 650 milliGRAM(s) Oral every 6 hours  heparin   Injectable 5000 Unit(s) SubCutaneous every 12 hours  influenza  Vaccine (HIGH DOSE) 0.7 milliLiter(s) IntraMuscular once  losartan 50 milliGRAM(s) Oral daily    PRN  melatonin 3 milliGRAM(s) Oral at bedtime PRN Insomnia  ondansetron Injectable 4 milliGRAM(s) IV Push every 6 hours PRN Nausea and/or Vomiting  oxyCODONE    IR 2.5 milliGRAM(s) Oral every 6 hours PRN Moderate Pain (4 - 6)  oxyCODONE    IR 5 milliGRAM(s) Oral every 4 hours PRN Severe Pain (7 - 10)      Labs:  140  |  106  |  9  ----------------------------<  92    (11-05)  4.3   |  25  |  0.70          Ca    9.6      11-05  Mg    2.0  Phos  3.3          Urinalysis Basic - ( 05 Nov 2023 06:37 )    Color: x / Appearance: x / SG: x / pH: x  Gluc: 92 mg/dL / Ketone: x  / Bili: x / Urobili: x   Blood: x / Protein: x / Nitrite: x   Leuk Esterase: x / RBC: x / WBC x   Sq Epi: x / Non Sq Epi: x / Bacteria: x      Urinalysis Basic - ( 05 Nov 2023 06:37 )    Color: x / Appearance: x / SG: x / pH: x  Gluc: 92 mg/dL / Ketone: x  / Bili: x / Urobili: x   Blood: x / Protein: x / Nitrite: x   Leuk Esterase: x / RBC: x / WBC x   Sq Epi: x / Non Sq Epi: x / Bacteria: x        EXAM    General: NAD, resting in bed comfortably.  Cardiac: regular rate, warm and well perfused  Respiratory: Nonlabored respirations, normal cw expansion  Abdomen: soft, nontender, mildly distended, incisional dressing c/d/i  Extremities: normal strength, FROM, no deformities [facility] DAILY PROGRESS NOTE    SUBJECTIVE:     Overnight: NAEO. AVSS. Tolerating LFD. Endorsed some abdominal discomfort before bed. +/- gas/BM. Pt has been ambulating halls to try and stimulate bowels.  Patient seen and evaluated on AM rounds.  Patient otherwise denies nausea, vomiting, chest pain, shortness of breath     OBJECTIVE:  Vital Signs Last 24 Hrs  T(C): 36.6 (06 Nov 2023 00:43), Max: 36.6 (05 Nov 2023 04:23)  T(F): 97.8 (06 Nov 2023 00:43), Max: 97.9 (05 Nov 2023 04:23)  HR: 63 (06 Nov 2023 00:43) (63 - 81)  BP: 145/86 (06 Nov 2023 00:43) (119/79 - 172/84)  BP(mean): --  RR: 18 (06 Nov 2023 00:43) (18 - 18)  SpO2: 98% (06 Nov 2023 00:43) (96% - 99%)    Parameters below as of 06 Nov 2023 00:43  Patient On (Oxygen Delivery Method): room air      STANDING  acetaminophen     Tablet .. 650 milliGRAM(s) Oral every 6 hours  heparin   Injectable 5000 Unit(s) SubCutaneous every 12 hours  influenza  Vaccine (HIGH DOSE) 0.7 milliLiter(s) IntraMuscular once  losartan 50 milliGRAM(s) Oral daily    PRN  melatonin 3 milliGRAM(s) Oral at bedtime PRN Insomnia  ondansetron Injectable 4 milliGRAM(s) IV Push every 6 hours PRN Nausea and/or Vomiting  oxyCODONE    IR 2.5 milliGRAM(s) Oral every 6 hours PRN Moderate Pain (4 - 6)  oxyCODONE    IR 5 milliGRAM(s) Oral every 4 hours PRN Severe Pain (7 - 10)      Labs:  140  |  106  |  9  ----------------------------<  92    (11-05)  4.3   |  25  |  0.70          Ca    9.6      11-05  Mg    2.0  Phos  3.3          Urinalysis Basic - ( 05 Nov 2023 06:37 )    Color: x / Appearance: x / SG: x / pH: x  Gluc: 92 mg/dL / Ketone: x  / Bili: x / Urobili: x   Blood: x / Protein: x / Nitrite: x   Leuk Esterase: x / RBC: x / WBC x   Sq Epi: x / Non Sq Epi: x / Bacteria: x      Urinalysis Basic - ( 05 Nov 2023 06:37 )    Color: x / Appearance: x / SG: x / pH: x  Gluc: 92 mg/dL / Ketone: x  / Bili: x / Urobili: x   Blood: x / Protein: x / Nitrite: x   Leuk Esterase: x / RBC: x / WBC x   Sq Epi: x / Non Sq Epi: x / Bacteria: x        EXAM    General: NAD, resting in bed comfortably.  Cardiac: regular rate, warm and well perfused  Respiratory: Nonlabored respirations, normal cw expansion  Abdomen: soft, nontender, mildly distended, incisional dressing c/d/i  Extremities: normal strength, FROM, no deformities

## 2023-11-07 ENCOUNTER — NON-APPOINTMENT (OUTPATIENT)
Age: 68
End: 2023-11-07

## 2023-11-07 ENCOUNTER — EMERGENCY (EMERGENCY)
Facility: HOSPITAL | Age: 68
LOS: 1 days | Discharge: NURSING, REHAB OR LONG-TERM CARE | End: 2023-11-07
Attending: STUDENT IN AN ORGANIZED HEALTH CARE EDUCATION/TRAINING PROGRAM | Admitting: SURGERY
Payer: MEDICAID

## 2023-11-07 VITALS
OXYGEN SATURATION: 98 % | DIASTOLIC BLOOD PRESSURE: 85 MMHG | SYSTOLIC BLOOD PRESSURE: 145 MMHG | HEART RATE: 88 BPM | TEMPERATURE: 98 F | HEIGHT: 57 IN | WEIGHT: 81.57 LBS | RESPIRATION RATE: 18 BRPM

## 2023-11-07 DIAGNOSIS — T85.528A DISPLACEMENT OF OTHER GASTROINTESTINAL PROSTHETIC DEVICES, IMPLANTS AND GRAFTS, INITIAL ENCOUNTER: Chronic | ICD-10-CM

## 2023-11-07 LAB
SURGICAL PATHOLOGY STUDY: SIGNIFICANT CHANGE UP
SURGICAL PATHOLOGY STUDY: SIGNIFICANT CHANGE UP

## 2023-11-08 DIAGNOSIS — K56.7 ILEUS, UNSPECIFIED: ICD-10-CM

## 2023-11-08 LAB
ALBUMIN SERPL ELPH-MCNC: 3.3 G/DL — SIGNIFICANT CHANGE UP (ref 3.3–5)
ALBUMIN SERPL ELPH-MCNC: 3.3 G/DL — SIGNIFICANT CHANGE UP (ref 3.3–5)
ALP SERPL-CCNC: 169 U/L — HIGH (ref 40–120)
ALP SERPL-CCNC: 169 U/L — HIGH (ref 40–120)
ALT FLD-CCNC: 20 U/L — SIGNIFICANT CHANGE UP (ref 10–45)
ALT FLD-CCNC: 20 U/L — SIGNIFICANT CHANGE UP (ref 10–45)
ANION GAP SERPL CALC-SCNC: 14 MMOL/L — SIGNIFICANT CHANGE UP (ref 5–17)
ANION GAP SERPL CALC-SCNC: 14 MMOL/L — SIGNIFICANT CHANGE UP (ref 5–17)
APPEARANCE UR: CLEAR — SIGNIFICANT CHANGE UP
APPEARANCE UR: CLEAR — SIGNIFICANT CHANGE UP
AST SERPL-CCNC: 32 U/L — SIGNIFICANT CHANGE UP (ref 10–40)
AST SERPL-CCNC: 32 U/L — SIGNIFICANT CHANGE UP (ref 10–40)
BASE EXCESS BLDV CALC-SCNC: -1.2 MMOL/L — SIGNIFICANT CHANGE UP (ref -2–3)
BASE EXCESS BLDV CALC-SCNC: -1.2 MMOL/L — SIGNIFICANT CHANGE UP (ref -2–3)
BASOPHILS # BLD AUTO: 0.03 K/UL — SIGNIFICANT CHANGE UP (ref 0–0.2)
BASOPHILS # BLD AUTO: 0.03 K/UL — SIGNIFICANT CHANGE UP (ref 0–0.2)
BASOPHILS NFR BLD AUTO: 0.4 % — SIGNIFICANT CHANGE UP (ref 0–2)
BASOPHILS NFR BLD AUTO: 0.4 % — SIGNIFICANT CHANGE UP (ref 0–2)
BILIRUB SERPL-MCNC: 0.3 MG/DL — SIGNIFICANT CHANGE UP (ref 0.2–1.2)
BILIRUB SERPL-MCNC: 0.3 MG/DL — SIGNIFICANT CHANGE UP (ref 0.2–1.2)
BILIRUB UR-MCNC: NEGATIVE — SIGNIFICANT CHANGE UP
BILIRUB UR-MCNC: NEGATIVE — SIGNIFICANT CHANGE UP
BUN SERPL-MCNC: 26 MG/DL — HIGH (ref 7–23)
BUN SERPL-MCNC: 26 MG/DL — HIGH (ref 7–23)
CA-I SERPL-SCNC: 1.29 MMOL/L — SIGNIFICANT CHANGE UP (ref 1.15–1.33)
CA-I SERPL-SCNC: 1.29 MMOL/L — SIGNIFICANT CHANGE UP (ref 1.15–1.33)
CALCIUM SERPL-MCNC: 9.6 MG/DL — SIGNIFICANT CHANGE UP (ref 8.4–10.5)
CALCIUM SERPL-MCNC: 9.6 MG/DL — SIGNIFICANT CHANGE UP (ref 8.4–10.5)
CHLORIDE BLDV-SCNC: 107 MMOL/L — SIGNIFICANT CHANGE UP (ref 96–108)
CHLORIDE BLDV-SCNC: 107 MMOL/L — SIGNIFICANT CHANGE UP (ref 96–108)
CHLORIDE SERPL-SCNC: 106 MMOL/L — SIGNIFICANT CHANGE UP (ref 96–108)
CHLORIDE SERPL-SCNC: 106 MMOL/L — SIGNIFICANT CHANGE UP (ref 96–108)
CO2 BLDV-SCNC: 26 MMOL/L — SIGNIFICANT CHANGE UP (ref 22–26)
CO2 BLDV-SCNC: 26 MMOL/L — SIGNIFICANT CHANGE UP (ref 22–26)
CO2 SERPL-SCNC: 20 MMOL/L — LOW (ref 22–31)
CO2 SERPL-SCNC: 20 MMOL/L — LOW (ref 22–31)
COLOR SPEC: YELLOW — SIGNIFICANT CHANGE UP
COLOR SPEC: YELLOW — SIGNIFICANT CHANGE UP
CREAT SERPL-MCNC: 0.62 MG/DL — SIGNIFICANT CHANGE UP (ref 0.5–1.3)
CREAT SERPL-MCNC: 0.62 MG/DL — SIGNIFICANT CHANGE UP (ref 0.5–1.3)
DIFF PNL FLD: NEGATIVE — SIGNIFICANT CHANGE UP
DIFF PNL FLD: NEGATIVE — SIGNIFICANT CHANGE UP
EGFR: 97 ML/MIN/1.73M2 — SIGNIFICANT CHANGE UP
EGFR: 97 ML/MIN/1.73M2 — SIGNIFICANT CHANGE UP
EOSINOPHIL # BLD AUTO: 0.13 K/UL — SIGNIFICANT CHANGE UP (ref 0–0.5)
EOSINOPHIL # BLD AUTO: 0.13 K/UL — SIGNIFICANT CHANGE UP (ref 0–0.5)
EOSINOPHIL NFR BLD AUTO: 1.9 % — SIGNIFICANT CHANGE UP (ref 0–6)
EOSINOPHIL NFR BLD AUTO: 1.9 % — SIGNIFICANT CHANGE UP (ref 0–6)
GAS PNL BLDV: 137 MMOL/L — SIGNIFICANT CHANGE UP (ref 136–145)
GAS PNL BLDV: 137 MMOL/L — SIGNIFICANT CHANGE UP (ref 136–145)
GAS PNL BLDV: SIGNIFICANT CHANGE UP
GAS PNL BLDV: SIGNIFICANT CHANGE UP
GLUCOSE BLDV-MCNC: 94 MG/DL — SIGNIFICANT CHANGE UP (ref 70–99)
GLUCOSE BLDV-MCNC: 94 MG/DL — SIGNIFICANT CHANGE UP (ref 70–99)
GLUCOSE SERPL-MCNC: 92 MG/DL — SIGNIFICANT CHANGE UP (ref 70–99)
GLUCOSE SERPL-MCNC: 92 MG/DL — SIGNIFICANT CHANGE UP (ref 70–99)
GLUCOSE UR QL: NEGATIVE MG/DL — SIGNIFICANT CHANGE UP
GLUCOSE UR QL: NEGATIVE MG/DL — SIGNIFICANT CHANGE UP
HCO3 BLDV-SCNC: 25 MMOL/L — SIGNIFICANT CHANGE UP (ref 22–29)
HCO3 BLDV-SCNC: 25 MMOL/L — SIGNIFICANT CHANGE UP (ref 22–29)
HCT VFR BLD CALC: 33.6 % — LOW (ref 34.5–45)
HCT VFR BLD CALC: 33.6 % — LOW (ref 34.5–45)
HCT VFR BLDA CALC: 31 % — LOW (ref 34.5–46.5)
HCT VFR BLDA CALC: 31 % — LOW (ref 34.5–46.5)
HGB BLD CALC-MCNC: 10.4 G/DL — LOW (ref 11.7–16.1)
HGB BLD CALC-MCNC: 10.4 G/DL — LOW (ref 11.7–16.1)
HGB BLD-MCNC: 9.8 G/DL — LOW (ref 11.5–15.5)
HGB BLD-MCNC: 9.8 G/DL — LOW (ref 11.5–15.5)
IMM GRANULOCYTES NFR BLD AUTO: 1.3 % — HIGH (ref 0–0.9)
IMM GRANULOCYTES NFR BLD AUTO: 1.3 % — HIGH (ref 0–0.9)
KETONES UR-MCNC: NEGATIVE MG/DL — SIGNIFICANT CHANGE UP
KETONES UR-MCNC: NEGATIVE MG/DL — SIGNIFICANT CHANGE UP
LACTATE BLDV-MCNC: 1 MMOL/L — SIGNIFICANT CHANGE UP (ref 0.5–2)
LACTATE BLDV-MCNC: 1 MMOL/L — SIGNIFICANT CHANGE UP (ref 0.5–2)
LEUKOCYTE ESTERASE UR-ACNC: NEGATIVE — SIGNIFICANT CHANGE UP
LEUKOCYTE ESTERASE UR-ACNC: NEGATIVE — SIGNIFICANT CHANGE UP
LIDOCAIN IGE QN: 22 U/L — SIGNIFICANT CHANGE UP (ref 7–60)
LIDOCAIN IGE QN: 22 U/L — SIGNIFICANT CHANGE UP (ref 7–60)
LYMPHOCYTES # BLD AUTO: 1.71 K/UL — SIGNIFICANT CHANGE UP (ref 1–3.3)
LYMPHOCYTES # BLD AUTO: 1.71 K/UL — SIGNIFICANT CHANGE UP (ref 1–3.3)
LYMPHOCYTES # BLD AUTO: 24.9 % — SIGNIFICANT CHANGE UP (ref 13–44)
LYMPHOCYTES # BLD AUTO: 24.9 % — SIGNIFICANT CHANGE UP (ref 13–44)
MCHC RBC-ENTMCNC: 24.6 PG — LOW (ref 27–34)
MCHC RBC-ENTMCNC: 24.6 PG — LOW (ref 27–34)
MCHC RBC-ENTMCNC: 29.2 GM/DL — LOW (ref 32–36)
MCHC RBC-ENTMCNC: 29.2 GM/DL — LOW (ref 32–36)
MCV RBC AUTO: 84.4 FL — SIGNIFICANT CHANGE UP (ref 80–100)
MCV RBC AUTO: 84.4 FL — SIGNIFICANT CHANGE UP (ref 80–100)
MONOCYTES # BLD AUTO: 0.38 K/UL — SIGNIFICANT CHANGE UP (ref 0–0.9)
MONOCYTES # BLD AUTO: 0.38 K/UL — SIGNIFICANT CHANGE UP (ref 0–0.9)
MONOCYTES NFR BLD AUTO: 5.5 % — SIGNIFICANT CHANGE UP (ref 2–14)
MONOCYTES NFR BLD AUTO: 5.5 % — SIGNIFICANT CHANGE UP (ref 2–14)
NEUTROPHILS # BLD AUTO: 4.52 K/UL — SIGNIFICANT CHANGE UP (ref 1.8–7.4)
NEUTROPHILS # BLD AUTO: 4.52 K/UL — SIGNIFICANT CHANGE UP (ref 1.8–7.4)
NEUTROPHILS NFR BLD AUTO: 66 % — SIGNIFICANT CHANGE UP (ref 43–77)
NEUTROPHILS NFR BLD AUTO: 66 % — SIGNIFICANT CHANGE UP (ref 43–77)
NITRITE UR-MCNC: NEGATIVE — SIGNIFICANT CHANGE UP
NITRITE UR-MCNC: NEGATIVE — SIGNIFICANT CHANGE UP
NRBC # BLD: 0 /100 WBCS — SIGNIFICANT CHANGE UP (ref 0–0)
NRBC # BLD: 0 /100 WBCS — SIGNIFICANT CHANGE UP (ref 0–0)
PCO2 BLDV: 46 MMHG — HIGH (ref 39–42)
PCO2 BLDV: 46 MMHG — HIGH (ref 39–42)
PH BLDV: 7.34 — SIGNIFICANT CHANGE UP (ref 7.32–7.43)
PH BLDV: 7.34 — SIGNIFICANT CHANGE UP (ref 7.32–7.43)
PH UR: 5 — SIGNIFICANT CHANGE UP (ref 5–8)
PH UR: 5 — SIGNIFICANT CHANGE UP (ref 5–8)
PLATELET # BLD AUTO: 394 K/UL — SIGNIFICANT CHANGE UP (ref 150–400)
PLATELET # BLD AUTO: 394 K/UL — SIGNIFICANT CHANGE UP (ref 150–400)
PO2 BLDV: 28 MMHG — SIGNIFICANT CHANGE UP (ref 25–45)
PO2 BLDV: 28 MMHG — SIGNIFICANT CHANGE UP (ref 25–45)
POTASSIUM BLDV-SCNC: 4.4 MMOL/L — SIGNIFICANT CHANGE UP (ref 3.5–5.1)
POTASSIUM BLDV-SCNC: 4.4 MMOL/L — SIGNIFICANT CHANGE UP (ref 3.5–5.1)
POTASSIUM SERPL-MCNC: 4.4 MMOL/L — SIGNIFICANT CHANGE UP (ref 3.5–5.3)
POTASSIUM SERPL-MCNC: 4.4 MMOL/L — SIGNIFICANT CHANGE UP (ref 3.5–5.3)
POTASSIUM SERPL-SCNC: 4.4 MMOL/L — SIGNIFICANT CHANGE UP (ref 3.5–5.3)
POTASSIUM SERPL-SCNC: 4.4 MMOL/L — SIGNIFICANT CHANGE UP (ref 3.5–5.3)
PROT SERPL-MCNC: 7.2 G/DL — SIGNIFICANT CHANGE UP (ref 6–8.3)
PROT SERPL-MCNC: 7.2 G/DL — SIGNIFICANT CHANGE UP (ref 6–8.3)
PROT UR-MCNC: NEGATIVE MG/DL — SIGNIFICANT CHANGE UP
PROT UR-MCNC: NEGATIVE MG/DL — SIGNIFICANT CHANGE UP
RBC # BLD: 3.98 M/UL — SIGNIFICANT CHANGE UP (ref 3.8–5.2)
RBC # BLD: 3.98 M/UL — SIGNIFICANT CHANGE UP (ref 3.8–5.2)
RBC # FLD: 19.5 % — HIGH (ref 10.3–14.5)
RBC # FLD: 19.5 % — HIGH (ref 10.3–14.5)
SAO2 % BLDV: 34.6 % — LOW (ref 67–88)
SAO2 % BLDV: 34.6 % — LOW (ref 67–88)
SODIUM SERPL-SCNC: 140 MMOL/L — SIGNIFICANT CHANGE UP (ref 135–145)
SODIUM SERPL-SCNC: 140 MMOL/L — SIGNIFICANT CHANGE UP (ref 135–145)
SP GR SPEC: 1.02 — SIGNIFICANT CHANGE UP (ref 1–1.03)
SP GR SPEC: 1.02 — SIGNIFICANT CHANGE UP (ref 1–1.03)
UROBILINOGEN FLD QL: 0.2 MG/DL — SIGNIFICANT CHANGE UP (ref 0.2–1)
UROBILINOGEN FLD QL: 0.2 MG/DL — SIGNIFICANT CHANGE UP (ref 0.2–1)
WBC # BLD: 6.86 K/UL — SIGNIFICANT CHANGE UP (ref 3.8–10.5)
WBC # BLD: 6.86 K/UL — SIGNIFICANT CHANGE UP (ref 3.8–10.5)
WBC # FLD AUTO: 6.86 K/UL — SIGNIFICANT CHANGE UP (ref 3.8–10.5)
WBC # FLD AUTO: 6.86 K/UL — SIGNIFICANT CHANGE UP (ref 3.8–10.5)

## 2023-11-08 PROCEDURE — 74177 CT ABD & PELVIS W/CONTRAST: CPT | Mod: 26,MA

## 2023-11-08 PROCEDURE — 71045 X-RAY EXAM CHEST 1 VIEW: CPT | Mod: 26

## 2023-11-08 PROCEDURE — 74176 CT ABD & PELVIS W/O CONTRAST: CPT | Mod: 26,59

## 2023-11-08 RX ORDER — ONDANSETRON 8 MG/1
4 TABLET, FILM COATED ORAL ONCE
Refills: 0 | Status: COMPLETED | OUTPATIENT
Start: 2023-11-08 | End: 2023-11-08

## 2023-11-08 RX ORDER — DEXTROSE MONOHYDRATE, SODIUM CHLORIDE, AND POTASSIUM CHLORIDE 50; .745; 4.5 G/1000ML; G/1000ML; G/1000ML
1000 INJECTION, SOLUTION INTRAVENOUS
Refills: 0 | Status: DISCONTINUED | OUTPATIENT
Start: 2023-11-08 | End: 2023-11-09

## 2023-11-08 RX ORDER — SODIUM CHLORIDE 9 MG/ML
500 INJECTION INTRAMUSCULAR; INTRAVENOUS; SUBCUTANEOUS ONCE
Refills: 0 | Status: COMPLETED | OUTPATIENT
Start: 2023-11-08 | End: 2023-11-08

## 2023-11-08 RX ORDER — ENOXAPARIN SODIUM 100 MG/ML
40 INJECTION SUBCUTANEOUS EVERY 24 HOURS
Refills: 0 | Status: DISCONTINUED | OUTPATIENT
Start: 2023-11-08 | End: 2023-11-09

## 2023-11-08 RX ORDER — INFLUENZA VIRUS VACCINE 15; 15; 15; 15 UG/.5ML; UG/.5ML; UG/.5ML; UG/.5ML
0.7 SUSPENSION INTRAMUSCULAR ONCE
Refills: 0 | Status: DISCONTINUED | OUTPATIENT
Start: 2023-11-08 | End: 2023-11-09

## 2023-11-08 RX ORDER — FAMOTIDINE 10 MG/ML
20 INJECTION INTRAVENOUS ONCE
Refills: 0 | Status: COMPLETED | OUTPATIENT
Start: 2023-11-08 | End: 2023-11-08

## 2023-11-08 RX ADMIN — SODIUM CHLORIDE 500 MILLILITER(S): 9 INJECTION INTRAMUSCULAR; INTRAVENOUS; SUBCUTANEOUS at 01:56

## 2023-11-08 RX ADMIN — ONDANSETRON 4 MILLIGRAM(S): 8 TABLET, FILM COATED ORAL at 03:15

## 2023-11-08 RX ADMIN — FAMOTIDINE 20 MILLIGRAM(S): 10 INJECTION INTRAVENOUS at 02:23

## 2023-11-08 RX ADMIN — DEXTROSE MONOHYDRATE, SODIUM CHLORIDE, AND POTASSIUM CHLORIDE 40 MILLILITER(S): 50; .745; 4.5 INJECTION, SOLUTION INTRAVENOUS at 10:40

## 2023-11-08 RX ADMIN — ENOXAPARIN SODIUM 40 MILLIGRAM(S): 100 INJECTION SUBCUTANEOUS at 17:35

## 2023-11-08 NOTE — ED ADULT NURSE REASSESSMENT NOTE - NS ED NURSE REASSESS COMMENT FT1
pt informed of need for urine sample, verbalized compliance and understanding but denies urge to void at this time. pt family member to alert RN when pt able to void

## 2023-11-08 NOTE — ED ADULT NURSE NOTE - OBJECTIVE STATEMENT
pt is a 69yo female PMH pulmonary HTN presenting to the ED complaining of abdominal pain. pt mandarin speaking,  ipad used to facilitate conversation, pt states she recently had a hemicolectomy and cholecystectomy, was DCd from [facility] yesterday, began experiencing abdominal distension, nausea with vomiting, reports loose stools yesterday, reports difficulty passing gas since today. pt A&Ox3 gross neuro intact, lungs cta bilaterally, no difficulty speaking in complete sentences, s1s2 heart sounds heard, pulses x 4, jackson x4, abdomen soft nontender, appears distended as per pt, skin intact. pt denies chest pain, sob, ha, f/c, urinary symptoms, hematuria. pt has periumbilical surgical wound, dressing dry and intact, no redness, drainage, warmth, or odor noted to wound or surrounding skin.

## 2023-11-08 NOTE — ED PROVIDER NOTE - NS ED ATTENDING STATEMENT MOD
This was a shared visit with the USAMA. I reviewed and verified the documentation and independently performed the documented:

## 2023-11-08 NOTE — PATIENT PROFILE ADULT - FUNCTIONAL ASSESSMENT - DAILY ACTIVITY 2.
[FreeTextEntry1] : -Patient desires second dose of Gardasil vaccine today. Consent obtained. Area was sterilized with alcohol prep pad. Gardasil vaccine administered in the left deltoid today. Patient tolerated it well. Lot#: [id number], Exp: 08/18/23.\par \par -She will follow up in 4 months for third dosage of Gardasil vaccine. \par  3 = A little assistance

## 2023-11-08 NOTE — ED ADULT NURSE REASSESSMENT NOTE - NS ED NURSE REASSESS COMMENT FT1
Received patient from RN, patient at baseline mental status, able to make needs known, NAD, VSS, patient agreeable to plan of care, pending bed, comfort and safety provided.

## 2023-11-08 NOTE — PATIENT PROFILE ADULT - FALL HARM RISK - HARM RISK INTERVENTIONS
Assistance with ambulation/Assistance OOB with selected safe patient handling equipment/Communicate Risk of Fall with Harm to all staff/Discuss with provider need for PT consult/Monitor gait and stability/Reinforce activity limits and safety measures with patient and family/Tailored Fall Risk Interventions/Visual Cue: Yellow wristband and red socks/Bed in lowest position, wheels locked, appropriate side rails in place/Call bell, personal items and telephone in reach/Instruct patient to call for assistance before getting out of bed or chair/Non-slip footwear when patient is out of bed/[place] to call system/Physically safe environment - no spills, clutter or unnecessary equipment/Purposeful Proactive Rounding/Room/bathroom lighting operational, light cord in reach

## 2023-11-08 NOTE — PATIENT PROFILE ADULT - NS PRO AD PATIENT TYPE
PRINCIPAL PROCEDURE  Procedure: Intracardiac catheter ablation for atrial fibrillation  Findings and Treatment:
Health Care Proxy (HCP)

## 2023-11-08 NOTE — H&P ADULT - NSHPLABSRESULTS_GEN_ALL_CORE
LABS:                          9.8    6.86  )-----------( 394      ( 2023 02:04 )             33.6           140  |  106  |  26<H>  ----------------------------<  92  4.4   |  20<L>  |  0.62    Ca    9.6      2023 02:04    TPro  7.2  /  Alb  3.3  /  TBili  0.3  /  DBili  x   /  AST  32  /  ALT  20  /  AlkPhos  169<H>                Urinalysis Basic - ( 2023 06:40 )    Color: Yellow / Appearance: Clear / S.022 / pH: x  Gluc: x / Ketone: Negative mg/dL  / Bili: Negative / Urobili: 0.2 mg/dL   Blood: x / Protein: Negative mg/dL / Nitrite: Negative   Leuk Esterase: Negative / RBC: x / WBC x   Sq Epi: x / Non Sq Epi: x / Bacteria: x            Culture Results:   <10,000 CFU/mL Normal Urogenital Erika (10-28 @ 15:35)  Culture Results:   No growth at 5 days (10-27 @ 18:42)  Culture Results:   No growth at 5 days (10-27 @ 18:37)      _______________________________________  RADIOLOGY & ADDITIONAL STUDIES:  < from: CT Abdomen and Pelvis w/ Oral Cont and w/ IV Cont (23 @ 05:06) >    IMPRESSION:  Post right hemicolectomy with with right upper quadrant ileocolonic   anastomosis. Diffusely dilated small bowel up to the level of the   ileocolonic anastomosis most favorable for an ileus.    Hepatic hypodensities which are better described on recent MRI of the   abdomen and pelvis from 10/25/2023, concern for metastatic disease.    Interval cholecystectomy with mild biliary ductal dilatation which is   within normal limits postcholecystectomy.    < end of copied text >

## 2023-11-08 NOTE — ED PROVIDER NOTE - CLINICAL SUMMARY MEDICAL DECISION MAKING FREE TEXT BOX
68 female history of scleroderma, HTN, HLD, recent admission for emphysematous cholecystitis status post percutaneous cholecystectomy Recent admission for right hemicolectomy for colonic mass and cholecystectomy presents with 1 day of abdominal distention diarrhea.  Vitals nonactionable.  Abdomen mildly distended mild RLQ tenderness no guarding.  DDx SBO, ileus, LBO, colitis.  CT A/P with oral and IV contrast, labs, analgesics and reassess. - Angelo Allison PA-C 68 female history of scleroderma, HTN, HLD, recent admission for emphysematous cholecystitis status post percutaneous cholecystectomy Recent admission for right hemicolectomy for colonic mass and cholecystectomy presents with 1 day of abdominal distention diarrhea.  Vitals nonactionable.  Abdomen mildly distended mild RLQ tenderness no guarding.  DDx SBO, ileus, LBO, colitis.  CT A/P with oral and IV contrast, labs, analgesics and reassess. - JACOB Michael attending- see attending attestation for my mdm

## 2023-11-08 NOTE — ED PROVIDER NOTE - NSFOLLOWUPINSTRUCTIONS_ED_ALL_ED_FT
Please follow up with your primary care doctor within 1 week.  Follow up with your gastroenterologist in 1 week    *Bring all printed lab/test results to your appointment(s).*    Pepcid 20mg once daily for acid reflux    Return to the ED for worsening pain, nausea, vomiting, diarrhea, bloody stools, or any other concerns.

## 2023-11-08 NOTE — ED PROVIDER NOTE - ATTENDING APP SHARED VISIT CONTRIBUTION OF CARE
I, Atilio Gambino, performed a history and physical exam of the patient and discussed their management with the resident and/or advanced care provider. I reviewed the resident and/or advanced care provider's note and agree with the documented findings and plan of care. I was present and available for all procedures.    68 female history of scleroderma, HTN, HLD, recent admission for emphysematous cholecystitis status post percutaneous cholecystectomy with IR in September, recent admission where she was found to have ascending colon malignant appearing mass with possible mets to the liver, had a right hemicolectomy and lap cholecystectomy.  1 day after discharge patient began having abdominal bloating and 2 episodes of watery diarrhea last night, reports difficulty passing gas since then.  Also reports upper abdominal discomfort and burning pain consistent with reflux.  Denies nausea, vomiting, fever, chills, melena, hematochezia, hematemesis.    Well appearing and in NAD, head normal appearing atraumatic, trachea midline, no respiratory distress, lungs cta bilaterally, rrr no murmurs, soft NT mildly distended abdomen,  Surgical sites clean dry intact without surrounding induration erythema tenderness palpation or drainage no visible extremity deformities, Alert and oriented, non focal neuro exam, skin warm and dry, normal affect and mood, no leg swelling, calf ttp or jvd    Postsurgical patient complaining of decreased bowel function as well as abdominal cramping and distention otherwise well-appearing reassuring vital signs will obtain CT scan screening blood work pain medication as needed IV hydration likely necessitating surgical consultation and admission depending on imaging discussed with patient and family member at bedside agreeable plan unlikely ACS PE pneumothorax dissection AAA pneumonia

## 2023-11-08 NOTE — ED PROVIDER NOTE - SKIN, MLM
Skin normal color for race, warm, dry and intact. No evidence of rash. Midline abdominal incision site and lap incisions appear healing well with dressing clean/dry/intact

## 2023-11-08 NOTE — ED ADULT NURSE NOTE - NSFALLUNIVINTERV_ED_ALL_ED
Bed/Stretcher in lowest position, wheels locked, appropriate side rails in place/Call bell, personal items and telephone in reach/Instruct patient to call for assistance before getting out of bed/chair/stretcher/Non-slip footwear applied when patient is off stretcher/[place] to call system/Physically safe environment - no spills, clutter or unnecessary equipment/Purposeful proactive rounding/Room/bathroom lighting operational, light cord in reach

## 2023-11-08 NOTE — H&P ADULT - ASSESSMENT
68F w/ PMHx scleroderma, pulmonary HTN hx of recent emphysematous cholecystitis w/ PCT placed 9/11 (presented for dislodgement 10/22) and newly diagnosed ascending colon mass with likely hepatic mets now s/p lap right hemicolectomy and cholecystectomy 11/1 presents to ED with abdominal pain and distention. She is passing gas, last BM was ~24hrs ago. CT w/ findings concerning for  ileus.    PLAN:  -Admit to [facility], Dr. Bowers  -CLD  -mIVF at 40/hr  -Pain meds PRN  -Monitor abdominal exam and bowel function  -DVT ppx  -AM labs      Patient discussed with attending surgeon, Dr. Bowers.    Kimberly Casas, PGY-2  [facility]  [phone]

## 2023-11-08 NOTE — H&P ADULT - HISTORY OF PRESENT ILLNESS
68F w/ PMHx scleroderma, pulmonary HTN hx of recent emphysematous cholecystitis and s/p lap right hemicolectomy and cholecystectomy 11/1 for ascending colon mass presents to ED with abdominal pain and distention. Patient had PCT placed 9/11 for emphysematous cholecystitis and presented back 10/22 for dislodgement. Incidentally, on CT A/P was found to have ascending colon thickening with subsequent colonoscopy findings of large malignant appearing ascending colon mass and MRI with concern for hepatic metastasis. Patient underwent lap right hemicolectomy and cholecystectomy 11/1 and was discharged home on 11/6 with GI function. Patient and patient's daughter report (via Mandarin ) that patient developed increasing distention and abdominal pain since discharge. She is passing gas, last BM was ~24hrs ago. Denies nausea, vomiting, fevers, chills. Last ate at 6pm yesterday, did not eat a lot but tolerated.    In the ED, patient is afebrile, VSS. Labs w/ WBC 6.86. CT w/ diffuse small bowel dilation up to ileocolic anastomosis without discrete transition point or tapering of distal colon.

## 2023-11-08 NOTE — H&P ADULT - NSHPPHYSICALEXAM_GEN_ALL_CORE
Physical Exam:  General: NAD, Lying in bed comfortably  Neuro: Alert and answering questions appropriately   HEENT: Grossly normal, EOMI  Cardio: Regular rate  Resp: Good effort, no signs of respiratory distress  GI/Abd: Soft, moderately distended, mild diffuse tenderness, no rebound or guarding, incisional steri strips in place  Vascular: All 4 extremities warm  Musculoskeletal: All 4 extremities moving spontaneously, no limitations

## 2023-11-08 NOTE — H&P ADULT - ATTENDING COMMENTS
I spoke to the pt with the use of video  Mike.  Reports feeling bloated. Denies pain, reports feeling uncomfortable.  + flatus, last time this morning.  Abdomen soft, NT, ? minimally distended.  Stable.  Picture overall c/w ileus.  Admit, clears, gentle hydration with IVFs,   Delayed CT cuts this afternoon.

## 2023-11-09 ENCOUNTER — TRANSCRIPTION ENCOUNTER (OUTPATIENT)
Age: 68
End: 2023-11-09

## 2023-11-09 VITALS
SYSTOLIC BLOOD PRESSURE: 132 MMHG | HEART RATE: 82 BPM | TEMPERATURE: 98 F | RESPIRATION RATE: 18 BRPM | DIASTOLIC BLOOD PRESSURE: 81 MMHG | OXYGEN SATURATION: 98 %

## 2023-11-09 LAB
ANION GAP SERPL CALC-SCNC: 9 MMOL/L — SIGNIFICANT CHANGE UP (ref 5–17)
ANION GAP SERPL CALC-SCNC: 9 MMOL/L — SIGNIFICANT CHANGE UP (ref 5–17)
BUN SERPL-MCNC: 12 MG/DL — SIGNIFICANT CHANGE UP (ref 7–23)
BUN SERPL-MCNC: 12 MG/DL — SIGNIFICANT CHANGE UP (ref 7–23)
CALCIUM SERPL-MCNC: 9.4 MG/DL — SIGNIFICANT CHANGE UP (ref 8.4–10.5)
CALCIUM SERPL-MCNC: 9.4 MG/DL — SIGNIFICANT CHANGE UP (ref 8.4–10.5)
CHLORIDE SERPL-SCNC: 106 MMOL/L — SIGNIFICANT CHANGE UP (ref 96–108)
CHLORIDE SERPL-SCNC: 106 MMOL/L — SIGNIFICANT CHANGE UP (ref 96–108)
CO2 SERPL-SCNC: 23 MMOL/L — SIGNIFICANT CHANGE UP (ref 22–31)
CO2 SERPL-SCNC: 23 MMOL/L — SIGNIFICANT CHANGE UP (ref 22–31)
CREAT SERPL-MCNC: 0.64 MG/DL — SIGNIFICANT CHANGE UP (ref 0.5–1.3)
CREAT SERPL-MCNC: 0.64 MG/DL — SIGNIFICANT CHANGE UP (ref 0.5–1.3)
CULTURE RESULTS: SIGNIFICANT CHANGE UP
CULTURE RESULTS: SIGNIFICANT CHANGE UP
EGFR: 96 ML/MIN/1.73M2 — SIGNIFICANT CHANGE UP
EGFR: 96 ML/MIN/1.73M2 — SIGNIFICANT CHANGE UP
GLUCOSE SERPL-MCNC: 90 MG/DL — SIGNIFICANT CHANGE UP (ref 70–99)
GLUCOSE SERPL-MCNC: 90 MG/DL — SIGNIFICANT CHANGE UP (ref 70–99)
HCT VFR BLD CALC: 31 % — LOW (ref 34.5–45)
HCT VFR BLD CALC: 31 % — LOW (ref 34.5–45)
HGB BLD-MCNC: 9.1 G/DL — LOW (ref 11.5–15.5)
HGB BLD-MCNC: 9.1 G/DL — LOW (ref 11.5–15.5)
MAGNESIUM SERPL-MCNC: 2 MG/DL — SIGNIFICANT CHANGE UP (ref 1.6–2.6)
MAGNESIUM SERPL-MCNC: 2 MG/DL — SIGNIFICANT CHANGE UP (ref 1.6–2.6)
MCHC RBC-ENTMCNC: 24.5 PG — LOW (ref 27–34)
MCHC RBC-ENTMCNC: 24.5 PG — LOW (ref 27–34)
MCHC RBC-ENTMCNC: 29.4 GM/DL — LOW (ref 32–36)
MCHC RBC-ENTMCNC: 29.4 GM/DL — LOW (ref 32–36)
MCV RBC AUTO: 83.6 FL — SIGNIFICANT CHANGE UP (ref 80–100)
MCV RBC AUTO: 83.6 FL — SIGNIFICANT CHANGE UP (ref 80–100)
NRBC # BLD: 0 /100 WBCS — SIGNIFICANT CHANGE UP (ref 0–0)
NRBC # BLD: 0 /100 WBCS — SIGNIFICANT CHANGE UP (ref 0–0)
PHOSPHATE SERPL-MCNC: 3.2 MG/DL — SIGNIFICANT CHANGE UP (ref 2.5–4.5)
PHOSPHATE SERPL-MCNC: 3.2 MG/DL — SIGNIFICANT CHANGE UP (ref 2.5–4.5)
PLATELET # BLD AUTO: 367 K/UL — SIGNIFICANT CHANGE UP (ref 150–400)
PLATELET # BLD AUTO: 367 K/UL — SIGNIFICANT CHANGE UP (ref 150–400)
POTASSIUM SERPL-MCNC: 4.1 MMOL/L — SIGNIFICANT CHANGE UP (ref 3.5–5.3)
POTASSIUM SERPL-MCNC: 4.1 MMOL/L — SIGNIFICANT CHANGE UP (ref 3.5–5.3)
POTASSIUM SERPL-SCNC: 4.1 MMOL/L — SIGNIFICANT CHANGE UP (ref 3.5–5.3)
POTASSIUM SERPL-SCNC: 4.1 MMOL/L — SIGNIFICANT CHANGE UP (ref 3.5–5.3)
RBC # BLD: 3.71 M/UL — LOW (ref 3.8–5.2)
RBC # BLD: 3.71 M/UL — LOW (ref 3.8–5.2)
RBC # FLD: 19.7 % — HIGH (ref 10.3–14.5)
RBC # FLD: 19.7 % — HIGH (ref 10.3–14.5)
SODIUM SERPL-SCNC: 138 MMOL/L — SIGNIFICANT CHANGE UP (ref 135–145)
SODIUM SERPL-SCNC: 138 MMOL/L — SIGNIFICANT CHANGE UP (ref 135–145)
SPECIMEN SOURCE: SIGNIFICANT CHANGE UP
SPECIMEN SOURCE: SIGNIFICANT CHANGE UP
WBC # BLD: 5.41 K/UL — SIGNIFICANT CHANGE UP (ref 3.8–10.5)
WBC # BLD: 5.41 K/UL — SIGNIFICANT CHANGE UP (ref 3.8–10.5)
WBC # FLD AUTO: 5.41 K/UL — SIGNIFICANT CHANGE UP (ref 3.8–10.5)
WBC # FLD AUTO: 5.41 K/UL — SIGNIFICANT CHANGE UP (ref 3.8–10.5)

## 2023-11-09 PROCEDURE — 83605 ASSAY OF LACTIC ACID: CPT

## 2023-11-09 PROCEDURE — 80048 BASIC METABOLIC PNL TOTAL CA: CPT

## 2023-11-09 PROCEDURE — 85025 COMPLETE CBC W/AUTO DIFF WBC: CPT

## 2023-11-09 PROCEDURE — T1013: CPT

## 2023-11-09 PROCEDURE — 84295 ASSAY OF SERUM SODIUM: CPT

## 2023-11-09 PROCEDURE — 85018 HEMOGLOBIN: CPT

## 2023-11-09 PROCEDURE — 80053 COMPREHEN METABOLIC PANEL: CPT

## 2023-11-09 PROCEDURE — 83735 ASSAY OF MAGNESIUM: CPT

## 2023-11-09 PROCEDURE — 71045 X-RAY EXAM CHEST 1 VIEW: CPT

## 2023-11-09 PROCEDURE — 84132 ASSAY OF SERUM POTASSIUM: CPT

## 2023-11-09 PROCEDURE — 82947 ASSAY GLUCOSE BLOOD QUANT: CPT

## 2023-11-09 PROCEDURE — 82330 ASSAY OF CALCIUM: CPT

## 2023-11-09 PROCEDURE — 74177 CT ABD & PELVIS W/CONTRAST: CPT | Mod: MA

## 2023-11-09 PROCEDURE — 84100 ASSAY OF PHOSPHORUS: CPT

## 2023-11-09 PROCEDURE — 83690 ASSAY OF LIPASE: CPT

## 2023-11-09 PROCEDURE — 87086 URINE CULTURE/COLONY COUNT: CPT

## 2023-11-09 PROCEDURE — 96374 THER/PROPH/DIAG INJ IV PUSH: CPT

## 2023-11-09 PROCEDURE — 96375 TX/PRO/DX INJ NEW DRUG ADDON: CPT

## 2023-11-09 PROCEDURE — 82435 ASSAY OF BLOOD CHLORIDE: CPT

## 2023-11-09 PROCEDURE — 85027 COMPLETE CBC AUTOMATED: CPT

## 2023-11-09 PROCEDURE — 85014 HEMATOCRIT: CPT

## 2023-11-09 PROCEDURE — 99285 EMERGENCY DEPT VISIT HI MDM: CPT | Mod: 25

## 2023-11-09 PROCEDURE — 81003 URINALYSIS AUTO W/O SCOPE: CPT

## 2023-11-09 PROCEDURE — 74176 CT ABD & PELVIS W/O CONTRAST: CPT

## 2023-11-09 PROCEDURE — 82803 BLOOD GASES ANY COMBINATION: CPT

## 2023-11-09 RX ORDER — SENNA PLUS 8.6 MG/1
2 TABLET ORAL ONCE
Refills: 0 | Status: COMPLETED | OUTPATIENT
Start: 2023-11-09 | End: 2023-11-09

## 2023-11-09 RX ORDER — OXYCODONE HYDROCHLORIDE 5 MG/1
1 TABLET ORAL
Qty: 5 | Refills: 0
Start: 2023-11-09

## 2023-11-09 RX ORDER — DOCUSATE SODIUM 100 MG
1 CAPSULE ORAL
Qty: 30 | Refills: 0
Start: 2023-11-09

## 2023-11-09 RX ADMIN — ENOXAPARIN SODIUM 40 MILLIGRAM(S): 100 INJECTION SUBCUTANEOUS at 18:02

## 2023-11-09 RX ADMIN — DEXTROSE MONOHYDRATE, SODIUM CHLORIDE, AND POTASSIUM CHLORIDE 40 MILLILITER(S): 50; .745; 4.5 INJECTION, SOLUTION INTRAVENOUS at 05:58

## 2023-11-09 RX ADMIN — SENNA PLUS 2 TABLET(S): 8.6 TABLET ORAL at 14:18

## 2023-11-09 NOTE — PROGRESS NOTE ADULT - SUBJECTIVE AND OBJECTIVE BOX
GREEN TEAM SURGERY DAILY PROGRESS NOTE    SUBJECTIVE:     Overnight: DOMENICO AGUIRRE.     Patient seen and evaluated on AM rounds. ***.   Patient otherwise denies nausea, vomiting, chest pain, shortness of breath     OBJECTIVE:  Vital Signs Last 24 Hrs  T(C): 36.5 (2023 00:03), Max: 36.8 (2023 15:00)  T(F): 97.7 (2023 00:03), Max: 98.3 (2023 15:00)  HR: 62 (2023 00:03) (61 - 88)  BP: 113/68 (2023 00:03) (113/68 - 135/72)  BP(mean): --  RR: 18 (2023 00:03) (16 - 18)  SpO2: 97% (2023 00:03) (96% - 100%)    Parameters below as of 2023 00:03  Patient On (Oxygen Delivery Method): room air      Daily     Daily   SONY:      Chest Tube:      NG Tube:           STANDING  dextrose 5% + sodium chloride 0.45% with potassium chloride 20 mEq/L 1000 milliLiter(s) (40 mL/Hr) IV Continuous <Continuous>  enoxaparin Injectable 40 milliGRAM(s) SubCutaneous every 24 hours  influenza  Vaccine (HIGH DOSE) 0.7 milliLiter(s) IntraMuscular once    PRN      Labs:  140  |  106  |  26<H>  ----------------------------<  92    ()  4.4   |  20<L>  |  0.62          Ca    9.6      08  Mg    x  Phos  x          Urinalysis Basic - ( 2023 06:40 )    Color: Yellow / Appearance: Clear / S.022 / pH: x  Gluc: x / Ketone: Negative mg/dL  / Bili: Negative / Urobili: 0.2 mg/dL   Blood: x / Protein: Negative mg/dL / Nitrite: Negative   Leuk Esterase: Negative / RBC: x / WBC x   Sq Epi: x / Non Sq Epi: x / Bacteria: x      Urinalysis Basic - ( 2023 06:40 )    Color: Yellow / Appearance: Clear / S.022 / pH: x  Gluc: x / Ketone: Negative mg/dL  / Bili: Negative / Urobili: 0.2 mg/dL   Blood: x / Protein: Negative mg/dL / Nitrite: Negative   Leuk Esterase: Negative / RBC: x / WBC x   Sq Epi: x / Non Sq Epi: x / Bacteria: x          Physical Exam:  General: NAD  Respiratory: respirations non labored  Abdominal: soft, nontender, nondistended  Extremities: FROM, warm  Neurological: A+Ox3    GREEN TEAM SURGERY DAILY PROGRESS NOTE    SUBJECTIVE:     Overnight: DOMENICO AGUIRRE.     Patient seen and evaluated on AM rounds.   Patient otherwise denies nausea, vomiting, chest pain, shortness of breath     OBJECTIVE:  Vital Signs Last 24 Hrs  T(C): 36.5 (2023 00:03), Max: 36.8 (2023 15:00)  T(F): 97.7 (2023 00:03), Max: 98.3 (2023 15:00)  HR: 62 (2023 00:03) (61 - 88)  BP: 113/68 (2023 00:03) (113/68 - 135/72)  BP(mean): --  RR: 18 (2023 00:03) (16 - 18)  SpO2: 97% (2023 00:03) (96% - 100%)    Parameters below as of 2023 00:03  Patient On (Oxygen Delivery Method): room air    STANDING  dextrose 5% + sodium chloride 0.45% with potassium chloride 20 mEq/L 1000 milliLiter(s) (40 mL/Hr) IV Continuous <Continuous>  enoxaparin Injectable 40 milliGRAM(s) SubCutaneous every 24 hours  influenza  Vaccine (HIGH DOSE) 0.7 milliLiter(s) IntraMuscular once    PRN      Labs:  140  |  106  |  26<H>  ----------------------------<  92    (-08)  4.4   |  20<L>  |  0.62          Ca    9.6      11-08  Mg    x  Phos  x          Urinalysis Basic - ( 2023 06:40 )    Color: Yellow / Appearance: Clear / S.022 / pH: x  Gluc: x / Ketone: Negative mg/dL  / Bili: Negative / Urobili: 0.2 mg/dL   Blood: x / Protein: Negative mg/dL / Nitrite: Negative   Leuk Esterase: Negative / RBC: x / WBC x   Sq Epi: x / Non Sq Epi: x / Bacteria: x      Urinalysis Basic - ( 2023 06:40 )    Color: Yellow / Appearance: Clear / S.022 / pH: x  Gluc: x / Ketone: Negative mg/dL  / Bili: Negative / Urobili: 0.2 mg/dL   Blood: x / Protein: Negative mg/dL / Nitrite: Negative   Leuk Esterase: Negative / RBC: x / WBC x   Sq Epi: x / Non Sq Epi: x / Bacteria: x          Physical Exam:  General: NAD  Respiratory: respirations non labored  Abdominal: GI/Abd: Soft, moderately distended, mild diffuse tenderness, no rebound or guarding, incisional steri strips in place  Extremities: FROM, warm  Neurological: A+Ox3    GREEN TEAM SURGERY DAILY PROGRESS NOTE    SUBJECTIVE:     Overnight: NAEO. AVSS. Tolerated a few bites of sandwich overnight without N/V. Currently (+/+) with improving abdominal distension.    Patient seen and evaluated on AM rounds.  Patient otherwise denies nausea, vomiting, chest pain, shortness of breath.     OBJECTIVE:  Vital Signs Last 24 Hrs  T(C): 36.5 (2023 00:03), Max: 36.8 (2023 15:00)  T(F): 97.7 (2023 00:03), Max: 98.3 (2023 15:00)  HR: 62 (2023 00:03) (61 - 88)  BP: 113/68 (2023 00:03) (113/68 - 135/72)  BP(mean): --  RR: 18 (2023 00:03) (16 - 18)  SpO2: 97% (2023 00:03) (96% - 100%)    Parameters below as of 2023 00:03  Patient On (Oxygen Delivery Method): room air    STANDING  dextrose 5% + sodium chloride 0.45% with potassium chloride 20 mEq/L 1000 milliLiter(s) (40 mL/Hr) IV Continuous <Continuous>  enoxaparin Injectable 40 milliGRAM(s) SubCutaneous every 24 hours  influenza  Vaccine (HIGH DOSE) 0.7 milliLiter(s) IntraMuscular once    PRN      Labs:  140  |  106  |  26<H>  ----------------------------<  92    ()  4.4   |  20<L>  |  0.62          Ca    9.6      -08  Mg    x  Phos  x          Urinalysis Basic - ( 2023 06:40 )    Color: Yellow / Appearance: Clear / S.022 / pH: x  Gluc: x / Ketone: Negative mg/dL  / Bili: Negative / Urobili: 0.2 mg/dL   Blood: x / Protein: Negative mg/dL / Nitrite: Negative   Leuk Esterase: Negative / RBC: x / WBC x   Sq Epi: x / Non Sq Epi: x / Bacteria: x    Urinalysis Basic - ( 2023 06:40 )    Color: Yellow / Appearance: Clear / S.022 / pH: x  Gluc: x / Ketone: Negative mg/dL  / Bili: Negative / Urobili: 0.2 mg/dL   Blood: x / Protein: Negative mg/dL / Nitrite: Negative   Leuk Esterase: Negative / RBC: x / WBC x   Sq Epi: x / Non Sq Epi: x / Bacteria: x    Physical Exam:  General: NAD  Respiratory: respirations non labored  Abdominal: GI/Abd: Soft, non-distended, mild diffuse tenderness, no rebound or guarding, incisional steri strips in place  Extremities: FROM, warm  Neurological: A+Ox3

## 2023-11-09 NOTE — DISCHARGE NOTE NURSING/CASE MANAGEMENT/SOCIAL WORK - NSDCPEFALRISK_GEN_ALL_CORE
For information on Fall & Injury Prevention, visit: [email or web address] OR  [email or web address] OR  https://www.cdc.gov/steadi/patient.html yes/L4-LAMINECTOMY, L4-5 POSTERIOR LUMBAR INTERBODY FUSION.

## 2023-11-09 NOTE — PROGRESS NOTE ADULT - ATTENDING COMMENTS
Pt was sent to the ED by my office NP who called the pt as part of the ERP f/u - pt reported feeling bloated and having pain at the time.  Spoke to her with video .  Since admission she had BMs and ongoing + flatus (never stopped since initial discharge).  She told me today she had not used any Tylenol at home.  She feels well and denies pain.   Abdomen soft, NT, ND.  Stable.  Give one dose of stool softener today.  D/c home today, with Tylenol and Colace 100 mg qday.  F/u with me in the office on 11/15 (my office will contact daughter for f/u and apt).

## 2023-11-09 NOTE — DISCHARGE NOTE NURSING/CASE MANAGEMENT/SOCIAL WORK - PATIENT PORTAL LINK FT
You can access the FollowMyHealth Patient Portal offered by [facility] by registering at the following website: [email or web address]. By joining [facility]’s FollowMyHealth portal, you will also be able to view your health information using other applications (apps) compatible with our system.

## 2023-11-09 NOTE — DISCHARGE NOTE PROVIDER - PROVIDER TOKENS
PROVIDER:[TOKEN:[[id number]],FOLLOWUP:[2 weeks]] PROVIDER:[TOKEN:[[id number]],FOLLOWUP:[1 week],ESTABLISHEDPATIENT:[T]]

## 2023-11-09 NOTE — DISCHARGE NOTE PROVIDER - HOSPITAL COURSE
HPI:  68F w/ PMHx scleroderma, pulmonary HTN hx of recent emphysematous cholecystitis and s/p lap right hemicolectomy and cholecystectomy 11/1 for ascending colon mass presents to ED with abdominal pain and distention. Patient had PCT placed 9/11 for emphysematous cholecystitis and presented back 10/22 for dislodgement. Incidentally, on CT A/P was found to have ascending colon thickening with subsequent colonoscopy findings of large malignant appearing ascending colon mass and MRI with concern for hepatic metastasis. Patient underwent lap right hemicolectomy and cholecystectomy 11/1 and was discharged home on 11/6 with GI function. Patient and patient's daughter report (via Mandarin ) that patient developed increasing distention and abdominal pain since discharge. She is passing gas, last BM was ~24hrs ago. Denies nausea, vomiting, fevers, chills. Last ate at 6pm yesterday, did not eat a lot but tolerated.    In the ED, patient is afebrile, VSS. Labs w/ WBC 6.86. CT w/ diffuse small bowel dilation up to ileocolic anastomosis without discrete transition point or tapering of distal colon. (08 Nov 2023 07:02)      Pt was admitted under Surgery for further evaluation and management. Pt was taken to the OR on //23, and is s/p. The patient tolerated the procedure well (see operative report for full details). Pt was transferred to the PACU in stable condition. In the PACU, the patient's pain was controlled and vitals stable. The patient was given clear liquids with Ensure Clears in PACU, and encouraged with early ambulation. On POC, the patient was doing well. The patient was transferred to the surgical floor in stable condition. ERP protocol was followed.   On POD #1, pt was stable and doing well. Pt's [place] was discontinued on , and passed TOV. Once IV pain control dosing complete, pt was transitioned to oral Tylenol and Motrin with Oxycodone for breakthrough pain. Diet was advanced as tolerated, and GI function returned.   Caprini score is***** ; pt will be prescribed with Eliquis as DVT prophylaxis for 30 days.  Pt to be discharged with ostomy/SONY drain; ostomy/SONY drain teaching done.  Physical therapy evaluated the patient and recommended ****  On the day of discharge, the patient's vitals are stable, pain is controlled, voiding urine, passing gas/stool, tolerating a low fiber diet, and ambulating well. Pt will f/u with ****  in 1-2 weeks. Pt will f/u with PCP in 1-2 weeks. 68F w/ PMHx scleroderma, pulmonary HTN hx of recent emphysematous cholecystitis and s/p lap right hemicolectomy and cholecystectomy 11/1 for ascending colon mass presents to ED with abdominal pain and distention. Patient had PCT placed 9/11 for emphysematous cholecystitis and presented back 10/22 for dislodgement. Incidentally, on CT A/P was found to have ascending colon thickening with subsequent colonoscopy findings of large malignant appearing ascending colon mass and MRI with concern for hepatic metastasis. Patient underwent lap right hemicolectomy and cholecystectomy 11/1 and was discharged home on 11/6 with GI function. Patient and patient's daughter report (via Mandarin ) that patient developed increasing distention and abdominal pain since discharge. She is passing gas, last BM was ~24hrs ago. Denies nausea, vomiting, fevers, chills. Last ate at 6pm yesterday, did not eat a lot but tolerated. In the ED, patient is afebrile, VSS. Labs w/ WBC 6.86. CT w/ diffuse small bowel dilation up to ileocolic anastomosis without discrete transition point or tapering of distal colon. (08 Nov 2023 07:02)    Pt was admitted under Green Surgery for further evaluation and management. Diet was advanced as tolerated, and GI function returned. Xray of abdomen showed no obstruction.    Patient is undocumented, and was seen by  during previous admission. Discussed with , no changes for this discharge.     On the day of discharge, the patient's vitals are stable, pain is controlled, voiding urine, passing gas/stool, tolerating a low fiber diet, and ambulating well. Pt will f/u with Dr. Bowers in 1-2 weeks. Pt will f/u with PCP in 1-2 weeks. 68F w/ PMHx scleroderma, pulmonary HTN hx of recent emphysematous cholecystitis and s/p lap right hemicolectomy and cholecystectomy 11/1 for ascending colon mass presents to ED with abdominal pain and distention. Patient had PCT placed 9/11 for emphysematous cholecystitis and presented back 10/22 for dislodgement. Incidentally, on CT A/P was found to have ascending colon thickening with subsequent colonoscopy findings of large malignant appearing ascending colon mass and MRI with concern for hepatic metastasis. Patient underwent lap right hemicolectomy and cholecystectomy 11/1 and was discharged home on 11/6 with GI function. Patient and patient's daughter report (via Mandarin ) that patient developed increasing distention and abdominal pain since discharge. She is passing gas, last BM was ~24hrs ago. Denies nausea, vomiting, fevers, chills. Last ate at 6pm yesterday, did not eat a lot but tolerated. In the ED, patient is afebrile, VSS. Labs w/ WBC 6.86. CT w/ diffuse small bowel dilation up to ileocolic anastomosis without discrete transition point or tapering of distal colon. (08 Nov 2023 07:02)    Pt was admitted under Green Surgery for further evaluation and management. Diet was advanced as tolerated, and GI function returned. Xray of abdomen showed no obstruction.    Patient is undocumented, and was seen by  during previous admission. Discussed with , no changes for this discharge.     On the day of discharge, the patient's vitals are stable, pain is controlled, voiding urine, passing gas/stool, tolerating a low fiber diet, and ambulating well. Pt will f/u with Dr. Bowers next weds (11/15/2023). Pt will f/u with PCP in 1-2 weeks.

## 2023-11-09 NOTE — DISCHARGE NOTE PROVIDER - CARE PROVIDER_API CALL
Stacey Bowers  Colon/Rectal Surgery  [address] [facility] [address]  [place] [address]  Phone: [phone]  Fax: [phone]  Follow Up Time: 2 weeks   Stacey Bowers  Colon/Rectal Surgery  [address] [place] [address], [facility] [address]  [place] [address]  Phone: [phone]  Fax: [phone]  Established Patient  Follow Up Time: 1 week

## 2023-11-09 NOTE — DISCHARGE NOTE PROVIDER - NSDCMRMEDTOKEN_GEN_ALL_CORE_FT
acetaminophen 325 mg oral tablet: 2 tab(s) orally every 6 hours as needed for  mild pain  irbesartan 75 mg oral tablet: 1 tab(s) orally once a day  oxyCODONE: 2.5 milligram(s) orally every 4 hours as needed for  moderate pain  oxyCODONE 5 mg oral tablet: 1 tab(s) orally every 4 hours as needed for Severe Pain (7 - 10) MDD: 6   acetaminophen 325 mg oral tablet: 2 tab(s) orally every 6 hours as needed for  mild pain  irbesartan 75 mg oral tablet: 1 tab(s) orally once a day   acetaminophen 325 mg oral tablet: 2 tab(s) orally every 6 hours as needed for  mild pain  docusate sodium 100 mg oral tablet: 1 tab(s) orally once a day  irbesartan 75 mg oral tablet: 1 tab(s) orally once a day  oxyCODONE 5 mg oral tablet: 1 tab(s) orally every 6 hours as needed for  severe pain MDD: 4

## 2023-11-09 NOTE — DISCHARGE NOTE PROVIDER - NSDCCPCAREPLAN_GEN_ALL_CORE_FT
PRINCIPAL DISCHARGE DIAGNOSIS  Diagnosis: Ileus  Assessment and Plan of Treatment: You did not have any surgical intervention during this admission.  WOUND CARE: Do not scrub incision. Pat Dry abdomen. Leave the white steri strips in place, they will fall off on their own in approximately 5-7 days.   BATHING: You may shower and/or sponge bathe 24 hours after surgery. Do no submerge the incision underwater for the next 2 weeks.  ACTIVITY: No heavy lifting anything more than 10-15lbs or straining. Otherwise, you may return to your usual level of physical activity. If you are taking narcotic pain medication (such as oxycodone), do NOT drive a car, operate machinery or make important decisions.  DIET: Maintain Low Fiber Diet until your next appointment  PAIN: For mild or moderate pain, you may take 975mg of tylenol every 6 hours. Do not exceed more than 4G tylenol  per day.   NOTIFY YOUR SURGEON IF: You have any bleeding that does not stop, any pus draining from your wound, any fever (over 100.4 F) or chills, persistent nausea/vomiting with inability to tolerate food or liquids, persistent diarrhea, or if your pain is not controlled on your discharge pain medications.  FOLLOW-UP:  1. Please call to make a follow-up appointment within one to two weeks of discharge with Dr. Bowers  2. Please follow up with your primary care physician in one week regarding your hospitalization.

## 2023-11-09 NOTE — PROGRESS NOTE ADULT - ASSESSMENT
68F w/ PMHx scleroderma, pulmonary HTN hx of recent emphysematous cholecystitis w/ PCT placed 9/11 (presented for dislodgement 10/22) and newly diagnosed ascending colon mass with likely hepatic mets now s/p lap right hemicolectomy and cholecystectomy 11/1 presents to ED with abdominal pain and distention. She is passing gas, last BM was ~24hrs ago. CT w/ findings concerning for  ileus.    -Admit to Green Surgery, Dr. Bowers  -CLD  -mIVF at 40/hr  -Pain meds PRN  -Monitor abdominal exam and bowel function  -DVT ppx  -AM labs    Green team  [id number] 68F w/ PMHx scleroderma, pulmonary HTN hx of recent emphysematous cholecystitis w/ PCT placed 9/11 (presented for dislodgement 10/22) and newly diagnosed ascending colon mass with likely hepatic mets now s/p lap right hemicolectomy and cholecystectomy 11/1 presents to ED with abdominal pain and distention. She is passing gas, last BM was ~24hrs ago. CT w/ findings concerning for ileus.    -Admit to [facility] Surgery, Dr. Bowers  -LRD  -Pain meds PRN  -Monitor abdominal exam and bowel function  -DVT ppx  -AM labs  -Discharge home today, pending patient tolerates her breakfast.    Green team  [id number]

## 2023-11-14 DIAGNOSIS — K56.7 ILEUS, UNSPECIFIED: ICD-10-CM

## 2023-11-14 DIAGNOSIS — R10.10 UPPER ABDOMINAL PAIN, UNSPECIFIED: ICD-10-CM

## 2023-11-14 DIAGNOSIS — Z90.49 ACQUIRED ABSENCE OF OTHER SPECIFIED PARTS OF DIGESTIVE TRACT: ICD-10-CM

## 2023-11-14 DIAGNOSIS — I10 ESSENTIAL (PRIMARY) HYPERTENSION: ICD-10-CM

## 2023-11-14 DIAGNOSIS — M34.9 SYSTEMIC SCLEROSIS, UNSPECIFIED: ICD-10-CM

## 2023-11-14 DIAGNOSIS — E78.5 HYPERLIPIDEMIA, UNSPECIFIED: ICD-10-CM

## 2023-11-15 ENCOUNTER — APPOINTMENT (OUTPATIENT)
Dept: SURGERY | Facility: CLINIC | Age: 68
End: 2023-11-15
Payer: SELF-PAY

## 2023-11-15 VITALS
SYSTOLIC BLOOD PRESSURE: 124 MMHG | DIASTOLIC BLOOD PRESSURE: 76 MMHG | HEART RATE: 73 BPM | RESPIRATION RATE: 17 BRPM | TEMPERATURE: 97.2 F

## 2023-11-15 VITALS — WEIGHT: 79.37 LBS | BODY MASS INDEX: 16 KG/M2 | HEIGHT: 59.06 IN

## 2023-11-15 PROCEDURE — 99024 POSTOP FOLLOW-UP VISIT: CPT

## 2023-11-27 ENCOUNTER — APPOINTMENT (OUTPATIENT)
Dept: SURGERY | Facility: CLINIC | Age: 68
End: 2023-11-27
Payer: SELF-PAY

## 2023-11-27 VITALS
DIASTOLIC BLOOD PRESSURE: 72 MMHG | OXYGEN SATURATION: 98 % | HEART RATE: 74 BPM | RESPIRATION RATE: 18 BRPM | SYSTOLIC BLOOD PRESSURE: 136 MMHG

## 2023-11-27 PROCEDURE — 99024 POSTOP FOLLOW-UP VISIT: CPT

## 2023-12-03 NOTE — H&P ADULT - HISTORY OF PRESENT ILLNESS
See PCP  for follow up in 2 -3  days  Hydrate well  Bulb suction nasal secretions frequently  Alternate Tylenol 160 mg per 5 ml give 4ml and ibuprofen 100mg per 5ml give  4 ml may alternate every 3 hrs as needed for fever  Return for persistent vomiting , color changes, rapid breathing, using neck or stomach muscles to breath, not drinking , not urinating  or changes or concerns    67-year-old female history of scleroderma, pulmonary hypertension on oxygen at home presents to the ED for 1 day of subjective fevers and chills, nausea and vomiting and diarrhea with generalized abdominal pain.  Patient denies chest pain, SOB, melana, dizziness, jaundice. Patient recently moved to the  from [place] 2 months ago.     In the ED, patient febrile to 104, tachycardic to 116. Currently on 4L NC. Labs significant for WBC of 15, TBili 1.8, Alk Phos 601, Lipase 1000

## 2023-12-18 ENCOUNTER — APPOINTMENT (OUTPATIENT)
Dept: SURGERY | Facility: CLINIC | Age: 68
End: 2023-12-18
Payer: SELF-PAY

## 2023-12-18 ENCOUNTER — NON-APPOINTMENT (OUTPATIENT)
Age: 68
End: 2023-12-18

## 2023-12-18 VITALS
RESPIRATION RATE: 16 BRPM | BODY MASS INDEX: 15.79 KG/M2 | SYSTOLIC BLOOD PRESSURE: 158 MMHG | DIASTOLIC BLOOD PRESSURE: 84 MMHG | HEART RATE: 76 BPM | TEMPERATURE: 96.1 F | HEIGHT: 59.6 IN | OXYGEN SATURATION: 99 % | WEIGHT: 79.37 LBS

## 2023-12-18 PROCEDURE — 99024 POSTOP FOLLOW-UP VISIT: CPT

## 2023-12-18 NOTE — ASSESSMENT
[FreeTextEntry1] : Doing well postop. She has a few liver lesions consistent with liver metastasis. The oncology navigator team have emailed the daughter who has not replied to the emails and has not done the required paperwork.  I emphasized to the patient the importance of the daughter responding to the oncology emails. RTO in 4 weeks.

## 2023-12-18 NOTE — REASON FOR VISIT
[[employer] ] : provided by [employer]   [FreeTextEntry1] : s/p Laparoscopic Right hemicolectomy and cholecystectomy on 11/01/23 for colon cancer and cholecystitis.   [Interpreters_IDNumber] : [id number] [Interpreters_FullName] : Chann [TWNoteComboBox1] : Chinese

## 2023-12-18 NOTE — HISTORY OF PRESENT ILLNESS
[FreeTextEntry1] : Hilda is a 69 y/o female here for post-op visit.  Pathology: 1. Gallbladder, cholecystectomy - Acute and chronic cholecystitis with intestinal metaplasia - Negative for dysplasia - Cholelithiasis 2. Right colon, terminal ileum, appendix, and lymph nodes, right hemicolectomy - Invasive moderately differentiated adenocarcinoma with mucinous feature of ascending colon (10.2 cm) - Tumor invading through muscularis propria into pericolonic tissue - Resection margins negative for carcinoma - Twenty five lymph nodes for carcinoma (0/25) - pTNM Stage (AJCC 8th ed.): pT3N0 - See synoptic summary 3. Colon, additional proximal transverse, resection - Segment of colon with mild acute serositis  Last seen 11/27/23-  Doing well postop. She has a few liver lesions consistent with liver metastasis. I had discussed her case with Dr. Juares preoperatively. I contacted the oncology navigator and their team has emailed the daughter. However no appointment has been made and the patient is not aware of any oncology contact. I will reach out to them again. I emphasized to the patient the importance of the daughter responding to the oncology emails. RTO in 4 weeks.  Today pt reports she feels well.  She has minimal incisional discomfort.  No issues with the surgical incisions. Daily once BM.  Has been putting on weight.  Good appetite, normal diet. Not taking anticoagulants. Hasn't not set an appointment with an oncologist.

## 2023-12-18 NOTE — PHYSICAL EXAM
[FreeTextEntry1] : Comfortable. Abdomen soft, non-tender, non-distended. Port incisions and midline extraction wound have healed well. No hernias.

## 2023-12-20 NOTE — ED ADULT NURSE NOTE - NSSUHOSCREENINGYN_ED_ALL_ED
May see any pulmonologist in the system. How is he feeling today? Can we help him get in with someone? If cough is still bad, I want him seen today by someone in primary care.  MAT Yes - the patient is able to be screened

## 2024-01-03 ENCOUNTER — APPOINTMENT (OUTPATIENT)
Dept: HEMATOLOGY ONCOLOGY | Facility: CLINIC | Age: 69
End: 2024-01-03
Payer: COMMERCIAL

## 2024-01-03 ENCOUNTER — OUTPATIENT (OUTPATIENT)
Dept: OUTPATIENT SERVICES | Facility: HOSPITAL | Age: 69
LOS: 1 days | Discharge: HOME | End: 2024-01-03

## 2024-01-03 ENCOUNTER — RESULT REVIEW (OUTPATIENT)
Age: 69
End: 2024-01-03

## 2024-01-03 VITALS
DIASTOLIC BLOOD PRESSURE: 78 MMHG | HEART RATE: 70 BPM | HEIGHT: 60 IN | TEMPERATURE: 97 F | RESPIRATION RATE: 16 BRPM | SYSTOLIC BLOOD PRESSURE: 164 MMHG | WEIGHT: 96.76 LBS | BODY MASS INDEX: 19 KG/M2 | OXYGEN SATURATION: 99 %

## 2024-01-03 DIAGNOSIS — Z80.0 FAMILY HISTORY OF MALIGNANT NEOPLASM OF DIGESTIVE ORGANS: ICD-10-CM

## 2024-01-03 DIAGNOSIS — T85.528A DISPLACEMENT OF OTHER GASTROINTESTINAL PROSTHETIC DEVICES, IMPLANTS AND GRAFTS, INITIAL ENCOUNTER: Chronic | ICD-10-CM

## 2024-01-03 DIAGNOSIS — C18.9 MALIGNANT NEOPLASM OF COLON, UNSPECIFIED: ICD-10-CM

## 2024-01-03 DIAGNOSIS — Z87.39 PERSONAL HISTORY OF OTHER DISEASES OF THE MUSCULOSKELETAL SYSTEM AND CONNECTIVE TISSUE: ICD-10-CM

## 2024-01-03 LAB
BASOPHILS # BLD AUTO: 0.01 K/UL — SIGNIFICANT CHANGE UP (ref 0–0.2)
BASOPHILS # BLD AUTO: 0.01 K/UL — SIGNIFICANT CHANGE UP (ref 0–0.2)
BASOPHILS NFR BLD AUTO: 0.2 % — SIGNIFICANT CHANGE UP (ref 0–2)
BASOPHILS NFR BLD AUTO: 0.2 % — SIGNIFICANT CHANGE UP (ref 0–2)
EOSINOPHIL # BLD AUTO: 0.08 K/UL — SIGNIFICANT CHANGE UP (ref 0–0.5)
EOSINOPHIL # BLD AUTO: 0.08 K/UL — SIGNIFICANT CHANGE UP (ref 0–0.5)
EOSINOPHIL NFR BLD AUTO: 1.3 % — SIGNIFICANT CHANGE UP (ref 0–6)
EOSINOPHIL NFR BLD AUTO: 1.3 % — SIGNIFICANT CHANGE UP (ref 0–6)
HCT VFR BLD CALC: 34.6 % — LOW (ref 39–50)
HCT VFR BLD CALC: 34.6 % — LOW (ref 39–50)
HGB BLD-MCNC: 10.1 G/DL — LOW (ref 13–17)
HGB BLD-MCNC: 10.1 G/DL — LOW (ref 13–17)
IMM GRANULOCYTES NFR BLD AUTO: 0.5 % — SIGNIFICANT CHANGE UP (ref 0–0.9)
IMM GRANULOCYTES NFR BLD AUTO: 0.5 % — SIGNIFICANT CHANGE UP (ref 0–0.9)
LYMPHOCYTES # BLD AUTO: 1.49 K/UL — SIGNIFICANT CHANGE UP (ref 1–3.3)
LYMPHOCYTES # BLD AUTO: 1.49 K/UL — SIGNIFICANT CHANGE UP (ref 1–3.3)
LYMPHOCYTES # BLD AUTO: 25.1 % — SIGNIFICANT CHANGE UP (ref 13–44)
LYMPHOCYTES # BLD AUTO: 25.1 % — SIGNIFICANT CHANGE UP (ref 13–44)
MCHC RBC-ENTMCNC: 23.9 PG — LOW (ref 27–34)
MCHC RBC-ENTMCNC: 23.9 PG — LOW (ref 27–34)
MCHC RBC-ENTMCNC: 29.2 G/DL — LOW (ref 32–36)
MCHC RBC-ENTMCNC: 29.2 G/DL — LOW (ref 32–36)
MCV RBC AUTO: 81.8 FL — SIGNIFICANT CHANGE UP (ref 80–100)
MCV RBC AUTO: 81.8 FL — SIGNIFICANT CHANGE UP (ref 80–100)
MONOCYTES # BLD AUTO: 0.34 K/UL — SIGNIFICANT CHANGE UP (ref 0–0.9)
MONOCYTES # BLD AUTO: 0.34 K/UL — SIGNIFICANT CHANGE UP (ref 0–0.9)
MONOCYTES NFR BLD AUTO: 5.7 % — SIGNIFICANT CHANGE UP (ref 2–14)
MONOCYTES NFR BLD AUTO: 5.7 % — SIGNIFICANT CHANGE UP (ref 2–14)
NEUTROPHILS # BLD AUTO: 3.99 K/UL — SIGNIFICANT CHANGE UP (ref 1.8–7.4)
NEUTROPHILS # BLD AUTO: 3.99 K/UL — SIGNIFICANT CHANGE UP (ref 1.8–7.4)
NEUTROPHILS NFR BLD AUTO: 67.2 % — SIGNIFICANT CHANGE UP (ref 43–77)
NEUTROPHILS NFR BLD AUTO: 67.2 % — SIGNIFICANT CHANGE UP (ref 43–77)
NRBC # BLD: 0 /100 WBCS — SIGNIFICANT CHANGE UP (ref 0–0)
NRBC # BLD: 0 /100 WBCS — SIGNIFICANT CHANGE UP (ref 0–0)
PLATELET # BLD AUTO: 272 K/UL — SIGNIFICANT CHANGE UP (ref 150–400)
PLATELET # BLD AUTO: 272 K/UL — SIGNIFICANT CHANGE UP (ref 150–400)
RBC # BLD: 4.23 M/UL — SIGNIFICANT CHANGE UP (ref 4.2–5.8)
RBC # BLD: 4.23 M/UL — SIGNIFICANT CHANGE UP (ref 4.2–5.8)
RBC # FLD: 18.1 % — HIGH (ref 10.3–14.5)
RBC # FLD: 18.1 % — HIGH (ref 10.3–14.5)
WBC # BLD: 5.94 K/UL — SIGNIFICANT CHANGE UP (ref 3.8–10.5)
WBC # BLD: 5.94 K/UL — SIGNIFICANT CHANGE UP (ref 3.8–10.5)
WBC # FLD AUTO: 5.94 K/UL — SIGNIFICANT CHANGE UP (ref 3.8–10.5)
WBC # FLD AUTO: 5.94 K/UL — SIGNIFICANT CHANGE UP (ref 3.8–10.5)

## 2024-01-03 PROCEDURE — 99245 OFF/OP CONSLTJ NEW/EST HI 55: CPT

## 2024-01-03 RX ORDER — DOCUSATE SODIUM 100 MG/1
100 CAPSULE, LIQUID FILLED ORAL
Refills: 0 | Status: DISCONTINUED | COMMUNITY
End: 2024-01-03

## 2024-01-04 PROBLEM — Z87.39 HISTORY OF SCLERODERMA: Status: RESOLVED | Noted: 2024-01-04 | Resolved: 2024-01-04

## 2024-01-04 LAB
ALBUMIN SERPL ELPH-MCNC: 4.4 G/DL
ALP BLD-CCNC: 155 U/L
ALT SERPL-CCNC: 22 U/L
ANION GAP SERPL CALC-SCNC: 14 MMOL/L
APTT BLD: 28.9 SEC
AST SERPL-CCNC: 23 U/L
BILIRUB SERPL-MCNC: 0.4 MG/DL
BUN SERPL-MCNC: 29 MG/DL
CALCIUM SERPL-MCNC: 9.8 MG/DL
CEA SERPL-MCNC: 88.1 NG/ML
CHLORIDE SERPL-SCNC: 108 MMOL/L
CO2 SERPL-SCNC: 23 MMOL/L
CREAT SERPL-MCNC: 0.69 MG/DL
EGFR: 94 ML/MIN/1.73M2
GLUCOSE SERPL-MCNC: 95 MG/DL
HAV IGM SER QL: NONREACTIVE
HBV CORE IGG+IGM SER QL: NONREACTIVE
HBV CORE IGM SER QL: NONREACTIVE
HBV SURFACE AB SER QL: NONREACTIVE
HBV SURFACE AG SER QL: NONREACTIVE
HCV AB SER QL: NONREACTIVE
HCV S/CO RATIO: 0.31 S/CO
HEPB DNA PCR INT: NOT DETECTED
HEPB DNA PCR LOG: NOT DETECTED LOGIU/ML
INR PPP: 0.79 RATIO
POTASSIUM SERPL-SCNC: 4.9 MMOL/L
PROT SERPL-MCNC: 7.8 G/DL
PT BLD: 9.1 SEC
SODIUM SERPL-SCNC: 145 MMOL/L

## 2024-01-08 ENCOUNTER — APPOINTMENT (OUTPATIENT)
Dept: CT IMAGING | Facility: IMAGING CENTER | Age: 69
End: 2024-01-08
Payer: COMMERCIAL

## 2024-01-08 ENCOUNTER — OUTPATIENT (OUTPATIENT)
Dept: OUTPATIENT SERVICES | Facility: HOSPITAL | Age: 69
LOS: 1 days | End: 2024-01-08
Payer: SELF-PAY

## 2024-01-08 ENCOUNTER — APPOINTMENT (OUTPATIENT)
Dept: MRI IMAGING | Facility: IMAGING CENTER | Age: 69
End: 2024-01-08
Payer: COMMERCIAL

## 2024-01-08 ENCOUNTER — TRANSCRIPTION ENCOUNTER (OUTPATIENT)
Age: 69
End: 2024-01-08

## 2024-01-08 DIAGNOSIS — C18.9 MALIGNANT NEOPLASM OF COLON, UNSPECIFIED: ICD-10-CM

## 2024-01-08 PROCEDURE — 71250 CT THORAX DX C-: CPT | Mod: 26

## 2024-01-08 PROCEDURE — 74183 MRI ABD W/O CNTR FLWD CNTR: CPT | Mod: 26

## 2024-01-08 PROCEDURE — 71250 CT THORAX DX C-: CPT

## 2024-01-08 PROCEDURE — 74183 MRI ABD W/O CNTR FLWD CNTR: CPT

## 2024-01-08 PROCEDURE — A9581: CPT

## 2024-01-12 ENCOUNTER — APPOINTMENT (OUTPATIENT)
Dept: ULTRASOUND IMAGING | Facility: IMAGING CENTER | Age: 69
End: 2024-01-12
Payer: COMMERCIAL

## 2024-01-12 ENCOUNTER — RESULT REVIEW (OUTPATIENT)
Age: 69
End: 2024-01-12

## 2024-01-12 ENCOUNTER — OUTPATIENT (OUTPATIENT)
Dept: OUTPATIENT SERVICES | Facility: HOSPITAL | Age: 69
LOS: 1 days | End: 2024-01-12
Payer: COMMERCIAL

## 2024-01-12 DIAGNOSIS — T85.528A DISPLACEMENT OF OTHER GASTROINTESTINAL PROSTHETIC DEVICES, IMPLANTS AND GRAFTS, INITIAL ENCOUNTER: Chronic | ICD-10-CM

## 2024-01-12 DIAGNOSIS — C18.9 MALIGNANT NEOPLASM OF COLON, UNSPECIFIED: ICD-10-CM

## 2024-01-12 PROCEDURE — 76942 ECHO GUIDE FOR BIOPSY: CPT

## 2024-01-12 PROCEDURE — 88307 TISSUE EXAM BY PATHOLOGIST: CPT | Mod: 26

## 2024-01-12 PROCEDURE — 88307 TISSUE EXAM BY PATHOLOGIST: CPT

## 2024-01-12 PROCEDURE — 47000 NEEDLE BIOPSY OF LIVER PERQ: CPT

## 2024-01-12 PROCEDURE — 76942 ECHO GUIDE FOR BIOPSY: CPT | Mod: 26

## 2024-01-16 LAB — SURGICAL PATHOLOGY STUDY: SIGNIFICANT CHANGE UP

## 2024-01-17 ENCOUNTER — APPOINTMENT (OUTPATIENT)
Dept: SURGERY | Facility: CLINIC | Age: 69
End: 2024-01-17
Payer: MEDICAID

## 2024-01-17 VITALS
TEMPERATURE: 96.3 F | OXYGEN SATURATION: 93 % | SYSTOLIC BLOOD PRESSURE: 129 MMHG | RESPIRATION RATE: 18 BRPM | HEART RATE: 65 BPM | DIASTOLIC BLOOD PRESSURE: 76 MMHG | HEIGHT: 60 IN

## 2024-01-17 DIAGNOSIS — Z09 ENCOUNTER FOR FOLLOW-UP EXAMINATION AFTER COMPLETED TREATMENT FOR CONDITIONS OTHER THAN MALIGNANT NEOPLASM: ICD-10-CM

## 2024-01-17 PROCEDURE — 99024 POSTOP FOLLOW-UP VISIT: CPT

## 2024-01-18 ENCOUNTER — OUTPATIENT (OUTPATIENT)
Dept: OUTPATIENT SERVICES | Facility: HOSPITAL | Age: 69
LOS: 1 days | End: 2024-01-18
Payer: SELF-PAY

## 2024-01-18 ENCOUNTER — TRANSCRIPTION ENCOUNTER (OUTPATIENT)
Age: 69
End: 2024-01-18

## 2024-01-18 ENCOUNTER — RESULT REVIEW (OUTPATIENT)
Age: 69
End: 2024-01-18

## 2024-01-18 VITALS
RESPIRATION RATE: 16 BRPM | DIASTOLIC BLOOD PRESSURE: 73 MMHG | HEIGHT: 61 IN | TEMPERATURE: 97 F | HEART RATE: 69 BPM | OXYGEN SATURATION: 100 % | SYSTOLIC BLOOD PRESSURE: 129 MMHG | WEIGHT: 98.11 LBS

## 2024-01-18 VITALS
SYSTOLIC BLOOD PRESSURE: 138 MMHG | OXYGEN SATURATION: 100 % | RESPIRATION RATE: 13 BRPM | HEART RATE: 64 BPM | DIASTOLIC BLOOD PRESSURE: 78 MMHG

## 2024-01-18 DIAGNOSIS — C18.9 MALIGNANT NEOPLASM OF COLON, UNSPECIFIED: ICD-10-CM

## 2024-01-18 DIAGNOSIS — T85.528A DISPLACEMENT OF OTHER GASTROINTESTINAL PROSTHETIC DEVICES, IMPLANTS AND GRAFTS, INITIAL ENCOUNTER: Chronic | ICD-10-CM

## 2024-01-18 PROCEDURE — 77001 FLUOROGUIDE FOR VEIN DEVICE: CPT | Mod: 26

## 2024-01-18 PROCEDURE — C1788: CPT

## 2024-01-18 PROCEDURE — 36561 INSERT TUNNELED CV CATH: CPT

## 2024-01-18 PROCEDURE — C1769: CPT

## 2024-01-18 PROCEDURE — 77001 FLUOROGUIDE FOR VEIN DEVICE: CPT

## 2024-01-18 PROCEDURE — C1894: CPT

## 2024-01-18 PROCEDURE — 76937 US GUIDE VASCULAR ACCESS: CPT | Mod: 26

## 2024-01-18 PROCEDURE — 76937 US GUIDE VASCULAR ACCESS: CPT

## 2024-01-18 RX ORDER — ONDANSETRON 8 MG/1
4 TABLET, FILM COATED ORAL ONCE
Refills: 0 | Status: DISCONTINUED | OUTPATIENT
Start: 2024-01-18 | End: 2024-02-01

## 2024-01-18 NOTE — ASU DISCHARGE PLAN (ADULT/PEDIATRIC) - NURSING INSTRUCTIONS
Please feel free to contact us at [phone] if any problems arise. After 6PM, Monday through Friday, on weekends and on holidays, please call [phone] and ask for the radiology resident on call to be paged.

## 2024-01-18 NOTE — PRE-ANESTHESIA EVALUATION ADULT - NSRADCARDRESULTSFT_GEN_ALL_CORE
< from: TTE W or WO Ultrasound Enhancing Agent (10.25.23 @ 13:09) >       CONCLUSIONS:      1. Left ventricular systolic function is normal with an ejection fraction of 66 % by Lundberg's method of disks.   2. Normal left ventricular diastolic function.   3. Normal right ventricular cavity size and systolic function.   4. Fibrocalcific aortic valve sclerosis without stenosis.   5. No prior echocardiogram is available for comparison.    < end of copied text >
DISPLAY PLAN FREE TEXT

## 2024-01-18 NOTE — PRE PROCEDURE NOTE - PRE PROCEDURE EVALUATION
Interventional Radiology    HPI: 68y Female with pulmonary hypertension, scleroderma, and colon cancer presenting for port insertion.     Allergies: No Known Allergies    Medications (Abx/Cardiac/Anticoagulation/Blood Products)      Data:  T(C): 36.1  HR: 69  BP: 129/73  RR: 16  SpO2: 100%    Exam  General: No acute distress  Chest: Non labored breathing    Imaging: Reviewed    Plan: 68y Female presents for port insertion.   -Risks/Benefits/alternatives explained with the patient and/or healthcare proxy and witnessed informed consent obtained.

## 2024-01-18 NOTE — ASU DISCHARGE PLAN (ADULT/PEDIATRIC) - NS MD DC FALL RISK RISK
For information on Fall & Injury Prevention, visit: [email or web address] OR  [email or web address] OR  https://www.cdc.gov/steadi/patient.html
1 pair

## 2024-01-18 NOTE — ASU PATIENT PROFILE, ADULT - FALL HARM RISK - HARM RISK INTERVENTIONS

## 2024-01-19 ENCOUNTER — EMERGENCY (EMERGENCY)
Facility: HOSPITAL | Age: 69
LOS: 1 days | Discharge: HOME | End: 2024-01-19
Attending: EMERGENCY MEDICINE
Payer: MEDICAID

## 2024-01-19 VITALS
HEIGHT: 61 IN | OXYGEN SATURATION: 98 % | DIASTOLIC BLOOD PRESSURE: 82 MMHG | WEIGHT: 100.09 LBS | HEART RATE: 84 BPM | SYSTOLIC BLOOD PRESSURE: 175 MMHG | RESPIRATION RATE: 18 BRPM | TEMPERATURE: 98 F

## 2024-01-19 DIAGNOSIS — T85.528A DISPLACEMENT OF OTHER GASTROINTESTINAL PROSTHETIC DEVICES, IMPLANTS AND GRAFTS, INITIAL ENCOUNTER: Chronic | ICD-10-CM

## 2024-01-19 PROCEDURE — 99283 EMERGENCY DEPT VISIT LOW MDM: CPT

## 2024-01-19 RX ORDER — LIDOCAINE AND PRILOCAINE 25; 25 MG/G; MG/G
2.5-2.5 CREAM TOPICAL
Qty: 1 | Refills: 1 | Status: ACTIVE | COMMUNITY
Start: 2024-01-19 | End: 1900-01-01

## 2024-01-19 RX ORDER — METOCLOPRAMIDE 10 MG/1
10 TABLET ORAL
Qty: 30 | Refills: 3 | Status: ACTIVE | COMMUNITY
Start: 2024-01-19 | End: 1900-01-01

## 2024-01-19 NOTE — ED ADULT TRIAGE NOTE - AS PAIN REST
3 (mild pain) Graft Donor Site Bandage (Optional-Leave Blank If You Don't Want In Note): Steri-strips and a pressure bandage were applied to the donor site.

## 2024-01-19 NOTE — ED ADULT TRIAGE NOTE - CHIEF COMPLAINT QUOTE
Recent chemo port placement, redness around site noted today. Denies fever. States first chemo infusion is next week.

## 2024-01-20 VITALS
OXYGEN SATURATION: 98 % | TEMPERATURE: 98 F | RESPIRATION RATE: 18 BRPM | HEART RATE: 70 BPM | DIASTOLIC BLOOD PRESSURE: 93 MMHG | SYSTOLIC BLOOD PRESSURE: 151 MMHG

## 2024-01-20 PROCEDURE — 71046 X-RAY EXAM CHEST 2 VIEWS: CPT

## 2024-01-20 PROCEDURE — 71046 X-RAY EXAM CHEST 2 VIEWS: CPT | Mod: 26

## 2024-01-20 PROCEDURE — 99283 EMERGENCY DEPT VISIT LOW MDM: CPT | Mod: 25

## 2024-01-20 NOTE — ED PROVIDER NOTE - NSFOLLOWUPINSTRUCTIONS_ED_ALL_ED_FT
Today you were evaluated for concern of your port.   A chest x-ray was obtained that did not show any concerning findings.      Return to the ER for any new or concerning symptoms.     You may take 650 mg acetaminophen six hours as needed for pain.   Drink plenty of fluids and rest.    Implanted Port Home Guide  An implanted port is a device that is placed under the skin. It is usually placed in the chest. The device may vary based on the need. Implanted ports can be used to give IV medicine, to take blood, or to give fluids. You may have an implanted port if:  You need IV medicine that would be irritating to the small veins in your hands or arms.  You need IV medicines, such as chemotherapy, for a long period of time.  You need IV nutrition for a long period of time.  You may have fewer limitations when using a port than you would if you used other types of long-term IVs. You will also likely be able to return to normal activities after your incision heals.    An implanted port has two main parts:  [place]. The reservoir is the part where a needle is inserted to give medicines or draw blood. The reservoir is round. After the port is placed, it appears as a small, raised area under your skin.  Catheter. The catheter is a small, thin tube that connects the reservoir to a vein. Medicine that is inserted into the reservoir goes into the catheter and then into the vein.  How is my port accessed?  To access your port:  A numbing cream may be placed on the skin over the port site.  Your health care provider will put on a mask and sterile gloves.  The skin over your port will be cleaned carefully with a germ-killing soap and allowed to dry.  Your health care provider will gently pinch the port and insert a needle into it.  Your health care provider will check for a blood return to make sure the port is in the vein and is still working (patent).  If your port needs to remain accessed to get medicine continuously (constant infusion), your health care provider will place a clear bandage (dressing) over the needle site. The dressing and needle will need to be changed every week, or as told by your health care provider.  What is flushing?  Flushing helps keep the port working. Follow instructions from your health care provider about how and when to flush the port. Ports are usually flushed with saline solution or a medicine called heparin. The need for flushing will depend on how the port is used:  If the port is only used from time to time to give medicines or draw blood, the port may need to be flushed:  Before and after medicines have been given.  Before and after blood has been drawn.  As part of routine maintenance. Flushing may be recommended every 4–6 weeks.  If a constant infusion is running, the port may not need to be flushed.  Throw away any syringes in a disposal container that is meant for sharp items (sharps container). You can buy a sharps container from a pharmacy, or you can make one by using an empty hard plastic bottle with a cover.  How long will my port stay implanted?  The port can stay in for as long as your health care provider thinks it is needed. When it is time for the port to come out, a surgery will be done to remove it. The surgery will be similar to the procedure that was done to put the port in.    Follow these instructions at home:  Caring for your port and port site    Flush your port as told by your health care provider.  If you need an infusion over several days, follow instructions from your health care provider about how to take care of your port site. Make sure you:  Change your dressing as told by your health care provider.  Wash your hands with soap and water for at least 20 seconds before and after you change your dressing. If soap and water are not available, use alcohol-based hand .  Place any used dressings or infusion bags into a plastic bag. Throw that bag in the trash.  Keep the dressing that covers the needle clean and dry. Do not get it wet.  Do not use scissors or sharp objects near the infusion tubing.  Keep any external tubes clamped, unless they are being used.  Check your port site every day for signs of infection. Check for:  Redness, swelling, or pain.  Fluid or blood.  Warmth.  Pus or a bad smell.  Protect the skin around the port site.  Avoid wearing bra straps that rub or irritate the site.  Protect the skin around your port from seat belts. Place a soft pad over your chest if needed.  Bathe or shower as told by your health care provider. The site may get wet as long as you are not actively receiving an infusion.  General instructions    Medical alert bracelet.  Return to your normal activities as told by your health care provider. Ask your health care provider what activities are safe for you.  Carry a medical alert card or wear a medical alert bracelet at all times. This will let health care providers know that you have an implanted port in case of an emergency.  Where to find more information  American Cancer Society: www.cancer.org  American Society of Clinical Oncology: www.cancer.net  Contact a health care provider if:  You have a fever or chills.  You have redness, swelling, or pain at the port site.  You have fluid or blood coming from your port site.  Your incision feels warm to the touch.  You have pus or a bad smell coming from the port site.  Summary  Implanted ports are usually placed in the chest for long-term IV access.  Follow instructions from your health care provider about flushing the port and changing bandages (dressings).  Take care of the area around your port by avoiding clothing that puts pressure on the area, and by watching for signs of infection.  Protect the skin around your port from seat belts. Place a soft pad over your chest if needed.  Contact a health care provider if you have a fever or you have redness, swelling, pain, fluid, or a bad smell at the port site.  This information is not intended to replace advice given to you by your health care provider. Make sure you discuss any questions you have with your health care provider. Today you were evaluated for concern of your port.   A chest x-ray was obtained that did not show any concerning findings.   There is no acute concern for infection at this time.      Return to the ER for any new or concerning symptoms.     You may take 650 mg acetaminophen six hours as needed for pain.   Drink plenty of fluids and rest.    Implanted Port Home Guide  An implanted port is a device that is placed under the skin. It is usually placed in the chest. The device may vary based on the need. Implanted ports can be used to give IV medicine, to take blood, or to give fluids. You may have an implanted port if:  You need IV medicine that would be irritating to the small veins in your hands or arms.  You need IV medicines, such as chemotherapy, for a long period of time.  You need IV nutrition for a long period of time.  You may have fewer limitations when using a port than you would if you used other types of long-term IVs. You will also likely be able to return to normal activities after your incision heals.    An implanted port has two main parts:  [place]. The reservoir is the part where a needle is inserted to give medicines or draw blood. The reservoir is round. After the port is placed, it appears as a small, raised area under your skin.  Catheter. The catheter is a small, thin tube that connects the reservoir to a vein. Medicine that is inserted into the reservoir goes into the catheter and then into the vein.  How is my port accessed?  To access your port:  A numbing cream may be placed on the skin over the port site.  Your health care provider will put on a mask and sterile gloves.  The skin over your port will be cleaned carefully with a germ-killing soap and allowed to dry.  Your health care provider will gently pinch the port and insert a needle into it.  Your health care provider will check for a blood return to make sure the port is in the vein and is still working (patent).  If your port needs to remain accessed to get medicine continuously (constant infusion), your health care provider will place a clear bandage (dressing) over the needle site. The dressing and needle will need to be changed every week, or as told by your health care provider.  What is flushing?  Flushing helps keep the port working. Follow instructions from your health care provider about how and when to flush the port. Ports are usually flushed with saline solution or a medicine called heparin. The need for flushing will depend on how the port is used:  If the port is only used from time to time to give medicines or draw blood, the port may need to be flushed:  Before and after medicines have been given.  Before and after blood has been drawn.  As part of routine maintenance. Flushing may be recommended every 4–6 weeks.  If a constant infusion is running, the port may not need to be flushed.  Throw away any syringes in a disposal container that is meant for sharp items (sharps container). You can buy a sharps container from a pharmacy, or you can make one by using an empty hard plastic bottle with a cover.  How long will my port stay implanted?  The port can stay in for as long as your health care provider thinks it is needed. When it is time for the port to come out, a surgery will be done to remove it. The surgery will be similar to the procedure that was done to put the port in.    Follow these instructions at home:  Caring for your port and port site    Flush your port as told by your health care provider.  If you need an infusion over several days, follow instructions from your health care provider about how to take care of your port site. Make sure you:  Change your dressing as told by your health care provider.  Wash your hands with soap and water for at least 20 seconds before and after you change your dressing. If soap and water are not available, use alcohol-based hand .  Place any used dressings or infusion bags into a plastic bag. Throw that bag in the trash.  Keep the dressing that covers the needle clean and dry. Do not get it wet.  Do not use scissors or sharp objects near the infusion tubing.  Keep any external tubes clamped, unless they are being used.  Check your port site every day for signs of infection. Check for:  Redness, swelling, or pain.  Fluid or blood.  Warmth.  Pus or a bad smell.  Protect the skin around the port site.  Avoid wearing bra straps that rub or irritate the site.  Protect the skin around your port from seat belts. Place a soft pad over your chest if needed.  Bathe or shower as told by your health care provider. The site may get wet as long as you are not actively receiving an infusion.  General instructions    Medical alert bracelet.  Return to your normal activities as told by your health care provider. Ask your health care provider what activities are safe for you.  Carry a medical alert card or wear a medical alert bracelet at all times. This will let health care providers know that you have an implanted port in case of an emergency.  Where to find more information  American Cancer Society: www.cancer.org  American Society of Clinical Oncology: www.cancer.net  Contact a health care provider if:  You have a fever or chills.  You have redness, swelling, or pain at the port site.  You have fluid or blood coming from your port site.  Your incision feels warm to the touch.  You have pus or a bad smell coming from the port site.  Summary  Implanted ports are usually placed in the chest for long-term IV access.  Follow instructions from your health care provider about flushing the port and changing bandages (dressings).  Take care of the area around your port by avoiding clothing that puts pressure on the area, and by watching for signs of infection.  Protect the skin around your port from seat belts. Place a soft pad over your chest if needed.  Contact a health care provider if you have a fever or you have redness, swelling, pain, fluid, or a bad smell at the port site.  This information is not intended to replace advice given to you by your health care provider. Make sure you discuss any questions you have with your health care provider.

## 2024-01-20 NOTE — ED PROVIDER NOTE - CLINICAL SUMMARY MEDICAL DECISION MAKING FREE TEXT BOX
68-year-old female history of HTN hx of recent emphysematous cholecystitis and s/p lap right hemicolectomy and cholecystectomy 11/1 for ascending colon mass, colon cancer, now metastasis to the liver presenting with redness around the port site.  The patient denies fevers, chills, chest pain, shortness of breath, headache, visual changes, nausea or vomiting.    VSS.  PE.  No cellulitic changes such as redness, increased warmth, tenderness to palpation.  Sites with no purulent drainage, site healing well.  Low concern for infection at this time.  Will obtain chest x-ray to ensure port is in correct location.  Dispo pending reassessment.

## 2024-01-20 NOTE — ED ADULT NURSE NOTE - OBJECTIVE STATEMENT
69 y/o female presents to the ED endorsing erythema at R chest wall port placed on Thursday. A/Ox4. Ambulatory without assistive devices at baseline. PMH: Colon CA w/resection (11/23), HTN, RA and Scleroderma. Mandarin-speaking (ID:[id number]). Daughter at bedside, providing collateral. Patient's daughter noticed the erythema at 18:00 on 1/19/24. Patient denies fever, chills, cough, chest pain, dyspnea, nausea, vomiting and diarrhea. Patient on CM, NSR. Safety and comfort provided.

## 2024-01-20 NOTE — ED PROVIDER NOTE - PHYSICAL EXAMINATION
GENERAL: Awake, alert, NAD  HEENT: NC/AT, moist mucous membranes, PERRL, EOMI  LUNGS: CTAB, no wheezes or crackles   CARDIAC: RRR, no m/r/g  ABDOMEN: Soft, non tender, non distended, no rebound, no guarding  BACK: No midline spinal tenderness, no CVA tenderness  EXT: No edema, no calf tenderness, 2+ DP pulses bilaterally, no deformities.  NEURO: A&Ox3. Moving all extremities.  SKIN: Warm and dry. No rash.  PSYCH: Normal affect.
98.2

## 2024-01-20 NOTE — ED ADULT NURSE NOTE - NSFALLUNIVINTERV_ED_ALL_ED
Bed/Stretcher in lowest position, wheels locked, appropriate side rails in place/Call bell, personal items and telephone in reach/Instruct patient to call for assistance before getting out of bed/chair/stretcher/Non-slip footwear applied when patient is off stretcher/[place] to call system/Physically safe environment - no spills, clutter or unnecessary equipment/Purposeful proactive rounding/Room/bathroom lighting operational, light cord in reach

## 2024-01-20 NOTE — ED PROVIDER NOTE - ATTENDING CONTRIBUTION TO CARE
Attending note (Sreedhar):   Patient evaluated with assistance of language line  #[id number]    68-year-old female history of HTN pulmonary hypertension scleroderma and metastatic colon cancer (with liver metastasis) who has not yet started chemotherapy but presents after recent port placement (right chest wall) noting some increased redness at that site.  No fever no drainage no bleeding no swelling.  On exam scant erythema underlying tape overlying the port without sarina cellulitic change.  Appears consistent with contact dermatitis versus healing process.  Not warm to touch no fluctuance.  At this time not concerned for infection.  Chest x-ray shows appropriate positioning of port.  Discussed with patient and family care of port site options for different dressings and tape and recommendations to follow-up with her physicians as outpatient.  Additionally went over signs for possible infection or cellulitis especially bright red erythema warmth/heat to touch or increased swelling/fluctuance or drainage.  Patient and her family member verbalized understanding and is stable for discharge.

## 2024-01-20 NOTE — ED PROVIDER NOTE - OBJECTIVE STATEMENT
68-year-old female history of HTN hx of recent emphysematous cholecystitis and s/p lap right hemicolectomy and cholecystectomy 11/1 for ascending colon mass, colon cancer, now metastasis to the liver presenting with redness around the port site.  The patient reports that she had the port placed on Thursday and yesterday they noticed that there was redness and increased pain with movement.  The patient has not had any fevers or chills.  The patient has not noticed any drainage from the port site.  The patient has not taken anything for her symptoms.  The patient is set to begin chemotherapy next week.  The patient denies fevers, chills, chest pain, shortness of breath, headache, visual changes, nausea or vomiting.

## 2024-01-20 NOTE — ED PROVIDER NOTE - PATIENT PORTAL LINK FT
You can access the FollowMyHealth Patient Portal offered by [facility] by registering at the following website: [email or web address]. By joining [facility]’s FollowMyHealth portal, you will also be able to view your health information using other applications (apps) compatible with our system.

## 2024-01-21 NOTE — REASON FOR VISIT
[Initial Consultation] : an initial consultation [Spouse] : spouse [Family Member] : family member [Other: _____] : [unfilled] [[employer] ] : provided by [employer]   [FreeTextEntry2] : Stage IV colon cancer [Interpreters_IDNumber] : [id number] [TWNoteComboBox1] : Chinese

## 2024-01-21 NOTE — ASSESSMENT
[FreeTextEntry1] :  Patient seen with and plan discussed with Dr. Guadalupe. Aryles Hedjar, MD, PGY-6 Hematology/Oncology Fellow [facility] [Palliative] : Goals of care discussed with patient: Palliative [Palliative Care Plan] : not applicable at this time

## 2024-01-21 NOTE — PHYSICAL EXAM
[Normal] : affect appropriate [Restricted in physically strenuous activity but ambulatory and able to carry out work of a light or sedentary nature] : Status 1- Restricted in physically strenuous activity but ambulatory and able to carry out work of a light or sedentary nature, e.g., light house work, office work [Thin] : thin [de-identified] : erythematous, macular, slightly reticular lesions throughout her hands and face

## 2024-01-21 NOTE — REVIEW OF SYSTEMS
[Fatigue] : fatigue [Recent Change In Weight] : ~T recent weight change [Diarrhea: Grade 0] : Diarrhea: Grade 0 [Negative] : Allergic/Immunologic [Fever] : no fever [Chills] : no chills [Abdominal Pain] : no abdominal pain [Vomiting] : no vomiting [Constipation] : no constipation

## 2024-01-21 NOTE — HISTORY OF PRESENT ILLNESS
[Disease: _____________________] : Disease: [unfilled] [T: ___] : T[unfilled] [N: ___] : N[unfilled] [M: ___] : M[unfilled] [AJCC Stage: ____] : AJCC Stage: [unfilled] [de-identified] : MS stable [de-identified] : 69 yo Mandarin-speaking F with a PMH of HTN, RA, scleroderma, and mild pulmonary hypertension who presents for management of Stage IV R colon cancer, GEORGINAAngela Stevens has been gradually losing weight over years. She has also had lifelong intermittent diarrhea for decades, but she has never had a workup for it. Her diarrhea is food related. She had an EGD about 10 years ago that showed esophageal inflammation and acid reflux, but she has never had a colonoscopy prior to the below events. She originally moved to the  from [place] in the summer of 2023. In September, she presented with abdominal pain and was found to have a Hb 6.8 with acute cholecystitis and had a percutaneous cholecystostomy drain placed. She also had 1U PRBCs.  She was then readmitted to [facility] from 10/22 - 11/6/23 for tube dislodgement, with CT showing ascending colon wall thickening, with a few liver lesions c/w mucinous metastases (largest 2.5 x 2.0 cm in the R hepatic lobe, others included a 0.8 cm segment 4A/8 lesion and a 0.9 cm segment 2/3 lesion). She also had continued anemia (Hb 7.0) requiring 2U PRBCs during admission. She underwent a colonoscopy on 10/24 that showed a circumferential ascending colon mass about 5 cm above the cecum, biopsy showing adenocarcinoma, moderately differentiated with a mucinous component. On 11/1/23, she underwent a R hemicolectomy with cholecystectomy, showing a 10.2 cm tumor with negative margins (closest 0.3 cm), no LVI or PNI, 0/25 LNs positive, and intermediate (5-9) tumor budding score. MS stable. T3N0  She lives with her , daughter and her , and her 2 grandchildren.  Referred to medical oncology for further management.  [de-identified] : moderately differentiated adenocarcinoma with mucinous component, GEORGINA [de-identified] : CEA 57.5 (10/24/23) [FreeTextEntry1] : initial visit  [de-identified] : She states since her surgery, her appetite has been gradually improving. She had mild pain initially post-op, but that quickly subsided. She denies N/V or constipation. She has had lifelong intermittent chronic diarrhea, nonbloody.

## 2024-01-22 ENCOUNTER — RESULT REVIEW (OUTPATIENT)
Age: 69
End: 2024-01-22

## 2024-01-22 ENCOUNTER — APPOINTMENT (OUTPATIENT)
Dept: INFUSION THERAPY | Facility: HOSPITAL | Age: 69
End: 2024-01-22

## 2024-01-22 ENCOUNTER — NON-APPOINTMENT (OUTPATIENT)
Age: 69
End: 2024-01-22

## 2024-01-22 ENCOUNTER — APPOINTMENT (OUTPATIENT)
Dept: HEMATOLOGY ONCOLOGY | Facility: CLINIC | Age: 69
End: 2024-01-22

## 2024-01-22 DIAGNOSIS — R11.2 NAUSEA WITH VOMITING, UNSPECIFIED: ICD-10-CM

## 2024-01-22 DIAGNOSIS — Z51.11 ENCOUNTER FOR ANTINEOPLASTIC CHEMOTHERAPY: ICD-10-CM

## 2024-01-22 PROBLEM — Z09 POSTOPERATIVE EXAMINATION: Status: ACTIVE | Noted: 2023-11-10

## 2024-01-22 LAB
ALBUMIN SERPL ELPH-MCNC: 3.8 G/DL — SIGNIFICANT CHANGE UP (ref 3.3–5)
ALP SERPL-CCNC: 148 U/L — HIGH (ref 40–120)
ALT FLD-CCNC: 40 U/L — SIGNIFICANT CHANGE UP (ref 10–45)
ANION GAP SERPL CALC-SCNC: 10 MMOL/L — SIGNIFICANT CHANGE UP (ref 5–17)
AST SERPL-CCNC: 37 U/L — SIGNIFICANT CHANGE UP (ref 10–40)
BASOPHILS # BLD AUTO: 0.02 K/UL — SIGNIFICANT CHANGE UP (ref 0–0.2)
BASOPHILS NFR BLD AUTO: 0.3 % — SIGNIFICANT CHANGE UP (ref 0–2)
BILIRUB SERPL-MCNC: 0.3 MG/DL — SIGNIFICANT CHANGE UP (ref 0.2–1.2)
BUN SERPL-MCNC: 33 MG/DL — HIGH (ref 7–23)
CALCIUM SERPL-MCNC: 9.3 MG/DL — SIGNIFICANT CHANGE UP (ref 8.4–10.5)
CEA SERPL-MCNC: 75.8 NG/ML — HIGH (ref 0–3.8)
CHLORIDE SERPL-SCNC: 111 MMOL/L — HIGH (ref 96–108)
CO2 SERPL-SCNC: 22 MMOL/L — SIGNIFICANT CHANGE UP (ref 22–31)
CREAT SERPL-MCNC: 0.57 MG/DL — SIGNIFICANT CHANGE UP (ref 0.5–1.3)
EGFR: 99 ML/MIN/1.73M2 — SIGNIFICANT CHANGE UP
EOSINOPHIL # BLD AUTO: 0.02 K/UL — SIGNIFICANT CHANGE UP (ref 0–0.5)
EOSINOPHIL NFR BLD AUTO: 0.3 % — SIGNIFICANT CHANGE UP (ref 0–6)
GLUCOSE SERPL-MCNC: 122 MG/DL — HIGH (ref 70–99)
HCT VFR BLD CALC: 32 % — LOW (ref 34.5–45)
HGB BLD-MCNC: 9.6 G/DL — LOW (ref 11.5–15.5)
IMM GRANULOCYTES NFR BLD AUTO: 0.8 % — SIGNIFICANT CHANGE UP (ref 0–0.9)
LYMPHOCYTES # BLD AUTO: 0.98 K/UL — LOW (ref 1–3.3)
LYMPHOCYTES # BLD AUTO: 16.6 % — SIGNIFICANT CHANGE UP (ref 13–44)
MCHC RBC-ENTMCNC: 23.9 PG — LOW (ref 27–34)
MCHC RBC-ENTMCNC: 30 G/DL — LOW (ref 32–36)
MCV RBC AUTO: 79.6 FL — LOW (ref 80–100)
MONOCYTES # BLD AUTO: 0.27 K/UL — SIGNIFICANT CHANGE UP (ref 0–0.9)
MONOCYTES NFR BLD AUTO: 4.6 % — SIGNIFICANT CHANGE UP (ref 2–14)
NEUTROPHILS # BLD AUTO: 4.55 K/UL — SIGNIFICANT CHANGE UP (ref 1.8–7.4)
NEUTROPHILS NFR BLD AUTO: 77.4 % — HIGH (ref 43–77)
NRBC # BLD: 0 /100 WBCS — SIGNIFICANT CHANGE UP (ref 0–0)
PLATELET # BLD AUTO: 207 K/UL — SIGNIFICANT CHANGE UP (ref 150–400)
POTASSIUM SERPL-MCNC: 4.2 MMOL/L — SIGNIFICANT CHANGE UP (ref 3.5–5.3)
POTASSIUM SERPL-SCNC: 4.2 MMOL/L — SIGNIFICANT CHANGE UP (ref 3.5–5.3)
PROT SERPL-MCNC: 7.3 G/DL — SIGNIFICANT CHANGE UP (ref 6–8.3)
RBC # BLD: 4.02 M/UL — SIGNIFICANT CHANGE UP (ref 3.8–5.2)
RBC # FLD: 17.8 % — HIGH (ref 10.3–14.5)
SODIUM SERPL-SCNC: 142 MMOL/L — SIGNIFICANT CHANGE UP (ref 135–145)
WBC # BLD: 5.89 K/UL — SIGNIFICANT CHANGE UP (ref 3.8–10.5)
WBC # FLD AUTO: 5.89 K/UL — SIGNIFICANT CHANGE UP (ref 3.8–10.5)

## 2024-01-22 NOTE — ASSESSMENT
[FreeTextEntry1] : Doing well postop. Further treatment as per Oncology. Follow-up with me for any issues. She will need a 1 year post colectomy colonoscopy, November 2024. My office will reach out to the pt/family at that time to schedule.

## 2024-01-22 NOTE — REASON FOR VISIT
[Follow-Up: _____] : a [unfilled] follow-up visit [Source: ______] : History obtained from [unfilled] [Family Member] : family member [Post Op: _________] : a [unfilled] post op visit [Interpreters_IDNumber] : [id number] [FreeTextEntry1] : s/p Laparoscopic Right hemicolectomy and cholecystectomy on 11/01/23 for colon cancer and cholecystitis. [Interpreters_FullName] : Joseph [TWNoteComboBox1] : Chinese

## 2024-01-22 NOTE — HISTORY OF PRESENT ILLNESS
[FreeTextEntry1] : Hilda is a 68 year old female being seen for a post-op visit.  Disease: Colon Cancer   Pathology: Colon Adenocarcinoma with mucinous component   TNM stage: T2, N0, M1 AJCC Stage: IV presumed   Tumor Markers: CEA 57.5 (10/24/23)  s/p Laparoscopic Right hemicolectomy and cholecystectomy on 11/01/23 for colon cancer and cholecystitis. Pathology: 1. Gallbladder, cholecystectomy - Acute and chronic cholecystitis with intestinal metaplasia - Negative for dysplasia - Cholelithiasis 2. Right colon, terminal ileum, appendix, and lymph nodes, right hemicolectomy - Invasive moderately differentiated adenocarcinoma with mucinous feature of ascending colon (10.2 cm) - Tumor invading through muscularis propria into pericolonic tissue - Resection margins negative for carcinoma - Twenty five lymph nodes for carcinoma (0/25) - pTNM Stage (AJCC 8th ed.): pT3N0 - See synoptic summary 3. Colon, additional proximal transverse, resection - Segment of colon with mild acute serositis  Last seen on 12/18/23 - Doing well postop. She has a few liver lesions consistent with liver metastasis. The oncology navigator team have emailed the daughter who has not replied to the emails and has not done the required paperwork. I emphasized to the patient the importance of the daughter responding to the oncology emails. RTO in 4 weeks.  Today patient denies pain.  BMs soft once daily, occasional diarrhea (not something new).   Good appetite.   Denies nausea, vomiting, fever or chills.  Surgical incision sites with no issues.   She finally saw oncology, had a liver biopsy with confirmed colon cancer metastasis and is scheduled to start chemotherapy.

## 2024-01-23 ENCOUNTER — APPOINTMENT (OUTPATIENT)
Dept: SURGICAL ONCOLOGY | Facility: CLINIC | Age: 69
End: 2024-01-23
Payer: MEDICAID

## 2024-01-23 VITALS
OXYGEN SATURATION: 95 % | DIASTOLIC BLOOD PRESSURE: 76 MMHG | SYSTOLIC BLOOD PRESSURE: 159 MMHG | HEIGHT: 60 IN | RESPIRATION RATE: 16 BRPM | WEIGHT: 102 LBS | BODY MASS INDEX: 20.03 KG/M2 | HEART RATE: 80 BPM

## 2024-01-23 LAB
FERRITIN SERPL-MCNC: 23 NG/ML
IRON SATN MFR SERPL: 7 %
IRON SERPL-MCNC: 31 UG/DL
TIBC SERPL-MCNC: 418 UG/DL
TRANSFERRIN SERPL-MCNC: 376 MG/DL
UIBC SERPL-MCNC: 388 UG/DL

## 2024-01-23 PROCEDURE — 99245 OFF/OP CONSLTJ NEW/EST HI 55: CPT

## 2024-01-23 PROCEDURE — 99205 OFFICE O/P NEW HI 60 MIN: CPT

## 2024-01-23 NOTE — HISTORY OF PRESENT ILLNESS
[de-identified] : 68F presents for an initial consultation for a resected right sided colon ca with synchronous bilobar liver mets.  GEORGINA  She originally moved [place] the [place] from [place] in the summer of 2023. In September, she presented with abdominal pain and was found to have a Hb 6.8 with acute cholecystitis and had a percutaneous cholecystostomy drain placed. She also had 1U PRBCs.  She was then readmitted to [facility] from 10/22 - 11/6/23 for tube dislodgement, with CT showing ascending colon wall thickening, with indeterminate hepatic lesions. She also had continued anemia (Hb 7.0) requiring 2U PRBCs during admission. She underwent a colonoscopy on 10/24/23 that showed a circumferential ascending colon mass about 5 cm above the cecum, biopsy showing adenocarcinoma, moderately differentiated with a mucinous component. On 11/1/23, she underwent a R hemicolectomy with cholecystectomy, with Dr. Stacey Bowers at [facility]. Path showed a 10.2 cm tumor with negative margins, 0/25 lymph nodes positive for carcinoma, MS stable. T3N0.   1/3/24 CEA 88.1  1/8/24 CT chest no suspicious pulmonary nodules. 1/8/24 MRI abd (eovist) Bilobar hepatic lesions, suspicious for mucinous hepatic metastases. Segment 5/6 lesion (3-11 and 2-20), 2.6 x 2.2 x 3.4 cm, previously 2.1 x 1.9 x 2.8 cm when remeasured in similar fashion. Segment 7 lesion (3-10), 0.6 cm, new. Segment 2/3 lesion (3-15), 0.9 cm, stable. Segment 4 lesion (3-13), 0.8 cm, stable.  1/12/24 liver biopsy metastatic adenocarcinoma with mucinous features, compatible with previously diagnosed colonic adenocarcinoma.   She started FOLFOX on 1/22/24.    ID: [id number]- Mandarin. She is doing well after surgery.   PMH: Scleroderma, HTN, RA, mild pulmonary HTN.  PSH: partial colectomy  social: She lives with her , daughter and her , and her 2 grandchildren.

## 2024-01-23 NOTE — PHYSICAL EXAM
[FreeTextEntry1] : General - pleasant well developed  Head/Face - Atraumatic, red macular rash on face Eyes - PERRL, no sclera icterus ENMT - MMM, normal nose/ears, no oropharyngeal lesion Neck - supple, no thyromegaly, no masses    Lymphatics - no palpable adenopathy CV - Regular Rate and Rhythm, no rub or gallop Pulm - CTAB Abd - soft, non-tender, nondistended.  Ext - no c/c/e, normal ROM in each extremity  Back/spine - no masses/tenderness Skin - no skin lesion on sun exposed skin, no jaundice Neuro - alert and oriented x 3 Psych - normal mood and affect

## 2024-01-23 NOTE — ASSESSMENT
[FreeTextEntry1] : 68F with a resected right sided colon cancer with synchronous bilobar liver metastasis.  Tumors appear potentially clearable with a combination of resection and ablation.  Agree with initial period of systemic therapy with FOLFOX.  The segment 6 tumor is too large to consider ablation.  The other tumors are at risk of disappearing with systemic therapy so will require short interval imaging.   We reviewed general disease diagnosis, staging, prognosis, and treatment options for colon cancer with liver metastases and the role for resection and locoregional therapy to the liver.  She will continue with her current chemotherapy regimen and undergo images after 4 cycles.   I briefly discussed the concept of adjuvant hepatic artery infusion pump and this may be a good option to continue with adjuvant therapy after clearing all visible sites of disease. Will discuss further with medical oncology   We also discussed percutaneous ablation pending treatment response as a less aggressive approach.    Plan; CTA abdomen, chest CT after 4th cycle folfox f/u after scans Discuss with medical oncology after repeat imaging

## 2024-01-23 NOTE — CONSULT LETTER
[Dear  ___] : Dear  [unfilled], [Consult Letter:] : I had the pleasure of evaluating your patient, [unfilled]. [Consult Closing:] : Thank you very much for allowing me to participate in the care of this patient.  If you have any questions, please do not hesitate to contact me. [Sincerely,] : Sincerely, [FreeTextEntry3] : Brenda Rubio MD

## 2024-01-24 ENCOUNTER — APPOINTMENT (OUTPATIENT)
Dept: INFUSION THERAPY | Facility: HOSPITAL | Age: 69
End: 2024-01-24

## 2024-01-29 DIAGNOSIS — Z45.2 ENCOUNTER FOR ADJUSTMENT AND MANAGEMENT OF VASCULAR ACCESS DEVICE: ICD-10-CM

## 2024-01-29 DIAGNOSIS — C18.9 MALIGNANT NEOPLASM OF COLON, UNSPECIFIED: ICD-10-CM

## 2024-01-31 ENCOUNTER — APPOINTMENT (OUTPATIENT)
Dept: HEMATOLOGY ONCOLOGY | Facility: CLINIC | Age: 69
End: 2024-01-31
Payer: COMMERCIAL

## 2024-01-31 VITALS
DIASTOLIC BLOOD PRESSURE: 78 MMHG | WEIGHT: 101.41 LBS | BODY MASS INDEX: 19.81 KG/M2 | RESPIRATION RATE: 14 BRPM | HEART RATE: 59 BPM | TEMPERATURE: 97.2 F | SYSTOLIC BLOOD PRESSURE: 147 MMHG

## 2024-01-31 DIAGNOSIS — D50.9 IRON DEFICIENCY ANEMIA, UNSPECIFIED: ICD-10-CM

## 2024-01-31 PROCEDURE — 99215 OFFICE O/P EST HI 40 MIN: CPT

## 2024-01-31 NOTE — END OF VISIT
[Time Spent: ___ minutes] : I have spent [unfilled] minutes of time on the encounter. [>50% of the face to face encounter time was spent on counseling and/or coordination of care for ___] : Greater than 50% of the face to face encounter time was spent on counseling and/or coordination of care for [unfilled] [] : Fellow [FreeTextEntry3] : 68 F w/ stage IV colon cancer s/p removal of primary, bilobar liver mets confirmed with bx s/p C1 5Fu/LV  - tolerated C1 well. Plan to add Oxaliplatin 65 mg/m2 For C2 - reviewed addition of zirabev 5 mg/kg, needs better BP control first. will contact PCP office re medication adjustment  - plan to repeat imaging in 2 mos  - d/w HB surgeon, possible ANGELI pump vs resection vs LDT in the future depending on response, clinical status

## 2024-01-31 NOTE — ASSESSMENT
[Palliative] : Goals of care discussed with patient: Palliative [Palliative Care Plan] : not applicable at this time [FreeTextEntry1] : Patient seen with and plan discussed with Dr. Guadalupe. Aryles Hedjar, MD, PGY-6 Hematology/Oncology Fellow [facility]

## 2024-01-31 NOTE — REVIEW OF SYSTEMS
[Fatigue] : fatigue [Recent Change In Weight] : ~T recent weight change [Diarrhea: Grade 0] : Diarrhea: Grade 0 [Negative] : Allergic/Immunologic [Fever] : no fever [Chills] : no chills [Abdominal Pain] : no abdominal pain [Vomiting] : no vomiting [Constipation] : no constipation [FreeTextEntry2] : diaphoretic episode

## 2024-01-31 NOTE — HISTORY OF PRESENT ILLNESS
[Disease: _____________________] : Disease: [unfilled] [T: ___] : T[unfilled] [N: ___] : N[unfilled] [M: ___] : M[unfilled] [AJCC Stage: ____] : AJCC Stage: [unfilled] [Therapy: ___] : Therapy: [unfilled] [Cycle: ___] : Cycle: [unfilled] [Day: ___] : Day: [unfilled] [de-identified] : 67 yo Mandarin-speaking F with a PMH of HTN, RA, scleroderma, and mild pulmonary hypertension who presents for management of Stage IV R colon cancer, GEORGINAAngela Stevens has been gradually losing weight over years. She has also had lifelong intermittent diarrhea for decades, but she has never had a workup for it. Her diarrhea is food related. She had an EGD about 10 years ago that showed esophageal inflammation and acid reflux, but she has never had a colonoscopy prior to the below events. She originally moved to the  from [place] in the summer of 2023. In September, she presented with abdominal pain and was found to have a Hb 6.8 with acute cholecystitis and had a percutaneous cholecystostomy drain placed. She also had 1U PRBCs.  She was then readmitted to [facility] from 10/22 - 11/6/23 for tube dislodgement, with CT showing ascending colon wall thickening, with a few liver lesions c/w mucinous metastases (largest 2.5 x 2.0 cm in the R hepatic lobe, others included a 0.8 cm segment 4A/8 lesion and a 0.9 cm segment 2/3 lesion). She also had continued anemia (Hb 7.0) requiring 2U PRBCs during admission. She underwent a colonoscopy on 10/24 that showed a circumferential ascending colon mass about 5 cm above the cecum, biopsy showing adenocarcinoma, moderately differentiated with a mucinous component. On 11/1/23, she underwent a R hemicolectomy with cholecystectomy, showing a 10.2 cm tumor with negative margins (closest 0.3 cm), no LVI or PNI, 0/25 LNs positive, and intermediate (5-9) tumor budding score. MS stable. T3N0  She lives with her , daughter and her , and her 2 grandchildren.  Referred to medical oncology for further management.   1/8/24: CT chest: no suspicious pulm nodules  MRI Abd: bilobar hepatic lesions, suspicious for mucinous hepatic metastases, with lesion increased in size, new or stable compared to prior  1/12/24: Liver bx: met colon ca 1/22/24: C1 5FU/LV [de-identified] : moderately differentiated adenocarcinoma with mucinous component, GEORGINA [de-identified] : CEA 57.5 (10/24/23) [de-identified] : Patient is s/p C1 of 5FU/LV. She states overall is tolerating it well. She denies N/V, abdominal pain, CP, SOB, poor appetite, constipation, diarrhea, or bleeding in her stool. Slight fatigue but is stable since before chemotherapy started. She denies neuropathy. She denies fevers or chills but had an episode about 3-4 days post chemo where she woke up drenched in sweats. She said she does not normally get this. Appetite is good and she is gaining weight.

## 2024-01-31 NOTE — REASON FOR VISIT
[Follow-Up Visit] : a follow-up [Family Member] : family member [Other: _____] : [unfilled] [FreeTextEntry2] : Stage IV colon cancer [Interpreters_IDNumber] : [id number] - Mandarin ([employer]) [TWNoteComboBox1] : Chinese

## 2024-01-31 NOTE — PHYSICAL EXAM
[Restricted in physically strenuous activity but ambulatory and able to carry out work of a light or sedentary nature] : Status 1- Restricted in physically strenuous activity but ambulatory and able to carry out work of a light or sedentary nature, e.g., light house work, office work [Thin] : thin [Normal] : affect appropriate [de-identified] : erythematous, macular, slightly reticular lesions (telangiectasias) throughout her hands and face, stable

## 2024-02-05 ENCOUNTER — RESULT REVIEW (OUTPATIENT)
Age: 69
End: 2024-02-05

## 2024-02-05 ENCOUNTER — APPOINTMENT (OUTPATIENT)
Dept: HEMATOLOGY ONCOLOGY | Facility: CLINIC | Age: 69
End: 2024-02-05

## 2024-02-05 ENCOUNTER — APPOINTMENT (OUTPATIENT)
Dept: INFUSION THERAPY | Facility: HOSPITAL | Age: 69
End: 2024-02-05

## 2024-02-05 LAB
ALBUMIN SERPL ELPH-MCNC: 3.9 G/DL — SIGNIFICANT CHANGE UP (ref 3.3–5)
ALP SERPL-CCNC: 126 U/L — HIGH (ref 40–120)
ALT FLD-CCNC: 37 U/L — SIGNIFICANT CHANGE UP (ref 10–45)
ANION GAP SERPL CALC-SCNC: 11 MMOL/L — SIGNIFICANT CHANGE UP (ref 5–17)
AST SERPL-CCNC: 27 U/L — SIGNIFICANT CHANGE UP (ref 10–40)
BASOPHILS # BLD AUTO: 0.02 K/UL — SIGNIFICANT CHANGE UP (ref 0–0.2)
BASOPHILS NFR BLD AUTO: 0.3 % — SIGNIFICANT CHANGE UP (ref 0–2)
BILIRUB SERPL-MCNC: 0.4 MG/DL — SIGNIFICANT CHANGE UP (ref 0.2–1.2)
BUN SERPL-MCNC: 32 MG/DL — HIGH (ref 7–23)
CALCIUM SERPL-MCNC: 8.8 MG/DL — SIGNIFICANT CHANGE UP (ref 8.4–10.5)
CHLORIDE SERPL-SCNC: 109 MMOL/L — HIGH (ref 96–108)
CO2 SERPL-SCNC: 22 MMOL/L — SIGNIFICANT CHANGE UP (ref 22–31)
CREAT SERPL-MCNC: 0.64 MG/DL — SIGNIFICANT CHANGE UP (ref 0.5–1.3)
EGFR: 96 ML/MIN/1.73M2 — SIGNIFICANT CHANGE UP
EOSINOPHIL # BLD AUTO: 0.02 K/UL — SIGNIFICANT CHANGE UP (ref 0–0.5)
EOSINOPHIL NFR BLD AUTO: 0.3 % — SIGNIFICANT CHANGE UP (ref 0–6)
GLUCOSE SERPL-MCNC: 99 MG/DL — SIGNIFICANT CHANGE UP (ref 70–99)
HCT VFR BLD CALC: 31.3 % — LOW (ref 34.5–45)
HGB BLD-MCNC: 9.4 G/DL — LOW (ref 11.5–15.5)
IMM GRANULOCYTES NFR BLD AUTO: 0.5 % — SIGNIFICANT CHANGE UP (ref 0–0.9)
LYMPHOCYTES # BLD AUTO: 1.05 K/UL — SIGNIFICANT CHANGE UP (ref 1–3.3)
LYMPHOCYTES # BLD AUTO: 15.9 % — SIGNIFICANT CHANGE UP (ref 13–44)
MCHC RBC-ENTMCNC: 24.2 PG — LOW (ref 27–34)
MCHC RBC-ENTMCNC: 30 G/DL — LOW (ref 32–36)
MCV RBC AUTO: 80.7 FL — SIGNIFICANT CHANGE UP (ref 80–100)
MONOCYTES # BLD AUTO: 0.27 K/UL — SIGNIFICANT CHANGE UP (ref 0–0.9)
MONOCYTES NFR BLD AUTO: 4.1 % — SIGNIFICANT CHANGE UP (ref 2–14)
NEUTROPHILS # BLD AUTO: 5.22 K/UL — SIGNIFICANT CHANGE UP (ref 1.8–7.4)
NEUTROPHILS NFR BLD AUTO: 78.9 % — HIGH (ref 43–77)
NRBC # BLD: 0 /100 WBCS — SIGNIFICANT CHANGE UP (ref 0–0)
PLATELET # BLD AUTO: 164 K/UL — SIGNIFICANT CHANGE UP (ref 150–400)
POTASSIUM SERPL-MCNC: 4.1 MMOL/L — SIGNIFICANT CHANGE UP (ref 3.5–5.3)
POTASSIUM SERPL-SCNC: 4.1 MMOL/L — SIGNIFICANT CHANGE UP (ref 3.5–5.3)
PROT SERPL-MCNC: 6.9 G/DL — SIGNIFICANT CHANGE UP (ref 6–8.3)
RBC # BLD: 3.88 M/UL — SIGNIFICANT CHANGE UP (ref 3.8–5.2)
RBC # FLD: 20.1 % — HIGH (ref 10.3–14.5)
SODIUM SERPL-SCNC: 142 MMOL/L — SIGNIFICANT CHANGE UP (ref 135–145)
WBC # BLD: 6.61 K/UL — SIGNIFICANT CHANGE UP (ref 3.8–10.5)
WBC # FLD AUTO: 6.61 K/UL — SIGNIFICANT CHANGE UP (ref 3.8–10.5)

## 2024-02-07 ENCOUNTER — APPOINTMENT (OUTPATIENT)
Dept: INFUSION THERAPY | Facility: HOSPITAL | Age: 69
End: 2024-02-07

## 2024-02-14 ENCOUNTER — APPOINTMENT (OUTPATIENT)
Dept: HEMATOLOGY ONCOLOGY | Facility: CLINIC | Age: 69
End: 2024-02-14
Payer: MEDICAID

## 2024-02-14 VITALS
TEMPERATURE: 97.1 F | HEART RATE: 82 BPM | RESPIRATION RATE: 15 BRPM | SYSTOLIC BLOOD PRESSURE: 119 MMHG | WEIGHT: 102.07 LBS | DIASTOLIC BLOOD PRESSURE: 78 MMHG | BODY MASS INDEX: 19.93 KG/M2 | OXYGEN SATURATION: 99 %

## 2024-02-14 PROCEDURE — 99215 OFFICE O/P EST HI 40 MIN: CPT

## 2024-02-14 RX ORDER — IRBESARTAN 75 MG/1
75 TABLET ORAL
Refills: 0 | Status: DISCONTINUED | COMMUNITY
End: 2024-02-14

## 2024-02-16 ENCOUNTER — OUTPATIENT (OUTPATIENT)
Dept: OUTPATIENT SERVICES | Facility: HOSPITAL | Age: 69
LOS: 1 days | Discharge: HOME | End: 2024-02-16

## 2024-02-16 DIAGNOSIS — T85.528A DISPLACEMENT OF OTHER GASTROINTESTINAL PROSTHETIC DEVICES, IMPLANTS AND GRAFTS, INITIAL ENCOUNTER: Chronic | ICD-10-CM

## 2024-02-16 DIAGNOSIS — C18.9 MALIGNANT NEOPLASM OF COLON, UNSPECIFIED: ICD-10-CM

## 2024-02-19 ENCOUNTER — APPOINTMENT (OUTPATIENT)
Dept: HEMATOLOGY ONCOLOGY | Facility: CLINIC | Age: 69
End: 2024-02-19

## 2024-02-19 ENCOUNTER — RESULT REVIEW (OUTPATIENT)
Age: 69
End: 2024-02-19

## 2024-02-19 ENCOUNTER — APPOINTMENT (OUTPATIENT)
Dept: INFUSION THERAPY | Facility: HOSPITAL | Age: 69
End: 2024-02-19

## 2024-02-19 LAB
ALBUMIN SERPL ELPH-MCNC: 3.7 G/DL — SIGNIFICANT CHANGE UP (ref 3.3–5)
ALP SERPL-CCNC: 114 U/L — SIGNIFICANT CHANGE UP (ref 40–120)
ALT FLD-CCNC: 27 U/L — SIGNIFICANT CHANGE UP (ref 10–45)
ANION GAP SERPL CALC-SCNC: 10 MMOL/L — SIGNIFICANT CHANGE UP (ref 5–17)
AST SERPL-CCNC: 25 U/L — SIGNIFICANT CHANGE UP (ref 10–40)
BASOPHILS # BLD AUTO: 0.02 K/UL — SIGNIFICANT CHANGE UP (ref 0–0.2)
BASOPHILS NFR BLD AUTO: 0.3 % — SIGNIFICANT CHANGE UP (ref 0–2)
BILIRUB SERPL-MCNC: 0.4 MG/DL — SIGNIFICANT CHANGE UP (ref 0.2–1.2)
BUN SERPL-MCNC: 20 MG/DL — SIGNIFICANT CHANGE UP (ref 7–23)
CALCIUM SERPL-MCNC: 8.9 MG/DL — SIGNIFICANT CHANGE UP (ref 8.4–10.5)
CEA SERPL-MCNC: 87.4 NG/ML — HIGH (ref 0–3.8)
CHLORIDE SERPL-SCNC: 108 MMOL/L — SIGNIFICANT CHANGE UP (ref 96–108)
CO2 SERPL-SCNC: 23 MMOL/L — SIGNIFICANT CHANGE UP (ref 22–31)
CREAT SERPL-MCNC: 0.62 MG/DL — SIGNIFICANT CHANGE UP (ref 0.5–1.3)
EGFR: 97 ML/MIN/1.73M2 — SIGNIFICANT CHANGE UP
EOSINOPHIL # BLD AUTO: 0.08 K/UL — SIGNIFICANT CHANGE UP (ref 0–0.5)
EOSINOPHIL NFR BLD AUTO: 1.3 % — SIGNIFICANT CHANGE UP (ref 0–6)
GLUCOSE SERPL-MCNC: 131 MG/DL — HIGH (ref 70–99)
HCT VFR BLD CALC: 32.9 % — LOW (ref 34.5–45)
HGB BLD-MCNC: 10 G/DL — LOW (ref 11.5–15.5)
IMM GRANULOCYTES NFR BLD AUTO: 1 % — HIGH (ref 0–0.9)
LYMPHOCYTES # BLD AUTO: 1.02 K/UL — SIGNIFICANT CHANGE UP (ref 1–3.3)
LYMPHOCYTES # BLD AUTO: 16.8 % — SIGNIFICANT CHANGE UP (ref 13–44)
MCHC RBC-ENTMCNC: 24.5 PG — LOW (ref 27–34)
MCHC RBC-ENTMCNC: 30.4 G/DL — LOW (ref 32–36)
MCV RBC AUTO: 80.6 FL — SIGNIFICANT CHANGE UP (ref 80–100)
MONOCYTES # BLD AUTO: 0.45 K/UL — SIGNIFICANT CHANGE UP (ref 0–0.9)
MONOCYTES NFR BLD AUTO: 7.4 % — SIGNIFICANT CHANGE UP (ref 2–14)
NEUTROPHILS # BLD AUTO: 4.43 K/UL — SIGNIFICANT CHANGE UP (ref 1.8–7.4)
NEUTROPHILS NFR BLD AUTO: 73.2 % — SIGNIFICANT CHANGE UP (ref 43–77)
NRBC # BLD: 0 /100 WBCS — SIGNIFICANT CHANGE UP (ref 0–0)
PLATELET # BLD AUTO: 156 K/UL — SIGNIFICANT CHANGE UP (ref 150–400)
POTASSIUM SERPL-MCNC: 3.8 MMOL/L — SIGNIFICANT CHANGE UP (ref 3.5–5.3)
POTASSIUM SERPL-SCNC: 3.8 MMOL/L — SIGNIFICANT CHANGE UP (ref 3.5–5.3)
PROT SERPL-MCNC: 6.2 G/DL — SIGNIFICANT CHANGE UP (ref 6–8.3)
RBC # BLD: 4.08 M/UL — SIGNIFICANT CHANGE UP (ref 3.8–5.2)
RBC # FLD: 21.9 % — HIGH (ref 10.3–14.5)
SODIUM SERPL-SCNC: 141 MMOL/L — SIGNIFICANT CHANGE UP (ref 135–145)
WBC # BLD: 6.06 K/UL — SIGNIFICANT CHANGE UP (ref 3.8–10.5)
WBC # FLD AUTO: 6.06 K/UL — SIGNIFICANT CHANGE UP (ref 3.8–10.5)

## 2024-02-20 DIAGNOSIS — R11.2 NAUSEA WITH VOMITING, UNSPECIFIED: ICD-10-CM

## 2024-02-20 DIAGNOSIS — Z51.11 ENCOUNTER FOR ANTINEOPLASTIC CHEMOTHERAPY: ICD-10-CM

## 2024-02-21 ENCOUNTER — APPOINTMENT (OUTPATIENT)
Dept: INFUSION THERAPY | Facility: HOSPITAL | Age: 69
End: 2024-02-21

## 2024-03-01 ENCOUNTER — OUTPATIENT (OUTPATIENT)
Dept: OUTPATIENT SERVICES | Facility: HOSPITAL | Age: 69
LOS: 1 days | Discharge: HOME | End: 2024-03-01

## 2024-03-01 DIAGNOSIS — T85.528A DISPLACEMENT OF OTHER GASTROINTESTINAL PROSTHETIC DEVICES, IMPLANTS AND GRAFTS, INITIAL ENCOUNTER: Chronic | ICD-10-CM

## 2024-03-01 DIAGNOSIS — C18.9 MALIGNANT NEOPLASM OF COLON, UNSPECIFIED: ICD-10-CM

## 2024-03-02 NOTE — PHYSICAL EXAM
[Restricted in physically strenuous activity but ambulatory and able to carry out work of a light or sedentary nature] : Status 1- Restricted in physically strenuous activity but ambulatory and able to carry out work of a light or sedentary nature, e.g., light house work, office work [Thin] : thin [Normal] : affect appropriate [de-identified] : erythematous, macular, slightly reticular lesions (telangiectasias) throughout her hands and face, stable

## 2024-03-02 NOTE — ASSESSMENT
[Palliative] : Goals of care discussed with patient: Palliative [Palliative Care Plan] : not applicable at this time [FreeTextEntry1] : pt requesting referral to PCP in the health system

## 2024-03-02 NOTE — REASON FOR VISIT
[Follow-Up Visit] : a follow-up [Spouse] : spouse [Ad Hoc ] : provided by an ad hoc  [FreeTextEntry2] : Stage IV colon cancer [Interpreters_IDNumber] : [id number] [Interpreters_FullName] : Ashley [FreeTextEntry3] : Mandarin [TWNoteComboBox1] : Other

## 2024-03-02 NOTE — REVIEW OF SYSTEMS
[Fatigue] : fatigue [Recent Change In Weight] : ~T recent weight change [Diarrhea: Grade 0] : Diarrhea: Grade 0 [Negative] : Allergic/Immunologic [Joint Pain] : joint pain [Joint Stiffness] : joint stiffness [Fever] : no fever [Chills] : no chills [Abdominal Pain] : no abdominal pain [Constipation] : no constipation [Vomiting] : no vomiting

## 2024-03-02 NOTE — HISTORY OF PRESENT ILLNESS
[Disease: _____________________] : Disease: [unfilled] [T: ___] : T[unfilled] [N: ___] : N[unfilled] [M: ___] : M[unfilled] [AJCC Stage: ____] : AJCC Stage: [unfilled] [Therapy: ___] : Therapy: [unfilled] [Cycle: ___] : Cycle: [unfilled] [Day: ___] : Day: [unfilled] [de-identified] : moderately differentiated adenocarcinoma with mucinous component, GEORGINA [de-identified] : 67 yo Mandarin-speaking F with a PMH of HTN, RA, scleroderma, and mild pulmonary hypertension who presents for management of Stage IV R colon cancer, GEORGINAAngela Stevens has been gradually losing weight over years. She has also had lifelong intermittent diarrhea for decades, but she has never had a workup for it. Her diarrhea is food related. She had an EGD about 10 years ago that showed esophageal inflammation and acid reflux, but she has never had a colonoscopy prior to the below events. She originally moved to the  from [place] in the summer of 2023. In September, she presented with abdominal pain and was found to have a Hb 6.8 with acute cholecystitis and had a percutaneous cholecystostomy drain placed. She also had 1U PRBCs.  She was then readmitted to [facility] from 10/22 - 11/6/23 for tube dislodgement, with CT showing ascending colon wall thickening, with a few liver lesions c/w mucinous metastases (largest 2.5 x 2.0 cm in the R hepatic lobe, others included a 0.8 cm segment 4A/8 lesion and a 0.9 cm segment 2/3 lesion). She also had continued anemia (Hb 7.0) requiring 2U PRBCs during admission. She underwent a colonoscopy on 10/24 that showed a circumferential ascending colon mass about 5 cm above the cecum, biopsy showing adenocarcinoma, moderately differentiated with a mucinous component. On 11/1/23, she underwent a R hemicolectomy with cholecystectomy, showing a 10.2 cm tumor with negative margins (closest 0.3 cm), no LVI or PNI, 0/25 LNs positive, and intermediate (5-9) tumor budding score. MS stable. T3N0  She lives with her , daughter and her , and her 2 grandchildren.  Referred to medical oncology for further management.   1/8/24: CT chest: no suspicious pulm nodules  MRI Abd: bilobar hepatic lesions, suspicious for mucinous hepatic metastases, with lesion increased in size, new or stable compared to prior  1/12/24: Liver bx: met colon ca 1/22/24: C1 5FU/LV 2/7/24: C2 FOLFOX, oxaliplatin 65 mg/m2  [de-identified] : CEA 57.5 (10/24/23) [de-identified] : KRAS Q61H NRAS wildtype APC [id number]*18, R876* FBXW7 R465C PIK3CA E545K [de-identified] : Patient is s/p C2 FOLFOX, oxaliplatin added this cycle.  Diarrhea 3-4 times per day starting day after pump came off until yesterday. Watery. Increased gas preceded BM. Not sure with without food. Did not take anything to stop diarrhea. Doesn't have anything at to take. No BM today. Eating and drinking normally. Energy good. No neuropathy.  c/o R ankle pain that she thinks is related to her autoimmune condition. Has been taking chinese version of Dexamethasone 0.75 mg daily x 2 mos, stopped 2 days ago. daughter bought this medication in [place].

## 2024-03-04 ENCOUNTER — RESULT REVIEW (OUTPATIENT)
Age: 69
End: 2024-03-04

## 2024-03-04 ENCOUNTER — NON-APPOINTMENT (OUTPATIENT)
Age: 69
End: 2024-03-04

## 2024-03-04 ENCOUNTER — LABORATORY RESULT (OUTPATIENT)
Age: 69
End: 2024-03-04

## 2024-03-04 ENCOUNTER — APPOINTMENT (OUTPATIENT)
Dept: INFUSION THERAPY | Facility: HOSPITAL | Age: 69
End: 2024-03-04

## 2024-03-04 ENCOUNTER — APPOINTMENT (OUTPATIENT)
Dept: HEMATOLOGY ONCOLOGY | Facility: CLINIC | Age: 69
End: 2024-03-04

## 2024-03-04 ENCOUNTER — APPOINTMENT (OUTPATIENT)
Dept: HEMATOLOGY ONCOLOGY | Facility: CLINIC | Age: 69
End: 2024-03-04
Payer: MEDICAID

## 2024-03-04 LAB
BASOPHILS # BLD AUTO: 0.02 K/UL — SIGNIFICANT CHANGE UP (ref 0–0.2)
BASOPHILS NFR BLD AUTO: 0.5 % — SIGNIFICANT CHANGE UP (ref 0–2)
EOSINOPHIL # BLD AUTO: 0.04 K/UL — SIGNIFICANT CHANGE UP (ref 0–0.5)
EOSINOPHIL NFR BLD AUTO: 1 % — SIGNIFICANT CHANGE UP (ref 0–6)
HCT VFR BLD CALC: 30.8 % — LOW (ref 34.5–45)
HGB BLD-MCNC: 9.7 G/DL — LOW (ref 11.5–15.5)
IMM GRANULOCYTES NFR BLD AUTO: 0.5 % — SIGNIFICANT CHANGE UP (ref 0–0.9)
LYMPHOCYTES # BLD AUTO: 0.75 K/UL — LOW (ref 1–3.3)
LYMPHOCYTES # BLD AUTO: 19.3 % — SIGNIFICANT CHANGE UP (ref 13–44)
MCHC RBC-ENTMCNC: 25 PG — LOW (ref 27–34)
MCHC RBC-ENTMCNC: 31.5 G/DL — LOW (ref 32–36)
MCV RBC AUTO: 79.4 FL — LOW (ref 80–100)
MONOCYTES # BLD AUTO: 0.45 K/UL — SIGNIFICANT CHANGE UP (ref 0–0.9)
MONOCYTES NFR BLD AUTO: 11.6 % — SIGNIFICANT CHANGE UP (ref 2–14)
NEUTROPHILS # BLD AUTO: 2.61 K/UL — SIGNIFICANT CHANGE UP (ref 1.8–7.4)
NEUTROPHILS NFR BLD AUTO: 67.1 % — SIGNIFICANT CHANGE UP (ref 43–77)
NRBC # BLD: 0 /100 WBCS — SIGNIFICANT CHANGE UP (ref 0–0)
PLATELET # BLD AUTO: 130 K/UL — LOW (ref 150–400)
RBC # BLD: 3.88 M/UL — SIGNIFICANT CHANGE UP (ref 3.8–5.2)
RBC # FLD: 21.8 % — HIGH (ref 10.3–14.5)
WBC # BLD: 3.89 K/UL — SIGNIFICANT CHANGE UP (ref 3.8–10.5)
WBC # FLD AUTO: 3.89 K/UL — SIGNIFICANT CHANGE UP (ref 3.8–10.5)

## 2024-03-04 PROCEDURE — 99214 OFFICE O/P EST MOD 30 MIN: CPT

## 2024-03-04 RX ORDER — DEXAMETHASONE 4 MG/1
4 TABLET ORAL
Qty: 16 | Refills: 0 | Status: ACTIVE | COMMUNITY
Start: 2024-01-19 | End: 1900-01-01

## 2024-03-04 NOTE — HISTORY OF PRESENT ILLNESS
[Disease: _____________________] : Disease: [unfilled] [T: ___] : T[unfilled] [N: ___] : N[unfilled] [M: ___] : M[unfilled] [AJCC Stage: ____] : AJCC Stage: [unfilled] [Therapy: ___] : Therapy: [unfilled] [Cycle: ___] : Cycle: [unfilled] [Day: ___] : Day: [unfilled] [de-identified] : 69 yo Mandarin-speaking F with a PMH of HTN, RA, scleroderma, and mild pulmonary hypertension who presents for management of Stage IV R colon cancer, GEORGINAAngela Stevens has been gradually losing weight over years. She has also had lifelong intermittent diarrhea for decades, but she has never had a workup for it. Her diarrhea is food related. She had an EGD about 10 years ago that showed esophageal inflammation and acid reflux, but she has never had a colonoscopy prior to the below events. She originally moved to the  from [place] in the summer of 2023. In September, she presented with abdominal pain and was found to have a Hb 6.8 with acute cholecystitis and had a percutaneous cholecystostomy drain placed. She also had 1U PRBCs.  She was then readmitted to [facility] from 10/22 - 11/6/23 for tube dislodgement, with CT showing ascending colon wall thickening, with a few liver lesions c/w mucinous metastases (largest 2.5 x 2.0 cm in the R hepatic lobe, others included a 0.8 cm segment 4A/8 lesion and a 0.9 cm segment 2/3 lesion). She also had continued anemia (Hb 7.0) requiring 2U PRBCs during admission. She underwent a colonoscopy on 10/24 that showed a circumferential ascending colon mass about 5 cm above the cecum, biopsy showing adenocarcinoma, moderately differentiated with a mucinous component. On 11/1/23, she underwent a R hemicolectomy with cholecystectomy, showing a 10.2 cm tumor with negative margins (closest 0.3 cm), no LVI or PNI, 0/25 LNs positive, and intermediate (5-9) tumor budding score. MS stable. T3N0  She lives with her , daughter and her , and her 2 grandchildren.  Referred to medical oncology for further management.   1/8/24: CT chest: no suspicious pulm nodules  MRI Abd: bilobar hepatic lesions, suspicious for mucinous hepatic metastases, with lesion increased in size, new or stable compared to prior  1/12/24: Liver bx: met colon ca 1/22/24: C1 5FU/LV 2/7/24: C2 FOLFOX, oxaliplatin 65 mg/m2 2/19/24: C3, Oxaliplatin 75 mg/m2 3/4/24: C4, Oxaliplatin 65 mg/m2  [de-identified] : moderately differentiated adenocarcinoma with mucinous component, GEORGINA [de-identified] : CEA 57.5 (10/24/23) [de-identified] : KRAS Q61H NRAS wildtype APC [id number]*18, R876* FBXW7 R465C PIK3CA E545K [de-identified] : Here with her daughter for treatment. C/o diarrhea 4-5 days post-treatment. She waiting 3 days to take imodium. States she thought it was "due to a cold." C/o new numbness in her right foot/toes that has been present since last treatment. Fingers have numbness with cold only.

## 2024-03-04 NOTE — REVIEW OF SYSTEMS
[Recent Change In Weight] : ~T recent weight change [Joint Pain] : joint pain [Joint Stiffness] : joint stiffness [Diarrhea: Grade 1 - Increase of <4 stools per day over baseline; mild increase in ostomy output compared to baseline] : Diarrhea: Grade 1 - Increase of <4 stools per day over baseline; mild increase in ostomy output compared to baseline [Negative] : Constitutional [de-identified] : +PN

## 2024-03-04 NOTE — PHYSICAL EXAM
[Restricted in physically strenuous activity but ambulatory and able to carry out work of a light or sedentary nature] : Status 1- Restricted in physically strenuous activity but ambulatory and able to carry out work of a light or sedentary nature, e.g., light house work, office work [Thin] : thin [Normal] : affect appropriate [de-identified] : erythematous, macular, slightly reticular lesions (telangiectasias) throughout her hands and face, stable

## 2024-03-04 NOTE — REASON FOR VISIT
[Follow-Up Visit] : a follow-up [Ad Hoc ] : provided by an ad hoc  [Family Member] : family member [FreeTextEntry2] : Stage IV colon cancer [Interpreters_IDNumber] : [id number] [Interpreters_FullName] : Rosenda [FreeTextEntry3] : Mandarin [TWNoteComboBox1] : Other

## 2024-03-04 NOTE — ASSESSMENT
[Palliative] : Goals of care discussed with patient: Palliative [Palliative Care Plan] : not applicable at this time [FreeTextEntry1] : Patient seen and discussed with Dr. Leena Guadalupe.

## 2024-03-05 DIAGNOSIS — R11.2 NAUSEA WITH VOMITING, UNSPECIFIED: ICD-10-CM

## 2024-03-05 DIAGNOSIS — Z51.11 ENCOUNTER FOR ANTINEOPLASTIC CHEMOTHERAPY: ICD-10-CM

## 2024-03-06 ENCOUNTER — APPOINTMENT (OUTPATIENT)
Dept: INFUSION THERAPY | Facility: HOSPITAL | Age: 69
End: 2024-03-06

## 2024-03-07 ENCOUNTER — APPOINTMENT (OUTPATIENT)
Dept: CT IMAGING | Facility: IMAGING CENTER | Age: 69
End: 2024-03-07
Payer: MEDICAID

## 2024-03-07 ENCOUNTER — APPOINTMENT (OUTPATIENT)
Dept: INTERNAL MEDICINE | Facility: CLINIC | Age: 69
End: 2024-03-07
Payer: MEDICAID

## 2024-03-07 ENCOUNTER — OUTPATIENT (OUTPATIENT)
Dept: OUTPATIENT SERVICES | Facility: HOSPITAL | Age: 69
LOS: 1 days | End: 2024-03-07
Payer: MEDICAID

## 2024-03-07 VITALS
SYSTOLIC BLOOD PRESSURE: 135 MMHG | TEMPERATURE: 97.3 F | DIASTOLIC BLOOD PRESSURE: 85 MMHG | WEIGHT: 98 LBS | BODY MASS INDEX: 19.24 KG/M2 | HEIGHT: 60 IN | RESPIRATION RATE: 15 BRPM

## 2024-03-07 VITALS — DIASTOLIC BLOOD PRESSURE: 82 MMHG | SYSTOLIC BLOOD PRESSURE: 122 MMHG

## 2024-03-07 DIAGNOSIS — C18.9 MALIGNANT NEOPLASM OF COLON, UNSPECIFIED: ICD-10-CM

## 2024-03-07 DIAGNOSIS — Z87.19 PERSONAL HISTORY OF OTHER DISEASES OF THE DIGESTIVE SYSTEM: ICD-10-CM

## 2024-03-07 DIAGNOSIS — U07.1 COVID-19: ICD-10-CM

## 2024-03-07 DIAGNOSIS — D50.9 IRON DEFICIENCY ANEMIA, UNSPECIFIED: ICD-10-CM

## 2024-03-07 DIAGNOSIS — M34.9 SYSTEMIC SCLEROSIS, UNSPECIFIED: ICD-10-CM

## 2024-03-07 DIAGNOSIS — T85.528A DISPLACEMENT OF OTHER GASTROINTESTINAL PROSTHETIC DEVICES, IMPLANTS AND GRAFTS, INITIAL ENCOUNTER: Chronic | ICD-10-CM

## 2024-03-07 PROCEDURE — 74174 CTA ABD&PLVS W/CONTRAST: CPT | Mod: 26

## 2024-03-07 PROCEDURE — 99204 OFFICE O/P NEW MOD 45 MIN: CPT

## 2024-03-07 PROCEDURE — 71260 CT THORAX DX C+: CPT | Mod: 26

## 2024-03-07 PROCEDURE — 74174 CTA ABD&PLVS W/CONTRAST: CPT

## 2024-03-07 PROCEDURE — 71260 CT THORAX DX C+: CPT

## 2024-03-07 NOTE — PHYSICAL EXAM
[No Acute Distress] : no acute distress [Well Nourished] : well nourished [Well Developed] : well developed [Supple] : supple [No Respiratory Distress] : no respiratory distress  [Clear to Auscultation] : lungs were clear to auscultation bilaterally [Regular Rhythm] : with a regular rhythm [Normal Rate] : normal rate  [Normal S1, S2] : normal S1 and S2 [No Edema] : there was no peripheral edema [Non Tender] : non-tender [Soft] : abdomen soft [Normal Bowel Sounds] : normal bowel sounds [No CVA Tenderness] : no CVA  tenderness [No Spinal Tenderness] : no spinal tenderness [Normal Gait] : normal gait [Normal Affect] : the affect was normal [Speech Grossly Normal] : speech grossly normal [Alert and Oriented x3] : oriented to person, place, and time [de-identified] : Well healed surgical scars, [de-identified] : female in stated age,  [de-identified] : Fluent in Mandarin,

## 2024-03-07 NOTE — HISTORY OF PRESENT ILLNESS
[Spouse] : spouse [Other: _____] : [unfilled] [FreeTextEntry8] : Pt presented for the 1st time to establish care.    [place] end of [place].  She went to ED in 9/2023 for abdominal pain. she was found to have cholecystitis and profound anemia.  She had percutaneous cholecystostomy and PRBC.  Pt went back to ED at the end of 10/2023 as the cholecystostomy tube dislodged.  Pt also did not have recommended GI eval yet as she did not have medical insurance at that time.  Pt had CT scan and noted colon mass.  Work up found that she has colon cancer but also liver mets.  Pt had hemicolectomy and then treated with chemotherapy.  She tolerated treatment well and is eating ok.  She is not in any pain at present.  She initially had diarrhea on chemotherapy but that has resolved.  Pt is on HTN meds and HLD meds from [place], she stopped statin about a month ago as she thought she did not need it anymore.  She also has a h/o scleroderma for many years and is not on meds for that.  She was on meds for RA, but she may not be taking it now as she stopped it thinking that she does not need it anymore.  She has a diagnosis of pulmonary HTM, and was taking meds for that, but had a f/u about a year ago in [place], she was told her pressure has improved, and told her to stop meds.  She stated she feels well, denied joined pain and denied any CP, SOB or dizziness.  Pt was also told that she has osteoporosis, but not on treatment.  She was told that osteoporosis was due to some of the meds for RA.  Pt c/o feeling tired, does not have same energy as before.  She also feels that she does not sleep well.  Her  noted that she has a stronger odor in her mouth.

## 2024-03-07 NOTE — ASSESSMENT
[FreeTextEntry1] : Pt advised to reschedule apt for PE.  She was given Rx to check routine labs prior to visit.  She was also given Rx to check mammogram.

## 2024-03-15 ENCOUNTER — APPOINTMENT (OUTPATIENT)
Dept: INTERNAL MEDICINE | Facility: CLINIC | Age: 69
End: 2024-03-15

## 2024-03-15 ENCOUNTER — APPOINTMENT (OUTPATIENT)
Dept: SURGICAL ONCOLOGY | Facility: CLINIC | Age: 69
End: 2024-03-15
Payer: MEDICAID

## 2024-03-15 ENCOUNTER — APPOINTMENT (OUTPATIENT)
Dept: CARDIOLOGY | Facility: CLINIC | Age: 69
End: 2024-03-15

## 2024-03-15 VITALS
HEART RATE: 83 BPM | DIASTOLIC BLOOD PRESSURE: 85 MMHG | BODY MASS INDEX: 19.22 KG/M2 | WEIGHT: 99.21 LBS | RESPIRATION RATE: 17 BRPM | SYSTOLIC BLOOD PRESSURE: 136 MMHG | HEIGHT: 60.24 IN | OXYGEN SATURATION: 96 %

## 2024-03-15 PROCEDURE — 99215 OFFICE O/P EST HI 40 MIN: CPT

## 2024-03-15 NOTE — HISTORY OF PRESENT ILLNESS
[de-identified] : 68F presents for a follow-up visit or a resected right sided colon ca with synchronous bilobar liver mets.  GEORGINA  [place] the [place] from [place] in the summer of 2023. In September, she presented with abdominal pain and was found to have a Hb 6.8 with acute cholecystitis and had a percutaneous cholecystostomy drain placed. She also had 1U PRBCs.  She was then readmitted to [facility] from 10/22 - 11/6/23 for tube dislodgement, with CT showing ascending colon wall thickening, with indeterminate hepatic lesions. She also had continued anemia (Hb 7.0) requiring 2U PRBCs during admission. She underwent a colonoscopy on 10/24/23 that showed a circumferential ascending colon mass about 5 cm above the cecum, biopsy showing adenocarcinoma, moderately differentiated with a mucinous component. On 11/1/23, she underwent a R hemicolectomy with cholecystectomy, with Dr. Stacey Bowers at [facility]. Path showed a 10.2 cm tumor with negative margins, 0/25 lymph nodes positive for carcinoma, MS stable. T3N0.   1/3/24 CEA 88.1 1/8/24 CT chest no suspicious pulmonary nodules. 1/8/24 MRI abd (eovist) Bilobar hepatic lesions, suspicious for mucinous hepatic metastases. 1/12/24 liver biopsy metastatic adenocarcinoma with mucinous features, compatible with previously diagnosed colonic adenocarcinoma.   Chemo hx:  Start date 1/22/24: C1 5FU/LV    2/7/24: C2 FOLFOX, oxaliplatin    2/19/24: C3, Oxaliplatin    3/4/24: C4, Oxaliplatin Medical oncologist is Dr Guadalupe   CT angio chest/ abdomen/ pelvis 3/7/24 showing enlarging hepatic metastases when compared to prior MRI 1/8/2024.  *Segment 7/8 measures 3.0 x 3.1 cm,  increased from 2.4 x 2.7 cm *Segment 2 lesion 1.0 x 1.3 cm, previously 0.7 cm *Segment 3 lesion 0.7 x 1.1 cm, previously 0.7 x 1.0 cm *Segment 7 lesion 0.9 x 0.7 cm, previously not measurable No evidence of metastatic disease in the chest, abdomen or pelvis otherwise. Postsurgical changes from partial colonic resection and ileocolonic anastomosis with mildly distended and fluid-filled proximal small bowel loops without transition point. Findings are suggestive of a mild degree partial small bowel obstruction possibly due to adhesions.  3/4/24 CEA: 74.6   She presents for followup after recent CT.  First 2 cycles chemo drew well.  After 3rd cycle, peripheral neuropathy and then diarrhea- improving with imodium.  No longer on steroid for scleroderma.  Has new lower back pain today. She lifted yesterday to take out the garbage.  Stable weight, eating well.  Next chemo planned for 3/18.  PMH: Scleroderma, HTN, RA, mild pulmonary HTN.  PSH: partial colectomy Social: She lives with her , daughter and her , and her 2 grandchildren.

## 2024-03-15 NOTE — REASON FOR VISIT
[Follow-Up Visit] : a follow-up visit for [[employer] ] : provided by [employer]   [Source: ______] : History obtained from [unfilled] [FreeTextEntry2] : colon ca with liver mets [Interpreters_IDNumber] : [id number] [Interpreters_FullName] : Emelia

## 2024-03-15 NOTE — ASSESSMENT
[FreeTextEntry1] : 68F with a resected right sided colon cancer with synchronous bilobar liver metastasis.  Tumors appear potentially clearable with a combination of resection and ablation.  The right posterior tumor is too large for ablation at this time.  She presents after receiving 4 cycles of systemic chemotherapy, and interval CT from 3/7 showing increase in size of liver lesions, otherwise no evidence of metastatic disease.  Discussed with medical oncology who feels progression is not failure of therapy, but related to time off therapy and fact that there was not a true pre-therapy baseline.   Previously discussed the concept of adjuvant hepatic artery infusion pump and patient and daughter are very interested in this approach. We discussed importance of compliance with therapy and need to be seen in infusion clinic every 2 weeks.    Plan; 3 additional cycles of FOLFOX- last cycle April 15th Plan for DEVIN pump placment  week of May 13th Eovist MRI, and CT CAP prior to pump placement Will schedule surgery date and flow scan in the meantime PST, medical clearance once scheduled

## 2024-03-15 NOTE — CONSULT LETTER
[Consult Letter:] : I had the pleasure of evaluating your patient, [unfilled]. [Dear  ___] : Dear  [unfilled], [Consult Closing:] : Thank you very much for allowing me to participate in the care of this patient.  If you have any questions, please do not hesitate to contact me. [Sincerely,] : Sincerely, [FreeTextEntry3] : Brenda Rubio MD

## 2024-03-18 ENCOUNTER — LABORATORY RESULT (OUTPATIENT)
Age: 69
End: 2024-03-18

## 2024-03-18 ENCOUNTER — RESULT REVIEW (OUTPATIENT)
Age: 69
End: 2024-03-18

## 2024-03-18 ENCOUNTER — APPOINTMENT (OUTPATIENT)
Dept: HEMATOLOGY ONCOLOGY | Facility: CLINIC | Age: 69
End: 2024-03-18

## 2024-03-18 ENCOUNTER — APPOINTMENT (OUTPATIENT)
Dept: INFUSION THERAPY | Facility: HOSPITAL | Age: 69
End: 2024-03-18

## 2024-03-18 ENCOUNTER — APPOINTMENT (OUTPATIENT)
Dept: HEMATOLOGY ONCOLOGY | Facility: CLINIC | Age: 69
End: 2024-03-18
Payer: MEDICAID

## 2024-03-18 LAB
ANISOCYTOSIS BLD QL: SLIGHT — SIGNIFICANT CHANGE UP
BASOPHILS # BLD AUTO: 0.02 K/UL — SIGNIFICANT CHANGE UP (ref 0–0.2)
BASOPHILS NFR BLD AUTO: 0.7 % — SIGNIFICANT CHANGE UP (ref 0–2)
DACRYOCYTES BLD QL SMEAR: SLIGHT — SIGNIFICANT CHANGE UP
ELLIPTOCYTES BLD QL SMEAR: SLIGHT — SIGNIFICANT CHANGE UP
EOSINOPHIL # BLD AUTO: 0.11 K/UL — SIGNIFICANT CHANGE UP (ref 0–0.5)
EOSINOPHIL NFR BLD AUTO: 3.7 % — SIGNIFICANT CHANGE UP (ref 0–6)
HCT VFR BLD CALC: 32.1 % — LOW (ref 34.5–45)
HGB BLD-MCNC: 10 G/DL — LOW (ref 11.5–15.5)
IMM GRANULOCYTES NFR BLD AUTO: 3 % — HIGH (ref 0–0.9)
LYMPHOCYTES # BLD AUTO: 0.69 K/UL — LOW (ref 1–3.3)
LYMPHOCYTES # BLD AUTO: 23.1 % — SIGNIFICANT CHANGE UP (ref 13–44)
MCHC RBC-ENTMCNC: 25.5 PG — LOW (ref 27–34)
MCHC RBC-ENTMCNC: 31.2 G/DL — LOW (ref 32–36)
MCV RBC AUTO: 81.9 FL — SIGNIFICANT CHANGE UP (ref 80–100)
MONOCYTES # BLD AUTO: 0.32 K/UL — SIGNIFICANT CHANGE UP (ref 0–0.9)
MONOCYTES NFR BLD AUTO: 10.7 % — SIGNIFICANT CHANGE UP (ref 2–14)
NEUTROPHILS # BLD AUTO: 1.76 K/UL — LOW (ref 1.8–7.4)
NEUTROPHILS NFR BLD AUTO: 58.8 % — SIGNIFICANT CHANGE UP (ref 43–77)
NRBC # BLD: 0 /100 WBCS — SIGNIFICANT CHANGE UP (ref 0–0)
PLAT MORPH BLD: NORMAL — SIGNIFICANT CHANGE UP
PLATELET # BLD AUTO: 114 K/UL — LOW (ref 150–400)
POIKILOCYTOSIS BLD QL AUTO: SLIGHT — SIGNIFICANT CHANGE UP
RBC # BLD: 3.92 M/UL — SIGNIFICANT CHANGE UP (ref 3.8–5.2)
RBC # FLD: 22.5 % — HIGH (ref 10.3–14.5)
RBC BLD AUTO: ABNORMAL
WBC # BLD: 2.99 K/UL — LOW (ref 3.8–10.5)
WBC # FLD AUTO: 2.99 K/UL — LOW (ref 3.8–10.5)

## 2024-03-18 PROCEDURE — 99214 OFFICE O/P EST MOD 30 MIN: CPT

## 2024-03-18 NOTE — REASON FOR VISIT
[Follow-Up Visit] : a follow-up [Family Member] : family member [Ad Hoc ] : provided by an ad hoc  [Interpreters_IDNumber] : [id number] [Interpreters_FullName] : Rosenda [FreeTextEntry2] : Stage IV colon cancer on chemo  [FreeTextEntry3] : Mandarin [TWNoteComboBox1] : Other

## 2024-03-18 NOTE — HISTORY OF PRESENT ILLNESS
[Disease: _____________________] : Disease: [unfilled] [T: ___] : T[unfilled] [N: ___] : N[unfilled] [M: ___] : M[unfilled] [AJCC Stage: ____] : AJCC Stage: [unfilled] [Therapy: ___] : Therapy: [unfilled] [Cycle: ___] : Cycle: [unfilled] [Day: ___] : Day: [unfilled] [de-identified] : 69 yo Mandarin-speaking F with a PMH of HTN, RA, scleroderma, and mild pulmonary hypertension who presents for management of Stage IV R colon cancer, GEORGINAAngela Stevens has been gradually losing weight over years. She has also had lifelong intermittent diarrhea for decades, but she has never had a workup for it. Her diarrhea is food related. She had an EGD about 10 years ago that showed esophageal inflammation and acid reflux, but she has never had a colonoscopy prior to the below events. She originally moved to the  from [place] in the summer of 2023. In September, she presented with abdominal pain and was found to have a Hb 6.8 with acute cholecystitis and had a percutaneous cholecystostomy drain placed. She also had 1U PRBCs.  She was then readmitted to [facility] from 10/22 - 11/6/23 for tube dislodgement, with CT showing ascending colon wall thickening, with a few liver lesions c/w mucinous metastases (largest 2.5 x 2.0 cm in the R hepatic lobe, others included a 0.8 cm segment 4A/8 lesion and a 0.9 cm segment 2/3 lesion). She also had continued anemia (Hb 7.0) requiring 2U PRBCs during admission. She underwent a colonoscopy on 10/24 that showed a circumferential ascending colon mass about 5 cm above the cecum, biopsy showing adenocarcinoma, moderately differentiated with a mucinous component. On 11/1/23, she underwent a R hemicolectomy with cholecystectomy, showing a 10.2 cm tumor with negative margins (closest 0.3 cm), no LVI or PNI, 0/25 LNs positive, and intermediate (5-9) tumor budding score. MS stable. T3N0  She lives with her , daughter and her , and her 2 grandchildren.  Referred to medical oncology for further management.   1/8/24: CT chest: no suspicious pulm nodules  MRI Abd: bilobar hepatic lesions, suspicious for mucinous hepatic metastases, with lesion increased in size, new or stable compared to prior  1/12/24: Liver bx: met colon ca 1/22/24: C1 5FU/LV 2/7/24: C2 FOLFOX, oxaliplatin 65 mg/m2 2/19/24: C3, Oxaliplatin 75 mg/m2 3/4/24: C4, Oxaliplatin 65 mg/m2 3/7/24: CT Chest, CTA a/p: enlarging hepatic mets 3/18/24: C5  [de-identified] : moderately differentiated adenocarcinoma with mucinous component, GEORGINA [de-identified] : CEA 57.5 (10/24/23) [de-identified] : KRAS Q61H NRAS wildtype APC [id number]*18, R876* FBXW7 R465C PIK3CA E545K [de-identified] : Diarrhea: took imodium at 3rd episode of diarrhea. Took imodium x 2 with relief. Eating and drinking normally. Hands have cold sensitivity only.  saw HB surgeon last week and plan is for ANGELI pump placement after a few cycles of chemo

## 2024-03-18 NOTE — REVIEW OF SYSTEMS
[Recent Change In Weight] : ~T recent weight change [Diarrhea: Grade 1 - Increase of <4 stools per day over baseline; mild increase in ostomy output compared to baseline] : Diarrhea: Grade 1 - Increase of <4 stools per day over baseline; mild increase in ostomy output compared to baseline [Joint Pain] : joint pain [Joint Stiffness] : joint stiffness [Negative] : Allergic/Immunologic [de-identified] : +PN

## 2024-03-18 NOTE — PHYSICAL EXAM
[Restricted in physically strenuous activity but ambulatory and able to carry out work of a light or sedentary nature] : Status 1- Restricted in physically strenuous activity but ambulatory and able to carry out work of a light or sedentary nature, e.g., light house work, office work [Thin] : thin [Normal] : affect appropriate [de-identified] : erythematous, macular, slightly reticular lesions (telangiectasias) throughout her hands and face, stable

## 2024-03-20 ENCOUNTER — APPOINTMENT (OUTPATIENT)
Dept: INFUSION THERAPY | Facility: HOSPITAL | Age: 69
End: 2024-03-20

## 2024-03-26 ENCOUNTER — OUTPATIENT (OUTPATIENT)
Dept: OUTPATIENT SERVICES | Facility: HOSPITAL | Age: 69
LOS: 1 days | End: 2024-03-26
Payer: MEDICAID

## 2024-03-26 ENCOUNTER — APPOINTMENT (OUTPATIENT)
Dept: CT IMAGING | Facility: IMAGING CENTER | Age: 69
End: 2024-03-26

## 2024-03-26 DIAGNOSIS — C18.9 MALIGNANT NEOPLASM OF COLON, UNSPECIFIED: ICD-10-CM

## 2024-03-26 DIAGNOSIS — T85.528A DISPLACEMENT OF OTHER GASTROINTESTINAL PROSTHETIC DEVICES, IMPLANTS AND GRAFTS, INITIAL ENCOUNTER: Chronic | ICD-10-CM

## 2024-03-26 PROCEDURE — 71260 CT THORAX DX C+: CPT

## 2024-03-26 PROCEDURE — 71260 CT THORAX DX C+: CPT | Mod: 26

## 2024-03-26 PROCEDURE — 74177 CT ABD & PELVIS W/CONTRAST: CPT | Mod: 26

## 2024-03-26 PROCEDURE — 74177 CT ABD & PELVIS W/CONTRAST: CPT

## 2024-03-28 ENCOUNTER — EMERGENCY (EMERGENCY)
Facility: HOSPITAL | Age: 69
LOS: 1 days | Discharge: HOME | End: 2024-03-28
Attending: EMERGENCY MEDICINE
Payer: MEDICAID

## 2024-03-28 VITALS
WEIGHT: 99.21 LBS | HEIGHT: 59.84 IN | TEMPERATURE: 98 F | RESPIRATION RATE: 18 BRPM | OXYGEN SATURATION: 98 % | SYSTOLIC BLOOD PRESSURE: 148 MMHG | DIASTOLIC BLOOD PRESSURE: 87 MMHG | HEART RATE: 81 BPM

## 2024-03-28 VITALS
OXYGEN SATURATION: 98 % | TEMPERATURE: 98 F | DIASTOLIC BLOOD PRESSURE: 70 MMHG | SYSTOLIC BLOOD PRESSURE: 134 MMHG | RESPIRATION RATE: 18 BRPM | HEART RATE: 58 BPM

## 2024-03-28 DIAGNOSIS — T85.528A DISPLACEMENT OF OTHER GASTROINTESTINAL PROSTHETIC DEVICES, IMPLANTS AND GRAFTS, INITIAL ENCOUNTER: Chronic | ICD-10-CM

## 2024-03-28 LAB
ALBUMIN SERPL ELPH-MCNC: 3.9 G/DL — SIGNIFICANT CHANGE UP (ref 3.3–5)
ALP SERPL-CCNC: 133 U/L — HIGH (ref 40–120)
ALT FLD-CCNC: 27 U/L — SIGNIFICANT CHANGE UP (ref 10–45)
ANION GAP SERPL CALC-SCNC: 13 MMOL/L — SIGNIFICANT CHANGE UP (ref 5–17)
ANISOCYTOSIS BLD QL: SLIGHT — SIGNIFICANT CHANGE UP
APPEARANCE UR: CLEAR — SIGNIFICANT CHANGE UP
AST SERPL-CCNC: 34 U/L — SIGNIFICANT CHANGE UP (ref 10–40)
BASE EXCESS BLDV CALC-SCNC: -1.5 MMOL/L — SIGNIFICANT CHANGE UP (ref -2–3)
BASOPHILS # BLD AUTO: 0 K/UL — SIGNIFICANT CHANGE UP (ref 0–0.2)
BASOPHILS NFR BLD AUTO: 0 % — SIGNIFICANT CHANGE UP (ref 0–2)
BILIRUB SERPL-MCNC: 0.3 MG/DL — SIGNIFICANT CHANGE UP (ref 0.2–1.2)
BILIRUB UR-MCNC: NEGATIVE — SIGNIFICANT CHANGE UP
BUN SERPL-MCNC: 23 MG/DL — SIGNIFICANT CHANGE UP (ref 7–23)
CA-I SERPL-SCNC: 1.29 MMOL/L — SIGNIFICANT CHANGE UP (ref 1.15–1.33)
CALCIUM SERPL-MCNC: 9.6 MG/DL — SIGNIFICANT CHANGE UP (ref 8.4–10.5)
CHLORIDE BLDV-SCNC: 110 MMOL/L — HIGH (ref 96–108)
CHLORIDE SERPL-SCNC: 111 MMOL/L — HIGH (ref 96–108)
CO2 BLDV-SCNC: 26 MMOL/L — SIGNIFICANT CHANGE UP (ref 22–26)
CO2 SERPL-SCNC: 22 MMOL/L — SIGNIFICANT CHANGE UP (ref 22–31)
COLOR SPEC: YELLOW — SIGNIFICANT CHANGE UP
CREAT SERPL-MCNC: 0.67 MG/DL — SIGNIFICANT CHANGE UP (ref 0.5–1.3)
DACRYOCYTES BLD QL SMEAR: SLIGHT — SIGNIFICANT CHANGE UP
DIFF PNL FLD: NEGATIVE — SIGNIFICANT CHANGE UP
EGFR: 95 ML/MIN/1.73M2 — SIGNIFICANT CHANGE UP
ELLIPTOCYTES BLD QL SMEAR: SLIGHT — SIGNIFICANT CHANGE UP
EOSINOPHIL # BLD AUTO: 0.08 K/UL — SIGNIFICANT CHANGE UP (ref 0–0.5)
EOSINOPHIL NFR BLD AUTO: 2.6 % — SIGNIFICANT CHANGE UP (ref 0–6)
GAS PNL BLDV: 142 MMOL/L — SIGNIFICANT CHANGE UP (ref 136–145)
GAS PNL BLDV: SIGNIFICANT CHANGE UP
GAS PNL BLDV: SIGNIFICANT CHANGE UP
GIANT PLATELETS BLD QL SMEAR: PRESENT — SIGNIFICANT CHANGE UP
GLUCOSE BLDV-MCNC: 106 MG/DL — HIGH (ref 70–99)
GLUCOSE SERPL-MCNC: 100 MG/DL — HIGH (ref 70–99)
GLUCOSE UR QL: NEGATIVE MG/DL — SIGNIFICANT CHANGE UP
HCO3 BLDV-SCNC: 24 MMOL/L — SIGNIFICANT CHANGE UP (ref 22–29)
HCT VFR BLD CALC: 29.8 % — LOW (ref 34.5–45)
HCT VFR BLDA CALC: 29 % — LOW (ref 34.5–46.5)
HGB BLD CALC-MCNC: 9.6 G/DL — LOW (ref 11.7–16.1)
HGB BLD-MCNC: 9.1 G/DL — LOW (ref 11.5–15.5)
KETONES UR-MCNC: NEGATIVE MG/DL — SIGNIFICANT CHANGE UP
LACTATE BLDV-MCNC: 1.5 MMOL/L — SIGNIFICANT CHANGE UP (ref 0.5–2)
LEUKOCYTE ESTERASE UR-ACNC: NEGATIVE — SIGNIFICANT CHANGE UP
LIDOCAIN IGE QN: 56 U/L — SIGNIFICANT CHANGE UP (ref 7–60)
LYMPHOCYTES # BLD AUTO: 0.76 K/UL — LOW (ref 1–3.3)
LYMPHOCYTES # BLD AUTO: 25.4 % — SIGNIFICANT CHANGE UP (ref 13–44)
MANUAL SMEAR VERIFICATION: SIGNIFICANT CHANGE UP
MCHC RBC-ENTMCNC: 25.8 PG — LOW (ref 27–34)
MCHC RBC-ENTMCNC: 30.5 GM/DL — LOW (ref 32–36)
MCV RBC AUTO: 84.4 FL — SIGNIFICANT CHANGE UP (ref 80–100)
MICROCYTES BLD QL: SLIGHT — SIGNIFICANT CHANGE UP
MONOCYTES # BLD AUTO: 0.26 K/UL — SIGNIFICANT CHANGE UP (ref 0–0.9)
MONOCYTES NFR BLD AUTO: 8.8 % — SIGNIFICANT CHANGE UP (ref 2–14)
MYELOCYTES NFR BLD: 0.9 % — HIGH (ref 0–0)
NEUTROPHILS # BLD AUTO: 1.88 K/UL — SIGNIFICANT CHANGE UP (ref 1.8–7.4)
NEUTROPHILS NFR BLD AUTO: 62.3 % — SIGNIFICANT CHANGE UP (ref 43–77)
NITRITE UR-MCNC: NEGATIVE — SIGNIFICANT CHANGE UP
NRBC # BLD: 1 /100 WBCS — HIGH (ref 0–0)
PCO2 BLDV: 45 MMHG — HIGH (ref 39–42)
PH BLDV: 7.34 — SIGNIFICANT CHANGE UP (ref 7.32–7.43)
PH UR: 5 — SIGNIFICANT CHANGE UP (ref 5–8)
PLAT MORPH BLD: NORMAL — SIGNIFICANT CHANGE UP
PLATELET # BLD AUTO: 115 K/UL — LOW (ref 150–400)
PO2 BLDV: 28 MMHG — SIGNIFICANT CHANGE UP (ref 25–45)
POIKILOCYTOSIS BLD QL AUTO: SIGNIFICANT CHANGE UP
POLYCHROMASIA BLD QL SMEAR: SLIGHT — SIGNIFICANT CHANGE UP
POTASSIUM BLDV-SCNC: 4.6 MMOL/L — SIGNIFICANT CHANGE UP (ref 3.5–5.1)
POTASSIUM SERPL-MCNC: 4.8 MMOL/L — SIGNIFICANT CHANGE UP (ref 3.5–5.3)
POTASSIUM SERPL-SCNC: 4.8 MMOL/L — SIGNIFICANT CHANGE UP (ref 3.5–5.3)
PROT SERPL-MCNC: 7.1 G/DL — SIGNIFICANT CHANGE UP (ref 6–8.3)
PROT UR-MCNC: NEGATIVE MG/DL — SIGNIFICANT CHANGE UP
RBC # BLD: 3.53 M/UL — LOW (ref 3.8–5.2)
RBC # FLD: 21.8 % — HIGH (ref 10.3–14.5)
RBC BLD AUTO: ABNORMAL
SAO2 % BLDV: 31 % — LOW (ref 67–88)
SCHISTOCYTES BLD QL AUTO: SLIGHT — SIGNIFICANT CHANGE UP
SODIUM SERPL-SCNC: 146 MMOL/L — HIGH (ref 135–145)
SP GR SPEC: >1.03 — HIGH (ref 1–1.03)
UROBILINOGEN FLD QL: 1 MG/DL — SIGNIFICANT CHANGE UP (ref 0.2–1)
WBC # BLD: 3.01 K/UL — LOW (ref 3.8–10.5)
WBC # FLD AUTO: 3.01 K/UL — LOW (ref 3.8–10.5)

## 2024-03-28 PROCEDURE — 74177 CT ABD & PELVIS W/CONTRAST: CPT | Mod: MC

## 2024-03-28 PROCEDURE — 85025 COMPLETE CBC W/AUTO DIFF WBC: CPT

## 2024-03-28 PROCEDURE — 84132 ASSAY OF SERUM POTASSIUM: CPT

## 2024-03-28 PROCEDURE — 96374 THER/PROPH/DIAG INJ IV PUSH: CPT | Mod: XU

## 2024-03-28 PROCEDURE — 82330 ASSAY OF CALCIUM: CPT

## 2024-03-28 PROCEDURE — 85018 HEMOGLOBIN: CPT

## 2024-03-28 PROCEDURE — 82947 ASSAY GLUCOSE BLOOD QUANT: CPT

## 2024-03-28 PROCEDURE — 87086 URINE CULTURE/COLONY COUNT: CPT

## 2024-03-28 PROCEDURE — 99284 EMERGENCY DEPT VISIT MOD MDM: CPT | Mod: 25

## 2024-03-28 PROCEDURE — 84295 ASSAY OF SERUM SODIUM: CPT

## 2024-03-28 PROCEDURE — 99285 EMERGENCY DEPT VISIT HI MDM: CPT

## 2024-03-28 PROCEDURE — 82435 ASSAY OF BLOOD CHLORIDE: CPT

## 2024-03-28 PROCEDURE — 83690 ASSAY OF LIPASE: CPT

## 2024-03-28 PROCEDURE — 82803 BLOOD GASES ANY COMBINATION: CPT

## 2024-03-28 PROCEDURE — 80053 COMPREHEN METABOLIC PANEL: CPT

## 2024-03-28 PROCEDURE — 85014 HEMATOCRIT: CPT

## 2024-03-28 PROCEDURE — 81003 URINALYSIS AUTO W/O SCOPE: CPT

## 2024-03-28 PROCEDURE — 74177 CT ABD & PELVIS W/CONTRAST: CPT | Mod: 26,MC

## 2024-03-28 PROCEDURE — 83605 ASSAY OF LACTIC ACID: CPT

## 2024-03-28 RX ORDER — SODIUM CHLORIDE 9 MG/ML
1000 INJECTION INTRAMUSCULAR; INTRAVENOUS; SUBCUTANEOUS ONCE
Refills: 0 | Status: COMPLETED | OUTPATIENT
Start: 2024-03-28 | End: 2024-03-28

## 2024-03-28 RX ORDER — ACETAMINOPHEN 500 MG
675 TABLET ORAL ONCE
Refills: 0 | Status: COMPLETED | OUTPATIENT
Start: 2024-03-28 | End: 2024-03-28

## 2024-03-28 RX ADMIN — SODIUM CHLORIDE 1000 MILLILITER(S): 9 INJECTION INTRAMUSCULAR; INTRAVENOUS; SUBCUTANEOUS at 16:12

## 2024-03-28 RX ADMIN — Medication 270 MILLIGRAM(S): at 16:11

## 2024-03-28 NOTE — ED ADULT NURSE NOTE - MODE OF DISCHARGE
Wheelchair Tremfya Counseling: I discussed with the patient the risks of guselkumab including but not limited to immunosuppression, serious infections, and drug reactions.  The patient understands that monitoring is required including a PPD at baseline and must alert us or the primary physician if symptoms of infection or other concerning signs are noted.

## 2024-03-28 NOTE — ED PROVIDER NOTE - CLINICAL SUMMARY MEDICAL DECISION MAKING FREE TEXT BOX
Patient is a 60-year-old female with a history of rheumatoid, scleroderma, metastatic colon cancer on chemotherapy who presents with pain. Patient and patient family member are poor historians.  Plan for CT abdomen pelvis with IV contrast and CBC/CMP to rule out acute pathology.  Most likely chronic manifestation of metastatic colon cancer.  No red flag symptoms for back pain, patient no longer endorses back pain.  Most likely dispo home after imaging. Patient is a 60-year-old female with a history of rheumatoid, scleroderma, metastatic colon cancer on chemotherapy who presents with pain. Patient and patient family member are poor historians.  Plan for CT abdomen pelvis with IV contrast and CBC/CMP to rule out acute pathology.  Most likely chronic manifestation of metastatic colon cancer.  No red flag symptoms for back pain, patient no longer endorses back pain.  Most likely dispo home after imaging.    Balbina: 60 year old female with RA, scleroderma, metastatic colon cancer on chemo who presents with pain. poor historian. lifting obxing and reporting lower abdominal pain. PE: att exam: patient awake alert NAD. LUNGS CTAB no wheeze no crackle. CARD RRR no m/r/g.  Abdomen soft NT ND no rebound no guarding no CVA tenderness. EXT WWP no edema no calf tenderness CV 2+DP/PT bilaterally. neuro A&Ox3. plan: will get labs, imaging, ct a/p r/o acute pathology, worsening of colon cancer, reassess.

## 2024-03-28 NOTE — ED PROVIDER NOTE - PHYSICAL EXAMINATION
Physical Exam:  Gen: NAD, non-toxic appearing  Head: NCAT  HEENT: Normal conjunctiva, trachea midline, moist mucous membranes  Lung: CTAB, no respiratory distress, no wheezes/rhonchi/rales B/L, speaking in full sentences  CV: RRR, no murmurs, rubs or gallops  Abd: Soft, NT, ND, no guarding, rigidity, rebound tenderness  MSK: No visible deformities, moves all four extremities, no pain with palpation of cervical/thoracic/lumbar spine  Neuro: No focal motor deficits  Skin: Warm, well perfused, no visible rashes, no leg swelling  Psych: appropriate affect and mood

## 2024-03-28 NOTE — ED PROVIDER NOTE - PATIENT PORTAL LINK FT
You can access the FollowMyHealth Patient Portal offered by [facility] by registering at the following website: [email or web address]. By joining [employer]’s FollowMyHealth portal, you will also be able to view your health information using other applications (apps) compatible with our system.

## 2024-03-28 NOTE — ED PROVIDER NOTE - NSFOLLOWUPINSTRUCTIONS_ED_ALL_ED_FT
You were evaluated in the Emergency Department today for your back pain. Your evaluation did not show signs of medical conditions requiring emergent intervention at this time.    Your imaging showed a compression fracture in your spine please follow-up with the spine specialist. Referral center will call you, if they are unable to call you please use the phone number provided below to make an appointment.    We recommend you take 600mg ibuprofen every 6 hours or tylenol 650mg every 6 hours as needed for pain. If needed, you can alternate these medications so that you take one medication every 3 hours. For instance, at noon take ibuprofen, then at 3pm take tylenol, then at 6pm take ibuprofen.    Return to the Emergency Department if you experience worsening back pain, difficulty walking, fevers, numbness, tingling, incontinence, or any other concerning symptoms.

## 2024-03-28 NOTE — ED ADULT NURSE NOTE - OBJECTIVE STATEMENT
69 y/o female complaining of back and hip pain x2 weeks. PT is A&Ox3, breathing spontaneously, who presents to the ED with back and bilateral hip pain after a twisting injury while taking out the garbage. Pt endorses ongoing pain, and current chemotherapy. PT denies chest pain, LOC, numbness or tingling. Pt resting comfortably in the stretcher, daughter at bedside. 69 y/o female complaining of back and hip pain x2 weeks. PT is A&Ox2, breathing spontaneously, who presents to the ED with back, abdominal pain and bilateral hip pain after a twisting injury while taking out the garbage. Pt endorses ongoing pain, and current chemotherapy. PT denies chest pain, LOC, numbness or tingling. Pt resting comfortably in the stretcher, daughter at bedside. Pt lung sound clear and equal bilat, abdomen soft tender in lower quadrants, non distendedx4 quadrants, negative for peripheral edema. 67 y/o female complaining of back and hip pain x2 weeks. PT is A&Ox3, breathing spontaneously, who presents to the ED with back, abdominal pain and bilateral hip pain after a twisting injury while taking out the garbage. Pt endorses ongoing pain, and current chemotherapy with pmh of Metastatic colon CA. PT denies chest pain, LOC, numbness or tingling. Pt resting comfortably in the stretcher, daughter at bedside. Pt lung sound clear and equal bilat, abdomen soft tender in lower quadrants, non distendedx4 quadrants, negative for peripheral edema.

## 2024-03-28 NOTE — ED PROVIDER NOTE - OBJECTIVE STATEMENT
Patient is a 60-year-old female with a history of rheumatoid, scleroderma, metastatic colon cancer on chemotherapy who presents with pain.  According to both patient and daughter at bedside, on the 15th she was lifting boxes and endorsed lower abdominal pain following that.  She denies any nausea, vomiting.  She had diarrhea this morning but is typical whenever she received chemotherapy.  She denies any dysuria, fevers, chills, numbness, tingling, urinary incontinence/retention. Patient is a 60-year-old female with a history of rheumatoid, scleroderma, metastatic colon cancer on chemotherapy who presents with pain.  According to both patient and daughter at bedside, on the 15th she was lifting boxes and endorsed lower abdominal pain following that.  She denies any nausea, vomiting.  She had diarrhea this morning but is typical whenever she received chemotherapy.  She denies any dysuria, fevers, chills, numbness, tingling, urinary incontinence/retention.  used.

## 2024-03-29 LAB
CULTURE RESULTS: SIGNIFICANT CHANGE UP
SPECIMEN SOURCE: SIGNIFICANT CHANGE UP

## 2024-04-01 ENCOUNTER — APPOINTMENT (OUTPATIENT)
Dept: HEMATOLOGY ONCOLOGY | Facility: CLINIC | Age: 69
End: 2024-04-01
Payer: MEDICAID

## 2024-04-01 ENCOUNTER — RESULT REVIEW (OUTPATIENT)
Age: 69
End: 2024-04-01

## 2024-04-01 ENCOUNTER — APPOINTMENT (OUTPATIENT)
Dept: INFUSION THERAPY | Facility: HOSPITAL | Age: 69
End: 2024-04-01

## 2024-04-01 LAB
ALBUMIN SERPL ELPH-MCNC: 3.7 G/DL — SIGNIFICANT CHANGE UP (ref 3.3–5)
ALP SERPL-CCNC: 138 U/L — HIGH (ref 40–120)
ALT FLD-CCNC: 42 U/L — SIGNIFICANT CHANGE UP (ref 10–45)
ANION GAP SERPL CALC-SCNC: 12 MMOL/L — SIGNIFICANT CHANGE UP (ref 5–17)
AST SERPL-CCNC: 66 U/L — HIGH (ref 10–40)
BASOPHILS # BLD AUTO: 0.03 K/UL — SIGNIFICANT CHANGE UP (ref 0–0.2)
BASOPHILS NFR BLD AUTO: 0.9 % — SIGNIFICANT CHANGE UP (ref 0–2)
BILIRUB SERPL-MCNC: 0.4 MG/DL — SIGNIFICANT CHANGE UP (ref 0.2–1.2)
BUN SERPL-MCNC: 21 MG/DL — SIGNIFICANT CHANGE UP (ref 7–23)
CALCIUM SERPL-MCNC: 9.5 MG/DL — SIGNIFICANT CHANGE UP (ref 8.4–10.5)
CEA SERPL-MCNC: 49.8 NG/ML — HIGH (ref 0–3.8)
CHLORIDE SERPL-SCNC: 110 MMOL/L — HIGH (ref 96–108)
CO2 SERPL-SCNC: 22 MMOL/L — SIGNIFICANT CHANGE UP (ref 22–31)
CREAT SERPL-MCNC: 0.58 MG/DL — SIGNIFICANT CHANGE UP (ref 0.5–1.3)
EGFR: 99 ML/MIN/1.73M2 — SIGNIFICANT CHANGE UP
EOSINOPHIL # BLD AUTO: 0.09 K/UL — SIGNIFICANT CHANGE UP (ref 0–0.5)
EOSINOPHIL NFR BLD AUTO: 2.7 % — SIGNIFICANT CHANGE UP (ref 0–6)
GLUCOSE SERPL-MCNC: 110 MG/DL — HIGH (ref 70–99)
HCT VFR BLD CALC: 32.4 % — LOW (ref 34.5–45)
HGB BLD-MCNC: 10.1 G/DL — LOW (ref 11.5–15.5)
IMM GRANULOCYTES NFR BLD AUTO: 1.8 % — HIGH (ref 0–0.9)
LYMPHOCYTES # BLD AUTO: 0.77 K/UL — LOW (ref 1–3.3)
LYMPHOCYTES # BLD AUTO: 23.2 % — SIGNIFICANT CHANGE UP (ref 13–44)
MCHC RBC-ENTMCNC: 26.2 PG — LOW (ref 27–34)
MCHC RBC-ENTMCNC: 31.2 G/DL — LOW (ref 32–36)
MCV RBC AUTO: 83.9 FL — SIGNIFICANT CHANGE UP (ref 80–100)
MONOCYTES # BLD AUTO: 0.32 K/UL — SIGNIFICANT CHANGE UP (ref 0–0.9)
MONOCYTES NFR BLD AUTO: 9.6 % — SIGNIFICANT CHANGE UP (ref 2–14)
NEUTROPHILS # BLD AUTO: 2.05 K/UL — SIGNIFICANT CHANGE UP (ref 1.8–7.4)
NEUTROPHILS NFR BLD AUTO: 61.8 % — SIGNIFICANT CHANGE UP (ref 43–77)
NRBC # BLD: 0 /100 WBCS — SIGNIFICANT CHANGE UP (ref 0–0)
PLATELET # BLD AUTO: 135 K/UL — LOW (ref 150–400)
POTASSIUM SERPL-MCNC: 3.4 MMOL/L — LOW (ref 3.5–5.3)
POTASSIUM SERPL-SCNC: 3.4 MMOL/L — LOW (ref 3.5–5.3)
PROT SERPL-MCNC: 6.6 G/DL — SIGNIFICANT CHANGE UP (ref 6–8.3)
RBC # BLD: 3.86 M/UL — SIGNIFICANT CHANGE UP (ref 3.8–5.2)
RBC # FLD: 22 % — HIGH (ref 10.3–14.5)
SODIUM SERPL-SCNC: 144 MMOL/L — SIGNIFICANT CHANGE UP (ref 135–145)
WBC # BLD: 3.32 K/UL — LOW (ref 3.8–10.5)
WBC # FLD AUTO: 3.32 K/UL — LOW (ref 3.8–10.5)

## 2024-04-01 PROCEDURE — 99214 OFFICE O/P EST MOD 30 MIN: CPT

## 2024-04-01 RX ORDER — ACETAMINOPHEN 325 MG/1
325 TABLET, FILM COATED ORAL EVERY 6 HOURS
Qty: 112 | Refills: 0 | Status: ACTIVE | COMMUNITY
Start: 2024-04-01 | End: 1900-01-01

## 2024-04-01 NOTE — REVIEW OF SYSTEMS
[Diarrhea: Grade 1 - Increase of <4 stools per day over baseline; mild increase in ostomy output compared to baseline] : Diarrhea: Grade 1 - Increase of <4 stools per day over baseline; mild increase in ostomy output compared to baseline [Joint Pain] : joint pain [Joint Stiffness] : joint stiffness [Negative] : Heme/Lymph [Constipation] : constipation [FreeTextEntry9] : +back pain [de-identified] : +PN

## 2024-04-01 NOTE — REASON FOR VISIT
[Follow-Up Visit] : a follow-up [Spouse] : spouse [Ad Hoc ] : provided by an ad hoc  [FreeTextEntry2] : Stage IV colon cancer on chemo  [Interpreters_IDNumber] : [id number] [Interpreters_FullName] : Joseph [FreeTextEntry3] : Mandarin [TWNoteComboBox1] : Other

## 2024-04-01 NOTE — ASSESSMENT
[Palliative] : Goals of care discussed with patient: Palliative [Palliative Care Plan] : not applicable at this time [Future Reassessment of Pain Scale] : Future reassessment of pain scale    [Medication(s)] : Medication(s)

## 2024-04-01 NOTE — PHYSICAL EXAM
[Restricted in physically strenuous activity but ambulatory and able to carry out work of a light or sedentary nature] : Status 1- Restricted in physically strenuous activity but ambulatory and able to carry out work of a light or sedentary nature, e.g., light house work, office work [Thin] : thin [Normal] : affect appropriate [de-identified] : erythematous, macular, slightly reticular lesions (telangiectasias) throughout her hands and face, stable

## 2024-04-01 NOTE — HISTORY OF PRESENT ILLNESS
[Disease: _____________________] : Disease: [unfilled] [T: ___] : T[unfilled] [N: ___] : N[unfilled] [M: ___] : M[unfilled] [AJCC Stage: ____] : AJCC Stage: [unfilled] [Therapy: ___] : Therapy: [unfilled] [Cycle: ___] : Cycle: [unfilled] [Day: ___] : Day: [unfilled] [de-identified] : 67 yo Mandarin-speaking F with a PMH of HTN, RA, scleroderma, and mild pulmonary hypertension who presents for management of Stage IV R colon cancer, GEORGINAAngela Stevens has been gradually losing weight over years. She has also had lifelong intermittent diarrhea for decades, but she has never had a workup for it. Her diarrhea is food related. She had an EGD about 10 years ago that showed esophageal inflammation and acid reflux, but she has never had a colonoscopy prior to the below events. She originally moved to the  from [place] in the summer of 2023. In September, she presented with abdominal pain and was found to have a Hb 6.8 with acute cholecystitis and had a percutaneous cholecystostomy drain placed. She also had 1U PRBCs.  She was then readmitted to [facility] from 10/22 - 11/6/23 for tube dislodgement, with CT showing ascending colon wall thickening, with a few liver lesions c/w mucinous metastases (largest 2.5 x 2.0 cm in the R hepatic lobe, others included a 0.8 cm segment 4A/8 lesion and a 0.9 cm segment 2/3 lesion). She also had continued anemia (Hb 7.0) requiring 2U PRBCs during admission. She underwent a colonoscopy on 10/24 that showed a circumferential ascending colon mass about 5 cm above the cecum, biopsy showing adenocarcinoma, moderately differentiated with a mucinous component. On 11/1/23, she underwent a R hemicolectomy with cholecystectomy, showing a 10.2 cm tumor with negative margins (closest 0.3 cm), no LVI or PNI, 0/25 LNs positive, and intermediate (5-9) tumor budding score. MS stable. T3N0  She lives with her , daughter and her , and her 2 grandchildren.  Referred to medical oncology for further management.   1/8/24: CT chest: no suspicious pulm nodules  MRI Abd: bilobar hepatic lesions, suspicious for mucinous hepatic metastases, with lesion increased in size, new or stable compared to prior  1/12/24: Liver bx: met colon ca 1/22/24: C1 5FU/LV 2/7/24: C2 FOLFOX, oxaliplatin 65 mg/m2 2/19/24: C3, Oxaliplatin 75 mg/m2 3/4/24: C4, Oxaliplatin 65 mg/m2 3/7/24: CT Chest, CTA a/p: enlarging hepatic mets 3/18/24: C5 3/28/24: Went to ED w/ back pain after heavy lifting  CT AP: Acute L1 superior endplate compression deformity with mild loss of height, new from prior CT of 3/7/2024. Bilobar hepatic lesions, slightly decrease in size when compared to prior CT of 3/7/2024. 4/1/24: C6 [de-identified] : moderately differentiated adenocarcinoma with mucinous component, GEORGINA [de-identified] : CEA 57.5 (10/24/23) [de-identified] : KRAS Q61H NRAS wildtype APC [id number]*18, R876* FBXW7 R465C PIK3CA E545K [de-identified] : S/p ER visit after lifting heavy garbage w. development of back pain. For pain, taking tylenol every 6 hours with relief. Before she takes Tylenol, pain can get 9/10. After can go down to 0-3/10. Ambulating normally. Noting constipation as well as episode of diarrhea yesterday. She states diarrhea yesterday resolved without intervention. Passing gas normally. Tolerating PO. No numbness or tingling down her legs. Fingers when she touches something cold there is numbness. No inference with ADLs.

## 2024-04-03 ENCOUNTER — APPOINTMENT (OUTPATIENT)
Dept: INFUSION THERAPY | Facility: HOSPITAL | Age: 69
End: 2024-04-03

## 2024-04-15 ENCOUNTER — APPOINTMENT (OUTPATIENT)
Dept: INFUSION THERAPY | Facility: HOSPITAL | Age: 69
End: 2024-04-15

## 2024-04-15 ENCOUNTER — APPOINTMENT (OUTPATIENT)
Dept: HEMATOLOGY ONCOLOGY | Facility: CLINIC | Age: 69
End: 2024-04-15

## 2024-04-15 ENCOUNTER — APPOINTMENT (OUTPATIENT)
Dept: ORTHOPEDIC SURGERY | Facility: CLINIC | Age: 69
End: 2024-04-15
Payer: MEDICAID

## 2024-04-15 ENCOUNTER — APPOINTMENT (OUTPATIENT)
Dept: HEMATOLOGY ONCOLOGY | Facility: CLINIC | Age: 69
End: 2024-04-15
Payer: MEDICAID

## 2024-04-15 VITALS
HEART RATE: 71 BPM | SYSTOLIC BLOOD PRESSURE: 138 MMHG | RESPIRATION RATE: 16 BRPM | DIASTOLIC BLOOD PRESSURE: 86 MMHG | BODY MASS INDEX: 19.18 KG/M2 | WEIGHT: 98.99 LBS | OXYGEN SATURATION: 96 % | TEMPERATURE: 96.8 F

## 2024-04-15 PROCEDURE — 99215 OFFICE O/P EST HI 40 MIN: CPT

## 2024-04-15 PROCEDURE — 72082 X-RAY EXAM ENTIRE SPI 2/3 VW: CPT

## 2024-04-15 PROCEDURE — 99204 OFFICE O/P NEW MOD 45 MIN: CPT | Mod: 25

## 2024-04-15 RX ORDER — MELOXICAM 15 MG/1
15 TABLET ORAL DAILY
Qty: 25 | Refills: 0 | Status: ACTIVE | COMMUNITY
Start: 2024-04-15 | End: 1900-01-01

## 2024-04-15 NOTE — HISTORY OF PRESENT ILLNESS
[de-identified] : Patient is a 68 year old female who presents for initial eval of bp following lifting heavy last month. Pt was informed of a fracture in spine following this. Pain exacerbated when exerting energy. Sxs exacerbated when standing from sitting. Denies pain when walking. Pt has osteoporosis.

## 2024-04-15 NOTE — ASSESSMENT
[FreeTextEntry1] : This is a 68 year female with back pain. I had a lengthy discussion with the patient in regard to their treatment plan and diagnosis. Their symptoms have persisted despite the conservative management they have attempted thus far. As a result, I would like to proceed with a thoracic and lumbar MRI. Encouraged her to continue to walk as much as possible. The patient can take Meloxicam, Tylenol/NSAIDs as needed for pain control if medically able to. I will have the patient follow-up in 3 to 4 weeks for repeat clinical evaluation, and to review their MRI results. I encouraged them to follow-up sooner if their symptoms worsen or change in any way. Encouraged patient to f/u w/ PCP for proper medication regimen for her osteoporosis. TLSO brace provided for support. Conservative Treatment Statement The patient has tried the following treatments: Activity modification + Ice/Compression + Braces - NSAIDS + Physical Therapy +   Please note the above modalities have been tried for 6+ weeks over the past 2 months.

## 2024-04-15 NOTE — PHYSICAL EXAM
[de-identified] : Lumbar Physical Exam   Antalgic, using cane  Station - Normal   Sagittal balance - Normal   Compensatory mechanism? - None      Reflexes    Patellar - normal    Gastroc - normal    Clonus - No    Hip Exam - Normal   Straight leg raise - none   Pulses - 2+ dp/pt   Range of motion - normal    Sensation   Sensation intact to light touch in L1, L2, L3, L4, L5 and S1 dermatomes bilaterally     Motor             IP Quad HS TA Gastroc EHL   Right 3+/5  5/5   5/5 5/5    5/5     5/5   Left   3+/5  5/5   5/5 5/5    5/5      5/5  [de-identified] : Scoliosis Rads  Facet arthropathy  L1 compression fracture

## 2024-04-15 NOTE — ADDENDUM
[FreeTextEntry1] :  I, Angie Rodriguez, acted solely as a scribe for Dr. Jus Noel MD on this date 04/15/2024   All medical record entries made by the Scribe were at my, Dr. Jus Noel MD., direction and personally dictated by me on 04/15/2024. I have reviewed the chart and agree that the record accurately reflects my personal performance of the history, physical exam, assessment and plan. I have also personally directed, reviewed, and agreed with the chart.

## 2024-04-17 ENCOUNTER — APPOINTMENT (OUTPATIENT)
Dept: INFUSION THERAPY | Facility: HOSPITAL | Age: 69
End: 2024-04-17

## 2024-04-19 ENCOUNTER — APPOINTMENT (OUTPATIENT)
Dept: CARDIOLOGY | Facility: CLINIC | Age: 69
End: 2024-04-19

## 2024-04-23 ENCOUNTER — APPOINTMENT (OUTPATIENT)
Dept: CT IMAGING | Facility: CLINIC | Age: 69
End: 2024-04-23
Payer: MEDICAID

## 2024-04-23 ENCOUNTER — OUTPATIENT (OUTPATIENT)
Dept: OUTPATIENT SERVICES | Facility: HOSPITAL | Age: 69
LOS: 1 days | End: 2024-04-23
Payer: MEDICAID

## 2024-04-23 ENCOUNTER — APPOINTMENT (OUTPATIENT)
Dept: MRI IMAGING | Facility: CLINIC | Age: 69
End: 2024-04-23
Payer: MEDICAID

## 2024-04-23 DIAGNOSIS — T85.528A DISPLACEMENT OF OTHER GASTROINTESTINAL PROSTHETIC DEVICES, IMPLANTS AND GRAFTS, INITIAL ENCOUNTER: Chronic | ICD-10-CM

## 2024-04-23 DIAGNOSIS — C18.9 MALIGNANT NEOPLASM OF COLON, UNSPECIFIED: ICD-10-CM

## 2024-04-23 PROCEDURE — 71260 CT THORAX DX C+: CPT | Mod: 26

## 2024-04-23 PROCEDURE — 74183 MRI ABD W/O CNTR FLWD CNTR: CPT

## 2024-04-23 PROCEDURE — 74183 MRI ABD W/O CNTR FLWD CNTR: CPT | Mod: 26

## 2024-04-23 PROCEDURE — 71260 CT THORAX DX C+: CPT

## 2024-04-23 PROCEDURE — A9581: CPT

## 2024-04-26 ENCOUNTER — OUTPATIENT (OUTPATIENT)
Dept: OUTPATIENT SERVICES | Facility: HOSPITAL | Age: 69
LOS: 1 days | End: 2024-04-26

## 2024-04-26 ENCOUNTER — APPOINTMENT (OUTPATIENT)
Dept: INTERVENTIONAL RADIOLOGY/VASCULAR | Facility: CLINIC | Age: 69
End: 2024-04-26

## 2024-04-26 ENCOUNTER — APPOINTMENT (OUTPATIENT)
Dept: SURGICAL ONCOLOGY | Facility: CLINIC | Age: 69
End: 2024-04-26

## 2024-04-26 VITALS
TEMPERATURE: 98 F | SYSTOLIC BLOOD PRESSURE: 138 MMHG | WEIGHT: 98.11 LBS | OXYGEN SATURATION: 99 % | RESPIRATION RATE: 16 BRPM | HEIGHT: 60 IN | DIASTOLIC BLOOD PRESSURE: 80 MMHG | HEART RATE: 73 BPM

## 2024-04-26 VITALS — HEIGHT: 60 IN

## 2024-04-26 DIAGNOSIS — I10 ESSENTIAL (PRIMARY) HYPERTENSION: ICD-10-CM

## 2024-04-26 DIAGNOSIS — Z96.642 PRESENCE OF LEFT ARTIFICIAL HIP JOINT: Chronic | ICD-10-CM

## 2024-04-26 DIAGNOSIS — C18.9 MALIGNANT NEOPLASM OF COLON, UNSPECIFIED: ICD-10-CM

## 2024-04-26 DIAGNOSIS — Z90.49 ACQUIRED ABSENCE OF OTHER SPECIFIED PARTS OF DIGESTIVE TRACT: Chronic | ICD-10-CM

## 2024-04-26 DIAGNOSIS — Z86.79 PERSONAL HISTORY OF OTHER DISEASES OF THE CIRCULATORY SYSTEM: ICD-10-CM

## 2024-04-26 DIAGNOSIS — Z96.641 PRESENCE OF RIGHT ARTIFICIAL HIP JOINT: Chronic | ICD-10-CM

## 2024-04-26 DIAGNOSIS — T85.528A DISPLACEMENT OF OTHER GASTROINTESTINAL PROSTHETIC DEVICES, IMPLANTS AND GRAFTS, INITIAL ENCOUNTER: Chronic | ICD-10-CM

## 2024-04-26 LAB
APTT BLD: 32.4 SEC — SIGNIFICANT CHANGE UP (ref 24.5–35.6)
BLD GP AB SCN SERPL QL: NEGATIVE — SIGNIFICANT CHANGE UP
INR BLD: 0.92 RATIO — SIGNIFICANT CHANGE UP (ref 0.85–1.18)
PROTHROM AB SERPL-ACNC: 10.4 SEC — SIGNIFICANT CHANGE UP (ref 9.5–13)
RH IG SCN BLD-IMP: POSITIVE — SIGNIFICANT CHANGE UP
RH IG SCN BLD-IMP: POSITIVE — SIGNIFICANT CHANGE UP

## 2024-04-26 PROCEDURE — XXXXX: CPT

## 2024-04-26 RX ORDER — CHLORHEXIDINE GLUCONATE 4 %
325 (65 FE) LIQUID (ML) TOPICAL TWICE DAILY
Qty: 60 | Refills: 0 | Status: COMPLETED | COMMUNITY
Start: 2024-01-24 | End: 2024-04-26

## 2024-04-26 RX ORDER — POTASSIUM CHLORIDE 1500 MG/1
20 TABLET, FILM COATED, EXTENDED RELEASE ORAL
Qty: 2 | Refills: 0 | Status: COMPLETED | COMMUNITY
Start: 2024-04-01 | End: 2024-04-26

## 2024-04-26 RX ORDER — ROSUVASTATIN CALCIUM 5 MG/1
1 TABLET ORAL
Refills: 0 | DISCHARGE

## 2024-04-26 RX ORDER — ESTAZOLAM 1 MG
1 TABLET ORAL
Refills: 0 | DISCHARGE

## 2024-04-26 RX ORDER — DEXAMETHASONE 0.5 MG/5ML
1 ELIXIR ORAL
Refills: 0 | DISCHARGE

## 2024-04-26 RX ORDER — LOSARTAN POTASSIUM 50 MG/1
50 TABLET, FILM COATED ORAL DAILY
Qty: 30 | Refills: 0 | Status: COMPLETED | COMMUNITY
Start: 2024-02-14 | End: 2024-04-26

## 2024-04-26 RX ORDER — SODIUM CHLORIDE 9 MG/ML
1000 INJECTION, SOLUTION INTRAVENOUS
Refills: 0 | Status: DISCONTINUED | OUTPATIENT
Start: 2024-05-01 | End: 2024-05-01

## 2024-04-26 NOTE — H&P PST ADULT - NSICDXPASTMEDICALHX_GEN_ALL_CORE_FT
PAST MEDICAL HISTORY:  H/O pulmonary hypertension     H/O scleroderma     HLD (hyperlipidemia)     HTN (hypertension)

## 2024-04-26 NOTE — H&P PST ADULT - NSICDXPASTSURGICALHX_GEN_ALL_CORE_FT
PAST SURGICAL HISTORY:  H/O right hemicolectomy     History of laparoscopic cholecystectomy     History of left hip replacement     History of right hip replacement     Migration of percutaneous cholecystostomy tube

## 2024-04-26 NOTE — H&P PST ADULT - PROBLEM SELECTOR PLAN 2
Patient instructed to take Irbesartan with a sip of water on the morning of procedure. Patient verbalized understanding.

## 2024-04-26 NOTE — H&P PST ADULT - PROBLEM SELECTOR PLAN 3
h/o pulmonary hypertension (and was taking meds for that, but had a f/u about a year ago in [place], she was told her pressure has improved, and told her to stop meds)  Last Echo on 1/25/23 : Normal Right Ventricle cavity size and systolic function.

## 2024-04-26 NOTE — H&P PST ADULT - PROBLEM SELECTOR PLAN 1
Patient tentatively scheduled for ANGELI pump placement on 5/1/24.  Pre-op instructions provided. Pt given verbal and written instructions with teach back on chlorhexidine wash and pepcid. Pt verbalized understanding with return demonstration.   JOSE LUIS precautions,       CBC: 4/1/24: WBC 3.32, H/H 10.1/32.4, Plt 135  CMP: 4/1/24: Na 144, K 3.4, BUN 21, Cr 0.58  PT,PTT, INR- done at PST ( liver mets)

## 2024-04-26 NOTE — H&P PST ADULT - HISTORY OF PRESENT ILLNESS
68 year old female with PMH of HTN, HLD, Scleroderma (not on meds) , Rheumatoid arthritis, h/o pulmonary hypertension (and was taking meds for that, but had a f/u about a year ago in [place], she was told her pressure has improved, and told her to stop meds- not followed up with cardiologist here in [facility]),  Colon cancer metastasized to liver (s/p chemo last dose 1/2024) s/p Laparoscopic Right hemicolectomy and cholecystectomy on 11/01/23  presents to Presurgical testing now  scheduled for ANGELI pump placement

## 2024-04-28 NOTE — HISTORY OF PRESENT ILLNESS
[FreeTextEntry1] : Patient is a 68 year old female with a past medical history of colon cancer with liver metastasis, HTN, RA and pulmonary HTN. She came from [place] during the summer of 2023 she was hospitalized in 2023 when she was found to have the colon cancer an liver lesions. On 11/1/23, she underwent a R hemicolectomy with cholecystectomy, with Dr. Stacey Bowers at [facility]. Patient has started chemotherapy in January 2024. Surgery is planning for a DEVIN pump placement and has requested a hepatic angiogram be performed prior to pump placement to evaluate flow of the GDA. Due to short time frame while doing the hepatic angiogram a MAA procedure will be completed.     Patient denies recent fever, chills, shortness of breath, chest pain, nausea, vomiting or diarrhea.   **patient has not taken meloxicam for pain and she is aware to hold it for 5 days prior to procedure.   MR abdomen 4/23/24 "Metastatic liver lesions are listed below at clinician request. The lesions are measured on series 39:  *  0.7 x 0.5 cm in segment 7 (23). *  3.7 x 3.5 cm in segment 7 (26). *  0.6 x 0.5 cm in segment 8 (32). *  1.0 x 0.8 cm in segment 2 (33). *  0.8 x 0.6 cm in segment 3 (40)."

## 2024-04-28 NOTE — ASSESSMENT
[FreeTextEntry1] : 68 year old female with colon cancer metastatic to the liver referred for diagnostic angiogram prior to abdominal pump placement. 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 CTA also shows patency of the GDA however technique does not allow for determination of direction of flow.  Will plan for mesenteric angiogram to evaluate both the celiac and SMA to assess for safety of proceeding with planned arterial pump placement.    Attempted to discuss future Y-90 therapy however given complexity of current case, will address that at a future date if needed.   Imaging and chart reviewed.  Patient is appropriate candidate for mesenteric angiogram. Not a candidate for radial access given poor collateral flow in the hand.  Will plan for procedure with sedation.  Preprocedure instructions given.   Discussed procedure details in length. Informed consent obtained. All questions answered.  Modality: Fluoro Position: Supine, groin access Anesthesia: Sedation

## 2024-04-28 NOTE — PHYSICAL EXAM
[Alert] : alert [Oriented x3] : oriented to person, place, and time [Capable of only limited selfcare, confined to bed or chair more than 50% of waking hours] : Capable of only limited selfcare, confined to bed or chair more than 50% of waking hours [de-identified] : Barbeau Test Type C both left and right wrist [de-identified] : b/l groin C/D/I no erythema or s/s of infection noted.

## 2024-04-28 NOTE — REASON FOR VISIT
[Consultation] : a consultation visit [Spouse] : spouse [Family Member] : family member [Other: ______] : provided by LAURA [Time Spent: ____ minutes] : Total time spent using  services: [unfilled] minutes. The patient's primary language is not English thus required  services. [FreeTextEntry1] : Hepatic angiogram  [Interpreters_IDNumber] : [id number] [FreeTextEntry3] : Mandarin [TWNoteComboBox1] : Other

## 2024-04-28 NOTE — REVIEW OF SYSTEMS
[Fever] : no fever [Chills] : no chills [Nosebleeds] : no nosebleeds [Sore Throat] : no sore throat [Chest Pain] : no chest pain [Palpitations] : no palpitations [Shortness Of Breath] : no shortness of breath [Wheezing] : no wheezing [Cough] : no cough [Abdominal Pain] : no abdominal pain [Vomiting] : no vomiting [Constipation] : no constipation [Easy Bleeding] : no tendency for easy bleeding [Easy Bruising] : no tendency for easy bruising

## 2024-04-29 ENCOUNTER — APPOINTMENT (OUTPATIENT)
Dept: SURGICAL ONCOLOGY | Facility: CLINIC | Age: 69
End: 2024-04-29
Payer: MEDICAID

## 2024-04-29 ENCOUNTER — APPOINTMENT (OUTPATIENT)
Dept: HEMATOLOGY ONCOLOGY | Facility: CLINIC | Age: 69
End: 2024-04-29

## 2024-04-29 ENCOUNTER — APPOINTMENT (OUTPATIENT)
Dept: INTERNAL MEDICINE | Facility: CLINIC | Age: 69
End: 2024-04-29
Payer: MEDICAID

## 2024-04-29 ENCOUNTER — APPOINTMENT (OUTPATIENT)
Dept: INFUSION THERAPY | Facility: HOSPITAL | Age: 69
End: 2024-04-29

## 2024-04-29 VITALS
DIASTOLIC BLOOD PRESSURE: 82 MMHG | SYSTOLIC BLOOD PRESSURE: 134 MMHG | OXYGEN SATURATION: 95 % | HEART RATE: 84 BPM | BODY MASS INDEX: 19.24 KG/M2 | WEIGHT: 98 LBS | HEIGHT: 59.84 IN

## 2024-04-29 VITALS
SYSTOLIC BLOOD PRESSURE: 127 MMHG | HEART RATE: 80 BPM | OXYGEN SATURATION: 90 % | BODY MASS INDEX: 18.85 KG/M2 | DIASTOLIC BLOOD PRESSURE: 83 MMHG | HEIGHT: 60 IN | WEIGHT: 96 LBS | TEMPERATURE: 98.6 F | RESPIRATION RATE: 16 BRPM

## 2024-04-29 VITALS — DIASTOLIC BLOOD PRESSURE: 80 MMHG | SYSTOLIC BLOOD PRESSURE: 122 MMHG

## 2024-04-29 DIAGNOSIS — Z01.818 ENCOUNTER FOR OTHER PREPROCEDURAL EXAMINATION: ICD-10-CM

## 2024-04-29 PROBLEM — E78.5 HYPERLIPIDEMIA, UNSPECIFIED: Chronic | Status: ACTIVE | Noted: 2024-04-26

## 2024-04-29 PROBLEM — I10 ESSENTIAL (PRIMARY) HYPERTENSION: Chronic | Status: ACTIVE | Noted: 2024-04-26

## 2024-04-29 PROCEDURE — G2211 COMPLEX E/M VISIT ADD ON: CPT | Mod: NC,1L

## 2024-04-29 PROCEDURE — 99214 OFFICE O/P EST MOD 30 MIN: CPT

## 2024-04-30 ENCOUNTER — OUTPATIENT (OUTPATIENT)
Dept: OUTPATIENT SERVICES | Facility: HOSPITAL | Age: 69
LOS: 1 days | Discharge: HOME | End: 2024-04-30

## 2024-04-30 ENCOUNTER — RESULT REVIEW (OUTPATIENT)
Age: 69
End: 2024-04-30

## 2024-04-30 ENCOUNTER — APPOINTMENT (OUTPATIENT)
Dept: NUCLEAR MEDICINE | Facility: HOSPITAL | Age: 69
End: 2024-04-30

## 2024-04-30 ENCOUNTER — OUTPATIENT (OUTPATIENT)
Dept: OUTPATIENT SERVICES | Facility: HOSPITAL | Age: 69
LOS: 1 days | End: 2024-04-30
Payer: MEDICAID

## 2024-04-30 ENCOUNTER — TRANSCRIPTION ENCOUNTER (OUTPATIENT)
Age: 69
End: 2024-04-30

## 2024-04-30 DIAGNOSIS — C78.7 SECONDARY MALIGNANT NEOPLASM OF LIVER AND INTRAHEPATIC BILE DUCT: ICD-10-CM

## 2024-04-30 DIAGNOSIS — Z90.49 ACQUIRED ABSENCE OF OTHER SPECIFIED PARTS OF DIGESTIVE TRACT: Chronic | ICD-10-CM

## 2024-04-30 DIAGNOSIS — Z96.642 PRESENCE OF LEFT ARTIFICIAL HIP JOINT: Chronic | ICD-10-CM

## 2024-04-30 DIAGNOSIS — T85.528A DISPLACEMENT OF OTHER GASTROINTESTINAL PROSTHETIC DEVICES, IMPLANTS AND GRAFTS, INITIAL ENCOUNTER: Chronic | ICD-10-CM

## 2024-04-30 DIAGNOSIS — C18.9 MALIGNANT NEOPLASM OF COLON, UNSPECIFIED: ICD-10-CM

## 2024-04-30 DIAGNOSIS — Z96.641 PRESENCE OF RIGHT ARTIFICIAL HIP JOINT: Chronic | ICD-10-CM

## 2024-04-30 PROCEDURE — 76937 US GUIDE VASCULAR ACCESS: CPT | Mod: 26

## 2024-04-30 PROCEDURE — 36245 INS CATH ABD/L-EXT ART 1ST: CPT

## 2024-04-30 PROCEDURE — 75726 ARTERY X-RAYS ABDOMEN: CPT | Mod: 26

## 2024-04-30 NOTE — ASSESSMENT
[Patient Optimized for Surgery] : Patient optimized for surgery [No Further Testing Recommended] : no further testing recommended [Modify anti-platelet treatment prior to procedure] : Modify anti-platelet treatment prior to procedure [Continue medications as is] : Continue current medications [As per surgery] : as per surgery [FreeTextEntry6] : Pt has held off on Meloxicam for at least a week now. [FreeTextEntry7] : Pt will take Irbesartan with sips of water on the morning of surgery,

## 2024-04-30 NOTE — RESULTS/DATA
[] : results reviewed [de-identified] : normal PT/PTT on 4/26/2024 [de-identified] : 4/26/2024 - Blood type - B positive

## 2024-04-30 NOTE — REVIEW OF SYSTEMS
[Back Pain] : back pain [Negative] : Heme/Lymph [FreeTextEntry9] : Back pain with walking or movement,

## 2024-04-30 NOTE — PROCEDURE NOTE - PROCEDURE FINDINGS AND DETAILS
Status post successful diagnostic celiac artery angiogram with forward flow noted in the gastroduodenal artery.   Right common femoral artery access. Hemostasis was achieved using the Starclose closure device.

## 2024-04-30 NOTE — PRE PROCEDURE NOTE - PRE PROCEDURE EVALUATION
------------------------------------------------------------  Interventional Radiology Pre-Procedure Note  ------------------------------------------------------------    Indication: 68y Female with pmh of metastatic colon cancer to the liver, s/p hemicolectomy who presents for mesenteric angiogram prior to DEVIN pump placement and possible mapping hepatic angiogram and administration of Tc99m-MAA.     Past Medical History:  No pertinent past medical history    H/O pulmonary hypertension    H/O pulmonary hypertension    H/O scleroderma    HTN (hypertension)    HLD (hyperlipidemia)    Allergies: No Known Allergies    Labs:       PT: 10.4 04-26-24 @ 16:38  aPTT: 32.4 04-26-24 @ 16:38   INR: 0.92 04-26-24 @ 16:38    Imaging: MRI abdomen 4/23/24 was reviewed     Consent: Risks/benefits/alternatives were explained and informed written consent was obtained.     Procedure Plan: Plan for mesenteric angiogram, possible mapping hepatic angiogram and administration of Tc99m-MAA.

## 2024-04-30 NOTE — ASU PATIENT PROFILE, ADULT - FALL HARM RISK - RISK INTERVENTIONS

## 2024-04-30 NOTE — PRE PROCEDURE NOTE - GENERAL PROCEDURE NAME
mesenteric angiogram, possible mapping hepatic angiogram and administration of Tc99m-MAA
no vascular compromise

## 2024-04-30 NOTE — PHYSICAL EXAM
[No Acute Distress] : no acute distress [Well Nourished] : well nourished [Well Developed] : well developed [Supple] : supple [No Respiratory Distress] : no respiratory distress  [Clear to Auscultation] : lungs were clear to auscultation bilaterally [Normal Rate] : normal rate  [Regular Rhythm] : with a regular rhythm [Normal S1, S2] : normal S1 and S2 [No Edema] : there was no peripheral edema [Soft] : abdomen soft [Non Tender] : non-tender [Normal Bowel Sounds] : normal bowel sounds [Normal Supraclavicular Nodes] : no supraclavicular lymphadenopathy [Normal Axillary Nodes] : no axillary lymphadenopathy [Normal Posterior Cervical Nodes] : no posterior cervical lymphadenopathy [Normal Anterior Cervical Nodes] : no anterior cervical lymphadenopathy [No CVA Tenderness] : no CVA  tenderness [No Joint Swelling] : no joint swelling [No Rash] : no rash [Normal Gait] : normal gait [Speech Grossly Normal] : speech grossly normal [Normal Affect] : the affect was normal [Alert and Oriented x3] : oriented to person, place, and time [Normal Mood] : the mood was normal [de-identified] : female in stated age,

## 2024-04-30 NOTE — HISTORY OF PRESENT ILLNESS
[No Pertinent Cardiac History] : no history of aortic stenosis, atrial fibrillation, coronary artery disease, recent myocardial infarction, or implantable device/pacemaker [No Pertinent Pulmonary History] : no history of asthma, COPD, sleep apnea, or smoking [No Adverse Anesthesia Reaction] : no adverse anesthesia reaction in self or family member [Spouse] : spouse [Other: _____] : [unfilled] [Chronic Anticoagulation] : no chronic anticoagulation [Chronic Kidney Disease] : no chronic kidney disease [Diabetes] : no diabetes [FreeTextEntry1] : Hepatic arterial pump insertion [FreeTextEntry2] : 5/1/2023, Wedenesday [FreeTextEntry3] : Dr. Brenda Franklin [FreeTextEntry4] : Pt was diagnosed with colon CA with liver mets.  She had treatment for the colon cancer and now is planning for treatment of the liver mets.  She has met with oncologist and surgeon, the plan is to have hepatic arterial pump placement so that she can receive treatment.  The hepatic arterial pump placement is scheduled to be done in 2 days on 5/1/2023.  She had preop labs a couple of weeks ago.  She feels well overall w/o any new complaint and has not been sick lately.  She did have back pain and went to ED a couple of months ago, she was told she has compression fracture on spine at L1?, she finally saw ortho 2 weeks ago and is sent for MRI of spine, but this is not done yet waiting for insurance approval.

## 2024-05-01 ENCOUNTER — APPOINTMENT (OUTPATIENT)
Dept: CT IMAGING | Facility: IMAGING CENTER | Age: 69
End: 2024-05-01

## 2024-05-01 ENCOUNTER — TRANSCRIPTION ENCOUNTER (OUTPATIENT)
Age: 69
End: 2024-05-01

## 2024-05-01 ENCOUNTER — APPOINTMENT (OUTPATIENT)
Dept: SURGICAL ONCOLOGY | Facility: HOSPITAL | Age: 69
End: 2024-05-01

## 2024-05-01 ENCOUNTER — RESULT REVIEW (OUTPATIENT)
Age: 69
End: 2024-05-01

## 2024-05-01 ENCOUNTER — APPOINTMENT (OUTPATIENT)
Dept: MRI IMAGING | Facility: IMAGING CENTER | Age: 69
End: 2024-05-01

## 2024-05-01 ENCOUNTER — APPOINTMENT (OUTPATIENT)
Dept: INFUSION THERAPY | Facility: HOSPITAL | Age: 69
End: 2024-05-01

## 2024-05-01 ENCOUNTER — INPATIENT (INPATIENT)
Facility: HOSPITAL | Age: 69
LOS: 7 days | Discharge: HOME | End: 2024-05-09
Attending: SURGERY | Admitting: SURGERY
Payer: MEDICAID

## 2024-05-01 VITALS
HEART RATE: 73 BPM | RESPIRATION RATE: 16 BRPM | DIASTOLIC BLOOD PRESSURE: 78 MMHG | WEIGHT: 98.11 LBS | TEMPERATURE: 98 F | SYSTOLIC BLOOD PRESSURE: 146 MMHG | OXYGEN SATURATION: 98 % | HEIGHT: 60 IN

## 2024-05-01 DIAGNOSIS — Z96.642 PRESENCE OF LEFT ARTIFICIAL HIP JOINT: Chronic | ICD-10-CM

## 2024-05-01 DIAGNOSIS — Z96.641 PRESENCE OF RIGHT ARTIFICIAL HIP JOINT: Chronic | ICD-10-CM

## 2024-05-01 DIAGNOSIS — T85.528A DISPLACEMENT OF OTHER GASTROINTESTINAL PROSTHETIC DEVICES, IMPLANTS AND GRAFTS, INITIAL ENCOUNTER: Chronic | ICD-10-CM

## 2024-05-01 DIAGNOSIS — Z90.49 ACQUIRED ABSENCE OF OTHER SPECIFIED PARTS OF DIGESTIVE TRACT: Chronic | ICD-10-CM

## 2024-05-01 DIAGNOSIS — C18.9 MALIGNANT NEOPLASM OF COLON, UNSPECIFIED: ICD-10-CM

## 2024-05-01 LAB
ALBUMIN SERPL ELPH-MCNC: 3.5 G/DL — SIGNIFICANT CHANGE UP (ref 3.3–5)
ALP SERPL-CCNC: 189 U/L — HIGH (ref 40–120)
ALT FLD-CCNC: 113 U/L — HIGH (ref 4–33)
ANION GAP SERPL CALC-SCNC: 10 MMOL/L — SIGNIFICANT CHANGE UP (ref 7–14)
APTT BLD: 50.2 SEC — HIGH (ref 24.5–35.6)
AST SERPL-CCNC: 250 U/L — HIGH (ref 4–32)
BASOPHILS # BLD AUTO: 0.01 K/UL — SIGNIFICANT CHANGE UP (ref 0–0.2)
BASOPHILS NFR BLD AUTO: 0.1 % — SIGNIFICANT CHANGE UP (ref 0–2)
BILIRUB SERPL-MCNC: 1.2 MG/DL — SIGNIFICANT CHANGE UP (ref 0.2–1.2)
BUN SERPL-MCNC: 14 MG/DL — SIGNIFICANT CHANGE UP (ref 7–23)
CALCIUM SERPL-MCNC: 8.5 MG/DL — SIGNIFICANT CHANGE UP (ref 8.4–10.5)
CHLORIDE SERPL-SCNC: 105 MMOL/L — SIGNIFICANT CHANGE UP (ref 98–107)
CO2 SERPL-SCNC: 23 MMOL/L — SIGNIFICANT CHANGE UP (ref 22–31)
CREAT SERPL-MCNC: 0.48 MG/DL — LOW (ref 0.5–1.3)
EGFR: 103 ML/MIN/1.73M2 — SIGNIFICANT CHANGE UP
EOSINOPHIL # BLD AUTO: 0.01 K/UL — SIGNIFICANT CHANGE UP (ref 0–0.5)
EOSINOPHIL NFR BLD AUTO: 0.1 % — SIGNIFICANT CHANGE UP (ref 0–6)
GAS PNL BLDA: SIGNIFICANT CHANGE UP
GLUCOSE SERPL-MCNC: 181 MG/DL — HIGH (ref 70–99)
HCT VFR BLD CALC: 32.4 % — LOW (ref 34.5–45)
HGB BLD-MCNC: 10 G/DL — LOW (ref 11.5–15.5)
IANC: 8.33 K/UL — HIGH (ref 1.8–7.4)
IMM GRANULOCYTES NFR BLD AUTO: 0.4 % — SIGNIFICANT CHANGE UP (ref 0–0.9)
INR BLD: 0.96 RATIO — SIGNIFICANT CHANGE UP (ref 0.85–1.18)
LYMPHOCYTES # BLD AUTO: 0.63 K/UL — LOW (ref 1–3.3)
LYMPHOCYTES # BLD AUTO: 6.8 % — LOW (ref 13–44)
MCHC RBC-ENTMCNC: 26.9 PG — LOW (ref 27–34)
MCHC RBC-ENTMCNC: 30.9 GM/DL — LOW (ref 32–36)
MCV RBC AUTO: 87.1 FL — SIGNIFICANT CHANGE UP (ref 80–100)
MONOCYTES # BLD AUTO: 0.2 K/UL — SIGNIFICANT CHANGE UP (ref 0–0.9)
MONOCYTES NFR BLD AUTO: 2.2 % — SIGNIFICANT CHANGE UP (ref 2–14)
NEUTROPHILS # BLD AUTO: 8.33 K/UL — HIGH (ref 1.8–7.4)
NEUTROPHILS NFR BLD AUTO: 90.4 % — HIGH (ref 43–77)
NRBC # BLD: 0 /100 WBCS — SIGNIFICANT CHANGE UP (ref 0–0)
NRBC # FLD: 0 K/UL — SIGNIFICANT CHANGE UP (ref 0–0)
PLATELET # BLD AUTO: 147 K/UL — LOW (ref 150–400)
POTASSIUM SERPL-MCNC: 4.1 MMOL/L — SIGNIFICANT CHANGE UP (ref 3.5–5.3)
POTASSIUM SERPL-SCNC: 4.1 MMOL/L — SIGNIFICANT CHANGE UP (ref 3.5–5.3)
PROT SERPL-MCNC: 5.9 G/DL — LOW (ref 6–8.3)
PROTHROM AB SERPL-ACNC: 10.8 SEC — SIGNIFICANT CHANGE UP (ref 9.5–13)
RBC # BLD: 3.72 M/UL — LOW (ref 3.8–5.2)
RBC # FLD: 18.6 % — HIGH (ref 10.3–14.5)
SODIUM SERPL-SCNC: 138 MMOL/L — SIGNIFICANT CHANGE UP (ref 135–145)
WBC # BLD: 9.22 K/UL — SIGNIFICANT CHANGE UP (ref 3.8–10.5)
WBC # FLD AUTO: 9.22 K/UL — SIGNIFICANT CHANGE UP (ref 3.8–10.5)

## 2024-05-01 PROCEDURE — 37617 LIGATION MAJOR ARTERY ABD: CPT | Mod: 82

## 2024-05-01 PROCEDURE — 88305 TISSUE EXAM BY PATHOLOGIST: CPT | Mod: 26

## 2024-05-01 PROCEDURE — 36260 INSERTION OF INFUSION PUMP: CPT

## 2024-05-01 PROCEDURE — 76940 US GUIDE TISSUE ABLATION: CPT | Mod: 26,82

## 2024-05-01 PROCEDURE — 76940 US GUIDE TISSUE ABLATION: CPT | Mod: 26

## 2024-05-01 PROCEDURE — 37617 LIGATION MAJOR ARTERY ABD: CPT

## 2024-05-01 PROCEDURE — 49905 OMENTAL FLAP INTRA-ABDOM: CPT | Mod: 82

## 2024-05-01 PROCEDURE — 38747 REMOVE ABDOMINAL LYMPH NODES: CPT | Mod: 82

## 2024-05-01 PROCEDURE — 47380 OPEN ABLATE LIVER TUMOR RF: CPT | Mod: 82

## 2024-05-01 PROCEDURE — 38747 REMOVE ABDOMINAL LYMPH NODES: CPT

## 2024-05-01 PROCEDURE — 44050 REDUCE BOWEL OBSTRUCTION: CPT

## 2024-05-01 PROCEDURE — 44050 REDUCE BOWEL OBSTRUCTION: CPT | Mod: 82

## 2024-05-01 PROCEDURE — 47380 OPEN ABLATE LIVER TUMOR RF: CPT

## 2024-05-01 PROCEDURE — 49905 OMENTAL FLAP INTRA-ABDOM: CPT

## 2024-05-01 DEVICE — PROBE ABL PERCISION PRXT 15G 15CM: Type: IMPLANTABLE DEVICE | Status: FUNCTIONAL

## 2024-05-01 DEVICE — LIGATING CLIPS WECK HORIZON SMALL-WIDE (RED) 24: Type: IMPLANTABLE DEVICE | Status: FUNCTIONAL

## 2024-05-01 DEVICE — SURGICEL NU-KNIT 6 X 9": Type: IMPLANTABLE DEVICE | Status: FUNCTIONAL

## 2024-05-01 DEVICE — AGENT HEMOSTATIC HEMOBLAST 1.65G 10CM: Type: IMPLANTABLE DEVICE | Status: FUNCTIONAL

## 2024-05-01 DEVICE — LIGATING CLIPS WECK HORIZON MEDIUM (BLUE) 24: Type: IMPLANTABLE DEVICE | Status: FUNCTIONAL

## 2024-05-01 DEVICE — SURGICEL FIBRILLAR 2 X 4": Type: IMPLANTABLE DEVICE | Status: FUNCTIONAL

## 2024-05-01 DEVICE — SURGICEL 2 X 14": Type: IMPLANTABLE DEVICE | Status: FUNCTIONAL

## 2024-05-01 DEVICE — PUMP HEPATIC ARTERY INFUSION INTERA 3000: Type: IMPLANTABLE DEVICE | Status: FUNCTIONAL

## 2024-05-01 DEVICE — LIGATING CLIPS WECK HORIZON LARGE (ORANGE) 24: Type: IMPLANTABLE DEVICE | Status: FUNCTIONAL

## 2024-05-01 RX ORDER — OXYCODONE HYDROCHLORIDE 5 MG/1
5 TABLET ORAL
Refills: 0 | Status: DISCONTINUED | OUTPATIENT
Start: 2024-05-01 | End: 2024-05-01

## 2024-05-01 RX ORDER — OXYCODONE HYDROCHLORIDE 5 MG/1
10 TABLET ORAL
Refills: 0 | Status: DISCONTINUED | OUTPATIENT
Start: 2024-05-01 | End: 2024-05-01

## 2024-05-01 RX ORDER — MORPHINE SULFATE 50 MG/1
0.1 CAPSULE, EXTENDED RELEASE ORAL ONCE
Refills: 0 | Status: DISCONTINUED | OUTPATIENT
Start: 2024-05-01 | End: 2024-05-01

## 2024-05-01 RX ORDER — IPRATROPIUM/ALBUTEROL SULFATE 18-103MCG
3 AEROSOL WITH ADAPTER (GRAM) INHALATION EVERY 6 HOURS
Refills: 0 | Status: DISCONTINUED | OUTPATIENT
Start: 2024-05-01 | End: 2024-05-03

## 2024-05-01 RX ORDER — HYDRALAZINE HCL 50 MG
5 TABLET ORAL ONCE
Refills: 0 | Status: COMPLETED | OUTPATIENT
Start: 2024-05-01 | End: 2024-05-01

## 2024-05-01 RX ORDER — IPRATROPIUM/ALBUTEROL SULFATE 18-103MCG
3 AEROSOL WITH ADAPTER (GRAM) INHALATION ONCE
Refills: 0 | Status: COMPLETED | OUTPATIENT
Start: 2024-05-01 | End: 2024-05-01

## 2024-05-01 RX ORDER — ONDANSETRON 8 MG/1
4 TABLET, FILM COATED ORAL EVERY 6 HOURS
Refills: 0 | Status: DISCONTINUED | OUTPATIENT
Start: 2024-05-01 | End: 2024-05-09

## 2024-05-01 RX ORDER — HYDROMORPHONE HYDROCHLORIDE 2 MG/ML
1 INJECTION INTRAMUSCULAR; INTRAVENOUS; SUBCUTANEOUS
Refills: 0 | Status: DISCONTINUED | OUTPATIENT
Start: 2024-05-01 | End: 2024-05-01

## 2024-05-01 RX ORDER — NALOXONE HYDROCHLORIDE 4 MG/.1ML
0.1 SPRAY NASAL
Refills: 0 | Status: DISCONTINUED | OUTPATIENT
Start: 2024-05-01 | End: 2024-05-02

## 2024-05-01 RX ORDER — ROSUVASTATIN CALCIUM 5 MG/1
1 TABLET ORAL
Refills: 0 | DISCHARGE

## 2024-05-01 RX ORDER — BUTORPHANOL TARTRATE 2 MG/ML
0.12 INJECTION, SOLUTION INTRAMUSCULAR; INTRAVENOUS EVERY 6 HOURS
Refills: 0 | Status: DISCONTINUED | OUTPATIENT
Start: 2024-05-01 | End: 2024-05-02

## 2024-05-01 RX ORDER — SODIUM CHLORIDE 9 MG/ML
1000 INJECTION, SOLUTION INTRAVENOUS
Refills: 0 | Status: DISCONTINUED | OUTPATIENT
Start: 2024-05-01 | End: 2024-05-03

## 2024-05-01 RX ORDER — IRBESARTAN 75 MG/1
1 TABLET ORAL
Refills: 0 | DISCHARGE

## 2024-05-01 RX ORDER — ONDANSETRON 8 MG/1
4 TABLET, FILM COATED ORAL ONCE
Refills: 0 | Status: DISCONTINUED | OUTPATIENT
Start: 2024-05-01 | End: 2024-05-02

## 2024-05-01 RX ORDER — HYDROMORPHONE HYDROCHLORIDE 2 MG/ML
0.4 INJECTION INTRAMUSCULAR; INTRAVENOUS; SUBCUTANEOUS EVERY 4 HOURS
Refills: 0 | Status: DISCONTINUED | OUTPATIENT
Start: 2024-05-01 | End: 2024-05-02

## 2024-05-01 RX ORDER — CITICOLINE SODIUM 500 MG
1 TABLET ORAL
Refills: 0 | DISCHARGE

## 2024-05-01 RX ORDER — HYDROMORPHONE HYDROCHLORIDE 2 MG/ML
0.5 INJECTION INTRAMUSCULAR; INTRAVENOUS; SUBCUTANEOUS
Refills: 0 | Status: DISCONTINUED | OUTPATIENT
Start: 2024-05-01 | End: 2024-05-01

## 2024-05-01 RX ORDER — ACETAMINOPHEN 500 MG
675 TABLET ORAL ONCE
Refills: 0 | Status: COMPLETED | OUTPATIENT
Start: 2024-05-01 | End: 2024-05-01

## 2024-05-01 RX ORDER — HYDROMORPHONE HYDROCHLORIDE 2 MG/ML
0.2 INJECTION INTRAMUSCULAR; INTRAVENOUS; SUBCUTANEOUS EVERY 4 HOURS
Refills: 0 | Status: DISCONTINUED | OUTPATIENT
Start: 2024-05-01 | End: 2024-05-02

## 2024-05-01 RX ADMIN — SODIUM CHLORIDE 75 MILLILITER(S): 9 INJECTION, SOLUTION INTRAVENOUS at 15:15

## 2024-05-01 RX ADMIN — Medication 5 MILLIGRAM(S): at 15:03

## 2024-05-01 RX ADMIN — SODIUM CHLORIDE 75 MILLILITER(S): 9 INJECTION, SOLUTION INTRAVENOUS at 22:53

## 2024-05-01 RX ADMIN — Medication 3 MILLILITER(S): at 22:53

## 2024-05-01 RX ADMIN — Medication 3 MILLILITER(S): at 15:33

## 2024-05-01 NOTE — BRIEF OPERATIVE NOTE - COMMENTS
I was asked to assist with this case given the complexity and lack of a qualified resident.   Merlene Nelson MD

## 2024-05-01 NOTE — BRIEF OPERATIVE NOTE - OPERATION/FINDINGS
the lesions at segment 2, 3 , 8 were ablated. the portohepatic dissect. Common  hepatic, GDA and proper hepatic were identified. all branches of GDA were ligated. Pump was inserted to GDA. methylene blue injected to ensure the drainage of the pump.

## 2024-05-01 NOTE — PACU DISCHARGE NOTE - NSPTMEETSDISCHCRITERIADT_GEN_A_CORE
From: Mayra Cantun Diaz  To: Vishnu Ren  Sent: 10/16/2023 12:39 PM CDT  Subject: Clarification letter requested as a [facility] Priority.    Good morning Dr. Ren and Ms. Lo RN,    Attached are the documents that I receive from , they need to have this clarifications as soon as 3 business days as they requested , if not they will take me out of the schedule.   I thank you for your time and help in this matter.   in the letter they are some examples that verbally they provide and are highlighter in yellow.     Thank you.  
Let patient know letter is scanned into [employer]. She asked that it also be faxed to her at [phone]. Fax confirmation received.   
Patient is wanting to know if letter will be done today.  
01-May-2024 19:09

## 2024-05-01 NOTE — CHART NOTE - NSCHARTNOTEFT_GEN_A_CORE
General Surgery Post op Check    Pt seen and examined without complaints. Denies abd pain. Reports difficulty coughing up phlegm. Denies SOB/CP/N/V.     Vital Signs Last 24 Hrs  T(C): 36.7 (01 May 2024 19:00), Max: 36.7 (01 May 2024 16:00)  T(F): 98.1 (01 May 2024 19:00), Max: 98.1 (01 May 2024 16:00)  HR: 77 (01 May 2024 19:00) (64 - 82)  BP: 130/87 (01 May 2024 19:00) (114/64 - 160/93)  BP(mean): 94 (01 May 2024 19:00) (75 - 109)  RR: 12 (01 May 2024 19:00) (12 - 16)  SpO2: 98% (01 May 2024 19:00) (93% - 99%)    Parameters below as of 01 May 2024 19:00  Patient On (Oxygen Delivery Method): nasal cannula  O2 Flow (L/min): 2      I&O's Summary    01 May 2024 07:01  -  01 May 2024 19:25  --------------------------------------------------------  IN: 375 mL / OUT: 415 mL / NET: -40 mL        Physical Exam  Gen: NAD, A&Ox3  Pulm: No respiratory distress, no subcostal retractions, on 2L NC  CV: RRR, no JVD  Abd: Soft, NT, ND, dressings c/d/i, HAIP palpable under skin  Extremities:  FROM, warm and well perfused, equal bilateral muscle strength      A/P: 68y Female s/p microwave ablation of liver mass and HIAP placement  -Maintain systolic between 110-160  -Duonebs and chest PT to assist in clearing phlegm  -Restart DVT ppx in AM  -Strict I&O's  -Analgesia and antiemetics as needed  -Diet: sips and chips, IVFs  -OOB/AAT    D Team surgery,  [id number] General Surgery Post op Check    Pt seen and examined without complaints. Denies abd pain. Reports difficulty coughing up phlegm. Denies SOB/CP/N/V.     Vital Signs Last 24 Hrs  T(C): 36.7 (01 May 2024 19:00), Max: 36.7 (01 May 2024 16:00)  T(F): 98.1 (01 May 2024 19:00), Max: 98.1 (01 May 2024 16:00)  HR: 77 (01 May 2024 19:00) (64 - 82)  BP: 130/87 (01 May 2024 19:00) (114/64 - 160/93)  BP(mean): 94 (01 May 2024 19:00) (75 - 109)  RR: 12 (01 May 2024 19:00) (12 - 16)  SpO2: 98% (01 May 2024 19:00) (93% - 99%)    Parameters below as of 01 May 2024 19:00  Patient On (Oxygen Delivery Method): nasal cannula  O2 Flow (L/min): 2      I&O's Summary    01 May 2024 07:01  -  01 May 2024 19:25  --------------------------------------------------------  IN: 375 mL / OUT: 415 mL / NET: -40 mL        Physical Exam  Gen: NAD, A&Ox3  Pulm: No respiratory distress, no subcostal retractions, on 2L NC  CV: RRR, no JVD  Abd: Soft, NT, ND, dressings c/d/i, HAIP palpable under skin  Extremities:  FROM, warm and well perfused, equal bilateral muscle strength      A/P: 68y Female s/p microwave ablation of liver mass and HAIP placement  -Maintain systolic between 110-160  -Duonebs and chest PT to assist in clearing phlegm  -Restart DVT ppx in AM  -Strict I&O's  -Analgesia and antiemetics as needed  -Diet: sips and chips, IVFs  -OOB/AAT    D Team surgery,  [id number]

## 2024-05-01 NOTE — BRIEF OPERATIVE NOTE - NSICDXBRIEFPROCEDURE_GEN_ALL_CORE_FT
PROCEDURES:  Microwave ablation, mass, liver 01-May-2024 15:26:11  Linda Judge  Insertion of implantable hepatic arterial infusion pump 01-May-2024 15:26:35  Linda Judge

## 2024-05-01 NOTE — CHART NOTE - NSCHARTNOTEFT_GEN_A_CORE
Pt s/p ablation of hepatic lesions today. Intra-op and post op c/b mild hypertension and per surgical team BP goal should be 110-160 this evening. Team reached out to ask for BP recs as pt is normally on losartan and team wishes to avoid ARB in the immediate post-op period to prevent ALVARO.    Pt is on Irbesartan 75mg qd at home, no other BP meds. Pts BP documented this am was 146/78, no documented BP post op. Lasb this am w/ normal Cr 0.48, shannan lytes.    From cardiology perspective, there is no absolute contraindication for resuming home irbesartan. Pt is on a very low dose of ARB at home, and I suspect HTN may be related to pain, or procedure, and less related to underlying poorly controlled HTN. Thus would avoid BP meds altogether in the immediate post-op period, but if needed can see recs below    Rec:  - would hold off on giving BP meds unless necessary from surgical/procedural perspective  - would give home ARB as preferred BP med  - if cannot give hydral 10mg po TID, assess BP response within a few hours, can increase dose to 20mg for next dose if BP goal is not achieved Pt s/p ablation of hepatic lesions today. Intra-op and post op c/b mild hypertension and per surgical team BP goal should be 110-160 this evening. Team reached out to ask for BP recs as pt is normally on losartan and team wishes to avoid ARB in the immediate post-op period to prevent ALVARO.    Pt is on Irbesartan 75mg qd at home, no other BP meds. Pts BP documented this am was 146/78, no documented BP post op. Lasb this am w/ normal Cr 0.48, shannan lytes.    From cardiology perspective, there is no absolute contraindication for resuming home irbesartan. Pt is on a very low dose of ARB at home, and I suspect HTN may be related to pain, or procedure, and less related to underlying poorly controlled HTN. Thus would avoid BP meds altogether in the immediate post-op period, but if needed can see recs below    Rec:  - would hold off on giving BP meds unless necessary from surgical/procedural perspective  - would give home ARB as preferred BP med  - if needing BP control, and not able to give home ARB, would give hydral 10mg po TID, assess BP response within a few hours, can increase dose to 20mg for next dose if BP goal is not achieved

## 2024-05-02 DIAGNOSIS — C18.9 MALIGNANT NEOPLASM OF COLON, UNSPECIFIED: ICD-10-CM

## 2024-05-02 LAB
ADD ON TEST-SPECIMEN IN LAB: SIGNIFICANT CHANGE UP
ALBUMIN SERPL ELPH-MCNC: 3.2 G/DL — LOW (ref 3.3–5)
ALBUMIN SERPL ELPH-MCNC: 3.3 G/DL — SIGNIFICANT CHANGE UP (ref 3.3–5)
ALP SERPL-CCNC: 175 U/L — HIGH (ref 40–120)
ALP SERPL-CCNC: 183 U/L — HIGH (ref 40–120)
ALT FLD-CCNC: 264 U/L — HIGH (ref 4–33)
ALT FLD-CCNC: 287 U/L — HIGH (ref 4–33)
ANION GAP SERPL CALC-SCNC: 13 MMOL/L — SIGNIFICANT CHANGE UP (ref 7–14)
ANION GAP SERPL CALC-SCNC: 15 MMOL/L — HIGH (ref 7–14)
APTT BLD: 27.4 SEC — SIGNIFICANT CHANGE UP (ref 24.5–35.6)
AST SERPL-CCNC: 392 U/L — HIGH (ref 4–32)
AST SERPL-CCNC: 431 U/L — HIGH (ref 4–32)
B PERT DNA SPEC QL NAA+PROBE: SIGNIFICANT CHANGE UP
B PERT+PARAPERT DNA PNL SPEC NAA+PROBE: SIGNIFICANT CHANGE UP
BASOPHILS # BLD AUTO: 0.01 K/UL — SIGNIFICANT CHANGE UP (ref 0–0.2)
BASOPHILS NFR BLD AUTO: 0.1 % — SIGNIFICANT CHANGE UP (ref 0–2)
BILIRUB DIRECT SERPL-MCNC: 0.2 MG/DL — SIGNIFICANT CHANGE UP (ref 0–0.3)
BILIRUB INDIRECT FLD-MCNC: 0.7 MG/DL — SIGNIFICANT CHANGE UP (ref 0–1)
BILIRUB SERPL-MCNC: 0.9 MG/DL — SIGNIFICANT CHANGE UP (ref 0.2–1.2)
BILIRUB SERPL-MCNC: 0.9 MG/DL — SIGNIFICANT CHANGE UP (ref 0.2–1.2)
BLOOD GAS ARTERIAL - LYTES,HGB,ICA,LACT RESULT: SIGNIFICANT CHANGE UP
BORDETELLA PARAPERTUSSIS (RAPRVP): SIGNIFICANT CHANGE UP
BUN SERPL-MCNC: 12 MG/DL — SIGNIFICANT CHANGE UP (ref 7–23)
BUN SERPL-MCNC: 13 MG/DL — SIGNIFICANT CHANGE UP (ref 7–23)
C PNEUM DNA SPEC QL NAA+PROBE: SIGNIFICANT CHANGE UP
CALCIUM SERPL-MCNC: 8.5 MG/DL — SIGNIFICANT CHANGE UP (ref 8.4–10.5)
CALCIUM SERPL-MCNC: 8.7 MG/DL — SIGNIFICANT CHANGE UP (ref 8.4–10.5)
CHLORIDE SERPL-SCNC: 105 MMOL/L — SIGNIFICANT CHANGE UP (ref 98–107)
CHLORIDE SERPL-SCNC: 105 MMOL/L — SIGNIFICANT CHANGE UP (ref 98–107)
CO2 SERPL-SCNC: 21 MMOL/L — LOW (ref 22–31)
CO2 SERPL-SCNC: 21 MMOL/L — LOW (ref 22–31)
CREAT SERPL-MCNC: 0.52 MG/DL — SIGNIFICANT CHANGE UP (ref 0.5–1.3)
CREAT SERPL-MCNC: 0.55 MG/DL — SIGNIFICANT CHANGE UP (ref 0.5–1.3)
EGFR: 100 ML/MIN/1.73M2 — SIGNIFICANT CHANGE UP
EGFR: 101 ML/MIN/1.73M2 — SIGNIFICANT CHANGE UP
EOSINOPHIL # BLD AUTO: 0 K/UL — SIGNIFICANT CHANGE UP (ref 0–0.5)
EOSINOPHIL NFR BLD AUTO: 0 % — SIGNIFICANT CHANGE UP (ref 0–6)
FLUAV AG NPH QL: SIGNIFICANT CHANGE UP
FLUAV SUBTYP SPEC NAA+PROBE: SIGNIFICANT CHANGE UP
FLUBV AG NPH QL: SIGNIFICANT CHANGE UP
FLUBV RNA SPEC QL NAA+PROBE: SIGNIFICANT CHANGE UP
GLUCOSE BLDC GLUCOMTR-MCNC: 115 MG/DL — HIGH (ref 70–99)
GLUCOSE SERPL-MCNC: 110 MG/DL — HIGH (ref 70–99)
GLUCOSE SERPL-MCNC: 121 MG/DL — HIGH (ref 70–99)
HADV DNA SPEC QL NAA+PROBE: SIGNIFICANT CHANGE UP
HCOV 229E RNA SPEC QL NAA+PROBE: SIGNIFICANT CHANGE UP
HCOV HKU1 RNA SPEC QL NAA+PROBE: SIGNIFICANT CHANGE UP
HCOV NL63 RNA SPEC QL NAA+PROBE: SIGNIFICANT CHANGE UP
HCOV OC43 RNA SPEC QL NAA+PROBE: SIGNIFICANT CHANGE UP
HCT VFR BLD CALC: 31.1 % — LOW (ref 34.5–45)
HCT VFR BLD CALC: 32.4 % — LOW (ref 34.5–45)
HGB BLD-MCNC: 10 G/DL — LOW (ref 11.5–15.5)
HGB BLD-MCNC: 9.7 G/DL — LOW (ref 11.5–15.5)
HMPV RNA SPEC QL NAA+PROBE: SIGNIFICANT CHANGE UP
HPIV1 RNA SPEC QL NAA+PROBE: SIGNIFICANT CHANGE UP
HPIV2 RNA SPEC QL NAA+PROBE: SIGNIFICANT CHANGE UP
HPIV3 RNA SPEC QL NAA+PROBE: SIGNIFICANT CHANGE UP
HPIV4 RNA SPEC QL NAA+PROBE: SIGNIFICANT CHANGE UP
IANC: 10.36 K/UL — HIGH (ref 1.8–7.4)
IMM GRANULOCYTES NFR BLD AUTO: 0.3 % — SIGNIFICANT CHANGE UP (ref 0–0.9)
INR BLD: 0.98 RATIO — SIGNIFICANT CHANGE UP (ref 0.85–1.18)
INR BLD: 0.99 RATIO — SIGNIFICANT CHANGE UP (ref 0.85–1.18)
LACTATE SERPL-SCNC: 1.2 MMOL/L — SIGNIFICANT CHANGE UP (ref 0.5–2)
LYMPHOCYTES # BLD AUTO: 0.67 K/UL — LOW (ref 1–3.3)
LYMPHOCYTES # BLD AUTO: 5.8 % — LOW (ref 13–44)
M PNEUMO DNA SPEC QL NAA+PROBE: SIGNIFICANT CHANGE UP
MAGNESIUM SERPL-MCNC: 1.6 MG/DL — SIGNIFICANT CHANGE UP (ref 1.6–2.6)
MAGNESIUM SERPL-MCNC: 1.6 MG/DL — SIGNIFICANT CHANGE UP (ref 1.6–2.6)
MCHC RBC-ENTMCNC: 26.8 PG — LOW (ref 27–34)
MCHC RBC-ENTMCNC: 26.9 PG — LOW (ref 27–34)
MCHC RBC-ENTMCNC: 30.9 GM/DL — LOW (ref 32–36)
MCHC RBC-ENTMCNC: 31.2 GM/DL — LOW (ref 32–36)
MCV RBC AUTO: 86.1 FL — SIGNIFICANT CHANGE UP (ref 80–100)
MCV RBC AUTO: 86.9 FL — SIGNIFICANT CHANGE UP (ref 80–100)
MONOCYTES # BLD AUTO: 0.51 K/UL — SIGNIFICANT CHANGE UP (ref 0–0.9)
MONOCYTES NFR BLD AUTO: 4.4 % — SIGNIFICANT CHANGE UP (ref 2–14)
NEUTROPHILS # BLD AUTO: 10.36 K/UL — HIGH (ref 1.8–7.4)
NEUTROPHILS NFR BLD AUTO: 89.4 % — HIGH (ref 43–77)
NRBC # BLD: 0 /100 WBCS — SIGNIFICANT CHANGE UP (ref 0–0)
NRBC # BLD: 0 /100 WBCS — SIGNIFICANT CHANGE UP (ref 0–0)
NRBC # FLD: 0 K/UL — SIGNIFICANT CHANGE UP (ref 0–0)
NRBC # FLD: 0 K/UL — SIGNIFICANT CHANGE UP (ref 0–0)
PHOSPHATE SERPL-MCNC: 3 MG/DL — SIGNIFICANT CHANGE UP (ref 2.5–4.5)
PHOSPHATE SERPL-MCNC: 3.5 MG/DL — SIGNIFICANT CHANGE UP (ref 2.5–4.5)
PLATELET # BLD AUTO: 120 K/UL — LOW (ref 150–400)
PLATELET # BLD AUTO: 153 K/UL — SIGNIFICANT CHANGE UP (ref 150–400)
POTASSIUM SERPL-MCNC: 4.1 MMOL/L — SIGNIFICANT CHANGE UP (ref 3.5–5.3)
POTASSIUM SERPL-MCNC: 4.1 MMOL/L — SIGNIFICANT CHANGE UP (ref 3.5–5.3)
POTASSIUM SERPL-SCNC: 4.1 MMOL/L — SIGNIFICANT CHANGE UP (ref 3.5–5.3)
POTASSIUM SERPL-SCNC: 4.1 MMOL/L — SIGNIFICANT CHANGE UP (ref 3.5–5.3)
PROT SERPL-MCNC: 5.8 G/DL — LOW (ref 6–8.3)
PROT SERPL-MCNC: 6 G/DL — SIGNIFICANT CHANGE UP (ref 6–8.3)
PROTHROM AB SERPL-ACNC: 11.1 SEC — SIGNIFICANT CHANGE UP (ref 9.5–13)
PROTHROM AB SERPL-ACNC: 11.2 SEC — SIGNIFICANT CHANGE UP (ref 9.5–13)
RAPID RVP RESULT: SIGNIFICANT CHANGE UP
RBC # BLD: 3.61 M/UL — LOW (ref 3.8–5.2)
RBC # BLD: 3.73 M/UL — LOW (ref 3.8–5.2)
RBC # FLD: 18.5 % — HIGH (ref 10.3–14.5)
RBC # FLD: 18.6 % — HIGH (ref 10.3–14.5)
RSV RNA NPH QL NAA+NON-PROBE: SIGNIFICANT CHANGE UP
RSV RNA SPEC QL NAA+PROBE: SIGNIFICANT CHANGE UP
RV+EV RNA SPEC QL NAA+PROBE: SIGNIFICANT CHANGE UP
SARS-COV-2 RNA SPEC QL NAA+PROBE: SIGNIFICANT CHANGE UP
SARS-COV-2 RNA SPEC QL NAA+PROBE: SIGNIFICANT CHANGE UP
SODIUM SERPL-SCNC: 139 MMOL/L — SIGNIFICANT CHANGE UP (ref 135–145)
SODIUM SERPL-SCNC: 141 MMOL/L — SIGNIFICANT CHANGE UP (ref 135–145)
TROPONIN T, HIGH SENSITIVITY RESULT: 34 NG/L — SIGNIFICANT CHANGE UP
WBC # BLD: 11.58 K/UL — HIGH (ref 3.8–10.5)
WBC # BLD: 12.52 K/UL — HIGH (ref 3.8–10.5)
WBC # FLD AUTO: 11.58 K/UL — HIGH (ref 3.8–10.5)
WBC # FLD AUTO: 12.52 K/UL — HIGH (ref 3.8–10.5)

## 2024-05-02 PROCEDURE — 74018 RADEX ABDOMEN 1 VIEW: CPT | Mod: 26

## 2024-05-02 PROCEDURE — 71275 CT ANGIOGRAPHY CHEST: CPT | Mod: 26

## 2024-05-02 PROCEDURE — 93010 ELECTROCARDIOGRAM REPORT: CPT

## 2024-05-02 PROCEDURE — 99222 1ST HOSP IP/OBS MODERATE 55: CPT

## 2024-05-02 PROCEDURE — 74177 CT ABD & PELVIS W/CONTRAST: CPT | Mod: 26

## 2024-05-02 PROCEDURE — 71045 X-RAY EXAM CHEST 1 VIEW: CPT | Mod: 26

## 2024-05-02 RX ORDER — MAGNESIUM SULFATE 500 MG/ML
2 VIAL (ML) INJECTION ONCE
Refills: 0 | Status: COMPLETED | OUTPATIENT
Start: 2024-05-02 | End: 2024-05-02

## 2024-05-02 RX ORDER — CHLORHEXIDINE GLUCONATE 213 G/1000ML
1 SOLUTION TOPICAL
Refills: 0 | Status: DISCONTINUED | OUTPATIENT
Start: 2024-05-02 | End: 2024-05-09

## 2024-05-02 RX ORDER — INFLUENZA VIRUS VACCINE 15; 15; 15; 15 UG/.5ML; UG/.5ML; UG/.5ML; UG/.5ML
0.7 SUSPENSION INTRAMUSCULAR ONCE
Refills: 0 | Status: DISCONTINUED | OUTPATIENT
Start: 2024-05-02 | End: 2024-05-09

## 2024-05-02 RX ORDER — LABETALOL HCL 100 MG
10 TABLET ORAL ONCE
Refills: 0 | Status: COMPLETED | OUTPATIENT
Start: 2024-05-02 | End: 2024-05-02

## 2024-05-02 RX ORDER — HYDROMORPHONE HYDROCHLORIDE 2 MG/ML
0.5 INJECTION INTRAMUSCULAR; INTRAVENOUS; SUBCUTANEOUS
Refills: 0 | Status: DISCONTINUED | OUTPATIENT
Start: 2024-05-02 | End: 2024-05-02

## 2024-05-02 RX ORDER — PIPERACILLIN AND TAZOBACTAM 4; .5 G/20ML; G/20ML
3.38 INJECTION, POWDER, LYOPHILIZED, FOR SOLUTION INTRAVENOUS EVERY 8 HOURS
Refills: 0 | Status: DISCONTINUED | OUTPATIENT
Start: 2024-05-02 | End: 2024-05-09

## 2024-05-02 RX ORDER — HYDROMORPHONE HYDROCHLORIDE 2 MG/ML
1 INJECTION INTRAMUSCULAR; INTRAVENOUS; SUBCUTANEOUS
Refills: 0 | Status: DISCONTINUED | OUTPATIENT
Start: 2024-05-02 | End: 2024-05-02

## 2024-05-02 RX ORDER — ACETAMINOPHEN 500 MG
500 TABLET ORAL EVERY 6 HOURS
Refills: 0 | Status: COMPLETED | OUTPATIENT
Start: 2024-05-02 | End: 2024-05-03

## 2024-05-02 RX ORDER — PANTOPRAZOLE SODIUM 20 MG/1
40 TABLET, DELAYED RELEASE ORAL DAILY
Refills: 0 | Status: DISCONTINUED | OUTPATIENT
Start: 2024-05-02 | End: 2024-05-06

## 2024-05-02 RX ORDER — HEPARIN SODIUM 5000 [USP'U]/ML
5000 INJECTION INTRAVENOUS; SUBCUTANEOUS EVERY 8 HOURS
Refills: 0 | Status: DISCONTINUED | OUTPATIENT
Start: 2024-05-02 | End: 2024-05-02

## 2024-05-02 RX ORDER — LABETALOL HCL 100 MG
10 TABLET ORAL EVERY 6 HOURS
Refills: 0 | Status: DISCONTINUED | OUTPATIENT
Start: 2024-05-02 | End: 2024-05-02

## 2024-05-02 RX ORDER — PIPERACILLIN AND TAZOBACTAM 4; .5 G/20ML; G/20ML
3.38 INJECTION, POWDER, LYOPHILIZED, FOR SOLUTION INTRAVENOUS ONCE
Refills: 0 | Status: COMPLETED | OUTPATIENT
Start: 2024-05-02 | End: 2024-05-02

## 2024-05-02 RX ORDER — HYDROMORPHONE HYDROCHLORIDE 2 MG/ML
0.2 INJECTION INTRAMUSCULAR; INTRAVENOUS; SUBCUTANEOUS
Refills: 0 | Status: DISCONTINUED | OUTPATIENT
Start: 2024-05-02 | End: 2024-05-09

## 2024-05-02 RX ORDER — HEPARIN SODIUM 5000 [USP'U]/ML
5000 INJECTION INTRAVENOUS; SUBCUTANEOUS EVERY 8 HOURS
Refills: 0 | Status: DISCONTINUED | OUTPATIENT
Start: 2024-05-02 | End: 2024-05-06

## 2024-05-02 RX ORDER — HYDROMORPHONE HYDROCHLORIDE 2 MG/ML
1 INJECTION INTRAMUSCULAR; INTRAVENOUS; SUBCUTANEOUS ONCE
Refills: 0 | Status: DISCONTINUED | OUTPATIENT
Start: 2024-05-02 | End: 2024-05-02

## 2024-05-02 RX ORDER — METOPROLOL TARTRATE 50 MG
5 TABLET ORAL EVERY 6 HOURS
Refills: 0 | Status: DISCONTINUED | OUTPATIENT
Start: 2024-05-02 | End: 2024-05-07

## 2024-05-02 RX ADMIN — Medication 10 MILLIGRAM(S): at 10:00

## 2024-05-02 RX ADMIN — Medication 3 MILLILITER(S): at 09:12

## 2024-05-02 RX ADMIN — Medication 3 MILLILITER(S): at 15:22

## 2024-05-02 RX ADMIN — Medication 25 GRAM(S): at 15:11

## 2024-05-02 RX ADMIN — HYDROMORPHONE HYDROCHLORIDE 1 MILLIGRAM(S): 2 INJECTION INTRAMUSCULAR; INTRAVENOUS; SUBCUTANEOUS at 09:40

## 2024-05-02 RX ADMIN — HYDROMORPHONE HYDROCHLORIDE 1 MILLIGRAM(S): 2 INJECTION INTRAMUSCULAR; INTRAVENOUS; SUBCUTANEOUS at 10:15

## 2024-05-02 RX ADMIN — Medication 200 MILLIGRAM(S): at 17:51

## 2024-05-02 RX ADMIN — Medication 10 MILLIGRAM(S): at 04:52

## 2024-05-02 RX ADMIN — HYDROMORPHONE HYDROCHLORIDE 0.4 MILLIGRAM(S): 2 INJECTION INTRAMUSCULAR; INTRAVENOUS; SUBCUTANEOUS at 07:16

## 2024-05-02 RX ADMIN — SODIUM CHLORIDE 75 MILLILITER(S): 9 INJECTION, SOLUTION INTRAVENOUS at 13:19

## 2024-05-02 RX ADMIN — PIPERACILLIN AND TAZOBACTAM 25 GRAM(S): 4; .5 INJECTION, POWDER, LYOPHILIZED, FOR SOLUTION INTRAVENOUS at 17:34

## 2024-05-02 RX ADMIN — HYDROMORPHONE HYDROCHLORIDE 0.4 MILLIGRAM(S): 2 INJECTION INTRAMUSCULAR; INTRAVENOUS; SUBCUTANEOUS at 06:46

## 2024-05-02 RX ADMIN — PIPERACILLIN AND TAZOBACTAM 200 GRAM(S): 4; .5 INJECTION, POWDER, LYOPHILIZED, FOR SOLUTION INTRAVENOUS at 13:20

## 2024-05-02 RX ADMIN — Medication 3 MILLILITER(S): at 22:43

## 2024-05-02 RX ADMIN — Medication 5 MILLIGRAM(S): at 17:51

## 2024-05-02 RX ADMIN — Medication 3 MILLILITER(S): at 04:04

## 2024-05-02 RX ADMIN — Medication 500 MILLIGRAM(S): at 18:32

## 2024-05-02 RX ADMIN — HEPARIN SODIUM 5000 UNIT(S): 5000 INJECTION INTRAVENOUS; SUBCUTANEOUS at 15:11

## 2024-05-02 NOTE — PATIENT PROFILE ADULT - FUNCTIONAL ASSESSMENT - BASIC MOBILITY 2.
Tazorac Counseling:  Patient advised that medication is irritating and drying.  Patient may need to apply sparingly and wash off after an hour before eventually leaving it on overnight.  The patient verbalized understanding of the proper use and possible adverse effects of tazorac.  All of the patient's questions and concerns were addressed. 3 = A little assistance

## 2024-05-02 NOTE — RAPID RESPONSE TEAM SUMMARY - NSSITUATIONBACKGROUNDRRT_GEN_ALL_CORE
68F metastatic colon cancer to liver POD1 s/p ANGELI pump placement and cholecystectomy. Rapid called for tachycardia to 130s and somnolence after receiving Dilaudid. Patient awake and alert, answering questions via [employer] . Patient c/o chest pain in addition to abdominal pain.

## 2024-05-02 NOTE — CONSULT NOTE ADULT - SUBJECTIVE AND OBJECTIVE BOX
SICU Consult Note  =====================================================  HPI  68 year old female with PMHx HTN, HLD, Scleroderma, RA, pulmonary hypertension, metastatic colon CA to liver (s/p chemo last dose 1/2024) and PSHx  Laparoscopic Right hemicolectomy and cholecystectomy (11/01/23) who is now s/p ANGELI pump placement and microwave ablation of liver mass on 5/1/24. SICU consulted for tachycardia, HTN and hypoxemia c/f PE.        PAST MEDICAL & SURGICAL HISTORY:  H/O pulmonary hypertension  H/O scleroderma  HTN (hypertension)  HLD (hyperlipidemia)  Migration of percutaneous cholecystostomy tube  H/O right hemicolectomy  History of laparoscopic cholecystectomy  History of left hip replacement  History of right hip replacement      ALLERGIES:  No Known Allergies      --------------------------------------------------------------------------------------    MEDICATIONS:    Neurologic Medications  butorphanol Injectable 0.125 milliGRAM(s) IV Push every 6 hours PRN Pruritus  HYDROmorphone  Injectable 0.2 milliGRAM(s) IV Push every 3 hours PRN Moderate Pain (4 - 6)  HYDROmorphone  Injectable 0.5 milliGRAM(s) IV Push every 3 hours PRN Severe Pain (7 - 10)  ondansetron Injectable 4 milliGRAM(s) IV Push every 6 hours PRN Nausea    Respiratory Medications  albuterol/ipratropium for Nebulization 3 milliLiter(s) Nebulizer every 6 hours    Cardiovascular Medications  labetalol Injectable 10 milliGRAM(s) IV Push once    Gastrointestinal Medications  lactated ringers. 1000 milliLiter(s) IV Continuous <Continuous>    Genitourinary Medications    Hematologic/Oncologic Medications  influenza  Vaccine (HIGH DOSE) 0.7 milliLiter(s) IntraMuscular once    Antimicrobial/Immunologic Medications    Endocrine/Metabolic Medications    Topical/Other Medications  naloxone Injectable 0.1 milliGRAM(s) IV Push every 3 minutes PRN For ANY of the following changes in patient status:  A. RR LESS THAN 10 breaths per minute, B. Oxygen saturation LESS THAN 90%, C. Sedation score of 6    --------------------------------------------------------------------------------------    VITAL SIGNS:  ICU Vital Signs Last 24 Hrs  T(C): 36.9 (02 May 2024 04:45), Max: 37 (02 May 2024 01:55)  T(F): 98.5 (02 May 2024 04:45), Max: 98.6 (02 May 2024 01:55)  HR: 99 (02 May 2024 09:29) (64 - 110)  BP: 136/67 (02 May 2024 05:24) (114/64 - 168/86)  BP(mean): 104 (02 May 2024 00:00) (75 - 109)  ABP: 159/81 (01 May 2024 19:00) (113/58 - 159/81)  ABP(mean): 113 (01 May 2024 19:00) (81 - 113)  RR: 18 (02 May 2024 09:29) (11 - 18)  SpO2: 96% (02 May 2024 09:29) (93% - 100%)    O2 Parameters below as of 02 May 2024 09:29  Patient On (Oxygen Delivery Method): room air    --------------------------------------------------------------------------------------    INS AND OUTS:  I&O's Detail    01 May 2024 07:01  -  02 May 2024 07:00  --------------------------------------------------------  IN:    Lactated Ringers: 825 mL  Total IN: 825 mL    OUT:    Indwelling Catheter - Urethral (mL): 800 mL    Oral Fluid: 0 mL  Total OUT: 800 mL    Total NET: 25 mL    --------------------------------------------------------------------------------------    EXAM  NEUROLOGY  Exam: Normal, in no acute distress.  Alert and oriented x4.  No focal neurologic deficits. ***    RESPIRATORY  Exam: Lungs clear to auscultation, Normal expansion/effort. ***  Mechanical Ventilation:     CARDIOVASCULAR  Exam: S1, S2.  Regular rate and rhythm.   ***    GI/NUTRITION  Exam: Abdomen soft, Non-tender, Non-distended.  ***  Wound:  ***  Current Diet: NPO***    VASCULAR  Exam: Extremities warm, pink, well-perfused. ***    MUSCULOSKELETAL  Exam: All extremities moving spontaneously without limitations. ***    SKIN  Exam: Good skin turgor, no skin breakdown. ***      TUBES / LINES / DRAINS    [x] Peripheral IV  [] Central Venous Line     	[] R	[] L	[] IJ	[] Fem	[] SC	Date Placed:   [] Arterial Line		[] R	[] L	[] Fem	[] Rad	[] Ax	Date Placed:   [] PICC		[] Midline		[] Mediport  [X Urinary Catheter		Date Placed:   [x] Necessity of urinary, arterial, and venous catheters discussed    --------------------------------------------------------------------------------------    LABS                          10.0   12.52 )-----------( 153      ( 02 May 2024 10:15 )             32.4     05-02    141  |  105  |  13  ----------------------------<  121<H>  4.1   |  21<L>  |  0.55    Ca    8.7      02 May 2024 05:27  Phos  3.5     05-02  Mg     1.60     05-02    TPro  5.8<L>  /  Alb  3.3  /  TBili  0.9  /  DBili  0.2  /  AST  431<H>  /  ALT  264<H>  /  AlkPhos  175<H>  05-02      ABG - ( 02 May 2024 10:25 )  pH, Arterial: 7.39  pH, Blood: x     /  pCO2: 39    /  pO2: 71    / HCO3: 24    / Base Excess: -1.2  /  SaO2: 95.9                --------------------------------------------------------------------------------------       SICU Consult Note  =====================================================  HPI  68 year old female with PMHx HTN, HLD, Scleroderma, RA, pulmonary hypertension, metastatic colon CA to liver (s/p chemo last dose 1/2024) and PSHx  Laparoscopic Right hemicolectomy and cholecystectomy (11/01/23) who is now s/p ANGELI pump placement and microwave ablation of liver mass on 5/1/24. SICU consulted for tachycardia, HTN and hypoxemia c/f PE.        PAST MEDICAL & SURGICAL HISTORY:  H/O pulmonary hypertension  H/O scleroderma  HTN (hypertension)  HLD (hyperlipidemia)  Migration of percutaneous cholecystostomy tube  H/O right hemicolectomy  History of laparoscopic cholecystectomy  History of left hip replacement  History of right hip replacement      ALLERGIES:  No Known Allergies      --------------------------------------------------------------------------------------    MEDICATIONS:    Neurologic Medications  butorphanol Injectable 0.125 milliGRAM(s) IV Push every 6 hours PRN Pruritus  HYDROmorphone  Injectable 0.2 milliGRAM(s) IV Push every 3 hours PRN Moderate Pain (4 - 6)  HYDROmorphone  Injectable 0.5 milliGRAM(s) IV Push every 3 hours PRN Severe Pain (7 - 10)  ondansetron Injectable 4 milliGRAM(s) IV Push every 6 hours PRN Nausea    Respiratory Medications  albuterol/ipratropium for Nebulization 3 milliLiter(s) Nebulizer every 6 hours    Cardiovascular Medications  labetalol Injectable 10 milliGRAM(s) IV Push once    Gastrointestinal Medications  lactated ringers. 1000 milliLiter(s) IV Continuous <Continuous>    Genitourinary Medications    Hematologic/Oncologic Medications  influenza  Vaccine (HIGH DOSE) 0.7 milliLiter(s) IntraMuscular once    Antimicrobial/Immunologic Medications    Endocrine/Metabolic Medications    Topical/Other Medications  naloxone Injectable 0.1 milliGRAM(s) IV Push every 3 minutes PRN For ANY of the following changes in patient status:  A. RR LESS THAN 10 breaths per minute, B. Oxygen saturation LESS THAN 90%, C. Sedation score of 6    --------------------------------------------------------------------------------------    VITAL SIGNS:  ICU Vital Signs Last 24 Hrs  T(C): 36.9 (02 May 2024 04:45), Max: 37 (02 May 2024 01:55)  T(F): 98.5 (02 May 2024 04:45), Max: 98.6 (02 May 2024 01:55)  HR: 99 (02 May 2024 09:29) (64 - 110)  BP: 136/67 (02 May 2024 05:24) (114/64 - 168/86)  BP(mean): 104 (02 May 2024 00:00) (75 - 109)  ABP: 159/81 (01 May 2024 19:00) (113/58 - 159/81)  ABP(mean): 113 (01 May 2024 19:00) (81 - 113)  RR: 18 (02 May 2024 09:29) (11 - 18)  SpO2: 96% (02 May 2024 09:29) (93% - 100%)    O2 Parameters below as of 02 May 2024 09:29  Patient On (Oxygen Delivery Method): room air    --------------------------------------------------------------------------------------    INS AND OUTS:  I&O's Detail    01 May 2024 07:01  -  02 May 2024 07:00  --------------------------------------------------------  IN:    Lactated Ringers: 825 mL  Total IN: 825 mL    OUT:    Indwelling Catheter - Urethral (mL): 800 mL    Oral Fluid: 0 mL  Total OUT: 800 mL    Total NET: 25 mL    --------------------------------------------------------------------------------------    EXAM  NEUROLOGY  Exam: anxious.  Alert and oriented x4.  No focal neurologic deficits.     RESPIRATORY  Exam: Normal expansion; increased WOB. On NC    CARDIOVASCULAR  Exam: S1, S2.  Regular rate and rhythm.       GI/NUTRITION  Exam: Abdomen soft, Non-distended, appropriately tender. ANGELI palpable under skin. surgical incision c/d/i  Current Diet: NPO    MUSCULOSKELETAL  Exam: All extremities moving spontaneously without limitations.         TUBES / LINES / DRAINS    [x] Peripheral IV  [] Central Venous Line     	[] R	[] L	[] IJ	[] Fem	[] SC	Date Placed:   [] Arterial Line		[] R	[] L	[] Fem	[] Rad	[] Ax	Date Placed:   [] PICC		[] Midline		[] Mediport  [X Urinary Catheter		Date Placed:   [x] Necessity of urinary, arterial, and venous catheters discussed    --------------------------------------------------------------------------------------    LABS                          10.0   12.52 )-----------( 153      ( 02 May 2024 10:15 )             32.4     05-02    141  |  105  |  13  ----------------------------<  121<H>  4.1   |  21<L>  |  0.55    Ca    8.7      02 May 2024 05:27  Phos  3.5     05-02  Mg     1.60     05-02    TPro  5.8<L>  /  Alb  3.3  /  TBili  0.9  /  DBili  0.2  /  AST  431<H>  /  ALT  264<H>  /  AlkPhos  175<H>  05-02    ABG - ( 02 May 2024 10:25 )  pH, Arterial: 7.39  pH, Blood: x     /  pCO2: 39    /  pO2: 71    / HCO3: 24    / Base Excess: -1.2  /  SaO2: 95.9                --------------------------------------------------------------------------------------

## 2024-05-02 NOTE — CONSULT NOTE ADULT - ATTENDING COMMENTS
I agree with the detailed interval history, physical, and plan, which I have reviewed and edited where appropriate'; also agree with notes/assessment with my team on service.  I have personally examined the patient.  I was physically present for the key portions of the evaluation and management (E/M) service provided.  I reviewed all the pertinent data.  The patient is a critical care patient with life threatening hemodynamic and metabolic instability in SICU.  The SICU team has a constant risk benefit analyzes discussion and coordinating care with the primary team and all consultants.   The patient is in SICU with the chief complaint and diagnosis mentioned in the note.   The plan will be specified in the note.  68 year old female with metastatic colon CA to liver; s/p ANGELI pump placement and microwave ablation of liver mass.   SICU consult for tachycardia, HTN and hypoxemia.  EXAM  NEUROLOGY  Exam: No focal neurologic deficits.   RESPIRATORY  Exam: coarse BS  CARDIOVASCULAR  Exam: RR  GI/NUTRITION  Exam: Abdomen soft, Non-distended  MUSCULOSKELETAL  Exam: All extremities moving   PLAN:     NEUROLOGY:  - PCEA  RESPIRATORY:  - Maintain O2 saturation >92%  CARDIOVASCULAR:  - MAP >65  - labetolol   GI / NUTRITION:  - Diet: NPO  RENAL / GENITOURINARY:  - [place] catheter  HEMATOLOGIC:  - DVT ppx  INFECTIOUS DISEASE:  -off antibiotics  ENDOCRINOLOGY:  - monitor glucose     Disposition: SICU

## 2024-05-02 NOTE — PATIENT PROFILE ADULT - FALL HARM RISK - HARM RISK INTERVENTIONS
Assistance with ambulation/Assistance OOB with selected safe patient handling equipment/Communicate Risk of Fall with Harm to all staff/Discuss with provider need for PT consult/Monitor gait and stability/Reinforce activity limits and safety measures with patient and family/Tailored Fall Risk Interventions/Visual Cue: Yellow wristband and red socks/Bed in lowest position, wheels locked, appropriate side rails in place/Call bell, personal items and telephone in reach/Instruct patient to call for assistance before getting out of bed or chair/Non-slip footwear when patient is out of bed/[place] to call system/Physically safe environment - no spills, clutter or unnecessary equipment/Purposeful Proactive Rounding/Room/bathroom lighting operational, light cord in reach

## 2024-05-02 NOTE — CONSULT NOTE ADULT - ASSESSMENT
ASSESSMENT:  68 year old female with PMHx HTN, HLD, Scleroderma, RA, pulmonary hypertension, metastatic colon CA to liver (s/p chemo last dose 1/2024) and PSHx  Laparoscopic Right hemicolectomy and cholecystectomy (11/01/23) who is now s/p ANGELI pump placement and microwave ablation of liver mass on 5/1/24. SICU consulted for tachycardia, HTN and hypoxemia c/f PE.    PLAN:     NEUROLOGY:  - A&Ox3  - pain control prn. has PCEA    RESPIRATORY:  - Maintain O2 saturation >92%  - currently on NC  - CT chest PE protocol   - monitor ABG    CARDIOVASCULAR:  - MAP >65  - s/p 10 labetolol     GI / NUTRITION:  - Diet: NPO    RENAL / GENITOURINARY:  - Indwelling arevalo catheter  - Monitor electrolytes and replete PRN  - Continue to monitor strict ins and outs q1 hour    HEMATOLOGIC:  - monitor H/H  - DVT ppx: SCDs  - f/u CT read; may need therapeutic AC if PE     INFECTIOUS DISEASE:  - Currently afebrile with no leukocytosis  - Continue to monitor off antibiotics    ENDOCRINOLOGY:  - No active issues  - Continue to monitor glucose on BMP (goal 140-180)  - FS currently 110s         Disposition: SICU   ASSESSMENT:  68 year old female with PMHx HTN, HLD, Scleroderma, RA, pulmonary hypertension, metastatic colon CA to liver (s/p chemo last dose 1/2024) and PSHx  Laparoscopic Right hemicolectomy and cholecystectomy (11/01/23) who is now s/p ANGELI pump placement and microwave ablation of liver mass on 5/1/24. SICU consulted for tachycardia, HTN and hypoxemia c/f PE.    PLAN:     NEUROLOGY:  - A&Ox3, mandarin speaking  - pain control w/ dilaudid prn    RESPIRATORY:  - Maintain O2 saturation >92%  - currently on nonrebreather  - CT chest PE negative for PE   - Aggressive chest PT  - monitor ABG  - Desat could be due to possible aspiration event upon induction in OR    CARDIOVASCULAR:  - MAP >65  - s/p 10 labetalol during RRT    GI / NUTRITION:  - Diet: NPO    RENAL / GENITOURINARY:  - IVF @75/hr  - Indwelling arevalo catheter  - Monitor electrolytes and replete PRN  - Continue to monitor strict ins and outs q1 hour    HEMATOLOGIC:  - monitor H/H  - DVT ppx: SCDs, ok to start SQH    INFECTIOUS DISEASE:  - Currently afebrile with no leukocytosis  - Continue to monitor off antibiotics    ENDOCRINOLOGY:  - No active issues  - Continue to monitor glucose on BMP (goal 140-180)  - FS currently 110s         Disposition: SICU

## 2024-05-02 NOTE — RAPID RESPONSE TEAM SUMMARY - NSADDTLFINDINGSRRT_GEN_ALL_CORE
Tachycardic to 130s. EKG sinus without ST changes. Hypertensive to . Given 10mg Labetalol. HR improved to 110s.  O2 saturation 80s requiring 5L supplemental O2 by nasal cannula with improvement to 91-93%  ABG found hypoxemia with PaO2 71. pH 7.38. PCO2 39. Lactate 1.1. H/H Stable 10/32. Troponin 34.  Tachycardic to 130s. EKG sinus without ST changes. Hypertensive to . Given 10mg Labetalol. HR improved to 115 and /89.  O2 saturation 80s requiring 5L supplemental O2 by nasal cannula with improvement to 91-93%  ABG found hypoxemia with PaO2 71. pH 7.38. PCO2 39. Lactate 1.1. H/H Stable 10/32. Troponin 34.

## 2024-05-02 NOTE — PROGRESS NOTE ADULT - ASSESSMENT
A/P: 68y Female s/p microwave ablation of liver mass and HAIP placement    PLAN:  -X-ray abdomen and chest ordered   -Maintain systolic between 110-160  -Duonebs and chest PT to assist in clearing phlegm  -Restart DVT ppx in AM  -Strict I&O's  -Analgesia and antiemetics as needed  -Diet: sips and chips, IVFs  -OOB/AAT  - DVT ppx: holding SQH d/t bruising at midline incision     D Team surgery,  [id number]. A/P: 68y Female with PMH of HTN, HLD, Scleroderma (not on meds) , Rheumatoid arthritis, h/o pulmonary hypertension (and was taking meds for that, but had a f/u about a year ago in [place], she was told her pressure has improved, and told her to stop meds- not followed up with cardiologist here in [facility]),  Colon cancer metastasized to liver (s/p chemo last dose 1/2024) s/p Laparoscopic Right hemicolectomy and cholecystectomy on 11/01/23. Now s/p microwave ablation of liver mass and HAIP placement on 5/1.     PLAN:  -X-ray abdomen and chest ordered   -Maintain systolic between 110-160  -Duonebs and chest PT to assist in clearing phlegm  -Restart DVT ppx in AM  -Strict I&O's  -Analgesia and antiemetics as needed  -Diet: sips and chips, IVFs  -OOB/AAT  - DVT ppx: holding SQH d/t bruising at midline incision     D Team surgery,  [id number].

## 2024-05-03 LAB
ALBUMIN SERPL ELPH-MCNC: 3 G/DL — LOW (ref 3.3–5)
ALP SERPL-CCNC: 172 U/L — HIGH (ref 40–120)
ALT FLD-CCNC: 258 U/L — HIGH (ref 4–33)
ANION GAP SERPL CALC-SCNC: 12 MMOL/L — SIGNIFICANT CHANGE UP (ref 7–14)
APTT BLD: 31 SEC — SIGNIFICANT CHANGE UP (ref 24.5–35.6)
AST SERPL-CCNC: 257 U/L — HIGH (ref 4–32)
BILIRUB DIRECT SERPL-MCNC: 0.3 MG/DL — SIGNIFICANT CHANGE UP (ref 0–0.3)
BILIRUB INDIRECT FLD-MCNC: 0.8 MG/DL — SIGNIFICANT CHANGE UP (ref 0–1)
BILIRUB SERPL-MCNC: 1.1 MG/DL — SIGNIFICANT CHANGE UP (ref 0.2–1.2)
BUN SERPL-MCNC: 11 MG/DL — SIGNIFICANT CHANGE UP (ref 7–23)
CALCIUM SERPL-MCNC: 8.5 MG/DL — SIGNIFICANT CHANGE UP (ref 8.4–10.5)
CHLORIDE SERPL-SCNC: 104 MMOL/L — SIGNIFICANT CHANGE UP (ref 98–107)
CO2 SERPL-SCNC: 23 MMOL/L — SIGNIFICANT CHANGE UP (ref 22–31)
CREAT SERPL-MCNC: 0.67 MG/DL — SIGNIFICANT CHANGE UP (ref 0.5–1.3)
CULTURE RESULTS: NO GROWTH — SIGNIFICANT CHANGE UP
EGFR: 95 ML/MIN/1.73M2 — SIGNIFICANT CHANGE UP
GLUCOSE SERPL-MCNC: 98 MG/DL — SIGNIFICANT CHANGE UP (ref 70–99)
HCT VFR BLD CALC: 31.2 % — LOW (ref 34.5–45)
HGB BLD-MCNC: 10.5 G/DL — LOW (ref 11.5–15.5)
INR BLD: 0.97 RATIO — SIGNIFICANT CHANGE UP (ref 0.85–1.18)
MAGNESIUM SERPL-MCNC: 2.2 MG/DL — SIGNIFICANT CHANGE UP (ref 1.6–2.6)
MCHC RBC-ENTMCNC: 29.2 PG — SIGNIFICANT CHANGE UP (ref 27–34)
MCHC RBC-ENTMCNC: 33.7 GM/DL — SIGNIFICANT CHANGE UP (ref 32–36)
MCV RBC AUTO: 86.7 FL — SIGNIFICANT CHANGE UP (ref 80–100)
NRBC # BLD: 0 /100 WBCS — SIGNIFICANT CHANGE UP (ref 0–0)
NRBC # FLD: 0 K/UL — SIGNIFICANT CHANGE UP (ref 0–0)
PHOSPHATE SERPL-MCNC: 3 MG/DL — SIGNIFICANT CHANGE UP (ref 2.5–4.5)
PLATELET # BLD AUTO: 164 K/UL — SIGNIFICANT CHANGE UP (ref 150–400)
POTASSIUM SERPL-MCNC: 3.6 MMOL/L — SIGNIFICANT CHANGE UP (ref 3.5–5.3)
POTASSIUM SERPL-SCNC: 3.6 MMOL/L — SIGNIFICANT CHANGE UP (ref 3.5–5.3)
PROT SERPL-MCNC: 6 G/DL — SIGNIFICANT CHANGE UP (ref 6–8.3)
PROTHROM AB SERPL-ACNC: 10.9 SEC — SIGNIFICANT CHANGE UP (ref 9.5–13)
RBC # BLD: 3.6 M/UL — LOW (ref 3.8–5.2)
RBC # FLD: 18.5 % — HIGH (ref 10.3–14.5)
SODIUM SERPL-SCNC: 139 MMOL/L — SIGNIFICANT CHANGE UP (ref 135–145)
SPECIMEN SOURCE: SIGNIFICANT CHANGE UP
WBC # BLD: 11.65 K/UL — HIGH (ref 3.8–10.5)
WBC # FLD AUTO: 11.65 K/UL — HIGH (ref 3.8–10.5)

## 2024-05-03 PROCEDURE — 71045 X-RAY EXAM CHEST 1 VIEW: CPT | Mod: 26

## 2024-05-03 PROCEDURE — 99291 CRITICAL CARE FIRST HOUR: CPT | Mod: GC

## 2024-05-03 RX ORDER — HYDRALAZINE HCL 50 MG
10 TABLET ORAL ONCE
Refills: 0 | Status: COMPLETED | OUTPATIENT
Start: 2024-05-03 | End: 2024-05-03

## 2024-05-03 RX ORDER — SODIUM CHLORIDE 9 MG/ML
1000 INJECTION, SOLUTION INTRAVENOUS
Refills: 0 | Status: DISCONTINUED | OUTPATIENT
Start: 2024-05-03 | End: 2024-05-06

## 2024-05-03 RX ORDER — DEXTROSE MONOHYDRATE, SODIUM CHLORIDE, AND POTASSIUM CHLORIDE 50; .745; 4.5 G/1000ML; G/1000ML; G/1000ML
1000 INJECTION, SOLUTION INTRAVENOUS
Refills: 0 | Status: DISCONTINUED | OUTPATIENT
Start: 2024-05-03 | End: 2024-05-03

## 2024-05-03 RX ORDER — HYDROMORPHONE HYDROCHLORIDE 2 MG/ML
0.5 INJECTION INTRAMUSCULAR; INTRAVENOUS; SUBCUTANEOUS
Refills: 0 | Status: DISCONTINUED | OUTPATIENT
Start: 2024-05-03 | End: 2024-05-09

## 2024-05-03 RX ORDER — LIDOCAINE 4 G/100G
1 CREAM TOPICAL EVERY 24 HOURS
Refills: 0 | Status: DISCONTINUED | OUTPATIENT
Start: 2024-05-03 | End: 2024-05-09

## 2024-05-03 RX ORDER — IPRATROPIUM/ALBUTEROL SULFATE 18-103MCG
3 AEROSOL WITH ADAPTER (GRAM) INHALATION EVERY 12 HOURS
Refills: 0 | Status: DISCONTINUED | OUTPATIENT
Start: 2024-05-03 | End: 2024-05-09

## 2024-05-03 RX ORDER — POTASSIUM CHLORIDE 20 MEQ
10 PACKET (EA) ORAL
Refills: 0 | Status: COMPLETED | OUTPATIENT
Start: 2024-05-03 | End: 2024-05-03

## 2024-05-03 RX ADMIN — LIDOCAINE 1 PATCH: 4 CREAM TOPICAL at 19:09

## 2024-05-03 RX ADMIN — Medication 3 MILLILITER(S): at 19:31

## 2024-05-03 RX ADMIN — HYDROMORPHONE HYDROCHLORIDE 0.5 MILLIGRAM(S): 2 INJECTION INTRAMUSCULAR; INTRAVENOUS; SUBCUTANEOUS at 10:16

## 2024-05-03 RX ADMIN — PIPERACILLIN AND TAZOBACTAM 25 GRAM(S): 4; .5 INJECTION, POWDER, LYOPHILIZED, FOR SOLUTION INTRAVENOUS at 00:07

## 2024-05-03 RX ADMIN — SODIUM CHLORIDE 42 MILLILITER(S): 9 INJECTION, SOLUTION INTRAVENOUS at 20:03

## 2024-05-03 RX ADMIN — PANTOPRAZOLE SODIUM 40 MILLIGRAM(S): 20 TABLET, DELAYED RELEASE ORAL at 12:00

## 2024-05-03 RX ADMIN — HYDROMORPHONE HYDROCHLORIDE 0.5 MILLIGRAM(S): 2 INJECTION INTRAMUSCULAR; INTRAVENOUS; SUBCUTANEOUS at 22:20

## 2024-05-03 RX ADMIN — HEPARIN SODIUM 5000 UNIT(S): 5000 INJECTION INTRAVENOUS; SUBCUTANEOUS at 21:49

## 2024-05-03 RX ADMIN — PIPERACILLIN AND TAZOBACTAM 25 GRAM(S): 4; .5 INJECTION, POWDER, LYOPHILIZED, FOR SOLUTION INTRAVENOUS at 14:00

## 2024-05-03 RX ADMIN — Medication 5 MILLIGRAM(S): at 06:04

## 2024-05-03 RX ADMIN — HEPARIN SODIUM 5000 UNIT(S): 5000 INJECTION INTRAVENOUS; SUBCUTANEOUS at 00:08

## 2024-05-03 RX ADMIN — Medication 500 MILLIGRAM(S): at 00:37

## 2024-05-03 RX ADMIN — LIDOCAINE 1 PATCH: 4 CREAM TOPICAL at 13:59

## 2024-05-03 RX ADMIN — Medication 5 MILLIGRAM(S): at 12:54

## 2024-05-03 RX ADMIN — Medication 200 MILLIGRAM(S): at 06:05

## 2024-05-03 RX ADMIN — PIPERACILLIN AND TAZOBACTAM 25 GRAM(S): 4; .5 INJECTION, POWDER, LYOPHILIZED, FOR SOLUTION INTRAVENOUS at 07:23

## 2024-05-03 RX ADMIN — SODIUM CHLORIDE 75 MILLILITER(S): 9 INJECTION, SOLUTION INTRAVENOUS at 08:23

## 2024-05-03 RX ADMIN — HEPARIN SODIUM 5000 UNIT(S): 5000 INJECTION INTRAVENOUS; SUBCUTANEOUS at 13:59

## 2024-05-03 RX ADMIN — CHLORHEXIDINE GLUCONATE 1 APPLICATION(S): 213 SOLUTION TOPICAL at 06:05

## 2024-05-03 RX ADMIN — Medication 100 MILLIEQUIVALENT(S): at 06:04

## 2024-05-03 RX ADMIN — Medication 3 MILLILITER(S): at 03:45

## 2024-05-03 RX ADMIN — HYDROMORPHONE HYDROCHLORIDE 0.5 MILLIGRAM(S): 2 INJECTION INTRAMUSCULAR; INTRAVENOUS; SUBCUTANEOUS at 21:49

## 2024-05-03 RX ADMIN — Medication 3 MILLILITER(S): at 10:00

## 2024-05-03 RX ADMIN — Medication 100 MILLIEQUIVALENT(S): at 04:31

## 2024-05-03 RX ADMIN — HYDROMORPHONE HYDROCHLORIDE 0.5 MILLIGRAM(S): 2 INJECTION INTRAMUSCULAR; INTRAVENOUS; SUBCUTANEOUS at 10:01

## 2024-05-03 RX ADMIN — Medication 200 MILLIGRAM(S): at 14:00

## 2024-05-03 RX ADMIN — Medication 200 MILLIGRAM(S): at 00:07

## 2024-05-03 RX ADMIN — Medication 5 MILLIGRAM(S): at 00:08

## 2024-05-03 RX ADMIN — Medication 100 MILLIEQUIVALENT(S): at 07:16

## 2024-05-03 RX ADMIN — Medication 10 MILLIGRAM(S): at 20:35

## 2024-05-03 RX ADMIN — PIPERACILLIN AND TAZOBACTAM 25 GRAM(S): 4; .5 INJECTION, POWDER, LYOPHILIZED, FOR SOLUTION INTRAVENOUS at 21:49

## 2024-05-03 RX ADMIN — HEPARIN SODIUM 5000 UNIT(S): 5000 INJECTION INTRAVENOUS; SUBCUTANEOUS at 06:04

## 2024-05-03 RX ADMIN — Medication 5 MILLIGRAM(S): at 17:25

## 2024-05-03 RX ADMIN — Medication 10 MILLIGRAM(S): at 03:39

## 2024-05-03 NOTE — DIETITIAN INITIAL EVALUATION ADULT - ETIOLOGY
Pt meets criteria for severe malnutrition in the setting of acute/chronic illness.  Pt meets criteria for moderate malnutrition in the setting of acute illness.

## 2024-05-03 NOTE — DIETITIAN INITIAL EVALUATION ADULT - NSFNSGIIOFT_GEN_A_CORE
05-02-24 @ 07:01  -  05-03-24 @ 07:00  --------------------------------------------------------  OUT:    Nasogastric/Oral tube (mL): 1800 mL  Total OUT: 1800 mL    Total NET: -1800 mL

## 2024-05-03 NOTE — PROGRESS NOTE ADULT - ASSESSMENT
68F with PMHx HTN, HLD, Scleroderma, RA, pulmonary hypertension, metastatic colon CA to liver (s/p chemo last dose 1/2024) and PSHx  Laparoscopic Right hemicolectomy and cholecystectomy (11/01/23) who is now s/p ANGELI pump placement and microwave ablation of liver mass on 5/1/24. SICU consulted for tachycardia, HTN and hypoxemia c/f PE.    PLAN:     NEUROLOGY:  - A&Ox3, mandarin speaking  - pain control w/ 500 tylenol q6 (transaminitis) and dilaudid prn- per pain, dilaudid 0.25 max dose     RESPIRATORY: no PE  - Maintain O2 saturation >92%  - currently Hiflo 50/50%  - Aggressive chest PT  - monitor ABG  - CT 5/2: No acute pulmonary embolism.New small pleural effusions with near complete lower lobe atelectasis. Mild interstitial pulmonary edema. Interval placement of a hepatic arterial infusion pump. New ablation cavities status post treatment of the segment 2/3 and 8 liver metastases. Other hepatic lesions are unchanged since 4/23/2024 abdominal MRI. Mild postprocedural pneumomediastinum and pneumoperitoneum. Mild ascites.Markedly distended, fluid-filled esophagus predisposing this patient to aspiration (patient has a reported history of scleroderma). New splenic infarct. Unchanged L1 and L5 compression deformities.      CARDIOVASCULAR:  - MAP >65  - metoprolol 5q6hr   - s/p 10 labetalol during RRT    GI / NUTRITION:  - Diet: NPO/NGT  - protonix ppx    RENAL / GENITOURINARY:  - IVF @75/hr  - Indwelling arevalo catheter  - Monitor electrolytes and replete PRN  - Continue to monitor strict ins and outs q1 hour    HEMATOLOGIC:  - monitor H/H  - DVT ppx: SCDs, ok to start SQH    INFECTIOUS DISEASE:  - febrile  - Flu/COVID negative  - f/u blood cultures  - zosyn (5/2- )    ENDOCRINOLOGY:  - No active issues  - Continue to monitor glucose on BMP (goal 140-180)  - FS currently 110s       Disposition: SICU 68 year old female with PMHx HTN, HLD, Scleroderma, RA, pulmonary hypertension, metastatic colon CA to liver (s/p chemo last dose 1/2024) and PSHx  Laparoscopic Right hemicolectomy and cholecystectomy (11/01/23) who is now s/p ANGELI pump placement and microwave ablation of liver mass on 5/1/24. SICU consulted for tachycardia, HTN and hypoxemia c/f PE. CT PE negative but near complete LLL atelectasis, as well as distended esophagus c/f high risk for aspiration. NGT placed, patient requiring HFNC for hypoxia.    PLAN:     NEUROLOGY:  - A&Ox3, mandarin speaking  - pain control w/ 500 tylenol q6 (transaminitis), dilaudid prn, lido patch    RESPIRATORY: no PE  - Maintain O2 saturation >92%  - currently Hiflo 50/50%, wean as tolerated  - Aggressive chest PT  - monitor ABG  - CT 5/2 negative for PE, w/ near complete LLL atelectasis    CARDIOVASCULAR:  - MAP >65  - metoprolol 5q6hr   - holding home ibrbesartan, statin    GI / NUTRITION:  - Diet: NPO/NGT to LCWS  - protonix ppx    RENAL / GENITOURINARY:  - mIVF @100/hr  - Indwelling arevalo catheter  - Monitor electrolytes and replete PRN  - Continue to monitor strict ins and outs q1 hour    HEMATOLOGIC:  - monitor H/H  - DVT ppx: SCDs, SQH    INFECTIOUS DISEASE:  - Monitor WBC, fever  - Flu/COVID/RVP negative  - f/u blood cultures  - zosyn (5/2- )    ENDOCRINOLOGY:  - No active issues  - Continue to monitor glucose    Disposition: SICU 68 year old female with PMHx HTN, HLD, Scleroderma, RA, pulmonary hypertension, metastatic colon CA to liver (s/p chemo last dose 1/2024) and PSHx  Laparoscopic Right hemicolectomy and cholecystectomy (11/01/23) who is now s/p ANGELI pump placement and microwave ablation of liver mass on 5/1/24. SICU consulted for tachycardia, HTN and hypoxemia c/f PE. CT PE negative but near complete LLL atelectasis, as well as distended esophagus c/f high risk for aspiration. NGT placed, patient requiring HFNC for hypoxia.    PLAN:     NEUROLOGY:  - A&Ox3, mandarin speaking  - pain control w/ 500 tylenol q6 (transaminitis), dilaudid prn, lido patch    RESPIRATORY: no PE  - Maintain O2 saturation >92%  - currently Hiflo 50/50%, wean as tolerated  - Aggressive chest PT  - monitor ABG  - CT 5/2 negative for PE, w/ near complete LLL atelectasis    CARDIOVASCULAR:  - MAP >65  - metoprolol 5q6hr   - holding home ibrbesartan, statin    GI / NUTRITION:  - Diet: NPO/NGT to LCWS  - protonix ppx    RENAL / GENITOURINARY:  - mIVF @100/hr  - dc arevalo, f/u TOV  - Monitor electrolytes and replete PRN  - Continue to monitor strict ins and outs q1 hour    HEMATOLOGIC:  - monitor H/H  - DVT ppx: SCDs, SQH    INFECTIOUS DISEASE:  - Monitor WBC, fever  - Flu/COVID/RVP negative  - f/u blood cultures  - zosyn (5/2- )    ENDOCRINOLOGY:  - No active issues  - Continue to monitor glucose    Disposition: SICU

## 2024-05-03 NOTE — DIETITIAN INITIAL EVALUATION ADULT - ADD RECOMMEND
Advance diet as medically feasible.  If unable to initiate PO diet, consider alternate means of nutrition.

## 2024-05-03 NOTE — DIETITIAN INITIAL EVALUATION ADULT - NS FNS DIET ORDER
Diet, NPO:   Except Medications     Special Instructions for Nursing:  Except Medications (05-01-24 @ 14:26)

## 2024-05-03 NOTE — DIETITIAN INITIAL EVALUATION ADULT - OTHER INFO
68y Female with PMH of HTN, HLD, Scleroderma (not on meds), Rheumatoid arthritis, h/o pulmonary hypertension (and was taking meds for that, but had a f/u about a year ago in [place], she was told her pressure has improved, and told her to stop meds- not followed up with cardiologist here in [facility]),  Colon cancer metastasized to liver (s/p chemo last dose 1/2024) s/p Laparoscopic Right hemicolectomy and cholecystectomy on 11/01/23. Now s/p microwave ablation of liver mass and HAIP placement on 5/1, SICU consulted for hypertension and tachycardia.     Used Mandarin  -- [place], ID #[id number].   Pt remains s/p RRT. Distended abdomen, s/p vomiting 5/2, now NPO with NG for decompression. Pt inquiring re PO diet. Informed re current NPO status and need.   Weight change reported, 7.5% within 3 months and <50% of estimated energy requirement for 4 days. Pt observed with mild temporal wasting and severe clavicle wasting.

## 2024-05-03 NOTE — DIETITIAN INITIAL EVALUATION ADULT - NSICDXPASTMEDICALHX_GEN_ALL_CORE_FT
PAST MEDICAL HISTORY:  H/O pulmonary hypertension     H/O scleroderma     HLD (hyperlipidemia)     HTN (hypertension)     
Never

## 2024-05-03 NOTE — DIETITIAN INITIAL EVALUATION ADULT - PERSON TAUGHT/METHOD
PATIENT DEMOGRAPHICS:  Juan Jose Guzman 2021 4 wk. o. female  Accompanied by: Mother  Preferred language: English  Visit on 2021    HISTORY:  Questions or concerns today: None  Interval history:    Specialist follow up recommended at [facility] / NICU discharge: No   ED/UC visits since Nursery / NICU discharge: No   Hospital admissions since Nursery / NICU discharge: No    Safety:    Counseling provided on rear-facing car seat use, not allowing baby to sleep in the car-seat while at home or overnight, keeping straps tight enough for only two fingers to pass through, and avoiding letting baby sit or sleep in the car seat with straps unfastened   Parent verifies having car seat: Yes   Parent verifies having a smoke detector in their home: Yes   History of any immunization reactions: No   Other safety concerns: No    Past medical history:  History reviewed. No pertinent past medical history. Past surgical history:  History reviewed. No pertinent surgical history. Social history:    Primary caregivers: Mother   Smoking in the home: No    Family history:   History reviewed. No pertinent family history. Family history of early hip replacement or hip/joint disease (prior to age 36): No   Family history of strabismus or childhood vision loss: No     Medications:  Current Outpatient Medications on File Prior to Visit   Medication Sig Dispense Refill    Cholecalciferol (VITAMIN D) 10 MCG/ML LIQD Take 1 mL by mouth daily 50 mL 2    sodium chloride (ALTAMIST SPRAY) 0.65 % nasal spray 1 spray by Nasal route as needed for Congestion (Up to 2-3 times daily, followed by suctioning 1-2 minutes later) (Patient not taking: Reported on 2021) 1 Bottle 0     No current facility-administered medications on file prior to visit.       Allergies:   No Known Allergies    Screening results:   [place] screen: Low risk   Hearing screen: Passed    Nutrition:   Breast feeding: No   Formula feeding: Yes    Formula type: Similac Advance    Volume per feed: 3-4 oz     Feedings per day: 8-10   Spitting up: Sometimes, small, intermittent    Bilious: No    Bloody: No    Projectile: No - Discussed normal physiologic reflux, reviewed reflux precautions, avoid overfeeding   Vitamin D supplement needed: Yes - taking supplement as prescribed, no refill needed      Voids: 6+/day  Stools: Generally soft, yellow/green; sometimes will strain and only able to pass a small amount of stool, baby then will be fussy, irritable, usually later the same day then will pass a larger soft stools, sometimes longer intervals between stool, ongoing about the last week and a half or so, this is around the time Mom switched from breast milk and formula to exclusive formula, using tummy pats/warm baths to soothe - Discussed, discussed normal infant dyschezia at this age, discussed typical stooling frequency in formula fed infants (daily to every other day as long as still soft), discussed normal bowel changes after dietary change like switch to exclusive formula, discussed continuing tummy pats, warm baths, bicycle kicks to support baby passing stool, if no bowel movement in > 48 hours with discomfort, may use Milk of Magnesia, script sent to pharmacy, counseled on usual dosing, but advised beginning with 1 mL daily as needed, may increase to 2.5 mL daily if ineffective   Passed meconium in first 24 hours of life: Yes  Sleep position: Back   Sleep location:  In crib/bassinet/pack-n-play    Behavior: No concerns     Activity (tummy time): Yes - Counseling provided regarding starting or continuing tummy time several times per day    Development:    Concerns about development: No   Calms when picked up or spoken to: Yes   Looks briefly at objects: Yes   Alerts to unexpected sounds: Yes   Makes brief, short vowel sounds: Yes   Holds chin up in prone: Yes   Holds fingers slightly open when at rest: Yes    ROS:   Constitutional:  Denies fever or chills   Eyes:  Denies apparent visual deficit   HENT:  Denies nasal congestion, ear tugging or discharge, or difficulty swallowing   Respiratory:  Denies cough or difficulty breathing   Cardiovascular:  Denies leg swelling, sweating and fatigue with feedings   GI:  Denies appearance of abdominal pain, nausea, vomiting, bloody stools or diarrhea, see notes above  :  Denies decreased urinary frequency   Musculoskeletal:  Denies asymmetric movement of extremities   Integument:  Rash/pustule on face  Neurologic:  Denies somnolence, decreased activity, shaking movements of extremities   Endocrine:  Denies jitters   Lymphatic:  Denies swollen glands   Psychiatric:  Baby alert, interactive   Hearing: Denies concerns     PHYSICAL EXAM:   VITAL SIGNS:Height 21.46\" (54.5 cm), weight 8 lb 4 oz (3.742 kg), head circumference 36.2 cm (14.25\"). Body mass index is 12.6 kg/m². 17 %ile (Z= -0.95) based on WHO (Girls, 0-2 years) weight-for-age data using vitals from 2021. 61 %ile (Z= 0.27) based on WHO (Girls, 0-2 years) Length-for-age data based on Length recorded on 2021. 6 %ile (Z= -1.55) based on WHO (Girls, 0-2 years) BMI-for-age based on BMI available as of 2021. Blood pressure percentiles are not available for patients under the age of 1. General:  Alert, no distress. Skin:  No mottling, no pallor, no cyanosis. Skin lesions: none. Jaundice: none. Rashes: 1-2 inflammatory pustules on face (right cheek, near nose) c/w  acne. Head: Normal shape/size. Anterior and posterior fontanelles open and flat. No signs of birth trauma. No over-riding sutures. No ridging over sutures lines. Eyes: Partial view of red reflexes intact bilaterally. PERRL. Conjunctiva normal without icterus or erythema. Ears: Normal set ears. No pits or tags. Nose: No congestion or rhinorrhea. Mouth: No cleft lip or palate.  teeth absent. Normal frenulum. Moist mucosa. Neck: No neck masses. No webbing.    Cardiac: Regular rate and rhythm, normal S1 and S2, no murmur, Femoral and brachial pulses palpable bilaterally. Precordial heart sounds audible in left chest.   Respiratory: Clear to auscultation bilaterally. No wheezes, rhonchi or rales. Normal effort. Abdomen:  Soft, no masses. Positive bowel sounds. Cord site well healed. Umbilical hernia: none. : SMR1. Anus patent to gross inspection. Musculoskeletal:  Normal chest wall without deformity, normal spaced nipples. No defects on clavicles bilaterally. No extra digits. Negative Ortaloni and Womack maneuvers and gluteal creases equal. Previously noted laxity of hips bilaterally is resolved. Normal spine without midline defects. Neuro: Strong suck. Intact and symmetric fadi reflex. Normal tone for age. Intact and symmetric palmar and plantar grasp reflexes. Active and symmetric movements of extremities. No results found for this visit on 21. No exam data present    Immunization History   Administered Date(s) Administered    Hepatitis B Ped/Adol (Engerix-B, Recombivax HB) 2021, 2021      ASSESSMENT/PLAN:  1. 1 month well visit - following along nicely on growth curves and developing well. Physical examination reassuring - notable for few inflammatory pustules on the face consistent with  acne.  history significant for GBS positivity with adequate IAP. Other concerns reported today: straining, irregular bowel movements, most consistent with infant dyschezia, mild constipation potential related to dietary change.      Anticipatory guidance provided on:    Social determinants of health including living situation, food security, , parent well-being (PPD/PPA)   Environmental tobacco exposure   Parent and infant relationships   Typical infant sleeping patterns   Fussiness and colic   Car seats and the recommendation for a rear-facing seat   Safe sleep including being alone in a crib or bassinet, on the infant's back, and not having toys/bumpers/other soft objects in the crib  Bright Futures (AAP) handout provided at conclusion of visit   Parents to call with any questions or concerns. 2. Immunizations: Needs Hep B - administered      VIS given and parent counselled on all vaccine components and potential side effects. 3. Maternal depression: [place] score +0 - Counseling provided on taking care of Mom as part of taking care of baby, never shake a baby, okay to set baby down in a safe environment (crib, bassinet without extra blankets or toys) if needing a few minutes for herself, follow-up here or with Ob/Gyn if mood concerns     4. [place] screening: Low risk    5.  hearing screening: Passed    6. Vitamin D insufficiency: Yes - taking supplement as prescribed, no refill needed    7.  acne: Discussed typical course, care (gentle cleansing with soap and water, drying well with bathing), and anticipated spontaneous resolution, follow-up as needed    8. Infant dyschezia, mild constipation: See plan notes above    Follow-up visit in 4 weeks for 2 mo WCE.      Linda Gaytan MD   [address] [place] [address] Rd verbal instruction/patient instructed

## 2024-05-03 NOTE — PROGRESS NOTE ADULT - ASSESSMENT
A/P: 68y Female with PMH of HTN, HLD, Scleroderma (not on meds) , Rheumatoid arthritis, h/o pulmonary hypertension (and was taking meds for that, but had a f/u about a year ago in [place], she was told her pressure has improved, and told her to stop meds- not followed up with cardiologist here in [facility]),  Colon cancer metastasized to liver (s/p chemo last dose 1/2024) s/p Laparoscopic Right hemicolectomy and cholecystectomy on 11/01/23. Now s/p microwave ablation of liver mass and HAIP placement on 5/1, SICU consulted for hypertension and tachycardia     PLAN:  -NPO with NGT   -wean O2 as tolerated    -Maintain systolic between 110-160  - chest PT to assist in clearing phlegm  -Strict I&O's  -Analgesia and antiemetics as needed  -OOB/AAT  - DVT ppx    D Team surgery  [id number].

## 2024-05-03 NOTE — DIETITIAN INITIAL EVALUATION ADULT - PERTINENT LABORATORY DATA
05-03    139  |  104  |  11  ----------------------------<  98  3.6   |  23  |  0.67    Ca    8.5      03 May 2024 01:25  Phos  3.0     05-03  Mg     2.20     05-03    TPro  6.0  /  Alb  3.0<L>  /  TBili  1.1  /  DBili  0.3  /  AST  257<H>  /  ALT  258<H>  /  AlkPhos  172<H>  05-03  A1C with Estimated Average Glucose Result: 5.3 % (09-11-23 @ 13:10)

## 2024-05-03 NOTE — DIETITIAN INITIAL EVALUATION ADULT - ORAL INTAKE PTA/DIET HISTORY
PO intakes fairly good prior to this hospitalization. Wt change 48 ---> 45 kg 2/2 chemotherapy/diarrhea in the beginning of the year.

## 2024-05-03 NOTE — DIETITIAN NUTRITION RISK NOTIFICATION - TREATMENT: THE FOLLOWING DIET HAS BEEN RECOMMENDED
Diet, NPO:   Except Medications     Special Instructions for Nursing:  Except Medications (05-01-24 @ 14:26) [Active]

## 2024-05-03 NOTE — PROGRESS NOTE ADULT - ATTENDING COMMENTS
Critical Care Dx  Mrs. 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  I have personally interviewed and examined the patient, reviewed data and laboratory tests/x-rays and all pertinent electronic images.  The SICU team has a constant risk benefit analyzes discussion with the primary team, all consultants, House Staff and PA's on all decisions.   Time involved in performance of separately billable procedures was not counted toward my critical care time. There is no overlap.    I have personally provided 60 minutes of critical care time concurrently with the resident/fellow. This time excludes time spent on separate procedures and time spent teaching. I have reviewed the resident's/fellow's documentation and agree with the assessment and plan of care.  I was physically present for the key portions of the evaluation and management (E/M) service provided.      Geronimo Eubanks MD  Acute and Critical Care Surgery Critical Care Dx  Mrs. 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  I have personally interviewed and examined the patient, reviewed data and laboratory tests/x-rays and all pertinent electronic images.  The SICU team has a constant risk benefit analyzes discussion with the primary team, all consultants, House Staff and PA's on all decisions.   Time involved in performance of separately billable procedures was not counted toward my critical care time. There is no overlap.    I have personally provided 60 minutes of critical care time concurrently with the resident/fellow. This time excludes time spent on separate procedures and time spent teaching. I have reviewed the resident's/fellow's documentation and agree with the assessment and plan of care.  I was physically present for the key portions of the evaluation and management (E/M) service provided.      Geronimo Eubanks MD  Acute and Critical Care Surgery

## 2024-05-03 NOTE — DIETITIAN INITIAL EVALUATION ADULT - PERTINENT MEDS FT
MEDICATIONS  (STANDING):  acetaminophen   IVPB .. 500 milliGRAM(s) IV Intermittent every 6 hours  albuterol/ipratropium for Nebulization 3 milliLiter(s) Nebulizer every 6 hours  chlorhexidine 2% Cloths 1 Application(s) Topical <User Schedule>  dextrose 5% + sodium chloride 0.45% with potassium chloride 20 mEq/L 1000 milliLiter(s) (100 mL/Hr) IV Continuous <Continuous>  heparin   Injectable 5000 Unit(s) SubCutaneous every 8 hours  influenza  Vaccine (HIGH DOSE) 0.7 milliLiter(s) IntraMuscular once  lidocaine   4% Patch 1 Patch Transdermal every 24 hours  metoprolol tartrate Injectable 5 milliGRAM(s) IV Push every 6 hours  pantoprazole  Injectable 40 milliGRAM(s) IV Push daily  piperacillin/tazobactam IVPB.. 3.375 Gram(s) IV Intermittent every 8 hours    MEDICATIONS  (PRN):  HYDROmorphone  Injectable 0.5 milliGRAM(s) IV Push every 3 hours PRN Severe Pain (7 - 10)  HYDROmorphone  Injectable 0.2 milliGRAM(s) IV Push every 3 hours PRN Moderate Pain (4 - 6)  ondansetron Injectable 4 milliGRAM(s) IV Push every 6 hours PRN Nausea

## 2024-05-04 LAB
ALBUMIN SERPL ELPH-MCNC: 2.9 G/DL — LOW (ref 3.3–5)
ALP SERPL-CCNC: 153 U/L — HIGH (ref 40–120)
ALT FLD-CCNC: 174 U/L — HIGH (ref 4–33)
ANION GAP SERPL CALC-SCNC: 9 MMOL/L — SIGNIFICANT CHANGE UP (ref 7–14)
APTT BLD: 41.8 SEC — HIGH (ref 24.5–35.6)
AST SERPL-CCNC: 109 U/L — HIGH (ref 4–32)
BILIRUB SERPL-MCNC: 0.8 MG/DL — SIGNIFICANT CHANGE UP (ref 0.2–1.2)
BUN SERPL-MCNC: 9 MG/DL — SIGNIFICANT CHANGE UP (ref 7–23)
CALCIUM SERPL-MCNC: 8.5 MG/DL — SIGNIFICANT CHANGE UP (ref 8.4–10.5)
CHLORIDE SERPL-SCNC: 104 MMOL/L — SIGNIFICANT CHANGE UP (ref 98–107)
CO2 SERPL-SCNC: 25 MMOL/L — SIGNIFICANT CHANGE UP (ref 22–31)
CREAT SERPL-MCNC: 0.53 MG/DL — SIGNIFICANT CHANGE UP (ref 0.5–1.3)
EGFR: 101 ML/MIN/1.73M2 — SIGNIFICANT CHANGE UP
GLUCOSE SERPL-MCNC: 120 MG/DL — HIGH (ref 70–99)
HCT VFR BLD CALC: 29.8 % — LOW (ref 34.5–45)
HGB BLD-MCNC: 9.2 G/DL — LOW (ref 11.5–15.5)
INR BLD: 0.91 RATIO — SIGNIFICANT CHANGE UP (ref 0.85–1.18)
MAGNESIUM SERPL-MCNC: 2 MG/DL — SIGNIFICANT CHANGE UP (ref 1.6–2.6)
MCHC RBC-ENTMCNC: 27.1 PG — SIGNIFICANT CHANGE UP (ref 27–34)
MCHC RBC-ENTMCNC: 30.9 GM/DL — LOW (ref 32–36)
MCV RBC AUTO: 87.6 FL — SIGNIFICANT CHANGE UP (ref 80–100)
NRBC # BLD: 0 /100 WBCS — SIGNIFICANT CHANGE UP (ref 0–0)
NRBC # FLD: 0 K/UL — SIGNIFICANT CHANGE UP (ref 0–0)
PHOSPHATE SERPL-MCNC: 2.4 MG/DL — LOW (ref 2.5–4.5)
PLATELET # BLD AUTO: 153 K/UL — SIGNIFICANT CHANGE UP (ref 150–400)
POTASSIUM SERPL-MCNC: 3.4 MMOL/L — LOW (ref 3.5–5.3)
POTASSIUM SERPL-SCNC: 3.4 MMOL/L — LOW (ref 3.5–5.3)
PROT SERPL-MCNC: 5.8 G/DL — LOW (ref 6–8.3)
PROTHROM AB SERPL-ACNC: 10.2 SEC — SIGNIFICANT CHANGE UP (ref 9.5–13)
RBC # BLD: 3.4 M/UL — LOW (ref 3.8–5.2)
RBC # FLD: 18.5 % — HIGH (ref 10.3–14.5)
SODIUM SERPL-SCNC: 138 MMOL/L — SIGNIFICANT CHANGE UP (ref 135–145)
WBC # BLD: 12.58 K/UL — HIGH (ref 3.8–10.5)
WBC # FLD AUTO: 12.58 K/UL — HIGH (ref 3.8–10.5)

## 2024-05-04 PROCEDURE — 99291 CRITICAL CARE FIRST HOUR: CPT | Mod: GC

## 2024-05-04 PROCEDURE — 71045 X-RAY EXAM CHEST 1 VIEW: CPT | Mod: 26

## 2024-05-04 PROCEDURE — 99232 SBSQ HOSP IP/OBS MODERATE 35: CPT

## 2024-05-04 RX ORDER — HYDRALAZINE HCL 50 MG
10 TABLET ORAL THREE TIMES A DAY
Refills: 0 | Status: DISCONTINUED | OUTPATIENT
Start: 2024-05-04 | End: 2024-05-05

## 2024-05-04 RX ORDER — POTASSIUM CHLORIDE 20 MEQ
10 PACKET (EA) ORAL
Refills: 0 | Status: COMPLETED | OUTPATIENT
Start: 2024-05-04 | End: 2024-05-04

## 2024-05-04 RX ORDER — POTASSIUM PHOSPHATE, MONOBASIC POTASSIUM PHOSPHATE, DIBASIC 236; 224 MG/ML; MG/ML
15 INJECTION, SOLUTION INTRAVENOUS ONCE
Refills: 0 | Status: COMPLETED | OUTPATIENT
Start: 2024-05-04 | End: 2024-05-04

## 2024-05-04 RX ORDER — HYDRALAZINE HCL 50 MG
10 TABLET ORAL ONCE
Refills: 0 | Status: COMPLETED | OUTPATIENT
Start: 2024-05-04 | End: 2024-05-04

## 2024-05-04 RX ADMIN — Medication 100 MILLIEQUIVALENT(S): at 04:11

## 2024-05-04 RX ADMIN — Medication 5 MILLIGRAM(S): at 00:57

## 2024-05-04 RX ADMIN — HYDROMORPHONE HYDROCHLORIDE 0.5 MILLIGRAM(S): 2 INJECTION INTRAMUSCULAR; INTRAVENOUS; SUBCUTANEOUS at 19:33

## 2024-05-04 RX ADMIN — HYDROMORPHONE HYDROCHLORIDE 0.5 MILLIGRAM(S): 2 INJECTION INTRAMUSCULAR; INTRAVENOUS; SUBCUTANEOUS at 05:32

## 2024-05-04 RX ADMIN — PIPERACILLIN AND TAZOBACTAM 25 GRAM(S): 4; .5 INJECTION, POWDER, LYOPHILIZED, FOR SOLUTION INTRAVENOUS at 05:30

## 2024-05-04 RX ADMIN — SODIUM CHLORIDE 42 MILLILITER(S): 9 INJECTION, SOLUTION INTRAVENOUS at 17:43

## 2024-05-04 RX ADMIN — PIPERACILLIN AND TAZOBACTAM 25 GRAM(S): 4; .5 INJECTION, POWDER, LYOPHILIZED, FOR SOLUTION INTRAVENOUS at 22:02

## 2024-05-04 RX ADMIN — Medication 10 MILLIGRAM(S): at 04:42

## 2024-05-04 RX ADMIN — HYDROMORPHONE HYDROCHLORIDE 0.5 MILLIGRAM(S): 2 INJECTION INTRAMUSCULAR; INTRAVENOUS; SUBCUTANEOUS at 20:05

## 2024-05-04 RX ADMIN — Medication 100 MILLIEQUIVALENT(S): at 05:29

## 2024-05-04 RX ADMIN — HYDROMORPHONE HYDROCHLORIDE 0.5 MILLIGRAM(S): 2 INJECTION INTRAMUSCULAR; INTRAVENOUS; SUBCUTANEOUS at 06:15

## 2024-05-04 RX ADMIN — HEPARIN SODIUM 5000 UNIT(S): 5000 INJECTION INTRAVENOUS; SUBCUTANEOUS at 05:29

## 2024-05-04 RX ADMIN — Medication 100 MILLIEQUIVALENT(S): at 03:10

## 2024-05-04 RX ADMIN — PANTOPRAZOLE SODIUM 40 MILLIGRAM(S): 20 TABLET, DELAYED RELEASE ORAL at 12:57

## 2024-05-04 RX ADMIN — Medication 3 MILLILITER(S): at 09:12

## 2024-05-04 RX ADMIN — HEPARIN SODIUM 5000 UNIT(S): 5000 INJECTION INTRAVENOUS; SUBCUTANEOUS at 22:08

## 2024-05-04 RX ADMIN — LIDOCAINE 1 PATCH: 4 CREAM TOPICAL at 00:00

## 2024-05-04 RX ADMIN — HEPARIN SODIUM 5000 UNIT(S): 5000 INJECTION INTRAVENOUS; SUBCUTANEOUS at 13:00

## 2024-05-04 RX ADMIN — LIDOCAINE 1 PATCH: 4 CREAM TOPICAL at 12:57

## 2024-05-04 RX ADMIN — Medication 5 MILLIGRAM(S): at 17:44

## 2024-05-04 RX ADMIN — PIPERACILLIN AND TAZOBACTAM 25 GRAM(S): 4; .5 INJECTION, POWDER, LYOPHILIZED, FOR SOLUTION INTRAVENOUS at 13:00

## 2024-05-04 RX ADMIN — POTASSIUM PHOSPHATE, MONOBASIC POTASSIUM PHOSPHATE, DIBASIC 62.5 MILLIMOLE(S): 236; 224 INJECTION, SOLUTION INTRAVENOUS at 05:30

## 2024-05-04 RX ADMIN — Medication 5 MILLIGRAM(S): at 05:29

## 2024-05-04 RX ADMIN — LIDOCAINE 1 PATCH: 4 CREAM TOPICAL at 19:33

## 2024-05-04 RX ADMIN — CHLORHEXIDINE GLUCONATE 1 APPLICATION(S): 213 SOLUTION TOPICAL at 05:31

## 2024-05-04 RX ADMIN — SODIUM CHLORIDE 42 MILLILITER(S): 9 INJECTION, SOLUTION INTRAVENOUS at 12:57

## 2024-05-04 RX ADMIN — Medication 10 MILLIGRAM(S): at 21:58

## 2024-05-04 RX ADMIN — Medication 5 MILLIGRAM(S): at 12:57

## 2024-05-04 NOTE — PHYSICAL THERAPY INITIAL EVALUATION ADULT - GENERAL OBSERVATIONS, REHAB EVAL
Pt encountered semireclined in bed in no distress, +NG tube, +telemetry monitor, +NC 2LO2. SpO2 = 98%, heart rate = 83 bpm.

## 2024-05-04 NOTE — PROGRESS NOTE ADULT - ASSESSMENT
68 year old female with PMHx HTN, HLD, Scleroderma, RA, pulmonary hypertension, metastatic colon CA to liver (s/p chemo last dose 1/2024) and PSHx  Laparoscopic Right hemicolectomy and cholecystectomy (11/01/23) who is now s/p ANGELI pump placement and microwave ablation of liver mass on 5/1/24. SICU consulted for tachycardia, HTN and hypoxemia c/f PE. CT PE negative but near complete LLL atelectasis, as well as distended esophagus c/f high risk for aspiration. NGT placed, patient requiring HFNC for hypoxia. Now off HiFlow.    PLAN:     NEUROLOGY:  - A&Ox3, mandarin speaking  - pain control w/ 500 tylenol q6 (transaminitis), dilaudid prn, lido patch    RESPIRATORY: no PE  - Maintain O2 saturation >92%  - off HFNC, on 3L NC  - Aggressive chest PT  - monitor ABG  - CT 5/2 negative for PE, w/ near complete LLL atelectasis    CARDIOVASCULAR:  - MAP >65  - metoprolol 5q6hr   - holding home ibrbesartan, statin    GI / NUTRITION:  - Diet: NPO/NGT to LCWS  - protonix ppx    RENAL / GENITOURINARY:  - D5LR @42  - dc gilbert arevalo TOV  - Monitor electrolytes and replete PRN  - Continue to monitor strict ins and outs q1 hour    HEMATOLOGIC:  - monitor H/H  - DVT ppx: SCDs, SQH    INFECTIOUS DISEASE:  - Monitor WBC, fever  - Flu/COVID/RVP negative  - 5/3 BCx NGTD  - zosyn (5/2- )    ENDOCRINOLOGY:  - No active issues  - Continue to monitor glucose    Disposition: SICU 68 year old female with PMHx HTN, HLD, Scleroderma, RA, pulmonary hypertension, metastatic colon CA to liver (s/p chemo last dose 1/2024) and PSHx  Laparoscopic Right hemicolectomy and cholecystectomy (11/01/23) who is now s/p ANGELI pump placement and microwave ablation of liver mass on 5/1/24. SICU consulted for tachycardia, HTN and hypoxemia c/f PE. CT PE negative but near complete LLL atelectasis, as well as distended esophagus c/f high risk for aspiration. NGT placed, patient requiring HFNC for hypoxia. Now off HiFlow doing well stable for floor    PLAN:     NEUROLOGY:  - A&Ox3, mandarin speaking  - pain control w/ 500 tylenol q6 (transaminitis), dilaudid prn, lido patch    RESPIRATORY: no PE  - Maintain O2 saturation >92%  - off HFNC, on 3L NC  - Aggressive chest PT  - monitor ABG  - CT 5/2 negative for PE, w/ near complete LLL atelectasis    CARDIOVASCULAR:  - MAP >65  - metoprolol 5q6hr   - holding home ibrbesartan, statin    GI / NUTRITION:  - Diet: NPO/NGT to LCWS  - protonix ppx    RENAL / GENITOURINARY:  - D5LR @42  - dc mickey, gilbert TOV  - Monitor electrolytes and replete PRN  - Continue to monitor strict ins and outs q1 hour    HEMATOLOGIC:  - monitor H/H  - DVT ppx: SCDs, SQH    INFECTIOUS DISEASE:  - Monitor WBC, fever  - Flu/COVID/RVP negative  - 5/3 BCx NGTD  - zosyn (5/2- )    ENDOCRINOLOGY:  - No active issues  - Continue to monitor glucose    Disposition: List to floor 68 year old female with PMHx HTN, HLD, Scleroderma, RA, pulmonary hypertension, metastatic colon CA to liver (s/p chemo last dose 1/2024) and PSHx  Laparoscopic Right hemicolectomy and cholecystectomy (11/01/23) who is now s/p ANGELI pump placement and microwave ablation of liver mass on 5/1/24. SICU consulted for tachycardia, HTN and hypoxemia c/f PE. CT PE negative but near complete LLL atelectasis, as well as distended esophagus c/f high risk for aspiration. NGT placed, patient requiring HFNC for hypoxia. Now off HiFlow doing well stable for floor    PM:  -SBPs 170s - 10 hydral x2    PLAN:     NEUROLOGY:  - A&Ox3, mandarin speaking  - pain control w/ 500 tylenol q6 (transaminitis), dilaudid prn, lido patch    RESPIRATORY: no PE  - Maintain O2 saturation >92%  - off HFNC, on 3L NC  - Aggressive chest PT  - monitor ABG  - CT 5/2 negative for PE, w/ near complete LLL atelectasis    CARDIOVASCULAR:  - MAP >65  - metoprolol 5q6hr   - holding home ibrbesartan, statin    GI / NUTRITION:  - Diet: NPO/NGT to LCWS  - protonix ppx    RENAL / GENITOURINARY:  - D5LR @42  - dc gilbert arevalo TOV  - Monitor electrolytes and replete PRN  - Continue to monitor strict ins and outs q1 hour    HEMATOLOGIC:  - monitor H/H  - DVT ppx: SCDs, SQH    INFECTIOUS DISEASE:  - Monitor WBC, fever  - Flu/COVID/RVP negative  - 5/3 BCx NGTD  - zosyn (5/2- )    ENDOCRINOLOGY:  - No active issues  - Continue to monitor glucose    Disposition: SICU

## 2024-05-04 NOTE — PHYSICAL THERAPY INITIAL EVALUATION ADULT - PATIENT PROFILE REVIEW, REHAB EVAL
Activity - Ambulate as tolerated. Pt cleared for PT evaluation prior to session by TYSON Sommers./yes

## 2024-05-04 NOTE — CONSULT NOTE ADULT - SUBJECTIVE AND OBJECTIVE BOX
Date of Admission:  5/1/24  CHIEF COMPLAINT:  ANGELI pump placement microwave ablation of liver mass on 5.1.24  HISTORY OF PRESENT ILLNESS:    68 year old female with PMHx HTN, HLD, Scleroderma, RA, pulmonary hypertension, metastatic colon CA to liver (s/p chemo last dose 1/2024) and PSHx  Laparoscopic Right hemicolectomy and cholecystectomy (11/01/23) who is now s/p ANGELI pump placement and microwave ablation of liver mass on 5/1/24. SICU consulted for tachycardia, HTN and hypoxemia c/f PE. CT PE negative but near complete LLL atelectasis, as well as distended esophagus c/f high risk for aspiration. NGT placed, patient requiring HFNC for hypoxia. Now off HiFlow doing well stable for floor  Cardiology called for assistance with elevated blood pressures in setting of NPO, unable to tolerate oral medications.  Allergies    No Known Allergies    Intolerances    	    MEDICATIONS:  heparin   Injectable 5000 Unit(s) SubCutaneous every 8 hours  metoprolol tartrate Injectable 5 milliGRAM(s) IV Push every 6 hours    piperacillin/tazobactam IVPB.. 3.375 Gram(s) IV Intermittent every 8 hours    albuterol/ipratropium for Nebulization 3 milliLiter(s) Nebulizer every 12 hours    HYDROmorphone  Injectable 0.5 milliGRAM(s) IV Push every 3 hours PRN  HYDROmorphone  Injectable 0.2 milliGRAM(s) IV Push every 3 hours PRN  ondansetron Injectable 4 milliGRAM(s) IV Push every 6 hours PRN    pantoprazole  Injectable 40 milliGRAM(s) IV Push daily      chlorhexidine 2% Cloths 1 Application(s) Topical <User Schedule>  dextrose 5% + lactated ringers. 1000 milliLiter(s) IV Continuous <Continuous>  influenza  Vaccine (HIGH DOSE) 0.7 milliLiter(s) IntraMuscular once  lidocaine   4% Patch 1 Patch Transdermal every 24 hours      PAST MEDICAL & SURGICAL HISTORY:  H/O pulmonary hypertension      H/O scleroderma      HTN (hypertension)      HLD (hyperlipidemia)      Migration of percutaneous cholecystostomy tube      H/O right hemicolectomy      History of laparoscopic cholecystectomy      History of left hip replacement      History of right hip replacement          FAMILY HISTORY:      SOCIAL HISTORY:    [ ] Non-smoker  [ ] Smoker  [ ] Alcohol      REVIEW OF SYSTEMS:  See HPI. Otherwise, 10 point ROS done and otherwise negative.      T(C): 36.7 (05-04-24 @ 14:19), Max: 37.3 (05-04-24 @ 04:00)  HR: 70 (05-04-24 @ 14:19) (70 - 102)  BP: 155/75 (05-04-24 @ 14:19) (106/69 - 180/95)  RR: 18 (05-04-24 @ 14:19) (10 - 18)  SpO2: 100% (05-04-24 @ 14:19) (98% - 100%)  Wt(kg): --  I&O's Summary    03 May 2024 07:01  -  04 May 2024 07:00  --------------------------------------------------------  IN: 2365 mL / OUT: 2800 mL / NET: -435 mL    04 May 2024 07:01  -  04 May 2024 18:02  --------------------------------------------------------  IN: 352 mL / OUT: 750 mL / NET: -398 mL        Physical Exam:  General: NAD  Cardiovascular: Normal S1 S2, No JVD, No murmurs, No edema  Respiratory: Lungs clear to auscultation	  Gastrointestinal:  Soft, Non-tender, + BS	  Skin: warm and dry, No rashes, No ecchymoses, No cyanosis	  Extremities:  No clubbing, cyanosis or edema  Vascular: Peripheral pulses palpable 2+ bilaterally    LABS:	   	    CBC Full  -  ( 04 May 2024 01:06 )  WBC Count : 12.58 K/uL  Hemoglobin : 9.2 g/dL  Hematocrit : 29.8 %  Platelet Count - Automated : 153 K/uL  Mean Cell Volume : 87.6 fL  Mean Cell Hemoglobin : 27.1 pg  Mean Cell Hemoglobin Concentration : 30.9 gm/dL  Auto Neutrophil # : x  Auto Lymphocyte # : x  Auto Monocyte # : x  Auto Eosinophil # : x  Auto Basophil # : x  Auto Neutrophil % : x  Auto Lymphocyte % : x  Auto Monocyte % : x  Auto Eosinophil % : x  Auto Basophil % : x    05-04    138  |  104  |  9   ----------------------------<  120<H>  3.4<L>   |  25  |  0.53  05-03    139  |  104  |  11  ----------------------------<  98  3.6   |  23  |  0.67    Ca    8.5      04 May 2024 01:06  Ca    8.5      03 May 2024 01:25  Phos  2.4     05-04  Phos  3.0     05-03  Mg     2.00     05-04  Mg     2.20     05-03    TPro  5.8<L>  /  Alb  2.9<L>  /  TBili  0.8  /  DBili  x   /  AST  109<H>  /  ALT  174<H>  /  AlkPhos  153<H>  05-04  TPro  6.0  /  Alb  3.0<L>  /  TBili  1.1  /  DBili  0.3  /  AST  257<H>  /  ALT  258<H>  /  AlkPhos  172<H>  05-03  < from: TTE W or WO Ultrasound Enhancing Agent (10.25.23 @ 13:09) >  TRANSTHORACIC ECHOCARDIOGRAM REPORT  ________________________________________________________________________________                                      _______       Pt. Name:       NATALIA VELA     Study Date:    10/25/2023  MRN:            [id number]   YOB: 1955  Accession #:    [id number]    Age:           68 years  Account#:       [id number] Gender:        F  Heart Rate:                  Height:        57.00 in (144.78 cm)  Rhythm:                      Weight:        85.00 lb(38.56 kg)  Blood Pressure: 131/71 mmHg  BSA/BMI:       1.25 m² / 18.39 kg/m²  ________________________________________________________________________________________  Referring Physician:    [id number] Jennifer Nicolas  Interpreting Physician: Scott Anton  Primary Sonographer:    Joaquin Max [facility]    CPT:               ECHO TTE WO CON COMP W DOPP - 57372.m  Indication(s):     Primary pulmonary hypertension - I27.0  Procedure:         Transthoracic echocardiogram with 2-D, M-mode and complete                     spectral and color flow Doppler.  Ordering Location: [facility]  Study Information: Image quality for this study is fair.    _______________________________________________________________________________________     CONCLUSIONS:      1. Left ventricular systolic function is normal with an ejection fraction of 66 % by Lundberg's method of disks.   2. Normal left ventricular diastolic function.   3. Normal right ventricular cavity size and systolic function.   4. Fibrocalcific aortic valve sclerosis without stenosis.   5. No prior echocardiogram is available for comparison.    ________________________________________________________________________________________  FINDINGS:     Left Ventricle:  Left ventricular systolic function is normal with a calculated ejection fraction of 66 % by the Lundberg's biplane method of disks. There is normal left ventricular diastolic function.     Right Ventricle:  The right ventricular cavity is normal in size and normal systolic function. Tricuspid annular plane systolic excursion (TAPSE) is 2.2 cm (normal >=1.7 cm). Tricuspid annular tissue Doppler S' is 13.9 cm/s (normal >10 cm/s).     Left Atrium:  The left atrium is mildly dilated in size with an indexed volume of 37.83 ml/m².     Right Atrium:  The right atrium is normal in size with an indexed volume of 23.20 ml/m².     Interatrial Septum:  The interatrial septum appears intact.     Aortic Valve:  The aortic valve appears trileaflet. There is mild calcification of the aortic valve leaflets. There is fibrocalcific aortic valve sclerosis without stenosis. There is trace aortic regurgitation.     Mitral Valve:  There is trace mitral regurgitation.     Tricuspid Valve:  Structurally normal tricuspid valve with normal leaflet excursion. There is mild tricuspid regurgitation.     Pulmonic Valve:  Structurally normal pulmonic valve with normal leaflet excursion. There is trace pulmonic regurgitation.     Pericardium:  There is a trace pericardial effusion with no evidence of hemodynamic compromise.     Systemic Veins:  The inferior vena cava is normal in size measuring 1.00 cm in diameter, (normal <2.1cm) with normal inspiratory collapse (normal >50%) consistent with normal right atrial pressure (~3, range 0-5mmHg).  ____________________________________________________________________  Quantitative Data:  Left Ventricle Measurements: (Indexed to BSA)     IVSd (2D):   0.7 cm  LVPWd (2D):  0.6 cm  LVIDd (2D):  4.9 cm  LVIDs (2D):  3.2 cm  LV Mass:     101 g  80.5 g/m²  BiPlane LV EF%: 66 %     MV E Vmax:    0.79 m/s  MV A Vmax:    0.80 m/s  MV E/A:       0.99  e' lateral:   8.70 cm/s  e' medial:    5.77 cm/s  E/e' lateral: 9.13  E/e' medial:  13.76  E/e' Average: 10.97    Aorta Measurements: (normal range) (Indexed toBSA)     Sinuses of Valsalva: 3.10 cm (2.7 - 3.3 cm)       Left Atrium Measurements: (Indexed to BSA)  LA Diam 2D: 3.80 cm    Right Ventricle Measurements: Right Atrial Measurements:     TAPSE:           2.2 cm       RA Vol:       29.00 ml  TV Sadie. S':      13.90 cm/s   RA Vol Index: 23.20 ml/m²  RV Base (RVID1): 3.5 cm  RV Mid (RVID2):  1.9 cm       LVOT / RVOT/ Qp/Qs Data: (Indexed to BSA)  LVOT Diameter: 1.90 cm  LVOT Vmax:     1.32 m/s  LVOT VTI:      29.60 cm  LVOT SV:       83.9 ml  67.13 ml/m²    Mitral Valve Measurements:     MV E Vmax: 0.8 m/s  MV A Vmax: 0.8 m/s  MV E/A:    1.0       Tricuspid Valve Measurements:     RA Pressure: 3 mmHg    ________________________________________________________________________________________  Electronically signed on 10/25/2023 at 4:03:38 PM by Scott Daly M.D.         *** Final ***    < end of copied text >

## 2024-05-04 NOTE — HISTORY OF PRESENT ILLNESS
[de-identified] : 67 yo Mandarin-speaking F with a PMH of HTN, RA, scleroderma, and mild pulmonary hypertension who presents for management of Stage IV R colon cancer, GEORGINAAngela Stevens has been gradually losing weight over years. She has also had lifelong intermittent diarrhea for decades, but she has never had a workup for it. Her diarrhea is food related. She had an EGD about 10 years ago that showed esophageal inflammation and acid reflux, but she has never had a colonoscopy prior to the below events. She originally moved to the  from [place] in the summer of 2023. In September, she presented with abdominal pain and was found to have a Hb 6.8 with acute cholecystitis and had a percutaneous cholecystostomy drain placed. She also had 1U PRBCs.  She was then readmitted to [facility] from 10/22 - 11/6/23 for tube dislodgement, with CT showing ascending colon wall thickening, with a few liver lesions c/w mucinous metastases (largest 2.5 x 2.0 cm in the R hepatic lobe, others included a 0.8 cm segment 4A/8 lesion and a 0.9 cm segment 2/3 lesion). She also had continued anemia (Hb 7.0) requiring 2U PRBCs during admission. She underwent a colonoscopy on 10/24 that showed a circumferential ascending colon mass about 5 cm above the cecum, biopsy showing adenocarcinoma, moderately differentiated with a mucinous component. On 11/1/23, she underwent a R hemicolectomy with cholecystectomy, showing a 10.2 cm tumor with negative margins (closest 0.3 cm), no LVI or PNI, 0/25 LNs positive, and intermediate (5-9) tumor budding score. MS stable. T3N0  She lives with her , daughter and her , and her 2 grandchildren.  Referred to medical oncology for further management.   1/8/24: CT chest: no suspicious pulm nodules  MRI Abd: bilobar hepatic lesions, suspicious for mucinous hepatic metastases, with lesion increased in size, new or stable compared to prior  1/12/24: Liver bx: met colon ca 1/22/24: C1 5FU/LV 2/7/24: C2 FOLFOX, oxaliplatin 65 mg/m2 2/19/24: C3, Oxaliplatin 75 mg/m2 3/4/24: C4, Oxaliplatin 65 mg/m2 3/7/24: CT Chest, CTA a/p: enlarging hepatic mets 3/18/24: C5 3/28/24: Went to ED w/ back pain after heavy lifting  CT AP: Acute L1 superior endplate compression deformity with mild loss of height, new from prior CT of 3/7/2024. Bilobar hepatic lesions, slightly decrease in size when compared to prior CT of 3/7/2024. 4/1/24: C6 4/23/24: CT CAP: no lung mets, stable liver lesions, precarinal 1.2 cm node is unchanged  4/15/24: chemo held due to placement of ANGELI pump  [de-identified] : moderately differentiated adenocarcinoma with mucinous component, GEORGINA [de-identified] : CEA 57.5 (10/24/23) [de-identified] : KRAS Q61H NRAS wildtype APC [id number]*18, R876* FBXW7 R465C [id number] [id number] [FreeTextEntry1] : chemo held today  [de-identified] : Pt presents ot the clinic for a follow up. She endorses 1 month hx of lower back pain. She denies any radiation, worse with attempting to stand up, but not at rest. she was dx with compression fx at L1 after lifting something heavy. went to ER and had imaging which showed finding. she is following with ortho for this.   Ambulates with a cane. appetite unchanged. She is planned for ANGELI pump placement at the end of the month.

## 2024-05-04 NOTE — PHYSICAL THERAPY INITIAL EVALUATION ADULT - HEALTH SCREEN CRITERIA
Left message for patient at      Telephone Information:   Mobile [phone]    to schedule procedure via [employer]  service [phone] access code [id number].  Home telephone # was not in service p/.  Patient to return call to [phone].     yes

## 2024-05-04 NOTE — PHYSICAL THERAPY INITIAL EVALUATION ADULT - ADDITIONAL COMMENTS
Pt lives in a house with family, no steps to negotiate. Pt ambulated with a cane at baseline, daughter assists with ADLs.    Following evaluation, pt was left sitting in chair in no distress, all lines in tact, call bell in reach.

## 2024-05-04 NOTE — REASON FOR VISIT
[FreeTextEntry2] : Stage IV colon cancer on chemo  [FreeTextEntry3] : Mandarin [TWNoteComboBox1] : Other

## 2024-05-04 NOTE — CONSULT NOTE ADULT - NS ATTEND AMEND GEN_ALL_CORE FT
68 year old female with PMHx HTN, HLD, Scleroderma, RA, pulmonary hypertension, metastatic colon CA to liver (s/p chemo last dose 1/2024) and PSHx  Laparoscopic Right hemicolectomy and cholecystectomy (11/01/23) who is now s/p ANGELI pump placement and microwave ablation of liver mass on 5/1/24.   Cardiology called for assistance with elevated blood pressures in setting of NPO, unable to tolerate oral medications.      Patient remains NPO  For now, continue metoprolol 5mg IV QGHR  Can add hydralazine  10 IVPB TID to control blood pressure  When patient is tolerating oral medications, will transition to ARB and other oral antihpertensives

## 2024-05-04 NOTE — CONSULT NOTE ADULT - ASSESSMENT
68 year old female with PMHx HTN, HLD, Scleroderma, RA, pulmonary hypertension, metastatic colon CA to liver (s/p chemo last dose 1/2024) and PSHx  Laparoscopic Right hemicolectomy and cholecystectomy (11/01/23) who is now s/p ANGELI pump placement and microwave ablation of liver mass on 5/1/24. SICU consulted for tachycardia, HTN and hypoxemia c/f PE. CT PE negative but near complete LLL atelectasis, as well as distended esophagus c/f high risk for aspiration. NGT placed, patient requiring HFNC for hypoxia. Now off HiFlow doing well stable for floor  Cardiology called for assistance with elevated blood pressures in setting of NPO, unable to tolerate oral medications.      PLAN:  EF is 66%  Patient remains NPO  She takes irbesartan 75 at home  For now, continue metoprolol 5mg IV QGHR  Can add hydralazine  10 IVPB TID to control blood pressure  When patient is tolerating oral medications, will transition to losartan.    THis plan further evaluated by Dr. Cruz

## 2024-05-04 NOTE — PROGRESS NOTE ADULT - ASSESSMENT
68y Female with PMH of HTN, HLD, Scleroderma (not on meds) , Rheumatoid arthritis, h/o pulmonary hypertension (and was taking meds for that, but had a f/u about a year ago in [place], she was told her pressure has improved, and told her to stop meds- not followed up with cardiologist here in [facility]),  Colon cancer metastasized to liver (s/p chemo last dose 1/2024) s/p Laparoscopic Right hemicolectomy and cholecystectomy on 11/01/23. Now s/p microwave ablation of liver mass and HAIP placement on 5/1, SICU consulted for hypertension and tachycardia     PLAN:  -NGT clamp trial passed, NGT d/cd  -NPO, sips  -Cardiology consulted for BP management  - chest PT to assist in clearing phlegm  -Strict I&O's  -Analgesia and antiemetics as needed  -OOB/AAT  - DVT ppx    D Team surgery  [id number].

## 2024-05-04 NOTE — REVIEW OF SYSTEMS
[Fatigue] : fatigue [Recent Change In Weight] : ~T recent weight change [Negative] : Gastrointestinal [FreeTextEntry9] : +back pain [de-identified] : +PN

## 2024-05-04 NOTE — PHYSICAL THERAPY INITIAL EVALUATION ADULT - PERTINENT HX OF CURRENT PROBLEM, REHAB EVAL
68 year old female post Microwave ablation, mass, liver and Insertion of implantable hepatic arterial infusion. POD#3. SICU consulted for tachycardia, hypertension, and hypoxemia. CT PE negative but near complete left lower lobe atelectasis, as well as distended esophagus concern fot high risk for aspiration.

## 2024-05-04 NOTE — PROGRESS NOTE ADULT - ATTENDING COMMENTS
Critical Care Dx  Mrs. 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  I have personally interviewed and examined the patient, reviewed data and laboratory tests/x-rays and all pertinent electronic images.  The SICU team has a constant risk benefit analyzes discussion with the primary team, all consultants, House Staff and PA's on all decisions.   Time involved in performance of separately billable procedures was not counted toward my critical care time. There is no overlap.    I have personally provided 65 minutes of critical care time concurrently with the resident/fellow. This time excludes time spent on separate procedures and time spent teaching. I have reviewed the resident's/fellow's documentation and agree with the assessment and plan of care.  I was physically present for the key portions of the evaluation and management (E/M) service provided.      Geronimo Eubanks MD  Acute and Critical Care Surgery.

## 2024-05-05 LAB
ANION GAP SERPL CALC-SCNC: 12 MMOL/L — SIGNIFICANT CHANGE UP (ref 7–14)
APTT BLD: 48.3 SEC — HIGH (ref 24.5–35.6)
BUN SERPL-MCNC: 8 MG/DL — SIGNIFICANT CHANGE UP (ref 7–23)
CALCIUM SERPL-MCNC: 8.5 MG/DL — SIGNIFICANT CHANGE UP (ref 8.4–10.5)
CHLORIDE SERPL-SCNC: 107 MMOL/L — SIGNIFICANT CHANGE UP (ref 98–107)
CO2 SERPL-SCNC: 23 MMOL/L — SIGNIFICANT CHANGE UP (ref 22–31)
CREAT SERPL-MCNC: 0.51 MG/DL — SIGNIFICANT CHANGE UP (ref 0.5–1.3)
EGFR: 102 ML/MIN/1.73M2 — SIGNIFICANT CHANGE UP
GLUCOSE SERPL-MCNC: 107 MG/DL — HIGH (ref 70–99)
HCT VFR BLD CALC: 28 % — LOW (ref 34.5–45)
HGB BLD-MCNC: 8.7 G/DL — LOW (ref 11.5–15.5)
INR BLD: <0.9 RATIO — SIGNIFICANT CHANGE UP (ref 0.85–1.18)
MAGNESIUM SERPL-MCNC: 1.8 MG/DL — SIGNIFICANT CHANGE UP (ref 1.6–2.6)
MCHC RBC-ENTMCNC: 27.5 PG — SIGNIFICANT CHANGE UP (ref 27–34)
MCHC RBC-ENTMCNC: 31.1 GM/DL — LOW (ref 32–36)
MCV RBC AUTO: 88.6 FL — SIGNIFICANT CHANGE UP (ref 80–100)
NRBC # BLD: 0 /100 WBCS — SIGNIFICANT CHANGE UP (ref 0–0)
NRBC # FLD: 0 K/UL — SIGNIFICANT CHANGE UP (ref 0–0)
PHOSPHATE SERPL-MCNC: 2.8 MG/DL — SIGNIFICANT CHANGE UP (ref 2.5–4.5)
PLATELET # BLD AUTO: 155 K/UL — SIGNIFICANT CHANGE UP (ref 150–400)
POTASSIUM SERPL-MCNC: 3.4 MMOL/L — LOW (ref 3.5–5.3)
POTASSIUM SERPL-SCNC: 3.4 MMOL/L — LOW (ref 3.5–5.3)
PROTHROM AB SERPL-ACNC: 9.6 SEC — SIGNIFICANT CHANGE UP (ref 9.5–13)
RBC # BLD: 3.16 M/UL — LOW (ref 3.8–5.2)
RBC # FLD: 17.9 % — HIGH (ref 10.3–14.5)
SODIUM SERPL-SCNC: 142 MMOL/L — SIGNIFICANT CHANGE UP (ref 135–145)
WBC # BLD: 7.15 K/UL — SIGNIFICANT CHANGE UP (ref 3.8–10.5)
WBC # FLD AUTO: 7.15 K/UL — SIGNIFICANT CHANGE UP (ref 3.8–10.5)

## 2024-05-05 RX ORDER — LOSARTAN POTASSIUM 100 MG/1
25 TABLET, FILM COATED ORAL DAILY
Refills: 0 | Status: DISCONTINUED | OUTPATIENT
Start: 2024-05-05 | End: 2024-05-09

## 2024-05-05 RX ORDER — LOSARTAN POTASSIUM 100 MG/1
25 TABLET, FILM COATED ORAL DAILY
Refills: 0 | Status: DISCONTINUED | OUTPATIENT
Start: 2024-05-05 | End: 2024-05-05

## 2024-05-05 RX ORDER — ACETAMINOPHEN 500 MG
500 TABLET ORAL EVERY 6 HOURS
Refills: 0 | Status: COMPLETED | OUTPATIENT
Start: 2024-05-05 | End: 2024-05-06

## 2024-05-05 RX ORDER — POTASSIUM CHLORIDE 20 MEQ
40 PACKET (EA) ORAL ONCE
Refills: 0 | Status: COMPLETED | OUTPATIENT
Start: 2024-05-05 | End: 2024-05-05

## 2024-05-05 RX ORDER — MAGNESIUM SULFATE 500 MG/ML
1 VIAL (ML) INJECTION ONCE
Refills: 0 | Status: COMPLETED | OUTPATIENT
Start: 2024-05-05 | End: 2024-05-05

## 2024-05-05 RX ORDER — POTASSIUM PHOSPHATE, MONOBASIC POTASSIUM PHOSPHATE, DIBASIC 236; 224 MG/ML; MG/ML
15 INJECTION, SOLUTION INTRAVENOUS ONCE
Refills: 0 | Status: COMPLETED | OUTPATIENT
Start: 2024-05-05 | End: 2024-05-05

## 2024-05-05 RX ADMIN — LIDOCAINE 1 PATCH: 4 CREAM TOPICAL at 00:20

## 2024-05-05 RX ADMIN — Medication 5 MILLIGRAM(S): at 12:06

## 2024-05-05 RX ADMIN — POTASSIUM PHOSPHATE, MONOBASIC POTASSIUM PHOSPHATE, DIBASIC 62.5 MILLIMOLE(S): 236; 224 INJECTION, SOLUTION INTRAVENOUS at 14:41

## 2024-05-05 RX ADMIN — LIDOCAINE 1 PATCH: 4 CREAM TOPICAL at 19:30

## 2024-05-05 RX ADMIN — Medication 5 MILLIGRAM(S): at 18:05

## 2024-05-05 RX ADMIN — PIPERACILLIN AND TAZOBACTAM 25 GRAM(S): 4; .5 INJECTION, POWDER, LYOPHILIZED, FOR SOLUTION INTRAVENOUS at 05:23

## 2024-05-05 RX ADMIN — CHLORHEXIDINE GLUCONATE 1 APPLICATION(S): 213 SOLUTION TOPICAL at 06:31

## 2024-05-05 RX ADMIN — Medication 100 GRAM(S): at 14:41

## 2024-05-05 RX ADMIN — PIPERACILLIN AND TAZOBACTAM 25 GRAM(S): 4; .5 INJECTION, POWDER, LYOPHILIZED, FOR SOLUTION INTRAVENOUS at 21:26

## 2024-05-05 RX ADMIN — Medication 5 MILLIGRAM(S): at 05:40

## 2024-05-05 RX ADMIN — Medication 500 MILLIGRAM(S): at 19:00

## 2024-05-05 RX ADMIN — Medication 10 MILLIGRAM(S): at 06:32

## 2024-05-05 RX ADMIN — HEPARIN SODIUM 5000 UNIT(S): 5000 INJECTION INTRAVENOUS; SUBCUTANEOUS at 21:26

## 2024-05-05 RX ADMIN — HEPARIN SODIUM 5000 UNIT(S): 5000 INJECTION INTRAVENOUS; SUBCUTANEOUS at 14:43

## 2024-05-05 RX ADMIN — LIDOCAINE 1 PATCH: 4 CREAM TOPICAL at 14:42

## 2024-05-05 RX ADMIN — Medication 200 MILLIGRAM(S): at 18:02

## 2024-05-05 RX ADMIN — Medication 500 MILLIGRAM(S): at 12:30

## 2024-05-05 RX ADMIN — SODIUM CHLORIDE 42 MILLILITER(S): 9 INJECTION, SOLUTION INTRAVENOUS at 05:23

## 2024-05-05 RX ADMIN — HEPARIN SODIUM 5000 UNIT(S): 5000 INJECTION INTRAVENOUS; SUBCUTANEOUS at 05:30

## 2024-05-05 RX ADMIN — Medication 3 MILLILITER(S): at 10:13

## 2024-05-05 RX ADMIN — Medication 200 MILLIGRAM(S): at 12:05

## 2024-05-05 RX ADMIN — Medication 40 MILLIEQUIVALENT(S): at 14:41

## 2024-05-05 RX ADMIN — PANTOPRAZOLE SODIUM 40 MILLIGRAM(S): 20 TABLET, DELAYED RELEASE ORAL at 12:06

## 2024-05-05 RX ADMIN — SODIUM CHLORIDE 42 MILLILITER(S): 9 INJECTION, SOLUTION INTRAVENOUS at 12:06

## 2024-05-05 RX ADMIN — PIPERACILLIN AND TAZOBACTAM 25 GRAM(S): 4; .5 INJECTION, POWDER, LYOPHILIZED, FOR SOLUTION INTRAVENOUS at 14:41

## 2024-05-05 NOTE — PROGRESS NOTE ADULT - ASSESSMENT
68y Female with PMH of HTN, HLD, Scleroderma (not on meds) , Rheumatoid arthritis, h/o pulmonary hypertension (and was taking meds for that, but had a f/u about a year ago in [place], she was told her pressure has improved, and told her to stop meds- not followed up with cardiologist here in [facility]),  Colon cancer metastasized to liver (s/p chemo last dose 1/2024) s/p Laparoscopic Right hemicolectomy and cholecystectomy on 11/01/23. Now s/p microwave ablation of liver mass and HAIP placement on 5/1, SICU consulted for hypertension and tachycardia     PLAN:  -NGT clamp trial passed, NGT d/cd  -Advance to CLD  - Start home PO BP meds  -Cardiology consulted for BP management  - chest PT to assist in clearing phlegm  -Strict I&O's  -Analgesia and antiemetics as needed  -OOB/AAT  - DVT ppx    D Team surgery  [id number].

## 2024-05-06 LAB
ALBUMIN SERPL ELPH-MCNC: 3.1 G/DL — LOW (ref 3.3–5)
ALP SERPL-CCNC: 219 U/L — HIGH (ref 40–120)
ALT FLD-CCNC: 83 U/L — HIGH (ref 4–33)
ANION GAP SERPL CALC-SCNC: 14 MMOL/L — SIGNIFICANT CHANGE UP (ref 7–14)
APTT BLD: 34.9 SEC — SIGNIFICANT CHANGE UP (ref 24.5–35.6)
AST SERPL-CCNC: 41 U/L — HIGH (ref 4–32)
BILIRUB SERPL-MCNC: 0.7 MG/DL — SIGNIFICANT CHANGE UP (ref 0.2–1.2)
BUN SERPL-MCNC: 6 MG/DL — LOW (ref 7–23)
CALCIUM SERPL-MCNC: 8.6 MG/DL — SIGNIFICANT CHANGE UP (ref 8.4–10.5)
CHLORIDE SERPL-SCNC: 109 MMOL/L — HIGH (ref 98–107)
CO2 SERPL-SCNC: 21 MMOL/L — LOW (ref 22–31)
CREAT SERPL-MCNC: 0.59 MG/DL — SIGNIFICANT CHANGE UP (ref 0.5–1.3)
EGFR: 98 ML/MIN/1.73M2 — SIGNIFICANT CHANGE UP
GLUCOSE SERPL-MCNC: 89 MG/DL — SIGNIFICANT CHANGE UP (ref 70–99)
HCT VFR BLD CALC: 28.6 % — LOW (ref 34.5–45)
HGB BLD-MCNC: 8.9 G/DL — LOW (ref 11.5–15.5)
INR BLD: <0.9 RATIO — LOW (ref 0.85–1.18)
MAGNESIUM SERPL-MCNC: 2.1 MG/DL — SIGNIFICANT CHANGE UP (ref 1.6–2.6)
MCHC RBC-ENTMCNC: 27.1 PG — SIGNIFICANT CHANGE UP (ref 27–34)
MCHC RBC-ENTMCNC: 31.1 GM/DL — LOW (ref 32–36)
MCV RBC AUTO: 86.9 FL — SIGNIFICANT CHANGE UP (ref 80–100)
NRBC # BLD: 0 /100 WBCS — SIGNIFICANT CHANGE UP (ref 0–0)
NRBC # FLD: 0 K/UL — SIGNIFICANT CHANGE UP (ref 0–0)
PHOSPHATE SERPL-MCNC: 3.3 MG/DL — SIGNIFICANT CHANGE UP (ref 2.5–4.5)
PLATELET # BLD AUTO: 161 K/UL — SIGNIFICANT CHANGE UP (ref 150–400)
POTASSIUM SERPL-MCNC: 4 MMOL/L — SIGNIFICANT CHANGE UP (ref 3.5–5.3)
POTASSIUM SERPL-SCNC: 4 MMOL/L — SIGNIFICANT CHANGE UP (ref 3.5–5.3)
PROT SERPL-MCNC: 6.3 G/DL — SIGNIFICANT CHANGE UP (ref 6–8.3)
PROTHROM AB SERPL-ACNC: 9.4 SEC — LOW (ref 9.5–13)
RBC # BLD: 3.29 M/UL — LOW (ref 3.8–5.2)
RBC # FLD: 17.7 % — HIGH (ref 10.3–14.5)
SODIUM SERPL-SCNC: 144 MMOL/L — SIGNIFICANT CHANGE UP (ref 135–145)
WBC # BLD: 4.62 K/UL — SIGNIFICANT CHANGE UP (ref 3.8–10.5)
WBC # FLD AUTO: 4.62 K/UL — SIGNIFICANT CHANGE UP (ref 3.8–10.5)

## 2024-05-06 RX ORDER — ACETAMINOPHEN 500 MG
650 TABLET ORAL EVERY 6 HOURS
Refills: 0 | Status: DISCONTINUED | OUTPATIENT
Start: 2024-05-06 | End: 2024-05-09

## 2024-05-06 RX ORDER — ENOXAPARIN SODIUM 100 MG/ML
40 INJECTION SUBCUTANEOUS EVERY 24 HOURS
Refills: 0 | Status: DISCONTINUED | OUTPATIENT
Start: 2024-05-06 | End: 2024-05-09

## 2024-05-06 RX ORDER — PANTOPRAZOLE SODIUM 20 MG/1
40 TABLET, DELAYED RELEASE ORAL
Refills: 0 | Status: DISCONTINUED | OUTPATIENT
Start: 2024-05-06 | End: 2024-05-09

## 2024-05-06 RX ORDER — ENOXAPARIN SODIUM 100 MG/ML
40 INJECTION SUBCUTANEOUS ONCE
Refills: 0 | Status: DISCONTINUED | OUTPATIENT
Start: 2024-05-06 | End: 2024-05-06

## 2024-05-06 RX ADMIN — LOSARTAN POTASSIUM 25 MILLIGRAM(S): 100 TABLET, FILM COATED ORAL at 05:26

## 2024-05-06 RX ADMIN — Medication 500 MILLIGRAM(S): at 00:50

## 2024-05-06 RX ADMIN — HEPARIN SODIUM 5000 UNIT(S): 5000 INJECTION INTRAVENOUS; SUBCUTANEOUS at 13:01

## 2024-05-06 RX ADMIN — Medication 5 MILLIGRAM(S): at 00:19

## 2024-05-06 RX ADMIN — PIPERACILLIN AND TAZOBACTAM 25 GRAM(S): 4; .5 INJECTION, POWDER, LYOPHILIZED, FOR SOLUTION INTRAVENOUS at 21:56

## 2024-05-06 RX ADMIN — ENOXAPARIN SODIUM 40 MILLIGRAM(S): 100 INJECTION SUBCUTANEOUS at 17:12

## 2024-05-06 RX ADMIN — Medication 3 MILLILITER(S): at 23:12

## 2024-05-06 RX ADMIN — PIPERACILLIN AND TAZOBACTAM 25 GRAM(S): 4; .5 INJECTION, POWDER, LYOPHILIZED, FOR SOLUTION INTRAVENOUS at 05:23

## 2024-05-06 RX ADMIN — SODIUM CHLORIDE 42 MILLILITER(S): 9 INJECTION, SOLUTION INTRAVENOUS at 05:21

## 2024-05-06 RX ADMIN — Medication 200 MILLIGRAM(S): at 05:25

## 2024-05-06 RX ADMIN — PIPERACILLIN AND TAZOBACTAM 25 GRAM(S): 4; .5 INJECTION, POWDER, LYOPHILIZED, FOR SOLUTION INTRAVENOUS at 13:01

## 2024-05-06 RX ADMIN — HEPARIN SODIUM 5000 UNIT(S): 5000 INJECTION INTRAVENOUS; SUBCUTANEOUS at 05:29

## 2024-05-06 RX ADMIN — Medication 5 MILLIGRAM(S): at 12:10

## 2024-05-06 RX ADMIN — CHLORHEXIDINE GLUCONATE 1 APPLICATION(S): 213 SOLUTION TOPICAL at 05:26

## 2024-05-06 RX ADMIN — LIDOCAINE 1 PATCH: 4 CREAM TOPICAL at 02:00

## 2024-05-06 RX ADMIN — LIDOCAINE 1 PATCH: 4 CREAM TOPICAL at 20:00

## 2024-05-06 RX ADMIN — Medication 5 MILLIGRAM(S): at 05:27

## 2024-05-06 RX ADMIN — Medication 3 MILLILITER(S): at 07:10

## 2024-05-06 RX ADMIN — PANTOPRAZOLE SODIUM 40 MILLIGRAM(S): 20 TABLET, DELAYED RELEASE ORAL at 13:01

## 2024-05-06 RX ADMIN — LIDOCAINE 1 PATCH: 4 CREAM TOPICAL at 12:10

## 2024-05-06 RX ADMIN — Medication 5 MILLIGRAM(S): at 17:13

## 2024-05-06 RX ADMIN — ONDANSETRON 4 MILLIGRAM(S): 8 TABLET, FILM COATED ORAL at 12:09

## 2024-05-06 RX ADMIN — Medication 500 MILLIGRAM(S): at 06:00

## 2024-05-06 RX ADMIN — Medication 200 MILLIGRAM(S): at 00:17

## 2024-05-06 NOTE — PROGRESS NOTE ADULT - ASSESSMENT
68y Female with PMH of HTN, HLD, Scleroderma (not on meds) , Rheumatoid arthritis, h/o pulmonary hypertension (and was taking meds for that, but had a f/u about a year ago in [place], she was told her pressure has improved, and told her to stop meds- not followed up with cardiologist here in [facility]),  Colon cancer metastasized to liver (s/p chemo last dose 1/2024) s/p Laparoscopic Right hemicolectomy and cholecystectomy on 11/01/23. Now s/p microwave ablation of liver mass and HAIP placement on 5/1, SICU consulted for hypertension and tachycardia     PLAN:  - Advance to Full liquid diet, regular diet later today if no nausea  - Start home PO BP meds  - appreciate cards recs  - chest PT to assist in clearing phlegm  - Strict I&O's  - Analgesia and antiemetics as needed  - OOB/AAT  - DVT ppx    D Team surgery  [id number]

## 2024-05-06 NOTE — CHART NOTE - NSCHARTNOTEFT_GEN_A_CORE
Source: Patient [X]    Family () [X]     Other (Chart Review) [X]    Language: Patient and  are Mandarin speaking. Language line  [id number] utilized to conduct interview.     Medical Course: Per chart review, patient is a 68y Female with PMH HTN, HLD, Scleroderma (not on meds) , Rheumatoid arthritis, h/o pulmonary hypertension (and was taking meds for that, but had a f/u about a year ago in [place], she was told her pressure has improved, and told her to stop meds- not followed up with cardiologist here in [facility]),  Colon cancer metastasized to liver (s/p chemo last dose 1/2024) s/p Laparoscopic Right hemicolectomy and cholecystectomy on 11/01/23. Now s/p microwave ablation of liver mass and HAIP placement on 5/1, SICU consulted for hypertension and tachycardia     Nutrition Course: Patient advanced from clear liquids to full liquids this AM with Ensure Enlive TID supplementation. Patient seen at bedside this AM by writer with  present. Patient currently reports a poor appetite and PO intake at meals during course of admission secondary to nausea, taking small amounts of liquid items on trays. Deferred writer's offer to document food preferences. Patient denies any swallowing difficulty with current diet order. No report of vomiting, diarrhea, constipation.    Diet : Diet, Full Liquid:   No Carbonated Beverages  Supplement Feeding Modality:  Oral  Ensure Enlive Cans or Servings Per Day:  1       Frequency:  Three Times a day (05-06-24 @ 07:56)    Anthropometrics  Weights per RN flowsheet: 44.8kg (5/3), 44.5kg (5/1), 44.5kg (4/26)  % Weight Change: +0.3kg (<1%) x2 days period of time    Pertinent Medications: MEDICATIONS  (STANDING):  albuterol/ipratropium for Nebulization 3 milliLiter(s) Nebulizer every 12 hours  chlorhexidine 2% Cloths 1 Application(s) Topical <User Schedule>  enoxaparin Injectable 40 milliGRAM(s) SubCutaneous every 24 hours  influenza  Vaccine (HIGH DOSE) 0.7 milliLiter(s) IntraMuscular once  lidocaine   4% Patch 1 Patch Transdermal every 24 hours  losartan 25 milliGRAM(s) Oral daily  metoprolol tartrate Injectable 5 milliGRAM(s) IV Push every 6 hours  pantoprazole    Tablet 40 milliGRAM(s) Oral before breakfast  piperacillin/tazobactam IVPB.. 3.375 Gram(s) IV Intermittent every 8 hours    MEDICATIONS  (PRN):  acetaminophen     Tablet .. 650 milliGRAM(s) Oral every 6 hours PRN Mild Pain (1 - 3)  HYDROmorphone  Injectable 0.5 milliGRAM(s) IV Push every 3 hours PRN Severe Pain (7 - 10)  HYDROmorphone  Injectable 0.2 milliGRAM(s) IV Push every 3 hours PRN Moderate Pain (4 - 6)  ondansetron Injectable 4 milliGRAM(s) IV Push every 6 hours PRN Nausea    Pertinent Labs:  05-06 Na144 mmol/L Glu 89 mg/dL K+ 4.0 mmol/L Cr  0.59 mg/dL BUN 6 mg/dL<L> 05-06 Phos 3.3 mg/dL 05-06 Alb 3.1 g/dL<L>    Skin: No pressure ulcers/injuries documented per RN flowsheets     Fluid: No edema documented per RN flowsheets     GI: Last BM 5/5/24 per RN flowsheet documentation. Not noted to be on a bowel regimen.     Estimated Needs:   [X] no change since previous assessment  Weight Used: Dosing Weight 98.1lb/44.4kg  Estimated Energy Needs: 1332-1554kcal (based on 30-35kcal/kg)  Estimated Protein Needs: 57.72-71.04gms (based on 1.3-1.6gms/kg)     Previous Nutrition Diagnosis: Moderate malnutrition    Nutrition Diagnosis is [X] ongoing    New Nutrition Diagnosis: Not applicable    Education: [X] Given today    Type of education provided: Writer provided verbal education regarding current diet order. Patient and  are verbalized understanding to the discussion.     Nutrition Recommendations  - Advance PO diet as tolerated, at medical team's discretion  - Continue Ensure Enlive (provides 350kcal, 20gms protein per serving) TID  - Monitor weights, labs, BM's, skin integrity, p.o. intake  - Please monitor % PO intake on flowsheets   - Honor food preferences as able within therapeutic diet order.   - RD remains available, please consult as needed     Monitoring and Evaluation:   [X] PO intake [X] Tolerance to diet prescription [X] weights [X] follow up per protocol    Lorie Fitzgerald MS RDN CDN  On Microsoft Teams or Pager #[phone]

## 2024-05-07 LAB
ALBUMIN SERPL ELPH-MCNC: 3.3 G/DL — SIGNIFICANT CHANGE UP (ref 3.3–5)
ALP SERPL-CCNC: 229 U/L — HIGH (ref 40–120)
ALT FLD-CCNC: 64 U/L — HIGH (ref 4–33)
ANION GAP SERPL CALC-SCNC: 17 MMOL/L — HIGH (ref 7–14)
AST SERPL-CCNC: 30 U/L — SIGNIFICANT CHANGE UP (ref 4–32)
BILIRUB DIRECT SERPL-MCNC: <0.2 MG/DL — SIGNIFICANT CHANGE UP (ref 0–0.3)
BILIRUB INDIRECT FLD-MCNC: >0.5 MG/DL — SIGNIFICANT CHANGE UP (ref 0–1)
BILIRUB SERPL-MCNC: 0.7 MG/DL — SIGNIFICANT CHANGE UP (ref 0.2–1.2)
BLD GP AB SCN SERPL QL: NEGATIVE — SIGNIFICANT CHANGE UP
BUN SERPL-MCNC: 8 MG/DL — SIGNIFICANT CHANGE UP (ref 7–23)
CALCIUM SERPL-MCNC: 8.5 MG/DL — SIGNIFICANT CHANGE UP (ref 8.4–10.5)
CHLORIDE SERPL-SCNC: 105 MMOL/L — SIGNIFICANT CHANGE UP (ref 98–107)
CO2 SERPL-SCNC: 18 MMOL/L — LOW (ref 22–31)
CREAT SERPL-MCNC: 0.62 MG/DL — SIGNIFICANT CHANGE UP (ref 0.5–1.3)
CULTURE RESULTS: SIGNIFICANT CHANGE UP
CULTURE RESULTS: SIGNIFICANT CHANGE UP
EGFR: 97 ML/MIN/1.73M2 — SIGNIFICANT CHANGE UP
GLUCOSE SERPL-MCNC: 85 MG/DL — SIGNIFICANT CHANGE UP (ref 70–99)
HCT VFR BLD CALC: 28.8 % — LOW (ref 34.5–45)
HGB BLD-MCNC: 8.7 G/DL — LOW (ref 11.5–15.5)
MAGNESIUM SERPL-MCNC: 1.9 MG/DL — SIGNIFICANT CHANGE UP (ref 1.6–2.6)
MCHC RBC-ENTMCNC: 26.9 PG — LOW (ref 27–34)
MCHC RBC-ENTMCNC: 30.2 GM/DL — LOW (ref 32–36)
MCV RBC AUTO: 88.9 FL — SIGNIFICANT CHANGE UP (ref 80–100)
NRBC # BLD: 0 /100 WBCS — SIGNIFICANT CHANGE UP (ref 0–0)
NRBC # FLD: 0 K/UL — SIGNIFICANT CHANGE UP (ref 0–0)
PHOSPHATE SERPL-MCNC: 3.2 MG/DL — SIGNIFICANT CHANGE UP (ref 2.5–4.5)
PLATELET # BLD AUTO: 176 K/UL — SIGNIFICANT CHANGE UP (ref 150–400)
POTASSIUM SERPL-MCNC: 3.7 MMOL/L — SIGNIFICANT CHANGE UP (ref 3.5–5.3)
POTASSIUM SERPL-SCNC: 3.7 MMOL/L — SIGNIFICANT CHANGE UP (ref 3.5–5.3)
PROT SERPL-MCNC: 6.6 G/DL — SIGNIFICANT CHANGE UP (ref 6–8.3)
RBC # BLD: 3.24 M/UL — LOW (ref 3.8–5.2)
RBC # FLD: 17.7 % — HIGH (ref 10.3–14.5)
RH IG SCN BLD-IMP: POSITIVE — SIGNIFICANT CHANGE UP
SODIUM SERPL-SCNC: 140 MMOL/L — SIGNIFICANT CHANGE UP (ref 135–145)
SPECIMEN SOURCE: SIGNIFICANT CHANGE UP
SPECIMEN SOURCE: SIGNIFICANT CHANGE UP
WBC # BLD: 6.08 K/UL — SIGNIFICANT CHANGE UP (ref 3.8–10.5)
WBC # FLD AUTO: 6.08 K/UL — SIGNIFICANT CHANGE UP (ref 3.8–10.5)

## 2024-05-07 PROCEDURE — 74018 RADEX ABDOMEN 1 VIEW: CPT | Mod: 26,59

## 2024-05-07 PROCEDURE — 74240 X-RAY XM UPR GI TRC 1CNTRST: CPT | Mod: 26

## 2024-05-07 RX ORDER — MAGNESIUM SULFATE 500 MG/ML
1 VIAL (ML) INJECTION ONCE
Refills: 0 | Status: COMPLETED | OUTPATIENT
Start: 2024-05-07 | End: 2024-05-07

## 2024-05-07 RX ORDER — POTASSIUM CHLORIDE 20 MEQ
20 PACKET (EA) ORAL ONCE
Refills: 0 | Status: COMPLETED | OUTPATIENT
Start: 2024-05-07 | End: 2024-05-07

## 2024-05-07 RX ADMIN — Medication 3 MILLILITER(S): at 21:30

## 2024-05-07 RX ADMIN — LOSARTAN POTASSIUM 25 MILLIGRAM(S): 100 TABLET, FILM COATED ORAL at 06:08

## 2024-05-07 RX ADMIN — ENOXAPARIN SODIUM 40 MILLIGRAM(S): 100 INJECTION SUBCUTANEOUS at 16:17

## 2024-05-07 RX ADMIN — ONDANSETRON 4 MILLIGRAM(S): 8 TABLET, FILM COATED ORAL at 00:13

## 2024-05-07 RX ADMIN — PIPERACILLIN AND TAZOBACTAM 25 GRAM(S): 4; .5 INJECTION, POWDER, LYOPHILIZED, FOR SOLUTION INTRAVENOUS at 22:02

## 2024-05-07 RX ADMIN — LIDOCAINE 1 PATCH: 4 CREAM TOPICAL at 16:18

## 2024-05-07 RX ADMIN — Medication 5 MILLIGRAM(S): at 00:10

## 2024-05-07 RX ADMIN — PANTOPRAZOLE SODIUM 40 MILLIGRAM(S): 20 TABLET, DELAYED RELEASE ORAL at 06:08

## 2024-05-07 RX ADMIN — CHLORHEXIDINE GLUCONATE 1 APPLICATION(S): 213 SOLUTION TOPICAL at 06:09

## 2024-05-07 RX ADMIN — LIDOCAINE 1 PATCH: 4 CREAM TOPICAL at 00:56

## 2024-05-07 RX ADMIN — Medication 100 GRAM(S): at 09:16

## 2024-05-07 RX ADMIN — PIPERACILLIN AND TAZOBACTAM 25 GRAM(S): 4; .5 INJECTION, POWDER, LYOPHILIZED, FOR SOLUTION INTRAVENOUS at 06:09

## 2024-05-07 RX ADMIN — Medication 20 MILLIEQUIVALENT(S): at 09:17

## 2024-05-07 RX ADMIN — Medication 5 MILLIGRAM(S): at 06:09

## 2024-05-07 RX ADMIN — LIDOCAINE 1 PATCH: 4 CREAM TOPICAL at 19:13

## 2024-05-07 RX ADMIN — PIPERACILLIN AND TAZOBACTAM 25 GRAM(S): 4; .5 INJECTION, POWDER, LYOPHILIZED, FOR SOLUTION INTRAVENOUS at 13:11

## 2024-05-07 NOTE — PROGRESS NOTE ADULT - ASSESSMENT
68y Female with PMH of HTN, HLD, Scleroderma (not on meds) , Rheumatoid arthritis, h/o pulmonary hypertension (and was taking meds for that, but had a f/u about a year ago in [place], she was told her pressure has improved, and told her to stop meds- not followed up with cardiologist here in [facility]),  Colon cancer metastasized to liver (s/p chemo last dose 1/2024) s/p Laparoscopic Right hemicolectomy and cholecystectomy on 11/01/23. Now s/p microwave ablation of liver mass and HAIP placement on 5/1, SICU consulted for hypertension and tachycardia     PLAN:  - Advance to regular diet  - continue BP meds  - appreciate cards recs  - chest PT to assist in clearing phlegm  - Strict I&O's  - Analgesia and antiemetics as needed  - OOB/AAT  - DVT ppx    D Team surgery  [id number]

## 2024-05-08 ENCOUNTER — APPOINTMENT (OUTPATIENT)
Dept: NUCLEAR MEDICINE | Facility: HOSPITAL | Age: 69
End: 2024-05-08
Payer: MEDICAID

## 2024-05-08 LAB
ALBUMIN SERPL ELPH-MCNC: 3.2 G/DL — LOW (ref 3.3–5)
ALP SERPL-CCNC: 192 U/L — HIGH (ref 40–120)
ALT FLD-CCNC: 45 U/L — HIGH (ref 4–33)
ANION GAP SERPL CALC-SCNC: 16 MMOL/L — HIGH (ref 7–14)
AST SERPL-CCNC: 20 U/L — SIGNIFICANT CHANGE UP (ref 4–32)
BILIRUB DIRECT SERPL-MCNC: <0.2 MG/DL — SIGNIFICANT CHANGE UP (ref 0–0.3)
BILIRUB INDIRECT FLD-MCNC: >0.4 MG/DL — SIGNIFICANT CHANGE UP (ref 0–1)
BILIRUB SERPL-MCNC: 0.6 MG/DL — SIGNIFICANT CHANGE UP (ref 0.2–1.2)
BUN SERPL-MCNC: 9 MG/DL — SIGNIFICANT CHANGE UP (ref 7–23)
CALCIUM SERPL-MCNC: 8.5 MG/DL — SIGNIFICANT CHANGE UP (ref 8.4–10.5)
CHLORIDE SERPL-SCNC: 107 MMOL/L — SIGNIFICANT CHANGE UP (ref 98–107)
CO2 SERPL-SCNC: 18 MMOL/L — LOW (ref 22–31)
CREAT SERPL-MCNC: 0.61 MG/DL — SIGNIFICANT CHANGE UP (ref 0.5–1.3)
EGFR: 97 ML/MIN/1.73M2 — SIGNIFICANT CHANGE UP
GLUCOSE SERPL-MCNC: 100 MG/DL — HIGH (ref 70–99)
HCT VFR BLD CALC: 29 % — LOW (ref 34.5–45)
HGB BLD-MCNC: 8.8 G/DL — LOW (ref 11.5–15.5)
MAGNESIUM SERPL-MCNC: 2.1 MG/DL — SIGNIFICANT CHANGE UP (ref 1.6–2.6)
MCHC RBC-ENTMCNC: 27.1 PG — SIGNIFICANT CHANGE UP (ref 27–34)
MCHC RBC-ENTMCNC: 30.3 GM/DL — LOW (ref 32–36)
MCV RBC AUTO: 89.2 FL — SIGNIFICANT CHANGE UP (ref 80–100)
NRBC # BLD: 0 /100 WBCS — SIGNIFICANT CHANGE UP (ref 0–0)
NRBC # FLD: 0 K/UL — SIGNIFICANT CHANGE UP (ref 0–0)
PHOSPHATE SERPL-MCNC: 2.9 MG/DL — SIGNIFICANT CHANGE UP (ref 2.5–4.5)
PLATELET # BLD AUTO: 170 K/UL — SIGNIFICANT CHANGE UP (ref 150–400)
POTASSIUM SERPL-MCNC: 3.5 MMOL/L — SIGNIFICANT CHANGE UP (ref 3.5–5.3)
POTASSIUM SERPL-SCNC: 3.5 MMOL/L — SIGNIFICANT CHANGE UP (ref 3.5–5.3)
PROT SERPL-MCNC: 6 G/DL — SIGNIFICANT CHANGE UP (ref 6–8.3)
RBC # BLD: 3.25 M/UL — LOW (ref 3.8–5.2)
RBC # FLD: 17.7 % — HIGH (ref 10.3–14.5)
SODIUM SERPL-SCNC: 141 MMOL/L — SIGNIFICANT CHANGE UP (ref 135–145)
WBC # BLD: 5.11 K/UL — SIGNIFICANT CHANGE UP (ref 3.8–10.5)
WBC # FLD AUTO: 5.11 K/UL — SIGNIFICANT CHANGE UP (ref 3.8–10.5)

## 2024-05-08 PROCEDURE — 74230 X-RAY XM SWLNG FUNCJ C+: CPT | Mod: 26

## 2024-05-08 RX ORDER — SODIUM CHLORIDE 9 MG/ML
1000 INJECTION, SOLUTION INTRAVENOUS
Refills: 0 | Status: DISCONTINUED | OUTPATIENT
Start: 2024-05-08 | End: 2024-05-08

## 2024-05-08 RX ORDER — POTASSIUM CHLORIDE 20 MEQ
10 PACKET (EA) ORAL
Refills: 0 | Status: COMPLETED | OUTPATIENT
Start: 2024-05-08 | End: 2024-05-08

## 2024-05-08 RX ORDER — SODIUM CHLORIDE 9 MG/ML
1000 INJECTION, SOLUTION INTRAVENOUS
Refills: 0 | Status: DISCONTINUED | OUTPATIENT
Start: 2024-05-08 | End: 2024-05-09

## 2024-05-08 RX ADMIN — LIDOCAINE 1 PATCH: 4 CREAM TOPICAL at 16:11

## 2024-05-08 RX ADMIN — PIPERACILLIN AND TAZOBACTAM 25 GRAM(S): 4; .5 INJECTION, POWDER, LYOPHILIZED, FOR SOLUTION INTRAVENOUS at 05:13

## 2024-05-08 RX ADMIN — Medication 100 MILLIEQUIVALENT(S): at 10:22

## 2024-05-08 RX ADMIN — ENOXAPARIN SODIUM 40 MILLIGRAM(S): 100 INJECTION SUBCUTANEOUS at 16:10

## 2024-05-08 RX ADMIN — Medication 100 MILLIEQUIVALENT(S): at 12:28

## 2024-05-08 RX ADMIN — PIPERACILLIN AND TAZOBACTAM 25 GRAM(S): 4; .5 INJECTION, POWDER, LYOPHILIZED, FOR SOLUTION INTRAVENOUS at 16:01

## 2024-05-08 RX ADMIN — SODIUM CHLORIDE 42 MILLILITER(S): 9 INJECTION, SOLUTION INTRAVENOUS at 08:40

## 2024-05-08 RX ADMIN — Medication 100 MILLIEQUIVALENT(S): at 16:29

## 2024-05-08 RX ADMIN — Medication 3 MILLILITER(S): at 09:46

## 2024-05-08 RX ADMIN — SODIUM CHLORIDE 42 MILLILITER(S): 9 INJECTION, SOLUTION INTRAVENOUS at 14:39

## 2024-05-08 RX ADMIN — LIDOCAINE 1 PATCH: 4 CREAM TOPICAL at 04:10

## 2024-05-08 RX ADMIN — LOSARTAN POTASSIUM 25 MILLIGRAM(S): 100 TABLET, FILM COATED ORAL at 05:14

## 2024-05-08 RX ADMIN — SODIUM CHLORIDE 42 MILLILITER(S): 9 INJECTION, SOLUTION INTRAVENOUS at 05:16

## 2024-05-08 RX ADMIN — LIDOCAINE 1 PATCH: 4 CREAM TOPICAL at 19:30

## 2024-05-08 RX ADMIN — CHLORHEXIDINE GLUCONATE 1 APPLICATION(S): 213 SOLUTION TOPICAL at 05:16

## 2024-05-08 RX ADMIN — Medication 3 MILLILITER(S): at 20:41

## 2024-05-08 RX ADMIN — PANTOPRAZOLE SODIUM 40 MILLIGRAM(S): 20 TABLET, DELAYED RELEASE ORAL at 05:13

## 2024-05-08 NOTE — SWALLOW BEDSIDE ASSESSMENT ADULT - ORAL PHASE
Within functional limits Trace-mild lingual/lateral sulci stasis post primary-swallow regular solids; Cleared with cued lingual sweep and liquid wash with thin liquids./Stasis in lateral sulci/Lingual stasis

## 2024-05-08 NOTE — PROGRESS NOTE ADULT - NUTRITIONAL ASSESSMENT
This patient has been assessed with a concern for Malnutrition and has been determined to have a diagnosis/diagnoses of Moderate protein-calorie malnutrition.    This patient is being managed with:   Diet Full Liquid-  No Carbonated Beverages  Supplement Feeding Modality:  Oral  Ensure Enlive Cans or Servings Per Day:  1       Frequency:  Three Times a day  Entered: May  6 2024  7:55AM  
This patient has been assessed with a concern for Malnutrition and has been determined to have a diagnosis/diagnoses of Moderate protein-calorie malnutrition.    This patient is being managed with:   Diet NPO-  Except Medications  With Ice Chips/Sips of Water     Special Instructions for Nursing:  Except Medications  Entered: May  4 2024  4:12PM  
This patient has been assessed with a concern for Malnutrition and has been determined to have a diagnosis/diagnoses of Moderate protein-calorie malnutrition.    This patient is being managed with:   Diet NPO-  Except Medications  With Ice Chips/Sips of Water     Special Instructions for Nursing:  Except Medications  Entered: May  4 2024  4:12PM  
This patient has been assessed with a concern for Malnutrition and has been determined to have a diagnosis/diagnoses of Moderate protein-calorie malnutrition.    This patient is being managed with:   Diet Full Liquid-  No Carbonated Beverages  Supplement Feeding Modality:  Oral  Ensure Enlive Cans or Servings Per Day:  1       Frequency:  Three Times a day  Entered: May  6 2024  7:55AM  
This patient has been assessed with a concern for Malnutrition and has been determined to have a diagnosis/diagnoses of Moderate protein-calorie malnutrition.    This patient is being managed with:   Diet NPO-  Except Medications  Entered: May  7 2024 12:02PM

## 2024-05-08 NOTE — PROVIDER CONTACT NOTE (OTHER) - BACKGROUND
Pt with hx of colon Ca with mets to liver and ANGELI pump placed this admission. Having HTN to 161/83 and one episode of spitting up clear fluid.
Pt. s/p insertion of implantable hepatic arterial infusion pump

## 2024-05-08 NOTE — SWALLOW BEDSIDE ASSESSMENT ADULT - SWALLOW EVAL: DIAGNOSIS
1. Mild oral dysphagia for regular solids as characterized by adequate bolus acceptance/containment, prolonged mastication with slow bolus manipulation (suspect exacerbated by limited dentition), adequate anterior-posterior transfer, and reduced oral containment (trace-mild lingual/lateral sulci stasis post primary-swallow regular solids; cleared with cued lingual sweep and liquid wash with thin liquids). 2. Functional oral stage for thin liquids and puree as characterized by adequate bolus acceptance/containment, adequate bolus maniplation, adequate anterior-posterior transfer, and adequate oral clearance. 3. Functional pharyngeal stage with thin liquids, puree, and regular solids as characterized by suspected timely initiation of pharyngeal swallow trigger and present hyolaryngeal excursion upon digital palpation. No overt s/sx aspiration/penetration observed. Patient denied globus/stuck sensation with all trialed consistencies.

## 2024-05-08 NOTE — SWALLOW VFSS/MBS ASSESSMENT ADULT - ADDITIONAL RECOMMENDATIONS
1. Consider GI work up/ consult at MD's discretion given Upper GI Series findings (see Radiology report). 2. This service to follow for diet tolerance as schedule permits. 3. Medical team advised to reconsult this service if patient is with a change in medical status or change in tolerance of recommended PO. 4. Patient may benefit from swallowing therapy pending discharge plans (i.e., Rehab center vs. Homecare vs. Outpatient at [facility] [phone]).

## 2024-05-08 NOTE — SWALLOW BEDSIDE ASSESSMENT ADULT - SWALLOW EVAL: RECOMMENDED FEEDING/EATING TECHNIQUES
Reduced rate of eating/alternate food with liquid/maintain upright posture during/after eating for 30 mins/position upright (90 degrees)

## 2024-05-08 NOTE — PROVIDER CONTACT NOTE (OTHER) - ASSESSMENT
NAD noted.
Pt denies pain. Pt reports burping and acid reflux prior to spitting up. Repeat blood pressure after scheduled metoprolol was given is 161/83, HR 76, SPO2 96 on room air, temp 98.0

## 2024-05-08 NOTE — SWALLOW BEDSIDE ASSESSMENT ADULT - ADDITIONAL RECOMMENDATIONS
This service to follow as schedule permits for diet tolerance. Medical team advised to reconsult this service if there is change in patient's medical status/observed tolerance of PO diet.

## 2024-05-08 NOTE — SWALLOW BEDSIDE ASSESSMENT ADULT - ASR SWALLOW REFERRAL
Recommend GI consult given UGI report of "Moderate to severe gastroesophageal reflux. Patient aspirated during exam with contrast entering the left mainstem bronchus" (see report for details).

## 2024-05-08 NOTE — SWALLOW BEDSIDE ASSESSMENT ADULT - ASR SWALLOW RECOMMEND DIAG
2. 2. Recommend consideration of Cinesophagram given findings from Upper GI CXR and to rule-out silent aspiration./VFSS/MBS 2. Recommend consideration of Cinesophagram given findings from Upper GI Series and to rule-out silent aspiration./VFSS/MBS

## 2024-05-08 NOTE — SWALLOW BEDSIDE ASSESSMENT ADULT - CONSISTENCIES ADMINISTERED
2 whole cookies/regular solid 4 ounces; teaspoon and single/sequential cup sips/thin liquid 4 ounces/pureed

## 2024-05-08 NOTE — PROGRESS NOTE ADULT - ASSESSMENT
68y Female with PMH of HTN, HLD, Scleroderma (not on meds) , Rheumatoid arthritis, h/o pulmonary hypertension (and was taking meds for that, but had a f/u about a year ago in [place], she was told her pressure has improved, and told her to stop meds- not followed up with cardiologist here in [facility]),  Colon cancer metastasized to liver (s/p chemo last dose 1/2024) s/p Laparoscopic Right hemicolectomy and cholecystectomy on 11/01/23. Now s/p microwave ablation of liver mass and HAIP placement on 5/1, SICU consulted for hypertension and tachycardia     PLAN:  - NPO  - speech and swallow  - continue BP meds  - appreciate cards recs  - chest PT to assist in clearing phlegm  - Strict I&O's  - Analgesia and antiemetics as needed  - OOB/AAT  - DVT ppx    D Team surgery  [id number]

## 2024-05-08 NOTE — SWALLOW VFSS/MBS ASSESSMENT ADULT - RECOMMENDED CONSISTENCY
1. From an oral pharyngeal standpoint: Regular Solids with Thin Liquids   2. Feeding and Swallowing Guidelines: Small bites, small sips, slow rate of intake, alternate solids with liquids, reswallow x1 per bite/ sip, upright with PO and 30 minutes following  3. Aspiration precautions  4. Oral Hygiene  5. Reflux precautions

## 2024-05-08 NOTE — SWALLOW VFSS/MBS ASSESSMENT ADULT - DIAGNOSTIC IMPRESSIONS
1. Mild oral dysphagia for regular solids characterized by adequate oral containment, prolonged mastication however patient able to adequately break down solids with adequate anterior to posterior transport and adequate oral clearance. 2. Functional oral stage for puree, moderately thick liquids, mildly thick liquids and thin liquids characterized by adequate oral containment, adequate anterior to posterior transport with adequate oral clearance. 3. Mild pharyngeal dysphagia for puree, regular solids, moderately thick liquids, mildly thick liquids and thin liquids characterized by delayed initiation of the pharyngeal swallow for liquids and regular solids, reduced base of tongue retraction, adequate hyolaryngeal excursion, adequate epiglottic deflection, adequate laryngeal vestibular closure and reduced pharyngeal contractility. There was trace pharyngeal residue located in the vallecula/ lateral pharyngeal walls/ pyriforms. There was Trace Laryngeal Penetration with retrieval and airway protection maintained for thin liquids. There was No Aspiration before, during or after the swallow for puree, regular solids, moderately thick liquids, mildly thick liquids and thin liquids.     Of Note: Patient noted to cough x1 during test administration in absence of contrast in the airway/ trachea.

## 2024-05-08 NOTE — PROVIDER CONTACT NOTE (OTHER) - ACTION/TREATMENT ORDERED:
MD Fidencio Maradiaga at bedside. Pt. states she hasn't been drinking enough fluids. Encouraging pt. to drink more fluids.
At this time we will continue to monitor BP overnight.

## 2024-05-08 NOTE — SWALLOW BEDSIDE ASSESSMENT ADULT - COMMENTS
Progress Note Surgery PA 5/8: "68y Female with PMH of HTN, HLD, Scleroderma (not on meds), Rheumatoid arthritis, h/o pulmonary hypertension (and was taking meds for that, but had a f/u about a year ago in [place], she was told her pressure has improved, and told her to stop meds- not followed up with cardiologist here in [facility]), Colon cancer metastasized to liver (s/p chemo last dose 1/2024) s/p Laparoscopic Right hemicolectomy and cholecystectomy on 11/01/23. Now s/p microwave ablation of liver mass and HAIP placement on 5/1, SICU consulted for hypertension and tachycardia."    XR UGI 5/7: "Mild distention of the proximal duodenum without gastric outlet obstruction. Moderate to severe gastroesophageal reflux. Patient aspirated during exam with contrast entering the left mainstem bronchus. Consider aspiration precautions."    CXR 5/4: "IMPRESSION: Bilateral small effusions with enteric tube in place."    Patient received upright and out of bed in chair at 90 degrees and in no acute distress. No family present at bedside. RN present completing assessment. Patient Mandarin-speaking and [employer]  accessed by RN utilized to translate to patient's native language, as she indicated this to be her preferred modality of communication. Patient able to follow simple directives and make wants/needs known. Denied current difficulties swallowing and/or history of dysphagia. Endorsed consuming regular solids and thin liquids prior to hospital admission and items such as, "apples, bread, and raisins." Agreeable to PO trials at bedside with SLP. Progress Note-Surgery PA 5/8: "68y Female with PMH of HTN, HLD, Scleroderma (not on meds), Rheumatoid arthritis, h/o pulmonary hypertension (and was taking meds for that, but had a f/u about a year ago in [place], she was told her pressure has improved, and told her to stop meds- not followed up with cardiologist here in [facility]), Colon cancer metastasized to liver (s/p chemo last dose 1/2024) s/p Laparoscopic Right hemicolectomy and cholecystectomy on 11/01/23. Now s/p microwave ablation of liver mass and HAIP placement on 5/1, SICU consulted for hypertension and tachycardia."    XR UGI 5/7: "Mild distention of the proximal duodenum without gastric outlet obstruction. Moderate to severe gastroesophageal reflux. Patient aspirated during exam with contrast entering the left mainstem bronchus. Consider aspiration precautions."    CXR Abdomen 5/7: "Enteric contrast is seen in the stomach and throughout the small and large bowel."    CXR 5/4: "Enteric tube entering the stomach and a right-sided CT compatible port. Hazy lung bases consistent with small effusion/atelectasis unchanged from the study of the day before. Lungs are clear. No pneumothorax."    Patient received upright and out of bed in chair at 90 degrees and in no acute distress. No family present at bedside. RN present completing assessment. Patient Mandarin-speaking and [employer]  accessed by RN utilized to translate to patient's native language, as she indicated this to be her preferred modality of communication. Patient able to follow simple directives and make wants/needs known. Denied current difficulties swallowing and/or history of dysphagia. Endorsed consuming regular solids and thin liquids prior to hospital admission and items such as, "apples, bread, and raisins." Agreeable to PO trials at bedside with SLP.

## 2024-05-08 NOTE — PROVIDER CONTACT NOTE (OTHER) - SITUATION
Pt is hypertensive to 161/83.
Pt. noted to have low urine output throughout shift.
Need for prophylactic measure

## 2024-05-08 NOTE — SWALLOW VFSS/MBS ASSESSMENT ADULT - COMMENTS
Progress Note-Surgery PA 5/8: "68y Female with PMH of HTN, HLD, Scleroderma (not on meds), Rheumatoid arthritis, h/o pulmonary hypertension (and was taking meds for that, but had a f/u about a year ago in [place], she was told her pressure has improved, and told her to stop meds- not followed up with cardiologist here in [facility]), Colon cancer metastasized to liver (s/p chemo last dose 1/2024) s/p Laparoscopic Right hemicolectomy and cholecystectomy on 11/01/23. Now s/p microwave ablation of liver mass and HAIP placement on 5/1, SICU consulted for hypertension and tachycardia."    XR UGI 5/7: "Mild distention of the proximal duodenum without gastric outlet obstruction. Moderate to severe gastroesophageal reflux. Patient aspirated during exam with contrast entering the left mainstem bronchus. Consider aspiration precautions."    CXR Abdomen 5/7: "Enteric contrast is seen in the stomach and throughout the small and large bowel."    CXR 5/4: "Enteric tube entering the stomach and a right-sided CT compatible port. Hazy lung bases consistent with small effusion/atelectasis unchanged from the study of the day before. Lungs are clear. No pneumothorax."    Of note: Patient known to this service, seen this AM 5/8 for a bedside swallow evaluation with recommendations for "1. From an oropharyngeal standpoint, Regular Solids with Thin Liquids; 2. Recommend consideration of Cinesophagram given findings from Upper GI Series and to rule-out silent aspiration." (see note for details).     Patient received in Radiology Suite this afternoon for a cinesophagram. Patient was Mandarin speaking [employer] utilized via i-pad  ID #[id number] (Scott). Patient able to make basic wants/ needs known and follow simple directives.

## 2024-05-08 NOTE — SWALLOW BEDSIDE ASSESSMENT ADULT - ORAL PREPARATORY PHASE
Within functional limits Prolonged mastication with slow bolus manipulation (suspect exacerbated by limited dentition)./Decreased mastication ability

## 2024-05-09 ENCOUNTER — TRANSCRIPTION ENCOUNTER (OUTPATIENT)
Age: 69
End: 2024-05-09

## 2024-05-09 ENCOUNTER — APPOINTMENT (OUTPATIENT)
Dept: HEMATOLOGY ONCOLOGY | Facility: CLINIC | Age: 69
End: 2024-05-09

## 2024-05-09 VITALS
OXYGEN SATURATION: 98 % | BODY MASS INDEX: 19.1 KG/M2 | DIASTOLIC BLOOD PRESSURE: 91 MMHG | HEART RATE: 89 BPM | TEMPERATURE: 97 F | RESPIRATION RATE: 16 BRPM | WEIGHT: 97.25 LBS | SYSTOLIC BLOOD PRESSURE: 158 MMHG

## 2024-05-09 VITALS
OXYGEN SATURATION: 97 % | HEART RATE: 95 BPM | SYSTOLIC BLOOD PRESSURE: 141 MMHG | DIASTOLIC BLOOD PRESSURE: 86 MMHG | TEMPERATURE: 98 F | RESPIRATION RATE: 18 BRPM

## 2024-05-09 LAB
ALBUMIN SERPL ELPH-MCNC: 3.2 G/DL — LOW (ref 3.3–5)
ALP SERPL-CCNC: 170 U/L — HIGH (ref 40–120)
ALT FLD-CCNC: 33 U/L — SIGNIFICANT CHANGE UP (ref 4–33)
ANION GAP SERPL CALC-SCNC: 12 MMOL/L — SIGNIFICANT CHANGE UP (ref 7–14)
AST SERPL-CCNC: 17 U/L — SIGNIFICANT CHANGE UP (ref 4–32)
BILIRUB SERPL-MCNC: 0.5 MG/DL — SIGNIFICANT CHANGE UP (ref 0.2–1.2)
BUN SERPL-MCNC: 5 MG/DL — LOW (ref 7–23)
CALCIUM SERPL-MCNC: 8.3 MG/DL — LOW (ref 8.4–10.5)
CHLORIDE SERPL-SCNC: 111 MMOL/L — HIGH (ref 98–107)
CO2 SERPL-SCNC: 19 MMOL/L — LOW (ref 22–31)
CREAT SERPL-MCNC: 0.58 MG/DL — SIGNIFICANT CHANGE UP (ref 0.5–1.3)
EGFR: 99 ML/MIN/1.73M2 — SIGNIFICANT CHANGE UP
GLUCOSE SERPL-MCNC: 115 MG/DL — HIGH (ref 70–99)
HCT VFR BLD CALC: 26.2 % — LOW (ref 34.5–45)
HGB BLD-MCNC: 8 G/DL — LOW (ref 11.5–15.5)
MAGNESIUM SERPL-MCNC: 1.8 MG/DL — SIGNIFICANT CHANGE UP (ref 1.6–2.6)
MCHC RBC-ENTMCNC: 27.2 PG — SIGNIFICANT CHANGE UP (ref 27–34)
MCHC RBC-ENTMCNC: 30.5 GM/DL — LOW (ref 32–36)
MCV RBC AUTO: 89.1 FL — SIGNIFICANT CHANGE UP (ref 80–100)
NRBC # BLD: 0 /100 WBCS — SIGNIFICANT CHANGE UP (ref 0–0)
NRBC # FLD: 0 K/UL — SIGNIFICANT CHANGE UP (ref 0–0)
PHOSPHATE SERPL-MCNC: 2.3 MG/DL — LOW (ref 2.5–4.5)
PLATELET # BLD AUTO: 155 K/UL — SIGNIFICANT CHANGE UP (ref 150–400)
POTASSIUM SERPL-MCNC: 3.7 MMOL/L — SIGNIFICANT CHANGE UP (ref 3.5–5.3)
POTASSIUM SERPL-SCNC: 3.7 MMOL/L — SIGNIFICANT CHANGE UP (ref 3.5–5.3)
PROT SERPL-MCNC: 5.9 G/DL — LOW (ref 6–8.3)
RBC # BLD: 2.94 M/UL — LOW (ref 3.8–5.2)
RBC # FLD: 17.7 % — HIGH (ref 10.3–14.5)
SODIUM SERPL-SCNC: 142 MMOL/L — SIGNIFICANT CHANGE UP (ref 135–145)
WBC # BLD: 5.25 K/UL — SIGNIFICANT CHANGE UP (ref 3.8–10.5)
WBC # FLD AUTO: 5.25 K/UL — SIGNIFICANT CHANGE UP (ref 3.8–10.5)

## 2024-05-09 RX ORDER — SODIUM,POTASSIUM PHOSPHATES 278-250MG
2 POWDER IN PACKET (EA) ORAL ONCE
Refills: 0 | Status: DISCONTINUED | OUTPATIENT
Start: 2024-05-09 | End: 2024-05-09

## 2024-05-09 RX ORDER — PANTOPRAZOLE SODIUM 20 MG/1
1 TABLET, DELAYED RELEASE ORAL
Qty: 30 | Refills: 0
Start: 2024-05-09 | End: 2024-06-07

## 2024-05-09 RX ORDER — ACETAMINOPHEN 500 MG
2 TABLET ORAL
Qty: 0 | Refills: 0 | DISCHARGE
Start: 2024-05-09

## 2024-05-09 RX ADMIN — Medication 3 MILLILITER(S): at 09:05

## 2024-05-09 RX ADMIN — PANTOPRAZOLE SODIUM 40 MILLIGRAM(S): 20 TABLET, DELAYED RELEASE ORAL at 05:45

## 2024-05-09 RX ADMIN — CHLORHEXIDINE GLUCONATE 1 APPLICATION(S): 213 SOLUTION TOPICAL at 05:45

## 2024-05-09 RX ADMIN — PIPERACILLIN AND TAZOBACTAM 25 GRAM(S): 4; .5 INJECTION, POWDER, LYOPHILIZED, FOR SOLUTION INTRAVENOUS at 07:59

## 2024-05-09 RX ADMIN — SODIUM CHLORIDE 42 MILLILITER(S): 9 INJECTION, SOLUTION INTRAVENOUS at 07:59

## 2024-05-09 RX ADMIN — PIPERACILLIN AND TAZOBACTAM 25 GRAM(S): 4; .5 INJECTION, POWDER, LYOPHILIZED, FOR SOLUTION INTRAVENOUS at 00:33

## 2024-05-09 RX ADMIN — LIDOCAINE 1 PATCH: 4 CREAM TOPICAL at 04:11

## 2024-05-09 RX ADMIN — LOSARTAN POTASSIUM 25 MILLIGRAM(S): 100 TABLET, FILM COATED ORAL at 05:45

## 2024-05-09 RX ADMIN — SODIUM CHLORIDE 42 MILLILITER(S): 9 INJECTION, SOLUTION INTRAVENOUS at 01:21

## 2024-05-09 NOTE — DISCHARGE NOTE PROVIDER - NSDCMRMEDTOKEN_GEN_ALL_CORE_FT
acetaminophen 325 mg oral tablet: 2 tab(s) orally every 6 hours As needed Mild Pain (1 - 3)  citicoline 1000 mg oral tablet: 1 tab(s) orally 3 times a day  irbesartan 75 mg oral tablet: 1 tab(s) orally once a day  pantoprazole 40 mg oral delayed release tablet: 1 tab(s) orally once a day (before a meal) MDD: 1  rosuvastatin 10 mg oral capsule: 1 cap(s) orally once a day

## 2024-05-09 NOTE — DISCHARGE NOTE PROVIDER - NSDCFUSCHEDAPPT_GEN_ALL_CORE_FT
Leena Guadalupe  [facility]  Scheduled Appointment: 05/09/2024    Lexie Lara  [facility]  RHEUM [address]  Scheduled Appointment: 05/13/2024    [facility]  Scheduled Appointment: 05/15/2024    Jus Noel  [facility]  ORTHOSURG [address]  Scheduled Appointment: 05/15/2024    Liz Ochoa  [facility]  INTMED [address]  Scheduled Appointment: 05/16/2024    [facility]  Scheduled Appointment: 05/17/2024    Rich Aviles  [facility]  CARDIOLOGY [address]   Scheduled Appointment: 05/31/2024

## 2024-05-09 NOTE — PROGRESS NOTE ADULT - SUBJECTIVE AND OBJECTIVE BOX
24 HOUR EVENTS:  - desatting to 80s on 6L NC, placed on Hiflo 50/50% --> f/u ABG on Hiflo  - CT negative for PE - possible aspiration event on induction in OR. Markedly distended, fluid-filled esophagus predisposing this patient to aspiration (patient has a reported history of scleroderma). New splenic infarct.  - Febrile to 101.3 - fluVID negative, BCx, UCx sent  - started on zosyn  - NGT placed  - metoprolol 5q6hr       SUBJECTIVE/ROS:  Patient was seen and examined on AM rounds. Denies new complaints.    NEURO  Exam: AOx4, No CNS deficits, follows commands  Meds: acetaminophen   IVPB .. 500 milliGRAM(s) IV Intermittent every 6 hours  HYDROmorphone  Injectable 0.2 milliGRAM(s) IV Push every 3 hours PRN Moderate Pain (4 - 6)  ondansetron Injectable 4 milliGRAM(s) IV Push every 6 hours PRN Nausea      RESPIRATORY  RR: 16 (05-03-24 @ 00:00) (11 - 23)  SpO2: 98% (05-03-24 @ 00:00) (88% - 100%)  Exam: unlabored respirations on HiFlow, CTA b/l. No wheezing, rales, rhonci, crackles appreciated.    ABG - ( 02 May 2024 17:30 )  pH: 7.41  /  pCO2: 38    /  pO2: 89    / HCO3: 24    / Base Excess: -0.4  /  SaO2: 98.4      Meds: albuterol/ipratropium for Nebulization 3 milliLiter(s) Nebulizer every 6 hours      CARDIOVASCULAR  HR: 85 (05-03-24 @ 00:00) (83 - 132)  BP: 148/79 (05-03-24 @ 00:00) (101/65 - 182/105)  BP(mean): 99 (05-03-24 @ 00:00) (76 - 133)  Lactate, Blood: 1.2 mmol/L (05-02 @ 10:15)  Exam: S1S2. No murmurs, rubs, gallops appreciated.  Cardiac Rhythm: NSR  Meds: metoprolol tartrate Injectable 5 milliGRAM(s) IV Push every 6 hours      GI/NUTRITION  Exam: Soft, non-distended, non-tender.   Diet: NPO  Meds: pantoprazole  Injectable 40 milliGRAM(s) IV Push daily      GENITOURINARY  I&O's Detail    05-01 @ 07:01  -  05-02 @ 07:00  --------------------------------------------------------  IN:    Lactated Ringers: 825 mL  Total IN: 825 mL    OUT:    Indwelling Catheter - Urethral (mL): 800 mL    Oral Fluid: 0 mL  Total OUT: 800 mL    Total NET: 25 mL      05-02 @ 07:01  -  05-03 @ 00:20  --------------------------------------------------------  IN:    IV PiggyBack: 250 mL    Lactated Ringers: 450 mL  Total IN: 700 mL    OUT:    Indwelling Catheter - Urethral (mL): 835 mL    Nasogastric/Oral tube (mL): 450 mL  Total OUT: 1285 mL    Total NET: -585 mL          05-02    139  |  105  |  12  ----------------------------<  110<H>  4.1   |  21<L>  |  0.52    Ca    8.5      02 May 2024 10:15  Phos  3.0     05-02  Mg     1.60     05-02    TPro  6.0  /  Alb  3.2<L>  /  TBili  0.9  /  DBili  x   /  AST  392<H>  /  ALT  287<H>  /  AlkPhos  183<H>  05-02    [ ] [place] catheter, indication: N/A  Meds: lactated ringers. 1000 milliLiter(s) IV Continuous <Continuous>        HEMATOLOGIC  Meds: heparin   Injectable 5000 Unit(s) SubCutaneous every 8 hours    [x] VTE Prophylaxis                        10.0   12.52 )-----------( 153      ( 02 May 2024 10:15 )             32.4     PT/INR - ( 02 May 2024 10:15 )   PT: 11.1 sec;   INR: 0.98 ratio         PTT - ( 02 May 2024 10:15 )  PTT:27.4 sec  Transfusion     [ ] PRBC   [ ] Platelets   [ ] FFP   [ ] Cryoprecipitate      INFECTIOUS DISEASES  T(C): 37.2 (05-02-24 @ 20:00), Max: 38.5 (05-02-24 @ 11:45)  WBC Count: 12.52 K/uL (05-02 @ 10:15)  WBC Count: 11.58 K/uL (05-02 @ 05:27)    Recent Cultures:  Meds: influenza  Vaccine (HIGH DOSE) 0.7 milliLiter(s) IntraMuscular once  piperacillin/tazobactam IVPB.. 3.375 Gram(s) IV Intermittent every 8 hours      OTHER MEDICATIONS:  chlorhexidine 2% Cloths 1 Application(s) Topical <User Schedule>      CODE STATUS: FULL    IMAGING:    < from: CT Abdomen and Pelvis w/ IV Cont (05.02.24 @ 11:29) >  FINDINGS:  CTA: No acute pulmonary embolism. Questionable nonocclusive filling   defect within a segmental left lower lobe pulmonary arterial branch on   4:235 resolves on portal venous phase imaging and is likely perfusional.   Enlarged pulmonary artery. 3.8 cm mid ascending aorta. The heart is   normal in size. Coronary arterial and aortic valvular calcification.   Right chest wall port with tip terminating at the superior cavoatrial   junction.    Lungs/Airways/Pleura: New small pleural effusions with near complete   bilateral lower lobe atelectasis, superimposed on subpleural cystic   changes at the bases which are better assessed on the prior chest CT. No   pneumothorax. Mild interlobular septal thickening likely represents mild   interstitial edema.    Mediastinum/Lymph nodes: No thoracic adenopathy. New mild   pneumomediastinum anterior to the diaphragm    Liver: Interval placement of a hepatic artery infusion pump. New ablation   cavities within segment 2/3 and 8, sequela of interval therapy. Other   hepatic lesions remain unchanged since recent abdominal MRI from   4/23/2024. Mild perihepatic ascites.    Gallbladder: Cholecystectomy.    Pancreas: Unremarkable.    Spleen: New splenic peripheral hypodensity, likely infarct.    Adrenal glands: Unremarkable.    Kidneys: Unremarkable.    Bowel: Right hemicolectomy and ileocolic anastomosis. . Dilated third   portion of duodenum with transition point as it crosses the midline,   similar to prior study. No bowel obstruction. New markedly distended,   fluid-filled esophagus.    Abdominal lymph nodes: No lymphadenopathy.    Peritoneum: Mild postprocedural pneumoperitoneum.    Pelvis: Limited evaluation due to metallic streak artifact from hip   prostheses. Partially imaged fibroid uterus. Small volume pelvic ascites.    Bones/soft tissues: Bilateral hip arthroplasties. Unchanged L1 and L5   compression deformities.    IMPRESSION:  No acute pulmonary embolism.    New small pleural effusions with near complete lower lobe atelectasis.   Mild interstitial pulmonary edema.    Interval placement of a hepatic arterial infusion pump. New ablation   cavities status post treatment of the segment 2/3 and 8 liver metastases.   Other hepatic lesions are unchanged since 4/23/2024 abdominal MRI.    Mild postprocedural pneumomediastinum and pneumoperitoneum. Mild ascites.    Markedly distended, fluid-filled esophagus predisposing this patient to   aspiration (patient has a reported history of scleroderma).    New splenic infarct.    Unchanged L1 and L5 compression deformities.    < end of copied text >  
Surgical Oncology Daily Progress Note    Interval Events: POD #6 microwave ablation x 3 of liver mass and HAIP placement  -UGIS 5/7- Mild distention of the proximal duodenum without gastric outlet obstruction.Moderate to severe gastroesophageal reflux.Patient aspirated during exam with contrast entering the left mainstem bronchus. Consider aspiration precautions.  -no acute events overnight    SUBJECTIVE: Patient seen and examined by team on morning rounds. Patient lying comfortably in bed, no complaints at this time. Denies chest pain, SOB, dizziness, palpitations, fever, chills, N/V/D.    Vital Signs Last 24 Hrs  T(C): 36.4 (08 May 2024 05:24), Max: 36.8 (07 May 2024 08:00)  T(F): 97.5 (08 May 2024 05:24), Max: 98.2 (07 May 2024 08:00)  HR: 86 (08 May 2024 05:24) (85 - 101)  BP: 153/82 (08 May 2024 05:24) (142/88 - 154/75)  RR: 16 (08 May 2024 05:24) (16 - 18)  SpO2: 94% (08 May 2024 05:24) (94% - 97%)  Parameters below as of 08 May 2024 05:24  Patient On (Oxygen Delivery Method): room air    General Appearance: Appears well, NAD  Neck: Supple  Chest: Equal expansion bilaterally, equal breath sounds  CV: Pulse regular presently  Abdomen: Soft, nontender, nondistended, midline wound c/d/i  Extremities: Grossly symmetric, SCD's in place     I&O's Summary    07 May 2024 07:01  -  08 May 2024 07:00  --------------------------------------------------------  IN: 500 mL / OUT: 750 mL / NET: -250 mL      I&O's Detail    07 May 2024 07:01  -  08 May 2024 07:00  --------------------------------------------------------  IN:    Oral Fluid: 500 mL  Total IN: 500 mL    OUT:    Voided (mL): 750 mL  Total OUT: 750 mL    Total NET: -250 mL          MEDICATIONS  (STANDING):  albuterol/ipratropium for Nebulization 3 milliLiter(s) Nebulizer every 12 hours  chlorhexidine 2% Cloths 1 Application(s) Topical <User Schedule>  dextrose 5% + sodium chloride 0.9%. 1000 milliLiter(s) (42 mL/Hr) IV Continuous <Continuous>  enoxaparin Injectable 40 milliGRAM(s) SubCutaneous every 24 hours  influenza  Vaccine (HIGH DOSE) 0.7 milliLiter(s) IntraMuscular once  lidocaine   4% Patch 1 Patch Transdermal every 24 hours  losartan 25 milliGRAM(s) Oral daily  pantoprazole    Tablet 40 milliGRAM(s) Oral before breakfast  piperacillin/tazobactam IVPB.. 3.375 Gram(s) IV Intermittent every 8 hours    MEDICATIONS  (PRN):  acetaminophen     Tablet .. 650 milliGRAM(s) Oral every 6 hours PRN Mild Pain (1 - 3)  HYDROmorphone  Injectable 0.5 milliGRAM(s) IV Push every 3 hours PRN Severe Pain (7 - 10)  HYDROmorphone  Injectable 0.2 milliGRAM(s) IV Push every 3 hours PRN Moderate Pain (4 - 6)  ondansetron Injectable 4 milliGRAM(s) IV Push every 6 hours PRN Nausea      LABS:                        8.7    6.08  )-----------( 176      ( 07 May 2024 05:45 )             28.8     05-07    140  |  105  |  8   ----------------------------<  85  3.7   |  18<L>  |  0.62    Ca    8.5      07 May 2024 05:45  Phos  3.2     05-07  Mg     1.90     05-07    TPro  6.6  /  Alb  3.3  /  TBili  0.7  /  DBili  <0.2  /  AST  30  /  ALT  64<H>  /  AlkPhos  229<H>  05-07      Urinalysis Basic - ( 07 May 2024 05:45 )    Color: x / Appearance: x / SG: x / pH: x  Gluc: 85 mg/dL / Ketone: x  / Bili: x / Urobili: x   Blood: x / Protein: x / Nitrite: x   Leuk Esterase: x / RBC: x / WBC x   Sq Epi: x / Non Sq Epi: x / Bacteria: x        RADIOLOGY & ADDITIONAL STUDIES:  ACC: [id number] EXAM:  XR UGI SNGL CON STDY   ORDERED BY: DESTINY VYAS     PROCEDURE DATE:  05/07/2024      INTERPRETATION:  CLINICAL INDICATION: Gastroduodenal distention on prior   CT, status post hepatic artery infusion pump placement.    TECHNIQUE: A single contrast upper GI series was performed utilizing   Omnipaque as the contrast agent and multiple spot fluoroscopic images   were taken in orthogonal projections.    COMPARISON: CT chest abdomen and pelvis 5/2/2024.    FLUOROSCOPY TIME: 3.8 minutes    FINDINGS:  Preliminary  radiograph of the abdomen demonstrates a nonobstructive   bowel gas pattern. Hepatic artery infusion pump overlying the left lower   abdomen with catheter tip to the right of midline. Surgical clips in the   right hemiabdomen. Partially visualized Mediport catheter in the SVC.  The esophagus demonstrates normal distensibility, contour, and motility.   There is no abnormal intraluminal filling defect or extrinsic   compression. There is rapid propulsion of contrast to the   gastroesophageal junction, with contrast then passing freely into a   normal-appearing stomach. No hiatal hernia is identified.  There is moderate to severe gastroesophageal reflux with patient in   supine position.  During the study, it was noted that patient aspirated with contrast   entering the left mainstem bronchus. No further contrast was administered.  The stomach demonstrates normal distensibility. No gastric outlet   obstruction.   Proximal duodenal distention without obstruction. The duodenal sweep is   otherwise unremarkable.    IMPRESSION:  Mild distention of the proximal duodenum without gastric outlet   obstruction.  Moderate to severe gastroesophageal reflux.  Patient aspirated during exam with contrast entering the left mainstem   bronchus. Consider aspiration precautions.    
TEAM [ D ] Surgery Daily Progress Note  =====================================================    SUBJECTIVE: Patient seen and examined at bedside on AM rounds. Patient reports 7-8/10 pain. NPO. Reports 1 episode emesis overnight, denies nausea this AM. Denies fever, chills.      ALLERGIES:  No Known Allergies      --------------------------------------------------------------------------------------    MEDICATIONS:    Neurologic Medications  butorphanol Injectable 0.125 milliGRAM(s) IV Push every 6 hours PRN Pruritus  HYDROmorphone  Injectable 0.2 milliGRAM(s) IV Push every 4 hours PRN Moderate Pain (4 - 6)  HYDROmorphone  Injectable 0.4 milliGRAM(s) IV Push every 4 hours PRN Severe Pain (7 - 10)  ondansetron Injectable 4 milliGRAM(s) IV Push every 6 hours PRN Nausea    Respiratory Medications  albuterol/ipratropium for Nebulization 3 milliLiter(s) Nebulizer every 6 hours    Cardiovascular Medications    Gastrointestinal Medications  lactated ringers. 1000 milliLiter(s) IV Continuous <Continuous>    Genitourinary Medications    Hematologic/Oncologic Medications  influenza  Vaccine (HIGH DOSE) 0.7 milliLiter(s) IntraMuscular once    Antimicrobial/Immunologic Medications    Endocrine/Metabolic Medications    Topical/Other Medications  naloxone Injectable 0.1 milliGRAM(s) IV Push every 3 minutes PRN For ANY of the following changes in patient status:  A. RR LESS THAN 10 breaths per minute, B. Oxygen saturation LESS THAN 90%, C. Sedation score of 6    --------------------------------------------------------------------------------------    VITAL SIGNS:  T(C): 36.9 (05-02-24 @ 04:45), Max: 37 (05-02-24 @ 01:55)  HR: 98 (05-02-24 @ 05:24) (64 - 110)  BP: 136/67 (05-02-24 @ 05:24) (114/64 - 168/86)  RR: 18 (05-02-24 @ 04:45) (11 - 18)  SpO2: 95% (05-02-24 @ 04:45) (93% - 100%)  --------------------------------------------------------------------------------------    EXAM    General: NAD, resting in bed comfortably.  Cardiac: regular rate, warm and well perfused  Respiratory: Nonlabored respirations, normal cw expansion.  Abdomen: soft, moderately tender, nondistended. surgical incision is c/d/i with mild bruising, HAIP palpable under skin, arevalo is blue    Extremities: normal strength, FROM, no deformities    --------------------------------------------------------------------------------------    LABS                          9.7    11.58 )-----------( 120      ( 02 May 2024 05:27 )             31.1     05-02    141  |  105  |  13  ----------------------------<  121<H>  4.1   |  21<L>  |  0.55    Ca    8.7      02 May 2024 05:27    TPro  5.8<L>  /  Alb  3.3  /  TBili  0.9  /  DBili  0.2  /  AST  431<H>  /  ALT  264<H>  /  AlkPhos  175<H>  05-02    --------------------------------------------------------------------------------------    INS AND OUTS:    05-01-24 @ 07:01  -  05-02-24 @ 07:00  --------------------------------------------------------  IN: 825 mL / OUT: 800 mL / NET: 25 mL      --------------------------------------------------------------------------------------
TEAM [ D ] Surgery Daily Progress Note  =====================================================    SUBJECTIVE: Patient seen and examined at bedside on AM rounds. Pt with rapid response yesterday tachycardia, hypertension, upgraded to SICU. Pt received hydralazine and labetalol, started on metoprolol q6. Placed on Hiflow NC for desatturation. CT negative for PE-? aspiration event with dilated fluid filled esophagus, NGT placed by SICU, new splenic infarct.     ALLERGIES:  No Known Allergies      --------------------------------------------------------------------------------------    MEDICATIONS:    Neurologic Medications  butorphanol Injectable 0.125 milliGRAM(s) IV Push every 6 hours PRN Pruritus  HYDROmorphone  Injectable 0.2 milliGRAM(s) IV Push every 4 hours PRN Moderate Pain (4 - 6)  HYDROmorphone  Injectable 0.4 milliGRAM(s) IV Push every 4 hours PRN Severe Pain (7 - 10)  ondansetron Injectable 4 milliGRAM(s) IV Push every 6 hours PRN Nausea    Respiratory Medications  albuterol/ipratropium for Nebulization 3 milliLiter(s) Nebulizer every 6 hours    Cardiovascular Medications    Gastrointestinal Medications  lactated ringers. 1000 milliLiter(s) IV Continuous <Continuous>    Genitourinary Medications    Hematologic/Oncologic Medications  influenza  Vaccine (HIGH DOSE) 0.7 milliLiter(s) IntraMuscular once    Antimicrobial/Immunologic Medications    Endocrine/Metabolic Medications    Topical/Other Medications  naloxone Injectable 0.1 milliGRAM(s) IV Push every 3 minutes PRN For ANY of the following changes in patient status:  A. RR LESS THAN 10 breaths per minute, B. Oxygen saturation LESS THAN 90%, C. Sedation score of 6    --------------------------------------------------------------------------------------    VITAL SIGNS:  T(C): 36.8 (05-03-24 @ 04:00), Max: 38.5 (05-02-24 @ 11:45)  HR: 84 (05-03-24 @ 07:00) (71 - 132)  BP: 151/95 (05-03-24 @ 07:00) (101/65 - 182/105)  ABP: --  ABP(mean): --  RR: 15 (05-03-24 @ 07:00) (11 - 23)  SpO2: 100% (05-03-24 @ 07:00) (88% - 100%)  Wt(kg): --  CVP(mm Hg): --      05-02 @ 07:01  -  05-03 @ 07:00  --------------------------------------------------------  IN:    IV PiggyBack: 250 mL    Lactated Ringers: 1350 mL  Total IN: 1600 mL    OUT:    Indwelling Catheter - Urethral (mL): 1585 mL    Nasogastric/Oral tube (mL): 1800 mL  Total OUT: 3385 mL    Total NET: -1785 mL      --------------------------------------------------------------------------------------    EXAM    General: NAD, resting in bed  Cardiac: regular rate, warm and well perfused  Respiratory: Nonlabored respirations, normal cw expansion.  Abdomen: soft, moderately tender, nondistended. surgical incision is c/d/i with mild bruising, HAIP palpable under skin, arevalo is blue    Extremities: normal strength, FROM, no deformities    --------------------------------------------------------------------------------------    LABS    CBC (05-03 @ 01:25)                              10.5<L>                         11.65<H>  )----------------(  164        --    % Neutrophils, --    % Lymphocytes, ANC: --                                  31.2<L>  CBC (05-02 @ 10:15)                              10.0<L>                         12.52<H>  )----------------(  153        --    % Neutrophils, --    % Lymphocytes, ANC: --                                  32.4<L>    BMP (05-03 @ 01:25)             139     |  104     |  11    		Ca++ --      Ca 8.5                ---------------------------------( 98    		Mg 2.20               3.6     |  23      |  0.67  			Ph 3.0     BMP (05-02 @ 10:15)             139     |  105     |  12    		Ca++ --      Ca 8.5                ---------------------------------( 110<H>		Mg 1.60               4.1     |  21<L>   |  0.52  			Ph 3.0       LFTs (05-03 @ 01:25)      TPro 6.0 / Alb 3.0<L> / TBili 1.1 / DBili 0.3 / <H> / <H> / AlkPhos 172<H>  LFTs (05-02 @ 10:15)      TPro 6.0 / Alb 3.2<L> / TBili 0.9 / DBili -- / <H> / <H> / AlkPhos 183<H>    Coags (05-03 @ 01:25)  aPTT 31.0 / INR 0.97 / PT 10.9  Coags (05-02 @ 10:15)  aPTT 27.4 / INR 0.98 / PT 11.1      ABG (05-02 @ 17:30)     7.41 / 38 / 89 / 24 / -0.4 / 98.4<H>%     Lactate:    ABG (05-02 @ 14:00)     7.39 / 40 / 65<L> / 24 / -0.7 / 94.8%     Lactate:            --------------------------------------------------------------------------------------
Anesthesia Pain Management Service    SUBJECTIVE: Patient s/p spinal morphine. Reports some abdominal discomfort.  services used. Patient primarily Mandarin speaking alert and orientedx2. RRT called this am for tachycardia and somnolence after receiving a dose of IV Dilaudid 1mg. Patient awake currently, no apparent distress. Denies headache, numbness and tingling.  Pain Scale Score:  Refer to charted pain scores    THERAPY:    s/p spinal PF morphine yesterday.      MEDICATIONS  (STANDING):  albuterol/ipratropium for Nebulization 3 milliLiter(s) Nebulizer every 6 hours  chlorhexidine 2% Cloths 1 Application(s) Topical <User Schedule>  heparin   Injectable 5000 Unit(s) SubCutaneous every 8 hours  influenza  Vaccine (HIGH DOSE) 0.7 milliLiter(s) IntraMuscular once  lactated ringers. 1000 milliLiter(s) (75 mL/Hr) IV Continuous <Continuous>  magnesium sulfate  IVPB 2 Gram(s) IV Intermittent once  pantoprazole  Injectable 40 milliGRAM(s) IV Push daily  piperacillin/tazobactam IVPB.- 3.375 Gram(s) IV Intermittent once  piperacillin/tazobactam IVPB.. 3.375 Gram(s) IV Intermittent every 8 hours    MEDICATIONS  (PRN):  HYDROmorphone  Injectable 0.2 milliGRAM(s) IV Push every 3 hours PRN Moderate Pain (4 - 6)  ondansetron Injectable 4 milliGRAM(s) IV Push every 6 hours PRN Nausea      OBJECTIVE: Patient laying in bed.    Sedation Score:	[ x] Alert	[ ] Drowsy	[ ] Arousable	[ ] Asleep	[ ] Unresponsive    Side Effects:	[ x] None	[ ] Nausea	[ ] Vomiting	[ ] Pruritus  		  [ ] Weakness		[ ] Numbness	[ ] Other:    Vital Signs Last 24 Hrs  T(C): 38.5 (02 May 2024 11:45), Max: 38.5 (02 May 2024 11:45)  T(F): 101.3 (02 May 2024 11:45), Max: 101.3 (02 May 2024 11:45)  HR: 115 (02 May 2024 14:00) (71 - 132)  BP: 152/102 (02 May 2024 14:00) (114/64 - 178/114)  BP(mean): 119 (02 May 2024 14:00) (75 - 133)  RR: 18 (02 May 2024 14:00) (11 - 18)  SpO2: 91% (02 May 2024 14:00) (88% - 100%)    Parameters below as of 02 May 2024 14:00  Patient On (Oxygen Delivery Method): nasal cannula, high flow  O2 Flow (L/min): 50  O2 Concentration (%): 50    ASSESSMENT/ PLAN  [x ] Patient transitioned to prn analgesics by primary team.  [x] Pain management per primary service, pain service to sign off   [x]Documentation and Verification of current medications     Comments:  PRN Oral/IV opioids and/or non-opioid Adjuvant analgesics to be used at this point.    Progress Note written now but Patient was seen earlier.
D Team Surgery Daily Progress Note    Subjective:   Patient seen at bedside this AM. Reports feeling well, improvement in her breathing today, reports mild cough with some clear output. No N/V. -Flatus/-BM.  No other concerns reported at this time.       Objective:  Vital Signs  T(C): 36.7 (05-04 @ 14:19), Max: 37.3 (05-04 @ 04:00)  HR: 70 (05-04 @ 14:19) (70 - 102)  BP: 155/75 (05-04 @ 14:19) (106/69 - 180/95)  RR: 18 (05-04 @ 14:19) (10 - 18)  SpO2: 100% (05-04 @ 14:19) (98% - 100%)  05-03-24 @ 07:01  -  05-04-24 @ 07:00  --------------------------------------------------------  IN:  Total IN: 0 mL    OUT:    Indwelling Catheter - Urethral (mL): 500 mL    Nasogastric/Oral tube (mL): 800 mL    Voided (mL): 1500 mL  Total OUT: 2800 mL    Total NET: -2800 mL      05-04-24 @ 07:01  -  05-04-24 @ 18:47  --------------------------------------------------------  IN:  Total IN: 0 mL    OUT:    Nasogastric/Oral tube (mL): 200 mL    Voided (mL): 550 mL  Total OUT: 750 mL    Total NET: -750 mL          Physical Exam:  GEN: resting in bed comfortably in NAD  RESP: no increased WOB  ABD: soft, NTTP, surgical incision c/d/i  EXTR: warm, well-perfused without gross deformities  NEURO: AAOx3    Labs:                        9.2    12.58 )-----------( 153      ( 04 May 2024 01:06 )             29.8   05-04    138  |  104  |  9   ----------------------------<  120<H>  3.4<L>   |  25  |  0.53    Ca    8.5      04 May 2024 01:06  Phos  2.4     05-04  Mg     2.00     05-04    TPro  5.8<L>  /  Alb  2.9<L>  /  TBili  0.8  /  DBili  x   /  AST  109<H>  /  ALT  174<H>  /  AlkPhos  153<H>  05-04    CAPILLARY BLOOD GLUCOSE          Medications:   MEDICATIONS  (STANDING):  albuterol/ipratropium for Nebulization 3 milliLiter(s) Nebulizer every 12 hours  chlorhexidine 2% Cloths 1 Application(s) Topical <User Schedule>  dextrose 5% + lactated ringers. 1000 milliLiter(s) (42 mL/Hr) IV Continuous <Continuous>  heparin   Injectable 5000 Unit(s) SubCutaneous every 8 hours  influenza  Vaccine (HIGH DOSE) 0.7 milliLiter(s) IntraMuscular once  lidocaine   4% Patch 1 Patch Transdermal every 24 hours  metoprolol tartrate Injectable 5 milliGRAM(s) IV Push every 6 hours  pantoprazole  Injectable 40 milliGRAM(s) IV Push daily  piperacillin/tazobactam IVPB.. 3.375 Gram(s) IV Intermittent every 8 hours    MEDICATIONS  (PRN):  HYDROmorphone  Injectable 0.5 milliGRAM(s) IV Push every 3 hours PRN Severe Pain (7 - 10)  HYDROmorphone  Injectable 0.2 milliGRAM(s) IV Push every 3 hours PRN Moderate Pain (4 - 6)  ondansetron Injectable 4 milliGRAM(s) IV Push every 6 hours PRN Nausea            
Surgical Oncology Daily Progress Note    SUBJECTIVE: Patient seen and examined by team on morning rounds. No acute events overnight. Tolerating full liquid diet, ambulating, passing flatus, having BM. Denies chest pain, SOB, dizziness, fever, chills, N/V/D.    Vital Signs Last 24 Hrs  T(C): 36.9 (07 May 2024 04:31), Max: 37 (06 May 2024 16:24)  T(F): 98.5 (07 May 2024 04:31), Max: 98.6 (06 May 2024 16:24)  HR: 86 (07 May 2024 06:04) (63 - 97)  BP: 151/78 (07 May 2024 06:04) (129/69 - 175/80)  RR: 19 (07 May 2024 04:31) (17 - 19)  SpO2: 98% (07 May 2024 04:31) (95% - 100%)  Parameters below as of 07 May 2024 04:31  Patient On (Oxygen Delivery Method): nasal cannula  O2 Flow (L/min): 1    General Appearance: Appears well, NAD  Neck: Supple  Chest: Equal expansion bilaterally, equal breath sounds  CV: Pulse regular presently  Abdomen: soft, non-distended, non tender, surgical incisions are c/d/i.    Extremities: Grossly symmetric, SCD's in place     I&O's Summary    06 May 2024 07:01  -  07 May 2024 07:00  --------------------------------------------------------  IN: 460 mL / OUT: 1100 mL / NET: -640 mL      I&O's Detail    06 May 2024 07:01  -  07 May 2024 07:00  --------------------------------------------------------  IN:    Oral Fluid: 460 mL  Total IN: 460 mL    OUT:    Voided (mL): 1100 mL  Total OUT: 1100 mL    Total NET: -640 mL          MEDICATIONS  (STANDING):  albuterol/ipratropium for Nebulization 3 milliLiter(s) Nebulizer every 12 hours  chlorhexidine 2% Cloths 1 Application(s) Topical <User Schedule>  enoxaparin Injectable 40 milliGRAM(s) SubCutaneous every 24 hours  influenza  Vaccine (HIGH DOSE) 0.7 milliLiter(s) IntraMuscular once  lidocaine   4% Patch 1 Patch Transdermal every 24 hours  losartan 25 milliGRAM(s) Oral daily  metoprolol tartrate Injectable 5 milliGRAM(s) IV Push every 6 hours  pantoprazole    Tablet 40 milliGRAM(s) Oral before breakfast  piperacillin/tazobactam IVPB.. 3.375 Gram(s) IV Intermittent every 8 hours    MEDICATIONS  (PRN):  acetaminophen     Tablet .. 650 milliGRAM(s) Oral every 6 hours PRN Mild Pain (1 - 3)  HYDROmorphone  Injectable 0.5 milliGRAM(s) IV Push every 3 hours PRN Severe Pain (7 - 10)  HYDROmorphone  Injectable 0.2 milliGRAM(s) IV Push every 3 hours PRN Moderate Pain (4 - 6)  ondansetron Injectable 4 milliGRAM(s) IV Push every 6 hours PRN Nausea      LABS:                        8.7    6.08  )-----------( 176      ( 07 May 2024 05:45 )             28.8     05-07    140  |  105  |  8   ----------------------------<  85  3.7   |  18<L>  |  0.62    Ca    8.5      07 May 2024 05:45  Phos  3.2     05-07  Mg     1.90     05-07    TPro  6.6  /  Alb  3.3  /  TBili  0.7  /  DBili  <0.2  /  AST  30  /  ALT  64<H>  /  AlkPhos  229<H>  05-07    PT/INR - ( 06 May 2024 05:14 )   PT: 9.4 sec;   INR: <0.90 ratio         PTT - ( 06 May 2024 05:14 )  PTT:34.9 sec  Urinalysis Basic - ( 07 May 2024 05:45 )    Color: x / Appearance: x / SG: x / pH: x  Gluc: 85 mg/dL / Ketone: x  / Bili: x / Urobili: x   Blood: x / Protein: x / Nitrite: x   Leuk Esterase: x / RBC: x / WBC x   Sq Epi: x / Non Sq Epi: x / Bacteria: x        RADIOLOGY & ADDITIONAL STUDIES:
24 HOUR EVENTS:  - added dilaudid 0.5, lido patch for poor pain control  - LR to mIVF -> D5LR @42  - dc arevalo, passed TOV  - OOB  - Off HFNC, on 3L NC  - POCUS - A line predom, IVC 1.6, small b/l pleural effusions  PM:  -SBPs 170s - 10 hydral x1        SUBJECTIVE/ROS:  Patient was seen and examined on AM rounds.     NEURO  Exam: No CNS deficit, Follows commands, AOx4   Meds: HYDROmorphone  Injectable 0.2 milliGRAM(s) IV Push every 3 hours PRN Moderate Pain (4 - 6)  HYDROmorphone  Injectable 0.5 milliGRAM(s) IV Push every 3 hours PRN Severe Pain (7 - 10)  ondansetron Injectable 4 milliGRAM(s) IV Push every 6 hours PRN Nausea      RESPIRATORY  RR: 11 (05-04-24 @ 02:00) (11 - 22)  SpO2: 100% (05-04-24 @ 02:00) (98% - 100%)  Exam: unlabored respirations, CTA b/l. No wheezing, rales, rhonci, crackles appreciated.   ABG - ( 02 May 2024 17:30 )  pH: 7.41  /  pCO2: 38    /  pO2: 89    / HCO3: 24    / Base Excess: -0.4  /  SaO2: 98.4     Meds: albuterol/ipratropium for Nebulization 3 milliLiter(s) Nebulizer every 12 hours      CARDIOVASCULAR  HR: 86 (05-04-24 @ 02:00) (68 - 102)  BP: 143/85 (05-04-24 @ 02:00) (105/65 - 180/95)  BP(mean): 103 (05-04-24 @ 02:00) (78 - 118)  Exam: S1S2. No murmurs, rubs, gallops appreciated.  Cardiac Rhythm: NSR  Meds: metoprolol tartrate Injectable 5 milliGRAM(s) IV Push every 6 hours      GI/NUTRITION  Exam: Soft, non-distended, non-tender.   Diet: regular  Meds: pantoprazole  Injectable 40 milliGRAM(s) IV Push daily      GENITOURINARY  I&O's Detail    05-02 @ 07:01  -  05-03 @ 07:00  --------------------------------------------------------  IN:    IV PiggyBack: 350 mL    Lactated Ringers: 1350 mL  Total IN: 1700 mL    OUT:    Indwelling Catheter - Urethral (mL): 1585 mL    Nasogastric/Oral tube (mL): 1800 mL  Total OUT: 3385 mL    Total NET: -1685 mL      05-03 @ 07:01  -  05-04 @ 02:31  --------------------------------------------------------  IN:    dextrose 5% + lactated ringers: 420 mL    dextrose 5% + sodium chloride 0.45% w/ Additives: 560 mL    IV PiggyBack: 300 mL    Lactated Ringers: 225 mL  Total IN: 1505 mL    OUT:    Indwelling Catheter - Urethral (mL): 500 mL    Nasogastric/Oral tube (mL): 700 mL    Voided (mL): 1100 mL  Total OUT: 2300 mL    Total NET: -795 mL      05-04    138  |  104  |  9   ----------------------------<  120<H>  3.4<L>   |  25  |  0.53    Ca    8.5      04 May 2024 01:06  Phos  2.4     05-04  Mg     2.00     05-04    TPro  5.8<L>  /  Alb  2.9<L>  /  TBili  0.8  /  DBili  x   /  AST  109<H>  /  ALT  174<H>  /  AlkPhos  153<H>  05-04  [ ] Arevalo catheter, indication: N/A  Meds: dextrose 5% + lactated ringers. 1000 milliLiter(s) IV Continuous <Continuous>      HEMATOLOGIC  Meds: heparin   Injectable 5000 Unit(s) SubCutaneous every 8 hours  [x] VTE Prophylaxis                        9.2    12.58 )-----------( 153      ( 04 May 2024 01:06 )             29.8     PT/INR - ( 04 May 2024 01:06 )   PT: 10.2 sec;   INR: 0.91 ratio    PTT - ( 04 May 2024 01:06 )  PTT:41.8 sec  Transfusion     [ ] PRBC   [ ] Platelets   [ ] FFP   [ ] Cryoprecipitate      INFECTIOUS DISEASES  T(C): 36.5 (05-04-24 @ 00:00), Max: 37.3 (05-03-24 @ 12:00)  WBC Count: 12.58 K/uL (05-04 @ 01:06)    Recent Cultures:  Specimen Source: .Blood Blood-Peripheral, 05-02 @ 12:39; Results   No growth at 24 hours; Gram Stain: --; Organism: --  Specimen Source: .Blood Blood, 05-02 @ 12:20; Results   No growth at 24 hours; Gram Stain: --; Organism: --  Specimen Source: Catheterized Catheterized, 05-02 @ 12:15; Results   No growth; Gram Stain: --; Organism: --    Meds: influenza  Vaccine (HIGH DOSE) 0.7 milliLiter(s) IntraMuscular once  piperacillin/tazobactam IVPB.. 3.375 Gram(s) IV Intermittent every 8 hours    OTHER MEDICATIONS:  chlorhexidine 2% Cloths 1 Application(s) Topical <User Schedule>  lidocaine   4% Patch 1 Patch Transdermal every 24 hours      CODE STATUS: FULL    IMAGING:
D TEAM Surgery Progress Note  Patient is a 68y old  Female who presents with a chief complaint of Metastatic colon cancer to liver (09 May 2024 08:43)      INTERVAL EVENTS: Patient is POD#8 s/p ANGELI pump placement, microwave ablation of liver lesion. No acute events overnight. Cinesophagram without aspiration.     SUBJECTIVE: Patient seen and examined at bedside with surgical team, patient without complaints. Denies abdominal pain. Tolerating diet without nausea, vomiting. Passing flatus and having bowel movements. Denies fever, chills, CP, SOB.    OBJECTIVE:    Vital Signs Last 24 Hrs  T(C): 36.3 (09 May 2024 05:20), Max: 37.2 (08 May 2024 16:19)  T(F): 97.4 (09 May 2024 05:20), Max: 98.9 (08 May 2024 16:19)  HR: 80 (09 May 2024 09:16) (74 - 100)  BP: 123/66 (09 May 2024 05:20) (123/66 - 150/82)  BP(mean): --  RR: 17 (09 May 2024 05:20) (17 - 18)  SpO2: 98% (09 May 2024 09:16) (94% - 98%)    Parameters below as of 09 May 2024 09:16  Patient On (Oxygen Delivery Method): room air    I&O's Detail    08 May 2024 07:01  -  09 May 2024 07:00  --------------------------------------------------------  IN:    dextrose 5% + sodium chloride 0.9%: 924 mL    Oral Fluid: 850 mL  Total IN: 1774 mL    OUT:    Voided (mL): 1120 mL  Total OUT: 1120 mL    Total NET: 654 mL      09 May 2024 07:01  -  09 May 2024 09:31  --------------------------------------------------------  IN:    Oral Fluid: 240 mL  Total IN: 240 mL    OUT:    Voided (mL): 200 mL  Total OUT: 200 mL    Total NET: 40 mL      MEDICATIONS  (STANDING):  albuterol/ipratropium for Nebulization 3 milliLiter(s) Nebulizer every 12 hours  chlorhexidine 2% Cloths 1 Application(s) Topical <User Schedule>  enoxaparin Injectable 40 milliGRAM(s) SubCutaneous every 24 hours  influenza  Vaccine (HIGH DOSE) 0.7 milliLiter(s) IntraMuscular once  lidocaine   4% Patch 1 Patch Transdermal every 24 hours  losartan 25 milliGRAM(s) Oral daily  pantoprazole    Tablet 40 milliGRAM(s) Oral before breakfast  piperacillin/tazobactam IVPB.. 3.375 Gram(s) IV Intermittent every 8 hours  potassium phosphate / sodium phosphate Tablet (K-PHOS No. 2) 2 Tablet(s) Oral once    MEDICATIONS  (PRN):  acetaminophen     Tablet .. 650 milliGRAM(s) Oral every 6 hours PRN Mild Pain (1 - 3)      PHYSICAL EXAM:  Constitutional: A&Ox3, NAD  Respiratory: Unlabored breathing  Abdomen: Soft, nondistended, NTTP. No rebound or guarding. Midline incision C/D/I. Pump pocket incision C/D/I.   Extremities: WWP, GIBSON spontaneously    LABS:                        8.0    5.25  )-----------( 155      ( 09 May 2024 06:36 )             26.2     05-09    142  |  111<H>  |  5<L>  ----------------------------<  115<H>  3.7   |  19<L>  |  0.58    Ca    8.3<L>      09 May 2024 06:36  Phos  2.3     05-09  Mg     1.80     05-09    TPro  5.9<L>  /  Alb  3.2<L>  /  TBili  0.5  /  DBili  x   /  AST  17  /  ALT  33  /  AlkPhos  170<H>  05-09      LIVER FUNCTIONS - ( 09 May 2024 06:36 )  Alb: 3.2 g/dL / Pro: 5.9 g/dL / ALK PHOS: 170 U/L / ALT: 33 U/L / AST: 17 U/L / GGT: x           Urinalysis Basic - ( 09 May 2024 06:36 )    Color: x / Appearance: x / SG: x / pH: x  Gluc: 115 mg/dL / Ketone: x  / Bili: x / Urobili: x   Blood: x / Protein: x / Nitrite: x   Leuk Esterase: x / RBC: x / WBC x   Sq Epi: x / Non Sq Epi: x / Bacteria: x          
Surgical Oncology Daily Progress Note    SUBJECTIVE: Patient seen and examined by team on morning rounds. No acute events overnight. Patient lying comfortably in bed. Tolerating clears. Denies chest pain, SOB, palpitations, dizziness, fever, chills, N/V/D.    Vital Signs Last 24 Hrs  T(C): 36.9 (06 May 2024 04:30), Max: 37 (05 May 2024 16:00)  T(F): 98.4 (06 May 2024 04:30), Max: 98.6 (05 May 2024 16:00)  HR: 70 (06 May 2024 04:30) (60 - 91)  BP: 152/83 (06 May 2024 04:30) (123/65 - 162/82)  RR: 18 (06 May 2024 04:30) (18 - 18)  SpO2: 98% (06 May 2024 04:30) (93% - 99%)  Parameters below as of 06 May 2024 04:30  Patient On (Oxygen Delivery Method): nasal cannula  O2 Flow (L/min): 1.5      General Appearance: Appears well, NAD  Neck: Supple  Chest: Equal expansion bilaterally, equal breath sounds  CV: Pulse regular presently  Abdomen: soft, non-distended, non tender, surgical incisions are c/d/i.    Extremities: Grossly symmetric, SCD's in place     I&O's Summary    05 May 2024 07:01  -  06 May 2024 07:00  --------------------------------------------------------  IN: 1287 mL / OUT: 1500 mL / NET: -213 mL      I&O's Detail    05 May 2024 07:01  -  06 May 2024 07:00  --------------------------------------------------------  IN:    dextrose 5% + lactated ringers: 882 mL    Oral Fluid: 405 mL  Total IN: 1287 mL    OUT:    Voided (mL): 1500 mL  Total OUT: 1500 mL    Total NET: -213 mL          MEDICATIONS  (STANDING):  albuterol/ipratropium for Nebulization 3 milliLiter(s) Nebulizer every 12 hours  chlorhexidine 2% Cloths 1 Application(s) Topical <User Schedule>  dextrose 5% + lactated ringers. 1000 milliLiter(s) (42 mL/Hr) IV Continuous <Continuous>  heparin   Injectable 5000 Unit(s) SubCutaneous every 8 hours  influenza  Vaccine (HIGH DOSE) 0.7 milliLiter(s) IntraMuscular once  lidocaine   4% Patch 1 Patch Transdermal every 24 hours  losartan 25 milliGRAM(s) Oral daily  metoprolol tartrate Injectable 5 milliGRAM(s) IV Push every 6 hours  pantoprazole    Tablet 40 milliGRAM(s) Oral before breakfast  piperacillin/tazobactam IVPB.. 3.375 Gram(s) IV Intermittent every 8 hours    MEDICATIONS  (PRN):  HYDROmorphone  Injectable 0.5 milliGRAM(s) IV Push every 3 hours PRN Severe Pain (7 - 10)  HYDROmorphone  Injectable 0.2 milliGRAM(s) IV Push every 3 hours PRN Moderate Pain (4 - 6)  ondansetron Injectable 4 milliGRAM(s) IV Push every 6 hours PRN Nausea      LABS:                        8.9    4.62  )-----------( 161      ( 06 May 2024 05:14 )             28.6     05-06    144  |  109<H>  |  6<L>  ----------------------------<  89  4.0   |  21<L>  |  0.59    Ca    8.6      06 May 2024 05:14  Phos  3.3     05-06  Mg     2.10     05-06    TPro  6.3  /  Alb  3.1<L>  /  TBili  0.7  /  DBili  x   /  AST  41<H>  /  ALT  83<H>  /  AlkPhos  219<H>  05-06    PT/INR - ( 06 May 2024 05:14 )   PT: 9.4 sec;   INR: <0.90 ratio         PTT - ( 06 May 2024 05:14 )  PTT:34.9 sec  Urinalysis Basic - ( 06 May 2024 05:14 )    Color: x / Appearance: x / SG: x / pH: x  Gluc: 89 mg/dL / Ketone: x  / Bili: x / Urobili: x   Blood: x / Protein: x / Nitrite: x   Leuk Esterase: x / RBC: x / WBC x   Sq Epi: x / Non Sq Epi: x / Bacteria: x        RADIOLOGY & ADDITIONAL STUDIES:
TEAM [ ** ] Surgery Daily Progress Note  =====================================================    SUBJECTIVE: Patient seen and examined at bedside on AM rounds. Patient reports that they're feeling better, tolerating sips (didnt have ice cubes), had bowel movement. Denies fever, chills, N/V, chest pain, SOB    ALLERGIES:  No Known Allergies      --------------------------------------------------------------------------------------    MEDICATIONS:    Neurologic Medications  acetaminophen   IVPB .. 500 milliGRAM(s) IV Intermittent every 6 hours  HYDROmorphone  Injectable 0.5 milliGRAM(s) IV Push every 3 hours PRN Severe Pain (7 - 10)  HYDROmorphone  Injectable 0.2 milliGRAM(s) IV Push every 3 hours PRN Moderate Pain (4 - 6)  ondansetron Injectable 4 milliGRAM(s) IV Push every 6 hours PRN Nausea    Respiratory Medications  albuterol/ipratropium for Nebulization 3 milliLiter(s) Nebulizer every 12 hours    Cardiovascular Medications  metoprolol tartrate Injectable 5 milliGRAM(s) IV Push every 6 hours    Gastrointestinal Medications  dextrose 5% + lactated ringers. 1000 milliLiter(s) IV Continuous <Continuous>  magnesium sulfate  IVPB 1 Gram(s) IV Intermittent once  pantoprazole  Injectable 40 milliGRAM(s) IV Push daily  potassium chloride    Tablet ER 40 milliEquivalent(s) Oral once  potassium phosphate IVPB 15 milliMole(s) IV Intermittent once    Genitourinary Medications    Hematologic/Oncologic Medications  heparin   Injectable 5000 Unit(s) SubCutaneous every 8 hours  influenza  Vaccine (HIGH DOSE) 0.7 milliLiter(s) IntraMuscular once    Antimicrobial/Immunologic Medications  piperacillin/tazobactam IVPB.. 3.375 Gram(s) IV Intermittent every 8 hours    Endocrine/Metabolic Medications    Topical/Other Medications  chlorhexidine 2% Cloths 1 Application(s) Topical <User Schedule>  lidocaine   4% Patch 1 Patch Transdermal every 24 hours    --------------------------------------------------------------------------------------    VITAL SIGNS:  T(C): 36.8 (05-05-24 @ 12:14), Max: 37.1 (05-05-24 @ 00:12)  HR: 91 (05-05-24 @ 12:14) (60 - 91)  BP: 130/76 (05-05-24 @ 12:14) (109/61 - 155/75)  RR: 18 (05-05-24 @ 12:14) (17 - 18)  SpO2: 97% (05-05-24 @ 12:14) (93% - 100%)  --------------------------------------------------------------------------------------    INS AND OUTS:    05-04-24 @ 07:01  -  05-05-24 @ 07:00  --------------------------------------------------------  IN: 872 mL / OUT: 1300 mL / NET: -428 mL    05-05-24 @ 07:01  -  05-05-24 @ 12:53  --------------------------------------------------------  IN: 0 mL / OUT: 300 mL / NET: -300 mL      --------------------------------------------------------------------------------------      EXAM    Physical Examination:  General: NAD, resting comfortably in bed  Respiratory: Nonlabored respirations, normal CW expansion.  Cardio: regular rate   Abdomen: soft, non-distended, non tender, surgical incisions are c/d/i.      --------------------------------------------------------------------------------------    LABS                          8.7    7.15  )-----------( 155      ( 05 May 2024 07:12 )             28.0     05-05    142  |  107  |  8   ----------------------------<  107<H>  3.4<L>   |  23  |  0.51    Ca    8.5      05 May 2024 07:12  Phos  2.8     05-05  Mg     1.80     05-05    TPro  5.8<L>  /  Alb  2.9<L>  /  TBili  0.8  /  DBili  x   /  AST  109<H>  /  ALT  174<H>  /  AlkPhos  153<H>  05-04    PT/INR - ( 05 May 2024 07:12 )   PT: 9.6 sec;   INR: <0.90 ratio         PTT - ( 05 May 2024 07:12 )  PTT:48.3 sec  Urinalysis Basic - ( 05 May 2024 07:12 )    Color: x / Appearance: x / SG: x / pH: x  Gluc: 107 mg/dL / Ketone: x  / Bili: x / Urobili: x   Blood: x / Protein: x / Nitrite: x   Leuk Esterase: x / RBC: x / WBC x   Sq Epi: x / Non Sq Epi: x / Bacteria: x      No Known Allergies    T(C): 36.8 (05-05-24 @ 12:14), Max: 37.1 (05-05-24 @ 00:12)  HR: 91 (05-05-24 @ 12:14) (60 - 91)  BP: 130/76 (05-05-24 @ 12:14) (109/61 - 155/75)  RR: 18 (05-05-24 @ 12:14) (17 - 18)  SpO2: 97% (05-05-24 @ 12:14) (93% - 100%)    05-04-24 @ 07:01  -  05-05-24 @ 07:00  --------------------------------------------------------  IN: 872 mL / OUT: 1300 mL / NET: -428 mL    05-05-24 @ 07:01  -  05-05-24 @ 12:53  --------------------------------------------------------  IN: 0 mL / OUT: 300 mL / NET: -300 mL

## 2024-05-09 NOTE — DISCHARGE NOTE NURSING/CASE MANAGEMENT/SOCIAL WORK - PATIENT PORTAL LINK FT
You can access the FollowMyHealth Patient Portal offered by [facility] by registering at the following website: [email or web address]. By joining [employer]’s FollowMyHealth portal, you will also be able to view your health information using other applications (apps) compatible with our system.

## 2024-05-09 NOTE — DISCHARGE NOTE PROVIDER - DETAILS OF MALNUTRITION DIAGNOSIS/DIAGNOSES
This patient has been assessed with a concern for Malnutrition and was treated during this hospitalization for the following Nutrition diagnosis/diagnoses:     -  05/03/2024: Moderate protein-calorie malnutrition

## 2024-05-09 NOTE — PROGRESS NOTE ADULT - ASSESSMENT
68y Female with PMH of HTN, HLD, Scleroderma (not on meds) , Rheumatoid arthritis, h/o pulmonary hypertension (and was taking meds for that, but had a f/u about a year ago in [place], she was told her pressure has improved, and told her to stop meds- not followed up with cardiologist here in [facility]),  Colon cancer metastasized to liver (s/p chemo last dose 1/2024) s/p Laparoscopic Right hemicolectomy and cholecystectomy on 11/01/23. Now s/p microwave ablation of liver mass and HAIP placement on 5/1, SICU consulted for hypertension and tachycardia     PLAN:  - Pain control PRN Tylenol  - OOB/ Ambulate/ IS while in bed  - Regular diet  - HTN on losartan  - ABX: Zosyn, completed 7days today  - VTE ppx: Lovenox, SCDs  - Discharge home today, no homecare needs    D Team surgery  [phone]

## 2024-05-09 NOTE — DISCHARGE NOTE PROVIDER - HOSPITAL COURSE
This patient is a 68 year old female with PMH of HTN, HLD, Scleroderma (not on meds) , Rheumatoid arthritis, pulmonary hypertension who presented to [facility] on 5/1/24 with metastatic colon cancer to liver, scheduled for surgery. Patient taken to the OR by Dr. Franklin and underwent exploratory laparotomy, hepatic artery infusion pump placement, and microwave ablation of liver mass. Tolerated procedure well without complication. Remained hemodynamically stable in PACU then was transferred to surgical floors.  5/2 POD1-5/3 POD2 RRT called for tachycardia, hypoxemia. Upon arrival patient tachycardic to 130s. EKG sinus without ST changes. Hypertensive to . Given 10mg Labetalol. HR improved to 115 and /89. O2 saturation 80s requiring 5L supplemental O2 by nasal cannula with improvement to 91-93%. ABG found hypoxemia with PaO2 71. pH 7.38. PCO2 39. Lactate 1.1. H/H Stable 10/32. Troponin 34. CTA chest and CT abdomen and pelvis performed to r/o PE or intraabdominal pathology. Results as follows:  No acute pulmonary embolism. New small pleural effusions with near complete lower lobe atelectasis. Mild interstitial pulmonary edema. Interval placement of a hepatic arterial infusion pump. New ablation cavities status post treatment of the segment 2/3 and 8 liver metastases. Other hepatic lesions are unchanged since 4/23/2024 abdominal MRI.  Mild postprocedural pneumomediastinum and pneumoperitoneum. Mild ascites. Markedly distended, fluid-filled esophagus predisposing this patient to aspiration (patient has a reported history of scleroderma). New splenic infarct. Unchanged L1 and L5 compression deformities.  Patient transferred to SICU for high-lakesha nasal cannula. NGT placed for decompression given fluid-filled esophagus. Patient then febrile to 101.3. Fever workup initiated. UA negative. Urine culture negative. Blood culture negative. Clinical picture c/f aspiration. Started on empiric antibiotic with Zosyn.  5/4POD3 Weaned off hiflo nasal cannula. Patient hypertensive despite metoprolol 5mg q6h. CCU consulted who recommended to add hydralazine 10mg q8h. Blood pressure improved thereafter.   5/6 POD5 Diet advanced to full liquids.   5/7 POD6 Patient with nausea, episode of spit up. Patient on aspiration precautions. UGI performed and showed Mild distention of the proximal duodenum without gastric outlet   obstruction. Moderate to severe gastroesophageal reflux. Patient aspirated during exam with contrast entering the left mainstem bronchus. Patient remained NPO with speech and swallow evaluation pending.  5/8 POD7 Speech and swallow evaluation determined from an oropharyngeal standpoint patient may have regular Solids with Thin Liquids, but recommended cinesophagram to r/o silent aspiration. Cinesophagram performed and negative for silent aspiration.  5/9 POD8 Completed 7 day course of Zosyn. Deemed stable for discharge by attending.  Patient seen by physical therapy throughout hospital stay who recommended patient be discharged home without skilled PT needs.  At this time, patient is currently ambulating, voiding, tolerating a regular diet. Pain well controlled on PO pain meds. Patient has been deemed stable for discharge home with follow up as an outpatient.

## 2024-05-09 NOTE — DISCHARGE NOTE PROVIDER - NSDCCPTREATMENT_GEN_ALL_CORE_FT
PRINCIPAL PROCEDURE  Procedure: Insertion of implantable hepatic arterial infusion pump  Findings and Treatment:       SECONDARY PROCEDURE  Procedure: Microwave ablation, mass, liver  Findings and Treatment:

## 2024-05-09 NOTE — DISCHARGE NOTE PROVIDER - CARE PROVIDER_API CALL
Brenda Rubio  Surgical Oncology  [address] [place] [id number]  Phone: [phone]  Fax: [phone]  Follow Up Time: 1 week

## 2024-05-09 NOTE — DISCHARGE NOTE PROVIDER - NSDCCPCAREPLAN_GEN_ALL_CORE_FT
PRINCIPAL DISCHARGE DIAGNOSIS  Diagnosis: Malignant neoplasm of colon, unspecified  Assessment and Plan of Treatment: WOUND CARE:  Please keep incisions clean and dry. Please do not Scrub or rub incisions. Do not use lotion or powder on incisions.   BATHING: You may shower and/or sponge bathe. You may use warm soapy water in the shower and rinse, pat dry.  ACTIVITY: No heavy lifting or straining. Otherwise, you may return to your usual level of physical activity. If you are taking narcotic pain medication DO NOT drive a car, operate machinery or make important decisions.  DIET: Return to your usual diet.  NOTIFY YOUR SURGEON IF YOU HAVE: any bleeding that does not stop, any pus draining from your wound(s), any fever (over 100.4 F) persistent nausea/vomiting, or if your pain is not controlled on your discharge pain medications, unable to urinate.  FOLLOW UP:  1. Please follow up with your primary care physician in one week regarding your hospitalization, bring copies of your discharge paperwork.  2. Please follow up with your surgeon, Dr. Franklin. Call [phone] to make an appointment.

## 2024-05-09 NOTE — DISCHARGE NOTE NURSING/CASE MANAGEMENT/SOCIAL WORK - NSDCPEFALRISK_GEN_ALL_CORE
For information on Fall & Injury Prevention, visit: [email or web address] OR  [email or web address] OR  https://www.cdc.gov/steadi/patient.html

## 2024-05-13 ENCOUNTER — NON-APPOINTMENT (OUTPATIENT)
Age: 69
End: 2024-05-13

## 2024-05-13 ENCOUNTER — APPOINTMENT (OUTPATIENT)
Dept: NUCLEAR MEDICINE | Facility: HOSPITAL | Age: 69
End: 2024-05-13

## 2024-05-13 ENCOUNTER — APPOINTMENT (OUTPATIENT)
Dept: RHEUMATOLOGY | Facility: CLINIC | Age: 69
End: 2024-05-13
Payer: MEDICAID

## 2024-05-13 ENCOUNTER — LABORATORY RESULT (OUTPATIENT)
Age: 69
End: 2024-05-13

## 2024-05-13 ENCOUNTER — OUTPATIENT (OUTPATIENT)
Dept: OUTPATIENT SERVICES | Facility: HOSPITAL | Age: 69
LOS: 1 days | End: 2024-05-13
Payer: MEDICAID

## 2024-05-13 VITALS
HEIGHT: 59 IN | HEART RATE: 83 BPM | WEIGHT: 96 LBS | OXYGEN SATURATION: 94 % | SYSTOLIC BLOOD PRESSURE: 139 MMHG | BODY MASS INDEX: 19.35 KG/M2 | RESPIRATION RATE: 16 BRPM | DIASTOLIC BLOOD PRESSURE: 87 MMHG

## 2024-05-13 DIAGNOSIS — Z96.641 PRESENCE OF RIGHT ARTIFICIAL HIP JOINT: Chronic | ICD-10-CM

## 2024-05-13 DIAGNOSIS — Z00.00 ENCOUNTER FOR GENERAL ADULT MEDICAL EXAMINATION WITHOUT ABNORMAL FINDINGS: ICD-10-CM

## 2024-05-13 DIAGNOSIS — Z90.49 ACQUIRED ABSENCE OF OTHER SPECIFIED PARTS OF DIGESTIVE TRACT: Chronic | ICD-10-CM

## 2024-05-13 DIAGNOSIS — C18.9 MALIGNANT NEOPLASM OF COLON, UNSPECIFIED: ICD-10-CM

## 2024-05-13 DIAGNOSIS — T85.528A DISPLACEMENT OF OTHER GASTROINTESTINAL PROSTHETIC DEVICES, IMPLANTS AND GRAFTS, INITIAL ENCOUNTER: Chronic | ICD-10-CM

## 2024-05-13 DIAGNOSIS — Z96.642 PRESENCE OF LEFT ARTIFICIAL HIP JOINT: Chronic | ICD-10-CM

## 2024-05-13 PROCEDURE — 78830 RP LOCLZJ TUM SPECT W/CT 1: CPT | Mod: 26

## 2024-05-13 PROCEDURE — A9540: CPT

## 2024-05-13 PROCEDURE — 78830 RP LOCLZJ TUM SPECT W/CT 1: CPT

## 2024-05-13 PROCEDURE — 99204 OFFICE O/P NEW MOD 45 MIN: CPT

## 2024-05-13 PROCEDURE — G2211 COMPLEX E/M VISIT ADD ON: CPT | Mod: NC,1L

## 2024-05-13 PROCEDURE — T1013A: CUSTOM

## 2024-05-14 ENCOUNTER — RESULT REVIEW (OUTPATIENT)
Age: 69
End: 2024-05-14

## 2024-05-14 ENCOUNTER — APPOINTMENT (OUTPATIENT)
Dept: HEMATOLOGY ONCOLOGY | Facility: CLINIC | Age: 69
End: 2024-05-14
Payer: MEDICAID

## 2024-05-14 ENCOUNTER — APPOINTMENT (OUTPATIENT)
Dept: INFUSION THERAPY | Facility: HOSPITAL | Age: 69
End: 2024-05-14

## 2024-05-14 VITALS
WEIGHT: 97 LBS | TEMPERATURE: 96.8 F | OXYGEN SATURATION: 98 % | BODY MASS INDEX: 19.59 KG/M2 | DIASTOLIC BLOOD PRESSURE: 78 MMHG | SYSTOLIC BLOOD PRESSURE: 117 MMHG | HEART RATE: 86 BPM | RESPIRATION RATE: 16 BRPM

## 2024-05-14 DIAGNOSIS — A49.8 OTHER BACTERIAL INFECTIONS OF UNSPECIFIED SITE: ICD-10-CM

## 2024-05-14 LAB
ALBUMIN SERPL ELPH-MCNC: 3.9 G/DL — SIGNIFICANT CHANGE UP (ref 3.3–5)
ALP SERPL-CCNC: 201 U/L — HIGH (ref 40–120)
ALT FLD-CCNC: 21 U/L — SIGNIFICANT CHANGE UP (ref 10–45)
ANION GAP SERPL CALC-SCNC: 13 MMOL/L — SIGNIFICANT CHANGE UP (ref 5–17)
AST SERPL-CCNC: 26 U/L — SIGNIFICANT CHANGE UP (ref 10–40)
BASOPHILS # BLD AUTO: 0.02 K/UL — SIGNIFICANT CHANGE UP (ref 0–0.2)
BASOPHILS NFR BLD AUTO: 0.3 % — SIGNIFICANT CHANGE UP (ref 0–2)
BILIRUB SERPL-MCNC: 0.3 MG/DL — SIGNIFICANT CHANGE UP (ref 0.2–1.2)
BUN SERPL-MCNC: 15 MG/DL — SIGNIFICANT CHANGE UP (ref 7–23)
CALCIUM SERPL-MCNC: 9.6 MG/DL — SIGNIFICANT CHANGE UP (ref 8.4–10.5)
CEA SERPL-MCNC: 48 NG/ML — HIGH (ref 0–3.8)
CHLORIDE SERPL-SCNC: 108 MMOL/L — SIGNIFICANT CHANGE UP (ref 96–108)
CO2 SERPL-SCNC: 21 MMOL/L — LOW (ref 22–31)
CREAT SERPL-MCNC: 0.57 MG/DL — SIGNIFICANT CHANGE UP (ref 0.5–1.3)
EGFR: 99 ML/MIN/1.73M2 — SIGNIFICANT CHANGE UP
EOSINOPHIL # BLD AUTO: 0.06 K/UL — SIGNIFICANT CHANGE UP (ref 0–0.5)
EOSINOPHIL NFR BLD AUTO: 0.8 % — SIGNIFICANT CHANGE UP (ref 0–6)
GLUCOSE SERPL-MCNC: 120 MG/DL — HIGH (ref 70–99)
HCT VFR BLD CALC: 28.9 % — LOW (ref 34.5–45)
HGB BLD-MCNC: 8.8 G/DL — LOW (ref 11.5–15.5)
IMM GRANULOCYTES NFR BLD AUTO: 1 % — HIGH (ref 0–0.9)
LYMPHOCYTES # BLD AUTO: 0.81 K/UL — LOW (ref 1–3.3)
LYMPHOCYTES # BLD AUTO: 11.3 % — LOW (ref 13–44)
MCHC RBC-ENTMCNC: 27.3 PG — SIGNIFICANT CHANGE UP (ref 27–34)
MCHC RBC-ENTMCNC: 30.4 G/DL — LOW (ref 32–36)
MCV RBC AUTO: 89.8 FL — SIGNIFICANT CHANGE UP (ref 80–100)
MONOCYTES # BLD AUTO: 0.4 K/UL — SIGNIFICANT CHANGE UP (ref 0–0.9)
MONOCYTES NFR BLD AUTO: 5.6 % — SIGNIFICANT CHANGE UP (ref 2–14)
NEUTROPHILS # BLD AUTO: 5.82 K/UL — SIGNIFICANT CHANGE UP (ref 1.8–7.4)
NEUTROPHILS NFR BLD AUTO: 81 % — HIGH (ref 43–77)
NRBC # BLD: 0 /100 WBCS — SIGNIFICANT CHANGE UP (ref 0–0)
PLATELET # BLD AUTO: 196 K/UL — SIGNIFICANT CHANGE UP (ref 150–400)
POTASSIUM SERPL-MCNC: 3.9 MMOL/L — SIGNIFICANT CHANGE UP (ref 3.5–5.3)
POTASSIUM SERPL-SCNC: 3.9 MMOL/L — SIGNIFICANT CHANGE UP (ref 3.5–5.3)
PROT SERPL-MCNC: 7 G/DL — SIGNIFICANT CHANGE UP (ref 6–8.3)
RBC # BLD: 3.22 M/UL — LOW (ref 3.8–5.2)
RBC # FLD: 17.8 % — HIGH (ref 10.3–14.5)
SODIUM SERPL-SCNC: 143 MMOL/L — SIGNIFICANT CHANGE UP (ref 135–145)
WBC # BLD: 7.18 K/UL — SIGNIFICANT CHANGE UP (ref 3.8–10.5)
WBC # FLD AUTO: 7.18 K/UL — SIGNIFICANT CHANGE UP (ref 3.8–10.5)

## 2024-05-14 PROCEDURE — 99215 OFFICE O/P EST HI 40 MIN: CPT

## 2024-05-15 ENCOUNTER — APPOINTMENT (OUTPATIENT)
Dept: INFUSION THERAPY | Facility: HOSPITAL | Age: 69
End: 2024-05-15

## 2024-05-15 ENCOUNTER — APPOINTMENT (OUTPATIENT)
Dept: ORTHOPEDIC SURGERY | Facility: CLINIC | Age: 69
End: 2024-05-15

## 2024-05-15 DIAGNOSIS — M54.9 DORSALGIA, UNSPECIFIED: ICD-10-CM

## 2024-05-15 DIAGNOSIS — Z51.11 ENCOUNTER FOR ANTINEOPLASTIC CHEMOTHERAPY: ICD-10-CM

## 2024-05-15 LAB
CDIFF BY PCR: DETECTED
GI PCR PANEL: NOT DETECTED

## 2024-05-15 RX ORDER — VANCOMYCIN HYDROCHLORIDE 125 MG/1
125 CAPSULE ORAL
Qty: 40 | Refills: 0 | Status: ACTIVE | COMMUNITY
Start: 2024-05-15 | End: 1900-01-01

## 2024-05-15 NOTE — PHYSICAL EXAM
[de-identified] : Lumbar Physical Exam   Antalgic, using cane  Station - Normal   Sagittal balance - Normal   Compensatory mechanism? - None      Reflexes    Patellar - normal    Gastroc - normal    Clonus - No    Hip Exam - Normal   Straight leg raise - none   Pulses - 2+ dp/pt   Range of motion - normal    Sensation   Sensation intact to light touch in L1, L2, L3, L4, L5 and S1 dermatomes bilaterally     Motor             IP Quad HS TA Gastroc EHL   Right 3+/5  5/5   5/5 5/5    5/5     5/5   Left   3+/5  5/5   5/5 5/5    5/5      5/5  [de-identified] : MRI Thoracic/Lumbar spine:  Previous imaging Scoliosis Rads  Facet arthropathy  L1 compression fracture

## 2024-05-15 NOTE — HISTORY OF PRESENT ILLNESS
[de-identified] : 69 yo female following up for her L1 compression fracture. She was provided a TLSO at last visit. She has a known history of osteoporosis and was recommended to follow with PCP for treatment options. An MRI of the thoracic and Lumbar spine was ordered at last visit as well, patient is here to discuss results and next steps in treatment.   4/15/2024 Patient is a 68 year old female who presents for initial eval of bp following lifting heavy last month. Pt was informed of a fracture in spine following this. Pain exacerbated when exerting energy. Sxs exacerbated when standing from sitting. Denies pain when walking. Pt has osteoporosis.

## 2024-05-16 ENCOUNTER — APPOINTMENT (OUTPATIENT)
Dept: INTERNAL MEDICINE | Facility: CLINIC | Age: 69
End: 2024-05-16
Payer: MEDICAID

## 2024-05-16 ENCOUNTER — NON-APPOINTMENT (OUTPATIENT)
Age: 69
End: 2024-05-16

## 2024-05-16 VITALS
HEART RATE: 77 BPM | HEIGHT: 59 IN | DIASTOLIC BLOOD PRESSURE: 87 MMHG | BODY MASS INDEX: 19.35 KG/M2 | SYSTOLIC BLOOD PRESSURE: 131 MMHG | TEMPERATURE: 98.6 F | WEIGHT: 96 LBS

## 2024-05-16 VITALS — SYSTOLIC BLOOD PRESSURE: 122 MMHG | DIASTOLIC BLOOD PRESSURE: 78 MMHG

## 2024-05-16 DIAGNOSIS — M06.9 RHEUMATOID ARTHRITIS, UNSPECIFIED: ICD-10-CM

## 2024-05-16 DIAGNOSIS — J84.9 INTERSTITIAL PULMONARY DISEASE, UNSPECIFIED: ICD-10-CM

## 2024-05-16 DIAGNOSIS — Z00.00 ENCOUNTER FOR GENERAL ADULT MEDICAL EXAMINATION W/OUT ABNORMAL FINDINGS: ICD-10-CM

## 2024-05-16 DIAGNOSIS — K63.89 OTHER SPECIFIED DISEASES OF INTESTINE: ICD-10-CM

## 2024-05-16 DIAGNOSIS — M81.0 AGE-RELATED OSTEOPOROSIS W/OUT CURRENT PATHOLOGICAL FRACTURE: ICD-10-CM

## 2024-05-16 PROCEDURE — 93000 ELECTROCARDIOGRAM COMPLETE: CPT

## 2024-05-16 PROCEDURE — 99397 PER PM REEVAL EST PAT 65+ YR: CPT | Mod: 25

## 2024-05-16 NOTE — REASON FOR VISIT
[Follow-Up Visit] : a follow-up visit for [[employer] ] : provided by [employer]   [Source: ______] : History obtained from [unfilled] [FreeTextEntry2] : colon ca with liver mets [Interpreters_IDNumber] : [id number] [Interpreters_FullName] : Ciera [FreeTextEntry3] : Mandarin [TWNoteComboBox1] : Other

## 2024-05-16 NOTE — HISTORY OF PRESENT ILLNESS
[Spouse] : spouse [FreeTextEntry1] : Pt presented for PE.  Pt [place] in the [place] new [place] in [place]23.  She never had PE in her country. [de-identified] : Pt was diagnosed with colon CA with liver mets in 2023, she had hemicolectomy and then chemotherapy.  She then had hepatic arterial pump placed recently for intrahepatic infusion of meds for the liver lesions.  The pump was used yesterday for the 1st time and she tolerated treatment well.  She will be getting treatment every 2 weeks.  Systemic therapy is temporary on hold.  She feels well w/o any new complaint.  She was having diarrhea recently and had C diff done with oncologist, she was tested + for C diff, and so she was started on Vanco which she took today.  She never had COVID infection.  She had 2 doses of COVID vaccine in [place].  She is UTD with Flu vaccine.

## 2024-05-16 NOTE — ASSESSMENT
[FreeTextEntry1] : Pt had colonoscopy w/n the past 12 months, she has Rx for DEXA scan.  She is getting treatment for colon cA with mets.  She wanted to defer Pap smear.  She also wanted to hold off on mammogram as she has a port on R upper chest.  Pt was reminded to have routine eye exam and dental care.  Will check routine labs.

## 2024-05-16 NOTE — HEALTH RISK ASSESSMENT
[Very Good] : ~his/her~  mood as very good [No] : In the past 12 months have you used drugs other than those required for medical reasons? No [One fall no injury in past year] : Patient reported one fall in the past year without injury [Little interest or pleasure doing things] : 1) Little interest or pleasure doing things [Feeling down, depressed, or hopeless] : 2) Feeling down, depressed, or hopeless [0] : 2) Feeling down, depressed, or hopeless: Not at all (0) [PHQ-2 Negative - No further assessment needed] : PHQ-2 Negative - No further assessment needed [Never] : Never [None] : None [# of Members in Household ___] :  household currently consist of [unfilled] member(s) [Retired] : retired [Less Than High School] : less than high school [] :  [# Of Children ___] : has [unfilled] children [Feels Safe at Home] : Feels safe at home [Fully functional (bathing, dressing, toileting, transferring, walking, feeding)] : Fully functional (bathing, dressing, toileting, transferring, walking, feeding) [Smoke Detector] : smoke detector [Carbon Monoxide Detector] : carbon monoxide detector [Seat Belt] :  uses seat belt [de-identified] : Very sedentary, some housework, [[id number]] : 0 [EyeExamDate] : >1 year [Change in mental status noted] : No change in mental status noted [Reports changes in hearing] : Reports no changes in hearing [Reports changes in vision] : Reports no changes in vision [MammogramDate] : never [PapSmearDate] : Never [BoneDensityDate] : Never [ColonoscopyDate] : 10/23 [de-identified] : Uses rollator for ambulator, [de-identified] : dentist - never

## 2024-05-16 NOTE — REVIEW OF SYSTEMS
[Nocturia] : nocturia [Unsteady Walking] : ataxia [Negative] : Heme/Lymph [Fever] : no fever [Chills] : no chills [Fatigue] : no fatigue [Recent Change In Weight] : ~T no recent weight change [Chest Pain] : no chest pain [Palpitations] : no palpitations [Lower Ext Edema] : no lower extremity edema [Shortness Of Breath] : no shortness of breath [Wheezing] : no wheezing [Cough] : no cough [Abdominal Pain] : no abdominal pain [Nausea] : no nausea [Constipation] : no constipation [Diarrhea] : diarrhea [Vomiting] : no vomiting [Heartburn] : no heartburn [Melena] : no melena [Dysuria] : no dysuria [Incontinence] : no incontinence [Hematuria] : no hematuria [Joint Pain] : no joint pain [Joint Stiffness] : no joint stiffness [Joint Swelling] : no joint swelling [Muscle Pain] : no muscle pain [Back Pain] : no back pain [Itching] : no itching [Mole Changes] : no mole changes [Dizziness] : no dizziness [Fainting] : no fainting [Insomnia] : no insomnia [Anxiety] : no anxiety [Depression] : no depression [Easy Bleeding] : no easy bleeding [Easy Bruising] : no easy bruising [Swollen Glands] : no swollen glands [FreeTextEntry2] : female in stated age,  [FreeTextEntry7] : Appetite is still not too good with early satiety, [FreeTextEntry8] : Nocturia of 1 X per night,  [de-identified] : Rash on face is chronic and not itchy, [de-identified] : Uses a rollator

## 2024-05-16 NOTE — DATA REVIEWED
[FreeTextEntry1] : EKG - NSR, R - 8, axis - 0, PRWP,  no STTW abnormality.  Prior EKG not available for comparison.

## 2024-05-16 NOTE — HISTORY OF PRESENT ILLNESS
[de-identified] : 68F presents for a follow-up visit or a resected right sided colon ca with synchronous bilobar liver mets.  GEORGINA  She is Mandarin speaking. Originally moved to the  from [place] in the summer of 2023. In September, she presented with abdominal pain and was found to have a Hb 6.8 with acute cholecystitis and had a percutaneous cholecystostomy drain placed. She also had 1U PRBCs.  She was then readmitted to [facility] from 10/22 - 11/6/23 for tube dislodgement, with CT showing ascending colon wall thickening, with indeterminate hepatic lesions. She also had continued anemia (Hb 7.0) requiring 2U PRBCs during admission. She underwent a colonoscopy on 10/24/23 that showed a circumferential ascending colon mass about 5 cm above the cecum, biopsy showing adenocarcinoma, moderately differentiated with a mucinous component. On 11/1/23, she underwent a R hemicolectomy with cholecystectomy, with Dr. Stacey Bowers at [facility]. Path showed a 10.2 cm tumor with negative margins, 0/25 lymph nodes positive for carcinoma, MS stable. T3N0.   1/3/24 CEA 88.1 1/8/24 CT chest no suspicious pulmonary nodules. 1/8/24 MRI abd (eovist) Bilobar hepatic lesions, suspicious for mucinous hepatic metastases. 1/12/24 liver biopsy metastatic adenocarcinoma with mucinous features, compatible with previously diagnosed colonic adenocarcinoma.   Chemo hx:  Start date 1/22/24: C1 5FU/LV    2/7/24: C2 FOLFOX, oxaliplatin    2/19/24: C3, Oxaliplatin    3/4/24: C4, Oxaliplatin.  Medical oncologist is Dr Guadalupe   CT angio chest/ abdomen/ pelvis 3/7/24 showing enlarging hepatic metastases when compared to prior MRI 1/8/2024.  *Segment 7/8 measures 3.0 x 3.1 cm,  increased from 2.4 x 2.7 cm *Segment 2 lesion 1.0 x 1.3 cm, previously 0.7 cm *Segment 3 lesion 0.7 x 1.1 cm, previously 0.7 x 1.0 cm *Segment 7 lesion 0.9 x 0.7 cm, previously not measurable No evidence of metastatic disease in the chest, abdomen or pelvis otherwise. Postsurgical changes from partial colonic resection and ileocolonic anastomosis with mildly distended and fluid-filled proximal small bowel loops without transition point. Findings are suggestive of a mild degree partial small bowel obstruction possibly due to adhesions.  3/4/24 CEA: 74.6     Updated Chemo hx: s/p 1 cycle of 5FU/LV, 6 cycles of FOLFOX.  Last dose 4/1/24.   MRI 4/23/24: Metastatic liver lesions as measured below:  *  0.7 x 0.5 cm in segment 7 (23). *  3.7 x 3.5 cm in segment 7 (26). *  0.6 x 0.5 cm in segment 8 (32). *  1.0 x 0.8 cm in segment 2 (33). *  0.8 x 0.6 cm in segment 3 (40). IMPRESSION: Bilobar hepatic lesions, suspicious for mucinous hepatic metastases, with lesions increased in size, new, or stable compared to prior.  CT chest 4/23/24- No suspicious lung nodule or mass. Precarinal 1.2 x 1 cm node is unchanged compared to 10/22/2023 CT chest.  4/29/24-- Pt presents for pre-op discussion, prior to hepatic artery infusion pump placement which is scheduled on 5/1/24.  Her CTA shows patency of the GDA however technique does not allow for determination of direction of flow. For this reason, I referred to IR and plan is for mesenteric angiogram to evaluate both the celiac and SMA to assess for safety of proceeding with planned arterial pump placement.   PMH: Scleroderma, HTN, RA, mild pulmonary HTN.  PSH: partial colectomy Social: She lives with her , daughter and her , and her 2 grandchildren.

## 2024-05-16 NOTE — PHYSICAL EXAM
[No Acute Distress] : no acute distress [Well Nourished] : well nourished [Well Developed] : well developed [Normal Sclera/Conjunctiva] : normal sclera/conjunctiva [PERRL] : pupils equal round and reactive to light [EOMI] : extraocular movements intact [Normal Outer Ear/Nose] : the outer ears and nose were normal in appearance [Normal Oropharynx] : the oropharynx was normal [Normal TMs] : both tympanic membranes were normal [Normal Nasal Mucosa] : the nasal mucosa was normal [No JVD] : no jugular venous distention [No Lymphadenopathy] : no lymphadenopathy [Supple] : supple [No Respiratory Distress] : no respiratory distress  [Clear to Auscultation] : lungs were clear to auscultation bilaterally [Normal Rate] : normal rate  [Regular Rhythm] : with a regular rhythm [Normal S1, S2] : normal S1 and S2 [No Carotid Bruits] : no carotid bruits [Pedal Pulses Present] : the pedal pulses are present [No Edema] : there was no peripheral edema [No Extremity Clubbing/Cyanosis] : no extremity clubbing/cyanosis [Soft] : abdomen soft [Non Tender] : non-tender [Non-distended] : non-distended [Normal Bowel Sounds] : normal bowel sounds [Declined Rectal Exam] : declined rectal exam [Normal Supraclavicular Nodes] : no supraclavicular lymphadenopathy [Normal Axillary Nodes] : no axillary lymphadenopathy [Normal Posterior Cervical Nodes] : no posterior cervical lymphadenopathy [Normal Anterior Cervical Nodes] : no anterior cervical lymphadenopathy [No CVA Tenderness] : no CVA  tenderness [No Spinal Tenderness] : no spinal tenderness [No Joint Swelling] : no joint swelling [Grossly Normal Strength/Tone] : grossly normal strength/tone [Coordination Grossly Intact] : coordination grossly intact [No Focal Deficits] : no focal deficits [Speech Grossly Normal] : speech grossly normal [Normal Affect] : the affect was normal [Alert and Oriented x3] : oriented to person, place, and time [Normal Mood] : the mood was normal [de-identified] : female in stated age,  [de-identified] : Pott noted on  R upper chest, site is clean and dry [de-identified] : Well healed surgical scar in mid abdomen, there is a hepatic pump? in LLQ with well healed surgical scar over the area, no sign of infectioin, [FreeTextEntry1] : deferred, last colonoscopy was w/n a year, [de-identified] : Pt has multiple erythematous lesions on her face and hands, which she stated has been present for a long time w/o change, [de-identified] : Ambulate with a rollator,

## 2024-05-16 NOTE — ASSESSMENT
[FreeTextEntry1] : 68F with a resected right sided colon cancer with synchronous bilobar liver metastasis.  Tumors appear potentially clearable with a combination of resection and ablation.  The right posterior tumor is too large for ablation at this time.  Rediscussed the concept of adjuvant hepatic artery infusion pump and patient and daughter are very interested in this approach. We discussed importance of compliance with therapy and need to be seen in infusion clinic every 2 weeks.  CEA levels:   4/1/24 49.8,  3/18/24: 56.8 , 2/19/24: 87.4  Last cycle of FOLFOX 4/1/24 MRI 4/23/24: Metastatic liver lesions as measured below:  *  0.7 x 0.5 cm in segment 7 (23). *  3.7 x 3.5 cm in segment 7 (26). *  0.6 x 0.5 cm in segment 8 (32). *  1.0 x 0.8 cm in segment 2 (33). *  0.8 x 0.6 cm in segment 3 (40).   Mesenteric angiogram with IR tomorrow to evaluate both the celiac and SMA  Plan for ANGELI pump placement 5/1/24 with attempt to clear as much of hepatic tumor burden as feasible

## 2024-05-17 ENCOUNTER — APPOINTMENT (OUTPATIENT)
Dept: INFUSION THERAPY | Facility: HOSPITAL | Age: 69
End: 2024-05-17

## 2024-05-21 LAB
25(OH)D3 SERPL-MCNC: 15.2 NG/ML
APPEARANCE: CLEAR
BILIRUBIN URINE: NEGATIVE
BLOOD URINE: NEGATIVE
CHOLEST SERPL-MCNC: 233 MG/DL
CK SERPL-CCNC: 39 U/L
COLOR: YELLOW
ESTIMATED AVERAGE GLUCOSE: 105 MG/DL
GLUCOSE QUALITATIVE U: NEGATIVE MG/DL
HBA1C MFR BLD HPLC: 5.3 %
HDLC SERPL-MCNC: 32 MG/DL
KETONES URINE: NEGATIVE MG/DL
LDLC SERPL CALC-MCNC: 143 MG/DL
LEUKOCYTE ESTERASE URINE: NEGATIVE
NITRITE URINE: NEGATIVE
NONHDLC SERPL-MCNC: 201 MG/DL
PH URINE: 5.5
PROTEIN URINE: NEGATIVE MG/DL
SPECIFIC GRAVITY URINE: 1.02
T4 FREE SERPL-MCNC: 1.1 NG/DL
TRIGL SERPL-MCNC: 316 MG/DL
TSH SERPL-ACNC: 0.75 UIU/ML
UROBILINOGEN URINE: 0.2 MG/DL

## 2024-05-21 RX ORDER — ROSUVASTATIN CALCIUM 10 MG/1
10 TABLET, FILM COATED ORAL
Qty: 90 | Refills: 1 | Status: ACTIVE | COMMUNITY
Start: 2024-02-14 | End: 1900-01-01

## 2024-05-28 ENCOUNTER — NON-APPOINTMENT (OUTPATIENT)
Age: 69
End: 2024-05-28

## 2024-05-28 ENCOUNTER — APPOINTMENT (OUTPATIENT)
Dept: HEMATOLOGY ONCOLOGY | Facility: CLINIC | Age: 69
End: 2024-05-28

## 2024-05-29 ENCOUNTER — RESULT REVIEW (OUTPATIENT)
Age: 69
End: 2024-05-29

## 2024-05-29 ENCOUNTER — APPOINTMENT (OUTPATIENT)
Dept: INFUSION THERAPY | Facility: HOSPITAL | Age: 69
End: 2024-05-29

## 2024-05-29 LAB
ALBUMIN SERPL ELPH-MCNC: 3.9 G/DL — SIGNIFICANT CHANGE UP (ref 3.3–5)
ALP SERPL-CCNC: 202 U/L — HIGH (ref 40–120)
ALT FLD-CCNC: 15 U/L — SIGNIFICANT CHANGE UP (ref 10–45)
ANION GAP SERPL CALC-SCNC: 14 MMOL/L — SIGNIFICANT CHANGE UP (ref 5–17)
AST SERPL-CCNC: 22 U/L — SIGNIFICANT CHANGE UP (ref 10–40)
BASOPHILS # BLD AUTO: 0.02 K/UL — SIGNIFICANT CHANGE UP (ref 0–0.2)
BASOPHILS NFR BLD AUTO: 0.3 % — SIGNIFICANT CHANGE UP (ref 0–2)
BILIRUB SERPL-MCNC: 0.4 MG/DL — SIGNIFICANT CHANGE UP (ref 0.2–1.2)
BUN SERPL-MCNC: 28 MG/DL — HIGH (ref 7–23)
CALCIUM SERPL-MCNC: 9.4 MG/DL — SIGNIFICANT CHANGE UP (ref 8.4–10.5)
CHLORIDE SERPL-SCNC: 108 MMOL/L — SIGNIFICANT CHANGE UP (ref 96–108)
CO2 SERPL-SCNC: 21 MMOL/L — LOW (ref 22–31)
CREAT SERPL-MCNC: 0.55 MG/DL — SIGNIFICANT CHANGE UP (ref 0.5–1.3)
EGFR: 100 ML/MIN/1.73M2 — SIGNIFICANT CHANGE UP
EOSINOPHIL # BLD AUTO: 0.06 K/UL — SIGNIFICANT CHANGE UP (ref 0–0.5)
EOSINOPHIL NFR BLD AUTO: 0.9 % — SIGNIFICANT CHANGE UP (ref 0–6)
GLUCOSE SERPL-MCNC: 101 MG/DL — HIGH (ref 70–99)
HCT VFR BLD CALC: 31 % — LOW (ref 34.5–45)
HGB BLD-MCNC: 9.6 G/DL — LOW (ref 11.5–15.5)
IMM GRANULOCYTES NFR BLD AUTO: 0.5 % — SIGNIFICANT CHANGE UP (ref 0–0.9)
LYMPHOCYTES # BLD AUTO: 1.2 K/UL — SIGNIFICANT CHANGE UP (ref 1–3.3)
LYMPHOCYTES # BLD AUTO: 18.7 % — SIGNIFICANT CHANGE UP (ref 13–44)
MCHC RBC-ENTMCNC: 27.4 PG — SIGNIFICANT CHANGE UP (ref 27–34)
MCHC RBC-ENTMCNC: 31 G/DL — LOW (ref 32–36)
MCV RBC AUTO: 88.6 FL — SIGNIFICANT CHANGE UP (ref 80–100)
MONOCYTES # BLD AUTO: 0.43 K/UL — SIGNIFICANT CHANGE UP (ref 0–0.9)
MONOCYTES NFR BLD AUTO: 6.7 % — SIGNIFICANT CHANGE UP (ref 2–14)
NEUTROPHILS # BLD AUTO: 4.67 K/UL — SIGNIFICANT CHANGE UP (ref 1.8–7.4)
NEUTROPHILS NFR BLD AUTO: 72.9 % — SIGNIFICANT CHANGE UP (ref 43–77)
NRBC # BLD: 0 /100 WBCS — SIGNIFICANT CHANGE UP (ref 0–0)
PLATELET # BLD AUTO: 228 K/UL — SIGNIFICANT CHANGE UP (ref 150–400)
POTASSIUM SERPL-MCNC: 4.2 MMOL/L — SIGNIFICANT CHANGE UP (ref 3.5–5.3)
POTASSIUM SERPL-SCNC: 4.2 MMOL/L — SIGNIFICANT CHANGE UP (ref 3.5–5.3)
PROT SERPL-MCNC: 7.4 G/DL — SIGNIFICANT CHANGE UP (ref 6–8.3)
RBC # BLD: 3.5 M/UL — LOW (ref 3.8–5.2)
RBC # FLD: 16.6 % — HIGH (ref 10.3–14.5)
SODIUM SERPL-SCNC: 143 MMOL/L — SIGNIFICANT CHANGE UP (ref 135–145)
WBC # BLD: 6.41 K/UL — SIGNIFICANT CHANGE UP (ref 3.8–10.5)
WBC # FLD AUTO: 6.41 K/UL — SIGNIFICANT CHANGE UP (ref 3.8–10.5)

## 2024-05-30 ENCOUNTER — OUTPATIENT (OUTPATIENT)
Dept: OUTPATIENT SERVICES | Facility: HOSPITAL | Age: 69
LOS: 1 days | End: 2024-05-30
Payer: MEDICAID

## 2024-05-30 ENCOUNTER — RESULT REVIEW (OUTPATIENT)
Age: 69
End: 2024-05-30

## 2024-05-30 ENCOUNTER — TRANSCRIPTION ENCOUNTER (OUTPATIENT)
Age: 69
End: 2024-05-30

## 2024-05-30 ENCOUNTER — APPOINTMENT (OUTPATIENT)
Dept: MRI IMAGING | Facility: CLINIC | Age: 69
End: 2024-05-30

## 2024-05-30 DIAGNOSIS — Z96.642 PRESENCE OF LEFT ARTIFICIAL HIP JOINT: Chronic | ICD-10-CM

## 2024-05-30 DIAGNOSIS — Z90.49 ACQUIRED ABSENCE OF OTHER SPECIFIED PARTS OF DIGESTIVE TRACT: Chronic | ICD-10-CM

## 2024-05-30 DIAGNOSIS — Z00.8 ENCOUNTER FOR OTHER GENERAL EXAMINATION: ICD-10-CM

## 2024-05-30 DIAGNOSIS — M06.9 RHEUMATOID ARTHRITIS, UNSPECIFIED: ICD-10-CM

## 2024-05-30 PROBLEM — A49.8 CLOSTRIDIOIDES DIFFICILE INFECTION: Status: ACTIVE | Noted: 2024-05-15

## 2024-05-30 LAB
CEA SERPL-MCNC: 53.1 NG/ML — HIGH (ref 0–3.8)
FERRITIN SERPL-MCNC: 93 NG/ML — SIGNIFICANT CHANGE UP (ref 13–330)
IRON SATN MFR SERPL: 10 % — LOW (ref 14–50)
IRON SATN MFR SERPL: 35 UG/DL — SIGNIFICANT CHANGE UP (ref 30–160)
TIBC SERPL-MCNC: 348 UG/DL — SIGNIFICANT CHANGE UP (ref 220–430)
UIBC SERPL-MCNC: 313 UG/DL — SIGNIFICANT CHANGE UP (ref 110–370)

## 2024-05-30 PROCEDURE — 72146 MRI CHEST SPINE W/O DYE: CPT

## 2024-05-30 PROCEDURE — 72148 MRI LUMBAR SPINE W/O DYE: CPT | Mod: 26

## 2024-05-30 PROCEDURE — 72146 MRI CHEST SPINE W/O DYE: CPT | Mod: 26

## 2024-05-30 PROCEDURE — 72148 MRI LUMBAR SPINE W/O DYE: CPT

## 2024-05-30 RX ORDER — IRBESARTAN 75 MG/1
75 TABLET ORAL
Qty: 90 | Refills: 1 | Status: ACTIVE | COMMUNITY
Start: 1900-01-01 | End: 1900-01-01

## 2024-05-30 NOTE — REVIEW OF SYSTEMS
[Fatigue] : fatigue [Constipation] : constipation [Joint Pain] : joint pain [Joint Stiffness] : joint stiffness [Negative] : Allergic/Immunologic [Abdominal Pain] : abdominal pain [Recent Change In Weight] : ~T no recent weight change [FreeTextEntry7] : tenderness over incision site  [FreeTextEntry9] : +back pain [de-identified] : +PN

## 2024-05-30 NOTE — REASON FOR VISIT
[Follow-Up Visit] : a follow-up [Spouse] : spouse [Ad Hoc ] : provided by an ad hoc  [Family Member] : family member [FreeTextEntry2] : Stage IV colon cancer  [FreeTextEntry3] : Mandarin [TWNoteComboBox1] : Other

## 2024-05-30 NOTE — PHYSICAL EXAM
[Restricted in physically strenuous activity but ambulatory and able to carry out work of a light or sedentary nature] : Status 1- Restricted in physically strenuous activity but ambulatory and able to carry out work of a light or sedentary nature, e.g., light house work, office work [Thin] : thin [Normal] : affect appropriate [de-identified] : Able to rotate to the left and right without pain; unable to lean forward due to pain; no point tenderness; pain not reproducible with palpation [de-identified] : well healed midline scar, ANGELI pump in LLQ, well healed scar  [de-identified] : erythematous, macular, slightly reticular lesions (telangiectasias) throughout her hands and face, stable

## 2024-05-30 NOTE — HISTORY OF PRESENT ILLNESS
[Disease: _____________________] : Disease: [unfilled] [T: ___] : T[unfilled] [N: ___] : N[unfilled] [M: ___] : M[unfilled] [AJCC Stage: ____] : AJCC Stage: [unfilled] [de-identified] : 67 yo Mandarin-speaking F with a PMH of HTN, RA, scleroderma, and mild pulmonary hypertension who presents for management of Stage IV R colon cancer, GEORGINAAngela Stevens has been gradually losing weight over years. She has also had lifelong intermittent diarrhea for decades, but she has never had a workup for it. Her diarrhea is food related. She had an EGD about 10 years ago that showed esophageal inflammation and acid reflux, but she has never had a colonoscopy prior to the below events. She originally moved to the  from [place] in the summer of 2023. In September, she presented with abdominal pain and was found to have a Hb 6.8 with acute cholecystitis and had a percutaneous cholecystostomy drain placed. She also had 1U PRBCs.  She was then readmitted to [facility] from 10/22 - 11/6/23 for tube dislodgement, with CT showing ascending colon wall thickening, with a few liver lesions c/w mucinous metastases (largest 2.5 x 2.0 cm in the R hepatic lobe, others included a 0.8 cm segment 4A/8 lesion and a 0.9 cm segment 2/3 lesion). She also had continued anemia (Hb 7.0) requiring 2U PRBCs during admission. She underwent a colonoscopy on 10/24 that showed a circumferential ascending colon mass about 5 cm above the cecum, biopsy showing adenocarcinoma, moderately differentiated with a mucinous component. On 11/1/23, she underwent a R hemicolectomy with cholecystectomy, showing a 10.2 cm tumor with negative margins (closest 0.3 cm), no LVI or PNI, 0/25 LNs positive, and intermediate (5-9) tumor budding score. MS stable. T3N0  She lives with her , daughter and her , and her 2 grandchildren.  Referred to medical oncology for further management.   1/8/24: CT chest: no suspicious pulm nodules  MRI Abd: bilobar hepatic lesions, suspicious for mucinous hepatic metastases, with lesion increased in size, new or stable compared to prior  1/12/24: Liver bx: met colon ca 1/22/24: C1 5FU/LV 2/7/24: C2 FOLFOX, oxaliplatin 65 mg/m2 2/19/24: C3, Oxaliplatin 75 mg/m2 3/4/24: C4, Oxaliplatin 65 mg/m2 3/7/24: CT Chest, CTA a/p: enlarging hepatic mets 3/18/24: C5 3/28/24: Went to ED w/ back pain after heavy lifting  CT AP: Acute L1 superior endplate compression deformity with mild loss of height, new from prior CT of 3/7/2024. Bilobar hepatic lesions, slightly decrease in size when compared to prior CT of 3/7/2024. 4/1/24: C6 4/15/24: chemo held due to placement of ANGELI pump  4/23/24: CT CAP: no lung mets, stable liver lesions, precarinal 1.2 cm node is unchanged  MR abd: Metastatic liver lesions noted for upcoming surgery 5/1/24: underwent exploratory laparotomy, hepatic artery infusion pump placement, and microwave ablation of multiple liver masses. 2 liver mets left untreated post op c/b aspiration PNA, tx with PNA  [de-identified] : moderately differentiated adenocarcinoma with mucinous component, GEORGINA [de-identified] : KRAS Q61H NRAS wildtype APC [id number], R876* [id number] R465C [id number] [de-identified] : CEA 57.5 (10/24/23) [FreeTextEntry1] : on hold d/t recent surgery  [de-identified] : here for post op f/u. d/c last week, had diarrhea upon d/c. stool sent for c diff, came back + today. diarrhea has resolved.  some tenderness at the incision site.  feels tired, in a wheelchair today.  weight is stable. eating ok.  has back pain related to vertebral fx, seeing ortho tomorrow.

## 2024-05-31 ENCOUNTER — APPOINTMENT (OUTPATIENT)
Dept: CARDIOLOGY | Facility: CLINIC | Age: 69
End: 2024-05-31
Payer: MEDICAID

## 2024-05-31 ENCOUNTER — APPOINTMENT (OUTPATIENT)
Dept: INFUSION THERAPY | Facility: HOSPITAL | Age: 69
End: 2024-05-31

## 2024-05-31 VITALS
HEART RATE: 79 BPM | WEIGHT: 93 LBS | HEIGHT: 59 IN | SYSTOLIC BLOOD PRESSURE: 104 MMHG | DIASTOLIC BLOOD PRESSURE: 60 MMHG | BODY MASS INDEX: 18.75 KG/M2 | OXYGEN SATURATION: 94 %

## 2024-05-31 DIAGNOSIS — E78.5 HYPERLIPIDEMIA, UNSPECIFIED: ICD-10-CM

## 2024-05-31 DIAGNOSIS — I27.20 PULMONARY HYPERTENSION, UNSPECIFIED: ICD-10-CM

## 2024-05-31 DIAGNOSIS — I10 ESSENTIAL (PRIMARY) HYPERTENSION: ICD-10-CM

## 2024-05-31 DIAGNOSIS — Z13.6 ENCOUNTER FOR SCREENING FOR CARDIOVASCULAR DISORDERS: ICD-10-CM

## 2024-05-31 PROCEDURE — 93000 ELECTROCARDIOGRAM COMPLETE: CPT

## 2024-05-31 PROCEDURE — 99214 OFFICE O/P EST MOD 30 MIN: CPT | Mod: 25

## 2024-05-31 PROCEDURE — G2211 COMPLEX E/M VISIT ADD ON: CPT | Mod: NC

## 2024-05-31 NOTE — ASSESSMENT
[FreeTextEntry1] : 68-year-old female with h/o scleroderma and RA here to establish care.   1. Systemic sclerosis: does not have skin tightening, but with telangiectasias, ILD and possible pulmonary HTN. Also with a h/o RA c/o worsening joint pain and stiffness.  Has metastatic colon cancer for which she is undergoing chemotherapy at present. Her main complaint at this time is of diffuse arthralgias without any evidence of synovitis.   i) Skin: no skin tightening but has telangiectasias. Denies any Raynaud's symptoms and did not have any color changes today during my exam. Will monitor for now.  ii) MSK: has diffuse arthralgias without synovitis, could consider Plaquenil at this time if her G6PD is normal. If not, will perhaps do a trial of low dose steroids. She is not a candidate for more aggressive immunosuppressive therapy at this time.  iii) Pulmonary: CT scan suggestive of early ILD. Consider repeating in 6 months to evaluate for progression as this will help direct treatment strategy.  PFTs referral given. Also, referred to pulmonology.  iv) Cardiac: TTE ordered to evaluate for PAH. .  v) GI: Recent C. diff infection that is being treated with vancomycin. H/o metastatic colon cancer and is on chemotherapy. Being followed by GI and heme/onc.   vi) Renal: No h/o renal involvement.  vii) Heme: as above. Will monitor labs  viii) Neuro: no active issues  2. Bone health: referred for DEXA. Ca+D as needed  Follow up n 2 months

## 2024-05-31 NOTE — REASON FOR VISIT
[Initial Evaluation] : an initial evaluation [Family Member] : family member [Ad Hoc ] : provided by an ad hoc  [Time Spent: ____ minutes] : Total time spent using  services: [unfilled] minutes. The patient's primary language is not English thus required  services. [FreeTextEntry1] : Scleroderma and RA

## 2024-05-31 NOTE — HISTORY OF PRESENT ILLNESS
[Currently Experiencing] : currently [Malaise] : malaise [Fatigue] : fatigue [Skin Lesions] : skin lesions [Shortness of Breath] : shortness of breath [Arthralgias] : arthralgias [Joint Swelling] : joint swelling [Decreased ROM] : decreased range of motion [Difficulty Standing] : difficulty standing [Difficulty Walking] : difficulty walking [FreeTextEntry1] : 68 year old Mandarin speaking female here to establish care for Scleroderma and ILD  She moved here from [place] last year. She was under the care of a rheumatologist for the least 30 years for scleroderma and RA. She was on treatment but we are unable to transcribe the medication name. States it was 6 tabs three times daily. She was on it from 2007 - 2021. She stopped the treatment during COVID due to access to medical care. Does not feel any different since stopping the medications.   Has joint pains in the wrists, shoulders, elbows, knees and ankles. Notices some swelling in the ankles. Recent h/o acute back pain. Feels that the joint pains have worsened. No morning stiffness.   C/o Raynaud's in cold weather. She may have had digital ulcers in the past (> 3 years ago). No on any medications for this to her knowledge.   Has telangiectasia of the face and upper extremities. Has a h/o pulmonary HTN s/p RHC in 2018.She was prescribed Sildenafil for a while but then was discontinued and was told she didn't need it anymore. Denies any SOB or cough at present.   Her metastatic colon cancer was diagnosed last year - no symptoms, just did colonoscopy that found the cancer. She is currently receiving chemotherapy.    [Anorexia] : no anorexia [Weight Loss] : no weight loss [Fever] : no fever [Chills] : no chills [Depression] : no depression [Malar Facial Rash] : no malar facial rash [Skin Nodules] : no skin nodules [Oral Ulcers] : no oral ulcers [Cough] : no cough [Dry Mouth] : no dry mouth [Dysphonia] : no dysphonia [Dysphagia] : no dysphagia [Chest Pain] : no chest pain [Joint Warmth] : no joint warmth [Joint Deformity] : no joint deformity [Morning Stiffness] : no morning stiffness [Falls] : no falls [Dyspnea] : no dyspnea [Myalgias] : no myalgias [Muscle Weakness] : no muscle weakness [Muscle Spasms] : no muscle spasms [Muscle Cramping] : no muscle cramping [Visual Changes] : no visual changes [Eye Pain] : no eye pain [Eye Redness] : no eye redness [Dry Eyes] : no dry eyes

## 2024-05-31 NOTE — PHYSICAL EXAM
Order was written/H&P was completed/Contractions pattern was reviewed/FHR was reviewed/Induction / Augmentation was discussed [General Appearance - Alert] : alert [General Appearance - In No Acute Distress] : in no acute distress [Sclera] : the sclera and conjunctiva were normal [PERRL With Normal Accommodation] : pupils were equal in size, round, and reactive to light [Extraocular Movements] : extraocular movements were intact [Outer Ear] : the ears and nose were normal in appearance [Oropharynx] : the oropharynx was normal [Neck Appearance] : the appearance of the neck was normal [Neck Cervical Mass (___cm)] : no neck mass was observed [Jugular Venous Distention Increased] : there was no jugular-venous distention [Thyroid Diffuse Enlargement] : the thyroid was not enlarged [Thyroid Nodule] : there were no palpable thyroid nodules [] : no respiratory distress [Auscultation Breath Sounds / Voice Sounds] : lungs were clear to auscultation bilaterally [Heart Rate And Rhythm] : heart rate was normal and rhythm regular [Heart Sounds] : normal S1 and S2 [Heart Sounds Gallop] : no gallops [Murmurs] : no murmurs [Heart Sounds Pericardial Friction Rub] : no pericardial rub [Full Pulse] : the pedal pulses are present [Edema] : there was no peripheral edema [Cervical Lymph Nodes Enlarged Posterior Bilaterally] : posterior cervical [Cervical Lymph Nodes Enlarged Anterior Bilaterally] : anterior cervical [Supraclavicular Lymph Nodes Enlarged Bilaterally] : supraclavicular [Axillary Lymph Nodes Enlarged Bilaterally] : axillary [No CVA Tenderness] : no ~M costovertebral angle tenderness [No Spinal Tenderness] : no spinal tenderness [No Focal Deficits] : no focal deficits [Oriented To Time, Place, And Person] : oriented to person, place, and time [Impaired Insight] : insight and judgment were intact [Affect] : the affect was normal [FreeTextEntry1] : scattered telangiectasias over the face and extremities. No skin tightening. Healed scars of prior digital ulcers

## 2024-05-31 NOTE — DATA REVIEWED
[FreeTextEntry1] : Prior records reviewed in [facility]   CT Chest (03/2024): AIRWAYS, LUNGS, PLEURA: Few mucoid impacted peripheral airways. No suspicious lung nodule or mass. Peripheral ground-glass opacities and subpleural cysts at the lung bases similar to prior exam. No pleural effusion. MEDIASTINUM: Normal heart size. No pericardial effusion. Aortic root calcifications. Dilated ascending thoracic aorta measuring 4 cm, unchanged. Precarinal 1.2 x 1 cm node (image 46, series 3) series is unchanged compared to 10/22/2023 CT chest. No large hilar nodes. Debris within the lumen of the esophagus. Port-A-Cath in place. IMAGED ABDOMEN: See report of same day MRI abdomen. SOFT TISSUES: Unremarkable. BONES: Unchanged loss of height of L1 vertebral body. IMPRESSION:. No suspicious lung nodule or mass. Precarinal 1.2 x 1 cm node is unchanged compared to 10/22/2023 CT chest.   CT abdomen and pelvis (03/2024): CHEST: LUNGS AND LARGE AIRWAYS: Patent central airways. No pulmonary nodules. PLEURA: No pleural effusion. VESSELS: Stable appearance. HEART: Heart size is normal.  No pericardial effusion. MEDIASTINUM AND FROILAN: No lymphadenopathy. CHEST WALL AND LOWER NECK: Within normal limits. ABDOMEN AND PELVIS: LIVER: Hypodense liver lesions are essentially unchanged. A reference 2.6 x 2 cm right lobe lesion series 3 image 66. BILE DUCTS: Normal caliber. GALLBLADDER: Status post cholecystectomy. SPLEEN: Within normal limits. PANCREAS: Within normal limits. ADRENALS: Within normal limits. KIDNEYS/URETERS: Within normal limits. BLADDER: Obscured by hardware artifact. REPRODUCTIVE ORGANS: Fibroid uterus. BOWEL: No bowel obstruction. Right hemicolectomy. Mild duodenal distention is unchanged. PERITONEUM: No ascites. VESSELS:  Within normal limits. RETROPERITONEUM/LYMPH NODES: No lymphadenopathy. ABDOMINAL WALL: Within normal limits. BONES: Bilateral hip arthroplasty. IMPRESSION: No evidence for lung metastasis. Stable liver lesions.

## 2024-06-03 ENCOUNTER — APPOINTMENT (OUTPATIENT)
Dept: HEMATOLOGY ONCOLOGY | Facility: CLINIC | Age: 69
End: 2024-06-03
Payer: MEDICAID

## 2024-06-03 VITALS
WEIGHT: 92.59 LBS | HEART RATE: 86 BPM | RESPIRATION RATE: 17 BRPM | SYSTOLIC BLOOD PRESSURE: 131 MMHG | OXYGEN SATURATION: 93 % | BODY MASS INDEX: 18.7 KG/M2 | TEMPERATURE: 97.7 F | DIASTOLIC BLOOD PRESSURE: 82 MMHG

## 2024-06-03 PROCEDURE — 99214 OFFICE O/P EST MOD 30 MIN: CPT

## 2024-06-05 NOTE — REVIEW OF SYSTEMS
[Fatigue] : fatigue [Recent Change In Weight] : ~T recent weight change [Joint Pain] : joint pain [Joint Stiffness] : joint stiffness [Negative] : Allergic/Immunologic [Abdominal Pain] : abdominal pain [Constipation] : no constipation [de-identified] : +PN

## 2024-06-05 NOTE — HISTORY OF PRESENT ILLNESS
[Disease: _____________________] : Disease: [unfilled] [T: ___] : T[unfilled] [N: ___] : N[unfilled] [M: ___] : M[unfilled] [AJCC Stage: ____] : AJCC Stage: [unfilled] [Therapy: ___] : Therapy: [unfilled] [de-identified] : 69 yo Mandarin-speaking F with a PMH of HTN, RA, scleroderma, and mild pulmonary hypertension who presents for management of Stage IV R colon cancer, GEORGINAAngela Stevens has been gradually losing weight over years. She has also had lifelong intermittent diarrhea for decades, but she has never had a workup for it. Her diarrhea is food related. She had an EGD about 10 years ago that showed esophageal inflammation and acid reflux, but she has never had a colonoscopy prior to the below events. She originally moved to the  from [place] in the summer of 2023. In September, she presented with abdominal pain and was found to have a Hb 6.8 with acute cholecystitis and had a percutaneous cholecystostomy drain placed. She also had 1U PRBCs.  She was then readmitted to [facility] from 10/22 - 11/6/23 for tube dislodgement, with CT showing ascending colon wall thickening, with a few liver lesions c/w mucinous metastases (largest 2.5 x 2.0 cm in the R hepatic lobe, others included a 0.8 cm segment 4A/8 lesion and a 0.9 cm segment 2/3 lesion). She also had continued anemia (Hb 7.0) requiring 2U PRBCs during admission. She underwent a colonoscopy on 10/24 that showed a circumferential ascending colon mass about 5 cm above the cecum, biopsy showing adenocarcinoma, moderately differentiated with a mucinous component. On 11/1/23, she underwent a R hemicolectomy with cholecystectomy, showing a 10.2 cm tumor with negative margins (closest 0.3 cm), no LVI or PNI, 0/25 LNs positive, and intermediate (5-9) tumor budding score. MS stable. T3N0  She lives with her , daughter and her , and her 2 grandchildren.  Referred to medical oncology for further management.   1/8/24: CT chest: no suspicious pulm nodules  MRI Abd: bilobar hepatic lesions, suspicious for mucinous hepatic metastases, with lesion increased in size, new or stable compared to prior  1/12/24: Liver bx: met colon ca 1/22/24: C1 5FU/LV 2/7/24: C2 FOLFOX, oxaliplatin 65 mg/m2 2/19/24: C3, Oxaliplatin 75 mg/m2 3/4/24: C4, Oxaliplatin 65 mg/m2 3/7/24: CT Chest, CTA a/p: enlarging hepatic mets 3/18/24: C5 3/28/24: Went to ED w/ back pain after heavy lifting  CT AP: Acute L1 superior endplate compression deformity with mild loss of height, new from prior CT of 3/7/2024. Bilobar hepatic lesions, slightly decrease in size when compared to prior CT of 3/7/2024. 4/1/24: C6 4/23/24: CT CAP: no lung mets, stable liver lesions, precarinal 1.2 cm node is unchanged  4/15/24: chemo held due to placement of ANGELI pump  5/15/24: FUDR  [de-identified] : moderately differentiated adenocarcinoma with mucinous component, GEORGINA [de-identified] : CEA 57.5 (10/24/23) [de-identified] : KRAS Q61H NRAS wildtype APC [id number]*18, R876* FBXW7 R465C PIK3CA E545K [de-identified] : pain along incision after exercising 4-5 days. Comes and goes and slowly getting better. Has not needed to take anything for pain.  Eating less, gets full quickly. Taking PPI. Sometimes does 3 meals a day, sometimes snacks.  Lower energy. Sits a lot. Can perform basic ADLs independently.  reporting she has been more lethargic, slow to answer.  Back pain improved.  Admits to difficulty sleeping, though this is not new.  Bowels are normal. Denies fever/chills, n/v/d/c, dysuria

## 2024-06-05 NOTE — PHYSICAL EXAM
[Restricted in physically strenuous activity but ambulatory and able to carry out work of a light or sedentary nature] : Status 1- Restricted in physically strenuous activity but ambulatory and able to carry out work of a light or sedentary nature, e.g., light house work, office work [Thin] : thin [Normal] : affect appropriate [de-identified] : surgical scars CDI, nno TTP, no appreciable hernia  [de-identified] : Able to rotate to the left and right without pain; unable to lean forward due to pain; no point tenderness; pain not reproducible with palpation [de-identified] : erythematous, macular, slightly reticular lesions (telangiectasias) throughout her hands and face, stable

## 2024-06-05 NOTE — REASON FOR VISIT
[Follow-Up Visit] : a follow-up [Spouse] : spouse [Ad Hoc ] : provided by an ad hoc  [FreeTextEntry2] : Stage IV colon cancer on chemo  [Interpreters_IDNumber] : [id number] [Interpreters_FullName] : Wets [FreeTextEntry3] : Mandarin [TWNoteComboBox1] : Other

## 2024-06-06 ENCOUNTER — OUTPATIENT (OUTPATIENT)
Dept: OUTPATIENT SERVICES | Facility: HOSPITAL | Age: 69
LOS: 1 days | End: 2024-06-06
Payer: MEDICAID

## 2024-06-06 ENCOUNTER — APPOINTMENT (OUTPATIENT)
Dept: RADIOLOGY | Facility: IMAGING CENTER | Age: 69
End: 2024-06-06
Payer: MEDICAID

## 2024-06-06 DIAGNOSIS — T85.528A DISPLACEMENT OF OTHER GASTROINTESTINAL PROSTHETIC DEVICES, IMPLANTS AND GRAFTS, INITIAL ENCOUNTER: Chronic | ICD-10-CM

## 2024-06-06 DIAGNOSIS — Z96.641 PRESENCE OF RIGHT ARTIFICIAL HIP JOINT: Chronic | ICD-10-CM

## 2024-06-06 DIAGNOSIS — Z96.642 PRESENCE OF LEFT ARTIFICIAL HIP JOINT: Chronic | ICD-10-CM

## 2024-06-06 DIAGNOSIS — Z90.49 ACQUIRED ABSENCE OF OTHER SPECIFIED PARTS OF DIGESTIVE TRACT: Chronic | ICD-10-CM

## 2024-06-06 DIAGNOSIS — M81.0 AGE-RELATED OSTEOPOROSIS WITHOUT CURRENT PATHOLOGICAL FRACTURE: ICD-10-CM

## 2024-06-06 DIAGNOSIS — Z00.00 ENCOUNTER FOR GENERAL ADULT MEDICAL EXAMINATION WITHOUT ABNORMAL FINDINGS: ICD-10-CM

## 2024-06-06 PROCEDURE — 77080 DXA BONE DENSITY AXIAL: CPT | Mod: 26

## 2024-06-06 PROCEDURE — 77080 DXA BONE DENSITY AXIAL: CPT

## 2024-06-10 ENCOUNTER — RESULT REVIEW (OUTPATIENT)
Age: 69
End: 2024-06-10

## 2024-06-10 ENCOUNTER — APPOINTMENT (OUTPATIENT)
Dept: HEMATOLOGY ONCOLOGY | Facility: CLINIC | Age: 69
End: 2024-06-10

## 2024-06-10 LAB
ALBUMIN SERPL ELPH-MCNC: 3.6 G/DL
ALP BLD-CCNC: 425 U/L
ALT SERPL-CCNC: 33 U/L
ANION GAP SERPL CALC-SCNC: 14 MMOL/L
AST SERPL-CCNC: 21 U/L
BASOPHILS # BLD AUTO: 0.01 K/UL — SIGNIFICANT CHANGE UP (ref 0–0.2)
BASOPHILS NFR BLD AUTO: 0.2 % — SIGNIFICANT CHANGE UP (ref 0–2)
BILIRUB SERPL-MCNC: 0.3 MG/DL
BUN SERPL-MCNC: 21 MG/DL
CALCIUM SERPL-MCNC: 9.6 MG/DL
CHLORIDE SERPL-SCNC: 102 MMOL/L
CO2 SERPL-SCNC: 21 MMOL/L
CREAT SERPL-MCNC: 0.67 MG/DL
EGFR: 95 ML/MIN/1.73M2
EOSINOPHIL # BLD AUTO: 0.07 K/UL — SIGNIFICANT CHANGE UP (ref 0–0.5)
EOSINOPHIL NFR BLD AUTO: 1.2 % — SIGNIFICANT CHANGE UP (ref 0–6)
GLUCOSE SERPL-MCNC: 102 MG/DL
HCT VFR BLD CALC: 30.5 % — LOW (ref 34.5–45)
HGB BLD-MCNC: 9.4 G/DL — LOW (ref 11.5–15.5)
IMM GRANULOCYTES NFR BLD AUTO: 0.5 % — SIGNIFICANT CHANGE UP (ref 0–0.9)
LYMPHOCYTES # BLD AUTO: 0.98 K/UL — LOW (ref 1–3.3)
LYMPHOCYTES # BLD AUTO: 17.3 % — SIGNIFICANT CHANGE UP (ref 13–44)
MCHC RBC-ENTMCNC: 27 PG — SIGNIFICANT CHANGE UP (ref 27–34)
MCHC RBC-ENTMCNC: 30.8 G/DL — LOW (ref 32–36)
MCV RBC AUTO: 87.6 FL — SIGNIFICANT CHANGE UP (ref 80–100)
MONOCYTES # BLD AUTO: 0.31 K/UL — SIGNIFICANT CHANGE UP (ref 0–0.9)
MONOCYTES NFR BLD AUTO: 5.5 % — SIGNIFICANT CHANGE UP (ref 2–14)
NEUTROPHILS # BLD AUTO: 4.28 K/UL — SIGNIFICANT CHANGE UP (ref 1.8–7.4)
NEUTROPHILS NFR BLD AUTO: 75.3 % — SIGNIFICANT CHANGE UP (ref 43–77)
NRBC # BLD: 0 /100 WBCS — SIGNIFICANT CHANGE UP (ref 0–0)
PLATELET # BLD AUTO: 249 K/UL — SIGNIFICANT CHANGE UP (ref 150–400)
POTASSIUM SERPL-SCNC: 4.9 MMOL/L
PROT SERPL-MCNC: 7.4 G/DL
RBC # BLD: 3.48 M/UL — LOW (ref 3.8–5.2)
RBC # FLD: 15.9 % — HIGH (ref 10.3–14.5)
SODIUM SERPL-SCNC: 138 MMOL/L
WBC # BLD: 5.68 K/UL — SIGNIFICANT CHANGE UP (ref 3.8–10.5)
WBC # FLD AUTO: 5.68 K/UL — SIGNIFICANT CHANGE UP (ref 3.8–10.5)

## 2024-06-10 RX ORDER — ALENDRONATE SODIUM 35 MG/1
35 TABLET ORAL
Qty: 12 | Refills: 3 | Status: ACTIVE | COMMUNITY
Start: 2024-06-10 | End: 1900-01-01

## 2024-06-11 ENCOUNTER — APPOINTMENT (OUTPATIENT)
Dept: INFUSION THERAPY | Facility: HOSPITAL | Age: 69
End: 2024-06-11

## 2024-06-12 DIAGNOSIS — R11.2 NAUSEA WITH VOMITING, UNSPECIFIED: ICD-10-CM

## 2024-06-13 ENCOUNTER — APPOINTMENT (OUTPATIENT)
Dept: INFUSION THERAPY | Facility: HOSPITAL | Age: 69
End: 2024-06-13

## 2024-06-17 ENCOUNTER — APPOINTMENT (OUTPATIENT)
Dept: HEMATOLOGY ONCOLOGY | Facility: CLINIC | Age: 69
End: 2024-06-17
Payer: MEDICAID

## 2024-06-17 VITALS
WEIGHT: 94.8 LBS | HEART RATE: 67 BPM | OXYGEN SATURATION: 96 % | SYSTOLIC BLOOD PRESSURE: 131 MMHG | RESPIRATION RATE: 16 BRPM | BODY MASS INDEX: 19.15 KG/M2 | TEMPERATURE: 96.6 F | DIASTOLIC BLOOD PRESSURE: 83 MMHG

## 2024-06-17 DIAGNOSIS — K59.00 CONSTIPATION, UNSPECIFIED: ICD-10-CM

## 2024-06-17 DIAGNOSIS — M48.50XA COLLAPSED VERTEBRA, NOT ELSEWHERE CLASSIFIED, SITE UNSPECIFIED, INITIAL ENCOUNTER FOR FRACTURE: ICD-10-CM

## 2024-06-17 DIAGNOSIS — K52.1 TOXIC GASTROENTERITIS AND COLITIS: ICD-10-CM

## 2024-06-17 DIAGNOSIS — T45.1X5A TOXIC GASTROENTERITIS AND COLITIS: ICD-10-CM

## 2024-06-17 PROCEDURE — 99214 OFFICE O/P EST MOD 30 MIN: CPT

## 2024-06-17 PROCEDURE — T1013A: CUSTOM

## 2024-06-17 PROCEDURE — G2211 COMPLEX E/M VISIT ADD ON: CPT | Mod: NC

## 2024-06-17 RX ORDER — FAMOTIDINE 20 MG/1
20 TABLET, FILM COATED ORAL
Qty: 90 | Refills: 0 | Status: ACTIVE | COMMUNITY
Start: 2024-06-17 | End: 1900-01-01

## 2024-06-17 RX ORDER — PANTOPRAZOLE 20 MG/1
20 TABLET, DELAYED RELEASE ORAL
Qty: 30 | Refills: 3 | Status: ACTIVE | COMMUNITY
Start: 2024-04-15 | End: 1900-01-01

## 2024-06-19 ENCOUNTER — APPOINTMENT (OUTPATIENT)
Dept: CV DIAGNOSITCS | Facility: HOSPITAL | Age: 69
End: 2024-06-19

## 2024-06-19 ENCOUNTER — RESULT REVIEW (OUTPATIENT)
Age: 69
End: 2024-06-19

## 2024-06-19 ENCOUNTER — OUTPATIENT (OUTPATIENT)
Dept: OUTPATIENT SERVICES | Facility: HOSPITAL | Age: 69
LOS: 1 days | End: 2024-06-19
Payer: MEDICAID

## 2024-06-19 DIAGNOSIS — T85.528A DISPLACEMENT OF OTHER GASTROINTESTINAL PROSTHETIC DEVICES, IMPLANTS AND GRAFTS, INITIAL ENCOUNTER: Chronic | ICD-10-CM

## 2024-06-19 DIAGNOSIS — Z90.49 ACQUIRED ABSENCE OF OTHER SPECIFIED PARTS OF DIGESTIVE TRACT: Chronic | ICD-10-CM

## 2024-06-19 DIAGNOSIS — Z96.642 PRESENCE OF LEFT ARTIFICIAL HIP JOINT: Chronic | ICD-10-CM

## 2024-06-19 DIAGNOSIS — I27.20 PULMONARY HYPERTENSION, UNSPECIFIED: ICD-10-CM

## 2024-06-19 DIAGNOSIS — Z96.641 PRESENCE OF RIGHT ARTIFICIAL HIP JOINT: Chronic | ICD-10-CM

## 2024-06-19 PROBLEM — I10 HTN (HYPERTENSION): Status: ACTIVE | Noted: 2024-01-03

## 2024-06-19 PROBLEM — Z13.6 SCREENING FOR CARDIOVASCULAR CONDITION: Status: ACTIVE | Noted: 2024-06-19

## 2024-06-19 PROBLEM — E78.5 HYPERLIPIDEMIA: Status: ACTIVE | Noted: 2024-03-07

## 2024-06-19 PROCEDURE — 93306 TTE W/DOPPLER COMPLETE: CPT | Mod: 26

## 2024-06-20 LAB
ANA PAT FLD IF-IMP: ABNORMAL
ANA SER IF-ACNC: ABNORMAL
APPEARANCE: CLEAR
BILIRUBIN URINE: NEGATIVE
BLOOD URINE: NEGATIVE
C3 SERPL-MCNC: 173 MG/DL
C4 SERPL-MCNC: 31 MG/DL
CCP AB SER IA-ACNC: >250 UNITS
CENP-A: <11 SI
CENP-B: <11 SI
COLOR: YELLOW
CREAT SPEC-SCNC: 90 MG/DL
CREAT/PROT UR: 0.2 RATIO
DEPRECATED KAPPA LC FREE/LAMBDA SER: 1.09 RATIO
DSDNA AB SER-ACNC: <1 IU/ML
EJ AB SER QL: NEGATIVE
ENA JO1 AB SER IA-ACNC: <20 UNITS
ENA PM/SCL AB SER-ACNC: <20 UNITS
ENA RNP AB SER IA-ACNC: <0.2 AL
ENA SM AB SER IA-ACNC: <0.2 AL
ENA SM+RNP AB SER IA-ACNC: <20 UNITS
ENA SS-A AB SER IA-ACNC: <0.2 AL
ENA SS-A IGG SER QL: <20 UNITS
ENA SS-B AB SER IA-ACNC: <0.2 AL
FERRITIN SERPL-MCNC: 322 NG/ML
FIBRILLARIN AB SER QL: NEGATIVE
FIBRILLARIN: <11 SI
G6PD SER-CCNC: 23.4 U/G HGB
GLUCOSE QUALITATIVE U: NEGATIVE MG/DL
IGA SER QL IEP: 352 MG/DL
IGG SER QL IEP: 1055 MG/DL
IGM SER QL IEP: 98 MG/DL
KAPPA LC CSF-MCNC: 5.19 MG/DL
KAPPA LC SERPL-MCNC: 5.67 MG/DL
KETONES URINE: NEGATIVE MG/DL
KU AB SER QL: NEGATIVE
LEUKOCYTE ESTERASE URINE: ABNORMAL
MDA-5 (P140)(CADM-140): <20 UNITS
MI2 AB SER QL: NEGATIVE
NITRITE URINE: NEGATIVE
NXP-2 (P140): <20 UNITS
OJ AB SER QL: NEGATIVE
PH URINE: 5.5
PL12 AB SER QL: ABNORMAL
PL7 AB SER QL: NEGATIVE
PM/SCL-100: <11 SI
PM/SCL-75: <11 SI
PROT UR-MCNC: 15 MG/DL
PROTEIN URINE: NEGATIVE MG/DL
RF+CCP IGG SER-IMP: ABNORMAL
RHEUMATOID FACT SER QL: 324 IU/ML
RP11: <11 SI
RP155: <11 SI
SCL-70: <11 SI
SPECIFIC GRAVITY URINE: 1.02
SRP AB SERPL QL: NEGATIVE
TH/TO: <11 SI
THYROGLOB AB SERPL-ACNC: 178 IU/ML
THYROPEROXIDASE AB SERPL IA-ACNC: 571 IU/ML
TIF GAMMA (P155/140): <20 UNITS
U1-SNRNP RNP A: <11 SI
U1-SNRNP RNP C: <11 SI
U1-SNRNP RNP-70KD: <11 SI
U2 SNRNP AB SER QL: NEGATIVE
UROBILINOGEN URINE: 0.2 MG/DL

## 2024-06-22 LAB
MISCELLANEOUS TEST: NORMAL
PROC NAME: NORMAL

## 2024-06-24 ENCOUNTER — APPOINTMENT (OUTPATIENT)
Dept: HEMATOLOGY ONCOLOGY | Facility: CLINIC | Age: 69
End: 2024-06-24

## 2024-06-24 LAB
ALBUMIN SERPL ELPH-MCNC: 4 G/DL
ALP BLD-CCNC: 247 U/L
ALT SERPL-CCNC: 37 U/L
ANION GAP SERPL CALC-SCNC: 13 MMOL/L
AST SERPL-CCNC: 27 U/L
BILIRUB SERPL-MCNC: 0.5 MG/DL
BUN SERPL-MCNC: 23 MG/DL
CALCIUM SERPL-MCNC: 9.1 MG/DL
CHLORIDE SERPL-SCNC: 108 MMOL/L
CO2 SERPL-SCNC: 22 MMOL/L
CREAT SERPL-MCNC: 0.64 MG/DL
EGFR: 96 ML/MIN/1.73M2
GLUCOSE SERPL-MCNC: 102 MG/DL
POTASSIUM SERPL-SCNC: 4.3 MMOL/L
PROT SERPL-MCNC: 7.1 G/DL
SODIUM SERPL-SCNC: 143 MMOL/L

## 2024-06-24 NOTE — REVIEW OF SYSTEMS
[Fatigue] : fatigue [Recent Change In Weight] : ~T recent weight change [Negative] : Allergic/Immunologic [Constipation] : constipation [Abdominal Pain] : no abdominal pain [Joint Pain] : no joint pain [Joint Stiffness] : no joint stiffness [Muscle Pain] : no muscle pain [FreeTextEntry2] : gained 2 lbs [FreeTextEntry7] : + GERD [de-identified] : + facial redness [de-identified] : +PN

## 2024-06-24 NOTE — PHYSICAL EXAM
[Restricted in physically strenuous activity but ambulatory and able to carry out work of a light or sedentary nature] : Status 1- Restricted in physically strenuous activity but ambulatory and able to carry out work of a light or sedentary nature, e.g., light house work, office work [Thin] : thin [Normal] : affect appropriate [de-identified] : Able to rotate to the left and right without pain; unable to lean forward due to pain; no point tenderness; pain not reproducible with palpation [de-identified] :  palpable hepatic artery infusion pump in place, surgical scars well healed, no TTP, no appreciable hernia  [de-identified] : facial and upper extremity telangiectasias are stable

## 2024-06-24 NOTE — REASON FOR VISIT
[Follow-Up Visit] : a follow-up [Spouse] : spouse [Ad Hoc ] : provided by an ad hoc  [Time Spent: ____ minutes] : Total time spent using  services: [unfilled] minutes. The patient's primary language is not English thus required  services. [FreeTextEntry2] : Stage IV colon cancer with ANGELI pump [Interpreters_IDNumber] : [id number] [Interpreters_FullName] : Haley [FreeTextEntry3] : Mandarin [TWNoteComboBox1] : Other

## 2024-06-24 NOTE — HISTORY OF PRESENT ILLNESS
[Disease: _____________________] : Disease: [unfilled] [T: ___] : T[unfilled] [N: ___] : N[unfilled] [M: ___] : M[unfilled] [AJCC Stage: ____] : AJCC Stage: [unfilled] [Therapy: ___] : Therapy: [unfilled] [de-identified] : 67 yo Mandarin-speaking F with a PMH of HTN, RA, scleroderma, and mild pulmonary hypertension who presents for management of Stage IV R colon cancer, GEORGINAAngela Stevens has been gradually losing weight over years. She has also had lifelong intermittent diarrhea for decades, but she has never had a workup for it. Her diarrhea is food related. She had an EGD about 10 years ago that showed esophageal inflammation and acid reflux, but she has never had a colonoscopy prior to the below events. She originally moved to the  from [place] in the summer of 2023. In September, she presented with abdominal pain and was found to have a Hb 6.8 with acute cholecystitis and had a percutaneous cholecystostomy drain placed. She also had 1U PRBCs.  She was then readmitted to [facility] from 10/22 - 11/6/23 for tube dislodgement, with CT showing ascending colon wall thickening, with a few liver lesions c/w mucinous metastases (largest 2.5 x 2.0 cm in the R hepatic lobe, others included a 0.8 cm segment 4A/8 lesion and a 0.9 cm segment 2/3 lesion). She also had continued anemia (Hb 7.0) requiring 2U PRBCs during admission. She underwent a colonoscopy on 10/24 that showed a circumferential ascending colon mass about 5 cm above the cecum, biopsy showing adenocarcinoma, moderately differentiated with a mucinous component. On 11/1/23, she underwent a R hemicolectomy with cholecystectomy, showing a 10.2 cm tumor with negative margins (closest 0.3 cm), no LVI or PNI, 0/25 LNs positive, and intermediate (5-9) tumor budding score. MS stable. T3N0  She lives with her , daughter and her , and her 2 grandchildren.  Referred to medical oncology for further management.   1/8/24: CT chest: no suspicious pulm nodules  MRI Abd: bilobar hepatic lesions, suspicious for mucinous hepatic metastases, with lesion increased in size, new or stable compared to prior  1/12/24: Liver bx: met colon ca 1/22/24: C1 5FU/LV 2/7/24: C2 FOLFOX, oxaliplatin 65 mg/m2 2/19/24: C3, Oxaliplatin 75 mg/m2 3/4/24: C4, Oxaliplatin 65 mg/m2 3/7/24: CT Chest, CTA a/p: enlarging hepatic mets 3/18/24: C5 3/28/24: Went to ED w/ back pain after heavy lifting  CT AP: Acute L1 superior endplate compression deformity with mild loss of height, new from prior CT of 3/7/2024. Bilobar hepatic lesions, slightly decrease in size when compared to prior CT of 3/7/2024. 4/1/24: C6 4/23/24: CT CAP: no lung mets, stable liver lesions, precarinal 1.2 cm node is unchanged  4/15/24: ANGELI pump placement  5/15/24: FUDR  6/11/24: 5FU/LV, FUDR held 2/2 elevated alk phos, saline + dex  [de-identified] : moderately differentiated adenocarcinoma with mucinous component, GEORGINA [de-identified] : CEA 57.5 (10/24/23) [de-identified] : KRAS Q61H NRAS wildtype APC [id number]*18, R876* FBXW7 R465C PIK3CA E545K [de-identified] : Patient is accompanied by her  and daughter. Patient denies any pain. Patient has had less chest tightness and feels more comfortable sitting up straight.  She had acid reflux which woke her up from sleep. She also had GERD after eating agustin 2 days ago. she is taking PPI with meals in am.  She denies any N/V. She has constipation, with small stools. She is not taking stool softeners.  Patient's spouse noticed that hair loss has stopped. Appetite is ok. She has milk, an egg and bread for breakfast.  She denies neuropathy symptom.  PMD recently started calcium, vitamin D and rosuvastatin. Family is pleased that the patient has been in better spirits and has gained 2 lbs since her last visit. Her strength is improving.   her statin was started by PCP recently

## 2024-06-24 NOTE — END OF VISIT
[Time Spent: ___ minutes] : I have spent [unfilled] minutes of time on the encounter. [] : Fellow [FreeTextEntry3] : 69 yo Mandarin-speaking F h/o scleroderma/RA, Stage IV R colon cancer, GEORGINA, KRAS mutated s/p 1 cycle of 5FU/LV, 5 cycles of FOLFOX, placement of ANGELI pump on 5/1/24 w/ several liver lesions ablated during surgery. Received first FUDR 5/15/24.  Dose on 6/10/2024 of FUDR was held due to C2 elevated alk phos of 425.  - labs reviewed  -- grade 1 fatigue: lifestyle modifications discussed - Will continue protonix dose 20mg qdaily, advised to take prior to breakfast and start famotidine 20mg PO qhs for GERD, and start miralax for constipation.  - RTC on 6/24 for labs, and fill on 6/25 - Plan to repeat scans in July - will confirm with surg onc if prefer MR abd vs CT Abd - advised to hold statin for now so as not to confuse picture about elevated LFTs - Miralax PRN constipation  - scans to be repeated early July 2024 - cont 5FU/LV maintenance Q 2 weeks

## 2024-06-25 ENCOUNTER — APPOINTMENT (OUTPATIENT)
Dept: RHEUMATOLOGY | Facility: CLINIC | Age: 69
End: 2024-06-25
Payer: MEDICAID

## 2024-06-25 ENCOUNTER — RESULT REVIEW (OUTPATIENT)
Age: 69
End: 2024-06-25

## 2024-06-25 ENCOUNTER — APPOINTMENT (OUTPATIENT)
Dept: HEMATOLOGY ONCOLOGY | Facility: CLINIC | Age: 69
End: 2024-06-25

## 2024-06-25 ENCOUNTER — APPOINTMENT (OUTPATIENT)
Dept: INFUSION THERAPY | Facility: HOSPITAL | Age: 69
End: 2024-06-25

## 2024-06-25 VITALS
BODY MASS INDEX: 19.35 KG/M2 | HEIGHT: 59 IN | SYSTOLIC BLOOD PRESSURE: 162 MMHG | DIASTOLIC BLOOD PRESSURE: 84 MMHG | HEART RATE: 61 BPM | OXYGEN SATURATION: 96 % | WEIGHT: 96 LBS

## 2024-06-25 LAB
ALBUMIN SERPL ELPH-MCNC: 3.8 G/DL — SIGNIFICANT CHANGE UP (ref 3.3–5)
ALP SERPL-CCNC: 224 U/L — HIGH (ref 40–120)
ALT FLD-CCNC: 36 U/L — SIGNIFICANT CHANGE UP (ref 10–45)
ANION GAP SERPL CALC-SCNC: 11 MMOL/L — SIGNIFICANT CHANGE UP (ref 5–17)
AST SERPL-CCNC: 31 U/L — SIGNIFICANT CHANGE UP (ref 10–40)
BASOPHILS # BLD AUTO: 0.02 K/UL — SIGNIFICANT CHANGE UP (ref 0–0.2)
BASOPHILS NFR BLD AUTO: 0.4 % — SIGNIFICANT CHANGE UP (ref 0–2)
BILIRUB SERPL-MCNC: 0.5 MG/DL — SIGNIFICANT CHANGE UP (ref 0.2–1.2)
BUN SERPL-MCNC: 20 MG/DL — SIGNIFICANT CHANGE UP (ref 7–23)
CALCIUM SERPL-MCNC: 9 MG/DL — SIGNIFICANT CHANGE UP (ref 8.4–10.5)
CHLORIDE SERPL-SCNC: 109 MMOL/L — HIGH (ref 96–108)
CO2 SERPL-SCNC: 24 MMOL/L — SIGNIFICANT CHANGE UP (ref 22–31)
CREAT SERPL-MCNC: 0.77 MG/DL — SIGNIFICANT CHANGE UP (ref 0.5–1.3)
EGFR: 84 ML/MIN/1.73M2 — SIGNIFICANT CHANGE UP
EOSINOPHIL # BLD AUTO: 0.02 K/UL — SIGNIFICANT CHANGE UP (ref 0–0.5)
EOSINOPHIL NFR BLD AUTO: 0.4 % — SIGNIFICANT CHANGE UP (ref 0–6)
GLUCOSE SERPL-MCNC: 109 MG/DL — HIGH (ref 70–99)
HCT VFR BLD CALC: 32 % — LOW (ref 34.5–45)
HGB BLD-MCNC: 10.1 G/DL — LOW (ref 11.5–15.5)
IMM GRANULOCYTES NFR BLD AUTO: 0.6 % — SIGNIFICANT CHANGE UP (ref 0–0.9)
LYMPHOCYTES # BLD AUTO: 1.03 K/UL — SIGNIFICANT CHANGE UP (ref 1–3.3)
LYMPHOCYTES # BLD AUTO: 21.6 % — SIGNIFICANT CHANGE UP (ref 13–44)
MCHC RBC-ENTMCNC: 27.3 PG — SIGNIFICANT CHANGE UP (ref 27–34)
MCHC RBC-ENTMCNC: 31.6 G/DL — LOW (ref 32–36)
MCV RBC AUTO: 86.5 FL — SIGNIFICANT CHANGE UP (ref 80–100)
MONOCYTES # BLD AUTO: 0.29 K/UL — SIGNIFICANT CHANGE UP (ref 0–0.9)
MONOCYTES NFR BLD AUTO: 6.1 % — SIGNIFICANT CHANGE UP (ref 2–14)
NEUTROPHILS # BLD AUTO: 3.38 K/UL — SIGNIFICANT CHANGE UP (ref 1.8–7.4)
NEUTROPHILS NFR BLD AUTO: 70.9 % — SIGNIFICANT CHANGE UP (ref 43–77)
NRBC # BLD: 0 /100 WBCS — SIGNIFICANT CHANGE UP (ref 0–0)
PLATELET # BLD AUTO: 165 K/UL — SIGNIFICANT CHANGE UP (ref 150–400)
POTASSIUM SERPL-MCNC: 3.9 MMOL/L — SIGNIFICANT CHANGE UP (ref 3.5–5.3)
POTASSIUM SERPL-SCNC: 3.9 MMOL/L — SIGNIFICANT CHANGE UP (ref 3.5–5.3)
PROT SERPL-MCNC: 7 G/DL — SIGNIFICANT CHANGE UP (ref 6–8.3)
RBC # BLD: 3.7 M/UL — LOW (ref 3.8–5.2)
RBC # FLD: 17.3 % — HIGH (ref 10.3–14.5)
SODIUM SERPL-SCNC: 144 MMOL/L — SIGNIFICANT CHANGE UP (ref 135–145)
WBC # BLD: 4.77 K/UL — SIGNIFICANT CHANGE UP (ref 3.8–10.5)
WBC # FLD AUTO: 4.77 K/UL — SIGNIFICANT CHANGE UP (ref 3.8–10.5)

## 2024-06-25 PROCEDURE — T1013A: CUSTOM

## 2024-06-25 PROCEDURE — G2211 COMPLEX E/M VISIT ADD ON: CPT | Mod: NC

## 2024-06-25 PROCEDURE — 99214 OFFICE O/P EST MOD 30 MIN: CPT

## 2024-06-26 ENCOUNTER — TRANSCRIPTION ENCOUNTER (OUTPATIENT)
Age: 69
End: 2024-06-26

## 2024-06-27 ENCOUNTER — APPOINTMENT (OUTPATIENT)
Dept: INFUSION THERAPY | Facility: HOSPITAL | Age: 69
End: 2024-06-27

## 2024-06-27 ENCOUNTER — OUTPATIENT (OUTPATIENT)
Dept: OUTPATIENT SERVICES | Facility: HOSPITAL | Age: 69
LOS: 1 days | Discharge: HOME | End: 2024-06-27

## 2024-06-27 DIAGNOSIS — Z90.49 ACQUIRED ABSENCE OF OTHER SPECIFIED PARTS OF DIGESTIVE TRACT: Chronic | ICD-10-CM

## 2024-06-27 DIAGNOSIS — Z96.641 PRESENCE OF RIGHT ARTIFICIAL HIP JOINT: Chronic | ICD-10-CM

## 2024-06-27 DIAGNOSIS — C18.9 MALIGNANT NEOPLASM OF COLON, UNSPECIFIED: ICD-10-CM

## 2024-06-27 DIAGNOSIS — T85.528A DISPLACEMENT OF OTHER GASTROINTESTINAL PROSTHETIC DEVICES, IMPLANTS AND GRAFTS, INITIAL ENCOUNTER: Chronic | ICD-10-CM

## 2024-06-27 DIAGNOSIS — Z96.642 PRESENCE OF LEFT ARTIFICIAL HIP JOINT: Chronic | ICD-10-CM

## 2024-07-01 ENCOUNTER — APPOINTMENT (OUTPATIENT)
Dept: HEMATOLOGY ONCOLOGY | Facility: CLINIC | Age: 69
End: 2024-07-01
Payer: MEDICAID

## 2024-07-01 VITALS
WEIGHT: 95.24 LBS | TEMPERATURE: 98 F | SYSTOLIC BLOOD PRESSURE: 144 MMHG | DIASTOLIC BLOOD PRESSURE: 86 MMHG | OXYGEN SATURATION: 96 % | HEART RATE: 76 BPM | HEIGHT: 58.98 IN | BODY MASS INDEX: 19.2 KG/M2 | RESPIRATION RATE: 16 BRPM

## 2024-07-01 PROBLEM — C18.9 COLON CANCER METASTASIZED TO LIVER: Status: ACTIVE | Noted: 2023-11-15

## 2024-07-01 PROCEDURE — 99214 OFFICE O/P EST MOD 30 MIN: CPT

## 2024-07-01 RX ORDER — POLYETHYLENE GLYCOL 3350 17 G/17G
17 POWDER, FOR SOLUTION ORAL DAILY
Qty: 30 | Refills: 0 | Status: ACTIVE | COMMUNITY
Start: 2024-07-01 | End: 1900-01-01

## 2024-07-02 ENCOUNTER — APPOINTMENT (OUTPATIENT)
Dept: INTERNAL MEDICINE | Facility: CLINIC | Age: 69
End: 2024-07-02

## 2024-07-03 ENCOUNTER — APPOINTMENT (OUTPATIENT)
Dept: HEMATOLOGY ONCOLOGY | Facility: CLINIC | Age: 69
End: 2024-07-03

## 2024-07-03 ENCOUNTER — APPOINTMENT (OUTPATIENT)
Dept: INTERNAL MEDICINE | Facility: CLINIC | Age: 69
End: 2024-07-03
Payer: MEDICAID

## 2024-07-03 VITALS
HEART RATE: 71 BPM | OXYGEN SATURATION: 98 % | HEIGHT: 58 IN | DIASTOLIC BLOOD PRESSURE: 83 MMHG | WEIGHT: 95 LBS | RESPIRATION RATE: 16 BRPM | SYSTOLIC BLOOD PRESSURE: 146 MMHG | BODY MASS INDEX: 19.94 KG/M2 | TEMPERATURE: 97.2 F

## 2024-07-03 VITALS — SYSTOLIC BLOOD PRESSURE: 124 MMHG | DIASTOLIC BLOOD PRESSURE: 82 MMHG

## 2024-07-03 DIAGNOSIS — K21.9 GASTRO-ESOPHAGEAL REFLUX DISEASE W/OUT ESOPHAGITIS: ICD-10-CM

## 2024-07-03 DIAGNOSIS — E78.5 HYPERLIPIDEMIA, UNSPECIFIED: ICD-10-CM

## 2024-07-03 DIAGNOSIS — M81.0 AGE-RELATED OSTEOPOROSIS W/OUT CURRENT PATHOLOGICAL FRACTURE: ICD-10-CM

## 2024-07-03 PROCEDURE — G2211 COMPLEX E/M VISIT ADD ON: CPT | Mod: NC

## 2024-07-03 PROCEDURE — 99214 OFFICE O/P EST MOD 30 MIN: CPT

## 2024-07-05 ENCOUNTER — LABORATORY RESULT (OUTPATIENT)
Age: 69
End: 2024-07-05

## 2024-07-05 PROBLEM — K21.9 GERD (GASTROESOPHAGEAL REFLUX DISEASE): Status: ACTIVE | Noted: 2024-07-03

## 2024-07-08 ENCOUNTER — LABORATORY RESULT (OUTPATIENT)
Age: 69
End: 2024-07-08

## 2024-07-09 ENCOUNTER — APPOINTMENT (OUTPATIENT)
Dept: INFUSION THERAPY | Facility: HOSPITAL | Age: 69
End: 2024-07-09

## 2024-07-09 DIAGNOSIS — R11.2 NAUSEA WITH VOMITING, UNSPECIFIED: ICD-10-CM

## 2024-07-09 DIAGNOSIS — Z51.11 ENCOUNTER FOR ANTINEOPLASTIC CHEMOTHERAPY: ICD-10-CM

## 2024-07-10 ENCOUNTER — NON-APPOINTMENT (OUTPATIENT)
Age: 69
End: 2024-07-10

## 2024-07-11 ENCOUNTER — APPOINTMENT (OUTPATIENT)
Dept: INFUSION THERAPY | Facility: HOSPITAL | Age: 69
End: 2024-07-11

## 2024-07-19 ENCOUNTER — RESULT REVIEW (OUTPATIENT)
Age: 69
End: 2024-07-19

## 2024-07-19 ENCOUNTER — APPOINTMENT (OUTPATIENT)
Dept: HEMATOLOGY ONCOLOGY | Facility: CLINIC | Age: 69
End: 2024-07-19

## 2024-07-19 ENCOUNTER — APPOINTMENT (OUTPATIENT)
Dept: HEMATOLOGY ONCOLOGY | Facility: CLINIC | Age: 69
End: 2024-07-19
Payer: MEDICAID

## 2024-07-19 VITALS
DIASTOLIC BLOOD PRESSURE: 84 MMHG | OXYGEN SATURATION: 96 % | RESPIRATION RATE: 16 BRPM | SYSTOLIC BLOOD PRESSURE: 138 MMHG | BODY MASS INDEX: 20.18 KG/M2 | HEART RATE: 87 BPM | TEMPERATURE: 97.7 F | WEIGHT: 96.56 LBS

## 2024-07-19 DIAGNOSIS — C78.7 MALIGNANT NEOPLASM OF COLON, UNSPECIFIED: ICD-10-CM

## 2024-07-19 DIAGNOSIS — C18.9 MALIGNANT NEOPLASM OF COLON, UNSPECIFIED: ICD-10-CM

## 2024-07-19 LAB
BASOPHILS # BLD AUTO: 0.01 K/UL — SIGNIFICANT CHANGE UP (ref 0–0.2)
BASOPHILS NFR BLD AUTO: 0.2 % — SIGNIFICANT CHANGE UP (ref 0–2)
EOSINOPHIL # BLD AUTO: 0.04 K/UL — SIGNIFICANT CHANGE UP (ref 0–0.5)
EOSINOPHIL NFR BLD AUTO: 0.6 % — SIGNIFICANT CHANGE UP (ref 0–6)
HCT VFR BLD CALC: 37 % — SIGNIFICANT CHANGE UP (ref 34.5–45)
HGB BLD-MCNC: 11.4 G/DL — LOW (ref 11.5–15.5)
IMM GRANULOCYTES NFR BLD AUTO: 1.3 % — HIGH (ref 0–0.9)
LYMPHOCYTES # BLD AUTO: 1.21 K/UL — SIGNIFICANT CHANGE UP (ref 1–3.3)
LYMPHOCYTES # BLD AUTO: 19.5 % — SIGNIFICANT CHANGE UP (ref 13–44)
MCHC RBC-ENTMCNC: 27.5 PG — SIGNIFICANT CHANGE UP (ref 27–34)
MCHC RBC-ENTMCNC: 30.8 G/DL — LOW (ref 32–36)
MCV RBC AUTO: 89.2 FL — SIGNIFICANT CHANGE UP (ref 80–100)
MONOCYTES # BLD AUTO: 0.41 K/UL — SIGNIFICANT CHANGE UP (ref 0–0.9)
MONOCYTES NFR BLD AUTO: 6.6 % — SIGNIFICANT CHANGE UP (ref 2–14)
NEUTROPHILS # BLD AUTO: 4.44 K/UL — SIGNIFICANT CHANGE UP (ref 1.8–7.4)
NEUTROPHILS NFR BLD AUTO: 71.8 % — SIGNIFICANT CHANGE UP (ref 43–77)
NRBC # BLD: 0 /100 WBCS — SIGNIFICANT CHANGE UP (ref 0–0)
PLATELET # BLD AUTO: 152 K/UL — SIGNIFICANT CHANGE UP (ref 150–400)
RBC # BLD: 4.15 M/UL — SIGNIFICANT CHANGE UP (ref 3.8–5.2)
RBC # FLD: 19.4 % — HIGH (ref 10.3–14.5)
WBC # BLD: 6.19 K/UL — SIGNIFICANT CHANGE UP (ref 3.8–10.5)
WBC # FLD AUTO: 6.19 K/UL — SIGNIFICANT CHANGE UP (ref 3.8–10.5)

## 2024-07-19 PROCEDURE — 99214 OFFICE O/P EST MOD 30 MIN: CPT

## 2024-07-20 LAB
ALBUMIN SERPL ELPH-MCNC: 4.3 G/DL
ALP BLD-CCNC: 185 U/L
ALT SERPL-CCNC: 31 U/L
ANION GAP SERPL CALC-SCNC: 16 MMOL/L
AST SERPL-CCNC: 29 U/L
BILIRUB SERPL-MCNC: 0.6 MG/DL
BUN SERPL-MCNC: 30 MG/DL
CALCIUM SERPL-MCNC: 9.4 MG/DL
CEA SERPL-MCNC: 28.1 NG/ML
CHLORIDE SERPL-SCNC: 110 MMOL/L
CO2 SERPL-SCNC: 20 MMOL/L
CREAT SERPL-MCNC: 0.7 MG/DL
EGFR: 94 ML/MIN/1.73M2
GLUCOSE SERPL-MCNC: 117 MG/DL
POTASSIUM SERPL-SCNC: 5.1 MMOL/L
PROT SERPL-MCNC: 7.3 G/DL
SODIUM SERPL-SCNC: 146 MMOL/L

## 2024-07-23 ENCOUNTER — APPOINTMENT (OUTPATIENT)
Dept: INFUSION THERAPY | Facility: HOSPITAL | Age: 69
End: 2024-07-23

## 2024-07-24 ENCOUNTER — NON-APPOINTMENT (OUTPATIENT)
Age: 69
End: 2024-07-24

## 2024-07-25 ENCOUNTER — APPOINTMENT (OUTPATIENT)
Dept: INFUSION THERAPY | Facility: HOSPITAL | Age: 69
End: 2024-07-25

## 2024-07-29 ENCOUNTER — APPOINTMENT (OUTPATIENT)
Dept: HEMATOLOGY ONCOLOGY | Facility: CLINIC | Age: 69
End: 2024-07-29
Payer: MEDICAID

## 2024-07-29 VITALS
BODY MASS INDEX: 20.14 KG/M2 | SYSTOLIC BLOOD PRESSURE: 150 MMHG | WEIGHT: 96.34 LBS | DIASTOLIC BLOOD PRESSURE: 91 MMHG | TEMPERATURE: 97.3 F | OXYGEN SATURATION: 99 % | HEART RATE: 64 BPM | RESPIRATION RATE: 16 BRPM

## 2024-07-29 PROCEDURE — G2211 COMPLEX E/M VISIT ADD ON: CPT | Mod: NC

## 2024-07-29 PROCEDURE — 99214 OFFICE O/P EST MOD 30 MIN: CPT

## 2024-07-29 NOTE — PHYSICAL EXAM
[Restricted in physically strenuous activity but ambulatory and able to carry out work of a light or sedentary nature] : Status 1- Restricted in physically strenuous activity but ambulatory and able to carry out work of a light or sedentary nature, e.g., light house work, office work [Thin] : thin [Normal] : affect appropriate [de-identified] :  palpable hepatic artery infusion pump in place, surgical scars well healed, no TTP, no appreciable hernia  [de-identified] : facial and upper extremity telangiectasias are stable

## 2024-07-29 NOTE — ASSESSMENT
[Future Reassessment of Pain Scale] : Future reassessment of pain scale    [Palliative] : Goals of care discussed with patient: Palliative [Palliative Care Plan] : not applicable at this time [FreeTextEntry1] : Patient seen and discussed with Dr. Leena Guadalupe.

## 2024-07-29 NOTE — REVIEW OF SYSTEMS
[Negative] : Allergic/Immunologic [Joint Pain] : no joint pain [Joint Stiffness] : no joint stiffness [Muscle Pain] : no muscle pain [de-identified] : + facial redness [de-identified] : +PN

## 2024-07-29 NOTE — REASON FOR VISIT
[Follow-Up Visit] : a follow-up [Spouse] : spouse [Ad Hoc ] : provided by an ad hoc  [FreeTextEntry2] : Stage IV colon cancer with ANGELI pump [Interpreters_IDNumber] : [id number] [Interpreters_FullName] : Cory [FreeTextEntry3] : Mandarin [TWNoteComboBox1] : Other

## 2024-07-29 NOTE — REASON FOR VISIT
[Follow-Up Visit] : a follow-up [Spouse] : spouse [Ad Hoc ] : provided by an ad hoc  [FreeTextEntry2] : Stage IV colon cancer with ANGELI pump [Interpreters_IDNumber] : [id number] [Interpreters_FullName] : Cory [FreeTextEntry3] : Mandarin [TWNoteComboBox1] : Other

## 2024-07-29 NOTE — HISTORY OF PRESENT ILLNESS
[Disease: _____________________] : Disease: [unfilled] [T: ___] : T[unfilled] [N: ___] : N[unfilled] [M: ___] : M[unfilled] [AJCC Stage: ____] : AJCC Stage: [unfilled] [Therapy: ___] : Therapy: [unfilled] [Day: ___] : Day: [unfilled] [de-identified] : 67 yo Mandarin-speaking F with a PMH of HTN, RA, scleroderma, and mild pulmonary hypertension who presents for management of Stage IV R colon cancer, GEORGINAAngela Stevens has been gradually losing weight over years. She has also had lifelong intermittent diarrhea for decades, but she has never had a workup for it. Her diarrhea is food related. She had an EGD about 10 years ago that showed esophageal inflammation and acid reflux, but she has never had a colonoscopy prior to the below events. She originally moved to the  from [place] in the summer of 2023. In September, she presented with abdominal pain and was found to have a Hb 6.8 with acute cholecystitis and had a percutaneous cholecystostomy drain placed. She also had 1U PRBCs.  She was then readmitted to [facility] from 10/22 - 11/6/23 for tube dislodgement, with CT showing ascending colon wall thickening, with a few liver lesions c/w mucinous metastases (largest 2.5 x 2.0 cm in the R hepatic lobe, others included a 0.8 cm segment 4A/8 lesion and a 0.9 cm segment 2/3 lesion). She also had continued anemia (Hb 7.0) requiring 2U PRBCs during admission. She underwent a colonoscopy on 10/24 that showed a circumferential ascending colon mass about 5 cm above the cecum, biopsy showing adenocarcinoma, moderately differentiated with a mucinous component. On 11/1/23, she underwent a R hemicolectomy with cholecystectomy, showing a 10.2 cm tumor with negative margins (closest 0.3 cm), no LVI or PNI, 0/25 LNs positive, and intermediate (5-9) tumor budding score. MS stable. T3N0  She lives with her , daughter and her , and her 2 grandchildren.  Referred to medical oncology for further management.   1/8/24: CT chest: no suspicious pulm nodules  MRI Abd: bilobar hepatic lesions, suspicious for mucinous hepatic metastases, with lesion increased in size, new or stable compared to prior  1/12/24: Liver bx: met colon ca 1/22/24: C1 5FU/LV 2/7/24: C2 FOLFOX, oxaliplatin 65 mg/m2 2/19/24: C3, Oxaliplatin 75 mg/m2 3/4/24: C4, Oxaliplatin 65 mg/m2 3/7/24: CT Chest, CTA a/p: enlarging hepatic mets 3/18/24: C5 3/28/24: Went to ED w/ back pain after heavy lifting  CT AP: Acute L1 superior endplate compression deformity with mild loss of height, new from prior CT of 3/7/2024. Bilobar hepatic lesions, slightly decrease in size when compared to prior CT of 3/7/2024. 4/1/24: C6 4/23/24: CT CAP: no lung mets, stable liver lesions, precarinal 1.2 cm node is unchanged  4/15/24: ANGELI pump placement  5/15/24: FUDR  6/11/24: 5FU/LV, FUDR held 2/2 elevated alk phos, saline + dex  6/25/24: 5FU/LV 7/8/24: 5FU/LV + FUDR @ 50% 7/23/24: 5FU/LV + saline [de-identified] : moderately differentiated adenocarcinoma with mucinous component, GEORGINA [de-identified] : CEA 57.5 (10/24/23) [de-identified] : KRAS Q61H NRAS wildtype APC [id number]*18, R876* FBXW7 R465C PIK3CA E545K [de-identified] : Feels well Has no new complaints  eating well. Moves around her house  Bowels are normal  Has some difficulty falling asleep at night but overall is better

## 2024-07-29 NOTE — REVIEW OF SYSTEMS
[Negative] : Allergic/Immunologic [Joint Pain] : no joint pain [Joint Stiffness] : no joint stiffness [Muscle Pain] : no muscle pain [de-identified] : + facial redness [de-identified] : +PN

## 2024-07-29 NOTE — PHYSICAL EXAM
[Restricted in physically strenuous activity but ambulatory and able to carry out work of a light or sedentary nature] : Status 1- Restricted in physically strenuous activity but ambulatory and able to carry out work of a light or sedentary nature, e.g., light house work, office work [Thin] : thin [Normal] : affect appropriate [de-identified] :  palpable hepatic artery infusion pump in place, surgical scars well healed, no TTP, no appreciable hernia  [de-identified] : facial and upper extremity telangiectasias are stable

## 2024-07-29 NOTE — HISTORY OF PRESENT ILLNESS
[Disease: _____________________] : Disease: [unfilled] [T: ___] : T[unfilled] [N: ___] : N[unfilled] [M: ___] : M[unfilled] [AJCC Stage: ____] : AJCC Stage: [unfilled] [Therapy: ___] : Therapy: [unfilled] [Day: ___] : Day: [unfilled] [de-identified] : 67 yo Mandarin-speaking F with a PMH of HTN, RA, scleroderma, and mild pulmonary hypertension who presents for management of Stage IV R colon cancer, GEORGINAAngela Stevens has been gradually losing weight over years. She has also had lifelong intermittent diarrhea for decades, but she has never had a workup for it. Her diarrhea is food related. She had an EGD about 10 years ago that showed esophageal inflammation and acid reflux, but she has never had a colonoscopy prior to the below events. She originally moved to the  from [place] in the summer of 2023. In September, she presented with abdominal pain and was found to have a Hb 6.8 with acute cholecystitis and had a percutaneous cholecystostomy drain placed. She also had 1U PRBCs.  She was then readmitted to [facility] from 10/22 - 11/6/23 for tube dislodgement, with CT showing ascending colon wall thickening, with a few liver lesions c/w mucinous metastases (largest 2.5 x 2.0 cm in the R hepatic lobe, others included a 0.8 cm segment 4A/8 lesion and a 0.9 cm segment 2/3 lesion). She also had continued anemia (Hb 7.0) requiring 2U PRBCs during admission. She underwent a colonoscopy on 10/24 that showed a circumferential ascending colon mass about 5 cm above the cecum, biopsy showing adenocarcinoma, moderately differentiated with a mucinous component. On 11/1/23, she underwent a R hemicolectomy with cholecystectomy, showing a 10.2 cm tumor with negative margins (closest 0.3 cm), no LVI or PNI, 0/25 LNs positive, and intermediate (5-9) tumor budding score. MS stable. T3N0  She lives with her , daughter and her , and her 2 grandchildren.  Referred to medical oncology for further management.   1/8/24: CT chest: no suspicious pulm nodules  MRI Abd: bilobar hepatic lesions, suspicious for mucinous hepatic metastases, with lesion increased in size, new or stable compared to prior  1/12/24: Liver bx: met colon ca 1/22/24: C1 5FU/LV 2/7/24: C2 FOLFOX, oxaliplatin 65 mg/m2 2/19/24: C3, Oxaliplatin 75 mg/m2 3/4/24: C4, Oxaliplatin 65 mg/m2 3/7/24: CT Chest, CTA a/p: enlarging hepatic mets 3/18/24: C5 3/28/24: Went to ED w/ back pain after heavy lifting  CT AP: Acute L1 superior endplate compression deformity with mild loss of height, new from prior CT of 3/7/2024. Bilobar hepatic lesions, slightly decrease in size when compared to prior CT of 3/7/2024. 4/1/24: C6 4/23/24: CT CAP: no lung mets, stable liver lesions, precarinal 1.2 cm node is unchanged  4/15/24: ANGELI pump placement  5/15/24: FUDR  6/11/24: 5FU/LV, FUDR held 2/2 elevated alk phos, saline + dex  6/25/24: 5FU/LV 7/8/24: 5FU/LV + FUDR @ 50% 7/23/24: 5FU/LV + saline [de-identified] : moderately differentiated adenocarcinoma with mucinous component, GEORGINA [de-identified] : CEA 57.5 (10/24/23) [de-identified] : KRAS Q61H NRAS wildtype APC [id number]*18, R876* FBXW7 R465C PIK3CA E545K [de-identified] : Feels well Has no new complaints  eating well. Moves around her house  Bowels are normal  Has some difficulty falling asleep at night but overall is better

## 2024-07-31 ENCOUNTER — APPOINTMENT (OUTPATIENT)
Dept: CT IMAGING | Facility: IMAGING CENTER | Age: 69
End: 2024-07-31
Payer: MEDICAID

## 2024-07-31 ENCOUNTER — OUTPATIENT (OUTPATIENT)
Dept: OUTPATIENT SERVICES | Facility: HOSPITAL | Age: 69
LOS: 1 days | End: 2024-07-31
Payer: MEDICAID

## 2024-07-31 ENCOUNTER — APPOINTMENT (OUTPATIENT)
Dept: MRI IMAGING | Facility: IMAGING CENTER | Age: 69
End: 2024-07-31
Payer: MEDICAID

## 2024-07-31 DIAGNOSIS — Z90.49 ACQUIRED ABSENCE OF OTHER SPECIFIED PARTS OF DIGESTIVE TRACT: Chronic | ICD-10-CM

## 2024-07-31 DIAGNOSIS — C18.9 MALIGNANT NEOPLASM OF COLON, UNSPECIFIED: ICD-10-CM

## 2024-07-31 DIAGNOSIS — Z96.642 PRESENCE OF LEFT ARTIFICIAL HIP JOINT: Chronic | ICD-10-CM

## 2024-07-31 DIAGNOSIS — T85.528A DISPLACEMENT OF OTHER GASTROINTESTINAL PROSTHETIC DEVICES, IMPLANTS AND GRAFTS, INITIAL ENCOUNTER: Chronic | ICD-10-CM

## 2024-07-31 DIAGNOSIS — Z96.641 PRESENCE OF RIGHT ARTIFICIAL HIP JOINT: Chronic | ICD-10-CM

## 2024-07-31 PROCEDURE — 74183 MRI ABD W/O CNTR FLWD CNTR: CPT | Mod: 26

## 2024-07-31 PROCEDURE — 71250 CT THORAX DX C-: CPT

## 2024-07-31 PROCEDURE — 74183 MRI ABD W/O CNTR FLWD CNTR: CPT

## 2024-07-31 PROCEDURE — 72197 MRI PELVIS W/O & W/DYE: CPT | Mod: 26

## 2024-07-31 PROCEDURE — 72197 MRI PELVIS W/O & W/DYE: CPT

## 2024-07-31 PROCEDURE — A9581: CPT

## 2024-07-31 PROCEDURE — 71250 CT THORAX DX C-: CPT | Mod: 26

## 2024-08-01 ENCOUNTER — APPOINTMENT (OUTPATIENT)
Dept: PULMONOLOGY | Facility: CLINIC | Age: 69
End: 2024-08-01
Payer: MEDICAID

## 2024-08-01 PROCEDURE — T1013: CPT

## 2024-08-01 PROCEDURE — 99204 OFFICE O/P NEW MOD 45 MIN: CPT | Mod: 25

## 2024-08-02 ENCOUNTER — RESULT REVIEW (OUTPATIENT)
Age: 69
End: 2024-08-02

## 2024-08-02 ENCOUNTER — APPOINTMENT (OUTPATIENT)
Dept: HEMATOLOGY ONCOLOGY | Facility: CLINIC | Age: 69
End: 2024-08-02

## 2024-08-02 ENCOUNTER — APPOINTMENT (OUTPATIENT)
Dept: INFUSION THERAPY | Facility: HOSPITAL | Age: 69
End: 2024-08-02

## 2024-08-03 LAB
ALBUMIN SERPL ELPH-MCNC: 3.7 G/DL — SIGNIFICANT CHANGE UP (ref 3.3–5)
ALP SERPL-CCNC: 146 U/L — HIGH (ref 40–120)
ALT FLD-CCNC: 33 U/L — SIGNIFICANT CHANGE UP (ref 10–45)
ANION GAP SERPL CALC-SCNC: 19 MMOL/L — HIGH (ref 5–17)
AST SERPL-CCNC: 34 U/L — SIGNIFICANT CHANGE UP (ref 10–40)
BILIRUB SERPL-MCNC: 0.4 MG/DL — SIGNIFICANT CHANGE UP (ref 0.2–1.2)
BUN SERPL-MCNC: 26 MG/DL — HIGH (ref 7–23)
CALCIUM SERPL-MCNC: 9.7 MG/DL — SIGNIFICANT CHANGE UP (ref 8.4–10.5)
CHLORIDE SERPL-SCNC: 105 MMOL/L — SIGNIFICANT CHANGE UP (ref 96–108)
CO2 SERPL-SCNC: 19 MMOL/L — LOW (ref 22–31)
CREAT SERPL-MCNC: 0.77 MG/DL — SIGNIFICANT CHANGE UP (ref 0.5–1.3)
EGFR: 84 ML/MIN/1.73M2 — SIGNIFICANT CHANGE UP
GLUCOSE SERPL-MCNC: 56 MG/DL — LOW (ref 70–99)
HCT VFR BLD CALC: 33.2 % — LOW (ref 34.5–45)
HGB BLD-MCNC: 10 G/DL — LOW (ref 11.5–15.5)
MCHC RBC-ENTMCNC: 26.8 PG — LOW (ref 27–34)
MCHC RBC-ENTMCNC: 30.1 GM/DL — LOW (ref 32–36)
MCV RBC AUTO: 89 FL — SIGNIFICANT CHANGE UP (ref 80–100)
PLATELET # BLD AUTO: 168 K/UL — SIGNIFICANT CHANGE UP (ref 150–400)
POTASSIUM SERPL-MCNC: 4.5 MMOL/L — SIGNIFICANT CHANGE UP (ref 3.5–5.3)
POTASSIUM SERPL-SCNC: 4.5 MMOL/L — SIGNIFICANT CHANGE UP (ref 3.5–5.3)
PROT SERPL-MCNC: 7.1 G/DL — SIGNIFICANT CHANGE UP (ref 6–8.3)
RBC # BLD: 3.73 M/UL — LOW (ref 3.8–5.2)
RBC # FLD: 20.7 % — HIGH (ref 10.3–14.5)
SODIUM SERPL-SCNC: 143 MMOL/L — SIGNIFICANT CHANGE UP (ref 135–145)
WBC # BLD: 4.34 K/UL — SIGNIFICANT CHANGE UP (ref 3.8–10.5)
WBC # FLD AUTO: 4.34 K/UL — SIGNIFICANT CHANGE UP (ref 3.8–10.5)

## 2024-08-04 NOTE — DISCUSSION/SUMMARY
[FreeTextEntry1] : ---Assessment plan----------The patient has been referred here for further opinion regarding pulmonary problem,  67 yo She moved here from [place] last year. She was under the care of a rheumatologist for the least 30 years for scleroderma and RA --- She moved here from [place] l2022---. She was under the care of a rheumatologist for the least 30 years for scleroderma and RA. She was on treatment but we are unable to transcribe the medication name. States it was 6 tabs three times daily. She was on it from 2007 - 2021. She stopped the treatment during COVID due to access to medical care. Does not feel any different since stopping the medications. DIAGNOSED METASTATIC COLONCA 2023--ON CHEMO THERAPY - - -    1. Systemic sclerosis: does not have skin tightening, but with telangiectasias,  MILD  ILD and  NO EVDIENCE OF PULM HTN ON EHCHO -- - - NEEDS PFT 6MWT   i) Skin: no skin tightening but has telangiectasias. Denies any Raynaud's symptoms -  F/U RHEUMATOLOGY  . ii) PULM REHAB  iii) Pulmonary: CT scan suggestive of early ILD. Consider repeating in 6 months to evaluate for progression NEEDS PFT   . iv) Cardiac: NO STRONG  evidence of pulm HTN on TTE.-REPEAT ECHO 6 MONTHS. v) GI: -. H/o metastatic colon cancer and is on chemotherapy. Being followed by GI and heme/onc. vi) NEEDS BRIT         Thanks for allowing  me to participate  in the care of this patient.  Patient at this time  will follow  the above mentioned recommendations and return back for follow up visit. If you have any questions  I can be reached  at # [phone] (office). Taz Menezes MD, [facility]  Pulmonary, Critical Care and Sleep Medicine

## 2024-08-04 NOTE — HISTORY OF PRESENT ILLNESS
[Never] : never [TextBox_4] : This letter  is regarding your patient  who  attended pulmonary out patient office today.  I have reviewed  patient's  past history, social history, family history and medication list. I also  reviewed nurse practitioners/ and fellows  notes and assessment and agree with it.   The patient was referred by  for Pulm HTN detected on echo   67 yo She moved here from [place] last year. She was under the care of a rheumatologist for the least 30 years for scleroderma and RA  She moved here from [place] l2022---. She was under the care of a rheumatologist for the least 30 years for scleroderma and RA. She was on treatment but we are unable to transcribe the medication name. States it was 6 tabs three times daily. She was on it from 2007 - 2021. She stopped the treatment during COVID due to access to medical care. Does not feel any different since stopping the medications.  Has joint pains in the wrists, shoulders, elbows, knees and ankles. Notices some swelling in the ankles. Recent h/o acute back pain. Feels that the joint pains have worsened. No morning stiffness. C/o Raynaud's in cold weather. She may have had digital ulcers in the past (> 3 years ago). No on any medications for this to her knowledge.  Has telangiectasia of the face and upper extremities. Has a h/o pulmonary HTN s/p RHC in 2018.She was prescribed Sildenafil for a while but then was discontinued and was told she didn't need it anymore. Denies any SOB or cough at present.   She has metastatic colon cancer was diagnosed 2023 - no symptoms, just did colonoscopy that found the cancer. She is currently receiving chemotherapy. - - - - - - - - - ------No history of , fever, chills , rigors, chest pain, or hemoptysis. Questionable history of Raynaud's phenomenon. No h/o significant weight loss in last few months. No history of liver dysfunction , collagen vascular disorder or chronic thromboembolic disease. I would classify the patient's dyspnea as WHO  FUNCTIONAL CLASS II--------  ----Echo  date--6/2024 1. Left ventricular cavity is small. Left ventricular wall thickness is normal. Left ventricular systolic function is normal with an ejection fraction of 62 % by Lundberg's method of disks. There are no regional wall motion abnormalities seen.  2. There is mild (grade 1) left ventricular diastolic dysfunction.  3. Normal right ventricular cavity size, with normal wall thickness, and normal systolic function.  4. No significant valvular disease.  5. Estimated pulmonary artery systolic pressure is 33 mmHg.  6. No echocardiographic evidence of pulmonary hypertension.  7. No pericardial effusion seen.  8. No prior echocardiogram is available for comparison.---- ----Pft date--------- ---CT Chest (03/2024): AIRWAYS, LUNGS, PLEURA: Few mucoid impacted peripheral airways. No suspicious lung nodule or mass. Peripheral ground-glass opacities and subpleural cysts at the lung bases similar to prior exam. No pleural effusion. MEDIASTINUM: Normal heart size. No pericardial effusion. Aortic root calcifications. Dilated ascending thoracic aorta measuring 4 cm, unchanged. Precarinal 1.2 x 1 cm node (image 46, series 3) series is unchanged compared to 10/22/2023 CT chest. No large hilar nodes. Debris within the lumen of the esophagus. Port-A-Cath in place. IMAGED ABDOMEN: See report of same day MRI abdomen. SOFT TISSUES: Unremarkable. BONES: Unchanged loss of height of L1 vertebral body. IMPRESSION:. No suspicious lung nodule or mass. Precarinal 1.2 x 1 cm node is unchanged compared to 10/22/2023 CT chest----- ----Cath date------------NOT DONE----

## 2024-08-04 NOTE — PHYSICAL EXAM
[No Acute Distress] : no acute distress [Normal Oropharynx] : normal oropharynx [Normal Appearance] : normal appearance [No Neck Mass] : no neck mass [Normal Rate/Rhythm] : normal rate/rhythm [Normal S1, S2] : normal s1, s2 [No Murmurs] : no murmurs [No Resp Distress] : no resp distress [Clear to Auscultation Bilaterally] : clear to auscultation bilaterally [No Abnormalities] : no abnormalities [Benign] : benign [Normal Gait] : normal gait [No Clubbing] : no clubbing [No Cyanosis] : no cyanosis [No Edema] : no edema [FROM] : FROM [Normal Color/ Pigmentation] : normal color/ pigmentation [No Focal Deficits] : no focal deficits [Oriented x3] : oriented x3 [Normal Affect] : normal affect [TextBox_54] : Decreased entry at bases [TextBox_89] : SOFT , BS +

## 2024-08-04 NOTE — END OF VISIT
[Time Spent: ___ minutes] : I have spent [unfilled] minutes of time on the encounter. [FreeTextEntry3] :   I, Dr. Taz Menezes  personally performed the evaluation and management (E/M) services for this established patient who presents today with (a) new problem(s)/exacerbation of (an) existing condition(s). That E/M includes conducting the clinically appropriate interval history &/or exam, assessing all new/exacerbated conditions, and establishing a new plan of care. Today, my [employer]AMA, Tracey Walters NP, , was here to observe my evaluation and management service for this new problem/exacerbated condition and follow the plan of care established by me going forward.

## 2024-08-04 NOTE — DISCUSSION/SUMMARY
[FreeTextEntry1] : ---Assessment plan----------The patient has been referred here for further opinion regarding pulmonary problem,  69 yo She moved here from [place] last year. She was under the care of a rheumatologist for the least 30 years for scleroderma and RA --- She moved here from [place] l2022---. She was under the care of a rheumatologist for the least 30 years for scleroderma and RA. She was on treatment but we are unable to transcribe the medication name. States it was 6 tabs three times daily. She was on it from 2007 - 2021. She stopped the treatment during COVID due to access to medical care. Does not feel any different since stopping the medications. DIAGNOSED METASTATIC COLONCA 2023--ON CHEMO THERAPY - - -    1. Systemic sclerosis: does not have skin tightening, but with telangiectasias,  MILD  ILD and  NO EVDIENCE OF PULM HTN ON EHCHO -- - - NEEDS PFT 6MWT   i) Skin: no skin tightening but has telangiectasias. Denies any Raynaud's symptoms -  F/U RHEUMATOLOGY  . ii) PULM REHAB  iii) Pulmonary: CT scan suggestive of early ILD. Consider repeating in 6 months to evaluate for progression NEEDS PFT   . iv) Cardiac: NO STRONG  evidence of pulm HTN on TTE.-REPEAT ECHO 6 MONTHS. v) GI: -. H/o metastatic colon cancer and is on chemotherapy. Being followed by GI and heme/onc. vi) NEEDS BRIT         Thanks for allowing  me to participate  in the care of this patient.  Patient at this time  will follow  the above mentioned recommendations and return back for follow up visit. If you have any questions  I can be reached  at # [phone] (office). Taz Menezes MD, [facility]  Pulmonary, Critical Care and Sleep Medicine

## 2024-08-04 NOTE — DISCUSSION/SUMMARY
[FreeTextEntry1] : ---Assessment plan----------The patient has been referred here for further opinion regarding pulmonary problem,  69 yo She moved here from [place] last year. She was under the care of a rheumatologist for the least 30 years for scleroderma and RA --- She moved here from [place] l2022---. She was under the care of a rheumatologist for the least 30 years for scleroderma and RA. She was on treatment but we are unable to transcribe the medication name. States it was 6 tabs three times daily. She was on it from 2007 - 2021. She stopped the treatment during COVID due to access to medical care. Does not feel any different since stopping the medications. DIAGNOSED METASTATIC COLONCA 2023--ON CHEMO THERAPY - - -    1. Systemic sclerosis: does not have skin tightening, but with telangiectasias,  MILD  ILD and  NO EVDIENCE OF PULM HTN ON EHCHO -- - - NEEDS PFT 6MWT   i) Skin: no skin tightening but has telangiectasias. Denies any Raynaud's symptoms -  F/U RHEUMATOLOGY  . ii) PULM REHAB  iii) Pulmonary: CT scan suggestive of early ILD. Consider repeating in 6 months to evaluate for progression NEEDS PFT   . iv) Cardiac: NO STRONG  evidence of pulm HTN on TTE.-REPEAT ECHO 6 MONTHS. v) GI: -. H/o metastatic colon cancer and is on chemotherapy. Being followed by GI and heme/onc. vi) NEEDS BRIT         Thanks for allowing  me to participate  in the care of this patient.  Patient at this time  will follow  the above mentioned recommendations and return back for follow up visit. If you have any questions  I can be reached  at # [phone] (office). Taz Menezes MD, [facility]  Pulmonary, Critical Care and Sleep Medicine

## 2024-08-04 NOTE — DISCUSSION/SUMMARY
[FreeTextEntry1] : ---Assessment plan----------The patient has been referred here for further opinion regarding pulmonary problem,  67 yo She moved here from [place] last year. She was under the care of a rheumatologist for the least 30 years for scleroderma and RA --- She moved here from [place] l2022---. She was under the care of a rheumatologist for the least 30 years for scleroderma and RA. She was on treatment but we are unable to transcribe the medication name. States it was 6 tabs three times daily. She was on it from 2007 - 2021. She stopped the treatment during COVID due to access to medical care. Does not feel any different since stopping the medications. DIAGNOSED METASTATIC COLONCA 2023--ON CHEMO THERAPY - - -    1. Systemic sclerosis: does not have skin tightening, but with telangiectasias,  MILD  ILD and  NO EVDIENCE OF PULM HTN ON EHCHO -- - - NEEDS PFT 6MWT   i) Skin: no skin tightening but has telangiectasias. Denies any Raynaud's symptoms -  F/U RHEUMATOLOGY  . ii) PULM REHAB  iii) Pulmonary: CT scan suggestive of early ILD. Consider repeating in 6 months to evaluate for progression NEEDS PFT   . iv) Cardiac: NO STRONG  evidence of pulm HTN on TTE.-REPEAT ECHO 6 MONTHS. v) GI: -. H/o metastatic colon cancer and is on chemotherapy. Being followed by GI and heme/onc. vi) NEEDS BRIT         Thanks for allowing  me to participate  in the care of this patient.  Patient at this time  will follow  the above mentioned recommendations and return back for follow up visit. If you have any questions  I can be reached  at # [phone] (office). Taz Menezes MD, [facility]  Pulmonary, Critical Care and Sleep Medicine

## 2024-08-04 NOTE — HISTORY OF PRESENT ILLNESS
[Never] : never [TextBox_4] : This letter  is regarding your patient  who  attended pulmonary out patient office today.  I have reviewed  patient's  past history, social history, family history and medication list. I also  reviewed nurse practitioners/ and fellows  notes and assessment and agree with it.   The patient was referred by  for Pulm HTN detected on echo   69 yo She moved here from [place] last year. She was under the care of a rheumatologist for the least 30 years for scleroderma and RA  She moved here from [place] l2022---. She was under the care of a rheumatologist for the least 30 years for scleroderma and RA. She was on treatment but we are unable to transcribe the medication name. States it was 6 tabs three times daily. She was on it from 2007 - 2021. She stopped the treatment during COVID due to access to medical care. Does not feel any different since stopping the medications.  Has joint pains in the wrists, shoulders, elbows, knees and ankles. Notices some swelling in the ankles. Recent h/o acute back pain. Feels that the joint pains have worsened. No morning stiffness. C/o Raynaud's in cold weather. She may have had digital ulcers in the past (> 3 years ago). No on any medications for this to her knowledge.  Has telangiectasia of the face and upper extremities. Has a h/o pulmonary HTN s/p RHC in 2018.She was prescribed Sildenafil for a while but then was discontinued and was told she didn't need it anymore. Denies any SOB or cough at present.   She has metastatic colon cancer was diagnosed 2023 - no symptoms, just did colonoscopy that found the cancer. She is currently receiving chemotherapy. - - - - - - - - - ------No history of , fever, chills , rigors, chest pain, or hemoptysis. Questionable history of Raynaud's phenomenon. No h/o significant weight loss in last few months. No history of liver dysfunction , collagen vascular disorder or chronic thromboembolic disease. I would classify the patient's dyspnea as WHO  FUNCTIONAL CLASS II--------  ----Echo  date--6/2024 1. Left ventricular cavity is small. Left ventricular wall thickness is normal. Left ventricular systolic function is normal with an ejection fraction of 62 % by Lundberg's method of disks. There are no regional wall motion abnormalities seen.  2. There is mild (grade 1) left ventricular diastolic dysfunction.  3. Normal right ventricular cavity size, with normal wall thickness, and normal systolic function.  4. No significant valvular disease.  5. Estimated pulmonary artery systolic pressure is 33 mmHg.  6. No echocardiographic evidence of pulmonary hypertension.  7. No pericardial effusion seen.  8. No prior echocardiogram is available for comparison.---- ----Pft date--------- ---CT Chest (03/2024): AIRWAYS, LUNGS, PLEURA: Few mucoid impacted peripheral airways. No suspicious lung nodule or mass. Peripheral ground-glass opacities and subpleural cysts at the lung bases similar to prior exam. No pleural effusion. MEDIASTINUM: Normal heart size. No pericardial effusion. Aortic root calcifications. Dilated ascending thoracic aorta measuring 4 cm, unchanged. Precarinal 1.2 x 1 cm node (image 46, series 3) series is unchanged compared to 10/22/2023 CT chest. No large hilar nodes. Debris within the lumen of the esophagus. Port-A-Cath in place. IMAGED ABDOMEN: See report of same day MRI abdomen. SOFT TISSUES: Unremarkable. BONES: Unchanged loss of height of L1 vertebral body. IMPRESSION:. No suspicious lung nodule or mass. Precarinal 1.2 x 1 cm node is unchanged compared to 10/22/2023 CT chest----- ----Cath date------------NOT DONE----

## 2024-08-04 NOTE — REASON FOR VISIT
[Initial] : an initial visit [[employer] ] : provided by [employer]   [Time Spent: ____ minutes] : Total time spent using  services: [unfilled] minutes. The patient's primary language is not English thus required  services.

## 2024-08-05 ENCOUNTER — TRANSCRIPTION ENCOUNTER (OUTPATIENT)
Age: 69
End: 2024-08-05

## 2024-08-05 NOTE — ASSESSMENT
[Future Reassessment of Pain Scale] : Future reassessment of pain scale    [Palliative] : Goals of care discussed with patient: Palliative [Palliative Care Plan] : not applicable at this time

## 2024-08-06 ENCOUNTER — RESULT REVIEW (OUTPATIENT)
Age: 69
End: 2024-08-06

## 2024-08-06 ENCOUNTER — APPOINTMENT (OUTPATIENT)
Dept: HEMATOLOGY ONCOLOGY | Facility: CLINIC | Age: 69
End: 2024-08-06

## 2024-08-06 ENCOUNTER — APPOINTMENT (OUTPATIENT)
Dept: INFUSION THERAPY | Facility: HOSPITAL | Age: 69
End: 2024-08-06

## 2024-08-06 LAB
BASOPHILS # BLD AUTO: 0.02 K/UL — SIGNIFICANT CHANGE UP (ref 0–0.2)
BASOPHILS NFR BLD AUTO: 0.4 % — SIGNIFICANT CHANGE UP (ref 0–2)
EOSINOPHIL # BLD AUTO: 0.04 K/UL — SIGNIFICANT CHANGE UP (ref 0–0.5)
EOSINOPHIL NFR BLD AUTO: 0.7 % — SIGNIFICANT CHANGE UP (ref 0–6)
HCT VFR BLD CALC: 34 % — LOW (ref 34.5–45)
HGB BLD-MCNC: 10.5 G/DL — LOW (ref 11.5–15.5)
IMM GRANULOCYTES NFR BLD AUTO: 0.7 % — SIGNIFICANT CHANGE UP (ref 0–0.9)
LYMPHOCYTES # BLD AUTO: 1.19 K/UL — SIGNIFICANT CHANGE UP (ref 1–3.3)
LYMPHOCYTES # BLD AUTO: 20.9 % — SIGNIFICANT CHANGE UP (ref 13–44)
MCHC RBC-ENTMCNC: 27.7 PG — SIGNIFICANT CHANGE UP (ref 27–34)
MCHC RBC-ENTMCNC: 30.9 G/DL — LOW (ref 32–36)
MCV RBC AUTO: 89.7 FL — SIGNIFICANT CHANGE UP (ref 80–100)
MONOCYTES # BLD AUTO: 0.29 K/UL — SIGNIFICANT CHANGE UP (ref 0–0.9)
MONOCYTES NFR BLD AUTO: 5.1 % — SIGNIFICANT CHANGE UP (ref 2–14)
NEUTROPHILS # BLD AUTO: 4.11 K/UL — SIGNIFICANT CHANGE UP (ref 1.8–7.4)
NEUTROPHILS NFR BLD AUTO: 72.2 % — SIGNIFICANT CHANGE UP (ref 43–77)
NRBC # BLD: 0 /100 WBCS — SIGNIFICANT CHANGE UP (ref 0–0)
PLATELET # BLD AUTO: 154 K/UL — SIGNIFICANT CHANGE UP (ref 150–400)
RBC # BLD: 3.79 M/UL — LOW (ref 3.8–5.2)
RBC # FLD: 20.1 % — HIGH (ref 10.3–14.5)
WBC # BLD: 5.69 K/UL — SIGNIFICANT CHANGE UP (ref 3.8–10.5)
WBC # FLD AUTO: 5.69 K/UL — SIGNIFICANT CHANGE UP (ref 3.8–10.5)

## 2024-08-06 PROCEDURE — 99213 OFFICE O/P EST LOW 20 MIN: CPT

## 2024-08-06 PROCEDURE — G2211 COMPLEX E/M VISIT ADD ON: CPT | Mod: NC

## 2024-08-06 NOTE — REASON FOR VISIT
[Follow-Up Visit] : a follow-up [Ad Hoc ] : provided by an ad hoc  [Spouse] : spouse [Interpreters_IDNumber] : [id number] [Interpreters_FullName] : Cory [[employer] ] : provided by [employer]   [FreeTextEntry2] : Stage IV colon cancer with ANGELI pump [FreeTextEntry3] : Mandarin [TWNoteComboBox1] : Chinese

## 2024-08-06 NOTE — REVIEW OF SYSTEMS
[Negative] : Allergic/Immunologic [Joint Pain] : no joint pain [Joint Stiffness] : no joint stiffness [Muscle Pain] : no muscle pain [de-identified] : + facial redness [de-identified] : +PN

## 2024-08-06 NOTE — PHYSICAL EXAM
[Restricted in physically strenuous activity but ambulatory and able to carry out work of a light or sedentary nature] : Status 1- Restricted in physically strenuous activity but ambulatory and able to carry out work of a light or sedentary nature, e.g., light house work, office work [Thin] : thin [Normal] : affect appropriate [de-identified] :  palpable hepatic artery infusion pump in place, surgical scars well healed, no TTP, no appreciable hernia  [de-identified] : facial and upper extremity telangiectasias are stable

## 2024-08-06 NOTE — REVIEW OF SYSTEMS
[Negative] : Allergic/Immunologic [Joint Pain] : no joint pain [Joint Stiffness] : no joint stiffness [Muscle Pain] : no muscle pain [de-identified] : + facial redness [de-identified] : +PN

## 2024-08-06 NOTE — HISTORY OF PRESENT ILLNESS
[Disease: _____________________] : Disease: [unfilled] [T: ___] : T[unfilled] [N: ___] : N[unfilled] [M: ___] : M[unfilled] [AJCC Stage: ____] : AJCC Stage: [unfilled] [Therapy: ___] : Therapy: [unfilled] [Day: ___] : Day: [unfilled] [de-identified] : 69 yo Mandarin-speaking F with a PMH of HTN, RA, scleroderma, and mild pulmonary hypertension who presents for management of Stage IV R colon cancer, GEORGINAAngela Stevens has been gradually losing weight over years. She has also had lifelong intermittent diarrhea for decades, but she has never had a workup for it. Her diarrhea is food related. She had an EGD about 10 years ago that showed esophageal inflammation and acid reflux, but she has never had a colonoscopy prior to the below events. She originally moved to the  from [place] in the summer of 2023. In September, she presented with abdominal pain and was found to have a Hb 6.8 with acute cholecystitis and had a percutaneous cholecystostomy drain placed. She also had 1U PRBCs.  She was then readmitted to [facility] from 10/22 - 11/6/23 for tube dislodgement, with CT showing ascending colon wall thickening, with a few liver lesions c/w mucinous metastases (largest 2.5 x 2.0 cm in the R hepatic lobe, others included a 0.8 cm segment 4A/8 lesion and a 0.9 cm segment 2/3 lesion). She also had continued anemia (Hb 7.0) requiring 2U PRBCs during admission. She underwent a colonoscopy on 10/24 that showed a circumferential ascending colon mass about 5 cm above the cecum, biopsy showing adenocarcinoma, moderately differentiated with a mucinous component. On 11/1/23, she underwent a R hemicolectomy with cholecystectomy, showing a 10.2 cm tumor with negative margins (closest 0.3 cm), no LVI or PNI, 0/25 LNs positive, and intermediate (5-9) tumor budding score. MS stable. T3N0  She lives with her , daughter and her , and her 2 grandchildren.  Referred to medical oncology for further management.   1/8/24: CT chest: no suspicious pulm nodules  MRI Abd: bilobar hepatic lesions, suspicious for mucinous hepatic metastases, with lesion increased in size, new or stable compared to prior  1/12/24: Liver bx: met colon ca 1/22/24: C1 5FU/LV 2/7/24: C2 FOLFOX, oxaliplatin 65 mg/m2 2/19/24: C3, Oxaliplatin 75 mg/m2 3/4/24: C4, Oxaliplatin 65 mg/m2 3/7/24: CT Chest, CTA a/p: enlarging hepatic mets 3/18/24: C5 3/28/24: Went to ED w/ back pain after heavy lifting  CT AP: Acute L1 superior endplate compression deformity with mild loss of height, new from prior CT of 3/7/2024. Bilobar hepatic lesions, slightly decrease in size when compared to prior CT of 3/7/2024. 4/1/24: C6 4/23/24: CT CAP: no lung mets, stable liver lesions, precarinal 1.2 cm node is unchanged  4/15/24: ANGELI pump placement (baseline alk phos elevated) 5/15/24: FUDR 100% ( baseline) 122 mg 5/29/24: saline + Dex via ANGELI () 6/11/24: 5FU/LV, FUDR held 2/2 elevated alk phos (425), saline + dex  6/25/24: 5FU/LV, saline + dex () 7/8/24: 5FU/LV + FUDR @ 25% of max dose (30 mg) 7/23/24: 5FU/LV + saline 7/31/24: CT Chest: stable exam x resolution b/l pleural effusions & lower lobe consolidation/atelectasis  7/31/24: MR AP: previously noted hepatic lesions no longer enhance, compatible w/ positive response to treatment.  8/6/24: FUDR 50% of max dose (30 mg) = 15 mg + 5FU/LV [de-identified] : moderately differentiated adenocarcinoma with mucinous component, GEORGINA [de-identified] : CEA 57.5 (10/24/23) [de-identified] : KRAS Q61H NRAS wildtype APC [id number], R876* [id number] [id number] [de-identified] : she feels well. denies any c/o. we reviewed results of her recent scans which show positive response to FUDR in the liver.

## 2024-08-06 NOTE — PHYSICAL EXAM
[Restricted in physically strenuous activity but ambulatory and able to carry out work of a light or sedentary nature] : Status 1- Restricted in physically strenuous activity but ambulatory and able to carry out work of a light or sedentary nature, e.g., light house work, office work [Thin] : thin [Normal] : affect appropriate [de-identified] :  palpable hepatic artery infusion pump in place, surgical scars well healed, no TTP, no appreciable hernia  [de-identified] : facial and upper extremity telangiectasias are stable

## 2024-08-06 NOTE — REASON FOR VISIT
[Follow-Up Visit] : a follow-up [Ad Hoc ] : provided by an ad hoc  [Spouse] : spouse [Interpreters_IDNumber] : [id number] [Interpreters_FullName] : Cory [[employer] ] : provided by [employer]   [FreeTextEntry2] : Stage IV colon cancer with ANGELI pump [FreeTextEntry3] : Mandarin [TWNoteComboBox1] : Chinese

## 2024-08-06 NOTE — HISTORY OF PRESENT ILLNESS
[Disease: _____________________] : Disease: [unfilled] [T: ___] : T[unfilled] [N: ___] : N[unfilled] [M: ___] : M[unfilled] [AJCC Stage: ____] : AJCC Stage: [unfilled] [Therapy: ___] : Therapy: [unfilled] [Day: ___] : Day: [unfilled] [de-identified] : 67 yo Mandarin-speaking F with a PMH of HTN, RA, scleroderma, and mild pulmonary hypertension who presents for management of Stage IV R colon cancer, GEORGINAAngela Stevens has been gradually losing weight over years. She has also had lifelong intermittent diarrhea for decades, but she has never had a workup for it. Her diarrhea is food related. She had an EGD about 10 years ago that showed esophageal inflammation and acid reflux, but she has never had a colonoscopy prior to the below events. She originally moved to the  from [place] in the summer of 2023. In September, she presented with abdominal pain and was found to have a Hb 6.8 with acute cholecystitis and had a percutaneous cholecystostomy drain placed. She also had 1U PRBCs.  She was then readmitted to [facility] from 10/22 - 11/6/23 for tube dislodgement, with CT showing ascending colon wall thickening, with a few liver lesions c/w mucinous metastases (largest 2.5 x 2.0 cm in the R hepatic lobe, others included a 0.8 cm segment 4A/8 lesion and a 0.9 cm segment 2/3 lesion). She also had continued anemia (Hb 7.0) requiring 2U PRBCs during admission. She underwent a colonoscopy on 10/24 that showed a circumferential ascending colon mass about 5 cm above the cecum, biopsy showing adenocarcinoma, moderately differentiated with a mucinous component. On 11/1/23, she underwent a R hemicolectomy with cholecystectomy, showing a 10.2 cm tumor with negative margins (closest 0.3 cm), no LVI or PNI, 0/25 LNs positive, and intermediate (5-9) tumor budding score. MS stable. T3N0  She lives with her , daughter and her , and her 2 grandchildren.  Referred to medical oncology for further management.   1/8/24: CT chest: no suspicious pulm nodules  MRI Abd: bilobar hepatic lesions, suspicious for mucinous hepatic metastases, with lesion increased in size, new or stable compared to prior  1/12/24: Liver bx: met colon ca 1/22/24: C1 5FU/LV 2/7/24: C2 FOLFOX, oxaliplatin 65 mg/m2 2/19/24: C3, Oxaliplatin 75 mg/m2 3/4/24: C4, Oxaliplatin 65 mg/m2 3/7/24: CT Chest, CTA a/p: enlarging hepatic mets 3/18/24: C5 3/28/24: Went to ED w/ back pain after heavy lifting  CT AP: Acute L1 superior endplate compression deformity with mild loss of height, new from prior CT of 3/7/2024. Bilobar hepatic lesions, slightly decrease in size when compared to prior CT of 3/7/2024. 4/1/24: C6 4/23/24: CT CAP: no lung mets, stable liver lesions, precarinal 1.2 cm node is unchanged  4/15/24: ANGELI pump placement (baseline alk phos elevated) 5/15/24: FUDR 100% ( baseline) 122 mg 5/29/24: saline + Dex via ANGELI () 6/11/24: 5FU/LV, FUDR held 2/2 elevated alk phos (425), saline + dex  6/25/24: 5FU/LV, saline + dex () 7/8/24: 5FU/LV + FUDR @ 25% of max dose (30 mg) 7/23/24: 5FU/LV + saline 7/31/24: CT Chest: stable exam x resolution b/l pleural effusions & lower lobe consolidation/atelectasis  7/31/24: MR AP: previously noted hepatic lesions no longer enhance, compatible w/ positive response to treatment.  8/6/24: FUDR 50% of max dose (30 mg) = 15 mg + 5FU/LV [de-identified] : moderately differentiated adenocarcinoma with mucinous component, GEORGINA [de-identified] : CEA 57.5 (10/24/23) [de-identified] : KRAS Q61H NRAS wildtype APC [id number]*18, R876* FBXW7 R465C PIK3CA E545K [de-identified] : she feels well. denies any c/o. we reviewed results of her recent scans which show positive response to FUDR in the liver.

## 2024-08-07 ENCOUNTER — APPOINTMENT (OUTPATIENT)
Dept: HEMATOLOGY ONCOLOGY | Facility: CLINIC | Age: 69
End: 2024-08-07

## 2024-08-08 ENCOUNTER — APPOINTMENT (OUTPATIENT)
Dept: INFUSION THERAPY | Facility: HOSPITAL | Age: 69
End: 2024-08-08

## 2024-08-13 ENCOUNTER — APPOINTMENT (OUTPATIENT)
Dept: SURGICAL ONCOLOGY | Facility: CLINIC | Age: 69
End: 2024-08-13
Payer: MEDICAID

## 2024-08-13 VITALS
WEIGHT: 82 LBS | HEART RATE: 82 BPM | HEIGHT: 59 IN | DIASTOLIC BLOOD PRESSURE: 88 MMHG | BODY MASS INDEX: 16.53 KG/M2 | OXYGEN SATURATION: 97 % | SYSTOLIC BLOOD PRESSURE: 135 MMHG

## 2024-08-13 PROCEDURE — 99214 OFFICE O/P EST MOD 30 MIN: CPT

## 2024-08-13 NOTE — PHYSICAL EXAM
[FreeTextEntry1] :  General: No acute distress. Well nourished and well kept. Head: AT/NC Eyes: PERRL. EOMI. Pulmonary: No respiratory distress. Abdomen: Soft. NT/ND. No rebound, no guarding, no rigidity. No peritoneal signs. No masses. Incision healed, no evidence of hernia. Neurological: A&O x 4. Psychiatric: Normal affect and mood.

## 2024-08-13 NOTE — HISTORY OF PRESENT ILLNESS
[de-identified] : Ms. NATALIA VELA is a Mandarin speaking 68-year-old female who presents for a follow-up visit regarding resected right sided colon cancer with synchronous bilobar liver mets s/p adjuvant FOLFOX completed 4/1/2024, HAIP 4/15/2024, FUDR 5/15/24-8/6/24 and 5FU/LV since 6/11/24, most recent dose 8/6/24.  She is Mandarin speaking. Originally moved to the  from [place] in the summer of 2023. In September, she presented with abdominal pain and was found to have a Hb 6.8 with acute cholecystitis and had a percutaneous cholecystostomy drain placed. She also had 1U PRBCs.  She was then readmitted to [facility] from 10/22 - 11/6/23 for tube dislodgement, with CT showing ascending colon wall thickening, with indeterminate hepatic lesions. She also had continued anemia (Hb 7.0) requiring 2U PRBCs during admission. She underwent a colonoscopy on 10/24/23 that showed a circumferential ascending colon mass about 5 cm above the cecum, biopsy showing adenocarcinoma, moderately differentiated with a mucinous component. On 11/1/23, she underwent a R hemicolectomy with cholecystectomy, with Dr. Stacey Bowers at [facility]. Path showed a 10.2 cm tumor with negative margins, 0/25 lymph nodes positive for carcinoma, MS stable. T3N0.  1/8/24 CT chest no suspicious pulmonary nodules. 1/8/24 MRI abd (eovist) Bilobar hepatic lesions, suspicious for mucinous hepatic metastases. 1/12/24 liver biopsy metastatic adenocarcinoma with mucinous features, compatible with previously diagnosed colonic adenocarcinoma.  Chemo hx: Start date 1/22/24: C1 5FU/LV 2/7/24: C2 FOLFOX, oxaliplatin 2/19/24: C3, Oxaliplatin 3/4/24: C4, Oxaliplatin. Medical oncologist is Dr Guadalupe CT angio chest/ abdomen/ pelvis 3/7/24 showing enlarging hepatic metastases when compared to prior MRI 1/8/2024. *Segment 7/8 measures 3.0 x 3.1 cm, increased from 2.4 x 2.7 cm *Segment 2 lesion 1.0 x 1.3 cm, previously 0.7 cm *Segment 3 lesion 0.7 x 1.1 cm, previously 0.7 x 1.0 cm *Segment 7 lesion 0.9 x 0.7 cm, previously not measurable No evidence of metastatic disease in the chest, abdomen or pelvis otherwise. Postsurgical changes from partial colonic resection and ileocolonic anastomosis with mildly distended and fluid-filled proximal small bowel loops without transition point. Findings are suggestive of a mild degree partial small bowel obstruction possibly due to adhesions.  Updated Chemo hx: s/p 1 cycle of 5FU/LV, 6 cycles of FOLFOX. Last dose 4/1/2024.  MRI 4/23/24: Metastatic liver lesions as measured below: * 0.7 x 0.5 cm in segment 7 (23). * 3.7 x 3.5 cm in segment 7 (26). * 0.6 x 0.5 cm in segment 8 (32). * 1.0 x 0.8 cm in segment 2 (33). * 0.8 x 0.6 cm in segment 3 (40). Bilobar hepatic lesions, suspicious for mucinous hepatic metastases, with lesions increased in size, new, or stable compared to prior.  CT chest 4/23/24- No suspicious lung nodule or mass. Precarinal 1.2 x 1 cm node is unchanged compared to 10/22/2023 CT chest.  Underwent HAIP placement on 5/1/2024, ablation of segment 2, 3, and 8 liver tumors with ultrasound guidance. Received 3 cycles of FUDR 5/15/24-8/6/24 and 5FU/LV since 6/11/24, most recent dose 8/5/24.  Interval imaging including CT chest on 07/31/2024 demonstrated peripheral reticular and groundglass opacities with bronchiectasis and cystic change, similar to prior studies. Resolution of prior bilateral pleural effusions and lower lobe consolidation/atelectasis noted. MRI 07/31/2024 was impressionable for mild hepatic steatosis. Similar size of a 3.1 x 3.3 cm lobulated mass within segment 7 of the liver that no longer enhances and now demonstrated T2 shine through on ADC images. There was a 1.2 x 2.4 cm nonenhancing focus within segment 2 of the liver abscesses in the area of the previously noted left-sided mass and a 2.1 x 2.1 cm nonenhancing subcapsular with intrinsic T1 hyperintensity, likely representing posttreatment changes. There was also a probable 8 mm cyst posterior to the segment 7 lesion. No new lesions identified.  No new metastatic disease identified within the abdomen or pelvis. Findings reportedly compatible with positive response to treatment.  Lab work history: 1/3/24 CEA 88.1 3/4/24 CEA: 74.6 5/29/24 CEA: 53 6/19/24 CEA: 28 6/23/24 CT DNA 0.04  PMH: Scleroderma, HTN, RA, mild pulmonary HTN, GERD, GEORGINA PSH: partial colectomy Social: She lives with her , daughter and her , and her 2 grandchildren.  Patient presents for follow-up visit. She has been doing well.

## 2024-08-13 NOTE — REASON FOR VISIT
[Follow-Up Visit] : a follow-up visit for [Colon Cancer] : colon cancer [[employer] ] : provided by [employer]   [Spouse] : spouse [Interpreters_IDNumber] : [id number] [TWNoteComboBox1] : Chinese

## 2024-08-13 NOTE — REASON FOR VISIT
[Follow-Up Visit] : a follow-up visit for [Colon Cancer] : colon cancer [[employer] ] : provided by [employer]   [Spouse] : spouse [Interpreters_IDNumber] : [id number] [TWNoteComboBox1] : Chinese

## 2024-08-13 NOTE — ASSESSMENT
[FreeTextEntry1] : Mandarin speaking 68F with resected right sided colon cancer with synchronous bilobar liver mets. S/p adjuvant FOLFOX completed 4/1/2024 HAIP 4/15/2024 - FUDR 5/15/24-8/6/24 and 5FU/LV since 6/11/24, most recent dose 8/6/24. 7/31/24 MRI findings compatible with positive response to treatment.   We discussed that 2 additional segment 7 tumors remain, both of which demonstrate nice response to treatment.  Tumor markers continue to improve.   We discussed possible approaches to management of segment 7 tumors including partial hepatectomy, SBRT, and ablation pending treatment response.   Encouraged her to contact our office in the interim with any acute changes to her health or if she has any questions.   Plan: Repeat MRI w eovist after 4 cycles FUDR FU after imaging Continue FUDR Continued follow up with Dr. Guadalupe

## 2024-08-13 NOTE — HISTORY OF PRESENT ILLNESS
[de-identified] : Ms. NATALIA VELA is a Mandarin speaking 68-year-old female who presents for a follow-up visit regarding resected right sided colon cancer with synchronous bilobar liver mets s/p adjuvant FOLFOX completed 4/1/2024, HAIP 4/15/2024, FUDR 5/15/24-8/6/24 and 5FU/LV since 6/11/24, most recent dose 8/6/24.  She is Mandarin speaking. Originally moved to the  from [place] in the summer of 2023. In September, she presented with abdominal pain and was found to have a Hb 6.8 with acute cholecystitis and had a percutaneous cholecystostomy drain placed. She also had 1U PRBCs.  She was then readmitted to [facility] from 10/22 - 11/6/23 for tube dislodgement, with CT showing ascending colon wall thickening, with indeterminate hepatic lesions. She also had continued anemia (Hb 7.0) requiring 2U PRBCs during admission. She underwent a colonoscopy on 10/24/23 that showed a circumferential ascending colon mass about 5 cm above the cecum, biopsy showing adenocarcinoma, moderately differentiated with a mucinous component. On 11/1/23, she underwent a R hemicolectomy with cholecystectomy, with Dr. Stacey Bowers at [facility]. Path showed a 10.2 cm tumor with negative margins, 0/25 lymph nodes positive for carcinoma, MS stable. T3N0.  1/8/24 CT chest no suspicious pulmonary nodules. 1/8/24 MRI abd (eovist) Bilobar hepatic lesions, suspicious for mucinous hepatic metastases. 1/12/24 liver biopsy metastatic adenocarcinoma with mucinous features, compatible with previously diagnosed colonic adenocarcinoma.  Chemo hx: Start date 1/22/24: C1 5FU/LV 2/7/24: C2 FOLFOX, oxaliplatin 2/19/24: C3, Oxaliplatin 3/4/24: C4, Oxaliplatin. Medical oncologist is Dr Guadalupe CT angio chest/ abdomen/ pelvis 3/7/24 showing enlarging hepatic metastases when compared to prior MRI 1/8/2024. *Segment 7/8 measures 3.0 x 3.1 cm, increased from 2.4 x 2.7 cm *Segment 2 lesion 1.0 x 1.3 cm, previously 0.7 cm *Segment 3 lesion 0.7 x 1.1 cm, previously 0.7 x 1.0 cm *Segment 7 lesion 0.9 x 0.7 cm, previously not measurable No evidence of metastatic disease in the chest, abdomen or pelvis otherwise. Postsurgical changes from partial colonic resection and ileocolonic anastomosis with mildly distended and fluid-filled proximal small bowel loops without transition point. Findings are suggestive of a mild degree partial small bowel obstruction possibly due to adhesions.  Updated Chemo hx: s/p 1 cycle of 5FU/LV, 6 cycles of FOLFOX. Last dose 4/1/2024.  MRI 4/23/24: Metastatic liver lesions as measured below: * 0.7 x 0.5 cm in segment 7 (23). * 3.7 x 3.5 cm in segment 7 (26). * 0.6 x 0.5 cm in segment 8 (32). * 1.0 x 0.8 cm in segment 2 (33). * 0.8 x 0.6 cm in segment 3 (40). Bilobar hepatic lesions, suspicious for mucinous hepatic metastases, with lesions increased in size, new, or stable compared to prior.  CT chest 4/23/24- No suspicious lung nodule or mass. Precarinal 1.2 x 1 cm node is unchanged compared to 10/22/2023 CT chest.  Underwent HAIP placement on 5/1/2024, ablation of segment 2, 3, and 8 liver tumors with ultrasound guidance. Received 3 cycles of FUDR 5/15/24-8/6/24 and 5FU/LV since 6/11/24, most recent dose 8/5/24.  Interval imaging including CT chest on 07/31/2024 demonstrated peripheral reticular and groundglass opacities with bronchiectasis and cystic change, similar to prior studies. Resolution of prior bilateral pleural effusions and lower lobe consolidation/atelectasis noted. MRI 07/31/2024 was impressionable for mild hepatic steatosis. Similar size of a 3.1 x 3.3 cm lobulated mass within segment 7 of the liver that no longer enhances and now demonstrated T2 shine through on ADC images. There was a 1.2 x 2.4 cm nonenhancing focus within segment 2 of the liver abscesses in the area of the previously noted left-sided mass and a 2.1 x 2.1 cm nonenhancing subcapsular with intrinsic T1 hyperintensity, likely representing posttreatment changes. There was also a probable 8 mm cyst posterior to the segment 7 lesion. No new lesions identified.  No new metastatic disease identified within the abdomen or pelvis. Findings reportedly compatible with positive response to treatment.  Lab work history: 1/3/24 CEA 88.1 3/4/24 CEA: 74.6 5/29/24 CEA: 53 6/19/24 CEA: 28 6/23/24 CT DNA 0.04  PMH: Scleroderma, HTN, RA, mild pulmonary HTN, GERD, GEORGINA PSH: partial colectomy Social: She lives with her , daughter and her , and her 2 grandchildren.  Patient presents for follow-up visit. She has been doing well.

## 2024-08-13 NOTE — END OF VISIT
[FreeTextEntry3] : By signing my name below, I, Mecarinkristel Vazquez, attest that this documentation has been prepared under the direction and in the presence of Brenda Rubio MD in the following sections HISTORY OF PRESENT ILLNESS; PAST MEDICAL/FAMILY/SOCIAL HISTORY; REVIEW OF SYSTEMS; PHYSICAL EXAM; ASSESSMENT/PLAN.

## 2024-08-16 ENCOUNTER — RESULT REVIEW (OUTPATIENT)
Age: 69
End: 2024-08-16

## 2024-08-16 ENCOUNTER — APPOINTMENT (OUTPATIENT)
Dept: INFUSION THERAPY | Facility: HOSPITAL | Age: 69
End: 2024-08-16

## 2024-08-16 ENCOUNTER — APPOINTMENT (OUTPATIENT)
Dept: HEMATOLOGY ONCOLOGY | Facility: CLINIC | Age: 69
End: 2024-08-16
Payer: MEDICAID

## 2024-08-16 VITALS
RESPIRATION RATE: 16 BRPM | TEMPERATURE: 97.7 F | WEIGHT: 96.34 LBS | SYSTOLIC BLOOD PRESSURE: 131 MMHG | DIASTOLIC BLOOD PRESSURE: 84 MMHG | BODY MASS INDEX: 19.46 KG/M2 | OXYGEN SATURATION: 97 % | HEART RATE: 69 BPM

## 2024-08-16 LAB
ALBUMIN SERPL ELPH-MCNC: 3.8 G/DL — SIGNIFICANT CHANGE UP (ref 3.3–5)
ALP SERPL-CCNC: 148 U/L — HIGH (ref 40–120)
ALT FLD-CCNC: 34 U/L — SIGNIFICANT CHANGE UP (ref 10–45)
ANION GAP SERPL CALC-SCNC: 15 MMOL/L — SIGNIFICANT CHANGE UP (ref 5–17)
AST SERPL-CCNC: 33 U/L — SIGNIFICANT CHANGE UP (ref 10–40)
BASOPHILS # BLD AUTO: 0.02 K/UL — SIGNIFICANT CHANGE UP (ref 0–0.2)
BASOPHILS NFR BLD AUTO: 0.4 % — SIGNIFICANT CHANGE UP (ref 0–2)
BILIRUB SERPL-MCNC: 0.8 MG/DL — SIGNIFICANT CHANGE UP (ref 0.2–1.2)
BUN SERPL-MCNC: 24 MG/DL — HIGH (ref 7–23)
CALCIUM SERPL-MCNC: 10.1 MG/DL — SIGNIFICANT CHANGE UP (ref 8.4–10.5)
CEA SERPL-MCNC: 21 NG/ML — HIGH (ref 0–3.8)
CHLORIDE SERPL-SCNC: 103 MMOL/L — SIGNIFICANT CHANGE UP (ref 96–108)
CO2 SERPL-SCNC: 21 MMOL/L — LOW (ref 22–31)
CREAT SERPL-MCNC: 0.6 MG/DL — SIGNIFICANT CHANGE UP (ref 0.5–1.3)
EGFR: 98 ML/MIN/1.73M2 — SIGNIFICANT CHANGE UP
EOSINOPHIL # BLD AUTO: 0.03 K/UL — SIGNIFICANT CHANGE UP (ref 0–0.5)
EOSINOPHIL NFR BLD AUTO: 0.6 % — SIGNIFICANT CHANGE UP (ref 0–6)
GLUCOSE SERPL-MCNC: 104 MG/DL — HIGH (ref 70–99)
HCT VFR BLD CALC: 33.2 % — LOW (ref 34.5–45)
HGB BLD-MCNC: 10.4 G/DL — LOW (ref 11.5–15.5)
IMM GRANULOCYTES NFR BLD AUTO: 2.3 % — HIGH (ref 0–0.9)
LYMPHOCYTES # BLD AUTO: 1.21 K/UL — SIGNIFICANT CHANGE UP (ref 1–3.3)
LYMPHOCYTES # BLD AUTO: 25.3 % — SIGNIFICANT CHANGE UP (ref 13–44)
MCHC RBC-ENTMCNC: 28.1 PG — SIGNIFICANT CHANGE UP (ref 27–34)
MCHC RBC-ENTMCNC: 31.3 G/DL — LOW (ref 32–36)
MCV RBC AUTO: 89.7 FL — SIGNIFICANT CHANGE UP (ref 80–100)
MONOCYTES # BLD AUTO: 0.39 K/UL — SIGNIFICANT CHANGE UP (ref 0–0.9)
MONOCYTES NFR BLD AUTO: 8.2 % — SIGNIFICANT CHANGE UP (ref 2–14)
NEUTROPHILS # BLD AUTO: 3.02 K/UL — SIGNIFICANT CHANGE UP (ref 1.8–7.4)
NEUTROPHILS NFR BLD AUTO: 63.2 % — SIGNIFICANT CHANGE UP (ref 43–77)
NRBC # BLD: 0 /100 WBCS — SIGNIFICANT CHANGE UP (ref 0–0)
PLATELET # BLD AUTO: 150 K/UL — SIGNIFICANT CHANGE UP (ref 150–400)
POTASSIUM SERPL-MCNC: 4.3 MMOL/L — SIGNIFICANT CHANGE UP (ref 3.5–5.3)
POTASSIUM SERPL-SCNC: 4.3 MMOL/L — SIGNIFICANT CHANGE UP (ref 3.5–5.3)
PROT SERPL-MCNC: 7.1 G/DL — SIGNIFICANT CHANGE UP (ref 6–8.3)
RBC # BLD: 3.7 M/UL — LOW (ref 3.8–5.2)
RBC # FLD: 20.8 % — HIGH (ref 10.3–14.5)
SODIUM SERPL-SCNC: 139 MMOL/L — SIGNIFICANT CHANGE UP (ref 135–145)
WBC # BLD: 4.78 K/UL — SIGNIFICANT CHANGE UP (ref 3.8–10.5)
WBC # FLD AUTO: 4.78 K/UL — SIGNIFICANT CHANGE UP (ref 3.8–10.5)

## 2024-08-16 PROCEDURE — G2211 COMPLEX E/M VISIT ADD ON: CPT | Mod: NC

## 2024-08-16 PROCEDURE — 99213 OFFICE O/P EST LOW 20 MIN: CPT

## 2024-08-16 NOTE — HISTORY OF PRESENT ILLNESS
[Disease: _____________________] : Disease: [unfilled] [T: ___] : T[unfilled] [N: ___] : N[unfilled] [M: ___] : M[unfilled] [AJCC Stage: ____] : AJCC Stage: [unfilled] [Therapy: ___] : Therapy: [unfilled] [Day: ___] : Day: [unfilled] [de-identified] : 69 yo Mandarin-speaking F with a PMH of HTN, RA, scleroderma, and mild pulmonary hypertension who presents for management of Stage IV R colon cancer, GEORGINAAngela Stevens has been gradually losing weight over years. She has also had lifelong intermittent diarrhea for decades, but she has never had a workup for it. Her diarrhea is food related. She had an EGD about 10 years ago that showed esophageal inflammation and acid reflux, but she has never had a colonoscopy prior to the below events. She originally moved to the  from [place] in the summer of 2023. In September, she presented with abdominal pain and was found to have a Hb 6.8 with acute cholecystitis and had a percutaneous cholecystostomy drain placed. She also had 1U PRBCs.  She was then readmitted to [facility] from 10/22 - 11/6/23 for tube dislodgement, with CT showing ascending colon wall thickening, with a few liver lesions c/w mucinous metastases (largest 2.5 x 2.0 cm in the R hepatic lobe, others included a 0.8 cm segment 4A/8 lesion and a 0.9 cm segment 2/3 lesion). She also had continued anemia (Hb 7.0) requiring 2U PRBCs during admission. She underwent a colonoscopy on 10/24 that showed a circumferential ascending colon mass about 5 cm above the cecum, biopsy showing adenocarcinoma, moderately differentiated with a mucinous component. On 11/1/23, she underwent a R hemicolectomy with cholecystectomy, showing a 10.2 cm tumor with negative margins (closest 0.3 cm), no LVI or PNI, 0/25 LNs positive, and intermediate (5-9) tumor budding score. MS stable. T3N0  She lives with her , daughter and her , and her 2 grandchildren.  Referred to medical oncology for further management.   1/8/24: CT chest: no suspicious pulm nodules  MRI Abd: bilobar hepatic lesions, suspicious for mucinous hepatic metastases, with lesion increased in size, new or stable compared to prior  1/12/24: Liver bx: met colon ca 1/22/24: C1 5FU/LV 2/7/24: C2 FOLFOX, oxaliplatin 65 mg/m2 2/19/24: C3, Oxaliplatin 75 mg/m2 3/4/24: C4, Oxaliplatin 65 mg/m2 3/7/24: CT Chest, CTA a/p: enlarging hepatic mets 3/18/24: C5 3/28/24: Went to ED w/ back pain after heavy lifting  CT AP: Acute L1 superior endplate compression deformity with mild loss of height, new from prior CT of 3/7/2024. Bilobar hepatic lesions, slightly decrease in size when compared to prior CT of 3/7/2024. 4/1/24: C6 4/23/24: CT CAP: no lung mets, stable liver lesions, precarinal 1.2 cm node is unchanged  4/15/24: ANGELI pump placement (baseline alk phos elevated) 5/15/24: FUDR 100% ( baseline) 122 mg 5/29/24: saline + Dex via ANGELI () 6/11/24: 5FU/LV, FUDR held 2/2 elevated alk phos (425), saline + dex  6/25/24: 5FU/LV, saline + dex () 7/8/24: 5FU/LV + FUDR @ 25% of max dose (30 mg) 7/23/24: 5FU/LV + saline 7/31/24: CT Chest: stable exam x resolution b/l pleural effusions & lower lobe consolidation/atelectasis  7/31/24: MR AP: previously noted hepatic lesions no longer enhance, compatible w/ positive response to treatment.  8/6/24: FUDR 50% of max dose (30 mg) = 15 mg + 5FU/LV [de-identified] : moderately differentiated adenocarcinoma with mucinous component, GEORGINA [de-identified] : CEA 57.5 (10/24/23) [de-identified] : KRAS Q61H NRAS wildtype APC [id number]*18, R876* FBXW7 R465C PIK3CA E545K [de-identified] : Here for clinical follow up  Tolerating treatment well  Offers no complaints today  Eating well. Weight has been stable (flowsheet in sunrise reviewed)

## 2024-08-16 NOTE — PHYSICAL EXAM
[Restricted in physically strenuous activity but ambulatory and able to carry out work of a light or sedentary nature] : Status 1- Restricted in physically strenuous activity but ambulatory and able to carry out work of a light or sedentary nature, e.g., light house work, office work [Thin] : thin [Normal] : affect appropriate [de-identified] :  palpable hepatic artery infusion pump in place, surgical scars well healed, no TTP, no appreciable hernia  [de-identified] : facial and upper extremity telangiectasias are stable

## 2024-08-16 NOTE — ASSESSMENT
[Future Reassessment of Pain Scale] : Future reassessment of pain scale    [Palliative] : Goals of care discussed with patient: Palliative [Palliative Care Plan] : not applicable at this time [FreeTextEntry1] : Patient seen and discussed with Dr. Leena Guadalupe

## 2024-08-16 NOTE — REASON FOR VISIT
[Follow-Up Visit] : a follow-up [[employer] ] : provided by [employer]   [FreeTextEntry2] : Stage IV colon cancer with ANGELI pump [Interpreters_IDNumber] : [id number] [Interpreters_FullName] : Melvi [FreeTextEntry3] : Mandarin [TWNoteComboBox1] : Chinese

## 2024-08-16 NOTE — REVIEW OF SYSTEMS
[Negative] : Allergic/Immunologic [Joint Pain] : no joint pain [Joint Stiffness] : no joint stiffness [Muscle Pain] : no muscle pain [de-identified] : + facial redness improving [de-identified] : +PN

## 2024-08-20 ENCOUNTER — APPOINTMENT (OUTPATIENT)
Dept: HEMATOLOGY ONCOLOGY | Facility: CLINIC | Age: 69
End: 2024-08-20

## 2024-08-20 ENCOUNTER — RESULT REVIEW (OUTPATIENT)
Age: 69
End: 2024-08-20

## 2024-08-20 ENCOUNTER — APPOINTMENT (OUTPATIENT)
Dept: INFUSION THERAPY | Facility: HOSPITAL | Age: 69
End: 2024-08-20

## 2024-08-20 ENCOUNTER — NON-APPOINTMENT (OUTPATIENT)
Age: 69
End: 2024-08-20

## 2024-08-20 LAB
BASOPHILS # BLD AUTO: 0.02 K/UL — SIGNIFICANT CHANGE UP (ref 0–0.2)
BASOPHILS NFR BLD AUTO: 0.4 % — SIGNIFICANT CHANGE UP (ref 0–2)
EOSINOPHIL # BLD AUTO: 0.04 K/UL — SIGNIFICANT CHANGE UP (ref 0–0.5)
EOSINOPHIL NFR BLD AUTO: 0.7 % — SIGNIFICANT CHANGE UP (ref 0–6)
HCT VFR BLD CALC: 34.8 % — SIGNIFICANT CHANGE UP (ref 34.5–45)
HGB BLD-MCNC: 10.9 G/DL — LOW (ref 11.5–15.5)
IMM GRANULOCYTES NFR BLD AUTO: 1.1 % — HIGH (ref 0–0.9)
LYMPHOCYTES # BLD AUTO: 1.15 K/UL — SIGNIFICANT CHANGE UP (ref 1–3.3)
LYMPHOCYTES # BLD AUTO: 21.5 % — SIGNIFICANT CHANGE UP (ref 13–44)
MCHC RBC-ENTMCNC: 27.7 PG — SIGNIFICANT CHANGE UP (ref 27–34)
MCHC RBC-ENTMCNC: 31.3 G/DL — LOW (ref 32–36)
MCV RBC AUTO: 88.5 FL — SIGNIFICANT CHANGE UP (ref 80–100)
MONOCYTES # BLD AUTO: 0.28 K/UL — SIGNIFICANT CHANGE UP (ref 0–0.9)
MONOCYTES NFR BLD AUTO: 5.2 % — SIGNIFICANT CHANGE UP (ref 2–14)
NEUTROPHILS # BLD AUTO: 3.81 K/UL — SIGNIFICANT CHANGE UP (ref 1.8–7.4)
NEUTROPHILS NFR BLD AUTO: 71.1 % — SIGNIFICANT CHANGE UP (ref 43–77)
NRBC # BLD: 0 /100 WBCS — SIGNIFICANT CHANGE UP (ref 0–0)
PLATELET # BLD AUTO: 152 K/UL — SIGNIFICANT CHANGE UP (ref 150–400)
RBC # BLD: 3.93 M/UL — SIGNIFICANT CHANGE UP (ref 3.8–5.2)
RBC # FLD: 20.8 % — HIGH (ref 10.3–14.5)
WBC # BLD: 5.36 K/UL — SIGNIFICANT CHANGE UP (ref 3.8–10.5)
WBC # FLD AUTO: 5.36 K/UL — SIGNIFICANT CHANGE UP (ref 3.8–10.5)

## 2024-08-22 ENCOUNTER — APPOINTMENT (OUTPATIENT)
Dept: INFUSION THERAPY | Facility: HOSPITAL | Age: 69
End: 2024-08-22

## 2024-08-23 ENCOUNTER — OUTPATIENT (OUTPATIENT)
Dept: OUTPATIENT SERVICES | Facility: HOSPITAL | Age: 69
LOS: 1 days | Discharge: HOME | End: 2024-08-23

## 2024-08-23 DIAGNOSIS — Z96.642 PRESENCE OF LEFT ARTIFICIAL HIP JOINT: Chronic | ICD-10-CM

## 2024-08-23 DIAGNOSIS — Z96.641 PRESENCE OF RIGHT ARTIFICIAL HIP JOINT: Chronic | ICD-10-CM

## 2024-08-23 DIAGNOSIS — Z90.49 ACQUIRED ABSENCE OF OTHER SPECIFIED PARTS OF DIGESTIVE TRACT: Chronic | ICD-10-CM

## 2024-08-23 DIAGNOSIS — C18.9 MALIGNANT NEOPLASM OF COLON, UNSPECIFIED: ICD-10-CM

## 2024-08-30 ENCOUNTER — RESULT REVIEW (OUTPATIENT)
Age: 69
End: 2024-08-30

## 2024-08-30 ENCOUNTER — APPOINTMENT (OUTPATIENT)
Dept: INFUSION THERAPY | Facility: HOSPITAL | Age: 69
End: 2024-08-30

## 2024-08-30 ENCOUNTER — APPOINTMENT (OUTPATIENT)
Dept: HEMATOLOGY ONCOLOGY | Facility: CLINIC | Age: 69
End: 2024-08-30
Payer: MEDICAID

## 2024-08-30 VITALS
OXYGEN SATURATION: 97 % | TEMPERATURE: 97.3 F | SYSTOLIC BLOOD PRESSURE: 133 MMHG | BODY MASS INDEX: 19.68 KG/M2 | HEART RATE: 81 BPM | DIASTOLIC BLOOD PRESSURE: 85 MMHG | RESPIRATION RATE: 16 BRPM | WEIGHT: 97.44 LBS

## 2024-08-30 DIAGNOSIS — C78.7 MALIGNANT NEOPLASM OF COLON, UNSPECIFIED: ICD-10-CM

## 2024-08-30 DIAGNOSIS — C18.9 MALIGNANT NEOPLASM OF COLON, UNSPECIFIED: ICD-10-CM

## 2024-08-30 LAB
ALBUMIN SERPL ELPH-MCNC: 3.8 G/DL — SIGNIFICANT CHANGE UP (ref 3.3–5)
ALP SERPL-CCNC: 191 U/L — HIGH (ref 40–120)
ALT FLD-CCNC: 57 U/L — HIGH (ref 10–45)
ANION GAP SERPL CALC-SCNC: 14 MMOL/L — SIGNIFICANT CHANGE UP (ref 5–17)
AST SERPL-CCNC: 44 U/L — HIGH (ref 10–40)
BASOPHILS # BLD AUTO: 0.02 K/UL — SIGNIFICANT CHANGE UP (ref 0–0.2)
BASOPHILS NFR BLD AUTO: 0.3 % — SIGNIFICANT CHANGE UP (ref 0–2)
BILIRUB SERPL-MCNC: 0.8 MG/DL — SIGNIFICANT CHANGE UP (ref 0.2–1.2)
BUN SERPL-MCNC: 30 MG/DL — HIGH (ref 7–23)
CALCIUM SERPL-MCNC: 10.5 MG/DL — SIGNIFICANT CHANGE UP (ref 8.4–10.5)
CHLORIDE SERPL-SCNC: 103 MMOL/L — SIGNIFICANT CHANGE UP (ref 96–108)
CO2 SERPL-SCNC: 22 MMOL/L — SIGNIFICANT CHANGE UP (ref 22–31)
CREAT SERPL-MCNC: 0.58 MG/DL — SIGNIFICANT CHANGE UP (ref 0.5–1.3)
EGFR: 99 ML/MIN/1.73M2 — SIGNIFICANT CHANGE UP
EOSINOPHIL # BLD AUTO: 0.02 K/UL — SIGNIFICANT CHANGE UP (ref 0–0.5)
EOSINOPHIL NFR BLD AUTO: 0.3 % — SIGNIFICANT CHANGE UP (ref 0–6)
GLUCOSE SERPL-MCNC: 92 MG/DL — SIGNIFICANT CHANGE UP (ref 70–99)
HCT VFR BLD CALC: 34.4 % — LOW (ref 34.5–45)
HGB BLD-MCNC: 10.7 G/DL — LOW (ref 11.5–15.5)
IMM GRANULOCYTES NFR BLD AUTO: 1.6 % — HIGH (ref 0–0.9)
LYMPHOCYTES # BLD AUTO: 1 K/UL — SIGNIFICANT CHANGE UP (ref 1–3.3)
LYMPHOCYTES # BLD AUTO: 14.6 % — SIGNIFICANT CHANGE UP (ref 13–44)
MCHC RBC-ENTMCNC: 28 PG — SIGNIFICANT CHANGE UP (ref 27–34)
MCHC RBC-ENTMCNC: 31.1 G/DL — LOW (ref 32–36)
MCV RBC AUTO: 90.1 FL — SIGNIFICANT CHANGE UP (ref 80–100)
MONOCYTES # BLD AUTO: 0.43 K/UL — SIGNIFICANT CHANGE UP (ref 0–0.9)
MONOCYTES NFR BLD AUTO: 6.3 % — SIGNIFICANT CHANGE UP (ref 2–14)
NEUTROPHILS # BLD AUTO: 5.29 K/UL — SIGNIFICANT CHANGE UP (ref 1.8–7.4)
NEUTROPHILS NFR BLD AUTO: 76.9 % — SIGNIFICANT CHANGE UP (ref 43–77)
NRBC # BLD: 0 /100 WBCS — SIGNIFICANT CHANGE UP (ref 0–0)
PLATELET # BLD AUTO: 151 K/UL — SIGNIFICANT CHANGE UP (ref 150–400)
POTASSIUM SERPL-MCNC: 4 MMOL/L — SIGNIFICANT CHANGE UP (ref 3.5–5.3)
POTASSIUM SERPL-SCNC: 4 MMOL/L — SIGNIFICANT CHANGE UP (ref 3.5–5.3)
PROT SERPL-MCNC: 7.1 G/DL — SIGNIFICANT CHANGE UP (ref 6–8.3)
RBC # BLD: 3.82 M/UL — SIGNIFICANT CHANGE UP (ref 3.8–5.2)
RBC # FLD: 21.2 % — HIGH (ref 10.3–14.5)
SODIUM SERPL-SCNC: 139 MMOL/L — SIGNIFICANT CHANGE UP (ref 135–145)
WBC # BLD: 6.87 K/UL — SIGNIFICANT CHANGE UP (ref 3.8–10.5)
WBC # FLD AUTO: 6.87 K/UL — SIGNIFICANT CHANGE UP (ref 3.8–10.5)

## 2024-08-30 PROCEDURE — 99213 OFFICE O/P EST LOW 20 MIN: CPT

## 2024-08-30 PROCEDURE — G2211 COMPLEX E/M VISIT ADD ON: CPT | Mod: NC

## 2024-08-30 NOTE — REVIEW OF SYSTEMS
[Negative] : Allergic/Immunologic [Joint Pain] : no joint pain [Joint Stiffness] : no joint stiffness [Muscle Pain] : no muscle pain [de-identified] : + facial redness improving [de-identified] : +PN

## 2024-08-30 NOTE — REASON FOR VISIT
[Follow-Up Visit] : a follow-up [[employer] ] : provided by [employer]   [FreeTextEntry2] : Stage IV colon cancer with ANGELI pump [Interpreters_IDNumber] : [id number] [Interpreters_FullName] : Iris [FreeTextEntry3] : Mandarin [TWNoteComboBox1] : Chinese

## 2024-08-30 NOTE — HISTORY OF PRESENT ILLNESS
[Disease: _____________________] : Disease: [unfilled] [T: ___] : T[unfilled] [N: ___] : N[unfilled] [M: ___] : M[unfilled] [AJCC Stage: ____] : AJCC Stage: [unfilled] [Therapy: ___] : Therapy: [unfilled] [Day: ___] : Day: [unfilled] [de-identified] : 67 yo Mandarin-speaking F with a PMH of HTN, RA, scleroderma, and mild pulmonary hypertension who presents for management of Stage IV R colon cancer, GEORGINAAngela Stevens has been gradually losing weight over years. She has also had lifelong intermittent diarrhea for decades, but she has never had a workup for it. Her diarrhea is food related. She had an EGD about 10 years ago that showed esophageal inflammation and acid reflux, but she has never had a colonoscopy prior to the below events. She originally moved to the  from [place] in the summer of 2023. In September, she presented with abdominal pain and was found to have a Hb 6.8 with acute cholecystitis and had a percutaneous cholecystostomy drain placed. She also had 1U PRBCs.  She was then readmitted to [facility] from 10/22 - 11/6/23 for tube dislodgement, with CT showing ascending colon wall thickening, with a few liver lesions c/w mucinous metastases (largest 2.5 x 2.0 cm in the R hepatic lobe, others included a 0.8 cm segment 4A/8 lesion and a 0.9 cm segment 2/3 lesion). She also had continued anemia (Hb 7.0) requiring 2U PRBCs during admission. She underwent a colonoscopy on 10/24 that showed a circumferential ascending colon mass about 5 cm above the cecum, biopsy showing adenocarcinoma, moderately differentiated with a mucinous component. On 11/1/23, she underwent a R hemicolectomy with cholecystectomy, showing a 10.2 cm tumor with negative margins (closest 0.3 cm), no LVI or PNI, 0/25 LNs positive, and intermediate (5-9) tumor budding score. MS stable. T3N0  She lives with her , daughter and her , and her 2 grandchildren.  Referred to medical oncology for further management.   1/8/24: CT chest: no suspicious pulm nodules  MRI Abd: bilobar hepatic lesions, suspicious for mucinous hepatic metastases, with lesion increased in size, new or stable compared to prior  1/12/24: Liver bx: met colon ca 1/22/24: C1 5FU/LV 2/7/24: C2 FOLFOX, oxaliplatin 65 mg/m2 2/19/24: C3, Oxaliplatin 75 mg/m2 3/4/24: C4, Oxaliplatin 65 mg/m2 3/7/24: CT Chest, CTA a/p: enlarging hepatic mets 3/18/24: C5 3/28/24: Went to ED w/ back pain after heavy lifting  CT AP: Acute L1 superior endplate compression deformity with mild loss of height, new from prior CT of 3/7/2024. Bilobar hepatic lesions, slightly decrease in size when compared to prior CT of 3/7/2024. 4/1/24: C6 4/23/24: CT CAP: no lung mets, stable liver lesions, precarinal 1.2 cm node is unchanged  4/15/24: ANGELI pump placement (baseline alk phos elevated) 5/15/24: FUDR 100% ( baseline) 122 mg 5/29/24: saline + Dex via ANGELI () 6/11/24: 5FU/LV, FUDR held 2/2 elevated alk phos (425), saline + dex  6/25/24: 5FU/LV, saline + dex () 7/8/24: 5FU/LV + FUDR @ 25% of max dose (30 mg) 7/23/24: 5FU/LV + saline 7/31/24: CT Chest: stable exam x resolution b/l pleural effusions & lower lobe consolidation/atelectasis  7/31/24: MR AP: previously noted hepatic lesions no longer enhance, compatible w/ positive response to treatment.  8/6/24: FUDR 50% of max dose (30 mg) = 15 mg + 5FU/LV 8/2024: 5FU/LV + saline  [de-identified] : moderately differentiated adenocarcinoma with mucinous component, GEORGINA [de-identified] : CEA 57.5 (10/24/23) [de-identified] : KRAS Q61H NRAS wildtype APC [id number]*18, R876* FBXW7 R465C PIK3CA E545K [de-identified] : Here for clinical follow up  Doing very well. Has no complaints today  Appetite and bowels are normal  Energy level is normal.  Active and Independent.

## 2024-08-30 NOTE — PHYSICAL EXAM
[Restricted in physically strenuous activity but ambulatory and able to carry out work of a light or sedentary nature] : Status 1- Restricted in physically strenuous activity but ambulatory and able to carry out work of a light or sedentary nature, e.g., light house work, office work [Thin] : thin [Normal] : affect appropriate [de-identified] :  palpable hepatic artery infusion pump in place, surgical scars well healed, no TTP, no appreciable hernia  [de-identified] : facial and upper extremity telangiectasias are stable

## 2024-09-03 ENCOUNTER — APPOINTMENT (OUTPATIENT)
Dept: HEMATOLOGY ONCOLOGY | Facility: CLINIC | Age: 69
End: 2024-09-03

## 2024-09-03 ENCOUNTER — RESULT REVIEW (OUTPATIENT)
Age: 69
End: 2024-09-03

## 2024-09-03 ENCOUNTER — APPOINTMENT (OUTPATIENT)
Dept: RHEUMATOLOGY | Facility: CLINIC | Age: 69
End: 2024-09-03
Payer: MEDICAID

## 2024-09-03 ENCOUNTER — APPOINTMENT (OUTPATIENT)
Dept: INFUSION THERAPY | Facility: HOSPITAL | Age: 69
End: 2024-09-03

## 2024-09-03 VITALS
HEIGHT: 59 IN | BODY MASS INDEX: 19.64 KG/M2 | RESPIRATION RATE: 16 BRPM | HEART RATE: 65 BPM | SYSTOLIC BLOOD PRESSURE: 165 MMHG | OXYGEN SATURATION: 95 % | DIASTOLIC BLOOD PRESSURE: 84 MMHG | WEIGHT: 97.44 LBS

## 2024-09-03 DIAGNOSIS — J84.9 INTERSTITIAL PULMONARY DISEASE, UNSPECIFIED: ICD-10-CM

## 2024-09-03 DIAGNOSIS — M06.9 RHEUMATOID ARTHRITIS, UNSPECIFIED: ICD-10-CM

## 2024-09-03 DIAGNOSIS — H53.8 OTHER VISUAL DISTURBANCES: ICD-10-CM

## 2024-09-03 DIAGNOSIS — M34.9 SYSTEMIC SCLEROSIS, UNSPECIFIED: ICD-10-CM

## 2024-09-03 LAB
BASOPHILS # BLD AUTO: 0.03 K/UL — SIGNIFICANT CHANGE UP (ref 0–0.2)
BASOPHILS NFR BLD AUTO: 0.5 % — SIGNIFICANT CHANGE UP (ref 0–2)
EOSINOPHIL # BLD AUTO: 0.04 K/UL — SIGNIFICANT CHANGE UP (ref 0–0.5)
EOSINOPHIL NFR BLD AUTO: 0.7 % — SIGNIFICANT CHANGE UP (ref 0–6)
HCT VFR BLD CALC: 34.5 % — SIGNIFICANT CHANGE UP (ref 34.5–45)
HGB BLD-MCNC: 11.1 G/DL — LOW (ref 11.5–15.5)
IMM GRANULOCYTES NFR BLD AUTO: 1.2 % — HIGH (ref 0–0.9)
LYMPHOCYTES # BLD AUTO: 1.27 K/UL — SIGNIFICANT CHANGE UP (ref 1–3.3)
LYMPHOCYTES # BLD AUTO: 22.4 % — SIGNIFICANT CHANGE UP (ref 13–44)
MCHC RBC-ENTMCNC: 28.6 PG — SIGNIFICANT CHANGE UP (ref 27–34)
MCHC RBC-ENTMCNC: 32.2 G/DL — SIGNIFICANT CHANGE UP (ref 32–36)
MCV RBC AUTO: 88.9 FL — SIGNIFICANT CHANGE UP (ref 80–100)
MONOCYTES # BLD AUTO: 0.39 K/UL — SIGNIFICANT CHANGE UP (ref 0–0.9)
MONOCYTES NFR BLD AUTO: 6.9 % — SIGNIFICANT CHANGE UP (ref 2–14)
NEUTROPHILS # BLD AUTO: 3.86 K/UL — SIGNIFICANT CHANGE UP (ref 1.8–7.4)
NEUTROPHILS NFR BLD AUTO: 68.3 % — SIGNIFICANT CHANGE UP (ref 43–77)
NRBC # BLD: 0 /100 WBCS — SIGNIFICANT CHANGE UP (ref 0–0)
PLATELET # BLD AUTO: 141 K/UL — LOW (ref 150–400)
RBC # BLD: 3.88 M/UL — SIGNIFICANT CHANGE UP (ref 3.8–5.2)
RBC # FLD: 21.2 % — HIGH (ref 10.3–14.5)
WBC # BLD: 5.66 K/UL — SIGNIFICANT CHANGE UP (ref 3.8–10.5)
WBC # FLD AUTO: 5.66 K/UL — SIGNIFICANT CHANGE UP (ref 3.8–10.5)

## 2024-09-03 PROCEDURE — 99215 OFFICE O/P EST HI 40 MIN: CPT | Mod: GC

## 2024-09-03 PROCEDURE — T1013A: CUSTOM

## 2024-09-03 NOTE — REVIEW OF SYSTEMS
[Feeling Poorly] : feeling poorly [Feeling Tired] : feeling tired [Arthralgias] : arthralgias [Joint Pain] : joint pain [Joint Swelling] : joint swelling [As Noted in HPI] : as noted in HPI [Skin Lesions] : skin lesion [Negative] : Heme/Lymph [Fever] : no fever [Chills] : no chills [Recent Weight Gain (___ Lbs)] : no recent weight gain [Recent Weight Loss (___ Lbs)] : no recent weight loss [Joint Stiffness] : no joint stiffness [Limb Pain] : no limb pain [Limb Swelling] : no limb swelling

## 2024-09-03 NOTE — REASON FOR VISIT
[Follow-Up: _____] : a [unfilled] follow-up visit [Family Member] : family member [Time Spent: ____ minutes] : Total time spent using  services: [unfilled] minutes. The patient's primary language is not English thus required  services. [Interpreters_IDNumber] : [id number] [TWNoteComboBox1] : Chinese

## 2024-09-03 NOTE — HISTORY OF PRESENT ILLNESS
[___ Month(s) Ago] : [unfilled] month(s) ago [Currently Experiencing] : currently [Decreased ROM] : decreased range of motion [Difficulty Standing] : difficulty standing [Difficulty Walking] : difficulty walking [FreeTextEntry1] : 68 year old Mandarin speaking female here to establish care for Scleroderma and ILD  She moved here from [place] last year. She was under the care of a rheumatologist for the least 30 years for scleroderma and RA. She was on treatment but we are unable to transcribe the medication name. States it was 6 tabs three times daily. She was on it from 2007 - 2021. She stopped the treatment during COVID due to access to medical care. Does not feel any different since stopping the medications.   Has joint pains in the wrists, shoulders, elbows, knees and ankles. Notices some swelling in the ankles. Recent h/o acute back pain. Feels that the joint pains have worsened. No morning stiffness.   C/o Raynaud's in cold weather. She may have had digital ulcers in the past (> 3 years ago). No on any medications for this to her knowledge.   Has telangiectasia of the face and upper extremities. Has a h/o pulmonary HTN s/p RHC in 2018.She was prescribed Sildenafil for a while but then was discontinued and was told she didn't need it anymore. Denies any SOB or cough at present.   Her metastatic colon cancer was diagnosed last year - no symptoms, just did colonoscopy that found the cancer. She is currently receiving chemotherapy.   06/2024: Her joint pains seem to have improved. Denies any pain or stiffness at this time. TTE with evidence of pulmonary HTN. PFTs not performed yet.   08/2024: Denies joint pain, morning stiffness. Endorses Raynauds. Denies f/c, cough, abd pain, n/v, SOB, GERD. Unclear whether pt is taking alondronate. States she has vision change on R eye, sees straight objects as wavy, Has been ongoing for last 10 yrs but worsening. [Malaise] : no malaise [Fatigue] : no fatigue [Skin Lesions] : no lesions [Shortness of Breath] : no shortness of breath [Arthralgias] : no arthralgias [Joint Swelling] : no joint swelling [Anorexia] : no anorexia [Weight Loss] : no weight loss [Fever] : no fever [Chills] : no chills [Depression] : no depression [Malar Facial Rash] : no malar facial rash [Skin Nodules] : no skin nodules [Oral Ulcers] : no oral ulcers [Cough] : no cough [Dry Mouth] : no dry mouth [Dysphonia] : no dysphonia [Dysphagia] : no dysphagia [Chest Pain] : no chest pain [Joint Warmth] : no joint warmth [Joint Deformity] : no joint deformity [Morning Stiffness] : no morning stiffness [Falls] : no falls [Dyspnea] : no dyspnea [Myalgias] : no myalgias [Muscle Weakness] : no muscle weakness [Muscle Spasms] : no muscle spasms [Muscle Cramping] : no muscle cramping [Visual Changes] : no visual changes [Eye Pain] : no eye pain [Eye Redness] : no eye redness [Dry Eyes] : no dry eyes

## 2024-09-03 NOTE — DATA REVIEWED
[FreeTextEntry1] : CT Chest (07/2024): AIRWAYS AND LUNGS: The central tracheobronchial tree is patent.  Peripheral reticular and ground glass opacities with bronchiectasis and cystic change is similar to prior studies. Resolution prior bilateral pleural effusions and lower lobe consolidation/atelectasis. MEDIASTINUM AND PLEURA: There are no enlarged mediastinal, hilar or axillary lymph nodes. The visualized portion of the thyroid gland is unremarkable. There is no pleural effusion. There is no pneumothorax. HEART AND VESSELS: There is mild cardiomegaly.  There are atherosclerotic calcifications of the aorta and coronary arteries.  There is trace pericardial fluid.  Aortic valve calcification UPPER ABDOMEN: Images of the upper abdomen demonstrate hepatic hypodensities better evaluated on recent MRI. BONES AND SOFT TISSUES: The bones are unremarkable.  The soft tissues are unremarkable. TUBES/LINES: Right-sided Mediport with tip in SVC IMPRESSION: Peripheral reticular and ground glass opacities with bronchiectasis and cystic change is similar to prior studies. Resolution prior bilateral pleural effusions and lower lobe consolidation/atelectasis.  TTE (07/2024):  1. Left ventricular cavity is small. Left ventricular wall thickness is normal. Left ventricular systolic function is normal with an ejection fraction of 62 % by Lundberg's method of disks. There are no regional wall motion abnormalities seen.  2. There is mild (grade 1) left ventricular diastolic dysfunction.  3. Normal right ventricular cavity size, with normal wall thickness, and normal systolic function.  4. No significant valvular disease.  5. Estimated pulmonary artery systolic pressure is 33 mmHg.  6. No echocardiographic evidence of pulmonary hypertension.  7. No pericardial effusion seen.  8. No prior echocardiogram is available for comparison.   DEXA (06/2024): Spine: T-score: -4.5. Z-score: -2.5 BMD: 0.551 g/cm2 Radius 1/3: T-score: -1.5 Z-score: 0.5 BMD: 0.607 g/cm2 IMPRESSION: Osteoporosis. FRACTURE RISK: The risk of fracture is increased. Medical therapy should be considered according to current guidelines. TREATMENT RECOMMENDATIONS:  Based on NOF treatment guidelines medical therapy is recommended at this time. FOLLOW-UP: A repeat examination is recommended in 2 years.  CT Chest (03/2024): AIRWAYS, LUNGS, PLEURA: Few mucoid impacted peripheral airways. No suspicious lung nodule or mass. Peripheral ground-glass opacities and subpleural cysts at the lung bases similar to prior exam. No pleural effusion. MEDIASTINUM: Normal heart size. No pericardial effusion. Aortic root calcifications. Dilated ascending thoracic aorta measuring 4 cm, unchanged. Precarinal 1.2 x 1 cm node (image 46, series 3) series is unchanged compared to 10/22/2023 CT chest. No large hilar nodes. Debris within the lumen of the esophagus. Port-A-Cath in place. IMAGED ABDOMEN: See report of same day MRI abdomen. SOFT TISSUES: Unremarkable. BONES: Unchanged loss of height of L1 vertebral body. IMPRESSION:. No suspicious lung nodule or mass. Precarinal 1.2 x 1 cm node is unchanged compared to 10/22/2023 CT chest.   CT abdomen and pelvis (03/2024): CHEST: LUNGS AND LARGE AIRWAYS: Patent central airways. No pulmonary nodules. PLEURA: No pleural effusion. VESSELS: Stable appearance. HEART: Heart size is normal.  No pericardial effusion. MEDIASTINUM AND FROIALN: No lymphadenopathy. CHEST WALL AND LOWER NECK: Within normal limits. ABDOMEN AND PELVIS: LIVER: Hypodense liver lesions are essentially unchanged. A reference 2.6 x 2 cm right lobe lesion series 3 image 66. BILE DUCTS: Normal caliber. GALLBLADDER: Status post cholecystectomy. SPLEEN: Within normal limits. PANCREAS: Within normal limits. ADRENALS: Within normal limits. KIDNEYS/URETERS: Within normal limits. BLADDER: Obscured by hardware artifact. REPRODUCTIVE ORGANS: Fibroid uterus. BOWEL: No bowel obstruction. Right hemicolectomy. Mild duodenal distention is unchanged. PERITONEUM: No ascites. VESSELS:  Within normal limits. RETROPERITONEUM/LYMPH NODES: No lymphadenopathy. ABDOMINAL WALL: Within normal limits. BONES: Bilateral hip arthroplasty. IMPRESSION: No evidence for lung metastasis. Stable liver lesions.

## 2024-09-03 NOTE — ASSESSMENT
[FreeTextEntry1] : 68-year-old female with h/o scleroderma and RA here for follow up.  1. Systemic sclerosis: does not have skin tightening, but with telangiectasias, ILD and possible pulmonary HTN. Also with a h/o RA .  Has metastatic colon cancer for which she is undergoing chemotherapy at present. Her main complaint at this time is of diffuse arthralgias without any evidence of synovitis, and now arthralgia improving i) Skin: no skin tightening but has telangiectasias. Denies any Raynaud's symptoms and did not have any color changes today during my exam. Will monitor for now.  ii) MSK: had diffuse arthralgias without synovitis at last visit with strongly positive RF and CCP. However, denies any symptoms today thus will monitor off therapy for now. Could consider Plaquenil or low dose steroids if symptomatic in the future. She is not a candidate for more aggressive immunosuppressive therapy at this time.  iii) Pulmonary: CT scan suggestive of early ILD. Repeat CT chest 8/6 with similar reticular GGO, resolved pleural effusion. PFTs and pulm referral given, appt 9/6. iv) Cardiac: Repeat TTE 6/2024 with EF 62% and grade 1 diastolic dysfunction, no pulm HTN v) GI: h/o C. diff infection s/p vancomycin. H/o metastatic colon cancer and is on chemotherapy. Being followed by GI and heme/onc.   vi) Renal: No h/o renal involvement.  vii) Heme: as above. Will monitor labs  viii) Neuro: no active issues  2. Bone health: DEXA shows T score -4.5 on spine, osteoporosis. On Vitamin D and alendronate prescribed by heme onc, has been taking since July. Discussed that alendronate has higher side effects for pts with scleroderma and dysmotility issues, will stop alendronate and prescribe prolia. Once approved by insurance, will contact patient for in office injection. Walker script given   #. blurry vision of R eye-optho referral given  Follow up in 3 months

## 2024-09-03 NOTE — PHYSICAL EXAM
[General Appearance - Alert] : alert [Sclera] : the sclera and conjunctiva were normal [General Appearance - In No Acute Distress] : in no acute distress [PERRL With Normal Accommodation] : pupils were equal in size, round, and reactive to light [Extraocular Movements] : extraocular movements were intact [Outer Ear] : the ears and nose were normal in appearance [Oropharynx] : the oropharynx was normal [Neck Appearance] : the appearance of the neck was normal [Neck Cervical Mass (___cm)] : no neck mass was observed [Jugular Venous Distention Increased] : there was no jugular-venous distention [Thyroid Diffuse Enlargement] : the thyroid was not enlarged [Thyroid Nodule] : there were no palpable thyroid nodules [] : no respiratory distress [Auscultation Breath Sounds / Voice Sounds] : lungs were clear to auscultation bilaterally [Heart Rate And Rhythm] : heart rate was normal and rhythm regular [Heart Sounds] : normal S1 and S2 [Heart Sounds Gallop] : no gallops [Murmurs] : no murmurs [Heart Sounds Pericardial Friction Rub] : no pericardial rub [Full Pulse] : the pedal pulses are present [Edema] : there was no peripheral edema [Cervical Lymph Nodes Enlarged Posterior Bilaterally] : posterior cervical [Cervical Lymph Nodes Enlarged Anterior Bilaterally] : anterior cervical [Supraclavicular Lymph Nodes Enlarged Bilaterally] : supraclavicular [Axillary Lymph Nodes Enlarged Bilaterally] : axillary [No CVA Tenderness] : no ~M costovertebral angle tenderness [No Spinal Tenderness] : no spinal tenderness [No Focal Deficits] : no focal deficits [Oriented To Time, Place, And Person] : oriented to person, place, and time [Impaired Insight] : insight and judgment were intact [Affect] : the affect was normal [FreeTextEntry1] : scattered telangiectasias over the face and extremities. No skin tightening. Healed scars of prior digital ulcers

## 2024-09-04 DIAGNOSIS — R11.2 NAUSEA WITH VOMITING, UNSPECIFIED: ICD-10-CM

## 2024-09-04 DIAGNOSIS — Z51.11 ENCOUNTER FOR ANTINEOPLASTIC CHEMOTHERAPY: ICD-10-CM

## 2024-09-05 ENCOUNTER — APPOINTMENT (OUTPATIENT)
Dept: INFUSION THERAPY | Facility: HOSPITAL | Age: 69
End: 2024-09-05

## 2024-09-13 ENCOUNTER — APPOINTMENT (OUTPATIENT)
Dept: HEMATOLOGY ONCOLOGY | Facility: CLINIC | Age: 69
End: 2024-09-13
Payer: MEDICAID

## 2024-09-13 ENCOUNTER — APPOINTMENT (OUTPATIENT)
Dept: INFUSION THERAPY | Facility: HOSPITAL | Age: 69
End: 2024-09-13

## 2024-09-13 ENCOUNTER — RESULT REVIEW (OUTPATIENT)
Age: 69
End: 2024-09-13

## 2024-09-13 VITALS
TEMPERATURE: 97.2 F | RESPIRATION RATE: 16 BRPM | BODY MASS INDEX: 19.95 KG/M2 | SYSTOLIC BLOOD PRESSURE: 153 MMHG | DIASTOLIC BLOOD PRESSURE: 90 MMHG | HEART RATE: 55 BPM | OXYGEN SATURATION: 98 % | WEIGHT: 98.77 LBS

## 2024-09-13 DIAGNOSIS — C18.9 MALIGNANT NEOPLASM OF COLON, UNSPECIFIED: ICD-10-CM

## 2024-09-13 DIAGNOSIS — C78.7 MALIGNANT NEOPLASM OF COLON, UNSPECIFIED: ICD-10-CM

## 2024-09-13 LAB
ALBUMIN SERPL ELPH-MCNC: 3.5 G/DL — SIGNIFICANT CHANGE UP (ref 3.3–5)
ALP SERPL-CCNC: 157 U/L — HIGH (ref 40–120)
ALT FLD-CCNC: 36 U/L — SIGNIFICANT CHANGE UP (ref 10–45)
ANION GAP SERPL CALC-SCNC: 10 MMOL/L — SIGNIFICANT CHANGE UP (ref 5–17)
AST SERPL-CCNC: 36 U/L — SIGNIFICANT CHANGE UP (ref 10–40)
BASOPHILS # BLD AUTO: 0.02 K/UL — SIGNIFICANT CHANGE UP (ref 0–0.2)
BASOPHILS NFR BLD AUTO: 0.4 % — SIGNIFICANT CHANGE UP (ref 0–2)
BILIRUB SERPL-MCNC: 0.8 MG/DL — SIGNIFICANT CHANGE UP (ref 0.2–1.2)
BUN SERPL-MCNC: 22 MG/DL — SIGNIFICANT CHANGE UP (ref 7–23)
CALCIUM SERPL-MCNC: 9.5 MG/DL — SIGNIFICANT CHANGE UP (ref 8.4–10.5)
CEA SERPL-MCNC: 17.2 NG/ML — HIGH (ref 0–3.8)
CHLORIDE SERPL-SCNC: 109 MMOL/L — HIGH (ref 96–108)
CO2 SERPL-SCNC: 22 MMOL/L — SIGNIFICANT CHANGE UP (ref 22–31)
CREAT SERPL-MCNC: 0.63 MG/DL — SIGNIFICANT CHANGE UP (ref 0.5–1.3)
EGFR: 97 ML/MIN/1.73M2 — SIGNIFICANT CHANGE UP
EOSINOPHIL # BLD AUTO: 0.02 K/UL — SIGNIFICANT CHANGE UP (ref 0–0.5)
EOSINOPHIL NFR BLD AUTO: 0.4 % — SIGNIFICANT CHANGE UP (ref 0–6)
GLUCOSE SERPL-MCNC: 90 MG/DL — SIGNIFICANT CHANGE UP (ref 70–99)
HCT VFR BLD CALC: 32.8 % — LOW (ref 34.5–45)
HGB BLD-MCNC: 10.1 G/DL — LOW (ref 11.5–15.5)
IMM GRANULOCYTES NFR BLD AUTO: 1.1 % — HIGH (ref 0–0.9)
LYMPHOCYTES # BLD AUTO: 1.25 K/UL — SIGNIFICANT CHANGE UP (ref 1–3.3)
LYMPHOCYTES # BLD AUTO: 23.7 % — SIGNIFICANT CHANGE UP (ref 13–44)
MCHC RBC-ENTMCNC: 28.4 PG — SIGNIFICANT CHANGE UP (ref 27–34)
MCHC RBC-ENTMCNC: 30.8 G/DL — LOW (ref 32–36)
MCV RBC AUTO: 92.1 FL — SIGNIFICANT CHANGE UP (ref 80–100)
MONOCYTES # BLD AUTO: 0.36 K/UL — SIGNIFICANT CHANGE UP (ref 0–0.9)
MONOCYTES NFR BLD AUTO: 6.8 % — SIGNIFICANT CHANGE UP (ref 2–14)
NEUTROPHILS # BLD AUTO: 3.56 K/UL — SIGNIFICANT CHANGE UP (ref 1.8–7.4)
NEUTROPHILS NFR BLD AUTO: 67.6 % — SIGNIFICANT CHANGE UP (ref 43–77)
NRBC # BLD: 0 /100 WBCS — SIGNIFICANT CHANGE UP (ref 0–0)
PLATELET # BLD AUTO: 140 K/UL — LOW (ref 150–400)
POTASSIUM SERPL-MCNC: 4.4 MMOL/L — SIGNIFICANT CHANGE UP (ref 3.5–5.3)
POTASSIUM SERPL-SCNC: 4.4 MMOL/L — SIGNIFICANT CHANGE UP (ref 3.5–5.3)
PROT SERPL-MCNC: 6.6 G/DL — SIGNIFICANT CHANGE UP (ref 6–8.3)
RBC # BLD: 3.56 M/UL — LOW (ref 3.8–5.2)
RBC # FLD: 21.2 % — HIGH (ref 10.3–14.5)
SODIUM SERPL-SCNC: 141 MMOL/L — SIGNIFICANT CHANGE UP (ref 135–145)
WBC # BLD: 5.27 K/UL — SIGNIFICANT CHANGE UP (ref 3.8–10.5)
WBC # FLD AUTO: 5.27 K/UL — SIGNIFICANT CHANGE UP (ref 3.8–10.5)

## 2024-09-13 PROCEDURE — G2211 COMPLEX E/M VISIT ADD ON: CPT | Mod: NC

## 2024-09-13 PROCEDURE — 99214 OFFICE O/P EST MOD 30 MIN: CPT

## 2024-09-13 NOTE — PHYSICAL EXAM
[Restricted in physically strenuous activity but ambulatory and able to carry out work of a light or sedentary nature] : Status 1- Restricted in physically strenuous activity but ambulatory and able to carry out work of a light or sedentary nature, e.g., light house work, office work [Thin] : thin [Normal] : affect appropriate [de-identified] :  palpable hepatic artery infusion pump in place, surgical scars well healed, no TTP, no appreciable hernia  [de-identified] : facial and upper extremity telangiectasias are stable

## 2024-09-13 NOTE — REVIEW OF SYSTEMS
[Negative] : Allergic/Immunologic [Joint Pain] : no joint pain [Joint Stiffness] : no joint stiffness [Muscle Pain] : no muscle pain [de-identified] : + facial redness improving [de-identified] : +PN

## 2024-09-13 NOTE — END OF VISIT
Per nursing, patient removed his own IV access and was seen walking down the hallway to leave. Patient upset that he had not been taken down for his Lexiscan yet and mad that he couldn't eat breakfast. I personally talked to and evaluated him this morning and explained to him that there are several people who need Lexiscans so he may not go as quickly as he wishes. Patient seemed upset by this but was agreeable at that time. Patient signed AMA form prior to leaving.     The risks and benefits of medical management and treatment were explained to the patient, and the patient was given the opportunity to have questions answered and concerns addressed. The patient possesses decision-making capacity. The patient understands the risks of leaving including death, permanent neurological disability, cardiovascular collapse, shock, or worsening of his/her condition. The patient is still electing to leave against medical advice. The patient was advised and encouraged to return at any time to complete the evaluation. The patient will be discharged AMA per patient request.    Please note patient left against medical advice with own free will and was NOT formally discharged from the hospital.      [Time Spent: ___ minutes] : I have spent [unfilled] minutes of time on the encounter which excludes teaching and separately reported services.

## 2024-09-13 NOTE — HISTORY OF PRESENT ILLNESS
[Disease: _____________________] : Disease: [unfilled] [T: ___] : T[unfilled] [N: ___] : N[unfilled] [M: ___] : M[unfilled] [AJCC Stage: ____] : AJCC Stage: [unfilled] [Therapy: ___] : Therapy: [unfilled] [Day: ___] : Day: [unfilled] [de-identified] : 67 yo Mandarin-speaking F with a PMH of HTN, RA, scleroderma, and mild pulmonary hypertension who presents for management of Stage IV R colon cancer, GEORGINAAngela Stevens has been gradually losing weight over years. She has also had lifelong intermittent diarrhea for decades, but she has never had a workup for it. Her diarrhea is food related. She had an EGD about 10 years ago that showed esophageal inflammation and acid reflux, but she has never had a colonoscopy prior to the below events. She originally moved to the  from [place] in the summer of 2023. In September, she presented with abdominal pain and was found to have a Hb 6.8 with acute cholecystitis and had a percutaneous cholecystostomy drain placed. She also had 1U PRBCs.  She was then readmitted to [facility] from 10/22 - 11/6/23 for tube dislodgement, with CT showing ascending colon wall thickening, with a few liver lesions c/w mucinous metastases (largest 2.5 x 2.0 cm in the R hepatic lobe, others included a 0.8 cm segment 4A/8 lesion and a 0.9 cm segment 2/3 lesion). She also had continued anemia (Hb 7.0) requiring 2U PRBCs during admission. She underwent a colonoscopy on 10/24 that showed a circumferential ascending colon mass about 5 cm above the cecum, biopsy showing adenocarcinoma, moderately differentiated with a mucinous component. On 11/1/23, she underwent a R hemicolectomy with cholecystectomy, showing a 10.2 cm tumor with negative margins (closest 0.3 cm), no LVI or PNI, 0/25 LNs positive, and intermediate (5-9) tumor budding score. MS stable. T3N0  She lives with her , daughter and her , and her 2 grandchildren.  Referred to medical oncology for further management.   1/8/24: CT chest: no suspicious pulm nodules  MRI Abd: bilobar hepatic lesions, suspicious for mucinous hepatic metastases, with lesion increased in size, new or stable compared to prior  1/12/24: Liver bx: met colon ca 1/22/24: C1 5FU/LV 2/7/24: C2 FOLFOX, oxaliplatin 65 mg/m2 2/19/24: C3, Oxaliplatin 75 mg/m2 3/4/24: C4, Oxaliplatin 65 mg/m2 3/7/24: CT Chest, CTA a/p: enlarging hepatic mets 3/18/24: C5 3/28/24: Went to ED w/ back pain after heavy lifting  CT AP: Acute L1 superior endplate compression deformity with mild loss of height, new from prior CT of 3/7/2024. Bilobar hepatic lesions, slightly decrease in size when compared to prior CT of 3/7/2024. 4/1/24: C6 4/23/24: CT CAP: no lung mets, stable liver lesions, precarinal 1.2 cm node is unchanged  4/15/24: ANGELI pump placement (baseline alk phos elevated) 5/15/24: FUDR 100% ( baseline) 122 mg 5/29/24: saline + Dex via ANGELI () 6/11/24: 5FU/LV, FUDR held 2/2 elevated alk phos (425), saline + dex  6/25/24: 5FU/LV, saline + dex () 7/8/24: 5FU/LV + FUDR @ 25% of max dose (30 mg) 7/23/24: 5FU/LV + saline 7/31/24: CT Chest: stable exam x resolution b/l pleural effusions & lower lobe consolidation/atelectasis  7/31/24: MR AP: previously noted hepatic lesions no longer enhance, compatible w/ positive response to treatment.  8/6/24: FUDR 50% of max dose (30 mg) = 15 mg + 5FU/LV 8/2024: 5FU/LV + saline  9/3/24: 5FU/LV + FUDR 15 mg 9/10/24: ctDNA negative  [de-identified] : moderately differentiated adenocarcinoma with mucinous component, GEORGINA [de-identified] : CEA 57.5 (10/24/23) [de-identified] : KRAS Q61H NRAS wildtype APC [id number]*18, R876* [id number] [id number] [de-identified] : Continues to doing very well.  She has no new complaints Active and Independent

## 2024-09-17 ENCOUNTER — APPOINTMENT (OUTPATIENT)
Dept: HEMATOLOGY ONCOLOGY | Facility: CLINIC | Age: 69
End: 2024-09-17

## 2024-09-17 ENCOUNTER — APPOINTMENT (OUTPATIENT)
Dept: INFUSION THERAPY | Facility: HOSPITAL | Age: 69
End: 2024-09-17

## 2024-09-17 ENCOUNTER — RESULT REVIEW (OUTPATIENT)
Age: 69
End: 2024-09-17

## 2024-09-17 ENCOUNTER — NON-APPOINTMENT (OUTPATIENT)
Age: 69
End: 2024-09-17

## 2024-09-17 LAB
BASOPHILS # BLD AUTO: 0.02 K/UL — SIGNIFICANT CHANGE UP (ref 0–0.2)
BASOPHILS NFR BLD AUTO: 0.4 % — SIGNIFICANT CHANGE UP (ref 0–2)
EOSINOPHIL # BLD AUTO: 0.03 K/UL — SIGNIFICANT CHANGE UP (ref 0–0.5)
EOSINOPHIL NFR BLD AUTO: 0.6 % — SIGNIFICANT CHANGE UP (ref 0–6)
HCT VFR BLD CALC: 32.8 % — LOW (ref 34.5–45)
HGB BLD-MCNC: 10.4 G/DL — LOW (ref 11.5–15.5)
IMM GRANULOCYTES NFR BLD AUTO: 1.2 % — HIGH (ref 0–0.9)
LYMPHOCYTES # BLD AUTO: 1.48 K/UL — SIGNIFICANT CHANGE UP (ref 1–3.3)
LYMPHOCYTES # BLD AUTO: 28.9 % — SIGNIFICANT CHANGE UP (ref 13–44)
MCHC RBC-ENTMCNC: 28.3 PG — SIGNIFICANT CHANGE UP (ref 27–34)
MCHC RBC-ENTMCNC: 31.7 G/DL — LOW (ref 32–36)
MCV RBC AUTO: 89.4 FL — SIGNIFICANT CHANGE UP (ref 80–100)
MONOCYTES # BLD AUTO: 0.33 K/UL — SIGNIFICANT CHANGE UP (ref 0–0.9)
MONOCYTES NFR BLD AUTO: 6.4 % — SIGNIFICANT CHANGE UP (ref 2–14)
NEUTROPHILS # BLD AUTO: 3.2 K/UL — SIGNIFICANT CHANGE UP (ref 1.8–7.4)
NEUTROPHILS NFR BLD AUTO: 62.5 % — SIGNIFICANT CHANGE UP (ref 43–77)
NRBC # BLD: 0 /100 WBCS — SIGNIFICANT CHANGE UP (ref 0–0)
PLATELET # BLD AUTO: 146 K/UL — LOW (ref 150–400)
RBC # BLD: 3.67 M/UL — LOW (ref 3.8–5.2)
RBC # FLD: 21.5 % — HIGH (ref 10.3–14.5)
WBC # BLD: 5.12 K/UL — SIGNIFICANT CHANGE UP (ref 3.8–10.5)
WBC # FLD AUTO: 5.12 K/UL — SIGNIFICANT CHANGE UP (ref 3.8–10.5)

## 2024-09-19 ENCOUNTER — APPOINTMENT (OUTPATIENT)
Dept: INFUSION THERAPY | Facility: HOSPITAL | Age: 69
End: 2024-09-19

## 2024-09-19 DIAGNOSIS — Z85.038 ENCOUNTER FOR SCREENING FOR MALIGNANT NEOPLASM OF COLON: ICD-10-CM

## 2024-09-19 DIAGNOSIS — Z12.11 ENCOUNTER FOR SCREENING FOR MALIGNANT NEOPLASM OF COLON: ICD-10-CM

## 2024-09-27 ENCOUNTER — RESULT REVIEW (OUTPATIENT)
Age: 69
End: 2024-09-27

## 2024-09-27 ENCOUNTER — APPOINTMENT (OUTPATIENT)
Dept: INFUSION THERAPY | Facility: HOSPITAL | Age: 69
End: 2024-09-27

## 2024-09-27 ENCOUNTER — APPOINTMENT (OUTPATIENT)
Dept: HEMATOLOGY ONCOLOGY | Facility: CLINIC | Age: 69
End: 2024-09-27
Payer: MEDICAID

## 2024-09-27 ENCOUNTER — NON-APPOINTMENT (OUTPATIENT)
Age: 69
End: 2024-09-27

## 2024-09-27 VITALS
TEMPERATURE: 97.8 F | OXYGEN SATURATION: 96 % | RESPIRATION RATE: 16 BRPM | DIASTOLIC BLOOD PRESSURE: 84 MMHG | WEIGHT: 99.21 LBS | HEART RATE: 79 BPM | BODY MASS INDEX: 20.04 KG/M2 | SYSTOLIC BLOOD PRESSURE: 152 MMHG

## 2024-09-27 DIAGNOSIS — C18.9 MALIGNANT NEOPLASM OF COLON, UNSPECIFIED: ICD-10-CM

## 2024-09-27 DIAGNOSIS — C78.7 MALIGNANT NEOPLASM OF COLON, UNSPECIFIED: ICD-10-CM

## 2024-09-27 LAB
ALBUMIN SERPL ELPH-MCNC: 3.8 G/DL — SIGNIFICANT CHANGE UP (ref 3.3–5)
ALP SERPL-CCNC: 183 U/L — HIGH (ref 40–120)
ALT FLD-CCNC: 48 U/L — HIGH (ref 10–45)
ANION GAP SERPL CALC-SCNC: 14 MMOL/L — SIGNIFICANT CHANGE UP (ref 5–17)
AST SERPL-CCNC: 39 U/L — SIGNIFICANT CHANGE UP (ref 10–40)
BASOPHILS # BLD AUTO: 0.02 K/UL — SIGNIFICANT CHANGE UP (ref 0–0.2)
BASOPHILS NFR BLD AUTO: 0.3 % — SIGNIFICANT CHANGE UP (ref 0–2)
BILIRUB SERPL-MCNC: 0.6 MG/DL — SIGNIFICANT CHANGE UP (ref 0.2–1.2)
BUN SERPL-MCNC: 23 MG/DL — SIGNIFICANT CHANGE UP (ref 7–23)
CALCIUM SERPL-MCNC: 10.3 MG/DL — SIGNIFICANT CHANGE UP (ref 8.4–10.5)
CHLORIDE SERPL-SCNC: 106 MMOL/L — SIGNIFICANT CHANGE UP (ref 96–108)
CO2 SERPL-SCNC: 20 MMOL/L — LOW (ref 22–31)
CREAT SERPL-MCNC: 0.57 MG/DL — SIGNIFICANT CHANGE UP (ref 0.5–1.3)
EGFR: 98 ML/MIN/1.73M2 — SIGNIFICANT CHANGE UP
EOSINOPHIL # BLD AUTO: 0.02 K/UL — SIGNIFICANT CHANGE UP (ref 0–0.5)
EOSINOPHIL NFR BLD AUTO: 0.3 % — SIGNIFICANT CHANGE UP (ref 0–6)
GLUCOSE SERPL-MCNC: 89 MG/DL — SIGNIFICANT CHANGE UP (ref 70–99)
HCT VFR BLD CALC: 33.6 % — LOW (ref 34.5–45)
HGB BLD-MCNC: 10.4 G/DL — LOW (ref 11.5–15.5)
IMM GRANULOCYTES NFR BLD AUTO: 1.7 % — HIGH (ref 0–0.9)
LYMPHOCYTES # BLD AUTO: 1.44 K/UL — SIGNIFICANT CHANGE UP (ref 1–3.3)
LYMPHOCYTES # BLD AUTO: 24.8 % — SIGNIFICANT CHANGE UP (ref 13–44)
MCHC RBC-ENTMCNC: 29.2 PG — SIGNIFICANT CHANGE UP (ref 27–34)
MCHC RBC-ENTMCNC: 31 G/DL — LOW (ref 32–36)
MCV RBC AUTO: 94.4 FL — SIGNIFICANT CHANGE UP (ref 80–100)
MONOCYTES # BLD AUTO: 0.39 K/UL — SIGNIFICANT CHANGE UP (ref 0–0.9)
MONOCYTES NFR BLD AUTO: 6.7 % — SIGNIFICANT CHANGE UP (ref 2–14)
NEUTROPHILS # BLD AUTO: 3.84 K/UL — SIGNIFICANT CHANGE UP (ref 1.8–7.4)
NEUTROPHILS NFR BLD AUTO: 66.2 % — SIGNIFICANT CHANGE UP (ref 43–77)
NRBC # BLD: 0 /100 WBCS — SIGNIFICANT CHANGE UP (ref 0–0)
PLATELET # BLD AUTO: 159 K/UL — SIGNIFICANT CHANGE UP (ref 150–400)
POTASSIUM SERPL-MCNC: 4.6 MMOL/L — SIGNIFICANT CHANGE UP (ref 3.5–5.3)
POTASSIUM SERPL-SCNC: 4.6 MMOL/L — SIGNIFICANT CHANGE UP (ref 3.5–5.3)
PROT SERPL-MCNC: 7 G/DL — SIGNIFICANT CHANGE UP (ref 6–8.3)
RBC # BLD: 3.56 M/UL — LOW (ref 3.8–5.2)
RBC # FLD: 21.2 % — HIGH (ref 10.3–14.5)
SODIUM SERPL-SCNC: 140 MMOL/L — SIGNIFICANT CHANGE UP (ref 135–145)
WBC # BLD: 5.81 K/UL — SIGNIFICANT CHANGE UP (ref 3.8–10.5)
WBC # FLD AUTO: 5.81 K/UL — SIGNIFICANT CHANGE UP (ref 3.8–10.5)

## 2024-09-27 PROCEDURE — 99214 OFFICE O/P EST MOD 30 MIN: CPT

## 2024-09-27 NOTE — PHYSICAL EXAM
[Restricted in physically strenuous activity but ambulatory and able to carry out work of a light or sedentary nature] : Status 1- Restricted in physically strenuous activity but ambulatory and able to carry out work of a light or sedentary nature, e.g., light house work, office work [Thin] : thin [Normal] : affect appropriate [de-identified] :  palpable hepatic artery infusion pump in place, surgical scars well healed, no TTP, no appreciable hernia  [de-identified] : facial and upper extremity telangiectasias are stable

## 2024-09-27 NOTE — REVIEW OF SYSTEMS
[Negative] : Allergic/Immunologic [Joint Pain] : no joint pain [Joint Stiffness] : no joint stiffness [Muscle Pain] : no muscle pain [de-identified] : + facial redness improving [de-identified] : +PN

## 2024-09-27 NOTE — HISTORY OF PRESENT ILLNESS
[Disease: _____________________] : Disease: [unfilled] [T: ___] : T[unfilled] [N: ___] : N[unfilled] [M: ___] : M[unfilled] [AJCC Stage: ____] : AJCC Stage: [unfilled] [Therapy: ___] : Therapy: [unfilled] [Day: ___] : Day: [unfilled] [de-identified] : 69 yo Mandarin-speaking F with a PMH of HTN, RA, scleroderma, and mild pulmonary hypertension who presents for management of Stage IV R colon cancer, GEROGINAAngela Stevens has been gradually losing weight over years. She has also had lifelong intermittent diarrhea for decades, but she has never had a workup for it. Her diarrhea is food related. She had an EGD about 10 years ago that showed esophageal inflammation and acid reflux, but she has never had a colonoscopy prior to the below events. She originally moved to the  from [place] in the summer of 2023. In September, she presented with abdominal pain and was found to have a Hb 6.8 with acute cholecystitis and had a percutaneous cholecystostomy drain placed. She also had 1U PRBCs.  She was then readmitted to [facility] from 10/22 - 11/6/23 for tube dislodgement, with CT showing ascending colon wall thickening, with a few liver lesions c/w mucinous metastases (largest 2.5 x 2.0 cm in the R hepatic lobe, others included a 0.8 cm segment 4A/8 lesion and a 0.9 cm segment 2/3 lesion). She also had continued anemia (Hb 7.0) requiring 2U PRBCs during admission. She underwent a colonoscopy on 10/24 that showed a circumferential ascending colon mass about 5 cm above the cecum, biopsy showing adenocarcinoma, moderately differentiated with a mucinous component. On 11/1/23, she underwent a R hemicolectomy with cholecystectomy, showing a 10.2 cm tumor with negative margins (closest 0.3 cm), no LVI or PNI, 0/25 LNs positive, and intermediate (5-9) tumor budding score. MS stable. T3N0  She lives with her , daughter and her , and her 2 grandchildren.  Referred to medical oncology for further management.   1/8/24: CT chest: no suspicious pulm nodules  MRI Abd: bilobar hepatic lesions, suspicious for mucinous hepatic metastases, with lesion increased in size, new or stable compared to prior  1/12/24: Liver bx: met colon ca 1/22/24: C1 5FU/LV 2/7/24: C2 FOLFOX, oxaliplatin 65 mg/m2 2/19/24: C3, Oxaliplatin 75 mg/m2 3/4/24: C4, Oxaliplatin 65 mg/m2 3/7/24: CT Chest, CTA a/p: enlarging hepatic mets 3/18/24: C5 3/28/24: Went to ED w/ back pain after heavy lifting  CT AP: Acute L1 superior endplate compression deformity with mild loss of height, new from prior CT of 3/7/2024. Bilobar hepatic lesions, slightly decrease in size when compared to prior CT of 3/7/2024. 4/1/24: C6 4/23/24: CT CAP: no lung mets, stable liver lesions, precarinal 1.2 cm node is unchanged  4/15/24: ANGELI pump placement (baseline alk phos elevated) 5/15/24: FUDR 100% ( baseline) 122 mg 5/29/24: saline + Dex via ANGELI () 6/11/24: 5FU/LV, FUDR held 2/2 elevated alk phos (425), saline + dex  6/25/24: 5FU/LV, saline + dex () 7/8/24: 5FU/LV + FUDR @ 25% of max dose (30 mg) 7/23/24: 5FU/LV + saline 7/31/24: CT Chest: stable exam x resolution b/l pleural effusions & lower lobe consolidation/atelectasis  7/31/24: MR AP: previously noted hepatic lesions no longer enhance, compatible w/ positive response to treatment.  8/6/24: FUDR 50% of max dose (30 mg) = 15 mg + 5FU/LV 8/2024: 5FU/LV + saline  9/3/24: 5FU/LV + FUDR 15 mg 9/10/24: ctDNA negative  9/17/24: 5FU/LV + saline [de-identified] : moderately differentiated adenocarcinoma with mucinous component, GEORGINA [de-identified] : CEA 57.5 (10/24/23) [de-identified] : KRAS Q61H NRAS wildtype APC [id number], R876* FBXW7 R465C [id number] [de-identified] : Here for clinical follow up  Feels well. C/o Mild hair loss  Vision is getting worse d/t MD. Requesting a rollator.

## 2024-09-27 NOTE — REASON FOR VISIT
[Follow-Up Visit] : a follow-up [[employer] ] : provided by [employer]   [FreeTextEntry2] : Stage IV colon cancer with ANGELI pump [Interpreters_IDNumber] : [id number] [Interpreters_FullName] : Melvi [FreeTextEntry3] : Mandarin [TWNoteComboBox1] : Chinese

## 2024-10-01 ENCOUNTER — RESULT REVIEW (OUTPATIENT)
Age: 69
End: 2024-10-01

## 2024-10-01 ENCOUNTER — APPOINTMENT (OUTPATIENT)
Dept: INFUSION THERAPY | Facility: HOSPITAL | Age: 69
End: 2024-10-01

## 2024-10-01 ENCOUNTER — APPOINTMENT (OUTPATIENT)
Dept: HEMATOLOGY ONCOLOGY | Facility: CLINIC | Age: 69
End: 2024-10-01

## 2024-10-01 LAB
ALBUMIN SERPL ELPH-MCNC: 3.7 G/DL
ALP BLD-CCNC: 166 U/L
ALT SERPL-CCNC: 32 U/L
ANION GAP SERPL CALC-SCNC: 16 MMOL/L
AST SERPL-CCNC: 35 U/L
BASOPHILS # BLD AUTO: 0.03 K/UL — SIGNIFICANT CHANGE UP (ref 0–0.2)
BASOPHILS NFR BLD AUTO: 0.6 % — SIGNIFICANT CHANGE UP (ref 0–2)
BILIRUB SERPL-MCNC: 0.8 MG/DL
BUN SERPL-MCNC: 21 MG/DL
CALCIUM SERPL-MCNC: 9.9 MG/DL
CEA SERPL-MCNC: 13.5 NG/ML
CHLORIDE SERPL-SCNC: 107 MMOL/L
CO2 SERPL-SCNC: 20 MMOL/L
CREAT SERPL-MCNC: 0.61 MG/DL
EGFR: 97 ML/MIN/1.73M2
EOSINOPHIL # BLD AUTO: 0.02 K/UL — SIGNIFICANT CHANGE UP (ref 0–0.5)
EOSINOPHIL NFR BLD AUTO: 0.4 % — SIGNIFICANT CHANGE UP (ref 0–6)
GLUCOSE SERPL-MCNC: 115 MG/DL
HCT VFR BLD CALC: 33.2 % — LOW (ref 34.5–45)
HGB BLD-MCNC: 10.6 G/DL — LOW (ref 11.5–15.5)
IMM GRANULOCYTES NFR BLD AUTO: 1.3 % — HIGH (ref 0–0.9)
LYMPHOCYTES # BLD AUTO: 1.32 K/UL — SIGNIFICANT CHANGE UP (ref 1–3.3)
LYMPHOCYTES # BLD AUTO: 27.7 % — SIGNIFICANT CHANGE UP (ref 13–44)
MCHC RBC-ENTMCNC: 29 PG — SIGNIFICANT CHANGE UP (ref 27–34)
MCHC RBC-ENTMCNC: 31.9 G/DL — LOW (ref 32–36)
MCV RBC AUTO: 91 FL — SIGNIFICANT CHANGE UP (ref 80–100)
MONOCYTES # BLD AUTO: 0.32 K/UL — SIGNIFICANT CHANGE UP (ref 0–0.9)
MONOCYTES NFR BLD AUTO: 6.7 % — SIGNIFICANT CHANGE UP (ref 2–14)
NEUTROPHILS # BLD AUTO: 3.02 K/UL — SIGNIFICANT CHANGE UP (ref 1.8–7.4)
NEUTROPHILS NFR BLD AUTO: 63.3 % — SIGNIFICANT CHANGE UP (ref 43–77)
NRBC # BLD: 0 /100 WBCS — SIGNIFICANT CHANGE UP (ref 0–0)
PLATELET # BLD AUTO: 99 K/UL — LOW (ref 150–400)
POTASSIUM SERPL-SCNC: 4.3 MMOL/L
PROT SERPL-MCNC: 6.5 G/DL
RBC # BLD: 3.65 M/UL — LOW (ref 3.8–5.2)
RBC # FLD: 20.4 % — HIGH (ref 10.3–14.5)
SODIUM SERPL-SCNC: 143 MMOL/L
WBC # BLD: 4.77 K/UL — SIGNIFICANT CHANGE UP (ref 3.8–10.5)
WBC # FLD AUTO: 4.77 K/UL — SIGNIFICANT CHANGE UP (ref 3.8–10.5)

## 2024-10-03 ENCOUNTER — APPOINTMENT (OUTPATIENT)
Dept: MRI IMAGING | Facility: IMAGING CENTER | Age: 69
End: 2024-10-03
Payer: MEDICAID

## 2024-10-03 ENCOUNTER — APPOINTMENT (OUTPATIENT)
Dept: INFUSION THERAPY | Facility: HOSPITAL | Age: 69
End: 2024-10-03

## 2024-10-03 ENCOUNTER — APPOINTMENT (OUTPATIENT)
Dept: CT IMAGING | Facility: IMAGING CENTER | Age: 69
End: 2024-10-03
Payer: MEDICAID

## 2024-10-03 PROCEDURE — 74183 MRI ABD W/O CNTR FLWD CNTR: CPT | Mod: 26

## 2024-10-03 PROCEDURE — 71250 CT THORAX DX C-: CPT | Mod: 26

## 2024-10-11 ENCOUNTER — APPOINTMENT (OUTPATIENT)
Dept: INFUSION THERAPY | Facility: HOSPITAL | Age: 69
End: 2024-10-11

## 2024-10-11 ENCOUNTER — APPOINTMENT (OUTPATIENT)
Dept: HEMATOLOGY ONCOLOGY | Facility: CLINIC | Age: 69
End: 2024-10-11

## 2024-10-15 ENCOUNTER — APPOINTMENT (OUTPATIENT)
Dept: HEMATOLOGY ONCOLOGY | Facility: CLINIC | Age: 69
End: 2024-10-15

## 2024-10-15 ENCOUNTER — RESULT REVIEW (OUTPATIENT)
Age: 69
End: 2024-10-15

## 2024-10-15 ENCOUNTER — NON-APPOINTMENT (OUTPATIENT)
Age: 69
End: 2024-10-15

## 2024-10-15 ENCOUNTER — APPOINTMENT (OUTPATIENT)
Dept: INFUSION THERAPY | Facility: HOSPITAL | Age: 69
End: 2024-10-15

## 2024-10-15 VITALS
WEIGHT: 100.75 LBS | TEMPERATURE: 97.1 F | BODY MASS INDEX: 20.35 KG/M2 | DIASTOLIC BLOOD PRESSURE: 88 MMHG | HEART RATE: 59 BPM | SYSTOLIC BLOOD PRESSURE: 160 MMHG | OXYGEN SATURATION: 98 % | RESPIRATION RATE: 16 BRPM

## 2024-10-15 DIAGNOSIS — C18.9 MALIGNANT NEOPLASM OF COLON, UNSPECIFIED: ICD-10-CM

## 2024-10-15 DIAGNOSIS — C78.7 MALIGNANT NEOPLASM OF COLON, UNSPECIFIED: ICD-10-CM

## 2024-10-15 LAB
ALBUMIN SERPL ELPH-MCNC: 3.4 G/DL — SIGNIFICANT CHANGE UP (ref 3.3–5)
ALP SERPL-CCNC: 205 U/L — HIGH (ref 40–120)
ALT FLD-CCNC: 47 U/L — HIGH (ref 10–45)
ANION GAP SERPL CALC-SCNC: 14 MMOL/L — SIGNIFICANT CHANGE UP (ref 5–17)
AST SERPL-CCNC: 43 U/L — HIGH (ref 10–40)
BASOPHILS # BLD AUTO: 0.02 K/UL — SIGNIFICANT CHANGE UP (ref 0–0.2)
BASOPHILS NFR BLD AUTO: 0.3 % — SIGNIFICANT CHANGE UP (ref 0–2)
BILIRUB SERPL-MCNC: 0.6 MG/DL — SIGNIFICANT CHANGE UP (ref 0.2–1.2)
BUN SERPL-MCNC: 22 MG/DL — SIGNIFICANT CHANGE UP (ref 7–23)
CALCIUM SERPL-MCNC: 9.4 MG/DL — SIGNIFICANT CHANGE UP (ref 8.4–10.5)
CEA SERPL-MCNC: 12.7 NG/ML — HIGH (ref 0–3.8)
CHLORIDE SERPL-SCNC: 108 MMOL/L — SIGNIFICANT CHANGE UP (ref 96–108)
CO2 SERPL-SCNC: 20 MMOL/L — LOW (ref 22–31)
CREAT SERPL-MCNC: 0.57 MG/DL — SIGNIFICANT CHANGE UP (ref 0.5–1.3)
EGFR: 98 ML/MIN/1.73M2 — SIGNIFICANT CHANGE UP
EOSINOPHIL # BLD AUTO: 0.05 K/UL — SIGNIFICANT CHANGE UP (ref 0–0.5)
EOSINOPHIL NFR BLD AUTO: 0.8 % — SIGNIFICANT CHANGE UP (ref 0–6)
GLUCOSE SERPL-MCNC: 92 MG/DL — SIGNIFICANT CHANGE UP (ref 70–99)
HCT VFR BLD CALC: 33.5 % — LOW (ref 34.5–45)
HGB BLD-MCNC: 10.6 G/DL — LOW (ref 11.5–15.5)
IMM GRANULOCYTES NFR BLD AUTO: 1.5 % — HIGH (ref 0–0.9)
LYMPHOCYTES # BLD AUTO: 1.41 K/UL — SIGNIFICANT CHANGE UP (ref 1–3.3)
LYMPHOCYTES # BLD AUTO: 23.5 % — SIGNIFICANT CHANGE UP (ref 13–44)
MCHC RBC-ENTMCNC: 29 PG — SIGNIFICANT CHANGE UP (ref 27–34)
MCHC RBC-ENTMCNC: 31.6 G/DL — LOW (ref 32–36)
MCV RBC AUTO: 91.5 FL — SIGNIFICANT CHANGE UP (ref 80–100)
MONOCYTES # BLD AUTO: 0.33 K/UL — SIGNIFICANT CHANGE UP (ref 0–0.9)
MONOCYTES NFR BLD AUTO: 5.5 % — SIGNIFICANT CHANGE UP (ref 2–14)
NEUTROPHILS # BLD AUTO: 4.09 K/UL — SIGNIFICANT CHANGE UP (ref 1.8–7.4)
NEUTROPHILS NFR BLD AUTO: 68.4 % — SIGNIFICANT CHANGE UP (ref 43–77)
NRBC # BLD: 0 /100 WBCS — SIGNIFICANT CHANGE UP (ref 0–0)
PLATELET # BLD AUTO: 153 K/UL — SIGNIFICANT CHANGE UP (ref 150–400)
POTASSIUM SERPL-MCNC: 4.1 MMOL/L — SIGNIFICANT CHANGE UP (ref 3.5–5.3)
POTASSIUM SERPL-SCNC: 4.1 MMOL/L — SIGNIFICANT CHANGE UP (ref 3.5–5.3)
PROT SERPL-MCNC: 6.3 G/DL — SIGNIFICANT CHANGE UP (ref 6–8.3)
RBC # BLD: 3.66 M/UL — LOW (ref 3.8–5.2)
RBC # FLD: 20 % — HIGH (ref 10.3–14.5)
SODIUM SERPL-SCNC: 142 MMOL/L — SIGNIFICANT CHANGE UP (ref 135–145)
WBC # BLD: 5.99 K/UL — SIGNIFICANT CHANGE UP (ref 3.8–10.5)
WBC # FLD AUTO: 5.99 K/UL — SIGNIFICANT CHANGE UP (ref 3.8–10.5)

## 2024-10-15 PROCEDURE — 99214 OFFICE O/P EST MOD 30 MIN: CPT

## 2024-10-15 PROCEDURE — G2211 COMPLEX E/M VISIT ADD ON: CPT | Mod: NC

## 2024-10-17 ENCOUNTER — APPOINTMENT (OUTPATIENT)
Dept: INFUSION THERAPY | Facility: HOSPITAL | Age: 69
End: 2024-10-17

## 2024-10-24 ENCOUNTER — OUTPATIENT (OUTPATIENT)
Dept: OUTPATIENT SERVICES | Facility: HOSPITAL | Age: 69
LOS: 1 days | Discharge: HOME | End: 2024-10-24

## 2024-10-24 DIAGNOSIS — Z96.642 PRESENCE OF LEFT ARTIFICIAL HIP JOINT: Chronic | ICD-10-CM

## 2024-10-24 DIAGNOSIS — Z90.49 ACQUIRED ABSENCE OF OTHER SPECIFIED PARTS OF DIGESTIVE TRACT: Chronic | ICD-10-CM

## 2024-10-24 DIAGNOSIS — T85.528A DISPLACEMENT OF OTHER GASTROINTESTINAL PROSTHETIC DEVICES, IMPLANTS AND GRAFTS, INITIAL ENCOUNTER: Chronic | ICD-10-CM

## 2024-10-24 DIAGNOSIS — C18.9 MALIGNANT NEOPLASM OF COLON, UNSPECIFIED: ICD-10-CM

## 2024-10-24 DIAGNOSIS — Z96.641 PRESENCE OF RIGHT ARTIFICIAL HIP JOINT: Chronic | ICD-10-CM

## 2024-10-25 ENCOUNTER — APPOINTMENT (OUTPATIENT)
Dept: PULMONOLOGY | Facility: CLINIC | Age: 69
End: 2024-10-25
Payer: MEDICAID

## 2024-10-25 ENCOUNTER — APPOINTMENT (OUTPATIENT)
Dept: INFUSION THERAPY | Facility: HOSPITAL | Age: 69
End: 2024-10-25

## 2024-10-25 PROCEDURE — 94726 PLETHYSMOGRAPHY LUNG VOLUMES: CPT

## 2024-10-25 PROCEDURE — 94618 PULMONARY STRESS TESTING: CPT

## 2024-10-25 PROCEDURE — 94060 EVALUATION OF WHEEZING: CPT

## 2024-10-25 PROCEDURE — ZZZZZ: CPT

## 2024-10-25 PROCEDURE — 94729 DIFFUSING CAPACITY: CPT

## 2024-10-29 ENCOUNTER — RESULT REVIEW (OUTPATIENT)
Age: 69
End: 2024-10-29

## 2024-10-29 ENCOUNTER — APPOINTMENT (OUTPATIENT)
Dept: INTERVENTIONAL RADIOLOGY/VASCULAR | Facility: CLINIC | Age: 69
End: 2024-10-29

## 2024-10-29 ENCOUNTER — APPOINTMENT (OUTPATIENT)
Dept: HEMATOLOGY ONCOLOGY | Facility: CLINIC | Age: 69
End: 2024-10-29

## 2024-10-29 ENCOUNTER — APPOINTMENT (OUTPATIENT)
Dept: INFUSION THERAPY | Facility: HOSPITAL | Age: 69
End: 2024-10-29

## 2024-10-29 ENCOUNTER — APPOINTMENT (OUTPATIENT)
Dept: HEMATOLOGY ONCOLOGY | Facility: CLINIC | Age: 69
End: 2024-10-29
Payer: MEDICAID

## 2024-10-29 VITALS
DIASTOLIC BLOOD PRESSURE: 73 MMHG | OXYGEN SATURATION: 100 % | HEART RATE: 63 BPM | TEMPERATURE: 97.7 F | SYSTOLIC BLOOD PRESSURE: 147 MMHG

## 2024-10-29 DIAGNOSIS — E78.5 HYPERLIPIDEMIA, UNSPECIFIED: ICD-10-CM

## 2024-10-29 DIAGNOSIS — I27.20 PULMONARY HYPERTENSION, UNSPECIFIED: ICD-10-CM

## 2024-10-29 DIAGNOSIS — C18.9 MALIGNANT NEOPLASM OF COLON, UNSPECIFIED: ICD-10-CM

## 2024-10-29 DIAGNOSIS — I10 ESSENTIAL (PRIMARY) HYPERTENSION: ICD-10-CM

## 2024-10-29 DIAGNOSIS — C78.00 MALIGNANT NEOPLASM OF COLON, UNSPECIFIED: ICD-10-CM

## 2024-10-29 DIAGNOSIS — H35.30 UNSPECIFIED MACULAR DEGENERATION: ICD-10-CM

## 2024-10-29 DIAGNOSIS — J84.9 INTERSTITIAL PULMONARY DISEASE, UNSPECIFIED: ICD-10-CM

## 2024-10-29 DIAGNOSIS — M34.9 SYSTEMIC SCLEROSIS, UNSPECIFIED: ICD-10-CM

## 2024-10-29 DIAGNOSIS — K21.9 GASTRO-ESOPHAGEAL REFLUX DISEASE W/OUT ESOPHAGITIS: ICD-10-CM

## 2024-10-29 LAB
ALBUMIN SERPL ELPH-MCNC: 3.6 G/DL — SIGNIFICANT CHANGE UP (ref 3.3–5)
ALP SERPL-CCNC: 224 U/L — HIGH (ref 40–120)
ALT FLD-CCNC: 53 U/L — HIGH (ref 10–45)
ANION GAP SERPL CALC-SCNC: 12 MMOL/L — SIGNIFICANT CHANGE UP (ref 5–17)
AST SERPL-CCNC: 50 U/L — HIGH (ref 10–40)
BASOPHILS # BLD AUTO: 0.02 K/UL — SIGNIFICANT CHANGE UP (ref 0–0.2)
BASOPHILS NFR BLD AUTO: 0.3 % — SIGNIFICANT CHANGE UP (ref 0–2)
BILIRUB SERPL-MCNC: 0.7 MG/DL — SIGNIFICANT CHANGE UP (ref 0.2–1.2)
BUN SERPL-MCNC: 19 MG/DL — SIGNIFICANT CHANGE UP (ref 7–23)
CALCIUM SERPL-MCNC: 9.6 MG/DL — SIGNIFICANT CHANGE UP (ref 8.4–10.5)
CHLORIDE SERPL-SCNC: 106 MMOL/L — SIGNIFICANT CHANGE UP (ref 96–108)
CO2 SERPL-SCNC: 24 MMOL/L — SIGNIFICANT CHANGE UP (ref 22–31)
CREAT SERPL-MCNC: 0.63 MG/DL — SIGNIFICANT CHANGE UP (ref 0.5–1.3)
EGFR: 96 ML/MIN/1.73M2 — SIGNIFICANT CHANGE UP
EOSINOPHIL # BLD AUTO: 0.01 K/UL — SIGNIFICANT CHANGE UP (ref 0–0.5)
EOSINOPHIL NFR BLD AUTO: 0.2 % — SIGNIFICANT CHANGE UP (ref 0–6)
GLUCOSE SERPL-MCNC: 93 MG/DL — SIGNIFICANT CHANGE UP (ref 70–99)
HCT VFR BLD CALC: 32.6 % — LOW (ref 34.5–45)
HGB BLD-MCNC: 10.3 G/DL — LOW (ref 11.5–15.5)
IMM GRANULOCYTES NFR BLD AUTO: 1 % — HIGH (ref 0–0.9)
LYMPHOCYTES # BLD AUTO: 1.37 K/UL — SIGNIFICANT CHANGE UP (ref 1–3.3)
LYMPHOCYTES # BLD AUTO: 23.8 % — SIGNIFICANT CHANGE UP (ref 13–44)
MCHC RBC-ENTMCNC: 29.3 PG — SIGNIFICANT CHANGE UP (ref 27–34)
MCHC RBC-ENTMCNC: 31.6 G/DL — LOW (ref 32–36)
MCV RBC AUTO: 92.6 FL — SIGNIFICANT CHANGE UP (ref 80–100)
MONOCYTES # BLD AUTO: 0.43 K/UL — SIGNIFICANT CHANGE UP (ref 0–0.9)
MONOCYTES NFR BLD AUTO: 7.5 % — SIGNIFICANT CHANGE UP (ref 2–14)
NEUTROPHILS # BLD AUTO: 3.86 K/UL — SIGNIFICANT CHANGE UP (ref 1.8–7.4)
NEUTROPHILS NFR BLD AUTO: 67.2 % — SIGNIFICANT CHANGE UP (ref 43–77)
NRBC # BLD: 0 /100 WBCS — SIGNIFICANT CHANGE UP (ref 0–0)
PLATELET # BLD AUTO: 157 K/UL — SIGNIFICANT CHANGE UP (ref 150–400)
POTASSIUM SERPL-MCNC: 4.5 MMOL/L — SIGNIFICANT CHANGE UP (ref 3.5–5.3)
POTASSIUM SERPL-SCNC: 4.5 MMOL/L — SIGNIFICANT CHANGE UP (ref 3.5–5.3)
PROT SERPL-MCNC: 6.7 G/DL — SIGNIFICANT CHANGE UP (ref 6–8.3)
RBC # BLD: 3.52 M/UL — LOW (ref 3.8–5.2)
RBC # FLD: 19.4 % — HIGH (ref 10.3–14.5)
SODIUM SERPL-SCNC: 141 MMOL/L — SIGNIFICANT CHANGE UP (ref 135–145)
WBC # BLD: 5.75 K/UL — SIGNIFICANT CHANGE UP (ref 3.8–10.5)
WBC # FLD AUTO: 5.75 K/UL — SIGNIFICANT CHANGE UP (ref 3.8–10.5)

## 2024-10-29 PROCEDURE — 99213 OFFICE O/P EST LOW 20 MIN: CPT

## 2024-10-30 DIAGNOSIS — Z51.11 ENCOUNTER FOR ANTINEOPLASTIC CHEMOTHERAPY: ICD-10-CM

## 2024-10-30 DIAGNOSIS — R11.2 NAUSEA WITH VOMITING, UNSPECIFIED: ICD-10-CM

## 2024-10-31 ENCOUNTER — APPOINTMENT (OUTPATIENT)
Dept: INFUSION THERAPY | Facility: HOSPITAL | Age: 69
End: 2024-10-31

## 2024-11-01 ENCOUNTER — OUTPATIENT (OUTPATIENT)
Dept: OUTPATIENT SERVICES | Facility: HOSPITAL | Age: 69
LOS: 1 days | End: 2024-11-01
Payer: MEDICAID

## 2024-11-01 ENCOUNTER — APPOINTMENT (OUTPATIENT)
Dept: ULTRASOUND IMAGING | Facility: IMAGING CENTER | Age: 69
End: 2024-11-01
Payer: MEDICAID

## 2024-11-01 DIAGNOSIS — Z90.49 ACQUIRED ABSENCE OF OTHER SPECIFIED PARTS OF DIGESTIVE TRACT: Chronic | ICD-10-CM

## 2024-11-01 DIAGNOSIS — C18.9 MALIGNANT NEOPLASM OF COLON, UNSPECIFIED: ICD-10-CM

## 2024-11-01 DIAGNOSIS — Z96.641 PRESENCE OF RIGHT ARTIFICIAL HIP JOINT: Chronic | ICD-10-CM

## 2024-11-01 PROCEDURE — 76705 ECHO EXAM OF ABDOMEN: CPT | Mod: 26

## 2024-11-01 PROCEDURE — 76705 ECHO EXAM OF ABDOMEN: CPT

## 2024-11-08 ENCOUNTER — APPOINTMENT (OUTPATIENT)
Dept: INFUSION THERAPY | Facility: HOSPITAL | Age: 69
End: 2024-11-08

## 2024-11-12 ENCOUNTER — RESULT REVIEW (OUTPATIENT)
Age: 69
End: 2024-11-12

## 2024-11-12 ENCOUNTER — APPOINTMENT (OUTPATIENT)
Dept: HEMATOLOGY ONCOLOGY | Facility: CLINIC | Age: 69
End: 2024-11-12

## 2024-11-12 ENCOUNTER — APPOINTMENT (OUTPATIENT)
Dept: INFUSION THERAPY | Facility: HOSPITAL | Age: 69
End: 2024-11-12

## 2024-11-12 LAB
ALBUMIN SERPL ELPH-MCNC: 3.6 G/DL — SIGNIFICANT CHANGE UP (ref 3.3–5)
ALP SERPL-CCNC: 299 U/L — HIGH (ref 40–120)
ALT FLD-CCNC: 170 U/L — HIGH (ref 10–45)
ANION GAP SERPL CALC-SCNC: 15 MMOL/L — SIGNIFICANT CHANGE UP (ref 5–17)
AST SERPL-CCNC: 131 U/L — HIGH (ref 10–40)
BASOPHILS # BLD AUTO: 0.02 K/UL — SIGNIFICANT CHANGE UP (ref 0–0.2)
BASOPHILS NFR BLD AUTO: 0.3 % — SIGNIFICANT CHANGE UP (ref 0–2)
BILIRUB SERPL-MCNC: 0.7 MG/DL — SIGNIFICANT CHANGE UP (ref 0.2–1.2)
BUN SERPL-MCNC: 28 MG/DL — HIGH (ref 7–23)
CALCIUM SERPL-MCNC: 9.6 MG/DL — SIGNIFICANT CHANGE UP (ref 8.4–10.5)
CHLORIDE SERPL-SCNC: 105 MMOL/L — SIGNIFICANT CHANGE UP (ref 96–108)
CO2 SERPL-SCNC: 21 MMOL/L — LOW (ref 22–31)
CREAT SERPL-MCNC: 0.65 MG/DL — SIGNIFICANT CHANGE UP (ref 0.5–1.3)
EGFR: 95 ML/MIN/1.73M2 — SIGNIFICANT CHANGE UP
EOSINOPHIL # BLD AUTO: 0.02 K/UL — SIGNIFICANT CHANGE UP (ref 0–0.5)
EOSINOPHIL NFR BLD AUTO: 0.3 % — SIGNIFICANT CHANGE UP (ref 0–6)
GLUCOSE SERPL-MCNC: 129 MG/DL — HIGH (ref 70–99)
HCT VFR BLD CALC: 33.4 % — LOW (ref 34.5–45)
HGB BLD-MCNC: 10.7 G/DL — LOW (ref 11.5–15.5)
IMM GRANULOCYTES NFR BLD AUTO: 1.8 % — HIGH (ref 0–0.9)
LYMPHOCYTES # BLD AUTO: 1.51 K/UL — SIGNIFICANT CHANGE UP (ref 1–3.3)
LYMPHOCYTES # BLD AUTO: 24.4 % — SIGNIFICANT CHANGE UP (ref 13–44)
MCHC RBC-ENTMCNC: 29.6 PG — SIGNIFICANT CHANGE UP (ref 27–34)
MCHC RBC-ENTMCNC: 32 G/DL — SIGNIFICANT CHANGE UP (ref 32–36)
MCV RBC AUTO: 92.3 FL — SIGNIFICANT CHANGE UP (ref 80–100)
MONOCYTES # BLD AUTO: 0.35 K/UL — SIGNIFICANT CHANGE UP (ref 0–0.9)
MONOCYTES NFR BLD AUTO: 5.7 % — SIGNIFICANT CHANGE UP (ref 2–14)
NEUTROPHILS # BLD AUTO: 4.18 K/UL — SIGNIFICANT CHANGE UP (ref 1.8–7.4)
NEUTROPHILS NFR BLD AUTO: 67.5 % — SIGNIFICANT CHANGE UP (ref 43–77)
NRBC # BLD: 0 /100 WBCS — SIGNIFICANT CHANGE UP (ref 0–0)
PLATELET # BLD AUTO: 154 K/UL — SIGNIFICANT CHANGE UP (ref 150–400)
POTASSIUM SERPL-MCNC: 4.4 MMOL/L — SIGNIFICANT CHANGE UP (ref 3.5–5.3)
POTASSIUM SERPL-SCNC: 4.4 MMOL/L — SIGNIFICANT CHANGE UP (ref 3.5–5.3)
PROT SERPL-MCNC: 6.6 G/DL — SIGNIFICANT CHANGE UP (ref 6–8.3)
RBC # BLD: 3.62 M/UL — LOW (ref 3.8–5.2)
RBC # FLD: 19.1 % — HIGH (ref 10.3–14.5)
SODIUM SERPL-SCNC: 141 MMOL/L — SIGNIFICANT CHANGE UP (ref 135–145)
WBC # BLD: 6.19 K/UL — SIGNIFICANT CHANGE UP (ref 3.8–10.5)
WBC # FLD AUTO: 6.19 K/UL — SIGNIFICANT CHANGE UP (ref 3.8–10.5)

## 2024-11-14 ENCOUNTER — APPOINTMENT (OUTPATIENT)
Dept: INFUSION THERAPY | Facility: HOSPITAL | Age: 69
End: 2024-11-14

## 2024-11-21 DIAGNOSIS — Z12.11 ENCOUNTER FOR SCREENING FOR MALIGNANT NEOPLASM OF COLON: ICD-10-CM

## 2024-11-21 RX ORDER — SODIUM PICOSULFATE, MAGNESIUM OXIDE, AND ANHYDROUS CITRIC ACID 12; 3.5; 1 G/175ML; G/175ML; MG/175ML
10-3.5-12 MG-GM LIQUID ORAL
Qty: 1 | Refills: 0 | Status: ACTIVE | COMMUNITY
Start: 2024-11-21 | End: 1900-01-01

## 2024-11-22 ENCOUNTER — APPOINTMENT (OUTPATIENT)
Dept: INTERVENTIONAL RADIOLOGY/VASCULAR | Facility: CLINIC | Age: 69
End: 2024-11-22

## 2024-11-22 ENCOUNTER — APPOINTMENT (OUTPATIENT)
Dept: INFUSION THERAPY | Facility: HOSPITAL | Age: 69
End: 2024-11-22

## 2024-11-22 DIAGNOSIS — I27.20 PULMONARY HYPERTENSION, UNSPECIFIED: ICD-10-CM

## 2024-11-22 DIAGNOSIS — I10 ESSENTIAL (PRIMARY) HYPERTENSION: ICD-10-CM

## 2024-11-22 DIAGNOSIS — J84.9 INTERSTITIAL PULMONARY DISEASE, UNSPECIFIED: ICD-10-CM

## 2024-11-25 ENCOUNTER — RESULT REVIEW (OUTPATIENT)
Age: 69
End: 2024-11-25

## 2024-11-25 ENCOUNTER — NON-APPOINTMENT (OUTPATIENT)
Age: 69
End: 2024-11-25

## 2024-11-25 ENCOUNTER — APPOINTMENT (OUTPATIENT)
Dept: INFUSION THERAPY | Facility: HOSPITAL | Age: 69
End: 2024-11-25

## 2024-11-25 LAB
ALBUMIN SERPL ELPH-MCNC: 3.5 G/DL — SIGNIFICANT CHANGE UP (ref 3.3–5)
ALP SERPL-CCNC: 254 U/L — HIGH (ref 40–120)
ALT FLD-CCNC: 46 U/L — HIGH (ref 10–45)
ANION GAP SERPL CALC-SCNC: 15 MMOL/L — SIGNIFICANT CHANGE UP (ref 5–17)
AST SERPL-CCNC: 35 U/L — SIGNIFICANT CHANGE UP (ref 10–40)
BASOPHILS # BLD AUTO: 0.02 K/UL — SIGNIFICANT CHANGE UP (ref 0–0.2)
BASOPHILS NFR BLD AUTO: 0.3 % — SIGNIFICANT CHANGE UP (ref 0–2)
BILIRUB SERPL-MCNC: 0.6 MG/DL — SIGNIFICANT CHANGE UP (ref 0.2–1.2)
BUN SERPL-MCNC: 33 MG/DL — HIGH (ref 7–23)
CALCIUM SERPL-MCNC: 9.8 MG/DL — SIGNIFICANT CHANGE UP (ref 8.4–10.5)
CEA SERPL-MCNC: 13.3 NG/ML — HIGH (ref 0–3.8)
CHLORIDE SERPL-SCNC: 110 MMOL/L — HIGH (ref 96–108)
CO2 SERPL-SCNC: 21 MMOL/L — LOW (ref 22–31)
CREAT SERPL-MCNC: 0.64 MG/DL — SIGNIFICANT CHANGE UP (ref 0.5–1.3)
EGFR: 96 ML/MIN/1.73M2 — SIGNIFICANT CHANGE UP
EOSINOPHIL # BLD AUTO: 0.04 K/UL — SIGNIFICANT CHANGE UP (ref 0–0.5)
EOSINOPHIL NFR BLD AUTO: 0.6 % — SIGNIFICANT CHANGE UP (ref 0–6)
GLUCOSE SERPL-MCNC: 102 MG/DL — HIGH (ref 70–99)
HCT VFR BLD CALC: 33.1 % — LOW (ref 34.5–45)
HGB BLD-MCNC: 10.4 G/DL — LOW (ref 11.5–15.5)
IMM GRANULOCYTES NFR BLD AUTO: 1.4 % — HIGH (ref 0–0.9)
LYMPHOCYTES # BLD AUTO: 1.46 K/UL — SIGNIFICANT CHANGE UP (ref 1–3.3)
LYMPHOCYTES # BLD AUTO: 20.4 % — SIGNIFICANT CHANGE UP (ref 13–44)
MCHC RBC-ENTMCNC: 30 PG — SIGNIFICANT CHANGE UP (ref 27–34)
MCHC RBC-ENTMCNC: 31.4 G/DL — LOW (ref 32–36)
MCV RBC AUTO: 95.4 FL — SIGNIFICANT CHANGE UP (ref 80–100)
MONOCYTES # BLD AUTO: 0.38 K/UL — SIGNIFICANT CHANGE UP (ref 0–0.9)
MONOCYTES NFR BLD AUTO: 5.3 % — SIGNIFICANT CHANGE UP (ref 2–14)
NEUTROPHILS # BLD AUTO: 5.17 K/UL — SIGNIFICANT CHANGE UP (ref 1.8–7.4)
NEUTROPHILS NFR BLD AUTO: 72 % — SIGNIFICANT CHANGE UP (ref 43–77)
NRBC # BLD: 0 /100 WBCS — SIGNIFICANT CHANGE UP (ref 0–0)
PLATELET # BLD AUTO: 172 K/UL — SIGNIFICANT CHANGE UP (ref 150–400)
POTASSIUM SERPL-MCNC: 3.9 MMOL/L — SIGNIFICANT CHANGE UP (ref 3.5–5.3)
POTASSIUM SERPL-SCNC: 3.9 MMOL/L — SIGNIFICANT CHANGE UP (ref 3.5–5.3)
PROT SERPL-MCNC: 6.7 G/DL — SIGNIFICANT CHANGE UP (ref 6–8.3)
RBC # BLD: 3.47 M/UL — LOW (ref 3.8–5.2)
RBC # FLD: 19.2 % — HIGH (ref 10.3–14.5)
SODIUM SERPL-SCNC: 146 MMOL/L — HIGH (ref 135–145)
WBC # BLD: 7.17 K/UL — SIGNIFICANT CHANGE UP (ref 3.8–10.5)
WBC # FLD AUTO: 7.17 K/UL — SIGNIFICANT CHANGE UP (ref 3.8–10.5)

## 2024-11-27 ENCOUNTER — APPOINTMENT (OUTPATIENT)
Dept: INFUSION THERAPY | Facility: HOSPITAL | Age: 69
End: 2024-11-27

## 2024-11-27 ENCOUNTER — APPOINTMENT (OUTPATIENT)
Dept: HEMATOLOGY ONCOLOGY | Facility: CLINIC | Age: 69
End: 2024-11-27

## 2024-11-27 ENCOUNTER — APPOINTMENT (OUTPATIENT)
Dept: RADIATION ONCOLOGY | Facility: CLINIC | Age: 69
End: 2024-11-27

## 2024-11-27 VITALS
WEIGHT: 102.73 LBS | HEART RATE: 73 BPM | SYSTOLIC BLOOD PRESSURE: 131 MMHG | DIASTOLIC BLOOD PRESSURE: 78 MMHG | BODY MASS INDEX: 20.71 KG/M2 | RESPIRATION RATE: 18 BRPM | OXYGEN SATURATION: 97 % | HEIGHT: 59 IN | TEMPERATURE: 97.7 F

## 2024-11-27 VITALS
DIASTOLIC BLOOD PRESSURE: 81 MMHG | WEIGHT: 101.85 LBS | SYSTOLIC BLOOD PRESSURE: 117 MMHG | RESPIRATION RATE: 17 BRPM | BODY MASS INDEX: 20.57 KG/M2 | TEMPERATURE: 96.9 F | HEART RATE: 78 BPM | OXYGEN SATURATION: 95 %

## 2024-11-27 DIAGNOSIS — C78.7 MALIGNANT NEOPLASM OF COLON, UNSPECIFIED: ICD-10-CM

## 2024-11-27 DIAGNOSIS — C18.9 MALIGNANT NEOPLASM OF COLON, UNSPECIFIED: ICD-10-CM

## 2024-11-27 PROCEDURE — 99205 OFFICE O/P NEW HI 60 MIN: CPT | Mod: GC

## 2024-11-27 PROCEDURE — 99213 OFFICE O/P EST LOW 20 MIN: CPT

## 2024-11-27 PROCEDURE — G2211 COMPLEX E/M VISIT ADD ON: CPT | Mod: NC

## 2024-12-02 ENCOUNTER — OUTPATIENT (OUTPATIENT)
Dept: OUTPATIENT SERVICES | Facility: HOSPITAL | Age: 69
LOS: 1 days | Discharge: HOME | End: 2024-12-02
Payer: MEDICAID

## 2024-12-02 DIAGNOSIS — Z96.641 PRESENCE OF RIGHT ARTIFICIAL HIP JOINT: Chronic | ICD-10-CM

## 2024-12-02 DIAGNOSIS — Z90.49 ACQUIRED ABSENCE OF OTHER SPECIFIED PARTS OF DIGESTIVE TRACT: Chronic | ICD-10-CM

## 2024-12-02 DIAGNOSIS — T85.528A DISPLACEMENT OF OTHER GASTROINTESTINAL PROSTHETIC DEVICES, IMPLANTS AND GRAFTS, INITIAL ENCOUNTER: Chronic | ICD-10-CM

## 2024-12-02 DIAGNOSIS — Z96.642 PRESENCE OF LEFT ARTIFICIAL HIP JOINT: Chronic | ICD-10-CM

## 2024-12-04 ENCOUNTER — TRANSCRIPTION ENCOUNTER (OUTPATIENT)
Age: 69
End: 2024-12-04

## 2024-12-05 ENCOUNTER — NON-APPOINTMENT (OUTPATIENT)
Age: 69
End: 2024-12-05

## 2024-12-05 ENCOUNTER — TRANSCRIPTION ENCOUNTER (OUTPATIENT)
Age: 69
End: 2024-12-05

## 2024-12-05 PROCEDURE — 77334 RADIATION TREATMENT AID(S): CPT | Mod: 26

## 2024-12-05 PROCEDURE — 77263 THER RADIOLOGY TX PLNG CPLX: CPT

## 2024-12-06 ENCOUNTER — APPOINTMENT (OUTPATIENT)
Dept: INFUSION THERAPY | Facility: HOSPITAL | Age: 69
End: 2024-12-06

## 2024-12-10 ENCOUNTER — APPOINTMENT (OUTPATIENT)
Dept: INFUSION THERAPY | Facility: HOSPITAL | Age: 69
End: 2024-12-10

## 2024-12-10 ENCOUNTER — RESULT REVIEW (OUTPATIENT)
Age: 69
End: 2024-12-10

## 2024-12-10 ENCOUNTER — LABORATORY RESULT (OUTPATIENT)
Age: 69
End: 2024-12-10

## 2024-12-10 LAB
BASOPHILS # BLD AUTO: 0.02 K/UL — SIGNIFICANT CHANGE UP (ref 0–0.2)
BASOPHILS NFR BLD AUTO: 0.3 % — SIGNIFICANT CHANGE UP (ref 0–2)
EOSINOPHIL # BLD AUTO: 0.03 K/UL — SIGNIFICANT CHANGE UP (ref 0–0.5)
EOSINOPHIL NFR BLD AUTO: 0.5 % — SIGNIFICANT CHANGE UP (ref 0–6)
HCT VFR BLD CALC: 32.5 % — LOW (ref 34.5–45)
HGB BLD-MCNC: 10.1 G/DL — LOW (ref 11.5–15.5)
IMM GRANULOCYTES NFR BLD AUTO: 1 % — HIGH (ref 0–0.9)
LYMPHOCYTES # BLD AUTO: 1.52 K/UL — SIGNIFICANT CHANGE UP (ref 1–3.3)
LYMPHOCYTES # BLD AUTO: 26 % — SIGNIFICANT CHANGE UP (ref 13–44)
MCHC RBC-ENTMCNC: 29.3 PG — SIGNIFICANT CHANGE UP (ref 27–34)
MCHC RBC-ENTMCNC: 31.1 G/DL — LOW (ref 32–36)
MCV RBC AUTO: 94.2 FL — SIGNIFICANT CHANGE UP (ref 80–100)
MONOCYTES # BLD AUTO: 0.33 K/UL — SIGNIFICANT CHANGE UP (ref 0–0.9)
MONOCYTES NFR BLD AUTO: 5.6 % — SIGNIFICANT CHANGE UP (ref 2–14)
NEUTROPHILS # BLD AUTO: 3.89 K/UL — SIGNIFICANT CHANGE UP (ref 1.8–7.4)
NEUTROPHILS NFR BLD AUTO: 66.6 % — SIGNIFICANT CHANGE UP (ref 43–77)
NRBC # BLD: 0 /100 WBCS — SIGNIFICANT CHANGE UP (ref 0–0)
PLATELET # BLD AUTO: 157 K/UL — SIGNIFICANT CHANGE UP (ref 150–400)
RBC # BLD: 3.45 M/UL — LOW (ref 3.8–5.2)
RBC # FLD: 19.7 % — HIGH (ref 10.3–14.5)
WBC # BLD: 5.85 K/UL — SIGNIFICANT CHANGE UP (ref 3.8–10.5)
WBC # FLD AUTO: 5.85 K/UL — SIGNIFICANT CHANGE UP (ref 3.8–10.5)

## 2024-12-12 ENCOUNTER — APPOINTMENT (OUTPATIENT)
Dept: INFUSION THERAPY | Facility: HOSPITAL | Age: 69
End: 2024-12-12

## 2024-12-17 ENCOUNTER — APPOINTMENT (OUTPATIENT)
Dept: SURGERY | Facility: CLINIC | Age: 69
End: 2024-12-17

## 2024-12-17 PROCEDURE — 45378 DIAGNOSTIC COLONOSCOPY: CPT

## 2024-12-20 ENCOUNTER — APPOINTMENT (OUTPATIENT)
Dept: INFUSION THERAPY | Facility: HOSPITAL | Age: 69
End: 2024-12-20

## 2024-12-20 ENCOUNTER — OUTPATIENT (OUTPATIENT)
Dept: OUTPATIENT SERVICES | Facility: HOSPITAL | Age: 69
LOS: 1 days | Discharge: HOME | End: 2024-12-20

## 2024-12-20 DIAGNOSIS — C18.9 MALIGNANT NEOPLASM OF COLON, UNSPECIFIED: ICD-10-CM

## 2024-12-20 DIAGNOSIS — C78.00 MALIGNANT NEOPLASM OF COLON, UNSPECIFIED: ICD-10-CM

## 2024-12-20 DIAGNOSIS — T85.528A DISPLACEMENT OF OTHER GASTROINTESTINAL PROSTHETIC DEVICES, IMPLANTS AND GRAFTS, INITIAL ENCOUNTER: Chronic | ICD-10-CM

## 2024-12-20 DIAGNOSIS — Z96.641 PRESENCE OF RIGHT ARTIFICIAL HIP JOINT: Chronic | ICD-10-CM

## 2024-12-20 DIAGNOSIS — Z90.49 ACQUIRED ABSENCE OF OTHER SPECIFIED PARTS OF DIGESTIVE TRACT: Chronic | ICD-10-CM

## 2024-12-24 ENCOUNTER — RESULT REVIEW (OUTPATIENT)
Age: 69
End: 2024-12-24

## 2024-12-24 ENCOUNTER — APPOINTMENT (OUTPATIENT)
Dept: INFUSION THERAPY | Facility: HOSPITAL | Age: 69
End: 2024-12-24

## 2024-12-24 DIAGNOSIS — R11.2 NAUSEA WITH VOMITING, UNSPECIFIED: ICD-10-CM

## 2024-12-24 DIAGNOSIS — Z51.11 ENCOUNTER FOR ANTINEOPLASTIC CHEMOTHERAPY: ICD-10-CM

## 2024-12-24 LAB
ALBUMIN SERPL ELPH-MCNC: 3.7 G/DL — SIGNIFICANT CHANGE UP (ref 3.3–5)
ALP SERPL-CCNC: 179 U/L — HIGH (ref 40–120)
ALT FLD-CCNC: 18 U/L — SIGNIFICANT CHANGE UP (ref 10–45)
ANION GAP SERPL CALC-SCNC: 13 MMOL/L — SIGNIFICANT CHANGE UP (ref 5–17)
AST SERPL-CCNC: 23 U/L — SIGNIFICANT CHANGE UP (ref 10–40)
BASOPHILS # BLD AUTO: 0.02 K/UL — SIGNIFICANT CHANGE UP (ref 0–0.2)
BASOPHILS NFR BLD AUTO: 0.2 % — SIGNIFICANT CHANGE UP (ref 0–2)
BILIRUB SERPL-MCNC: 0.5 MG/DL — SIGNIFICANT CHANGE UP (ref 0.2–1.2)
BUN SERPL-MCNC: 31 MG/DL — HIGH (ref 7–23)
CALCIUM SERPL-MCNC: 9.6 MG/DL — SIGNIFICANT CHANGE UP (ref 8.4–10.5)
CEA SERPL-MCNC: 16.8 NG/ML — HIGH (ref 0–3.8)
CHLORIDE SERPL-SCNC: 110 MMOL/L — HIGH (ref 96–108)
CO2 SERPL-SCNC: 21 MMOL/L — LOW (ref 22–31)
CREAT SERPL-MCNC: 0.78 MG/DL — SIGNIFICANT CHANGE UP (ref 0.5–1.3)
EGFR: 82 ML/MIN/1.73M2 — SIGNIFICANT CHANGE UP
EOSINOPHIL # BLD AUTO: 0.04 K/UL — SIGNIFICANT CHANGE UP (ref 0–0.5)
EOSINOPHIL NFR BLD AUTO: 0.5 % — SIGNIFICANT CHANGE UP (ref 0–6)
GLUCOSE SERPL-MCNC: 110 MG/DL — HIGH (ref 70–99)
HCT VFR BLD CALC: 32 % — LOW (ref 34.5–45)
HGB BLD-MCNC: 10.1 G/DL — LOW (ref 11.5–15.5)
IMM GRANULOCYTES NFR BLD AUTO: 0.6 % — SIGNIFICANT CHANGE UP (ref 0–0.9)
LYMPHOCYTES # BLD AUTO: 1.73 K/UL — SIGNIFICANT CHANGE UP (ref 1–3.3)
LYMPHOCYTES # BLD AUTO: 20.4 % — SIGNIFICANT CHANGE UP (ref 13–44)
MCHC RBC-ENTMCNC: 30.1 PG — SIGNIFICANT CHANGE UP (ref 27–34)
MCHC RBC-ENTMCNC: 31.6 G/DL — LOW (ref 32–36)
MCV RBC AUTO: 95.5 FL — SIGNIFICANT CHANGE UP (ref 80–100)
MONOCYTES # BLD AUTO: 0.31 K/UL — SIGNIFICANT CHANGE UP (ref 0–0.9)
MONOCYTES NFR BLD AUTO: 3.7 % — SIGNIFICANT CHANGE UP (ref 2–14)
NEUTROPHILS # BLD AUTO: 6.31 K/UL — SIGNIFICANT CHANGE UP (ref 1.8–7.4)
NEUTROPHILS NFR BLD AUTO: 74.6 % — SIGNIFICANT CHANGE UP (ref 43–77)
NRBC # BLD: 0 /100 WBCS — SIGNIFICANT CHANGE UP (ref 0–0)
NRBC BLD-RTO: 0 /100 WBCS — SIGNIFICANT CHANGE UP (ref 0–0)
PLATELET # BLD AUTO: 147 K/UL — LOW (ref 150–400)
POTASSIUM SERPL-MCNC: 3.9 MMOL/L — SIGNIFICANT CHANGE UP (ref 3.5–5.3)
POTASSIUM SERPL-SCNC: 3.9 MMOL/L — SIGNIFICANT CHANGE UP (ref 3.5–5.3)
PROT SERPL-MCNC: 6.6 G/DL — SIGNIFICANT CHANGE UP (ref 6–8.3)
RBC # BLD: 3.35 M/UL — LOW (ref 3.8–5.2)
RBC # FLD: 19.4 % — HIGH (ref 10.3–14.5)
SODIUM SERPL-SCNC: 144 MMOL/L — SIGNIFICANT CHANGE UP (ref 135–145)
WBC # BLD: 8.46 K/UL — SIGNIFICANT CHANGE UP (ref 3.8–10.5)
WBC # FLD AUTO: 8.46 K/UL — SIGNIFICANT CHANGE UP (ref 3.8–10.5)

## 2024-12-26 ENCOUNTER — APPOINTMENT (OUTPATIENT)
Dept: INFUSION THERAPY | Facility: HOSPITAL | Age: 69
End: 2024-12-26

## 2024-12-27 DIAGNOSIS — C78.7 MALIGNANT NEOPLASM OF COLON, UNSPECIFIED: ICD-10-CM

## 2024-12-27 DIAGNOSIS — C18.9 MALIGNANT NEOPLASM OF COLON, UNSPECIFIED: ICD-10-CM

## 2025-01-03 ENCOUNTER — APPOINTMENT (OUTPATIENT)
Dept: INFUSION THERAPY | Facility: HOSPITAL | Age: 70
End: 2025-01-03

## 2025-01-06 ENCOUNTER — OUTPATIENT (OUTPATIENT)
Dept: OUTPATIENT SERVICES | Facility: HOSPITAL | Age: 70
LOS: 1 days | End: 2025-01-06
Payer: MEDICAID

## 2025-01-06 ENCOUNTER — RESULT REVIEW (OUTPATIENT)
Age: 70
End: 2025-01-06

## 2025-01-06 ENCOUNTER — APPOINTMENT (OUTPATIENT)
Dept: CT IMAGING | Facility: IMAGING CENTER | Age: 70
End: 2025-01-06
Payer: MEDICAID

## 2025-01-06 ENCOUNTER — APPOINTMENT (OUTPATIENT)
Dept: MRI IMAGING | Facility: IMAGING CENTER | Age: 70
End: 2025-01-06
Payer: MEDICAID

## 2025-01-06 DIAGNOSIS — Z90.49 ACQUIRED ABSENCE OF OTHER SPECIFIED PARTS OF DIGESTIVE TRACT: Chronic | ICD-10-CM

## 2025-01-06 DIAGNOSIS — C18.9 MALIGNANT NEOPLASM OF COLON, UNSPECIFIED: ICD-10-CM

## 2025-01-06 DIAGNOSIS — T85.528A DISPLACEMENT OF OTHER GASTROINTESTINAL PROSTHETIC DEVICES, IMPLANTS AND GRAFTS, INITIAL ENCOUNTER: Chronic | ICD-10-CM

## 2025-01-06 DIAGNOSIS — Z96.641 PRESENCE OF RIGHT ARTIFICIAL HIP JOINT: Chronic | ICD-10-CM

## 2025-01-06 DIAGNOSIS — Z96.642 PRESENCE OF LEFT ARTIFICIAL HIP JOINT: Chronic | ICD-10-CM

## 2025-01-06 PROCEDURE — A9585: CPT

## 2025-01-06 PROCEDURE — 77300 RADIATION THERAPY DOSE PLAN: CPT | Mod: 26

## 2025-01-06 PROCEDURE — 71250 CT THORAX DX C-: CPT | Mod: 26

## 2025-01-06 PROCEDURE — 77301 RADIOTHERAPY DOSE PLAN IMRT: CPT | Mod: 26

## 2025-01-06 PROCEDURE — A9581: CPT

## 2025-01-06 PROCEDURE — 71250 CT THORAX DX C-: CPT

## 2025-01-06 PROCEDURE — 74183 MRI ABD W/O CNTR FLWD CNTR: CPT | Mod: 26

## 2025-01-06 PROCEDURE — 72197 MRI PELVIS W/O & W/DYE: CPT | Mod: 26

## 2025-01-06 PROCEDURE — 74183 MRI ABD W/O CNTR FLWD CNTR: CPT

## 2025-01-06 PROCEDURE — 72197 MRI PELVIS W/O & W/DYE: CPT

## 2025-01-06 PROCEDURE — 77338 DESIGN MLC DEVICE FOR IMRT: CPT | Mod: 26

## 2025-01-07 ENCOUNTER — RESULT REVIEW (OUTPATIENT)
Age: 70
End: 2025-01-07

## 2025-01-07 ENCOUNTER — APPOINTMENT (OUTPATIENT)
Dept: INFUSION THERAPY | Facility: HOSPITAL | Age: 70
End: 2025-01-07

## 2025-01-07 ENCOUNTER — APPOINTMENT (OUTPATIENT)
Dept: HEMATOLOGY ONCOLOGY | Facility: CLINIC | Age: 70
End: 2025-01-07
Payer: MEDICAID

## 2025-01-07 DIAGNOSIS — I10 ESSENTIAL (PRIMARY) HYPERTENSION: ICD-10-CM

## 2025-01-07 LAB
BASOPHILS # BLD AUTO: 0.02 K/UL — SIGNIFICANT CHANGE UP (ref 0–0.2)
BASOPHILS NFR BLD AUTO: 0.4 % — SIGNIFICANT CHANGE UP (ref 0–2)
EOSINOPHIL # BLD AUTO: 0.09 K/UL — SIGNIFICANT CHANGE UP (ref 0–0.5)
EOSINOPHIL NFR BLD AUTO: 1.8 % — SIGNIFICANT CHANGE UP (ref 0–6)
HCT VFR BLD CALC: 32.9 % — LOW (ref 34.5–45)
HGB BLD-MCNC: 10.5 G/DL — LOW (ref 11.5–15.5)
IMM GRANULOCYTES NFR BLD AUTO: 1 % — HIGH (ref 0–0.9)
LYMPHOCYTES # BLD AUTO: 1.14 K/UL — SIGNIFICANT CHANGE UP (ref 1–3.3)
LYMPHOCYTES # BLD AUTO: 22.9 % — SIGNIFICANT CHANGE UP (ref 13–44)
MCHC RBC-ENTMCNC: 30.3 PG — SIGNIFICANT CHANGE UP (ref 27–34)
MCHC RBC-ENTMCNC: 31.9 G/DL — LOW (ref 32–36)
MCV RBC AUTO: 95.1 FL — SIGNIFICANT CHANGE UP (ref 80–100)
MONOCYTES # BLD AUTO: 0.21 K/UL — SIGNIFICANT CHANGE UP (ref 0–0.9)
MONOCYTES NFR BLD AUTO: 4.2 % — SIGNIFICANT CHANGE UP (ref 2–14)
NEUTROPHILS # BLD AUTO: 3.46 K/UL — SIGNIFICANT CHANGE UP (ref 1.8–7.4)
NEUTROPHILS NFR BLD AUTO: 69.7 % — SIGNIFICANT CHANGE UP (ref 43–77)
NRBC # BLD: 0 /100 WBCS — SIGNIFICANT CHANGE UP (ref 0–0)
NRBC BLD-RTO: 0 /100 WBCS — SIGNIFICANT CHANGE UP (ref 0–0)
PLATELET # BLD AUTO: 165 K/UL — SIGNIFICANT CHANGE UP (ref 150–400)
RBC # BLD: 3.46 M/UL — LOW (ref 3.8–5.2)
RBC # FLD: 19.2 % — HIGH (ref 10.3–14.5)
WBC # BLD: 4.97 K/UL — SIGNIFICANT CHANGE UP (ref 3.8–10.5)
WBC # FLD AUTO: 4.97 K/UL — SIGNIFICANT CHANGE UP (ref 3.8–10.5)

## 2025-01-07 PROCEDURE — 99213 OFFICE O/P EST LOW 20 MIN: CPT

## 2025-01-07 PROCEDURE — G2211 COMPLEX E/M VISIT ADD ON: CPT

## 2025-01-08 ENCOUNTER — NON-APPOINTMENT (OUTPATIENT)
Age: 70
End: 2025-01-08

## 2025-01-09 ENCOUNTER — NON-APPOINTMENT (OUTPATIENT)
Age: 70
End: 2025-01-09

## 2025-01-09 ENCOUNTER — APPOINTMENT (OUTPATIENT)
Dept: INFUSION THERAPY | Facility: HOSPITAL | Age: 70
End: 2025-01-09

## 2025-01-09 VITALS
DIASTOLIC BLOOD PRESSURE: 81 MMHG | RESPIRATION RATE: 17 BRPM | HEART RATE: 70 BPM | WEIGHT: 105.05 LBS | BODY MASS INDEX: 21.18 KG/M2 | OXYGEN SATURATION: 99 % | SYSTOLIC BLOOD PRESSURE: 159 MMHG | HEIGHT: 59 IN

## 2025-01-09 DIAGNOSIS — Z51.0 ENCOUNTER FOR ANTINEOPLASTIC RADIATION THERAPY: ICD-10-CM

## 2025-01-09 DIAGNOSIS — C78.00 MALIGNANT NEOPLASM OF COLON, UNSPECIFIED: ICD-10-CM

## 2025-01-09 DIAGNOSIS — C18.9 MALIGNANT NEOPLASM OF COLON, UNSPECIFIED: ICD-10-CM

## 2025-01-10 ENCOUNTER — NON-APPOINTMENT (OUTPATIENT)
Age: 70
End: 2025-01-10

## 2025-01-10 ENCOUNTER — EMERGENCY (EMERGENCY)
Facility: HOSPITAL | Age: 70
LOS: 1 days | Discharge: HOME | End: 2025-01-10
Attending: EMERGENCY MEDICINE | Admitting: EMERGENCY MEDICINE
Payer: MEDICAID

## 2025-01-10 VITALS
OXYGEN SATURATION: 98 % | DIASTOLIC BLOOD PRESSURE: 77 MMHG | HEART RATE: 100 BPM | RESPIRATION RATE: 18 BRPM | SYSTOLIC BLOOD PRESSURE: 153 MMHG | TEMPERATURE: 98 F | HEIGHT: 59.06 IN

## 2025-01-10 DIAGNOSIS — Z96.642 PRESENCE OF LEFT ARTIFICIAL HIP JOINT: Chronic | ICD-10-CM

## 2025-01-10 DIAGNOSIS — Z90.49 ACQUIRED ABSENCE OF OTHER SPECIFIED PARTS OF DIGESTIVE TRACT: Chronic | ICD-10-CM

## 2025-01-10 DIAGNOSIS — T85.528A DISPLACEMENT OF OTHER GASTROINTESTINAL PROSTHETIC DEVICES, IMPLANTS AND GRAFTS, INITIAL ENCOUNTER: Chronic | ICD-10-CM

## 2025-01-10 DIAGNOSIS — Z96.641 PRESENCE OF RIGHT ARTIFICIAL HIP JOINT: Chronic | ICD-10-CM

## 2025-01-10 PROCEDURE — 93010 ELECTROCARDIOGRAM REPORT: CPT

## 2025-01-10 PROCEDURE — 99285 EMERGENCY DEPT VISIT HI MDM: CPT

## 2025-01-10 NOTE — ED PROVIDER NOTE - CARE PLAN
Assessment and plan of treatment:	see MDM   Principal Discharge DX:	Abdominal fluid collection  Assessment and plan of treatment:	see MDM   1

## 2025-01-10 NOTE — ED PROVIDER NOTE - CLINICAL SUMMARY MEDICAL DECISION MAKING FREE TEXT BOX
70 yo Mandarin-speaking F with a history of HTN, RA, scleroderma, mild pulmonary HTN, Stage IV R colon cancer w hepatic artery infusion pump. Patient presents with  to discuss results of recent MRI but unsure of details, was recommended to present to the ER by oncologist Dr. Guadalupe. After review of HIE, presents after recent oncology appt in Jan 6 2025 where MRI A/P was ordered that showed new trace complex fluid encircling the infusion pump catheter near the falciform ligament, possibly blood but neg for hematoma, needs further imaging with CTA for better characterization. Patient denies any abdominal pain, changes in bowel or bladder habits, nausea, vomiting, fever, chills. Vitals stable, abdomen soft, non tender non distended. HIA pump palpable without overt signs of infection. Will order labs and obtain CTA A/P w IV contrast to better characterize collection

## 2025-01-10 NOTE — ED PROVIDER NOTE - CARE PROVIDER_API CALL
Leena Guadalupe  Medical Oncology  [address] [place] [id number]  Phone: [phone]  Fax: [phone]  Established Patient  Follow Up Time:

## 2025-01-10 NOTE — ED ADULT TRIAGE NOTE - CHIEF COMPLAINT QUOTE
pt requesting results of MRI "performed on Thursday for a clot in the lung". pt hx of colorectal cancer metastasis to the liver, and pulmonary fibrosis. pt actively on chemo (last infusion x 2 days ago). t denies headaches, dizziness chest pain, SOB. pt requesting results of MRI "performed on Thursday for a clot in the lung". pt hx of colorectal cancer metastasis to the liver, and pulmonary fibrosis. pt actively on chemo (last infusion x 2 days ago). Pt denies headaches, dizziness chest pain, SOB.

## 2025-01-10 NOTE — ED ADULT NURSE NOTE - CHIEF COMPLAINT QUOTE
pt requesting results of MRI "performed on Thursday for a clot in the lung". pt hx of colorectal cancer metastasis to the liver, and pulmonary fibrosis. pt actively on chemo (last infusion x 2 days ago). Pt denies headaches, dizziness chest pain, SOB.

## 2025-01-10 NOTE — ED ADULT NURSE NOTE - NSICDXPASTMEDICALHX_GEN_ALL_CORE_FT
GROUP THERAPY PROGRESS NOTE    Sharlene willard participated in a morning Process Group on the Geriatric Unit, with a focus identifying feelings, planning for the day, and singing. Group time: 45 minutes. Personal goal for participation: To increase the capacity to shift ones mood, prepare for the day, and share in group singing. Goal orientation: The patient will be able to prepare for the day through group singing. Group therapy participation: When prompted, this patient minimally participated in the group. Therapeutic interventions reviewed and discussed: The group members were introduce themselves by first names and participate in group singing as a way to increase their oxygen and blood flow and begin their day on a positive note. They were also asked to join in singing several songs. Impression of participation: The patient said he was feeling \"tired\" and he complained about a peer who was not in group (without mentioning her name) and asked for his \"second cup of oatmeal.\" He was resopnded to by nursing who redirected him. He expressed no SI/HI and displayed no overt psychosis. He was alert and said he did not want to sing. He acknowledged the undersigned's coming and going and he did not engage in the group process. His affect was depressed and his mood was irritable. PAST MEDICAL HISTORY:  H/O pulmonary hypertension     H/O scleroderma     HLD (hyperlipidemia)     HTN (hypertension)

## 2025-01-10 NOTE — ED PROVIDER NOTE - PHYSICAL EXAMINATION
VITALS:   T(C): 36.4 (01-10-25 @ 18:15), Max: 36.4 (01-10-25 @ 18:15)  HR: 100 (01-10-25 @ 18:15) (100 - 100)  BP: 153/77 (01-10-25 @ 18:15) (153/77 - 153/77)  RR: 18 (01-10-25 @ 18:15) (18 - 18)  SpO2: 98% (01-10-25 @ 18:15) (98% - 98%)    GENERAL: NAD, lying in bed comfortably  HEAD:  Atraumatic, normocephalic  EYES: EOMI, PERRLA, conjunctiva and sclera clear  ENT: Moist mucous membranes  NECK: Supple, no JVD  HEART: Regular rate and rhythm, no murmurs, rubs, or gallops, +R chest wall chemo port  LUNGS: Unlabored respirations.  Clear to auscultation bilaterally, no crackles, wheezing, or rhonchi  ABDOMEN: Soft, nontender, nondistended, +BS, +L sided ANGELI pump in place  EXTREMITIES: 2+ peripheral pulses bilaterally. No clubbing, cyanosis, or edema  NERVOUS SYSTEM:  A&Ox3, no focal deficits   SKIN: No rashes or lesions

## 2025-01-10 NOTE — ED ADULT NURSE NOTE - NSFALLUNIVINTERV_ED_ALL_ED
Bed/Stretcher in lowest position, wheels locked, appropriate side rails in place/Call bell, personal items and telephone in reach/Instruct patient to call for assistance before getting out of bed/chair/stretcher/Non-slip footwear applied when patient is off stretcher/[place] to call system/Physically safe environment - no spills, clutter or unnecessary equipment/Purposeful proactive rounding/Room/bathroom lighting operational, light cord in reach

## 2025-01-10 NOTE — ED PROVIDER NOTE - PATIENT PORTAL LINK FT
You can access the FollowMyHealth Patient Portal offered by [facility] by registering at the following website: [email or web address]. By joining [facility]’s FollowMyHealth portal, you will also be able to view your health information using other applications (apps) compatible with our system.

## 2025-01-10 NOTE — ED PROVIDER NOTE - OBJECTIVE STATEMENT
68 yo Mandarin-speaking F with a history of HTN, RA, scleroderma, mild pulmonary HTN, Stage IV R colon cancer w hepatic artery infusion pump. Patient presents with  to discuss results of recent MRI but unsure of details, was recommended to present to the ER by oncologist Dr. Guadalupe. After review of HIE, presents after recent oncology appt in Jan 6 2025 where MRI A/P was ordered that showed new trace complex fluid encircling the infusion pump catheter near the falciform ligament, possibly blood but neg for hematoma, needs further imaging with CTA for better characterization. Patient denies any abdominal pain, changes in bowel or bladder habits, nausea, vomiting, fever, chills.

## 2025-01-10 NOTE — ED PROVIDER NOTE - PROGRESS NOTE DETAILS
CT A/P w IV contrast reviewed: No concerning interval change compared to 1/6/2025 MRI. Again demonstrated is a 1.4 cm diameter rind of mildly complex fluid surrounding the distal aspect of the infusion catheter as it extends toward the falciform ligament. No active bleeding is evident. Unchanged hepatic ablation cavities and other smaller lesions. No actionable findings. Patient will be discharged with PCP and heme onc follow up. CT A/P w IV contrast reviewed: No concerning interval change compared to 1/6/2025 MRI. Again demonstrated is a 1.4 cm diameter rind of mildly complex fluid surrounding the distal aspect of the infusion catheter as it extends toward the falciform ligament. No active bleeding is evident. Unchanged hepatic ablation cavities and other smaller lesions. No actionable findings. Patient will be discharged with PCP and heme onc follow up. Patient, , and daughter updated via [employer] [place]  ID [id number]

## 2025-01-10 NOTE — ED ADULT NURSE NOTE - OBJECTIVE STATEMENT
Pt received to rm 25   , awake and alert, A&OX4, ambulatory.  .Hx of colorectal cancer metastasis to the liver, and pulmonary fibrosis. pt actively on chemo (last infusion x 2 days ago).   Pt coming to ED requesting  MRI that was performed on Thursday for clot in lung. Pt denying any SOB. Speaking in full sentences, airway patent. Pt douglas any pain or discomfort at this time. Respirations even and unlabored. Denies CP, SOB, N/V, HA, dizziness, palpitations, blurry vision.    Labs drawn and sent. Awaitn Ct scan. Bed in lowest position, call bell within reach. Safety maintained.

## 2025-01-10 NOTE — ED ADULT NURSE NOTE - SUICIDE SCREENING QUESTION 3
Aklief Pregnancy And Lactation Text: It is unknown if this medication is safe to use during pregnancy.  It is unknown if this medication is excreted in breast milk.  Breastfeeding women should use the topical cream on the smallest area of the skin for the shortest time needed while breastfeeding.  Do not apply to nipple and areola. No

## 2025-01-10 NOTE — ED PROVIDER NOTE - ATTENDING CONTRIBUTION TO CARE
Attending note:   After face to face evaluation of this patient, I concur with above noted hx, pe, and care plan for this patient. Remy: History of reyes .  Patient is a 69-year-old female with history of hypertension and RA.  Patient has stage IV colon cancer with a hepatic artery infusion pump for chemo.  Patient had MRI of the abdomen and pelvis with and without contrast that showed new trace complex fluid encircling the infusion pump catheter near the falciform ligament possible blood.  This was told to family today.  As per HIE notes by oncologist this requires a CT angio as per the primary team.  Unsure who the primary team is.  Also new 4 mm distal CBD stone was noted.  Patient has no complaints.  Patient is well-appearing with normal vitals and borderline heart rate.  Patient's abdomen is soft and nontender.  There is a firm area on left lower quadrant that is nontender corresponding with patient's infusion pump.  There is no ecchymosis or edema noted around.  As per radiology CTA unlikely to give more definitive answers then MRI but given that imaging was 4 days ago we will perform CT angio now to see if there is been any change in this collection.

## 2025-01-10 NOTE — ED PROVIDER NOTE - NSFOLLOWUPCLINICS_GEN_ALL_ED_FT
[facility] General Internal Medicine  General Internal Medicine  [address] [place] [address]  Phone: [phone]  Fax:

## 2025-01-11 VITALS
RESPIRATION RATE: 18 BRPM | HEART RATE: 94 BPM | OXYGEN SATURATION: 100 % | SYSTOLIC BLOOD PRESSURE: 134 MMHG | DIASTOLIC BLOOD PRESSURE: 65 MMHG | TEMPERATURE: 98 F

## 2025-01-11 PROBLEM — Z51.0 ENCOUNTER FOR ANTINEOPLASTIC RADIATION THERAPY: Status: ACTIVE | Noted: 2025-01-11

## 2025-01-11 LAB
ALBUMIN SERPL ELPH-MCNC: 3.6 G/DL — SIGNIFICANT CHANGE UP (ref 3.3–5)
ALP SERPL-CCNC: 291 U/L — HIGH (ref 40–120)
ALT FLD-CCNC: 74 U/L — HIGH (ref 4–33)
ANION GAP SERPL CALC-SCNC: 15 MMOL/L — HIGH (ref 7–14)
APTT BLD: 26.7 SEC — SIGNIFICANT CHANGE UP (ref 24.5–35.6)
AST SERPL-CCNC: 40 U/L — HIGH (ref 4–32)
BASOPHILS # BLD AUTO: 0.01 K/UL — SIGNIFICANT CHANGE UP (ref 0–0.2)
BASOPHILS NFR BLD AUTO: 0.3 % — SIGNIFICANT CHANGE UP (ref 0–2)
BILIRUB SERPL-MCNC: 0.7 MG/DL — SIGNIFICANT CHANGE UP (ref 0.2–1.2)
BLD GP AB SCN SERPL QL: NEGATIVE — SIGNIFICANT CHANGE UP
BLOOD GAS VENOUS COMPREHENSIVE RESULT: SIGNIFICANT CHANGE UP
BUN SERPL-MCNC: 20 MG/DL — SIGNIFICANT CHANGE UP (ref 7–23)
CALCIUM SERPL-MCNC: 9.3 MG/DL — SIGNIFICANT CHANGE UP (ref 8.4–10.5)
CHLORIDE SERPL-SCNC: 105 MMOL/L — SIGNIFICANT CHANGE UP (ref 98–107)
CO2 SERPL-SCNC: 21 MMOL/L — LOW (ref 22–31)
CREAT SERPL-MCNC: 0.54 MG/DL — SIGNIFICANT CHANGE UP (ref 0.5–1.3)
EGFR: 100 ML/MIN/1.73M2 — SIGNIFICANT CHANGE UP
EOSINOPHIL # BLD AUTO: 0.06 K/UL — SIGNIFICANT CHANGE UP (ref 0–0.5)
EOSINOPHIL NFR BLD AUTO: 1.7 % — SIGNIFICANT CHANGE UP (ref 0–6)
GLUCOSE SERPL-MCNC: 96 MG/DL — SIGNIFICANT CHANGE UP (ref 70–99)
HCT VFR BLD CALC: 33 % — LOW (ref 34.5–45)
HGB BLD-MCNC: 10 G/DL — LOW (ref 11.5–15.5)
IANC: 2.25 K/UL — SIGNIFICANT CHANGE UP (ref 1.8–7.4)
IMM GRANULOCYTES NFR BLD AUTO: 1.4 % — HIGH (ref 0–0.9)
INR BLD: <0.9 RATIO — SIGNIFICANT CHANGE UP (ref 0.85–1.16)
LYMPHOCYTES # BLD AUTO: 0.89 K/UL — LOW (ref 1–3.3)
LYMPHOCYTES # BLD AUTO: 25.2 % — SIGNIFICANT CHANGE UP (ref 13–44)
MAGNESIUM SERPL-MCNC: 2.1 MG/DL — SIGNIFICANT CHANGE UP (ref 1.6–2.6)
MCHC RBC-ENTMCNC: 29.4 PG — SIGNIFICANT CHANGE UP (ref 27–34)
MCHC RBC-ENTMCNC: 30.3 G/DL — LOW (ref 32–36)
MCV RBC AUTO: 97.1 FL — SIGNIFICANT CHANGE UP (ref 80–100)
MONOCYTES # BLD AUTO: 0.27 K/UL — SIGNIFICANT CHANGE UP (ref 0–0.9)
MONOCYTES NFR BLD AUTO: 7.6 % — SIGNIFICANT CHANGE UP (ref 2–14)
NEUTROPHILS # BLD AUTO: 2.25 K/UL — SIGNIFICANT CHANGE UP (ref 1.8–7.4)
NEUTROPHILS NFR BLD AUTO: 63.8 % — SIGNIFICANT CHANGE UP (ref 43–77)
NRBC # BLD: 0 /100 WBCS — SIGNIFICANT CHANGE UP (ref 0–0)
NRBC # FLD: 0 K/UL — SIGNIFICANT CHANGE UP (ref 0–0)
PHOSPHATE SERPL-MCNC: 3.6 MG/DL — SIGNIFICANT CHANGE UP (ref 2.5–4.5)
PLATELET # BLD AUTO: 213 K/UL — SIGNIFICANT CHANGE UP (ref 150–400)
POTASSIUM SERPL-MCNC: 4.1 MMOL/L — SIGNIFICANT CHANGE UP (ref 3.5–5.3)
POTASSIUM SERPL-SCNC: 4.1 MMOL/L — SIGNIFICANT CHANGE UP (ref 3.5–5.3)
PROT SERPL-MCNC: 6.8 G/DL — SIGNIFICANT CHANGE UP (ref 6–8.3)
PROTHROM AB SERPL-ACNC: 10.2 SEC — SIGNIFICANT CHANGE UP (ref 9.9–13.4)
RBC # BLD: 3.4 M/UL — LOW (ref 3.8–5.2)
RBC # FLD: 18.9 % — HIGH (ref 10.3–14.5)
RH IG SCN BLD-IMP: POSITIVE — SIGNIFICANT CHANGE UP
SODIUM SERPL-SCNC: 141 MMOL/L — SIGNIFICANT CHANGE UP (ref 135–145)
WBC # BLD: 3.53 K/UL — LOW (ref 3.8–10.5)
WBC # FLD AUTO: 3.53 K/UL — LOW (ref 3.8–10.5)

## 2025-01-11 PROCEDURE — 74174 CTA ABD&PLVS W/CONTRAST: CPT | Mod: 26

## 2025-01-11 NOTE — ED ADULT NURSE REASSESSMENT NOTE - NS ED NURSE REASSESS COMMENT FT1
report received from TYSON Ledesma/ TYSON VIZCAINO. pt to be discharged at this time. offers no complaints, appears in no acute distress. safety maintained. MD at bedside

## 2025-01-13 ENCOUNTER — NON-APPOINTMENT (OUTPATIENT)
Age: 70
End: 2025-01-13

## 2025-01-15 PROCEDURE — 77435 SBRT MANAGEMENT: CPT

## 2025-01-16 ENCOUNTER — TRANSCRIPTION ENCOUNTER (OUTPATIENT)
Age: 70
End: 2025-01-16

## 2025-01-17 ENCOUNTER — NON-APPOINTMENT (OUTPATIENT)
Age: 70
End: 2025-01-17

## 2025-01-17 ENCOUNTER — TRANSCRIPTION ENCOUNTER (OUTPATIENT)
Age: 70
End: 2025-01-17

## 2025-01-21 ENCOUNTER — APPOINTMENT (OUTPATIENT)
Dept: SURGICAL ONCOLOGY | Facility: CLINIC | Age: 70
End: 2025-01-21
Payer: MEDICAID

## 2025-01-21 ENCOUNTER — RESULT REVIEW (OUTPATIENT)
Age: 70
End: 2025-01-21

## 2025-01-21 ENCOUNTER — APPOINTMENT (OUTPATIENT)
Dept: HEMATOLOGY ONCOLOGY | Facility: CLINIC | Age: 70
End: 2025-01-21

## 2025-01-21 ENCOUNTER — NON-APPOINTMENT (OUTPATIENT)
Age: 70
End: 2025-01-21

## 2025-01-21 ENCOUNTER — APPOINTMENT (OUTPATIENT)
Dept: INFUSION THERAPY | Facility: HOSPITAL | Age: 70
End: 2025-01-21

## 2025-01-21 VITALS
WEIGHT: 101 LBS | OXYGEN SATURATION: 98 % | BODY MASS INDEX: 20.36 KG/M2 | HEIGHT: 59 IN | SYSTOLIC BLOOD PRESSURE: 134 MMHG | DIASTOLIC BLOOD PRESSURE: 99 MMHG | HEART RATE: 91 BPM

## 2025-01-21 VITALS
BODY MASS INDEX: 20.37 KG/M2 | OXYGEN SATURATION: 96 % | HEIGHT: 59.05 IN | HEART RATE: 104 BPM | DIASTOLIC BLOOD PRESSURE: 89 MMHG | WEIGHT: 101.06 LBS | TEMPERATURE: 97.2 F | RESPIRATION RATE: 17 BRPM | SYSTOLIC BLOOD PRESSURE: 142 MMHG

## 2025-01-21 DIAGNOSIS — C18.9 MALIGNANT NEOPLASM OF COLON, UNSPECIFIED: ICD-10-CM

## 2025-01-21 DIAGNOSIS — K21.9 GASTRO-ESOPHAGEAL REFLUX DISEASE W/OUT ESOPHAGITIS: ICD-10-CM

## 2025-01-21 DIAGNOSIS — C78.7 MALIGNANT NEOPLASM OF COLON, UNSPECIFIED: ICD-10-CM

## 2025-01-21 LAB
ALBUMIN SERPL ELPH-MCNC: 3.8 G/DL — SIGNIFICANT CHANGE UP (ref 3.3–5)
ALP SERPL-CCNC: 191 U/L — HIGH (ref 40–120)
ALT FLD-CCNC: 27 U/L — SIGNIFICANT CHANGE UP (ref 10–45)
ANION GAP SERPL CALC-SCNC: 13 MMOL/L — SIGNIFICANT CHANGE UP (ref 5–17)
AST SERPL-CCNC: 30 U/L — SIGNIFICANT CHANGE UP (ref 10–40)
BASOPHILS # BLD AUTO: 0.03 K/UL — SIGNIFICANT CHANGE UP (ref 0–0.2)
BASOPHILS NFR BLD AUTO: 0.5 % — SIGNIFICANT CHANGE UP (ref 0–2)
BILIRUB SERPL-MCNC: 0.4 MG/DL — SIGNIFICANT CHANGE UP (ref 0.2–1.2)
BUN SERPL-MCNC: 30 MG/DL — HIGH (ref 7–23)
CALCIUM SERPL-MCNC: 9.6 MG/DL — SIGNIFICANT CHANGE UP (ref 8.4–10.5)
CHLORIDE SERPL-SCNC: 107 MMOL/L — SIGNIFICANT CHANGE UP (ref 96–108)
CO2 SERPL-SCNC: 22 MMOL/L — SIGNIFICANT CHANGE UP (ref 22–31)
CREAT SERPL-MCNC: 0.62 MG/DL — SIGNIFICANT CHANGE UP (ref 0.5–1.3)
EGFR: 96 ML/MIN/1.73M2 — SIGNIFICANT CHANGE UP
EOSINOPHIL # BLD AUTO: 0.05 K/UL — SIGNIFICANT CHANGE UP (ref 0–0.5)
EOSINOPHIL NFR BLD AUTO: 0.8 % — SIGNIFICANT CHANGE UP (ref 0–6)
GLUCOSE SERPL-MCNC: 131 MG/DL — HIGH (ref 70–99)
HCT VFR BLD CALC: 33.9 % — LOW (ref 34.5–45)
HGB BLD-MCNC: 10.9 G/DL — LOW (ref 11.5–15.5)
IMM GRANULOCYTES NFR BLD AUTO: 1.7 % — HIGH (ref 0–0.9)
LYMPHOCYTES # BLD AUTO: 0.82 K/UL — LOW (ref 1–3.3)
LYMPHOCYTES # BLD AUTO: 12.5 % — LOW (ref 13–44)
MCHC RBC-ENTMCNC: 30.5 PG — SIGNIFICANT CHANGE UP (ref 27–34)
MCHC RBC-ENTMCNC: 32.2 G/DL — SIGNIFICANT CHANGE UP (ref 32–36)
MCV RBC AUTO: 95 FL — SIGNIFICANT CHANGE UP (ref 80–100)
MONOCYTES # BLD AUTO: 0.34 K/UL — SIGNIFICANT CHANGE UP (ref 0–0.9)
MONOCYTES NFR BLD AUTO: 5.2 % — SIGNIFICANT CHANGE UP (ref 2–14)
NEUTROPHILS # BLD AUTO: 5.2 K/UL — SIGNIFICANT CHANGE UP (ref 1.8–7.4)
NEUTROPHILS NFR BLD AUTO: 79.3 % — HIGH (ref 43–77)
NRBC # BLD: 0 /100 WBCS — SIGNIFICANT CHANGE UP (ref 0–0)
NRBC BLD-RTO: 0 /100 WBCS — SIGNIFICANT CHANGE UP (ref 0–0)
PLATELET # BLD AUTO: 199 K/UL — SIGNIFICANT CHANGE UP (ref 150–400)
POTASSIUM SERPL-MCNC: 3.7 MMOL/L — SIGNIFICANT CHANGE UP (ref 3.5–5.3)
POTASSIUM SERPL-SCNC: 3.7 MMOL/L — SIGNIFICANT CHANGE UP (ref 3.5–5.3)
PROT SERPL-MCNC: 7.1 G/DL — SIGNIFICANT CHANGE UP (ref 6–8.3)
RBC # BLD: 3.57 M/UL — LOW (ref 3.8–5.2)
RBC # FLD: 17.5 % — HIGH (ref 10.3–14.5)
SODIUM SERPL-SCNC: 142 MMOL/L — SIGNIFICANT CHANGE UP (ref 135–145)
WBC # BLD: 6.55 K/UL — SIGNIFICANT CHANGE UP (ref 3.8–10.5)
WBC # FLD AUTO: 6.55 K/UL — SIGNIFICANT CHANGE UP (ref 3.8–10.5)

## 2025-01-21 PROCEDURE — 99213 OFFICE O/P EST LOW 20 MIN: CPT

## 2025-01-21 PROCEDURE — G2211 COMPLEX E/M VISIT ADD ON: CPT | Mod: NC

## 2025-01-22 LAB — CEA SERPL-MCNC: 29.4 NG/ML — HIGH (ref 0–3.8)

## 2025-01-23 ENCOUNTER — APPOINTMENT (OUTPATIENT)
Dept: INTERNAL MEDICINE | Facility: CLINIC | Age: 70
End: 2025-01-23
Payer: MEDICAID

## 2025-01-23 ENCOUNTER — APPOINTMENT (OUTPATIENT)
Dept: INFUSION THERAPY | Facility: HOSPITAL | Age: 70
End: 2025-01-23

## 2025-01-23 VITALS
SYSTOLIC BLOOD PRESSURE: 133 MMHG | DIASTOLIC BLOOD PRESSURE: 83 MMHG | OXYGEN SATURATION: 98 % | TEMPERATURE: 98.4 F | WEIGHT: 102 LBS | HEIGHT: 59.05 IN | HEART RATE: 75 BPM | BODY MASS INDEX: 20.56 KG/M2

## 2025-01-23 VITALS — DIASTOLIC BLOOD PRESSURE: 82 MMHG | SYSTOLIC BLOOD PRESSURE: 136 MMHG

## 2025-01-23 DIAGNOSIS — C18.9 MALIGNANT NEOPLASM OF COLON, UNSPECIFIED: ICD-10-CM

## 2025-01-23 DIAGNOSIS — C78.00 MALIGNANT NEOPLASM OF COLON, UNSPECIFIED: ICD-10-CM

## 2025-01-23 DIAGNOSIS — I10 ESSENTIAL (PRIMARY) HYPERTENSION: ICD-10-CM

## 2025-01-23 PROCEDURE — 99213 OFFICE O/P EST LOW 20 MIN: CPT

## 2025-01-23 PROCEDURE — G2211 COMPLEX E/M VISIT ADD ON: CPT | Mod: NC

## 2025-01-23 RX ORDER — AMLODIPINE BESYLATE 2.5 MG/1
2.5 TABLET ORAL
Qty: 90 | Refills: 1 | Status: ACTIVE | COMMUNITY
Start: 2025-01-23 | End: 1900-01-01

## 2025-02-04 ENCOUNTER — APPOINTMENT (OUTPATIENT)
Dept: INFUSION THERAPY | Facility: HOSPITAL | Age: 70
End: 2025-02-04

## 2025-02-04 ENCOUNTER — APPOINTMENT (OUTPATIENT)
Dept: HEMATOLOGY ONCOLOGY | Facility: CLINIC | Age: 70
End: 2025-02-04
Payer: MEDICAID

## 2025-02-04 ENCOUNTER — RESULT REVIEW (OUTPATIENT)
Age: 70
End: 2025-02-04

## 2025-02-04 DIAGNOSIS — I10 ESSENTIAL (PRIMARY) HYPERTENSION: ICD-10-CM

## 2025-02-04 LAB
ALBUMIN SERPL ELPH-MCNC: 3.7 G/DL — SIGNIFICANT CHANGE UP (ref 3.3–5)
ALP SERPL-CCNC: 176 U/L — HIGH (ref 40–120)
ALT FLD-CCNC: 50 U/L — HIGH (ref 10–45)
ANION GAP SERPL CALC-SCNC: 12 MMOL/L — SIGNIFICANT CHANGE UP (ref 5–17)
AST SERPL-CCNC: 31 U/L — SIGNIFICANT CHANGE UP (ref 10–40)
BASOPHILS # BLD AUTO: 0.02 K/UL — SIGNIFICANT CHANGE UP (ref 0–0.2)
BASOPHILS NFR BLD AUTO: 0.4 % — SIGNIFICANT CHANGE UP (ref 0–2)
BILIRUB SERPL-MCNC: 0.4 MG/DL — SIGNIFICANT CHANGE UP (ref 0.2–1.2)
BUN SERPL-MCNC: 19 MG/DL — SIGNIFICANT CHANGE UP (ref 7–23)
CALCIUM SERPL-MCNC: 9.4 MG/DL — SIGNIFICANT CHANGE UP (ref 8.4–10.5)
CHLORIDE SERPL-SCNC: 108 MMOL/L — SIGNIFICANT CHANGE UP (ref 96–108)
CO2 SERPL-SCNC: 23 MMOL/L — SIGNIFICANT CHANGE UP (ref 22–31)
CREAT SERPL-MCNC: 0.56 MG/DL — SIGNIFICANT CHANGE UP (ref 0.5–1.3)
EGFR: 99 ML/MIN/1.73M2 — SIGNIFICANT CHANGE UP
EOSINOPHIL # BLD AUTO: 0.08 K/UL — SIGNIFICANT CHANGE UP (ref 0–0.5)
EOSINOPHIL NFR BLD AUTO: 1.7 % — SIGNIFICANT CHANGE UP (ref 0–6)
GLUCOSE SERPL-MCNC: 113 MG/DL — HIGH (ref 70–99)
HCT VFR BLD CALC: 31.4 % — LOW (ref 34.5–45)
HGB BLD-MCNC: 9.9 G/DL — LOW (ref 11.5–15.5)
IMM GRANULOCYTES NFR BLD AUTO: 0.6 % — SIGNIFICANT CHANGE UP (ref 0–0.9)
LYMPHOCYTES # BLD AUTO: 0.9 K/UL — LOW (ref 1–3.3)
LYMPHOCYTES # BLD AUTO: 18.9 % — SIGNIFICANT CHANGE UP (ref 13–44)
MCHC RBC-ENTMCNC: 29.6 PG — SIGNIFICANT CHANGE UP (ref 27–34)
MCHC RBC-ENTMCNC: 31.5 G/DL — LOW (ref 32–36)
MCV RBC AUTO: 94 FL — SIGNIFICANT CHANGE UP (ref 80–100)
MONOCYTES # BLD AUTO: 0.29 K/UL — SIGNIFICANT CHANGE UP (ref 0–0.9)
MONOCYTES NFR BLD AUTO: 6.1 % — SIGNIFICANT CHANGE UP (ref 2–14)
NEUTROPHILS # BLD AUTO: 3.44 K/UL — SIGNIFICANT CHANGE UP (ref 1.8–7.4)
NEUTROPHILS NFR BLD AUTO: 72.3 % — SIGNIFICANT CHANGE UP (ref 43–77)
NRBC # BLD: 0 /100 WBCS — SIGNIFICANT CHANGE UP (ref 0–0)
NRBC BLD-RTO: 0 /100 WBCS — SIGNIFICANT CHANGE UP (ref 0–0)
PLATELET # BLD AUTO: 123 K/UL — LOW (ref 150–400)
POTASSIUM SERPL-MCNC: 3.9 MMOL/L — SIGNIFICANT CHANGE UP (ref 3.5–5.3)
POTASSIUM SERPL-SCNC: 3.9 MMOL/L — SIGNIFICANT CHANGE UP (ref 3.5–5.3)
PROT SERPL-MCNC: 6.5 G/DL — SIGNIFICANT CHANGE UP (ref 6–8.3)
RBC # BLD: 3.34 M/UL — LOW (ref 3.8–5.2)
RBC # FLD: 17.6 % — HIGH (ref 10.3–14.5)
SODIUM SERPL-SCNC: 143 MMOL/L — SIGNIFICANT CHANGE UP (ref 135–145)
WBC # BLD: 4.76 K/UL — SIGNIFICANT CHANGE UP (ref 3.8–10.5)
WBC # FLD AUTO: 4.76 K/UL — SIGNIFICANT CHANGE UP (ref 3.8–10.5)

## 2025-02-04 PROCEDURE — G2211 COMPLEX E/M VISIT ADD ON: CPT

## 2025-02-04 PROCEDURE — 99214 OFFICE O/P EST MOD 30 MIN: CPT

## 2025-02-06 ENCOUNTER — APPOINTMENT (OUTPATIENT)
Dept: INFUSION THERAPY | Facility: HOSPITAL | Age: 70
End: 2025-02-06

## 2025-02-18 ENCOUNTER — RESULT REVIEW (OUTPATIENT)
Age: 70
End: 2025-02-18

## 2025-02-18 ENCOUNTER — APPOINTMENT (OUTPATIENT)
Dept: INFUSION THERAPY | Facility: HOSPITAL | Age: 70
End: 2025-02-18

## 2025-02-18 ENCOUNTER — OUTPATIENT (OUTPATIENT)
Dept: OUTPATIENT SERVICES | Facility: HOSPITAL | Age: 70
LOS: 1 days | Discharge: HOME | End: 2025-02-18

## 2025-02-18 DIAGNOSIS — Z90.49 ACQUIRED ABSENCE OF OTHER SPECIFIED PARTS OF DIGESTIVE TRACT: Chronic | ICD-10-CM

## 2025-02-18 DIAGNOSIS — C18.9 MALIGNANT NEOPLASM OF COLON, UNSPECIFIED: ICD-10-CM

## 2025-02-18 DIAGNOSIS — T85.528A DISPLACEMENT OF OTHER GASTROINTESTINAL PROSTHETIC DEVICES, IMPLANTS AND GRAFTS, INITIAL ENCOUNTER: Chronic | ICD-10-CM

## 2025-02-18 DIAGNOSIS — Z96.642 PRESENCE OF LEFT ARTIFICIAL HIP JOINT: Chronic | ICD-10-CM

## 2025-02-18 DIAGNOSIS — Z96.641 PRESENCE OF RIGHT ARTIFICIAL HIP JOINT: Chronic | ICD-10-CM

## 2025-02-18 LAB
ALBUMIN SERPL ELPH-MCNC: 3.5 G/DL — SIGNIFICANT CHANGE UP (ref 3.3–5)
ALP SERPL-CCNC: 193 U/L — HIGH (ref 40–120)
ALT FLD-CCNC: 116 U/L — HIGH (ref 10–45)
ANION GAP SERPL CALC-SCNC: 17 MMOL/L — SIGNIFICANT CHANGE UP (ref 5–17)
AST SERPL-CCNC: 98 U/L — HIGH (ref 10–40)
BASOPHILS # BLD AUTO: 0.02 K/UL — SIGNIFICANT CHANGE UP (ref 0–0.2)
BASOPHILS NFR BLD AUTO: 0.3 % — SIGNIFICANT CHANGE UP (ref 0–2)
BILIRUB SERPL-MCNC: 0.4 MG/DL — SIGNIFICANT CHANGE UP (ref 0.2–1.2)
BUN SERPL-MCNC: 28 MG/DL — HIGH (ref 7–23)
CALCIUM SERPL-MCNC: 9 MG/DL — SIGNIFICANT CHANGE UP (ref 8.4–10.5)
CHLORIDE SERPL-SCNC: 107 MMOL/L — SIGNIFICANT CHANGE UP (ref 96–108)
CO2 SERPL-SCNC: 19 MMOL/L — LOW (ref 22–31)
CREAT SERPL-MCNC: 0.6 MG/DL — SIGNIFICANT CHANGE UP (ref 0.5–1.3)
EGFR: 97 ML/MIN/1.73M2 — SIGNIFICANT CHANGE UP
EOSINOPHIL # BLD AUTO: 0.02 K/UL — SIGNIFICANT CHANGE UP (ref 0–0.5)
EOSINOPHIL NFR BLD AUTO: 0.3 % — SIGNIFICANT CHANGE UP (ref 0–6)
GLUCOSE SERPL-MCNC: 129 MG/DL — HIGH (ref 70–99)
HCT VFR BLD CALC: 29.7 % — LOW (ref 34.5–45)
HGB BLD-MCNC: 9.5 G/DL — LOW (ref 11.5–15.5)
IMM GRANULOCYTES NFR BLD AUTO: 1.1 % — HIGH (ref 0–0.9)
LYMPHOCYTES # BLD AUTO: 0.88 K/UL — LOW (ref 1–3.3)
LYMPHOCYTES # BLD AUTO: 14.1 % — SIGNIFICANT CHANGE UP (ref 13–44)
MCHC RBC-ENTMCNC: 30.7 PG — SIGNIFICANT CHANGE UP (ref 27–34)
MCHC RBC-ENTMCNC: 32 G/DL — SIGNIFICANT CHANGE UP (ref 32–36)
MCV RBC AUTO: 96.1 FL — SIGNIFICANT CHANGE UP (ref 80–100)
MONOCYTES # BLD AUTO: 0.26 K/UL — SIGNIFICANT CHANGE UP (ref 0–0.9)
MONOCYTES NFR BLD AUTO: 4.2 % — SIGNIFICANT CHANGE UP (ref 2–14)
NEUTROPHILS # BLD AUTO: 4.98 K/UL — SIGNIFICANT CHANGE UP (ref 1.8–7.4)
NEUTROPHILS NFR BLD AUTO: 80 % — HIGH (ref 43–77)
NRBC BLD AUTO-RTO: 0 /100 WBCS — SIGNIFICANT CHANGE UP (ref 0–0)
PLATELET # BLD AUTO: 151 K/UL — SIGNIFICANT CHANGE UP (ref 150–400)
POTASSIUM SERPL-MCNC: 4.1 MMOL/L — SIGNIFICANT CHANGE UP (ref 3.5–5.3)
POTASSIUM SERPL-SCNC: 4.1 MMOL/L — SIGNIFICANT CHANGE UP (ref 3.5–5.3)
PROT SERPL-MCNC: 6.3 G/DL — SIGNIFICANT CHANGE UP (ref 6–8.3)
RBC # BLD: 3.09 M/UL — LOW (ref 3.8–5.2)
RBC # FLD: 18.4 % — HIGH (ref 10.3–14.5)
SODIUM SERPL-SCNC: 143 MMOL/L — SIGNIFICANT CHANGE UP (ref 135–145)
WBC # BLD: 6.23 K/UL — SIGNIFICANT CHANGE UP (ref 3.8–10.5)
WBC # FLD AUTO: 6.23 K/UL — SIGNIFICANT CHANGE UP (ref 3.8–10.5)

## 2025-02-19 ENCOUNTER — APPOINTMENT (OUTPATIENT)
Dept: RADIATION ONCOLOGY | Facility: CLINIC | Age: 70
End: 2025-02-19
Payer: MEDICAID

## 2025-02-19 ENCOUNTER — NON-APPOINTMENT (OUTPATIENT)
Age: 70
End: 2025-02-19

## 2025-02-19 VITALS
BODY MASS INDEX: 22.45 KG/M2 | HEIGHT: 59 IN | WEIGHT: 111.38 LBS | HEART RATE: 95 BPM | TEMPERATURE: 96.9 F | SYSTOLIC BLOOD PRESSURE: 145 MMHG | DIASTOLIC BLOOD PRESSURE: 78 MMHG | OXYGEN SATURATION: 98 % | RESPIRATION RATE: 17 BRPM

## 2025-02-19 DIAGNOSIS — C18.9 MALIGNANT NEOPLASM OF COLON, UNSPECIFIED: ICD-10-CM

## 2025-02-19 DIAGNOSIS — Z51.11 ENCOUNTER FOR ANTINEOPLASTIC CHEMOTHERAPY: ICD-10-CM

## 2025-02-19 DIAGNOSIS — C78.7 MALIGNANT NEOPLASM OF COLON, UNSPECIFIED: ICD-10-CM

## 2025-02-19 DIAGNOSIS — R11.2 NAUSEA WITH VOMITING, UNSPECIFIED: ICD-10-CM

## 2025-02-19 PROCEDURE — 99212 OFFICE O/P EST SF 10 MIN: CPT

## 2025-02-20 ENCOUNTER — APPOINTMENT (OUTPATIENT)
Dept: INFUSION THERAPY | Facility: HOSPITAL | Age: 70
End: 2025-02-20

## 2025-02-28 ENCOUNTER — RESULT REVIEW (OUTPATIENT)
Age: 70
End: 2025-02-28

## 2025-03-01 ENCOUNTER — APPOINTMENT (OUTPATIENT)
Dept: CT IMAGING | Facility: CLINIC | Age: 70
End: 2025-03-01
Payer: MEDICAID

## 2025-03-01 ENCOUNTER — OUTPATIENT (OUTPATIENT)
Dept: OUTPATIENT SERVICES | Facility: HOSPITAL | Age: 70
LOS: 1 days | End: 2025-03-01
Payer: MEDICAID

## 2025-03-01 ENCOUNTER — APPOINTMENT (OUTPATIENT)
Dept: MRI IMAGING | Facility: CLINIC | Age: 70
End: 2025-03-01
Payer: MEDICAID

## 2025-03-01 DIAGNOSIS — Z90.49 ACQUIRED ABSENCE OF OTHER SPECIFIED PARTS OF DIGESTIVE TRACT: Chronic | ICD-10-CM

## 2025-03-01 DIAGNOSIS — T85.528A DISPLACEMENT OF OTHER GASTROINTESTINAL PROSTHETIC DEVICES, IMPLANTS AND GRAFTS, INITIAL ENCOUNTER: Chronic | ICD-10-CM

## 2025-03-01 DIAGNOSIS — Z96.642 PRESENCE OF LEFT ARTIFICIAL HIP JOINT: Chronic | ICD-10-CM

## 2025-03-01 DIAGNOSIS — C18.9 MALIGNANT NEOPLASM OF COLON, UNSPECIFIED: ICD-10-CM

## 2025-03-01 PROCEDURE — 71250 CT THORAX DX C-: CPT | Mod: 26

## 2025-03-01 PROCEDURE — 74183 MRI ABD W/O CNTR FLWD CNTR: CPT

## 2025-03-01 PROCEDURE — A9585: CPT

## 2025-03-01 PROCEDURE — 74183 MRI ABD W/O CNTR FLWD CNTR: CPT | Mod: 26

## 2025-03-01 PROCEDURE — 72197 MRI PELVIS W/O & W/DYE: CPT

## 2025-03-01 PROCEDURE — 72197 MRI PELVIS W/O & W/DYE: CPT | Mod: 26

## 2025-03-01 PROCEDURE — 71250 CT THORAX DX C-: CPT

## 2025-03-04 ENCOUNTER — RESULT REVIEW (OUTPATIENT)
Age: 70
End: 2025-03-04

## 2025-03-04 ENCOUNTER — APPOINTMENT (OUTPATIENT)
Dept: INFUSION THERAPY | Facility: HOSPITAL | Age: 70
End: 2025-03-04

## 2025-03-04 ENCOUNTER — APPOINTMENT (OUTPATIENT)
Dept: HEMATOLOGY ONCOLOGY | Facility: CLINIC | Age: 70
End: 2025-03-04
Payer: MEDICAID

## 2025-03-04 ENCOUNTER — NON-APPOINTMENT (OUTPATIENT)
Age: 70
End: 2025-03-04

## 2025-03-04 LAB
ALBUMIN SERPL ELPH-MCNC: 3.6 G/DL — SIGNIFICANT CHANGE UP (ref 3.3–5)
ALP SERPL-CCNC: 220 U/L — HIGH (ref 40–120)
ALT FLD-CCNC: 99 U/L — HIGH (ref 10–45)
ANION GAP SERPL CALC-SCNC: 11 MMOL/L — SIGNIFICANT CHANGE UP (ref 5–17)
AST SERPL-CCNC: 59 U/L — HIGH (ref 10–40)
BASOPHILS # BLD AUTO: 0.01 K/UL — SIGNIFICANT CHANGE UP (ref 0–0.2)
BASOPHILS NFR BLD AUTO: 0.2 % — SIGNIFICANT CHANGE UP (ref 0–2)
BILIRUB SERPL-MCNC: 0.5 MG/DL — SIGNIFICANT CHANGE UP (ref 0.2–1.2)
BUN SERPL-MCNC: 26 MG/DL — HIGH (ref 7–23)
CALCIUM SERPL-MCNC: 9.3 MG/DL — SIGNIFICANT CHANGE UP (ref 8.4–10.5)
CEA SERPL-MCNC: 15.9 NG/ML — HIGH (ref 0–3.8)
CHLORIDE SERPL-SCNC: 114 MMOL/L — HIGH (ref 96–108)
CO2 SERPL-SCNC: 17 MMOL/L — LOW (ref 22–31)
CREAT SERPL-MCNC: 0.6 MG/DL — SIGNIFICANT CHANGE UP (ref 0.5–1.3)
EGFR: 97 ML/MIN/1.73M2 — SIGNIFICANT CHANGE UP
EGFR: 97 ML/MIN/1.73M2 — SIGNIFICANT CHANGE UP
EOSINOPHIL # BLD AUTO: 0.02 K/UL — SIGNIFICANT CHANGE UP (ref 0–0.5)
EOSINOPHIL NFR BLD AUTO: 0.3 % — SIGNIFICANT CHANGE UP (ref 0–6)
GLUCOSE SERPL-MCNC: 133 MG/DL — HIGH (ref 70–99)
HCT VFR BLD CALC: 32.1 % — LOW (ref 34.5–45)
HGB BLD-MCNC: 10.1 G/DL — LOW (ref 11.5–15.5)
IMM GRANULOCYTES NFR BLD AUTO: 1 % — HIGH (ref 0–0.9)
LYMPHOCYTES # BLD AUTO: 0.96 K/UL — LOW (ref 1–3.3)
LYMPHOCYTES # BLD AUTO: 16.2 % — SIGNIFICANT CHANGE UP (ref 13–44)
MCHC RBC-ENTMCNC: 29.7 PG — SIGNIFICANT CHANGE UP (ref 27–34)
MCHC RBC-ENTMCNC: 31.5 G/DL — LOW (ref 32–36)
MCV RBC AUTO: 94.4 FL — SIGNIFICANT CHANGE UP (ref 80–100)
MONOCYTES # BLD AUTO: 0.31 K/UL — SIGNIFICANT CHANGE UP (ref 0–0.9)
MONOCYTES NFR BLD AUTO: 5.2 % — SIGNIFICANT CHANGE UP (ref 2–14)
NEUTROPHILS # BLD AUTO: 4.56 K/UL — SIGNIFICANT CHANGE UP (ref 1.8–7.4)
NEUTROPHILS NFR BLD AUTO: 77.1 % — HIGH (ref 43–77)
NRBC BLD AUTO-RTO: 0 /100 WBCS — SIGNIFICANT CHANGE UP (ref 0–0)
PLATELET # BLD AUTO: 127 K/UL — LOW (ref 150–400)
POTASSIUM SERPL-MCNC: 4 MMOL/L — SIGNIFICANT CHANGE UP (ref 3.5–5.3)
POTASSIUM SERPL-SCNC: 4 MMOL/L — SIGNIFICANT CHANGE UP (ref 3.5–5.3)
PROT SERPL-MCNC: 6.8 G/DL — SIGNIFICANT CHANGE UP (ref 6–8.3)
RBC # BLD: 3.4 M/UL — LOW (ref 3.8–5.2)
RBC # FLD: 19.4 % — HIGH (ref 10.3–14.5)
SODIUM SERPL-SCNC: 141 MMOL/L — SIGNIFICANT CHANGE UP (ref 135–145)
WBC # BLD: 5.92 K/UL — SIGNIFICANT CHANGE UP (ref 3.8–10.5)
WBC # FLD AUTO: 5.92 K/UL — SIGNIFICANT CHANGE UP (ref 3.8–10.5)

## 2025-03-04 PROCEDURE — 99214 OFFICE O/P EST MOD 30 MIN: CPT

## 2025-03-04 PROCEDURE — G2211 COMPLEX E/M VISIT ADD ON: CPT | Mod: NC

## 2025-03-06 ENCOUNTER — APPOINTMENT (OUTPATIENT)
Dept: INFUSION THERAPY | Facility: HOSPITAL | Age: 70
End: 2025-03-06

## 2025-03-14 DIAGNOSIS — C78.7 MALIGNANT NEOPLASM OF COLON, UNSPECIFIED: ICD-10-CM

## 2025-03-14 DIAGNOSIS — C18.9 MALIGNANT NEOPLASM OF COLON, UNSPECIFIED: ICD-10-CM

## 2025-03-18 ENCOUNTER — RESULT REVIEW (OUTPATIENT)
Age: 70
End: 2025-03-18

## 2025-03-18 ENCOUNTER — APPOINTMENT (OUTPATIENT)
Dept: INFUSION THERAPY | Facility: HOSPITAL | Age: 70
End: 2025-03-18

## 2025-03-18 LAB
ALBUMIN SERPL ELPH-MCNC: 3.6 G/DL — SIGNIFICANT CHANGE UP (ref 3.3–5)
ALP SERPL-CCNC: 208 U/L — HIGH (ref 40–120)
ALT FLD-CCNC: 137 U/L — HIGH (ref 10–45)
ANION GAP SERPL CALC-SCNC: 11 MMOL/L — SIGNIFICANT CHANGE UP (ref 5–17)
AST SERPL-CCNC: 58 U/L — HIGH (ref 10–40)
BASOPHILS # BLD AUTO: 0.01 K/UL — SIGNIFICANT CHANGE UP (ref 0–0.2)
BASOPHILS NFR BLD AUTO: 0.2 % — SIGNIFICANT CHANGE UP (ref 0–2)
BILIRUB SERPL-MCNC: 0.6 MG/DL — SIGNIFICANT CHANGE UP (ref 0.2–1.2)
BUN SERPL-MCNC: 22 MG/DL — SIGNIFICANT CHANGE UP (ref 7–23)
CALCIUM SERPL-MCNC: 9.2 MG/DL — SIGNIFICANT CHANGE UP (ref 8.4–10.5)
CHLORIDE SERPL-SCNC: 108 MMOL/L — SIGNIFICANT CHANGE UP (ref 96–108)
CO2 SERPL-SCNC: 24 MMOL/L — SIGNIFICANT CHANGE UP (ref 22–31)
CREAT SERPL-MCNC: 0.67 MG/DL — SIGNIFICANT CHANGE UP (ref 0.5–1.3)
EGFR: 95 ML/MIN/1.73M2 — SIGNIFICANT CHANGE UP
EGFR: 95 ML/MIN/1.73M2 — SIGNIFICANT CHANGE UP
EOSINOPHIL # BLD AUTO: 0.03 K/UL — SIGNIFICANT CHANGE UP (ref 0–0.5)
EOSINOPHIL NFR BLD AUTO: 0.6 % — SIGNIFICANT CHANGE UP (ref 0–6)
GLUCOSE SERPL-MCNC: 94 MG/DL — SIGNIFICANT CHANGE UP (ref 70–99)
HCT VFR BLD CALC: 30.9 % — LOW (ref 34.5–45)
HGB BLD-MCNC: 9.7 G/DL — LOW (ref 11.5–15.5)
IMM GRANULOCYTES NFR BLD AUTO: 2.3 % — HIGH (ref 0–0.9)
LYMPHOCYTES # BLD AUTO: 0.93 K/UL — LOW (ref 1–3.3)
LYMPHOCYTES # BLD AUTO: 17.6 % — SIGNIFICANT CHANGE UP (ref 13–44)
MCHC RBC-ENTMCNC: 30 PG — SIGNIFICANT CHANGE UP (ref 27–34)
MCHC RBC-ENTMCNC: 31.4 G/DL — LOW (ref 32–36)
MCV RBC AUTO: 95.7 FL — SIGNIFICANT CHANGE UP (ref 80–100)
MONOCYTES # BLD AUTO: 0.36 K/UL — SIGNIFICANT CHANGE UP (ref 0–0.9)
MONOCYTES NFR BLD AUTO: 6.8 % — SIGNIFICANT CHANGE UP (ref 2–14)
NEUTROPHILS # BLD AUTO: 3.83 K/UL — SIGNIFICANT CHANGE UP (ref 1.8–7.4)
NEUTROPHILS NFR BLD AUTO: 72.5 % — SIGNIFICANT CHANGE UP (ref 43–77)
NRBC BLD AUTO-RTO: 0 /100 WBCS — SIGNIFICANT CHANGE UP (ref 0–0)
PLATELET # BLD AUTO: 145 K/UL — LOW (ref 150–400)
POTASSIUM SERPL-MCNC: 3.8 MMOL/L — SIGNIFICANT CHANGE UP (ref 3.5–5.3)
POTASSIUM SERPL-SCNC: 3.8 MMOL/L — SIGNIFICANT CHANGE UP (ref 3.5–5.3)
PROT SERPL-MCNC: 6.7 G/DL — SIGNIFICANT CHANGE UP (ref 6–8.3)
RBC # BLD: 3.23 M/UL — LOW (ref 3.8–5.2)
RBC # FLD: 20.1 % — HIGH (ref 10.3–14.5)
SODIUM SERPL-SCNC: 144 MMOL/L — SIGNIFICANT CHANGE UP (ref 135–145)
WBC # BLD: 5.28 K/UL — SIGNIFICANT CHANGE UP (ref 3.8–10.5)
WBC # FLD AUTO: 5.28 K/UL — SIGNIFICANT CHANGE UP (ref 3.8–10.5)

## 2025-03-19 ENCOUNTER — NON-APPOINTMENT (OUTPATIENT)
Age: 70
End: 2025-03-19

## 2025-03-20 ENCOUNTER — APPOINTMENT (OUTPATIENT)
Dept: INFUSION THERAPY | Facility: HOSPITAL | Age: 70
End: 2025-03-20

## 2025-04-01 ENCOUNTER — APPOINTMENT (OUTPATIENT)
Dept: INFUSION THERAPY | Facility: HOSPITAL | Age: 70
End: 2025-04-01

## 2025-04-01 ENCOUNTER — RESULT REVIEW (OUTPATIENT)
Age: 70
End: 2025-04-01

## 2025-04-01 ENCOUNTER — APPOINTMENT (OUTPATIENT)
Dept: HEMATOLOGY ONCOLOGY | Facility: CLINIC | Age: 70
End: 2025-04-01

## 2025-04-01 ENCOUNTER — NON-APPOINTMENT (OUTPATIENT)
Age: 70
End: 2025-04-01

## 2025-04-01 ENCOUNTER — APPOINTMENT (OUTPATIENT)
Dept: HEMATOLOGY ONCOLOGY | Facility: CLINIC | Age: 70
End: 2025-04-01
Payer: MEDICAID

## 2025-04-01 LAB
ALBUMIN SERPL ELPH-MCNC: 3.5 G/DL — SIGNIFICANT CHANGE UP (ref 3.3–5)
ALP SERPL-CCNC: 225 U/L — HIGH (ref 40–120)
ALT FLD-CCNC: 149 U/L — HIGH (ref 10–45)
ANION GAP SERPL CALC-SCNC: 12 MMOL/L — SIGNIFICANT CHANGE UP (ref 5–17)
AST SERPL-CCNC: 96 U/L — HIGH (ref 10–40)
BASOPHILS # BLD AUTO: 0.02 K/UL — SIGNIFICANT CHANGE UP (ref 0–0.2)
BASOPHILS NFR BLD AUTO: 0.4 % — SIGNIFICANT CHANGE UP (ref 0–2)
BILIRUB SERPL-MCNC: 0.6 MG/DL — SIGNIFICANT CHANGE UP (ref 0.2–1.2)
BUN SERPL-MCNC: 34 MG/DL — HIGH (ref 7–23)
CALCIUM SERPL-MCNC: 9.4 MG/DL — SIGNIFICANT CHANGE UP (ref 8.4–10.5)
CEA SERPL-MCNC: 14 NG/ML — HIGH (ref 0–3.8)
CHLORIDE SERPL-SCNC: 108 MMOL/L — SIGNIFICANT CHANGE UP (ref 96–108)
CO2 SERPL-SCNC: 23 MMOL/L — SIGNIFICANT CHANGE UP (ref 22–31)
CREAT SERPL-MCNC: 0.77 MG/DL — SIGNIFICANT CHANGE UP (ref 0.5–1.3)
EGFR: 83 ML/MIN/1.73M2 — SIGNIFICANT CHANGE UP
EGFR: 83 ML/MIN/1.73M2 — SIGNIFICANT CHANGE UP
EOSINOPHIL # BLD AUTO: 0.03 K/UL — SIGNIFICANT CHANGE UP (ref 0–0.5)
EOSINOPHIL NFR BLD AUTO: 0.6 % — SIGNIFICANT CHANGE UP (ref 0–6)
GLUCOSE SERPL-MCNC: 94 MG/DL — SIGNIFICANT CHANGE UP (ref 70–99)
HCT VFR BLD CALC: 32.9 % — LOW (ref 34.5–45)
HGB BLD-MCNC: 10.2 G/DL — LOW (ref 11.5–15.5)
IMM GRANULOCYTES NFR BLD AUTO: 1.5 % — HIGH (ref 0–0.9)
LYMPHOCYTES # BLD AUTO: 1.06 K/UL — SIGNIFICANT CHANGE UP (ref 1–3.3)
LYMPHOCYTES # BLD AUTO: 19.8 % — SIGNIFICANT CHANGE UP (ref 13–44)
MCHC RBC-ENTMCNC: 30.3 PG — SIGNIFICANT CHANGE UP (ref 27–34)
MCHC RBC-ENTMCNC: 31 G/DL — LOW (ref 32–36)
MCV RBC AUTO: 97.6 FL — SIGNIFICANT CHANGE UP (ref 80–100)
MONOCYTES # BLD AUTO: 0.36 K/UL — SIGNIFICANT CHANGE UP (ref 0–0.9)
MONOCYTES NFR BLD AUTO: 6.7 % — SIGNIFICANT CHANGE UP (ref 2–14)
NEUTROPHILS # BLD AUTO: 3.8 K/UL — SIGNIFICANT CHANGE UP (ref 1.8–7.4)
NEUTROPHILS NFR BLD AUTO: 71 % — SIGNIFICANT CHANGE UP (ref 43–77)
NRBC BLD AUTO-RTO: 0 /100 WBCS — SIGNIFICANT CHANGE UP (ref 0–0)
PLATELET # BLD AUTO: 157 K/UL — SIGNIFICANT CHANGE UP (ref 150–400)
POTASSIUM SERPL-MCNC: 4.2 MMOL/L — SIGNIFICANT CHANGE UP (ref 3.5–5.3)
POTASSIUM SERPL-SCNC: 4.2 MMOL/L — SIGNIFICANT CHANGE UP (ref 3.5–5.3)
PROT SERPL-MCNC: 6.6 G/DL — SIGNIFICANT CHANGE UP (ref 6–8.3)
RBC # BLD: 3.37 M/UL — LOW (ref 3.8–5.2)
RBC # FLD: 20.7 % — HIGH (ref 10.3–14.5)
SODIUM SERPL-SCNC: 142 MMOL/L — SIGNIFICANT CHANGE UP (ref 135–145)
WBC # BLD: 5.35 K/UL — SIGNIFICANT CHANGE UP (ref 3.8–10.5)
WBC # FLD AUTO: 5.35 K/UL — SIGNIFICANT CHANGE UP (ref 3.8–10.5)

## 2025-04-01 PROCEDURE — 99213 OFFICE O/P EST LOW 20 MIN: CPT

## 2025-04-01 PROCEDURE — G2211 COMPLEX E/M VISIT ADD ON: CPT | Mod: NC

## 2025-04-03 ENCOUNTER — APPOINTMENT (OUTPATIENT)
Dept: INFUSION THERAPY | Facility: HOSPITAL | Age: 70
End: 2025-04-03

## 2025-04-11 DIAGNOSIS — C78.7 MALIGNANT NEOPLASM OF COLON, UNSPECIFIED: ICD-10-CM

## 2025-04-11 DIAGNOSIS — C18.9 MALIGNANT NEOPLASM OF COLON, UNSPECIFIED: ICD-10-CM

## 2025-04-12 NOTE — H&P PST ADULT - I HAVE PERSONALLY SEEN AND EXAMINED THE PATIENT. THERE HAVE NOT BEEN ANY CHANGES IN THE PATIENT'S HISTORY OR EXAM UNLESS COMMENTED BELOW
HPI   Chief Complaint   Patient presents with    Hypertension     Pt states she was at work and her BP was elevated and she has a headache       50-year-old female presents emergency department for evaluation of headache and elevated blood pressure.  Patient states she has a history of migraines.  However denies previous issues with hypertension, denies any hypertensive medications.  She states around 1530 today she began to experience a migraine that she believes is similar to ones she has had in the past, at which time she checked her blood pressure and it was elevated around 160-170 systolic, she is unsure of her diastolic number.  She describes her headache as across her forehead and over her temples, denies any pain behind her eyes, dull and nonradiating.  States she had relief of her headache for short period of time, however on the way to the hospital it began to return.  Denies any pain with eye movement.  No visual auras.  She endorses light sensitivity.  Denies nausea and vomiting.  She states she normally is able to take Tylenol, as well as smoke marijuana and this will relieve her symptoms.  However today's headache occurred at work, and Tylenol alone did not help her symptoms.  She denies CP, SOB, LOC, seizure-like activity, dizziness, lightheadedness, hematemesis, abdominal pain, back or flank pain, dysuria or urinary symptoms.  Denies any other concerns or complaints today.  Denies illicit drug use, and excessive alcohol intake.  Denies history of kidney, lung or heart disease.      History provided by:  Patient   used: No      Patient History   History reviewed. No pertinent past medical history.  History reviewed. No pertinent surgical history.  Family History   Problem Relation Name Age of Onset    Breast cancer Mother's Sister      Breast cancer Mother's Sister      Breast cancer Cousin       Social History     Tobacco Use    Smoking status: Every Day     Current packs/day:  0.50     Types: Cigarettes    Smokeless tobacco: Current   Vaping Use    Vaping status: Never Used   Substance Use Topics    Alcohol use: Yes     Comment: occasionally    Drug use: Never       Physical Exam   ED Triage Vitals [04/12/25 1607]   Temperature Heart Rate Respirations BP   36.5 °C (97.7 °F) 79 18 (!) 181/96      Pulse Ox Temp Source Heart Rate Source Patient Position   100 % Temporal Monitor Sitting      BP Location FiO2 (%)     Right arm --       Physical Exam  Vitals and nursing note reviewed.   Constitutional:       General: She is in acute distress.      Appearance: Normal appearance. She is normal weight. She is not ill-appearing, toxic-appearing or diaphoretic.   HENT:      Head: Normocephalic and atraumatic. No contusion, right periorbital erythema or left periorbital erythema.      Jaw: There is normal jaw occlusion.      Right Ear: Hearing, tympanic membrane, ear canal and external ear normal. No mastoid tenderness. No hemotympanum.      Left Ear: Hearing, tympanic membrane, ear canal and external ear normal. No mastoid tenderness. No hemotympanum.      Nose:      Right Sinus: Frontal sinus tenderness present. No maxillary sinus tenderness.      Left Sinus: Frontal sinus tenderness present. No maxillary sinus tenderness.      Mouth/Throat:      Lips: Pink. No lesions.      Mouth: Mucous membranes are moist.      Pharynx: Oropharynx is clear. Uvula midline. No pharyngeal swelling, oropharyngeal exudate, posterior oropharyngeal erythema, uvula swelling or postnasal drip.      Tonsils: No tonsillar exudate or tonsillar abscesses.   Eyes:      General: Vision grossly intact. Gaze aligned appropriately. No visual field deficit or scleral icterus.        Right eye: No discharge.         Left eye: No discharge.      Extraocular Movements: Extraocular movements intact.      Conjunctiva/sclera: Conjunctivae normal.      Pupils: Pupils are equal, round, and reactive to light.      Comments: Normal test of  skew.   Neck:      Vascular: No carotid bruit.   Cardiovascular:      Rate and Rhythm: Normal rate and regular rhythm.      Pulses: Normal pulses.      Heart sounds: Normal heart sounds. No murmur heard.     No friction rub. No gallop.   Pulmonary:      Effort: Pulmonary effort is normal. No respiratory distress.      Breath sounds: Normal breath sounds. No stridor. No wheezing, rhonchi or rales.   Chest:      Chest wall: No tenderness.   Abdominal:      General: Abdomen is flat. Bowel sounds are normal. There is no distension.      Palpations: Abdomen is soft.      Tenderness: There is abdominal tenderness. There is rebound. There is no right CVA tenderness, left CVA tenderness or guarding.   Musculoskeletal:         General: No swelling or signs of injury. Normal range of motion.      Cervical back: Normal range of motion and neck supple. No rigidity or tenderness.      Right lower leg: No edema.      Left lower leg: No edema.   Lymphadenopathy:      Cervical: No cervical adenopathy.   Skin:     General: Skin is warm.      Capillary Refill: Capillary refill takes less than 2 seconds.      Findings: No erythema or rash.   Neurological:      General: No focal deficit present.      Mental Status: She is alert and oriented to person, place, and time. Mental status is at baseline.      GCS: GCS eye subscore is 4. GCS verbal subscore is 5. GCS motor subscore is 6.      Cranial Nerves: Cranial nerves 2-12 are intact. No cranial nerve deficit, dysarthria or facial asymmetry.      Sensory: Sensation is intact. No sensory deficit.      Motor: Motor function is intact. No weakness, tremor, atrophy, abnormal muscle tone or seizure activity.      Coordination: Coordination is intact. Finger-Nose-Finger Test and Heel to Shin Test normal.      Gait: Gait is intact.      Comments: NIHSS 0.  Van negative.         ED Course & MDM   Diagnoses as of 04/12/25 2009   Elevated blood pressure reading   Nonintractable headache,  unspecified chronicity pattern, unspecified headache type     [place] Coma Scale Score: 15 (04/12/25 1609 : Pepper Mcginnis RN)                     Medical Decision Making  Differential diagnoses considered but not limited to: Migraine, tension, electrolyte abnormality, hypertensive urgency.  50-year-old female presents emergency department for evaluation of headache and elevated blood pressure.    Patient is nontoxic-appearing, in some distress given her headache pain.  She does exhibit frontal tenderness.  And light sensitivity.  Heart sounds normal with regular rate and rhythm.  Elevated /96, afebrile.  Neuroexam grossly intact.  NIHSS 0.  Van negative.  Normal test of skew.  Given the patient's neurovascular status and no significant change in her headache pattern I have low suspicion for intracranial abnormality.  However I will obtain basic lab work.  CMP unremarkable with normal electrolytes and kidney function.  Magnesium 1.9.  CBC with no leukocytosis or anemia.  All unremarkable lab work.  EKG at 1754, with ventricular rate of 72, as interpreted by me, shows a normal rate and rhythm, normal axis, normal intervals with possible left atrial enlargement.  CT interval 156.  QRS 76.  QT/QTc 414/453.  And normal ST and T wave pattern with no evidence of acute ischemia and compared with previous EKGs.  Patient was initially treated with IV Reglan 10 mg, IV Toradol 15 mg, Benadryl 12.5 mg, IV fluids and reevaluated with improvement in her symptoms, however was still experiencing her headache.  For which she was subsequently given IV Decadron 4 mg, Tylenol 975 oral, and IV magnesium sulfate 1 g.  Upon subsequent reevaluation patient's symptoms had completely resolved and is no longer experiencing a headache.  She has no further symptoms or complaints at this time.  I personally ambulated the patient, he reported no imbalance, dizziness, vision changes or lightheadedness.  Patient will be discharged home with  very strict return precautions.  She was advised to follow-up with her PCP within a week.  Patient's blood pressure at time of discharge was 166/104, although asymptomatic.  She was educated to keep a blood pressure log prior to presentation to her PCP.  As well as advised on lifestyle changes until that time.    As a result of the work-up, the patient was discharged home.  she was informed of her diagnosis, educated on lab and imaging findings, I explained reasons for the patient to return to the Emergency Department and instructed to come back with any concerns or worsening of condition.  she demonstrated verbal understanding and were in agreement with the plan of care.  I emphasized the importance of follow up with her PCP in the timeframe recommended.  she was given the opportunity to ask questions.  All of the patient's questions were answered.  Patient discharged in good stable condition with resolution in her symptoms.    This visit was staffed with onsite attending physician Dr. Gray.    Amount and/or Complexity of Data Reviewed  Labs: ordered. Decision-making details documented in ED Course.  ECG/medicine tests: ordered. Decision-making details documented in ED Course.      Labs Reviewed   MAGNESIUM - Normal       Result Value    Magnesium 1.90     COMPREHENSIVE METABOLIC PANEL - Normal    Glucose 80      Sodium 138      Potassium 4.1      Chloride 104      Bicarbonate 27      Anion Gap 11      Urea Nitrogen 13      Creatinine 0.67      eGFR >90      Calcium 9.6      Albumin 4.7      Alkaline Phosphatase 62      Total Protein 7.5      AST 16      Bilirubin, Total 0.4      ALT 14     CBC WITH AUTO DIFFERENTIAL    WBC 6.5      nRBC 0.0      RBC 4.18      Hemoglobin 14.0      Hematocrit 41.2      MCV 99      MCH 33.5      MCHC 34.0      RDW 13.4      Platelets 358      Neutrophils % 49.4      Immature Granulocytes %, Automated 0.3      Lymphocytes % 38.9      Monocytes % 6.0      Eosinophils % 4.0       Basophils % 1.4      Neutrophils Absolute 3.18      Immature Granulocytes Absolute, Automated 0.02      Lymphocytes Absolute 2.51      Monocytes Absolute 0.39      Eosinophils Absolute 0.26      Basophils Absolute 0.09          No orders to display            Pedro Carnes PA-C  04/13/25 4907     Statement Selected

## 2025-04-15 ENCOUNTER — RESULT REVIEW (OUTPATIENT)
Age: 70
End: 2025-04-15

## 2025-04-15 ENCOUNTER — APPOINTMENT (OUTPATIENT)
Dept: HEMATOLOGY ONCOLOGY | Facility: CLINIC | Age: 70
End: 2025-04-15

## 2025-04-15 ENCOUNTER — APPOINTMENT (OUTPATIENT)
Dept: INFUSION THERAPY | Facility: HOSPITAL | Age: 70
End: 2025-04-15

## 2025-04-15 LAB
ALBUMIN SERPL ELPH-MCNC: 3.7 G/DL — SIGNIFICANT CHANGE UP (ref 3.3–5)
ALP SERPL-CCNC: 172 U/L — HIGH (ref 40–120)
ALT FLD-CCNC: 105 U/L — HIGH (ref 10–45)
ANION GAP SERPL CALC-SCNC: 13 MMOL/L — SIGNIFICANT CHANGE UP (ref 5–17)
AST SERPL-CCNC: 53 U/L — HIGH (ref 10–40)
BASOPHILS # BLD AUTO: 0.01 K/UL — SIGNIFICANT CHANGE UP (ref 0–0.2)
BASOPHILS NFR BLD AUTO: 0.2 % — SIGNIFICANT CHANGE UP (ref 0–2)
BILIRUB SERPL-MCNC: 0.7 MG/DL — SIGNIFICANT CHANGE UP (ref 0.2–1.2)
BUN SERPL-MCNC: 23 MG/DL — SIGNIFICANT CHANGE UP (ref 7–23)
CALCIUM SERPL-MCNC: 9.1 MG/DL — SIGNIFICANT CHANGE UP (ref 8.4–10.5)
CEA SERPL-MCNC: 14.4 NG/ML — HIGH (ref 0–3.8)
CHLORIDE SERPL-SCNC: 109 MMOL/L — HIGH (ref 96–108)
CO2 SERPL-SCNC: 22 MMOL/L — SIGNIFICANT CHANGE UP (ref 22–31)
CREAT SERPL-MCNC: 0.64 MG/DL — SIGNIFICANT CHANGE UP (ref 0.5–1.3)
EGFR: 96 ML/MIN/1.73M2 — SIGNIFICANT CHANGE UP
EGFR: 96 ML/MIN/1.73M2 — SIGNIFICANT CHANGE UP
EOSINOPHIL # BLD AUTO: 0.03 K/UL — SIGNIFICANT CHANGE UP (ref 0–0.5)
EOSINOPHIL NFR BLD AUTO: 0.5 % — SIGNIFICANT CHANGE UP (ref 0–6)
GLUCOSE SERPL-MCNC: 108 MG/DL — HIGH (ref 70–99)
HCT VFR BLD CALC: 32.4 % — LOW (ref 34.5–45)
HGB BLD-MCNC: 10.3 G/DL — LOW (ref 11.5–15.5)
IMM GRANULOCYTES NFR BLD AUTO: 1.3 % — HIGH (ref 0–0.9)
LYMPHOCYTES # BLD AUTO: 1.25 K/UL — SIGNIFICANT CHANGE UP (ref 1–3.3)
LYMPHOCYTES # BLD AUTO: 20 % — SIGNIFICANT CHANGE UP (ref 13–44)
MCHC RBC-ENTMCNC: 30.5 PG — SIGNIFICANT CHANGE UP (ref 27–34)
MCHC RBC-ENTMCNC: 31.8 G/DL — LOW (ref 32–36)
MCV RBC AUTO: 95.9 FL — SIGNIFICANT CHANGE UP (ref 80–100)
MONOCYTES # BLD AUTO: 0.4 K/UL — SIGNIFICANT CHANGE UP (ref 0–0.9)
MONOCYTES NFR BLD AUTO: 6.4 % — SIGNIFICANT CHANGE UP (ref 2–14)
NEUTROPHILS # BLD AUTO: 4.49 K/UL — SIGNIFICANT CHANGE UP (ref 1.8–7.4)
NEUTROPHILS NFR BLD AUTO: 71.6 % — SIGNIFICANT CHANGE UP (ref 43–77)
NRBC BLD AUTO-RTO: 0 /100 WBCS — SIGNIFICANT CHANGE UP (ref 0–0)
PLATELET # BLD AUTO: 130 K/UL — LOW (ref 150–400)
POTASSIUM SERPL-MCNC: 4.1 MMOL/L — SIGNIFICANT CHANGE UP (ref 3.5–5.3)
POTASSIUM SERPL-SCNC: 4.1 MMOL/L — SIGNIFICANT CHANGE UP (ref 3.5–5.3)
PROT SERPL-MCNC: 6.3 G/DL — SIGNIFICANT CHANGE UP (ref 6–8.3)
RBC # BLD: 3.38 M/UL — LOW (ref 3.8–5.2)
RBC # FLD: 21 % — HIGH (ref 10.3–14.5)
SODIUM SERPL-SCNC: 143 MMOL/L — SIGNIFICANT CHANGE UP (ref 135–145)
WBC # BLD: 6.26 K/UL — SIGNIFICANT CHANGE UP (ref 3.8–10.5)
WBC # FLD AUTO: 6.26 K/UL — SIGNIFICANT CHANGE UP (ref 3.8–10.5)

## 2025-04-17 ENCOUNTER — APPOINTMENT (OUTPATIENT)
Dept: INFUSION THERAPY | Facility: HOSPITAL | Age: 70
End: 2025-04-17

## 2025-04-26 ENCOUNTER — OUTPATIENT (OUTPATIENT)
Dept: OUTPATIENT SERVICES | Facility: HOSPITAL | Age: 70
LOS: 1 days | Discharge: HOME | End: 2025-04-26

## 2025-04-26 DIAGNOSIS — Z96.641 PRESENCE OF RIGHT ARTIFICIAL HIP JOINT: Chronic | ICD-10-CM

## 2025-04-26 DIAGNOSIS — T85.528A DISPLACEMENT OF OTHER GASTROINTESTINAL PROSTHETIC DEVICES, IMPLANTS AND GRAFTS, INITIAL ENCOUNTER: Chronic | ICD-10-CM

## 2025-04-26 DIAGNOSIS — Z96.642 PRESENCE OF LEFT ARTIFICIAL HIP JOINT: Chronic | ICD-10-CM

## 2025-04-26 DIAGNOSIS — Z90.49 ACQUIRED ABSENCE OF OTHER SPECIFIED PARTS OF DIGESTIVE TRACT: Chronic | ICD-10-CM

## 2025-04-26 DIAGNOSIS — C18.9 MALIGNANT NEOPLASM OF COLON, UNSPECIFIED: ICD-10-CM

## 2025-04-29 ENCOUNTER — APPOINTMENT (OUTPATIENT)
Dept: INFUSION THERAPY | Facility: HOSPITAL | Age: 70
End: 2025-04-29

## 2025-04-29 ENCOUNTER — RESULT REVIEW (OUTPATIENT)
Age: 70
End: 2025-04-29

## 2025-04-29 ENCOUNTER — APPOINTMENT (OUTPATIENT)
Dept: HEMATOLOGY ONCOLOGY | Facility: CLINIC | Age: 70
End: 2025-04-29

## 2025-04-29 ENCOUNTER — APPOINTMENT (OUTPATIENT)
Dept: HEMATOLOGY ONCOLOGY | Facility: CLINIC | Age: 70
End: 2025-04-29
Payer: MEDICAID

## 2025-04-29 DIAGNOSIS — Z51.11 ENCOUNTER FOR ANTINEOPLASTIC CHEMOTHERAPY: ICD-10-CM

## 2025-04-29 DIAGNOSIS — R11.2 NAUSEA WITH VOMITING, UNSPECIFIED: ICD-10-CM

## 2025-04-29 LAB
ALBUMIN SERPL ELPH-MCNC: 3.5 G/DL — SIGNIFICANT CHANGE UP (ref 3.3–5)
ALP SERPL-CCNC: 229 U/L — HIGH (ref 40–120)
ALT FLD-CCNC: 70 U/L — HIGH (ref 10–45)
ANION GAP SERPL CALC-SCNC: 12 MMOL/L — SIGNIFICANT CHANGE UP (ref 5–17)
AST SERPL-CCNC: 65 U/L — HIGH (ref 10–40)
BASOPHILS # BLD AUTO: 0.01 K/UL — SIGNIFICANT CHANGE UP (ref 0–0.2)
BASOPHILS NFR BLD AUTO: 0.2 % — SIGNIFICANT CHANGE UP (ref 0–2)
BILIRUB SERPL-MCNC: 0.7 MG/DL — SIGNIFICANT CHANGE UP (ref 0.2–1.2)
BUN SERPL-MCNC: 14 MG/DL — SIGNIFICANT CHANGE UP (ref 7–23)
CALCIUM SERPL-MCNC: 9.4 MG/DL — SIGNIFICANT CHANGE UP (ref 8.4–10.5)
CHLORIDE SERPL-SCNC: 109 MMOL/L — HIGH (ref 96–108)
CO2 SERPL-SCNC: 19 MMOL/L — LOW (ref 22–31)
CREAT SERPL-MCNC: 0.69 MG/DL — SIGNIFICANT CHANGE UP (ref 0.5–1.3)
EGFR: 94 ML/MIN/1.73M2 — SIGNIFICANT CHANGE UP
EGFR: 94 ML/MIN/1.73M2 — SIGNIFICANT CHANGE UP
EOSINOPHIL # BLD AUTO: 0.09 K/UL — SIGNIFICANT CHANGE UP (ref 0–0.5)
EOSINOPHIL NFR BLD AUTO: 1.8 % — SIGNIFICANT CHANGE UP (ref 0–6)
GLUCOSE SERPL-MCNC: 121 MG/DL — HIGH (ref 70–99)
HCT VFR BLD CALC: 29.4 % — LOW (ref 34.5–45)
HGB BLD-MCNC: 9.1 G/DL — LOW (ref 11.5–15.5)
IMM GRANULOCYTES NFR BLD AUTO: 0.8 % — SIGNIFICANT CHANGE UP (ref 0–0.9)
LYMPHOCYTES # BLD AUTO: 1.36 K/UL — SIGNIFICANT CHANGE UP (ref 1–3.3)
LYMPHOCYTES # BLD AUTO: 27.4 % — SIGNIFICANT CHANGE UP (ref 13–44)
MCHC RBC-ENTMCNC: 30.1 PG — SIGNIFICANT CHANGE UP (ref 27–34)
MCHC RBC-ENTMCNC: 31 G/DL — LOW (ref 32–36)
MCV RBC AUTO: 97.4 FL — SIGNIFICANT CHANGE UP (ref 80–100)
MONOCYTES # BLD AUTO: 0.3 K/UL — SIGNIFICANT CHANGE UP (ref 0–0.9)
MONOCYTES NFR BLD AUTO: 6 % — SIGNIFICANT CHANGE UP (ref 2–14)
NEUTROPHILS # BLD AUTO: 3.17 K/UL — SIGNIFICANT CHANGE UP (ref 1.8–7.4)
NEUTROPHILS NFR BLD AUTO: 63.8 % — SIGNIFICANT CHANGE UP (ref 43–77)
NRBC BLD AUTO-RTO: 0 /100 WBCS — SIGNIFICANT CHANGE UP (ref 0–0)
PLATELET # BLD AUTO: 135 K/UL — LOW (ref 150–400)
POTASSIUM SERPL-MCNC: 3.7 MMOL/L — SIGNIFICANT CHANGE UP (ref 3.5–5.3)
POTASSIUM SERPL-SCNC: 3.7 MMOL/L — SIGNIFICANT CHANGE UP (ref 3.5–5.3)
PROT SERPL-MCNC: 6.4 G/DL — SIGNIFICANT CHANGE UP (ref 6–8.3)
RBC # BLD: 3.02 M/UL — LOW (ref 3.8–5.2)
RBC # FLD: 20.6 % — HIGH (ref 10.3–14.5)
SODIUM SERPL-SCNC: 140 MMOL/L — SIGNIFICANT CHANGE UP (ref 135–145)
WBC # BLD: 4.97 K/UL — SIGNIFICANT CHANGE UP (ref 3.8–10.5)
WBC # FLD AUTO: 4.97 K/UL — SIGNIFICANT CHANGE UP (ref 3.8–10.5)

## 2025-04-29 PROCEDURE — 99213 OFFICE O/P EST LOW 20 MIN: CPT

## 2025-04-30 ENCOUNTER — NON-APPOINTMENT (OUTPATIENT)
Age: 70
End: 2025-04-30

## 2025-05-01 ENCOUNTER — APPOINTMENT (OUTPATIENT)
Dept: INFUSION THERAPY | Facility: HOSPITAL | Age: 70
End: 2025-05-01

## 2025-05-13 ENCOUNTER — APPOINTMENT (OUTPATIENT)
Dept: RADIOLOGY | Facility: IMAGING CENTER | Age: 70
End: 2025-05-13
Payer: MEDICAID

## 2025-05-13 ENCOUNTER — APPOINTMENT (OUTPATIENT)
Dept: INFUSION THERAPY | Facility: HOSPITAL | Age: 70
End: 2025-05-13

## 2025-05-13 ENCOUNTER — APPOINTMENT (OUTPATIENT)
Dept: ULTRASOUND IMAGING | Facility: IMAGING CENTER | Age: 70
End: 2025-05-13
Payer: MEDICAID

## 2025-05-13 ENCOUNTER — OUTPATIENT (OUTPATIENT)
Dept: OUTPATIENT SERVICES | Facility: HOSPITAL | Age: 70
LOS: 1 days | End: 2025-05-13
Payer: MEDICAID

## 2025-05-13 ENCOUNTER — INPATIENT (INPATIENT)
Facility: HOSPITAL | Age: 70
LOS: 11 days | Discharge: HOME | End: 2025-05-25
Attending: STUDENT IN AN ORGANIZED HEALTH CARE EDUCATION/TRAINING PROGRAM | Admitting: STUDENT IN AN ORGANIZED HEALTH CARE EDUCATION/TRAINING PROGRAM
Payer: MEDICAID

## 2025-05-13 ENCOUNTER — RESULT REVIEW (OUTPATIENT)
Age: 70
End: 2025-05-13

## 2025-05-13 ENCOUNTER — APPOINTMENT (OUTPATIENT)
Dept: HEMATOLOGY ONCOLOGY | Facility: CLINIC | Age: 70
End: 2025-05-13
Payer: MEDICAID

## 2025-05-13 ENCOUNTER — APPOINTMENT (OUTPATIENT)
Dept: HEMATOLOGY ONCOLOGY | Facility: CLINIC | Age: 70
End: 2025-05-13

## 2025-05-13 VITALS
WEIGHT: 110.23 LBS | DIASTOLIC BLOOD PRESSURE: 73 MMHG | HEART RATE: 86 BPM | RESPIRATION RATE: 16 BRPM | SYSTOLIC BLOOD PRESSURE: 106 MMHG | HEIGHT: 59.06 IN | OXYGEN SATURATION: 95 % | TEMPERATURE: 98 F

## 2025-05-13 DIAGNOSIS — E86.0 DEHYDRATION: ICD-10-CM

## 2025-05-13 DIAGNOSIS — R74.01 ELEVATION OF LEVELS OF LIVER TRANSAMINASE LEVELS: ICD-10-CM

## 2025-05-13 DIAGNOSIS — C18.9 MALIGNANT NEOPLASM OF COLON, UNSPECIFIED: ICD-10-CM

## 2025-05-13 DIAGNOSIS — E80.6 OTHER DISORDERS OF BILIRUBIN METABOLISM: ICD-10-CM

## 2025-05-13 DIAGNOSIS — Z96.89 PRESENCE OF OTHER SPECIFIED FUNCTIONAL IMPLANTS: Chronic | ICD-10-CM

## 2025-05-13 DIAGNOSIS — Z96.641 PRESENCE OF RIGHT ARTIFICIAL HIP JOINT: Chronic | ICD-10-CM

## 2025-05-13 DIAGNOSIS — R10.9 UNSPECIFIED ABDOMINAL PAIN: ICD-10-CM

## 2025-05-13 DIAGNOSIS — C78.7 MALIGNANT NEOPLASM OF COLON, UNSPECIFIED: ICD-10-CM

## 2025-05-13 DIAGNOSIS — Z29.9 ENCOUNTER FOR PROPHYLACTIC MEASURES, UNSPECIFIED: ICD-10-CM

## 2025-05-13 DIAGNOSIS — Z90.49 ACQUIRED ABSENCE OF OTHER SPECIFIED PARTS OF DIGESTIVE TRACT: Chronic | ICD-10-CM

## 2025-05-13 DIAGNOSIS — Z96.642 PRESENCE OF LEFT ARTIFICIAL HIP JOINT: Chronic | ICD-10-CM

## 2025-05-13 DIAGNOSIS — T85.528A DISPLACEMENT OF OTHER GASTROINTESTINAL PROSTHETIC DEVICES, IMPLANTS AND GRAFTS, INITIAL ENCOUNTER: Chronic | ICD-10-CM

## 2025-05-13 LAB
ALBUMIN SERPL ELPH-MCNC: 3.3 G/DL — SIGNIFICANT CHANGE UP (ref 3.3–5)
ALBUMIN SERPL ELPH-MCNC: 3.7 G/DL — SIGNIFICANT CHANGE UP (ref 3.3–5)
ALP SERPL-CCNC: 598 U/L — HIGH (ref 40–120)
ALP SERPL-CCNC: 707 U/L — HIGH (ref 40–120)
ALT FLD-CCNC: 226 U/L — HIGH (ref 10–45)
ALT FLD-CCNC: 241 U/L — HIGH (ref 4–33)
ANION GAP SERPL CALC-SCNC: 12 MMOL/L — SIGNIFICANT CHANGE UP (ref 5–17)
ANION GAP SERPL CALC-SCNC: 15 MMOL/L — HIGH (ref 7–14)
APAP SERPL-MCNC: <10 UG/ML — LOW (ref 15–25)
APPEARANCE UR: CLEAR — SIGNIFICANT CHANGE UP
APTT BLD: 29.5 SEC — SIGNIFICANT CHANGE UP (ref 26.1–36.8)
AST SERPL-CCNC: 341 U/L — HIGH (ref 4–32)
AST SERPL-CCNC: 350 U/L — HIGH (ref 10–40)
BASOPHILS # BLD AUTO: 0 K/UL — SIGNIFICANT CHANGE UP (ref 0–0.2)
BASOPHILS # BLD AUTO: 0.02 K/UL — SIGNIFICANT CHANGE UP (ref 0–0.2)
BASOPHILS NFR BLD AUTO: 0 % — SIGNIFICANT CHANGE UP (ref 0–2)
BASOPHILS NFR BLD AUTO: 0.4 % — SIGNIFICANT CHANGE UP (ref 0–2)
BILIRUB DIRECT SERPL-MCNC: 4.3 MG/DL — HIGH (ref 0–0.3)
BILIRUB INDIRECT FLD-MCNC: 0.7 MG/DL — SIGNIFICANT CHANGE UP (ref 0–1)
BILIRUB SERPL-MCNC: 4 MG/DL — HIGH (ref 0.2–1.2)
BILIRUB SERPL-MCNC: 5 MG/DL — HIGH (ref 0.2–1.2)
BILIRUB UR-MCNC: NEGATIVE — SIGNIFICANT CHANGE UP
BUN SERPL-MCNC: 12 MG/DL — SIGNIFICANT CHANGE UP (ref 7–23)
BUN SERPL-MCNC: 14 MG/DL — SIGNIFICANT CHANGE UP (ref 7–23)
CALCIUM SERPL-MCNC: 10.3 MG/DL — SIGNIFICANT CHANGE UP (ref 8.4–10.5)
CALCIUM SERPL-MCNC: 9.7 MG/DL — SIGNIFICANT CHANGE UP (ref 8.4–10.5)
CEA SERPL-MCNC: 10 NG/ML — HIGH (ref 0–3.8)
CHLORIDE SERPL-SCNC: 106 MMOL/L — SIGNIFICANT CHANGE UP (ref 96–108)
CHLORIDE SERPL-SCNC: 106 MMOL/L — SIGNIFICANT CHANGE UP (ref 98–107)
CO2 SERPL-SCNC: 19 MMOL/L — LOW (ref 22–31)
CO2 SERPL-SCNC: 19 MMOL/L — LOW (ref 22–31)
COLOR SPEC: SIGNIFICANT CHANGE UP
CREAT SERPL-MCNC: 0.51 MG/DL — SIGNIFICANT CHANGE UP (ref 0.5–1.3)
CREAT SERPL-MCNC: 0.73 MG/DL — SIGNIFICANT CHANGE UP (ref 0.5–1.3)
DIFF PNL FLD: NEGATIVE — SIGNIFICANT CHANGE UP
EGFR: 101 ML/MIN/1.73M2 — SIGNIFICANT CHANGE UP
EGFR: 101 ML/MIN/1.73M2 — SIGNIFICANT CHANGE UP
EGFR: 89 ML/MIN/1.73M2 — SIGNIFICANT CHANGE UP
EGFR: 89 ML/MIN/1.73M2 — SIGNIFICANT CHANGE UP
EOSINOPHIL # BLD AUTO: 0 K/UL — SIGNIFICANT CHANGE UP (ref 0–0.5)
EOSINOPHIL # BLD AUTO: 0.03 K/UL — SIGNIFICANT CHANGE UP (ref 0–0.5)
EOSINOPHIL NFR BLD AUTO: 0 % — SIGNIFICANT CHANGE UP (ref 0–6)
EOSINOPHIL NFR BLD AUTO: 0.6 % — SIGNIFICANT CHANGE UP (ref 0–6)
GLUCOSE SERPL-MCNC: 123 MG/DL — HIGH (ref 70–99)
GLUCOSE SERPL-MCNC: 163 MG/DL — HIGH (ref 70–99)
GLUCOSE UR QL: NEGATIVE MG/DL — SIGNIFICANT CHANGE UP
HCT VFR BLD CALC: 29.4 % — LOW (ref 34.5–45)
HCT VFR BLD CALC: 32.3 % — LOW (ref 34.5–45)
HGB BLD-MCNC: 9.3 G/DL — LOW (ref 11.5–15.5)
HGB BLD-MCNC: 9.8 G/DL — LOW (ref 11.5–15.5)
HIV 1+2 AB+HIV1 P24 AG SERPL QL IA: SIGNIFICANT CHANGE UP
IANC: 5.33 K/UL — SIGNIFICANT CHANGE UP (ref 1.8–7.4)
IMM GRANULOCYTES NFR BLD AUTO: 0.5 % — SIGNIFICANT CHANGE UP (ref 0–0.9)
IMM GRANULOCYTES NFR BLD AUTO: 0.9 % — SIGNIFICANT CHANGE UP (ref 0–0.9)
INR BLD: 0.92 RATIO — SIGNIFICANT CHANGE UP (ref 0.85–1.16)
KETONES UR QL: NEGATIVE MG/DL — SIGNIFICANT CHANGE UP
LEUKOCYTE ESTERASE UR-ACNC: NEGATIVE — SIGNIFICANT CHANGE UP
LYMPHOCYTES # BLD AUTO: 0.28 K/UL — LOW (ref 1–3.3)
LYMPHOCYTES # BLD AUTO: 1.15 K/UL — SIGNIFICANT CHANGE UP (ref 1–3.3)
LYMPHOCYTES # BLD AUTO: 21.5 % — SIGNIFICANT CHANGE UP (ref 13–44)
LYMPHOCYTES # BLD AUTO: 4.9 % — LOW (ref 13–44)
MCHC RBC-ENTMCNC: 30 PG — SIGNIFICANT CHANGE UP (ref 27–34)
MCHC RBC-ENTMCNC: 30.1 PG — SIGNIFICANT CHANGE UP (ref 27–34)
MCHC RBC-ENTMCNC: 30.3 G/DL — LOW (ref 32–36)
MCHC RBC-ENTMCNC: 31.6 G/DL — LOW (ref 32–36)
MCV RBC AUTO: 95.1 FL — SIGNIFICANT CHANGE UP (ref 80–100)
MCV RBC AUTO: 98.8 FL — SIGNIFICANT CHANGE UP (ref 80–100)
MONOCYTES # BLD AUTO: 0.12 K/UL — SIGNIFICANT CHANGE UP (ref 0–0.9)
MONOCYTES # BLD AUTO: 0.38 K/UL — SIGNIFICANT CHANGE UP (ref 0–0.9)
MONOCYTES NFR BLD AUTO: 2.1 % — SIGNIFICANT CHANGE UP (ref 2–14)
MONOCYTES NFR BLD AUTO: 7.1 % — SIGNIFICANT CHANGE UP (ref 2–14)
NEUTROPHILS # BLD AUTO: 3.71 K/UL — SIGNIFICANT CHANGE UP (ref 1.8–7.4)
NEUTROPHILS # BLD AUTO: 5.33 K/UL — SIGNIFICANT CHANGE UP (ref 1.8–7.4)
NEUTROPHILS NFR BLD AUTO: 69.5 % — SIGNIFICANT CHANGE UP (ref 43–77)
NEUTROPHILS NFR BLD AUTO: 92.5 % — HIGH (ref 43–77)
NITRITE UR-MCNC: NEGATIVE — SIGNIFICANT CHANGE UP
NRBC # BLD AUTO: 0 K/UL — SIGNIFICANT CHANGE UP (ref 0–0)
NRBC # FLD: 0 K/UL — SIGNIFICANT CHANGE UP (ref 0–0)
NRBC BLD AUTO-RTO: 0 /100 WBCS — SIGNIFICANT CHANGE UP (ref 0–0)
NRBC BLD AUTO-RTO: 0 /100 WBCS — SIGNIFICANT CHANGE UP (ref 0–0)
PH UR: 6 — SIGNIFICANT CHANGE UP (ref 5–8)
PLATELET # BLD AUTO: 182 K/UL — SIGNIFICANT CHANGE UP (ref 150–400)
PLATELET # BLD AUTO: 212 K/UL — SIGNIFICANT CHANGE UP (ref 150–400)
POTASSIUM SERPL-MCNC: 4.1 MMOL/L — SIGNIFICANT CHANGE UP (ref 3.5–5.3)
POTASSIUM SERPL-MCNC: 4.3 MMOL/L — SIGNIFICANT CHANGE UP (ref 3.5–5.3)
POTASSIUM SERPL-SCNC: 4.1 MMOL/L — SIGNIFICANT CHANGE UP (ref 3.5–5.3)
POTASSIUM SERPL-SCNC: 4.3 MMOL/L — SIGNIFICANT CHANGE UP (ref 3.5–5.3)
PROT SERPL-MCNC: 6.2 G/DL — SIGNIFICANT CHANGE UP (ref 6–8.3)
PROT SERPL-MCNC: 7 G/DL — SIGNIFICANT CHANGE UP (ref 6–8.3)
PROT UR-MCNC: NEGATIVE MG/DL — SIGNIFICANT CHANGE UP
PROTHROM AB SERPL-ACNC: 11 SEC — SIGNIFICANT CHANGE UP (ref 9.9–13.4)
RBC # BLD: 3.09 M/UL — LOW (ref 3.8–5.2)
RBC # BLD: 3.27 M/UL — LOW (ref 3.8–5.2)
RBC # FLD: 18.8 % — HIGH (ref 10.3–14.5)
RBC # FLD: 19.2 % — HIGH (ref 10.3–14.5)
SODIUM SERPL-SCNC: 136 MMOL/L — SIGNIFICANT CHANGE UP (ref 135–145)
SODIUM SERPL-SCNC: 140 MMOL/L — SIGNIFICANT CHANGE UP (ref 135–145)
SP GR SPEC: 1.06 — HIGH (ref 1–1.03)
UROBILINOGEN FLD QL: 0.2 MG/DL — SIGNIFICANT CHANGE UP (ref 0.2–1)
WBC # BLD: 5.34 K/UL — SIGNIFICANT CHANGE UP (ref 3.8–10.5)
WBC # BLD: 5.76 K/UL — SIGNIFICANT CHANGE UP (ref 3.8–10.5)
WBC # FLD AUTO: 5.34 K/UL — SIGNIFICANT CHANGE UP (ref 3.8–10.5)
WBC # FLD AUTO: 5.76 K/UL — SIGNIFICANT CHANGE UP (ref 3.8–10.5)

## 2025-05-13 PROCEDURE — 99285 EMERGENCY DEPT VISIT HI MDM: CPT

## 2025-05-13 PROCEDURE — 73030 X-RAY EXAM OF SHOULDER: CPT | Mod: 26,RT

## 2025-05-13 PROCEDURE — 93971 EXTREMITY STUDY: CPT

## 2025-05-13 PROCEDURE — 99223 1ST HOSP IP/OBS HIGH 75: CPT

## 2025-05-13 PROCEDURE — 73030 X-RAY EXAM OF SHOULDER: CPT

## 2025-05-13 PROCEDURE — 93971 EXTREMITY STUDY: CPT | Mod: 26,RT

## 2025-05-13 PROCEDURE — 74177 CT ABD & PELVIS W/CONTRAST: CPT | Mod: 26

## 2025-05-13 PROCEDURE — 99214 OFFICE O/P EST MOD 30 MIN: CPT

## 2025-05-13 PROCEDURE — 71046 X-RAY EXAM CHEST 2 VIEWS: CPT | Mod: 26

## 2025-05-13 PROCEDURE — 76705 ECHO EXAM OF ABDOMEN: CPT | Mod: 26

## 2025-05-13 RX ORDER — ACETAMINOPHEN 500 MG/5ML
650 LIQUID (ML) ORAL EVERY 8 HOURS
Refills: 0 | Status: DISCONTINUED | OUTPATIENT
Start: 2025-05-13 | End: 2025-05-14

## 2025-05-13 RX ORDER — SODIUM CHLORIDE 9 G/1000ML
1000 INJECTION, SOLUTION INTRAVENOUS
Refills: 0 | Status: DISCONTINUED | OUTPATIENT
Start: 2025-05-13 | End: 2025-05-15

## 2025-05-13 RX ORDER — SODIUM CHLORIDE 9 G/1000ML
1000 INJECTION, SOLUTION INTRAVENOUS ONCE
Refills: 0 | Status: COMPLETED | OUTPATIENT
Start: 2025-05-13 | End: 2025-05-13

## 2025-05-13 RX ADMIN — Medication 500 MILLILITER(S): at 23:01

## 2025-05-13 RX ADMIN — SODIUM CHLORIDE 1000 MILLILITER(S): 9 INJECTION, SOLUTION INTRAVENOUS at 20:45

## 2025-05-13 NOTE — H&P ADULT - NSICDXFAMILYHX_GEN_ALL_CORE_FT
FAMILY HISTORY:  Grandparent  Still living? Unknown  Family history of malignant neoplasm of digestive organ, Age at diagnosis: Age Unknown    Uncle  Still living? Unknown  Family history of malignant neoplasm of digestive organ, Age at diagnosis: Age Unknown     FAMILY HISTORY:  Grandparent  Still living? Unknown  Family history of cancer of genital organ, Age at diagnosis: Age Unknown  Family history of rheumatoid arthritis, Age at diagnosis: Age Unknown    Uncle  Still living? Unknown  Family history of malignant neoplasm of digestive organ, Age at diagnosis: Age Unknown

## 2025-05-13 NOTE — H&P ADULT - NSICDXPASTMEDICALHX_GEN_ALL_CORE_FT
PAST MEDICAL HISTORY:  Collapsed vertebra     Constipation     GERD without esophagitis     H/O colon cancer, stage IV     H/O pulmonary hypertension     H/O scleroderma     HLD (hyperlipidemia)     HTN (hypertension)     Hyperlipidemia     ILD (interstitial lung disease)     Infarction of spleen     Macular degeneration     Normocytic anemia      PAST MEDICAL HISTORY:  Collapsed vertebra     Constipation     Gait difficulty     GERD without esophagitis     H/O colon cancer, stage IV     H/O pulmonary hypertension     H/O scleroderma     HLD (hyperlipidemia)     HTN (hypertension)     Hyperlipidemia     ILD (interstitial lung disease)     Infarction of spleen     Macular degeneration     Normocytic anemia

## 2025-05-13 NOTE — ED ADULT NURSE NOTE - CHIEF COMPLAINT QUOTE
Luis [id number] Mandarin   oncologist sent us in for high liver enzymes  past med hx colon/ gi CA with mets to liver, pt hs portable chemo inustion pump  " I also have right shoulder pain"

## 2025-05-13 NOTE — H&P ADULT - NSICDXPASTSURGICALHX_GEN_ALL_CORE_FT
PAST SURGICAL HISTORY:  H/O insertion of hepatic arterial infusion pump     H/O right hemicolectomy     History of laparoscopic cholecystectomy     History of left hip replacement     History of right hip replacement     Migration of percutaneous cholecystostomy tube

## 2025-05-13 NOTE — ED ADULT TRIAGE NOTE - CHIEF COMPLAINT QUOTE
Luis [id number] Mandarin   oncologist sent us in for high liver enzymes  past med hx colon/ gi CA with mets to liver, pt hs portable chemo inustion pump  " I also have right shoulder pain"

## 2025-05-13 NOTE — H&P ADULT - PROBLEM SELECTOR PLAN 3
Direct bili = 4.3 (total = 5)  In the setting of liver failure  RUQ US is negative for any acute findings; cites "heterogeneous hepatic echogenicity, likely related to metastatic disease/post ablation cavities seen on prior imaging"  - f/u lab-work for trend  - evaluate need for Hepatology consult  - Oncology consult in the AM

## 2025-05-13 NOTE — H&P ADULT - NSHPLABSRESULTS_GEN_ALL_CORE
LAB-WORK/STUDIES:                          9.8    5.76  )-----------( 212      ( 13 May 2025 17:02 )             32.3     140  |  106  |  12  ----------------------------<  163[H]     05-13  4.1   |  19[L]  |  0.51    Ca    10.3      13 May 2025 17:02    TPro  7.0  /  Alb  3.7  /  TBili  5.0[H]  /  DBili  4.3[H]  /  AST  341[H]  /  ALT  241[H]  /  AlkPhos  707[H]  05-13    PT/INR: 11.0/0.92 (05-13-25 @ 17:02)  PTT: 29.5 (05-13-25 @ 17:02)    ====================================================        ====================================================  .

## 2025-05-13 NOTE — H&P ADULT - HISTORY OF PRESENT ILLNESS
69-year-old female, with past history significant for Hypertension, Rheumatoid arthritis, Normocytic anemia, Scleroderma, Pulmonary hypertension (mild), Stage IV right colon cancer and Hepatic infusion pumps, presented to the ED, aft er being referred by outpatient oncologist, secondary to right upper quadrant pain and elevated liver function test.  Seen and evaluated at bedside;    Vital signs upon ED presentation as follows: BP = 106/73, HR = 86, RR = 16, T = 36.4 C (97.5 F), O2 Sat = 95% on RA.  Diagnosed with Transaminitis, Direct Hyperbilirubinemia and Right Upper Quadrant Pain, and prescribed LR one liter in the ED. 69-year-old female, with past history significant for Hypertension, Rheumatoid arthritis, Normocytic anemia, Scleroderma, Pulmonary hypertension (mild), Stage IV right colon cancer and Hepatic infusion pumps, presented to the ED, aft er being referred by outpatient oncologist, secondary to right upper quadrant pain and elevated liver function test.  Seen and evaluated at bedside with daughter present; NAD.  Patient reports onset of mid abdominal pain a few days ago, and more recent onset of right upper extremity pain - to the extent that there is difficulty raising the RUE against gravity.  Pain of the abdomen is rated at ~ 6/10 maximum intensity, while that or the RUE is rated at around 8/10 maximum intensity.  No inciting factor identified, and no prior occurrence.  No associated nausea, vomiting, fever, chills, diaphoresis.  Does suffer with intermittent constipation (treated with increased consumption of fruits and vegetables) and diarrhea.    Reports pain of the right lateral anterior foot, which has been occurring for the past few months.    Vital signs upon ED presentation as follows: BP = 106/73, HR = 86, RR = 16, T = 36.4 C (97.5 F), O2 Sat = 95% on RA.  Diagnosed with Transaminitis, Direct Hyperbilirubinemia and Right Upper Quadrant Pain, and prescribed LR one liter in the ED. 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  Seen and evaluated at bedside with daughter present; NAD.  Patient reports onset of mid abdominal pain a few days ago, and more recent onset of right upper extremity pain - to the extent that there is difficulty raising the RUE against gravity.  Pain of the abdomen is rated at ~ 6/10 maximum intensity, while that or the RUE is rated at around 8/10 maximum intensity.  No inciting factor identified, and no prior occurrence.  No associated nausea, vomiting, fever, chills, diaphoresis.  Does suffer with intermittent constipation (treated with increased consumption of fruits and vegetables) and diarrhea.    Reports pain of the right lateral anterior foot, which has been occurring for the past few months.    Vital signs upon ED presentation as follows: BP = 106/73, HR = 86, RR = 16, T = 36.4 C (97.5 F), O2 Sat = 95% on RA.  Diagnosed with Transaminitis, Direct Hyperbilirubinemia and Right Upper Quadrant Pain, and prescribed LR one liter in the ED.

## 2025-05-13 NOTE — ED PROVIDER NOTE - CLINICAL SUMMARY MEDICAL DECISION MAKING FREE TEXT BOX
Patient with elevated LFTs and scleral icterus concerning for hepatic obstruction will obtain RUQ US, CT abd pelvis, basic labs including hepatic panel, coags,

## 2025-05-13 NOTE — ED PROVIDER NOTE - CARE PLAN
Principal Discharge DX:	Transaminitis  Secondary Diagnosis:	Direct hyperbilirubinemia  Secondary Diagnosis:	Right upper quadrant pain   1

## 2025-05-13 NOTE — ED ADULT NURSE NOTE - OBJECTIVE STATEMENT
69 year old female received to rm9 A&Ox3 ambulatory. pt is mandarin speaking, declined translation services, pt's daughter at bedside. translating. pt sent by oncologist for elevated liver enzymes. pt states she had diffuse abd yesterday but denies pain today. pt presents with right chest wall chemo port accessed with chemo infusion in progress via home chemo infusion pump. respirations even and unlabroed. PIV#20 left AC, labs drawn and sent. vs as noted. awaiting imaging

## 2025-05-13 NOTE — H&P ADULT - PROBLEM SELECTOR PLAN 2
AST = 341, ALT = 241; chart review indicates fluctuating elevations since ~ 09/2024, but highest values presently  INR = 0.92  History of colon cancer metastatic to the liver AST = 341, ALT = 241; chart review indicates fluctuating elevations since ~ 09/2024, but has highest values presently  INR = 0.92  History of colon cancer metastatic to the liver  Possibly impacted by fluid status  - f/u trend w/ repeat lab-work  - avoid medications that may worsen liver function

## 2025-05-13 NOTE — H&P ADULT - PROBLEM SELECTOR PLAN 7
No acute issues presently  - on Irbesartan 75 mg daily; continuing And to the lung, per chart review  Follows with oncologist as outpatient  - Oncology consult in the AM

## 2025-05-13 NOTE — H&P ADULT - PROBLEM SELECTOR PLAN 9
Lovenox Patient/family  unable to recall name of medication for macular degeneration  - family to provide information in the AM (please f/u)

## 2025-05-13 NOTE — H&P ADULT - PROBLEM SELECTOR PLAN 6
And to the lung, per chart review  Follows with oncologist as outpatient  - Oncology consult in the AM Pain at the right lateral anterior foot, during ambulation, over the recent past  Examination notes calluses over the areas - findings suggestive of Tailor's Bunion  - Podiatry consult (inpatient vs outpatient)  - Tylenol PRN mild pain

## 2025-05-13 NOTE — ED PROVIDER NOTE - OBJECTIVE STATEMENT
70 yo Mandarin-speaking F with a history of HTN, RA, scleroderma, mild pulmonary HTN, Stage IV R colon cancer w hepatic artery infusion pump.  Presenting with right upper quadrant pain  Patient was seen at oncologist office and was advised to come into the emergency department due to elevated LFTs.  Patient states having right upper quadrant pain.  Patient denies chest pain shortness of breath nausea vomiting diarrhea fevers chills

## 2025-05-13 NOTE — H&P ADULT - PROBLEM SELECTOR PLAN 5
Very dry, peeling lips, dry oral mucosa, hemoconcentrated lab-work, increased urine specific gravity, with report of decreased PO hydration today  - prescribing IVF of NaCl one liter over 2 hours, followed by 0.45 NaCl at 100 mL/Hr x 10 hours  - encourage oral hydration, as tolerated  - f/u electrolytes, renal function and for signs of clinical  improvement

## 2025-05-13 NOTE — H&P ADULT - ASSESSMENT
[ x ]  Lab studies personally reviewed  [ x ]  Radiology personally reviewed  [ x ]  Old records personally reviewed    69-year-old female, with past history significant for Hypertension, Rheumatoid arthritis, Normocytic anemia, Scleroderma, Pulmonary hypertension (mild), Stage IV right colon cancer and Hepatic infusion pumps, presented to the ED, aft er being referred by outpatient oncologist, secondary to right upper quadrant pain and elevated liver function test.  Diagnosed with Transaminitis, Direct Hyperbilirubinemia and Right Upper Quadrant Pain in the ED. 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  Diagnosed with Transaminitis, Direct Hyperbilirubinemia and Right Upper Quadrant Pain in the ED.

## 2025-05-13 NOTE — H&P ADULT - PROBLEM SELECTOR PLAN 8
Lovenox Patient/family  unable to recall name of medication for macular degeneration  - family to provide information in the AM (please f/u) No acute issues presently  - on Irbesartan 75 mg daily; continuing

## 2025-05-13 NOTE — ED PROVIDER NOTE - PROGRESS NOTE DETAILS
onc consulted will follow JACOB Maradiaga MD PGY-2: Received signout, briefly 69-year-old female with history of stage IV metastatic colon cancer presents with right upper quadrant pain  With uptrending transaminitis and hyperbilirubinemia sent in by oncologist to eval for hepatobiliary pathology. pending CT and right upper quadrant ultrasound.  Anticipate admission for possible MRCP if imaging unrevealing of etiology.  Patient reassessed, currently complaining of mild right upper quadrant pain does not want any medication for pain at this time.  Denies nausea.  Has not had any oral intake since the morning dry mucous membrane, will give 1 L LR.

## 2025-05-13 NOTE — ED PROVIDER NOTE - ATTENDING CONTRIBUTION TO CARE
70 yo F with h/o HTN, RA, scleroderma, mild pulmonary HTN, Stage IV R colon cancer w hepatic artery infusion pump who presents to the ED c/o RUQ abdominal pain and scleral icterus. Pt was seen by onc earlier today, noticed to have rise in LFT's so was referred to the ED for further evaluation. Plan for labs, CT abd/pelvis, most likely admission

## 2025-05-13 NOTE — ED ADULT NURSE REASSESSMENT NOTE - NS ED NURSE REASSESS COMMENT FT1
Received pt awake and alert, in no acute distress, resting comfortably on stretcher. Resp. equal and unlabored, no wheezes or crackles, not tachypneic/tachycardic. VS stable. Safety precautions maintained. Awaiting transfer to assigned room upstairs.

## 2025-05-13 NOTE — H&P ADULT - PROBLEM SELECTOR PLAN 1
Acute onset a few days ago, with intensity of ~ 6/10, in patient with elevated bilirubin, transaminitis, history of Stage IV colon cancer metastatic to the liver and lung  History of scleroderma, hepatic infusion pumps  US = "No acute findings in the abd or pelvis.  Hepatic findings unchanged since CT abd and pelvis dated 1/11/2025.   - declines offer for medications at present  - history of constipation; likely that this could be also impacting.  Tends to treat same w/ fruits/vegetables  - f/u stool count  - could be aggravated by dehydration; ensure optimal hydration.  IVF prescribed  - hematology consult in the AM  - ?GI consult in the AM

## 2025-05-13 NOTE — H&P ADULT - PROBLEM SELECTOR PLAN 4
Very dry, peeling lips, dry oral mucosa, hemoconcentrated lab-work, increased urine specific gravity, with report of decreased PO hydration today  - prescribing IVF of NaCl one liter over 2 hours, followed by 0.45 NaCl at 100 mL/Hr x 10 hours  - encourage oral hydration, as tolerated  - f/u electrolytes, renal function and for signs of clinical  improvement Recent onset and causes limitations in range of motion  X-ray negative for any acute findings  - Tylenol PRN mild pain  - ensure optimal hydration  - Physical Therapy yefrial

## 2025-05-13 NOTE — H&P ADULT - NSHPPHYSICALEXAM_GEN_ALL_CORE
Vital Signs Last 24 Hrs  T(C): 36.3 (13 May 2025 21:03), Max: 36.5 (13 May 2025 09:21)  T(F): 97.4 (13 May 2025 21:03), Max: 97.7 (13 May 2025 09:21)  HR: 74 (13 May 2025 21:03) (72 - 94)  BP: 136/75 (13 May 2025 21:03) (106/73 - 136/75)  BP(mean): --  RR: 17 (13 May 2025 21:03) (16 - 18)  SpO2: 99% (13 May 2025 21:03) (95% - 100%)    Parameters below as of 13 May 2025 16:47  Patient On (Oxygen Delivery Method): room air    =================================================== Vital Signs Last 24 Hrs  T(C): 36.3 (13 May 2025 21:03), Max: 36.5 (13 May 2025 09:21)  T(F): 97.4 (13 May 2025 21:03), Max: 97.7 (13 May 2025 09:21)  HR: 74 (13 May 2025 21:03) (72 - 94)  BP: 136/75 (13 May 2025 21:03) (106/73 - 136/75)  BP(mean): --  RR: 17 (13 May 2025 21:03) (16 - 18)  SpO2: 99% (13 May 2025 21:03) (95% - 100%)    Parameters below as of 13 May 2025 16:47  Patient On (Oxygen Delivery Method): room air    ===================================================    PHYSICAL EXAMINATION:    APPEARANCE: Adequately groomed, jaundiced, frail appearing, pleasant female,  lying propped up in stretcher in NAD  HEENT: Very dry and peeling lips.  Dry oral mucosa.  B/L icterus.  Pupils reactive to light.  EOMI  LYMPHATIC: No lymphadenopathy appreciated  CHEST: Infusion port to the right chest area  CARDIOVASCULAR: (+) S1 S2.  No JVD.  No murmurs.  No edema  RESPIRATORY: No wheezing, rhonchi, crackles appreciated  GASTROINTESTINAL: Soft.  Non-tender to mild palpation.  (+) BS  GENITOURINARY: No suprapubic tenderness.  No CVA tenderness B/L  EXTREMITIES: Normal range of motion.  No clubbing, cyanosis or edema  MUSCULOSKELETAL: No atrophy.  No asymmetry.  Good ROM  SKIN: Midline vertical post surgical abdominal scar.  R-chest infusion port site clean and intact.  Small scattered erythematous patches o rashes. No ecchymoses.  No cyanosis.  Dry/peeling lips  PSYCHIATRIC: A&O x 3.  Mood & affect appropriate to situation  NEUROLOGICAL: Non-focal, GIBSON x 4 against gravity  VASCULAR: Peripheral pulses palpable Vital Signs Last 24 Hrs  T(C): 36.3 (13 May 2025 21:03), Max: 36.5 (13 May 2025 09:21)  T(F): 97.4 (13 May 2025 21:03), Max: 97.7 (13 May 2025 09:21)  HR: 74 (13 May 2025 21:03) (72 - 94)  BP: 136/75 (13 May 2025 21:03) (106/73 - 136/75)  BP(mean): --  RR: 17 (13 May 2025 21:03) (16 - 18)  SpO2: 99% (13 May 2025 21:03) (95% - 100%)    Parameters below as of 13 May 2025 16:47  Patient On (Oxygen Delivery Method): room air    ===================================================    PHYSICAL EXAMINATION:    APPEARANCE: Adequately groomed, jaundiced, frail appearing, pleasant female,  lying propped up in stretcher in NAD  HEENT: Very dry and peeling lips.  Dry oral mucosa.  B/L icterus.  Pupils reactive to light.  EOMI  LYMPHATIC: No lymphadenopathy appreciated  CHEST: Infusion port to the right chest area  CARDIOVASCULAR: (+) S1 S2.  No JVD.  No murmurs.  No edema  RESPIRATORY: No wheezing, rhonchi, crackles appreciated  GASTROINTESTINAL: Soft.  Non-tender to mild palpation.  (+) BS  GENITOURINARY: No suprapubic tenderness.  No CVA tenderness B/L  EXTREMITIES: Normal range of motion.  No clubbing, cyanosis or edema  MUSCULOSKELETAL: No atrophy.  No asymmetry.  Good ROM  SKIN: Midline vertical post surgical abdominal scar.  R-chest infusion port site clean and intact.  Small scattered erythematous patches o rashes. No ecchymoses.  No cyanosis.  Dry/peeling lips.  Calluses over b/l lateral anterior feet (R > L)  PSYCHIATRIC: A&O x 3.  Mood & affect appropriate to situation  NEUROLOGICAL: Non-focal, GIBSON x 4 against gravity  VASCULAR: Peripheral pulses palpable Vital Signs Last 24 Hrs  T(C): 36.3 (13 May 2025 21:03), Max: 36.5 (13 May 2025 09:21)  T(F): 97.4 (13 May 2025 21:03), Max: 97.7 (13 May 2025 09:21)  HR: 74 (13 May 2025 21:03) (72 - 94)  BP: 136/75 (13 May 2025 21:03) (106/73 - 136/75)  BP(mean): --  RR: 17 (13 May 2025 21:03) (16 - 18)  SpO2: 99% (13 May 2025 21:03) (95% - 100%)    Parameters below as of 13 May 2025 16:47  Patient On (Oxygen Delivery Method): room air    ===================================================    PHYSICAL EXAMINATION:    APPEARANCE: Adequately groomed, jaundiced, frail appearing, pleasant female,  lying propped up in stretcher in NAD  HEENT: Very dry and peeling lips.  Dry oral mucosa.  B/L icterus.  Pupils reactive to light.  EOMI  LYMPHATIC: No lymphadenopathy appreciated  CHEST: Infusion port to the right chest area  CARDIOVASCULAR: (+) S1 S2.  No JVD.  No murmurs.  No edema  RESPIRATORY: No wheezing, rhonchi, crackles appreciated  GASTROINTESTINAL: Soft.  Non-tender to mild palpation.  (+) BS  GENITOURINARY: No suprapubic tenderness.  No CVA tenderness B/L  EXTREMITIES: Normal range of motion.  No clubbing, cyanosis or edema  MUSCULOSKELETAL: No atrophy.  No asymmetry.  Good ROM  SKIN: Midline vertical post surgical abdominal scar.  R-chest infusion port site clean and intact.  Small scattered erythematous patches of rashes (due to systemic sclerosis, per patient). No ecchymoses.  No cyanosis.  Dry/peeling lips.  Calluses over b/l lateral anterior feet (R > L)  PSYCHIATRIC: A&O x 3.  Mood & affect appropriate to situation  NEUROLOGICAL: Non-focal, GIBSON x 4 against gravity  VASCULAR: Peripheral pulses palpable

## 2025-05-13 NOTE — H&P ADULT - NSHPREVIEWOFSYSTEMS_GEN_ALL_CORE
REVIEW OF SYSTEMS:    CONSTITUTIONAL: No weakness, fever, chills or sweating  EYES/ENT: No visual changes.  No dysphagia  NECK: No pain or stiffness  RESPIRATORY: No cough or hemoptysis.  No shortness of breath  CARDIOVASCULAR: No chest pain or palpitations.  No lower extremity edema  GASTROINTESTINAL: Mid abdominal pain.  Intermittent constipation and diarrhea.  No epigastric or abdominal pain. No nausea, vomiting or hematemesis.  No diarrhea or constipation. No melena or hematochezia.  GENITOURINARY: No dysuria, frequency or hematuria  MUSCULOSKELETAL: No joint pain, swelling, decreased ROM, erythema, warmth  NEUROLOGICAL: No numbness or weakness  PSYCHIATRY: No anxiety, or depression.  SKIN: No itching, burning, rashes, or lesions   All other review of systems is negative unless indicated above.

## 2025-05-14 ENCOUNTER — NON-APPOINTMENT (OUTPATIENT)
Age: 70
End: 2025-05-14

## 2025-05-14 DIAGNOSIS — C18.9 MALIGNANT NEOPLASM OF COLON, UNSPECIFIED: ICD-10-CM

## 2025-05-14 DIAGNOSIS — I10 ESSENTIAL (PRIMARY) HYPERTENSION: ICD-10-CM

## 2025-05-14 DIAGNOSIS — Z79.899 OTHER LONG TERM (CURRENT) DRUG THERAPY: ICD-10-CM

## 2025-05-14 DIAGNOSIS — M25.511 PAIN IN RIGHT SHOULDER: ICD-10-CM

## 2025-05-14 DIAGNOSIS — M79.671 PAIN IN RIGHT FOOT: ICD-10-CM

## 2025-05-14 LAB
24R-OH-CALCIDIOL SERPL-MCNC: 41.4 NG/ML — SIGNIFICANT CHANGE UP
ALBUMIN SERPL ELPH-MCNC: 3.1 G/DL — LOW (ref 3.3–5)
ALP SERPL-CCNC: 552 U/L — HIGH (ref 40–120)
ALT FLD-CCNC: 190 U/L — HIGH (ref 4–33)
ANION GAP SERPL CALC-SCNC: 14 MMOL/L — SIGNIFICANT CHANGE UP (ref 7–14)
APTT BLD: 30.2 SEC — SIGNIFICANT CHANGE UP (ref 26.1–36.8)
AST SERPL-CCNC: 207 U/L — HIGH (ref 4–32)
BASOPHILS # BLD AUTO: 0 K/UL — SIGNIFICANT CHANGE UP (ref 0–0.2)
BASOPHILS NFR BLD AUTO: 0 % — SIGNIFICANT CHANGE UP (ref 0–2)
BILIRUB DIRECT SERPL-MCNC: 1.6 MG/DL — HIGH (ref 0–0.3)
BILIRUB SERPL-MCNC: 2.5 MG/DL — HIGH (ref 0.2–1.2)
BUN SERPL-MCNC: 13 MG/DL — SIGNIFICANT CHANGE UP (ref 7–23)
CALCIUM SERPL-MCNC: 9.2 MG/DL — SIGNIFICANT CHANGE UP (ref 8.4–10.5)
CHLORIDE SERPL-SCNC: 110 MMOL/L — HIGH (ref 98–107)
CO2 SERPL-SCNC: 18 MMOL/L — LOW (ref 22–31)
CREAT SERPL-MCNC: 0.53 MG/DL — SIGNIFICANT CHANGE UP (ref 0.5–1.3)
EGFR: 100 ML/MIN/1.73M2 — SIGNIFICANT CHANGE UP
EGFR: 100 ML/MIN/1.73M2 — SIGNIFICANT CHANGE UP
EOSINOPHIL # BLD AUTO: 0 K/UL — SIGNIFICANT CHANGE UP (ref 0–0.5)
EOSINOPHIL NFR BLD AUTO: 0 % — SIGNIFICANT CHANGE UP (ref 0–6)
GGT SERPL-CCNC: 575 U/L — HIGH (ref 5–36)
GLUCOSE SERPL-MCNC: 154 MG/DL — HIGH (ref 70–99)
HAV IGM SER-ACNC: SIGNIFICANT CHANGE UP
HBV CORE IGM SER-ACNC: SIGNIFICANT CHANGE UP
HBV SURFACE AG SER-ACNC: SIGNIFICANT CHANGE UP
HCT VFR BLD CALC: 27.9 % — LOW (ref 34.5–45)
HCV AB S/CO SERPL IA: 0.18 S/CO — SIGNIFICANT CHANGE UP (ref 0–0.79)
HCV AB SERPL-IMP: SIGNIFICANT CHANGE UP
HGB BLD-MCNC: 8.4 G/DL — LOW (ref 11.5–15.5)
IANC: 4.6 K/UL — SIGNIFICANT CHANGE UP (ref 1.8–7.4)
IMM GRANULOCYTES NFR BLD AUTO: 0.6 % — SIGNIFICANT CHANGE UP (ref 0–0.9)
INR BLD: 0.96 RATIO — SIGNIFICANT CHANGE UP (ref 0.85–1.16)
LYMPHOCYTES # BLD AUTO: 0.47 K/UL — LOW (ref 1–3.3)
LYMPHOCYTES # BLD AUTO: 8.7 % — LOW (ref 13–44)
MAGNESIUM SERPL-MCNC: 2 MG/DL — SIGNIFICANT CHANGE UP (ref 1.6–2.6)
MCHC RBC-ENTMCNC: 29.6 PG — SIGNIFICANT CHANGE UP (ref 27–34)
MCHC RBC-ENTMCNC: 30.1 G/DL — LOW (ref 32–36)
MCV RBC AUTO: 98.2 FL — SIGNIFICANT CHANGE UP (ref 80–100)
MONOCYTES # BLD AUTO: 0.3 K/UL — SIGNIFICANT CHANGE UP (ref 0–0.9)
MONOCYTES NFR BLD AUTO: 5.6 % — SIGNIFICANT CHANGE UP (ref 2–14)
MRSA PCR RESULT.: SIGNIFICANT CHANGE UP
NEUTROPHILS # BLD AUTO: 4.6 K/UL — SIGNIFICANT CHANGE UP (ref 1.8–7.4)
NEUTROPHILS NFR BLD AUTO: 85.1 % — HIGH (ref 43–77)
NRBC # BLD AUTO: 0 K/UL — SIGNIFICANT CHANGE UP (ref 0–0)
NRBC # FLD: 0 K/UL — SIGNIFICANT CHANGE UP (ref 0–0)
NRBC BLD AUTO-RTO: 0 /100 WBCS — SIGNIFICANT CHANGE UP (ref 0–0)
PHOSPHATE SERPL-MCNC: 2.8 MG/DL — SIGNIFICANT CHANGE UP (ref 2.5–4.5)
PLATELET # BLD AUTO: 183 K/UL — SIGNIFICANT CHANGE UP (ref 150–400)
POTASSIUM SERPL-MCNC: 3.8 MMOL/L — SIGNIFICANT CHANGE UP (ref 3.5–5.3)
POTASSIUM SERPL-SCNC: 3.8 MMOL/L — SIGNIFICANT CHANGE UP (ref 3.5–5.3)
PROT SERPL-MCNC: 6.1 G/DL — SIGNIFICANT CHANGE UP (ref 6–8.3)
PROTHROM AB SERPL-ACNC: 11.1 SEC — SIGNIFICANT CHANGE UP (ref 9.9–13.4)
RBC # BLD: 2.84 M/UL — LOW (ref 3.8–5.2)
RBC # FLD: 18.7 % — HIGH (ref 10.3–14.5)
S AUREUS DNA NOSE QL NAA+PROBE: SIGNIFICANT CHANGE UP
SODIUM SERPL-SCNC: 142 MMOL/L — SIGNIFICANT CHANGE UP (ref 135–145)
TSH SERPL-MCNC: 0.18 UIU/ML — LOW (ref 0.27–4.2)
WBC # BLD: 5.4 K/UL — SIGNIFICANT CHANGE UP (ref 3.8–10.5)
WBC # FLD AUTO: 5.4 K/UL — SIGNIFICANT CHANGE UP (ref 3.8–10.5)

## 2025-05-14 PROCEDURE — 99222 1ST HOSP IP/OBS MODERATE 55: CPT | Mod: GC

## 2025-05-14 PROCEDURE — 99233 SBSQ HOSP IP/OBS HIGH 50: CPT

## 2025-05-14 RX ORDER — LOSARTAN POTASSIUM 100 MG/1
25 TABLET, FILM COATED ORAL DAILY
Refills: 0 | Status: DISCONTINUED | OUTPATIENT
Start: 2025-05-14 | End: 2025-05-25

## 2025-05-14 RX ORDER — OMEGA-3-ACID ETHYL ESTERS CAPSULES 1 G/1
2 CAPSULE, LIQUID FILLED ORAL
Refills: 0 | DISCHARGE

## 2025-05-14 RX ADMIN — LOSARTAN POTASSIUM 25 MILLIGRAM(S): 100 TABLET, FILM COATED ORAL at 16:46

## 2025-05-14 RX ADMIN — Medication 1 APPLICATION(S): at 11:53

## 2025-05-14 RX ADMIN — SODIUM CHLORIDE 100 MILLILITER(S): 9 INJECTION, SOLUTION INTRAVENOUS at 01:32

## 2025-05-14 NOTE — CONSULT NOTE ADULT - SUBJECTIVE AND OBJECTIVE BOX
Initial GI Consult    Patient is a 69y old  Female who presents with a chief complaint of Transaminitis, Direct hyperbilirubinemia, Right upper quadrant pain (13 May 2025 21:26)    HPI: Ms. Maradiaga is a 69-year-old female, with Hypertension, Dyslipidemia, Interstitial lung disease, Iron deficiency anemia, Back pain, Rheumatoid arthritis, Normocytic anemia, Scleroderma, Pulmonary hypertension (mild), Stage IV right colon cancer with metastases to liver and lung s/p R hemicolectomy, ANGELI, SBRT in January 2025 and multiple rounds of chemotherapy. The patient has most recently been restarted on maintenence therapy with 5FU/LV which was restarted in January. She got her last dose 2 weeks ago and was due for her dose the dayof admission; howveer when she went to onc office she was found to have elevated liver enzymes and jaundice so was sent into the hospital for further workup. Thee patient last had imaging in March which showed no acitve disease. She denies alcohol or herbal supplement use. No excessive tylenol use. She reports one day of severe RUQ abd pain that has since subsided. She denies taking any new medications over the last few months.       PAST MEDICAL & SURGICAL HISTORY:  H/O pulmonary hypertension      H/O scleroderma      HTN (hypertension)      HLD (hyperlipidemia)      H/O colon cancer, stage IV      Normocytic anemia      Macular degeneration      ILD (interstitial lung disease)      Constipation      Collapsed vertebra      Infarction of spleen      GERD without esophagitis      Hyperlipidemia      Gait difficulty      Migration of percutaneous cholecystostomy tube      H/O right hemicolectomy      History of laparoscopic cholecystectomy      History of left hip replacement      History of right hip replacement      H/O insertion of hepatic arterial infusion pump        FAMILY HISTORY:  Family history of rheumatoid arthritis (Grandparent)    Family history of cancer of genital organ (Grandparent)    Family history of malignant neoplasm of digestive organ (Uncle)        MEDS:  MEDICATIONS  (STANDING):  chlorhexidine 2% Cloths 1 Application(s) Topical daily  losartan 25 milliGRAM(s) Oral daily  sodium chloride 0.45%. 1000 milliLiter(s) (100 mL/Hr) IV Continuous <Continuous>    MEDICATIONS  (PRN):    Allergies    No Known Allergies    Intolerances          ROS: As per HPI    ______________________________________________________________________  PHYSICAL EXAM:  T(C): 36.4 (05-14-25 @ 13:01), Max: 36.4 (05-14-25 @ 05:10)  HR: 77 (05-14-25 @ 16:45)  BP: 123/59 (05-14-25 @ 16:45)  RR: 18 (05-14-25 @ 13:01)  SpO2: 97% (05-14-25 @ 13:01)  Wt(kg): --    05-13 - 05-14  --------------------------------------------------------  IN:    sodium chloride 0.45%: 600 mL  Total IN: 600 mL    OUT:  Total OUT: 0 mL    Total NET: 600 mL      05-14 - 05-14  --------------------------------------------------------  IN:    Oral Fluid: 1050 mL  Total IN: 1050 mL    OUT:  Total OUT: 0 mL    Total NET: 1050 mL          GEN: NAD, normocephalic  CVS: S1S2+  CHEST: clear to auscultation  ABD: soft , nontender, nondistended, bowel sounds present  EXTR: no cyanosis, no clubbing, no edema  NEURO: A&OX3  SKIN:  warm;  jaundiced    ______________________________________________________________________  LABS:                        8.4    5.40  )-----------( 183      ( 14 May 2025 06:05 )             27.9     05-14    142  |  110[H]  |  13  ----------------------------<  154[H]  3.8   |  18[L]  |  0.53    Ca    9.2      14 May 2025 06:05  Phos  2.8     05-14  Mg     2.00     05-14    TPro  6.1  /  Alb  3.1[L]  /  TBili  2.5[H]  /  DBili  1.6[H]  /  AST  207[H]  /  ALT  190[H]  /  AlkPhos  552[H]  05-14    LIVER FUNCTIONS - ( 14 May 2025 06:05 )  Alb: 3.1 g/dL / Pro: 6.1 g/dL / ALK PHOS: 552 U/L / ALT: 190 U/L / AST: 207 U/L / GGT: 575 U/L       PT/INR - ( 14 May 2025 06:05 )   PT: 11.1 sec;   INR: 0.96 ratio         PTT - ( 14 May 2025 06:05 )  PTT:30.2 sec  ____________________________________________

## 2025-05-14 NOTE — PATIENT PROFILE ADULT - FALL HARM RISK - HARM RISK INTERVENTIONS

## 2025-05-14 NOTE — CONSULT NOTE ADULT - ASSESSMENT
Ms. Maradiaga is a 69-year-old female, with Hypertension, Dyslipidemia, Interstitial lung disease, Iron deficiency anemia, Back pain, Rheumatoid arthritis, Normocytic anemia, Scleroderma, Pulmonary hypertension (mild), Stage IV right colon cancer with metastases to liver and lung s/p R hemicolectomy, ANGELI, SBRT in January 2025 and multiple rounds of chemotherapy. She presents with elevated liver enzymes and jaundice with one day of RUQ pain     #Elevated ALP/AST/ALT/ bili  The patient has always had an elevation in AST/ALT likely from lesions in the liver and now from local inflammation of areas that were treated. However, she has an acute increase in these enzymes which makes me concerned for stone, DILI or ischemic injury. Her labs are getting better and her pain has resolved so will continue to monitor labs for now.     Recommendations:  - Please send acute hepatitis panel   - Send CMV, EBV IgG/IgM and PCR  - Can hold off on MRI/MRCP given liver enzymes and bili are decreasing so if this was caused by a gallstone it likely passed.   - Please continue to trend CBC, CMP and INR   - Avoid hepatotoxic medication     Shama Ardon MD  Gastroenterology/Hepatology Fellow, PGY-5  Please contact via TEAMS    NON-URGENT CONSULTS:  Please email [email or web address]  [email or web address]

## 2025-05-14 NOTE — PATIENT PROFILE ADULT - SAFE PLACE TO LIVE
Lab calling with a critical result--advised to call oncall provider for ordering physician: Dr Gume [facility] @ [facility] lung [facility] and [facility] [phone]. Lab will call answering service now.     Elena Horvath RN Triage Nurse Advisor 4:20 PM 6/2/2024   no

## 2025-05-14 NOTE — PHYSICAL THERAPY INITIAL EVALUATION ADULT - GENERAL OBSERVATIONS, REHAB EVAL
Patient received semi-supine in bed with all lines/tubes intact and in NAD. Patient's HR: 80 beats per minute

## 2025-05-14 NOTE — PHYSICAL THERAPY INITIAL EVALUATION ADULT - ADDITIONAL COMMENTS
Patient lives at home with her  and children and has no stairs to enter. Patient reports using a rolling walker at home due to poor vision. Patient reports no falls in the past 6 months. Rollator present at patient's bedside. Patient reported that her daughter brought a rollator for her to use while in the hospital.  Patient left in bedside chair with +chair alarm, +call bell, all lines/tubes intact, and in NAD. RNPeg, aware of patient's position and participation in session.

## 2025-05-14 NOTE — PHYSICAL THERAPY INITIAL EVALUATION ADULT - PERTINENT HX OF CURRENT PROBLEM, REHAB EVAL
Patient is a 69-year-old female who presented to the ED, after being referred by outpatient oncologist, secondary to right upper quadrant pain and elevated liver function test.

## 2025-05-14 NOTE — PHYSICAL THERAPY INITIAL EVALUATION ADULT - PATIENT/FAMILY AGREES WITH PLAN
[FreeTextEntry1] : 1.  SLE: Onset in 2012 with fatigue, fever, arthritis, and nephritis.  A kidney biopsy was performed around 2013 (results unknown).  She was treated with MMF 3 g/d and did well until 2016 at which time she had a  flare (extra-renal: malar rash, SLE in the scalp, Raynauds, alopecia).  Plaquenil and steroids were added.  Since that time she has done quite well.  She has not had cardiopulmonary involvement, oral ulcers, thrombosis. In Apr 2020 she developed a heavy chest, which improved spontaneously.  She also had pain in her fingertips, although no ischemic lesions were noted.  These also improved on their own. In early Jun 2021 she reported nausea without vomiting or abdominal pain that was improving on its own.  She also noted an abnormal sensation in the fingertips and toes without pain or discoloration. Her Feb 2023 labs were notable for increased proteinuria and DNA Ab. Same was true in Mar.  A similar trend would mean more aggressive treatment with or without a kidney biopsy. However, labs improved towards mid-2023. \par 2.  OB history:  uncomplicated pregnancy at age 17\par 3.  Migraine: long-standing problem, although not problematic in recent times until Aug 2020 at which time she had headache for a week.  The symptoms resolved. She saw a neurologist, and an MRI was ordered. However, she was not able to tolerate confinement in the machine. \par 4.  RTC tendonitis: pain in the left shoulder that was improving in late spring 2021 with conservative measures.\par 5.  COVID Infection: In mid-Feb 2023, she developed fever and congestion secondary to COVID. These symptoms resolved, but she was left quite fatigued. Leg pain then ensued, although this has been getting better. \par 6.  DLE: lesions in ears more active in May 2023.  She tried vitamin ointment without benefit. Eventually there was some improvement. \par 7.  Abnormal LFTs: noted in 2023; unclear origin but consider anti-fungal med administered for toenail fungus.\par \par Meds\par Probiotic\par Plaquenil 400 mg/day\par CellCept 0.5 gm/d\par Vitamin D 2000 IU/d\par antifungal\par \par Vaccine\par Prevnar 13 10/16/18 ([id number]; exp 2/20)\par PNVX 23 10/1/19 ([id number]; exp 3/25/21) yes

## 2025-05-15 ENCOUNTER — APPOINTMENT (OUTPATIENT)
Dept: INFUSION THERAPY | Facility: HOSPITAL | Age: 70
End: 2025-05-15

## 2025-05-15 LAB
ADD ON TEST-SPECIMEN IN LAB: SIGNIFICANT CHANGE UP
ADD ON TEST-SPECIMEN IN LAB: SIGNIFICANT CHANGE UP
ALBUMIN SERPL ELPH-MCNC: 3.2 G/DL — LOW (ref 3.3–5)
ALP SERPL-CCNC: 465 U/L — HIGH (ref 40–120)
ALT FLD-CCNC: 144 U/L — HIGH (ref 4–33)
ANION GAP SERPL CALC-SCNC: 14 MMOL/L — SIGNIFICANT CHANGE UP (ref 7–14)
AST SERPL-CCNC: 115 U/L — HIGH (ref 4–32)
BASOPHILS # BLD AUTO: 0.01 K/UL — SIGNIFICANT CHANGE UP (ref 0–0.2)
BASOPHILS NFR BLD AUTO: 0.3 % — SIGNIFICANT CHANGE UP (ref 0–2)
BILIRUB SERPL-MCNC: 1 MG/DL — SIGNIFICANT CHANGE UP (ref 0.2–1.2)
BUN SERPL-MCNC: 22 MG/DL — SIGNIFICANT CHANGE UP (ref 7–23)
CALCIUM SERPL-MCNC: 9.4 MG/DL — SIGNIFICANT CHANGE UP (ref 8.4–10.5)
CHLORIDE SERPL-SCNC: 112 MMOL/L — HIGH (ref 98–107)
CK SERPL-CCNC: 24 U/L — LOW (ref 25–170)
CO2 SERPL-SCNC: 20 MMOL/L — LOW (ref 22–31)
CREAT SERPL-MCNC: 0.61 MG/DL — SIGNIFICANT CHANGE UP (ref 0.5–1.3)
CRP SERPL-MCNC: 38.2 MG/L — HIGH
EGFR: 97 ML/MIN/1.73M2 — SIGNIFICANT CHANGE UP
EGFR: 97 ML/MIN/1.73M2 — SIGNIFICANT CHANGE UP
EOSINOPHIL # BLD AUTO: 0.01 K/UL — SIGNIFICANT CHANGE UP (ref 0–0.5)
EOSINOPHIL NFR BLD AUTO: 0.3 % — SIGNIFICANT CHANGE UP (ref 0–6)
ERYTHROCYTE [SEDIMENTATION RATE] IN BLOOD: 59 MM/HR — HIGH (ref 0–20)
GLUCOSE SERPL-MCNC: 87 MG/DL — SIGNIFICANT CHANGE UP (ref 70–99)
HCT VFR BLD CALC: 29.1 % — LOW (ref 34.5–45)
HGB BLD-MCNC: 8.6 G/DL — LOW (ref 11.5–15.5)
IANC: 3.26 K/UL — SIGNIFICANT CHANGE UP (ref 1.8–7.4)
IMM GRANULOCYTES NFR BLD AUTO: 0.5 % — SIGNIFICANT CHANGE UP (ref 0–0.9)
LYMPHOCYTES # BLD AUTO: 0.58 K/UL — LOW (ref 1–3.3)
LYMPHOCYTES # BLD AUTO: 14.5 % — SIGNIFICANT CHANGE UP (ref 13–44)
MAGNESIUM SERPL-MCNC: 2.1 MG/DL — SIGNIFICANT CHANGE UP (ref 1.6–2.6)
MCHC RBC-ENTMCNC: 29.1 PG — SIGNIFICANT CHANGE UP (ref 27–34)
MCHC RBC-ENTMCNC: 29.6 G/DL — LOW (ref 32–36)
MCV RBC AUTO: 98.3 FL — SIGNIFICANT CHANGE UP (ref 80–100)
MONOCYTES # BLD AUTO: 0.11 K/UL — SIGNIFICANT CHANGE UP (ref 0–0.9)
MONOCYTES NFR BLD AUTO: 2.8 % — SIGNIFICANT CHANGE UP (ref 2–14)
NEUTROPHILS # BLD AUTO: 3.26 K/UL — SIGNIFICANT CHANGE UP (ref 1.8–7.4)
NEUTROPHILS NFR BLD AUTO: 81.6 % — HIGH (ref 43–77)
NRBC # BLD AUTO: 0 K/UL — SIGNIFICANT CHANGE UP (ref 0–0)
NRBC # FLD: 0 K/UL — SIGNIFICANT CHANGE UP (ref 0–0)
NRBC BLD AUTO-RTO: 0 /100 WBCS — SIGNIFICANT CHANGE UP (ref 0–0)
PHOSPHATE SERPL-MCNC: 2.4 MG/DL — LOW (ref 2.5–4.5)
PLATELET # BLD AUTO: 234 K/UL — SIGNIFICANT CHANGE UP (ref 150–400)
POTASSIUM SERPL-MCNC: 4 MMOL/L — SIGNIFICANT CHANGE UP (ref 3.5–5.3)
POTASSIUM SERPL-SCNC: 4 MMOL/L — SIGNIFICANT CHANGE UP (ref 3.5–5.3)
PROT SERPL-MCNC: 6.2 G/DL — SIGNIFICANT CHANGE UP (ref 6–8.3)
RBC # BLD: 2.96 M/UL — LOW (ref 3.8–5.2)
RBC # FLD: 18.6 % — HIGH (ref 10.3–14.5)
SODIUM SERPL-SCNC: 146 MMOL/L — HIGH (ref 135–145)
WBC # BLD: 3.99 K/UL — SIGNIFICANT CHANGE UP (ref 3.8–10.5)
WBC # FLD AUTO: 3.99 K/UL — SIGNIFICANT CHANGE UP (ref 3.8–10.5)

## 2025-05-15 PROCEDURE — 99233 SBSQ HOSP IP/OBS HIGH 50: CPT

## 2025-05-15 PROCEDURE — 99221 1ST HOSP IP/OBS SF/LOW 40: CPT

## 2025-05-15 RX ORDER — ACETAMINOPHEN 500 MG/5ML
975 LIQUID (ML) ORAL EVERY 8 HOURS
Refills: 0 | Status: DISCONTINUED | OUTPATIENT
Start: 2025-05-15 | End: 2025-05-16

## 2025-05-15 RX ORDER — SOD PHOS DI, MONO/K PHOS MONO 250 MG
1 TABLET ORAL EVERY 4 HOURS
Refills: 0 | Status: COMPLETED | OUTPATIENT
Start: 2025-05-15 | End: 2025-05-15

## 2025-05-15 RX ORDER — ACETAMINOPHEN 500 MG/5ML
975 LIQUID (ML) ORAL EVERY 6 HOURS
Refills: 0 | Status: DISCONTINUED | OUTPATIENT
Start: 2025-05-15 | End: 2025-05-15

## 2025-05-15 RX ORDER — ACETAMINOPHEN 500 MG/5ML
650 LIQUID (ML) ORAL EVERY 6 HOURS
Refills: 0 | Status: DISCONTINUED | OUTPATIENT
Start: 2025-05-15 | End: 2025-05-15

## 2025-05-15 RX ORDER — LIDOCAINE HYDROCHLORIDE 20 MG/ML
1 JELLY TOPICAL EVERY 24 HOURS
Refills: 0 | Status: DISCONTINUED | OUTPATIENT
Start: 2025-05-15 | End: 2025-05-15

## 2025-05-15 RX ORDER — LIDOCAINE HYDROCHLORIDE 20 MG/ML
1 JELLY TOPICAL EVERY 24 HOURS
Refills: 0 | Status: DISCONTINUED | OUTPATIENT
Start: 2025-05-15 | End: 2025-05-25

## 2025-05-15 RX ORDER — POLYETHYLENE GLYCOL 3350 17 G/17G
17 POWDER, FOR SOLUTION ORAL DAILY
Refills: 0 | Status: DISCONTINUED | OUTPATIENT
Start: 2025-05-15 | End: 2025-05-17

## 2025-05-15 RX ORDER — BISACODYL 5 MG
5 TABLET, DELAYED RELEASE (ENTERIC COATED) ORAL AT BEDTIME
Refills: 0 | Status: DISCONTINUED | OUTPATIENT
Start: 2025-05-15 | End: 2025-05-17

## 2025-05-15 RX ORDER — ENOXAPARIN SODIUM 100 MG/ML
40 INJECTION SUBCUTANEOUS EVERY 24 HOURS
Refills: 0 | Status: DISCONTINUED | OUTPATIENT
Start: 2025-05-15 | End: 2025-05-25

## 2025-05-15 RX ADMIN — Medication 1 PACKET(S): at 17:38

## 2025-05-15 RX ADMIN — Medication 1 PACKET(S): at 13:30

## 2025-05-15 RX ADMIN — ENOXAPARIN SODIUM 40 MILLIGRAM(S): 100 INJECTION SUBCUTANEOUS at 09:17

## 2025-05-15 RX ADMIN — LOSARTAN POTASSIUM 25 MILLIGRAM(S): 100 TABLET, FILM COATED ORAL at 05:02

## 2025-05-15 RX ADMIN — Medication 975 MILLIGRAM(S): at 21:07

## 2025-05-15 RX ADMIN — Medication 5 MILLIGRAM(S): at 21:07

## 2025-05-15 RX ADMIN — Medication 1 APPLICATION(S): at 09:17

## 2025-05-15 RX ADMIN — Medication 975 MILLIGRAM(S): at 22:07

## 2025-05-15 RX ADMIN — LIDOCAINE HYDROCHLORIDE 1 PATCH: 20 JELLY TOPICAL at 18:02

## 2025-05-15 RX ADMIN — Medication 1 PACKET(S): at 09:17

## 2025-05-15 RX ADMIN — LIDOCAINE HYDROCHLORIDE 1 PATCH: 20 JELLY TOPICAL at 17:37

## 2025-05-15 NOTE — OCCUPATIONAL THERAPY INITIAL EVALUATION ADULT - NSOTDISCHREC_GEN_A_CORE
anticipated after discharge. Recommend continued family supervision/assist with functional activities as needed./No skilled OT needs

## 2025-05-15 NOTE — OCCUPATIONAL THERAPY INITIAL EVALUATION ADULT - MD ORDER
Occupational Therapy (OT) to evaluate and treat. Ambulate with walker with assistance. Per TYSON Dumas, pt is okay to participate in OT evaluation and perform activity as tolerated.

## 2025-05-15 NOTE — OCCUPATIONAL THERAPY INITIAL EVALUATION ADULT - DIAGNOSIS, OT EVAL
Transaminitis, Direct Hyperbilirubinemia and Right Upper Quadrant Pain; Decreased functional mobility; Decreased ADL management

## 2025-05-15 NOTE — OCCUPATIONAL THERAPY INITIAL EVALUATION ADULT - LIVES WITH, PROFILE
Pt. reports she lives with her  and daughter in a house with no steps to enter. Once inside, pt. reports bedroom and bathroom are located on the main level. Per pt., she has a shower stall in her bathroom, +shower chair, however, pt states she mostly sponge-bathes.

## 2025-05-15 NOTE — PROGRESS NOTE ADULT - SUBJECTIVE AND OBJECTIVE BOX
Patient is a 69y old  Female who presents with a chief complaint of Transaminitis, Direct hyperbilirubinemia, Right upper quadrant pain (15 May 2025 09:39)      INTERVAL HPI/OVERNIGHT EVENTS:  Seen by me this afternoon, Mandarin  ID# [id number]. Having lunch at time of visit, feels better, no further abdominal pain. +Shoulder pain is slightly better, also w/ pain on B/L feet. No diarrhea.    Review of Systems: 12 point review of systems otherwise negative    MEDICATIONS  (STANDING):  acetaminophen     Tablet .. 975 milliGRAM(s) Oral every 6 hours  chlorhexidine 2% Cloths 1 Application(s) Topical daily  enoxaparin Injectable 40 milliGRAM(s) SubCutaneous every 24 hours  losartan 25 milliGRAM(s) Oral daily  sodium chloride 0.45%. 1000 milliLiter(s) (100 mL/Hr) IV Continuous <Continuous>    MEDICATIONS  (PRN):  lidocaine   4% Patch 1 Patch Transdermal every 24 hours PRN Pain      Allergies    No Known Allergies    Intolerances          Vital Signs Last 24 Hrs  T(C): 36.6 (15 May 2025 13:06), Max: 36.7 (15 May 2025 04:43)  T(F): 97.9 (15 May 2025 13:06), Max: 98 (15 May 2025 04:43)  HR: 73 (15 May 2025 13:06) (72 - 76)  BP: 127/68 (15 May 2025 13:06) (127/67 - 137/76)  BP(mean): --  RR: 18 (15 May 2025 13:06) (18 - 18)  SpO2: 98% (15 May 2025 13:06) (95% - 98%)    Parameters below as of 15 May 2025 13:06  Patient On (Oxygen Delivery Method): room air      CAPILLARY BLOOD GLUCOSE          05-14 @ 07:01  -  05-15 @ 07:00  --------------------------------------------------------  IN: 1050 mL / OUT: 0 mL / NET: 1050 mL        Physical Exam:    Daily     Daily   General:  Well appearing, NAD, not cachetic  HEENT:  Nonicteric, PERRLA  CV:  RRR, no murmur, no JVD  Lungs:  CTA B/L, no wheezes, rales, rhonchi  Abdomen:  Soft, non-tender, no distended, positive BS  Extremities:  2+ pulses, no c/c, no edema, slightly decreased ROM UE's due to pain  Skin:  Warm and dry, no rashes  :  No arevalo  Neuro:  AAOx3, non-focal, CN II-XII grossly intact  No Restraints    LABS:                        8.6    3.99  )-----------( 234      ( 15 May 2025 05:45 )             29.1     05-15    146[H]  |  112[H]  |  22  ----------------------------<  87  4.0   |  20[L]  |  0.61    Ca    9.4      15 May 2025 05:45  Phos  2.4     05-15  Mg     2.10     05-15    TPro  6.2  /  Alb  3.2[L]  /  TBili  1.0  /  DBili  x   /  AST  115[H]  /  ALT  144[H]  /  AlkPhos  465[H]  05-15    PT/INR - ( 14 May 2025 06:05 )   PT: 11.1 sec;   INR: 0.96 ratio         PTT - ( 14 May 2025 06:05 )  PTT:30.2 sec  Urinalysis Basic - ( 15 May 2025 05:45 )    Color: x / Appearance: x / SG: x / pH: x  Gluc: 87 mg/dL / Ketone: x  / Bili: x / Urobili: x   Blood: x / Protein: x / Nitrite: x   Leuk Esterase: x / RBC: x / WBC x   Sq Epi: x / Non Sq Epi: x / Bacteria: x          RADIOLOGY & ADDITIONAL TESTS:  Reviewed by me

## 2025-05-15 NOTE — OCCUPATIONAL THERAPY INITIAL EVALUATION ADULT - PHYSICAL ASSIST/NONPHYSICAL ASSIST:DRESS LOWER BODY, OT EVAL
Pulmonary dysfunction, COPD exacerbation, dyspnea, steroid dependent set-up required/verbal cues/nonverbal cues (demo/gestures)/1 person assist

## 2025-05-15 NOTE — CONSULT NOTE ADULT - ASSESSMENT
69F with PMH RA, scleroderma, ILD, metastatic colon cancer, HTN, HLD, GERD presenting from onc office due to RUQ pain and transaminitis. Rheumatology consulted for arthralgias and hx of RA and scleroderma    #RA  #scleroderma  #ILD  -Was diagnosed with RA and scleroderma in [place], >30 yrs, was on immunosuppressive medications there but unknown what it was  -Seen by Dr Lara 2024, pt had telengiectasias, ILD, Raynauds, previous digital ulcers, arthralgias  -Positive , , LOW 1: 640 homogenous pattern, weak positive PL12  -Negative systemic sclerosis panel, dsDNA, MARY, sjogrens  -CT chest 3/2025 with stable peripheral reticulations and traction bronchiectasis within the lung bases representing fibrosis  -TTE 2024 pulm artery 33mmHg  -Deferred treatment for RA/arthralgias as pt was undergoing chemo and has not followed up with Dr Lara    Recommendations:  -Please obtain CK, RNA paulette 3  -Please obtain TTE  -Recommend good blood pressure control    Note incomplete  Irasema Baca MD  Rheumatology Fellow 69F with PMH RA, scleroderma, ILD, metastatic colon cancer, HTN, HLD, GERD presenting from onc office due to RUQ pain and transaminitis. Rheumatology consulted for arthralgias and hx of RA and scleroderma    #RA  #scleroderma  #ILD  -Was diagnosed with RA and scleroderma in [place], >30 yrs, was on immunosuppressive medications there but unknown what it was  -Seen by Dr Lara 2024, pt had telengiectasias, ILD, Raynauds, previous digital ulcers, arthralgias  -Positive , , LOW 1: 640 homogenous pattern, weak positive PL12  -Negative systemic sclerosis panel, dsDNA, MARY, sjogrens  -CT chest 3/2025 with stable peripheral reticulations and traction bronchiectasis within the lung bases representing fibrosis  -TTE 2024 pulm artery 33mmHg  -Deferred treatment for RA/arthralgias as pt was undergoing chemo and has not followed up with Dr Lara    #transaminitis  -Pt presenting with abdominal pain and transaminitis  -, transaminitis improving from / to 100s  -CTAP unchanged liver lesions, pt s/p hemicolectomy and cholecystectomy  -Unclear etiology of abd pain and transaminitis, now improving    Pt has RA/scleroderma overlap, currently reporting b/l forearm pain. Pt also has decreased proximal muscle strength upper and lower extremities. DDx includes myositis vs deconditioning vs structural (tendinopathy). PMR is a consideration although not in shoulder or hip girdles and pain is more upper arms. At this time, no synovitis on exam and the pain appears more in upper arm musculature, not particularly in shoulders or elbows. R fifth toe lateral pain likely from callus.     Recommendations:  -Please obtain CK, RNA pualette 3  -Please obtain TTE  -Recommend good blood pressure control    Note incomplete  Irasema Baca MD  Rheumatology Fellow 69F with PMH RA, scleroderma, ILD, metastatic colon cancer, HTN, HLD, GERD presenting from onc office due to RUQ pain and transaminitis. Rheumatology consulted for arthralgias and hx of RA and scleroderma    #RA  #scleroderma  #ILD  -Was diagnosed with RA and scleroderma in [place], >30 yrs, was on immunosuppressive medications there but unknown what it was  -Seen by Dr Lara 2024, pt had telengiectasias, ILD, Raynauds, previous digital ulcers, arthralgias  -Positive , , LOW 1: 640 homogenous pattern, weak positive PL12  -Negative systemic sclerosis panel, dsDNA, MARY, sjogrens  -CT chest 3/2025 with stable peripheral reticulations and traction bronchiectasis within the lung bases representing fibrosis  -TTE 2024 pulm artery 33mmHg  -Deferred treatment for RA/arthralgias as pt was undergoing chemo and has not followed up with Dr Lara  -Currently with pain upper arms, initially started L arm then R arm and now back to L arm    #transaminitis  -Pt presenting with abdominal pain and transaminitis  -, transaminitis improving from / to 100s  -CTAP unchanged liver lesions, pt s/p hemicolectomy and cholecystectomy  -Unclear etiology of abd pain and transaminitis, now improving    Pt has RA/scleroderma overlap, currently reporting L upper arm pain. Pt also has decreased proximal muscle strength upper and lower extremities. DDx includes muscular strain/tendinopathy vs deconditioning. Myositis is a consideration given previous weak positive PL12, however less likely given CK is 24. PMR is a consideration although pain is not in shoulder or hip girdles.. At this time, no synovitis on exam and the pain appears more in upper arm musculature, not particularly in shoulders or elbows. R fifth toe lateral pain likely from callus.     Recommendations:  -Please obtain myoglobin and aldolase (ordered)  -Recommend lidocaine patch to L upper arm and PT    Case discussed with Dr Marizta Baca MD  Rheumatology Fellow

## 2025-05-15 NOTE — OCCUPATIONAL THERAPY INITIAL EVALUATION ADULT - PERTINENT HX OF CURRENT PROBLEM, REHAB EVAL
Pt is a 69 year old female with hx of Hypertension, Dyslipidemia, Interstitial lung disease, Iron deficiency anemia, Back pain, Rheumatoid arthritis, Normocytic anemia, Scleroderma, Pulmonary hypertension (mild), Stage IV right colon cancer with metastases to liver and lung, Constipation, GERD, and Hepatic infusion pumps, who presented to [facility] secondary to right upper quadrant pain and elevated liver function test, referred by outpatient oncologist to come to ED. Pt diagnosed with Transaminitis, Direct Hyperbilirubinemia and Right Upper Quadrant Pain in the ED. US Right Abdomen: No acute pathology. Heterogeneous hepatic echogenicity, likely related to metastatic disease/post ablation cavities seen on prior imaging. XRay of Right Shoulder: No acute fracture.

## 2025-05-15 NOTE — CONSULT NOTE ADULT - SUBJECTIVE AND OBJECTIVE BOX
GEORGEDANTE MIRIAN  [id number]    HISTORY OF PRESENT ILLNESS:  "69-year-old female, with past history significant for Hypertension, Dyslipidemia, Interstitial lung disease, Iron deficiency anemia, Back pain, Rheumatoid arthritis, Normocytic anemia, Scleroderma, Pulmonary hypertension (mild), Stage IV right colon cancer with metastases to liver and lung, Constipation, GERD, and Hepatic infusion pumps, presented to the ED, aft er being referred by outpatient oncologist, secondary to right upper quadrant pain and elevated liver function test.  Seen and evaluated at bedside with daughter present; NAD.  Patient reports onset of mid abdominal pain a few days ago, and more recent onset of right upper extremity pain - to the extent that there is difficulty raising the RUE against gravity.  Pain of the abdomen is rated at ~ 6/10 maximum intensity, while that or the RUE is rated at around 8/10 maximum intensity.  No inciting factor identified, and no prior occurrence.  No associated nausea, vomiting, fever, chills, diaphoresis.  Does suffer with intermittent constipation (treated with increased consumption of fruits and vegetables) and diarrhea.    Reports pain of the right lateral anterior foot, which has been occurring for the past few months."    Diagnosed with metastatic colon cancer , mucinous adenocarcinoma  S/p R hemicolectomy, cholecystectomy  S/p 5FU, FOLFOX, hepatic artery infusion pump 2024, FUDR, RT to liver  Most recently on 5FU and leucovorin maintenance therapy    Rheum Hx:  -Was diagnosed with RA and scleroderma in [place], >30 yrs, was on immunosuppressive medications there but unknown what it was  -Seen by Dr Lara , pt had telengiectasias, ILD, Raynauds, previous digital ulcers, arthralgias  -Positive , , LOW 1: 640 homogenous pattern, weak positive PL12  -Negative systemic sclerosis panel, dsDNA, MARY, sjogrens  -Deferred treatment as pt was undergoing chemo           Rheum ROS    Denies fevers/chills/weight loss/night sweats/alopecia/sinus disease/asthma history/oral ulcers/dysphagia/vision changes/Raynauds/VTE/miscarraiges/sicca symptoms/urinary changes/edema/SOB/joint pain/swelling/jaw claudication/rash/photosensitivity/morning stiffness    Endorses fevers/chills/weight loss/night sweats/alopecia/sinus disease/asthma history/oral ulcers/dysphagia/vision changes/Raynauds/VTE/miscarraiges/sicca symptoms/urinary changes/edema/SOB/joint pain/swelling/jaw claudication/rash/photosensitivity/morning stiffness      MEDICATIONS  (STANDING):  chlorhexidine 2% Cloths 1 Application(s) Topical daily  enoxaparin Injectable 40 milliGRAM(s) SubCutaneous every 24 hours  losartan 25 milliGRAM(s) Oral daily  potassium phosphate / sodium phosphate Powder (PHOS-NaK) 1 Packet(s) Oral every 4 hours  sodium chloride 0.45%. 1000 milliLiter(s) (100 mL/Hr) IV Continuous <Continuous>    MEDICATIONS  (PRN):  acetaminophen     Tablet .. 650 milliGRAM(s) Oral every 6 hours PRN Mild Pain (1 - 3)      Allergies    No Known Allergies    Intolerances        PERTINENT MEDICATION HISTORY:    SOCIAL HISTORY:  OCCUPATION:  TRAVEL HISTORY:    FAMILY HISTORY:  Family history of rheumatoid arthritis (Grandparent)    Family history of cancer of genital organ (Grandparent)    Family history of malignant neoplasm of digestive organ (Uncle)        Vital Signs Last 24 Hrs  T(C): 36.7 (15 May 2025 04:43), Max: 36.7 (15 May 2025 04:43)  T(F): 98 (15 May 2025 04:43), Max: 98 (15 May 2025 04:43)  HR: 72 (15 May 2025 04:43) (70 - 77)  BP: 137/76 (15 May 2025 04:43) (123/59 - 176/62)  BP(mean): --  RR: 18 (15 May 2025 04:43) (18 - 18)  SpO2: 95% (15 May 2025 04:43) (95% - 97%)    Parameters below as of 15 May 2025 04:43  Patient On (Oxygen Delivery Method): room air        ROS as noted above     Physical Exam:  General: No apparent distress  HEENT: EOMI, MMM  CVS: +S1/S2, RRR, no murmurs/rubs/gallops  Resp: CTA b/l. No crackles/wheezing  GI: Soft, NT/ND +BS  MSK: no swelling/warmth/erythema of the joints of the UE/LE  Neuro: AAOx3  Skin: no visible rashes    LABS:                        8.6    3.99  )-----------( 234      ( 15 May 2025 05:45 )             29.1     05-15    146[H]  |  112[H]  |  22  ----------------------------<  87  4.0   |  20[L]  |  0.61    Ca    9.4      15 May 2025 05:45  Phos  2.4     05-15  Mg     2.10     05-15    TPro  6.2  /  Alb  3.2[L]  /  TBili  1.0  /  DBili  x   /  AST  115[H]  /  ALT  144[H]  /  AlkPhos  465[H]  05-15    PT/INR - ( 14 May 2025 06:05 )   PT: 11.1 sec;   INR: 0.96 ratio         PTT - ( 14 May 2025 06:05 )  PTT:30.2 sec  Urinalysis Basic - ( 15 May 2025 05:45 )    Color: x / Appearance: x / SG: x / pH: x  Gluc: 87 mg/dL / Ketone: x  / Bili: x / Urobili: x   Blood: x / Protein: x / Nitrite: x   Leuk Esterase: x / RBC: x / WBC x   Sq Epi: x / Non Sq Epi: x / Bacteria: x            Rheumatology Work Up    Centromere Antibody: <0.2 AI [Fluorescent Bead Immunoassay                      Centomere B Antibodies, IgG                      <1.0 AI (negative)                      > or =1.0 AI (positive)                      Reference values apply to all  ages.] (10-31-23 @ 06:43)  RNA Polymerase III Ig Units *H* [0 - 19] [INTERPRETIVE INFORMATION: RNA Polymerase III Antibody, IgG    19 Units or less ......Negative    20 - 39 Units .........Weak Positive    40 - 80 Units .........Moderate Positive    81 Units or greater ...Strong Positive  The presence of RNA polymerase III IgG antibody, when considered  in conjunction with other laboratory and clinical findings, is an  aid in the diagnosis of systemic sclerosis (SSc) with increased  incidence of skin involvement and renal crisis with the diffuse  cutaneous form ofSSc. RNA polymerase III IgG antibody occur in  about 11-23 percent of SSc patients, and typically in the absence  of anti-centromere and anti-Scl-70 antibodies.  A negative result indicates no detectable IgG antibodies to the  dominant antigen of RNApolymerase III and does not rule out the  possibility of SSc. False-positive results may also occur due to  non-specific binding of immune complexes. Strong clinical  correlation is recommended.  If clinical suspicion remains, consider additional testing for  other antibodies associated with SSc, including centromere,  Scl-70, U3-RNP, PM/Scl, or Th/To.  Performed By: [email or web address]  [address] [place] [id number]  : Juan Mott MD, PhD  CLIA Number: [id number]] (10-31-23 @ 06:43)  Scleroderma Antibodies: <0.2 AI [Fluorescent Bead Immunoassay                       Scl 70  Antibodies, IgG                      <1.0 AI (negative)                      > or =1.0 AI (positive)                      Reference values apply to all ages.] (10-29-23 @ 07:14)            RADIOLOGY & ADDITIONAL STUDIES:    < from: CT Abdomen and Pelvis w/ IV Cont (25 @ 17:23) >    IMPRESSION:  No acute findings in the abdomen or pelvis.    Hepatic findings unchanged since CT abdomen and pelvis dated 2025.   Please refer to prior MR abdomen for better characterization.    < end of copied text >    < from: US Abdomen Upper Quadrant Right (25 @ 18:27) >  IMPRESSION:  No acute pathology. Heterogeneous hepatic echogenicity, likely related to   metastatic disease/post ablation cavities seen on prior imaging.    < end of copied text >   GEORGEDANTE MIRIAN  [id number]    HISTORY OF PRESENT ILLNESS:  "69-year-old female, with past history significant for Hypertension, Dyslipidemia, Interstitial lung disease, Iron deficiency anemia, Back pain, Rheumatoid arthritis, Normocytic anemia, Scleroderma, Pulmonary hypertension (mild), Stage IV right colon cancer with metastases to liver and lung, Constipation, GERD, and Hepatic infusion pumps, presented to the ED, aft er being referred by outpatient oncologist, secondary to right upper quadrant pain and elevated liver function test.  Seen and evaluated at bedside with daughter present; NAD.  Patient reports onset of mid abdominal pain a few days ago, and more recent onset of right upper extremity pain - to the extent that there is difficulty raising the RUE against gravity.  Pain of the abdomen is rated at ~ 6/10 maximum intensity, while that or the RUE is rated at around 8/10 maximum intensity.  No inciting factor identified, and no prior occurrence.  No associated nausea, vomiting, fever, chills, diaphoresis.  Does suffer with intermittent constipation (treated with increased consumption of fruits and vegetables) and diarrhea.    Reports pain of the right lateral anterior foot, which has been occurring for the past few months."    Diagnosed with metastatic colon cancer , mucinous adenocarcinoma  S/p R hemicolectomy, cholecystectomy  S/p 5FU, FOLFOX, hepatic artery infusion pump 2024, FUDR, RT to liver  Most recently on 5FU and leucovorin maintenance therapy    Rheum Hx:  -Was diagnosed with RA and scleroderma in [place], >30 yrs, was on immunosuppressive medications there but unknown what it was  -Seen by Dr Lara , pt had telengiectasias, ILD, Raynauds, previous digital ulcers, arthralgias  -Positive , , LOW 1: 640 homogenous pattern, weak positive PL12  -Negative systemic sclerosis panel, dsDNA, MARY, sjogrens  -Deferred treatment as pt was undergoing chemo     Further history obtained with Mandarin  [id number]  Pt states she has pain b/l upper arms, not in shoulders or elbows. Feels painful to lift her arms above her head. Described as soreness in muscles, is intermittent and has been ongoing for 1 week. Denies trauma or increased exercise/activtity. Has R foot lateral MTP pain where she has a callus, has been ongoing for several months. Endorses raynauds, GERD. No new rashses, denies HA, vision changes, scalp tenderness, jaw claudication, joint pain, CP, SOB, urinary changes. Abd pain resolving      Rheum ROS  as above      MEDICATIONS  (STANDING):  chlorhexidine 2% Cloths 1 Application(s) Topical daily  enoxaparin Injectable 40 milliGRAM(s) SubCutaneous every 24 hours  losartan 25 milliGRAM(s) Oral daily  potassium phosphate / sodium phosphate Powder (PHOS-NaK) 1 Packet(s) Oral every 4 hours  sodium chloride 0.45%. 1000 milliLiter(s) (100 mL/Hr) IV Continuous <Continuous>    MEDICATIONS  (PRN):  acetaminophen     Tablet .. 650 milliGRAM(s) Oral every 6 hours PRN Mild Pain (1 - 3)      Allergies    No Known Allergies    Intolerances        PERTINENT MEDICATION HISTORY: none    SOCIAL HISTORY: did not obtain  OCCUPATION: did not obtain  TRAVEL HISTORY: immigrated from [place]     FAMILY HISTORY:  Family history of rheumatoid arthritis (Grandparent)    Family history of cancer of genital organ (Grandparent)    Family history of malignant neoplasm of digestive organ (Uncle)        Vital Signs Last 24 Hrs  T(C): 36.7 (15 May 2025 04:43), Max: 36.7 (15 May 2025 04:43)  T(F): 98 (15 May 2025 04:43), Max: 98 (15 May 2025 04:43)  HR: 72 (15 May 2025 04:43) (70 - 77)  BP: 137/76 (15 May 2025 04:43) (123/59 - 176/62)  BP(mean): --  RR: 18 (15 May 2025 04:43) (18 - 18)  SpO2: 95% (15 May 2025 04:43) (95% - 97%)    Parameters below as of 15 May 2025 04:43  Patient On (Oxygen Delivery Method): room air        ROS as noted above     Physical Exam:  General: No apparent distress  HEENT: EOMI, MMM, no carotid bruit, no scalp tenderness  CVS: +S1/S2, RRR, no murmurs/rubs/gallops  Resp: CTA b/l. No crackles/wheezing  GI: Soft, NT/ND +BS  MSK: no swelling/warmth/erythema of the joints of the UE/LE. TTP upper arms. Full ROM shoulders although has pain with arm movement. Negative empty can sign. Biceps 4/5, hip flexors 4/5  Neuro: AAOx3  Skin: +telengiectasias, no skin tightening     LABS:                        8.6    3.99  )-----------( 234      ( 15 May 2025 05:45 )             29.1     05-15    146[H]  |  112[H]  |  22  ----------------------------<  87  4.0   |  20[L]  |  0.61    Ca    9.4      15 May 2025 05:45  Phos  2.4     05-15  Mg     2.10     05-15    TPro  6.2  /  Alb  3.2[L]  /  TBili  1.0  /  DBili  x   /  AST  115[H]  /  ALT  144[H]  /  AlkPhos  465[H]  05-15    PT/INR - ( 14 May 2025 06:05 )   PT: 11.1 sec;   INR: 0.96 ratio         PTT - ( 14 May 2025 06:05 )  PTT:30.2 sec  Urinalysis Basic - ( 15 May 2025 05:45 )    Color: x / Appearance: x / SG: x / pH: x  Gluc: 87 mg/dL / Ketone: x  / Bili: x / Urobili: x   Blood: x / Protein: x / Nitrite: x   Leuk Esterase: x / RBC: x / WBC x   Sq Epi: x / Non Sq Epi: x / Bacteria: x            Rheumatology Work Up    Centromere Antibody: <0.2 AI [Fluorescent Bead Immunoassay                      Centomere B Antibodies, IgG                      <1.0 AI (negative)                      > or =1.0 AI (positive)                      Reference values apply to all  ages.] (10-31-23 @ 06:43)  RNA Polymerase III Ig Units *H* [0 - 19] [INTERPRETIVE INFORMATION: RNA Polymerase III Antibody, IgG    19 Units or less ......Negative    20 - 39 Units .........Weak Positive    40 - 80 Units .........Moderate Positive    81 Units or greater ...Strong Positive  The presence of RNA polymerase III IgG antibody, when considered  in conjunction with other laboratory and clinical findings, is an  aid in the diagnosis of systemic sclerosis (SSc) with increased  incidence of skin involvement and renal crisis with the diffuse  cutaneous form ofSSc. RNA polymerase III IgG antibody occur in  about 11-23 percent of SSc patients, and typically in the absence  of anti-centromere and anti-Scl-70 antibodies.  A negative result indicates no detectable IgG antibodies to the  dominant antigen of RNApolymerase III and does not rule out the  possibility of SSc. False-positive results may also occur due to  non-specific binding of immune complexes. Strong clinical  correlation is recommended.  If clinical suspicion remains, consider additional testing for  other antibodies associated with SSc, including centromere,  Scl-70, U3-RNP, PM/Scl, or Th/To.  Performed By: [facility]  [address] [place] [id number]  : Juan Mott MD, PhD  CLIA Number: [id number]] (10-31-23 @ 06:43)  Scleroderma Antibodies: <0.2 AI [Fluorescent Bead Immunoassay                       Scl 70  Antibodies, IgG                      <1.0 AI (negative)                      > or =1.0 AI (positive)                      Reference values apply to all ages.] (10-29-23 @ 07:14)            RADIOLOGY & ADDITIONAL STUDIES:    < from: CT Abdomen and Pelvis w/ IV Cont (25 @ 17:23) >    IMPRESSION:  No acute findings in the abdomen or pelvis.    Hepatic findings unchanged since CT abdomen and pelvis dated 2025.   Please refer to prior MR abdomen for better characterization.    < end of copied text >    < from: US Abdomen Upper Quadrant Right (25 @ 18:27) >  IMPRESSION:  No acute pathology. Heterogeneous hepatic echogenicity, likely related to   metastatic disease/post ablation cavities seen on prior imaging.    < end of copied text >   GEORGEDANTE MIRIAN  [id number]    HISTORY OF PRESENT ILLNESS:  "69-year-old female, with past history significant for Hypertension, Dyslipidemia, Interstitial lung disease, Iron deficiency anemia, Back pain, Rheumatoid arthritis, Normocytic anemia, Scleroderma, Pulmonary hypertension (mild), Stage IV right colon cancer with metastases to liver and lung, Constipation, GERD, and Hepatic infusion pumps, presented to the ED, aft er being referred by outpatient oncologist, secondary to right upper quadrant pain and elevated liver function test.  Seen and evaluated at bedside with daughter present; NAD.  Patient reports onset of mid abdominal pain a few days ago, and more recent onset of right upper extremity pain - to the extent that there is difficulty raising the RUE against gravity.  Pain of the abdomen is rated at ~ 6/10 maximum intensity, while that or the RUE is rated at around 8/10 maximum intensity.  No inciting factor identified, and no prior occurrence.  No associated nausea, vomiting, fever, chills, diaphoresis.  Does suffer with intermittent constipation (treated with increased consumption of fruits and vegetables) and diarrhea.    Reports pain of the right lateral anterior foot, which has been occurring for the past few months."    Diagnosed with metastatic colon cancer , mucinous adenocarcinoma  S/p R hemicolectomy, cholecystectomy  S/p 5FU, FOLFOX, hepatic artery infusion pump 2024, FUDR, RT to liver  Most recently on 5FU and leucovorin maintenance therapy    Rheum Hx:  -Was diagnosed with RA and scleroderma in [place], >30 yrs, was on immunosuppressive medications there but unknown what it was  -Seen by Dr Lara , pt had telengiectasias, ILD, Raynauds, previous digital ulcers, arthralgias  -Positive , , LOW 1: 640 homogenous pattern, weak positive PL12  -Negative systemic sclerosis panel, dsDNA, MARY, sjogrens  -Deferred treatment as pt was undergoing chemo     Further history obtained with Mandarin  [id number]  Pt states she has pain b/l upper arms, not in shoulders or elbows. Feels painful to lift her arms above her head. Described as soreness in muscles, is intermittent and has been ongoing for 1 week. Denies trauma or increased exercise/activtity. Has R foot lateral MTP pain where she has a callus, has been ongoing for several months. Endorses raynauds, GERD. No new rashses, denies HA, vision changes, scalp tenderness, jaw claudication, joint pain, CP, SOB, urinary changes. Abd pain resolving    Mandarin  [id number] when seen again with Dr Salas  -Additional info obtained, pain started L upper arm, then moved to R upper arm, currently mostly in L upper arm      Rheum ROS  as above      MEDICATIONS  (STANDING):  chlorhexidine 2% Cloths 1 Application(s) Topical daily  enoxaparin Injectable 40 milliGRAM(s) SubCutaneous every 24 hours  losartan 25 milliGRAM(s) Oral daily  potassium phosphate / sodium phosphate Powder (PHOS-NaK) 1 Packet(s) Oral every 4 hours  sodium chloride 0.45%. 1000 milliLiter(s) (100 mL/Hr) IV Continuous <Continuous>    MEDICATIONS  (PRN):  acetaminophen     Tablet .. 650 milliGRAM(s) Oral every 6 hours PRN Mild Pain (1 - 3)      Allergies    No Known Allergies    Intolerances        PERTINENT MEDICATION HISTORY: none    SOCIAL HISTORY: did not obtain  OCCUPATION: did not obtain  TRAVEL HISTORY: immigrated from [place]     FAMILY HISTORY:  Family history of rheumatoid arthritis (Grandparent)    Family history of cancer of genital organ (Grandparent)    Family history of malignant neoplasm of digestive organ (Uncle)        Vital Signs Last 24 Hrs  T(C): 36.7 (15 May 2025 04:43), Max: 36.7 (15 May 2025 04:43)  T(F): 98 (15 May 2025 04:43), Max: 98 (15 May 2025 04:43)  HR: 72 (15 May 2025 04:43) (70 - 77)  BP: 137/76 (15 May 2025 04:43) (123/59 - 176/62)  BP(mean): --  RR: 18 (15 May 2025 04:43) (18 - 18)  SpO2: 95% (15 May 2025 04:43) (95% - 97%)    Parameters below as of 15 May 2025 04:43  Patient On (Oxygen Delivery Method): room air        ROS as noted above     Physical Exam:  General: No apparent distress  HEENT: EOMI, MMM, no carotid bruit, no scalp tenderness  CVS: +S1/S2, RRR, no murmurs/rubs/gallops  Resp: CTA b/l. No crackles/wheezing  GI: Soft, NT/ND +BS  MSK: no swelling/warmth/erythema of the joints of the UE/LE. TTP upper arms. Full ROM shoulders although has pain with arm movement. Negative empty can sign. Biceps 4/5, hip flexors 4/5  Neuro: AAOx3  Skin: +telengiectasias, no skin tightening, decreased oral aperture    LABS:                        8.6    3.99  )-----------( 234      ( 15 May 2025 05:45 )             29.1     05-15    146[H]  |  112[H]  |  22  ----------------------------<  87  4.0   |  20[L]  |  0.61    Ca    9.4      15 May 2025 05:45  Phos  2.4     -15  Mg     2.10     -15    TPro  6.2  /  Alb  3.2[L]  /  TBili  1.0  /  DBili  x   /  AST  115[H]  /  ALT  144[H]  /  AlkPhos  465[H]  05-15    PT/INR - ( 14 May 2025 06:05 )   PT: 11.1 sec;   INR: 0.96 ratio         PTT - ( 14 May 2025 06:05 )  PTT:30.2 sec  Urinalysis Basic - ( 15 May 2025 05:45 )    Color: x / Appearance: x / SG: x / pH: x  Gluc: 87 mg/dL / Ketone: x  / Bili: x / Urobili: x   Blood: x / Protein: x / Nitrite: x   Leuk Esterase: x / RBC: x / WBC x   Sq Epi: x / Non Sq Epi: x / Bacteria: x            Rheumatology Work Up    Centromere Antibody: <0.2 AI [Fluorescent Bead Immunoassay                      Centomere B Antibodies, IgG                      <1.0 AI (negative)                      > or =1.0 AI (positive)                      Reference values apply to all  ages.] (10-31-23 @ 06:43)  RNA Polymerase III Ig Units *H* [0 - 19] [INTERPRETIVE INFORMATION: RNA Polymerase III Antibody, IgG    19 Units or less ......Negative    20 - 39 Units .........Weak Positive    40 - 80 Units .........Moderate Positive    81 Units or greater ...Strong Positive  The presence of RNA polymerase III IgG antibody, when considered  in conjunction with other laboratory and clinical findings, is an  aid in the diagnosis of systemic sclerosis (SSc) with increased  incidence of skin involvement and renal crisis with the diffuse  cutaneous form ofSSc. RNA polymerase III IgG antibody occur in  about 11-23 percent of SSc patients, and typically in the absence  of anti-centromere and anti-Scl-70 antibodies.  A negative result indicates no detectable IgG antibodies to the  dominant antigen of RNApolymerase III and does not rule out the  possibility of SSc. False-positive results may also occur due to  non-specific binding of immune complexes. Strong clinical  correlation is recommended.  If clinical suspicion remains, consider additional testing for  other antibodies associated with SSc, including centromere,  Scl-70, U3-RNP, PM/Scl, or Th/To.  Performed By: [facility]  [address] [place] [address]  : Juan Mott MD, PhD  CLIA Number: [id number]] (10-31-23 @ 06:43)  Scleroderma Antibodies: <0.2 AI [Fluorescent Bead Immunoassay                       Scl 70  Antibodies, IgG                      <1.0 AI (negative)                      > or =1.0 AI (positive)                      Reference values apply to all ages.] (10-29-23 @ 07:14)            RADIOLOGY & ADDITIONAL STUDIES:    < from: CT Abdomen and Pelvis w/ IV Cont (25 @ 17:23) >    IMPRESSION:  No acute findings in the abdomen or pelvis.    Hepatic findings unchanged since CT abdomen and pelvis dated 2025.   Please refer to prior MR abdomen for better characterization.    < end of copied text >    < from: US Abdomen Upper Quadrant Right (25 @ 18:27) >  IMPRESSION:  No acute pathology. Heterogeneous hepatic echogenicity, likely related to   metastatic disease/post ablation cavities seen on prior imaging.    < end of copied text >

## 2025-05-15 NOTE — OCCUPATIONAL THERAPY INITIAL EVALUATION ADULT - GENERAL OBSERVATIONS, REHAB EVAL
Pt. received semisupine in bed. No acute distress. Patient agreed to evaluation from Occupational Therapist. +Heplock, +pump.

## 2025-05-16 ENCOUNTER — NON-APPOINTMENT (OUTPATIENT)
Age: 70
End: 2025-05-16

## 2025-05-16 LAB
ALBUMIN SERPL ELPH-MCNC: 3.1 G/DL — LOW (ref 3.3–5)
ALP SERPL-CCNC: 451 U/L — HIGH (ref 40–120)
ALT FLD-CCNC: 188 U/L — HIGH (ref 4–33)
ANION GAP SERPL CALC-SCNC: 13 MMOL/L — SIGNIFICANT CHANGE UP (ref 7–14)
AST SERPL-CCNC: 241 U/L — HIGH (ref 4–32)
BASOPHILS # BLD AUTO: 0.03 K/UL — SIGNIFICANT CHANGE UP (ref 0–0.2)
BASOPHILS NFR BLD AUTO: 0.9 % — SIGNIFICANT CHANGE UP (ref 0–2)
BILIRUB SERPL-MCNC: 0.9 MG/DL — SIGNIFICANT CHANGE UP (ref 0.2–1.2)
BUN SERPL-MCNC: 29 MG/DL — HIGH (ref 7–23)
CALCIUM SERPL-MCNC: 9.3 MG/DL — SIGNIFICANT CHANGE UP (ref 8.4–10.5)
CHLORIDE SERPL-SCNC: 109 MMOL/L — HIGH (ref 98–107)
CMV DNA CSF QL NAA+PROBE: SIGNIFICANT CHANGE UP IU/ML
CMV DNA SPEC NAA+PROBE-LOG#: SIGNIFICANT CHANGE UP LOG10IU/ML
CO2 SERPL-SCNC: 20 MMOL/L — LOW (ref 22–31)
CREAT SERPL-MCNC: 0.57 MG/DL — SIGNIFICANT CHANGE UP (ref 0.5–1.3)
EBV DNA SERPL NAA+PROBE-ACNC: ABNORMAL IU/ML
EBV EA AB SER IA-ACNC: >150 U/ML — HIGH
EBV EA AB TITR SER IF: POSITIVE
EBV EA IGG SER-ACNC: POSITIVE
EBV NA IGG SER IA-ACNC: 108 U/ML — HIGH
EBV PATRN SPEC IB-IMP: SIGNIFICANT CHANGE UP
EBV VCA IGG AVIDITY SER QL IA: POSITIVE
EBV VCA IGM SER IA-ACNC: 606 U/ML — HIGH
EBV VCA IGM SER IA-ACNC: <10 U/ML — SIGNIFICANT CHANGE UP
EBV VCA IGM TITR FLD: NEGATIVE — SIGNIFICANT CHANGE UP
EBVPCR LOG: ABNORMAL LOG10IU/ML
EGFR: 98 ML/MIN/1.73M2 — SIGNIFICANT CHANGE UP
EGFR: 98 ML/MIN/1.73M2 — SIGNIFICANT CHANGE UP
EOSINOPHIL # BLD AUTO: 0.03 K/UL — SIGNIFICANT CHANGE UP (ref 0–0.5)
EOSINOPHIL NFR BLD AUTO: 0.9 % — SIGNIFICANT CHANGE UP (ref 0–6)
GIANT PLATELETS BLD QL SMEAR: PRESENT — SIGNIFICANT CHANGE UP
GLUCOSE BLDC GLUCOMTR-MCNC: 111 MG/DL — HIGH (ref 70–99)
GLUCOSE SERPL-MCNC: 87 MG/DL — SIGNIFICANT CHANGE UP (ref 70–99)
HCT VFR BLD CALC: 31.2 % — LOW (ref 34.5–45)
HGB BLD-MCNC: 9.4 G/DL — LOW (ref 11.5–15.5)
IANC: 2.23 K/UL — SIGNIFICANT CHANGE UP (ref 1.8–7.4)
LYMPHOCYTES # BLD AUTO: 0.61 K/UL — LOW (ref 1–3.3)
LYMPHOCYTES # BLD AUTO: 19.1 % — SIGNIFICANT CHANGE UP (ref 13–44)
MAGNESIUM SERPL-MCNC: 2.1 MG/DL — SIGNIFICANT CHANGE UP (ref 1.6–2.6)
MANUAL SMEAR VERIFICATION: SIGNIFICANT CHANGE UP
MCHC RBC-ENTMCNC: 29.6 PG — SIGNIFICANT CHANGE UP (ref 27–34)
MCHC RBC-ENTMCNC: 30.1 G/DL — LOW (ref 32–36)
MCV RBC AUTO: 98.1 FL — SIGNIFICANT CHANGE UP (ref 80–100)
MONOCYTES # BLD AUTO: 0.03 K/UL — SIGNIFICANT CHANGE UP (ref 0–0.9)
MONOCYTES NFR BLD AUTO: 0.9 % — LOW (ref 2–14)
MYOGLOBIN SERPL-MCNC: <21 NG/ML — LOW (ref 25–58)
NEUTROPHILS # BLD AUTO: 2.45 K/UL — SIGNIFICANT CHANGE UP (ref 1.8–7.4)
NEUTROPHILS NFR BLD AUTO: 77.3 % — HIGH (ref 43–77)
PHOSPHATE SERPL-MCNC: 3.8 MG/DL — SIGNIFICANT CHANGE UP (ref 2.5–4.5)
PLAT MORPH BLD: NORMAL — SIGNIFICANT CHANGE UP
PLATELET # BLD AUTO: 252 K/UL — SIGNIFICANT CHANGE UP (ref 150–400)
PLATELET COUNT - ESTIMATE: NORMAL — SIGNIFICANT CHANGE UP
POTASSIUM SERPL-MCNC: 4 MMOL/L — SIGNIFICANT CHANGE UP (ref 3.5–5.3)
POTASSIUM SERPL-SCNC: 4 MMOL/L — SIGNIFICANT CHANGE UP (ref 3.5–5.3)
PROT SERPL-MCNC: 6.4 G/DL — SIGNIFICANT CHANGE UP (ref 6–8.3)
RBC # BLD: 3.18 M/UL — LOW (ref 3.8–5.2)
RBC # FLD: 18.6 % — HIGH (ref 10.3–14.5)
RBC BLD AUTO: NORMAL — SIGNIFICANT CHANGE UP
SMUDGE CELLS # BLD: PRESENT — SIGNIFICANT CHANGE UP
SODIUM SERPL-SCNC: 142 MMOL/L — SIGNIFICANT CHANGE UP (ref 135–145)
VARIANT LYMPHS # BLD: 0.9 % — SIGNIFICANT CHANGE UP (ref 0–6)
VARIANT LYMPHS NFR BLD MANUAL: 0.9 % — SIGNIFICANT CHANGE UP (ref 0–6)
WBC # BLD: 3.17 K/UL — LOW (ref 3.8–10.5)
WBC # FLD AUTO: 3.17 K/UL — LOW (ref 3.8–10.5)

## 2025-05-16 PROCEDURE — 99233 SBSQ HOSP IP/OBS HIGH 50: CPT

## 2025-05-16 RX ADMIN — Medication 5 MILLIGRAM(S): at 21:06

## 2025-05-16 RX ADMIN — ENOXAPARIN SODIUM 40 MILLIGRAM(S): 100 INJECTION SUBCUTANEOUS at 09:27

## 2025-05-16 RX ADMIN — LIDOCAINE HYDROCHLORIDE 1 PATCH: 20 JELLY TOPICAL at 05:39

## 2025-05-16 RX ADMIN — Medication 975 MILLIGRAM(S): at 06:39

## 2025-05-16 RX ADMIN — Medication 1 APPLICATION(S): at 17:24

## 2025-05-16 RX ADMIN — LOSARTAN POTASSIUM 25 MILLIGRAM(S): 100 TABLET, FILM COATED ORAL at 05:40

## 2025-05-16 RX ADMIN — Medication 975 MILLIGRAM(S): at 05:39

## 2025-05-16 NOTE — DIETITIAN INITIAL EVALUATION ADULT - PERTINENT LABORATORY DATA
05-16    142  |  109[H]  |  29[H]  ----------------------------<  87  4.0   |  20[L]  |  0.57    Ca    9.3      16 May 2025 06:05  Phos  3.8     05-16  Mg     2.10     05-16    TPro  6.4  /  Alb  3.1[L]  /  TBili  0.9  /  DBili  x   /  AST  241[H]  /  ALT  188[H]  /  AlkPhos  451[H]  05-16  POCT Blood Glucose.: 111 mg/dL (05-16-25 @ 08:36)

## 2025-05-16 NOTE — DIETITIAN INITIAL EVALUATION ADULT - PROBLEM SELECTOR PLAN 7
And to the lung, per chart review  Follows with oncologist as outpatient  - Oncology consult in the AM

## 2025-05-16 NOTE — DIETITIAN INITIAL EVALUATION ADULT - ORAL INTAKE PTA/DIET HISTORY
Patient reports her appetite has been good at baseline, able to consume three meals daily. Pt reported stable weight ~50kg, last obtained at this weight 1wk ago. Pt denies any specific diet followed PTA. Pt denies any nutrition supplement in use. NKFA as per pt.

## 2025-05-16 NOTE — DIETITIAN INITIAL EVALUATION ADULT - OTHER INFO
Per chart review, 69-year-old female, with past history significant for Hypertension, Dyslipidemia, Interstitial lung disease, Iron deficiency anemia, Back pain, Rheumatoid arthritis, Normocytic anemia, Scleroderma, Pulmonary hypertension (mild), Stage IV right colon cancer with metastases to liver and lung, Constipation, GERD, and Hepatic infusion pumps, presented to the ED, aft er being referred by outpatient oncologist, secondary to right upper quadrant pain and elevated liver function test. Diagnosed with Transaminitis, Direct Hyperbilirubinemia and Right Upper Quadrant Pain in the ED.    Visited pt at the bedside. Nutrition interview conducted in Mandarin, which writer speak fluent Mandarin. Breakfast visibly seen with <50 % completion. Pt reports her appetite has decreased in hospital. Pt adm 3 days and PO intake varies from % as per RN flow sheet. Pt's diet has been supplementing with Ensure Enlive 1x daily (350 bill and 20 gm protein), Ensure Enlive no longer available on the Formulary list, recommend change to Ensure Plus HP 2x daily (350cal, 20gm pro each). Pt reported good supplement completion. Encouraged pt to consume supplement if has no appetite for foods. Pt's labs notable with resolved hypernatremia. Denies any GI distress (nausea/vomiting/diarrhea/constipation.) LBM yesterday with bowel regimen in use.  Discussed importance to reduce sodium intake. Reviewed high sodium foods to avoid. Pt verbalized understanding. RD to remain available for further nutritional interventions as indicated.

## 2025-05-16 NOTE — DIETITIAN INITIAL EVALUATION ADULT - PERTINENT MEDS FT
MEDICATIONS  (STANDING):  bisacodyl 5 milliGRAM(s) Oral at bedtime  chlorhexidine 2% Cloths 1 Application(s) Topical daily  enoxaparin Injectable 40 milliGRAM(s) SubCutaneous every 24 hours  losartan 25 milliGRAM(s) Oral daily  polyethylene glycol 3350 17 Gram(s) Oral daily    MEDICATIONS  (PRN):  lidocaine   4% Patch 1 Patch Transdermal every 24 hours PRN Pain

## 2025-05-16 NOTE — DIETITIAN INITIAL EVALUATION ADULT - PROBLEM SELECTOR PLAN 2
AST = 341, ALT = 241; chart review indicates fluctuating elevations since ~ 09/2024, but has highest values presently  INR = 0.92  History of colon cancer metastatic to the liver  Possibly impacted by fluid status  - f/u trend w/ repeat lab-work  - avoid medications that may worsen liver function

## 2025-05-16 NOTE — CONSULT NOTE ADULT - ASSESSMENT
This is a 70 y/o F with PMHx of HTN, dyslipidemia, interstitial lung disease, iron deficiency anemia, back pain, RA, normocytic anemia, scleroderma, mild pulmonary HTN, GERD, constipation, stage IV right colon cx with metastases to liver and lung s/p lap lj and R hemicolectomy (11/1/23, Dr. Thornton) and HAIP + microwave ablation of liver (5/1/24, Dr. Franklin, Dr. Nelson) who was sent in by outpatient oncologist due to RUQ pain and elevated LFTs. Patient with uptrending transminitis, for which surgical oncology has been called to comment.     Recommendations:   - please obtain MRCP  - please trend LFTs  - appreciate hepatology recommendations  - global care per primary  - Surgical Oncology team to follow    Plan discussed with Dr. Ramiro SIFUENTES Team   [id number]

## 2025-05-16 NOTE — DIETITIAN INITIAL EVALUATION ADULT - PROBLEM SELECTOR PLAN 6
Pain at the right lateral anterior foot, during ambulation, over the recent past  Examination notes calluses over the areas - findings suggestive of Tailor's Bunion  - Podiatry consult (inpatient vs outpatient)  - Tylenol PRN mild pain

## 2025-05-16 NOTE — CONSULT NOTE ADULT - SUBJECTIVE AND OBJECTIVE BOX
D SURGERY CONSULT NOTE    Consulting Team: Medicine Team    Patient: NATALIA VELA , 69y ([birth year])Female   MRN: [id number]  Location: [address]  Visit: 05-13-25 Inpatient  Date: 05-16-25 @ 13:29      HPI:  This is a 70 y/o F with PMHx of HTN, dyslipidemia, interstitial lung disease, iron deficiency anemia, back pain, RA, normocytic anemia, scleroderma, mild pulmonary HTN, GERD, constipation, stage IV right colon cx with metastases to liver and lung s/p lap lj and R hemicolectomy (11/1/23, Dr. Thornton) and HAIP + microwave ablation of liver (5/1/24, Dr. Franklin, Dr. Nelson) who was sent in by outpatient oncologist due to RUQ pain and elevated LFTs. Patient with uptrending transminitis, for which surgical oncology has been called to comment.     Patient currently denies any abdominal pain, issues tolerating food, nausea, or vomiting. Notes one episode of severe RUQ pain 3 days ago, since resolved. Prior to admission, denies fevers, chills, changes to BM, yellowing of skin/eyes.      PAST MEDICAL HISTORY:  H/O pulmonary hypertension  H/O scleroderma  HTN (hypertension)  HLD (hyperlipidemia)  H/O colon cancer, stage IV  Normocytic anemia  Macular degeneration  ILD (interstitial lung disease)  Constipation  Collapsed vertebra  Infarction of spleen  GERD without esophagitis  Gait difficulty        PAST SURGICAL HISTORY:  Migration of percutaneous cholecystostomy tube  H/O right hemicolectomy  History of laparoscopic cholecystectomy  History of left hip replacement  History of right hip replacement  H/O insertion of hepatic arterial infusion pump        MEDICATIONS:  bisacodyl 5 milliGRAM(s) Oral at bedtime  chlorhexidine 2% Cloths 1 Application(s) Topical daily  enoxaparin Injectable 40 milliGRAM(s) SubCutaneous every 24 hours  lidocaine   4% Patch 1 Patch Transdermal every 24 hours PRN  losartan 25 milliGRAM(s) Oral daily  polyethylene glycol 3350 17 Gram(s) Oral daily      ALLERGIES:  No Known Allergies      VITALS & I/Os:  Vital Signs Last 24 Hrs  T(C): 36.4 (16 May 2025 12:42), Max: 36.7 (15 May 2025 21:24)  T(F): 97.5 (16 May 2025 12:42), Max: 98.1 (15 May 2025 21:24)  HR: 79 (16 May 2025 12:42) (79 - 85)  BP: 114/61 (16 May 2025 12:42) (106/62 - 136/78)  BP(mean): --  RR: 17 (16 May 2025 12:42) (17 - 18)  SpO2: 95% (16 May 2025 12:42) (95% - 98%)    Parameters below as of 16 May 2025 12:42  Patient On (Oxygen Delivery Method): room air        I&O's Summary      PHYSICAL EXAM:  General: No acute distress, mild scleral icterus  Respiratory: Nonlabored, R chest port  Cardiovascular: RRR  Abdominal: Soft, nondistended, nontender. well healed laparotomy scar, HAIP in LLQ  Extremities: Warm    LABS:                        9.4    3.17  )-----------( 252      ( 16 May 2025 06:05 )             31.2     05-16    142  |  109[H]  |  29[H]  ----------------------------<  87  4.0   |  20[L]  |  0.57    Ca    9.3      16 May 2025 06:05  Phos  3.8     05-16  Mg     2.10     05-16    TPro  6.4  /  Alb  3.1[L]  /  TBili  0.9  /  DBili  x   /  AST  241[H]  /  ALT  188[H]  /  AlkPhos  451[H]  05-16    Lactate:              Urinalysis Basic - ( 16 May 2025 06:05 )    Color: x / Appearance: x / SG: x / pH: x  Gluc: 87 mg/dL / Ketone: x  / Bili: x / Urobili: x   Blood: x / Protein: x / Nitrite: x   Leuk Esterase: x / RBC: x / WBC x   Sq Epi: x / Non Sq Epi: x / Bacteria: x          IMAGING:  CTAP 5/13/25:   FINDINGS:  LOWERCHEST: Within normal limits.    LIVER: Unchanged hypoattenuating lesion in segment 7 with an additional   adjacent focus. Unchanged wedge-shaped hypodensity at the periphery of   segment 4.  BILE DUCTS: Normal caliber.  GALLBLADDER: Cholecystectomy.  SPLEEN: New linear hypodensity, suspicious for splenic infarct.  PANCREAS: Within normal limits.  ADRENALS: Within normal limits.  KIDNEYS/URETERS: Within normal limits.    BLADDER: Obscured by hardware-related streak artifact.  REPRODUCTIVE ORGANS: The visualized uterus and bilateral adnexa within   normal limits.    BOWEL: Right hemicolectomy with ileocolic anastomosis. No bowel   obstruction.  PERITONEUM/RETROPERITONEUM: Within normal limits.  VESSELS: Atherosclerotic changes. Tip of infusion catheter in the region   of the common/proper hepatic artery. Confluent soft tissue density   surrounding the course of the pump catheter along the falciform ligament,   similar to findings present on prior exams.  LYMPH NODES: No lymphadenopathy.  ABDOMINAL WALL: Postsurgical changes. Infusion pump in the left base   ventral abdominal wall.  BONES: Bilateral hip arthroplasty.    IMPRESSION:  No acute findings in the abdomen or pelvis.    Hepatic findings unchanged since CT abdomen and pelvis dated 1/11/2025.   Please refer to prior MR abdomen for better characterization.    RUQ u/s 5/13/25:  FINDINGS:  Liver: Heterogeneous foci in the right lobe, likely related to post   ablation cavities/lesion seen on prior imaging. There is hepatopedal flow   in the main portal vein.  Bile ducts: Normal caliber. Common bile duct measures 7 mm. Shadowing   adjacent to the distal common bile duct, likely related to post   cholecystomy clips.  Gallbladder: Cholecystectomy.  Pancreas: Visualized portions are within normal limits.  Right kidney: 9.3 cm. No hydronephrosis.  Ascites: None.  IVC: Visualized portions are within normal limits. Atherosclerotic   calcification of the aorta.    IMPRESSION:  No acute pathology. Heterogeneous hepatic echogenicity, likely related to   metastatic disease/post ablation cavities seen on prior imaging.    --- End of Report ---

## 2025-05-16 NOTE — DIETITIAN INITIAL EVALUATION ADULT - NS FNS DIET ORDER
Diet, Regular:   DASH/TLC {Sodium & Cholesterol Restricted} (DASH)  Supplement Feeding Modality:  Oral  Ensure Enlive Cans or Servings Per Day:  1       Frequency:  Daily (05-13-25 @ 22:24) [Active]

## 2025-05-16 NOTE — DIETITIAN INITIAL EVALUATION ADULT - NSICDXPASTMEDICALHX_GEN_ALL_CORE_FT
PAST MEDICAL HISTORY:  Collapsed vertebra     Constipation     Gait difficulty     GERD without esophagitis     H/O colon cancer, stage IV     H/O pulmonary hypertension     H/O scleroderma     HLD (hyperlipidemia)     HTN (hypertension)     Hyperlipidemia     ILD (interstitial lung disease)     Infarction of spleen     Macular degeneration     Normocytic anemia

## 2025-05-16 NOTE — DIETITIAN INITIAL EVALUATION ADULT - ORAL NUTRITION SUPPLEMENTS
Recommend change nutrition supplement to Ensure Plus HP 2x daily (350cal, 20gm pro each) to provide optimal nutrition.

## 2025-05-16 NOTE — DIETITIAN INITIAL EVALUATION ADULT - PROBLEM SELECTOR PLAN 4
Recent onset and causes limitations in range of motion  X-ray negative for any acute findings  - Tylenol PRN mild pain  - ensure optimal hydration  - Physical Therapy yefrial

## 2025-05-16 NOTE — DIETITIAN INITIAL EVALUATION ADULT - NS FNS WEIGHT CHANGE REASON
As per HIE pt's previous weight hx 112 (4/29/24), 109.2 (4/1/25). Pt reported UBW 110lbs/50kg, last obtained at this weight 1 wk ago./unintentional

## 2025-05-16 NOTE — DIETITIAN INITIAL EVALUATION ADULT - PROBLEM SELECTOR PLAN 9
Patient/family  unable to recall name of medication for macular degeneration  - family to provide information in the AM (please f/u)

## 2025-05-16 NOTE — PROGRESS NOTE ADULT - SUBJECTIVE AND OBJECTIVE BOX
Patient is a 69y old  Female who presents with a chief complaint of Transaminitis, Direct hyperbilirubinemia, Right upper quadrant pain (16 May 2025 13:28)      INTERVAL HPI/OVERNIGHT EVENTS:  Seen by me this morning, Mandarin  ID# [id number]. Doing well, resting comfortably, c/o L shoulder/arm pain, states that lidocaine is not really working for her. Appetite is ok. +BM, loose BM x2 (has chronic on/off diarrhea). No abdominal pain.    Review of Systems: 12 point review of systems otherwise negative    MEDICATIONS  (STANDING):  bisacodyl 5 milliGRAM(s) Oral at bedtime  chlorhexidine 2% Cloths 1 Application(s) Topical daily  enoxaparin Injectable 40 milliGRAM(s) SubCutaneous every 24 hours  losartan 25 milliGRAM(s) Oral daily  polyethylene glycol 3350 17 Gram(s) Oral daily    MEDICATIONS  (PRN):  lidocaine   4% Patch 1 Patch Transdermal every 24 hours PRN Pain      Allergies    No Known Allergies    Intolerances          Vital Signs Last 24 Hrs  T(C): 37.1 (16 May 2025 20:45), Max: 37.2 (16 May 2025 17:20)  T(F): 98.7 (16 May 2025 20:45), Max: 98.9 (16 May 2025 17:20)  HR: 94 (16 May 2025 20:45) (79 - 94)  BP: 138/78 (16 May 2025 20:45) (106/62 - 138/78)  BP(mean): --  RR: 18 (16 May 2025 20:45) (17 - 18)  SpO2: 95% (16 May 2025 20:45) (95% - 98%)    Parameters below as of 16 May 2025 20:45  Patient On (Oxygen Delivery Method): room air      CAPILLARY BLOOD GLUCOSE      POCT Blood Glucose.: 111 mg/dL (16 May 2025 08:36)        Physical Exam:    Daily     Daily   General:  Well appearing, NAD, cachetic  HEENT:  Nonicteric, PERRLA  CV:  RRR, no murmur, no JVD  Lungs:  CTA B/L, no wheezes, rales, rhonchi  Abdomen:  Soft, non-tender, no distended, positive BS  Extremities:  2+ pulses, no c/c, limited ROM LUE d/t pain on shoulder  Skin:  Warm and dry, no rashes  :  No arevalo  Neuro:  AAOx3, non-focal, CN II-XII grossly intact  No Restraints    LABS:                        9.4    3.17  )-----------( 252      ( 16 May 2025 06:05 )             31.2     05-16    142  |  109[H]  |  29[H]  ----------------------------<  87  4.0   |  20[L]  |  0.57    Ca    9.3      16 May 2025 06:05  Phos  3.8     05-16  Mg     2.10     05-16    TPro  6.4  /  Alb  3.1[L]  /  TBili  0.9  /  DBili  x   /  AST  241[H]  /  ALT  188[H]  /  AlkPhos  451[H]  05-16      Urinalysis Basic - ( 16 May 2025 06:05 )    Color: x / Appearance: x / SG: x / pH: x  Gluc: 87 mg/dL / Ketone: x  / Bili: x / Urobili: x   Blood: x / Protein: x / Nitrite: x   Leuk Esterase: x / RBC: x / WBC x   Sq Epi: x / Non Sq Epi: x / Bacteria: x          RADIOLOGY & ADDITIONAL TESTS:  Reviewed by me

## 2025-05-17 LAB
ALBUMIN SERPL ELPH-MCNC: 3.1 G/DL — LOW (ref 3.3–5)
ALP SERPL-CCNC: 435 U/L — HIGH (ref 40–120)
ALT FLD-CCNC: 140 U/L — HIGH (ref 4–33)
ANION GAP SERPL CALC-SCNC: 13 MMOL/L — SIGNIFICANT CHANGE UP (ref 7–14)
AST SERPL-CCNC: 107 U/L — HIGH (ref 4–32)
BASOPHILS # BLD AUTO: 0.01 K/UL — SIGNIFICANT CHANGE UP (ref 0–0.2)
BASOPHILS NFR BLD AUTO: 0.3 % — SIGNIFICANT CHANGE UP (ref 0–2)
BILIRUB SERPL-MCNC: 1.2 MG/DL — SIGNIFICANT CHANGE UP (ref 0.2–1.2)
BUN SERPL-MCNC: 21 MG/DL — SIGNIFICANT CHANGE UP (ref 7–23)
CALCIUM SERPL-MCNC: 9.5 MG/DL — SIGNIFICANT CHANGE UP (ref 8.4–10.5)
CHLORIDE SERPL-SCNC: 106 MMOL/L — SIGNIFICANT CHANGE UP (ref 98–107)
CO2 SERPL-SCNC: 19 MMOL/L — LOW (ref 22–31)
CREAT SERPL-MCNC: 0.55 MG/DL — SIGNIFICANT CHANGE UP (ref 0.5–1.3)
EGFR: 99 ML/MIN/1.73M2 — SIGNIFICANT CHANGE UP
EGFR: 99 ML/MIN/1.73M2 — SIGNIFICANT CHANGE UP
EOSINOPHIL # BLD AUTO: 0.12 K/UL — SIGNIFICANT CHANGE UP (ref 0–0.5)
EOSINOPHIL NFR BLD AUTO: 3.3 % — SIGNIFICANT CHANGE UP (ref 0–6)
GLUCOSE SERPL-MCNC: 90 MG/DL — SIGNIFICANT CHANGE UP (ref 70–99)
HCT VFR BLD CALC: 31.9 % — LOW (ref 34.5–45)
HGB BLD-MCNC: 9.8 G/DL — LOW (ref 11.5–15.5)
IANC: 2.15 K/UL — SIGNIFICANT CHANGE UP (ref 1.8–7.4)
IMM GRANULOCYTES NFR BLD AUTO: 0.5 % — SIGNIFICANT CHANGE UP (ref 0–0.9)
LYMPHOCYTES # BLD AUTO: 1.17 K/UL — SIGNIFICANT CHANGE UP (ref 1–3.3)
LYMPHOCYTES # BLD AUTO: 32.1 % — SIGNIFICANT CHANGE UP (ref 13–44)
MAGNESIUM SERPL-MCNC: 2 MG/DL — SIGNIFICANT CHANGE UP (ref 1.6–2.6)
MCHC RBC-ENTMCNC: 29.8 PG — SIGNIFICANT CHANGE UP (ref 27–34)
MCHC RBC-ENTMCNC: 30.7 G/DL — LOW (ref 32–36)
MCV RBC AUTO: 97 FL — SIGNIFICANT CHANGE UP (ref 80–100)
MONOCYTES # BLD AUTO: 0.18 K/UL — SIGNIFICANT CHANGE UP (ref 0–0.9)
MONOCYTES NFR BLD AUTO: 4.9 % — SIGNIFICANT CHANGE UP (ref 2–14)
NEUTROPHILS # BLD AUTO: 2.15 K/UL — SIGNIFICANT CHANGE UP (ref 1.8–7.4)
NEUTROPHILS NFR BLD AUTO: 58.9 % — SIGNIFICANT CHANGE UP (ref 43–77)
NRBC # BLD AUTO: 0 K/UL — SIGNIFICANT CHANGE UP (ref 0–0)
NRBC # FLD: 0 K/UL — SIGNIFICANT CHANGE UP (ref 0–0)
NRBC BLD AUTO-RTO: 0 /100 WBCS — SIGNIFICANT CHANGE UP (ref 0–0)
PHOSPHATE SERPL-MCNC: 2.8 MG/DL — SIGNIFICANT CHANGE UP (ref 2.5–4.5)
PLATELET # BLD AUTO: 261 K/UL — SIGNIFICANT CHANGE UP (ref 150–400)
POTASSIUM SERPL-MCNC: 4 MMOL/L — SIGNIFICANT CHANGE UP (ref 3.5–5.3)
POTASSIUM SERPL-SCNC: 4 MMOL/L — SIGNIFICANT CHANGE UP (ref 3.5–5.3)
PROT SERPL-MCNC: 6.3 G/DL — SIGNIFICANT CHANGE UP (ref 6–8.3)
RBC # BLD: 3.29 M/UL — LOW (ref 3.8–5.2)
RBC # FLD: 18.1 % — HIGH (ref 10.3–14.5)
SODIUM SERPL-SCNC: 138 MMOL/L — SIGNIFICANT CHANGE UP (ref 135–145)
WBC # BLD: 3.65 K/UL — LOW (ref 3.8–10.5)
WBC # FLD AUTO: 3.65 K/UL — LOW (ref 3.8–10.5)

## 2025-05-17 PROCEDURE — 99233 SBSQ HOSP IP/OBS HIGH 50: CPT

## 2025-05-17 RX ORDER — GABAPENTIN 400 MG/1
300 CAPSULE ORAL AT BEDTIME
Refills: 0 | Status: DISCONTINUED | OUTPATIENT
Start: 2025-05-17 | End: 2025-05-25

## 2025-05-17 RX ORDER — TRAMADOL HYDROCHLORIDE 50 MG/1
25 TABLET, FILM COATED ORAL THREE TIMES A DAY
Refills: 0 | Status: DISCONTINUED | OUTPATIENT
Start: 2025-05-17 | End: 2025-05-17

## 2025-05-17 RX ORDER — BISACODYL 5 MG
5 TABLET, DELAYED RELEASE (ENTERIC COATED) ORAL
Refills: 0 | Status: DISCONTINUED | OUTPATIENT
Start: 2025-05-18 | End: 2025-05-25

## 2025-05-17 RX ADMIN — TRAMADOL HYDROCHLORIDE 25 MILLIGRAM(S): 50 TABLET, FILM COATED ORAL at 22:57

## 2025-05-17 RX ADMIN — GABAPENTIN 300 MILLIGRAM(S): 400 CAPSULE ORAL at 21:57

## 2025-05-17 RX ADMIN — TRAMADOL HYDROCHLORIDE 25 MILLIGRAM(S): 50 TABLET, FILM COATED ORAL at 21:57

## 2025-05-17 RX ADMIN — LOSARTAN POTASSIUM 25 MILLIGRAM(S): 100 TABLET, FILM COATED ORAL at 05:45

## 2025-05-17 RX ADMIN — LIDOCAINE HYDROCHLORIDE 1 PATCH: 20 JELLY TOPICAL at 19:15

## 2025-05-17 RX ADMIN — TRAMADOL HYDROCHLORIDE 25 MILLIGRAM(S): 50 TABLET, FILM COATED ORAL at 17:38

## 2025-05-17 RX ADMIN — LIDOCAINE HYDROCHLORIDE 1 PATCH: 20 JELLY TOPICAL at 21:57

## 2025-05-17 RX ADMIN — Medication 1 APPLICATION(S): at 13:14

## 2025-05-17 RX ADMIN — ENOXAPARIN SODIUM 40 MILLIGRAM(S): 100 INJECTION SUBCUTANEOUS at 09:06

## 2025-05-17 RX ADMIN — LIDOCAINE HYDROCHLORIDE 1 PATCH: 20 JELLY TOPICAL at 09:06

## 2025-05-17 RX ADMIN — TRAMADOL HYDROCHLORIDE 25 MILLIGRAM(S): 50 TABLET, FILM COATED ORAL at 16:38

## 2025-05-17 NOTE — PROGRESS NOTE ADULT - SUBJECTIVE AND OBJECTIVE BOX
Patient is a 69y old  Female who presents with a chief complaint of Transaminitis, Direct hyperbilirubinemia, Right upper quadrant pain (16 May 2025 20:49)      INTERVAL HPI/OVERNIGHT EVENTS:  Seen by me this morning, sleeping comfortably, Mandarin  ID# [id number]. States that L shoulder/arm pain is still present, not really improved w/ lidocaine patch. Appetite is ok. +BM x1 loose. No abdominal pain.    Review of Systems: 12 point review of systems otherwise negative    MEDICATIONS  (STANDING):  chlorhexidine 2% Cloths 1 Application(s) Topical daily  enoxaparin Injectable 40 milliGRAM(s) SubCutaneous every 24 hours  gabapentin 300 milliGRAM(s) Oral at bedtime  losartan 25 milliGRAM(s) Oral daily    MEDICATIONS  (PRN):  lidocaine   4% Patch 1 Patch Transdermal every 24 hours PRN Pain  traMADol 25 milliGRAM(s) Oral three times a day PRN Moderate Pain (4 - 6) and severe pain      Allergies    No Known Allergies    Intolerances          Vital Signs Last 24 Hrs  T(C): 36.8 (17 May 2025 13:28), Max: 37.1 (16 May 2025 20:45)  T(F): 98.2 (17 May 2025 13:28), Max: 98.7 (16 May 2025 20:45)  HR: 76 (17 May 2025 13:28) (76 - 94)  BP: 104/61 (17 May 2025 13:28) (104/61 - 138/78)  BP(mean): --  RR: 18 (17 May 2025 13:28) (17 - 18)  SpO2: 99% (17 May 2025 13:28) (95% - 99%)    Parameters below as of 17 May 2025 13:28  Patient On (Oxygen Delivery Method): room air      CAPILLARY BLOOD GLUCOSE            Physical Exam:    Daily     Daily   General:  Well appearing, NAD, cachetic  HEENT:  Nonicteric, PERRLA  CV:  RRR, no murmur, no JVD  Lungs:  CTA B/L, no wheezes, rales, rhonchi  Abdomen:  Soft, non-tender, no distended, positive BS, infusion pump on LLQ  Extremities:  2+ pulses, no c/c, decreased ROM LUE d/t pain  Skin:  Warm and dry, no rashes  :  No arevalo  Neuro:  AAOx3, non-focal, CN II-XII grossly intact  No Restraints    LABS:                        9.8    3.65  )-----------( 261      ( 17 May 2025 06:45 )             31.9     05-17    138  |  106  |  21  ----------------------------<  90  4.0   |  19[L]  |  0.55    Ca    9.5      17 May 2025 06:45  Phos  2.8     05-17  Mg     2.00     05-17    TPro  6.3  /  Alb  3.1[L]  /  TBili  1.2  /  DBili  x   /  AST  107[H]  /  ALT  140[H]  /  AlkPhos  435[H]  05-17      Urinalysis Basic - ( 17 May 2025 06:45 )    Color: x / Appearance: x / SG: x / pH: x  Gluc: 90 mg/dL / Ketone: x  / Bili: x / Urobili: x   Blood: x / Protein: x / Nitrite: x   Leuk Esterase: x / RBC: x / WBC x   Sq Epi: x / Non Sq Epi: x / Bacteria: x          RADIOLOGY & ADDITIONAL TESTS:  Reviewed by me

## 2025-05-18 LAB
ALBUMIN SERPL ELPH-MCNC: 3.2 G/DL — LOW (ref 3.3–5)
ALP SERPL-CCNC: 380 U/L — HIGH (ref 40–120)
ALT FLD-CCNC: 99 U/L — HIGH (ref 4–33)
ANION GAP SERPL CALC-SCNC: 11 MMOL/L — SIGNIFICANT CHANGE UP (ref 7–14)
AST SERPL-CCNC: 59 U/L — HIGH (ref 4–32)
BASOPHILS # BLD AUTO: 0.01 K/UL — SIGNIFICANT CHANGE UP (ref 0–0.2)
BASOPHILS NFR BLD AUTO: 0.5 % — SIGNIFICANT CHANGE UP (ref 0–2)
BILIRUB SERPL-MCNC: 0.8 MG/DL — SIGNIFICANT CHANGE UP (ref 0.2–1.2)
BUN SERPL-MCNC: 27 MG/DL — HIGH (ref 7–23)
CALCIUM SERPL-MCNC: 9.3 MG/DL — SIGNIFICANT CHANGE UP (ref 8.4–10.5)
CHLORIDE SERPL-SCNC: 107 MMOL/L — SIGNIFICANT CHANGE UP (ref 98–107)
CO2 SERPL-SCNC: 22 MMOL/L — SIGNIFICANT CHANGE UP (ref 22–31)
CREAT SERPL-MCNC: 0.66 MG/DL — SIGNIFICANT CHANGE UP (ref 0.5–1.3)
EGFR: 95 ML/MIN/1.73M2 — SIGNIFICANT CHANGE UP
EGFR: 95 ML/MIN/1.73M2 — SIGNIFICANT CHANGE UP
EOSINOPHIL # BLD AUTO: 0.15 K/UL — SIGNIFICANT CHANGE UP (ref 0–0.5)
EOSINOPHIL NFR BLD AUTO: 7.5 % — HIGH (ref 0–6)
GLUCOSE SERPL-MCNC: 93 MG/DL — SIGNIFICANT CHANGE UP (ref 70–99)
HCT VFR BLD CALC: 29 % — LOW (ref 34.5–45)
HGB BLD-MCNC: 8.8 G/DL — LOW (ref 11.5–15.5)
IANC: 0.93 K/UL — LOW (ref 1.8–7.4)
IMM GRANULOCYTES NFR BLD AUTO: 1.5 % — HIGH (ref 0–0.9)
LYMPHOCYTES # BLD AUTO: 0.75 K/UL — LOW (ref 1–3.3)
LYMPHOCYTES # BLD AUTO: 37.7 % — SIGNIFICANT CHANGE UP (ref 13–44)
MAGNESIUM SERPL-MCNC: 2 MG/DL — SIGNIFICANT CHANGE UP (ref 1.6–2.6)
MCHC RBC-ENTMCNC: 29.4 PG — SIGNIFICANT CHANGE UP (ref 27–34)
MCHC RBC-ENTMCNC: 30.3 G/DL — LOW (ref 32–36)
MCV RBC AUTO: 97 FL — SIGNIFICANT CHANGE UP (ref 80–100)
MONOCYTES # BLD AUTO: 0.12 K/UL — SIGNIFICANT CHANGE UP (ref 0–0.9)
MONOCYTES NFR BLD AUTO: 6 % — SIGNIFICANT CHANGE UP (ref 2–14)
NEUTROPHILS # BLD AUTO: 0.93 K/UL — LOW (ref 1.8–7.4)
NEUTROPHILS NFR BLD AUTO: 46.8 % — SIGNIFICANT CHANGE UP (ref 43–77)
NRBC # BLD AUTO: 0.02 K/UL — HIGH (ref 0–0)
NRBC # FLD: 0.02 K/UL — HIGH (ref 0–0)
NRBC BLD AUTO-RTO: 1 /100 WBCS — HIGH (ref 0–0)
PHOSPHATE SERPL-MCNC: 3.2 MG/DL — SIGNIFICANT CHANGE UP (ref 2.5–4.5)
PLATELET # BLD AUTO: 261 K/UL — SIGNIFICANT CHANGE UP (ref 150–400)
POTASSIUM SERPL-MCNC: 3.8 MMOL/L — SIGNIFICANT CHANGE UP (ref 3.5–5.3)
POTASSIUM SERPL-SCNC: 3.8 MMOL/L — SIGNIFICANT CHANGE UP (ref 3.5–5.3)
PROT SERPL-MCNC: 6.2 G/DL — SIGNIFICANT CHANGE UP (ref 6–8.3)
RBC # BLD: 2.99 M/UL — LOW (ref 3.8–5.2)
RBC # FLD: 17.9 % — HIGH (ref 10.3–14.5)
SODIUM SERPL-SCNC: 140 MMOL/L — SIGNIFICANT CHANGE UP (ref 135–145)
WBC # BLD: 1.99 K/UL — LOW (ref 3.8–10.5)
WBC # FLD AUTO: 1.99 K/UL — LOW (ref 3.8–10.5)

## 2025-05-18 PROCEDURE — 99233 SBSQ HOSP IP/OBS HIGH 50: CPT

## 2025-05-18 RX ADMIN — ENOXAPARIN SODIUM 40 MILLIGRAM(S): 100 INJECTION SUBCUTANEOUS at 07:34

## 2025-05-18 RX ADMIN — Medication 5 MILLIGRAM(S): at 21:20

## 2025-05-18 RX ADMIN — LOSARTAN POTASSIUM 25 MILLIGRAM(S): 100 TABLET, FILM COATED ORAL at 05:33

## 2025-05-18 RX ADMIN — Medication 1 APPLICATION(S): at 08:41

## 2025-05-18 RX ADMIN — GABAPENTIN 300 MILLIGRAM(S): 400 CAPSULE ORAL at 21:20

## 2025-05-18 NOTE — PROGRESS NOTE ADULT - PROBLEM SELECTOR PLAN 10
Lovenox  PT/OT evals --> No needs
Lovenox  PT/OT evals --> No needs
Lovenox  PT eval --> No needs  OT evaluation
Lovenox  PT/OT evals --> No needs
Lovenox  PT/OT evals --> No needs

## 2025-05-18 NOTE — PROGRESS NOTE ADULT - SUBJECTIVE AND OBJECTIVE BOX
Patient is a 69y old  Female who presents with a chief complaint of Transaminitis, Direct hyperbilirubinemia, Right upper quadrant pain (17 May 2025 17:35)      INTERVAL HPI/OVERNIGHT EVENTS:  Mandarin  ID# [id number]  Seen by me this afternoon, doing well, no complaints. Appetite is good, x1 loose BM yesterday. LUE shoulder pain is much better today. No abdominal pain.    Review of Systems: 12 point review of systems otherwise negative    MEDICATIONS  (STANDING):  bisacodyl 5 milliGRAM(s) Oral <User Schedule>  chlorhexidine 2% Cloths 1 Application(s) Topical daily  enoxaparin Injectable 40 milliGRAM(s) SubCutaneous every 24 hours  gabapentin 300 milliGRAM(s) Oral at bedtime  losartan 25 milliGRAM(s) Oral daily    MEDICATIONS  (PRN):  lidocaine   4% Patch 1 Patch Transdermal every 24 hours PRN Pain  traMADol 25 milliGRAM(s) Oral three times a day PRN Moderate Pain (4 - 6) and severe pain      Allergies    No Known Allergies    Intolerances          Vital Signs Last 24 Hrs  T(C): 37.1 (18 May 2025 13:02), Max: 37.1 (18 May 2025 13:02)  T(F): 98.7 (18 May 2025 13:02), Max: 98.7 (18 May 2025 13:02)  HR: 99 (18 May 2025 13:02) (72 - 99)  BP: 101/64 (18 May 2025 13:02) (101/64 - 124/82)  BP(mean): --  RR: 18 (18 May 2025 13:02) (18 - 18)  SpO2: 97% (18 May 2025 13:02) (97% - 97%)    Parameters below as of 18 May 2025 13:02  Patient On (Oxygen Delivery Method): room air      CAPILLARY BLOOD GLUCOSE          05-18 @ 07:01  -  05-18 @ 17:38  --------------------------------------------------------  IN: 600 mL / OUT: 0 mL / NET: 600 mL        Physical Exam:    Daily     Daily   General:  Well appearing, NAD, cachetic  HEENT:  Nonicteric, PERRLA  CV:  RRR, no murmur, no JVD  Lungs:  CTA B/L, no wheezes, rales, rhonchi  Abdomen:  Soft, non-tender, no distended, positive BS, HAIP on LLQ  Extremities:  2+ pulses, no c/c, full ROM LUE/shoulder  Skin:  Warm and dry, no rashes  :  No arevalo  Neuro:  AAOx3, non-focal, CN II-XII grossly intact  No Restraints    LABS:                        8.8    1.99  )-----------( 261      ( 18 May 2025 05:40 )             29.0     05-18    140  |  107  |  27[H]  ----------------------------<  93  3.8   |  22  |  0.66    Ca    9.3      18 May 2025 05:40  Phos  3.2     05-18  Mg     2.00     05-18    TPro  6.2  /  Alb  3.2[L]  /  TBili  0.8  /  DBili  x   /  AST  59[H]  /  ALT  99[H]  /  AlkPhos  380[H]  05-18      Urinalysis Basic - ( 18 May 2025 05:40 )    Color: x / Appearance: x / SG: x / pH: x  Gluc: 93 mg/dL / Ketone: x  / Bili: x / Urobili: x   Blood: x / Protein: x / Nitrite: x   Leuk Esterase: x / RBC: x / WBC x   Sq Epi: x / Non Sq Epi: x / Bacteria: x          RADIOLOGY & ADDITIONAL TESTS:  Reviewed by me

## 2025-05-19 LAB
ALBUMIN SERPL ELPH-MCNC: 3.1 G/DL — LOW (ref 3.3–5)
ALDOLASE SERPL-CCNC: 18.5 U/L — HIGH (ref 3.3–10.3)
ALP SERPL-CCNC: 359 U/L — HIGH (ref 40–120)
ALT FLD-CCNC: 77 U/L — HIGH (ref 4–33)
ANION GAP SERPL CALC-SCNC: 9 MMOL/L — SIGNIFICANT CHANGE UP (ref 7–14)
ANISOCYTOSIS BLD QL: SLIGHT — SIGNIFICANT CHANGE UP
AST SERPL-CCNC: 44 U/L — HIGH (ref 4–32)
BASOPHILS # BLD AUTO: 0.03 K/UL — SIGNIFICANT CHANGE UP (ref 0–0.2)
BASOPHILS NFR BLD AUTO: 1.8 % — SIGNIFICANT CHANGE UP (ref 0–2)
BILIRUB SERPL-MCNC: 0.7 MG/DL — SIGNIFICANT CHANGE UP (ref 0.2–1.2)
BUN SERPL-MCNC: 26 MG/DL — HIGH (ref 7–23)
CALCIUM SERPL-MCNC: 8.9 MG/DL — SIGNIFICANT CHANGE UP (ref 8.4–10.5)
CHLORIDE SERPL-SCNC: 109 MMOL/L — HIGH (ref 98–107)
CO2 SERPL-SCNC: 22 MMOL/L — SIGNIFICANT CHANGE UP (ref 22–31)
CREAT SERPL-MCNC: 0.6 MG/DL — SIGNIFICANT CHANGE UP (ref 0.5–1.3)
EGFR: 97 ML/MIN/1.73M2 — SIGNIFICANT CHANGE UP
EGFR: 97 ML/MIN/1.73M2 — SIGNIFICANT CHANGE UP
EOSINOPHIL # BLD AUTO: 0.05 K/UL — SIGNIFICANT CHANGE UP (ref 0–0.5)
EOSINOPHIL NFR BLD AUTO: 2.7 % — SIGNIFICANT CHANGE UP (ref 0–6)
GIANT PLATELETS BLD QL SMEAR: PRESENT — SIGNIFICANT CHANGE UP
GLUCOSE SERPL-MCNC: 92 MG/DL — SIGNIFICANT CHANGE UP (ref 70–99)
HCT VFR BLD CALC: 28.2 % — LOW (ref 34.5–45)
HGB BLD-MCNC: 8.5 G/DL — LOW (ref 11.5–15.5)
IANC: 0.6 K/UL — LOW (ref 1.8–7.4)
LYMPHOCYTES # BLD AUTO: 0.59 K/UL — LOW (ref 1–3.3)
LYMPHOCYTES # BLD AUTO: 33 % — SIGNIFICANT CHANGE UP (ref 13–44)
MACROCYTES BLD QL: SLIGHT — SIGNIFICANT CHANGE UP
MAGNESIUM SERPL-MCNC: 2 MG/DL — SIGNIFICANT CHANGE UP (ref 1.6–2.6)
MANUAL SMEAR VERIFICATION: SIGNIFICANT CHANGE UP
MCHC RBC-ENTMCNC: 30.1 G/DL — LOW (ref 32–36)
MCHC RBC-ENTMCNC: 30.1 PG — SIGNIFICANT CHANGE UP (ref 27–34)
MCV RBC AUTO: 100 FL — SIGNIFICANT CHANGE UP (ref 80–100)
MONOCYTES # BLD AUTO: 0.16 K/UL — SIGNIFICANT CHANGE UP (ref 0–0.9)
MONOCYTES NFR BLD AUTO: 8.9 % — SIGNIFICANT CHANGE UP (ref 2–14)
MYELOCYTES NFR BLD: 4.5 % — HIGH (ref 0–0)
NEUTROPHILS # BLD AUTO: 0.74 K/UL — LOW (ref 1.8–7.4)
NEUTROPHILS NFR BLD AUTO: 41.1 % — LOW (ref 43–77)
PHOSPHATE SERPL-MCNC: 2.7 MG/DL — SIGNIFICANT CHANGE UP (ref 2.5–4.5)
PLAT MORPH BLD: NORMAL — SIGNIFICANT CHANGE UP
PLATELET # BLD AUTO: 226 K/UL — SIGNIFICANT CHANGE UP (ref 150–400)
PLATELET COUNT - ESTIMATE: NORMAL — SIGNIFICANT CHANGE UP
POLYCHROMASIA BLD QL SMEAR: SIGNIFICANT CHANGE UP
POTASSIUM SERPL-MCNC: 3.9 MMOL/L — SIGNIFICANT CHANGE UP (ref 3.5–5.3)
POTASSIUM SERPL-SCNC: 3.9 MMOL/L — SIGNIFICANT CHANGE UP (ref 3.5–5.3)
PROT SERPL-MCNC: 6 G/DL — SIGNIFICANT CHANGE UP (ref 6–8.3)
RBC # BLD: 2.82 M/UL — LOW (ref 3.8–5.2)
RBC # FLD: 18 % — HIGH (ref 10.3–14.5)
RBC BLD AUTO: ABNORMAL
SMUDGE CELLS # BLD: PRESENT — SIGNIFICANT CHANGE UP
SODIUM SERPL-SCNC: 140 MMOL/L — SIGNIFICANT CHANGE UP (ref 135–145)
VARIANT LYMPHS # BLD: 8 % — HIGH (ref 0–6)
VARIANT LYMPHS NFR BLD MANUAL: 8 % — HIGH (ref 0–6)
WBC # BLD: 1.79 K/UL — LOW (ref 3.8–10.5)
WBC # FLD AUTO: 1.79 K/UL — LOW (ref 3.8–10.5)

## 2025-05-19 PROCEDURE — 99233 SBSQ HOSP IP/OBS HIGH 50: CPT

## 2025-05-19 PROCEDURE — 74183 MRI ABD W/O CNTR FLWD CNTR: CPT | Mod: 26

## 2025-05-19 RX ADMIN — GABAPENTIN 300 MILLIGRAM(S): 400 CAPSULE ORAL at 21:24

## 2025-05-19 RX ADMIN — Medication 1 APPLICATION(S): at 11:51

## 2025-05-19 RX ADMIN — ENOXAPARIN SODIUM 40 MILLIGRAM(S): 100 INJECTION SUBCUTANEOUS at 06:11

## 2025-05-19 RX ADMIN — LOSARTAN POTASSIUM 25 MILLIGRAM(S): 100 TABLET, FILM COATED ORAL at 06:10

## 2025-05-19 NOTE — PROGRESS NOTE ADULT - SUBJECTIVE AND OBJECTIVE BOX
Dinesh Silvestre MD  Academic Hospitalist  Pager [phone]  Email: [email or web address]  Available on Microsoft Teams        PROGRESS NOTE:     Patient is a 69y old  Female who presents with a chief complaint of Transaminitis, Direct hyperbilirubinemia, Right upper quadrant pain (18 May 2025 17:38)      SUBJECTIVE / OVERNIGHT EVENTS:  Patient seen and examined this morning. Mandarin  [id number].   ADDITIONAL REVIEW OF SYSTEMS:    MEDICATIONS  (STANDING):  bisacodyl 5 milliGRAM(s) Oral <User Schedule>  chlorhexidine 2% Cloths 1 Application(s) Topical daily  enoxaparin Injectable 40 milliGRAM(s) SubCutaneous every 24 hours  gabapentin 300 milliGRAM(s) Oral at bedtime  losartan 25 milliGRAM(s) Oral daily    MEDICATIONS  (PRN):  lidocaine   4% Patch 1 Patch Transdermal every 24 hours PRN Pain  traMADol 25 milliGRAM(s) Oral three times a day PRN Moderate Pain (4 - 6) and severe pain      CAPILLARY BLOOD GLUCOSE        I&O's Summary    18 May 2025 07:01  -  19 May 2025 07:00  --------------------------------------------------------  IN: 600 mL / OUT: 0 mL / NET: 600 mL        PHYSICAL EXAM:  Vital Signs Last 24 Hrs  T(C): 36.6 (19 May 2025 06:00), Max: 36.6 (19 May 2025 06:00)  T(F): 97.9 (19 May 2025 06:00), Max: 97.9 (19 May 2025 06:00)  HR: 67 (19 May 2025 06:00) (67 - 75)  BP: 109/67 (19 May 2025 06:00) (102/60 - 109/67)  BP(mean): --  RR: 16 (19 May 2025 06:00) (16 - 18)  SpO2: 96% (19 May 2025 06:00) (96% - 99%)    Parameters below as of 19 May 2025 06:00  Patient On (Oxygen Delivery Method): room air    CONSTITUTIONAL: NAD, pleasant  RESPIRATORY: Normal respiratory effort; no respiratory distress, CTAB  CARDIOVASCULAR: No visible JVD, No lower extremity edema; S1S2, no m,r,g  ABDOMEN: Not guarding, does not appear distended, BS+  MUSCLOSKELETAL: no clubbing or cyanosis of digits; no joint swelling   PSYCH: AOx3        LABS:                        8.5    1.79  )-----------( 226      ( 19 May 2025 05:20 )             28.2     05-19    140  |  109[H]  |  26[H]  ----------------------------<  92  3.9   |  22  |  0.60    Ca    8.9      19 May 2025 05:20  Phos  2.7     05-19  Mg     2.00     05-19    TPro  6.0  /  Alb  3.1[L]  /  TBili  0.7  /  DBili  x   /  AST  44[H]  /  ALT  77[H]  /  AlkPhos  359[H]  05-19          Urinalysis Basic - ( 19 May 2025 05:20 )    Color: x / Appearance: x / SG: x / pH: x  Gluc: 92 mg/dL / Ketone: x  / Bili: x / Urobili: x   Blood: x / Protein: x / Nitrite: x   Leuk Esterase: x / RBC: x / WBC x   Sq Epi: x / Non Sq Epi: x / Bacteria: x          RADIOLOGY & ADDITIONAL TESTS:  Results Reviewed:   Imaging Personally Reviewed:  ACC: [id number] EXAM:  MR MRCP WAW IC   ORDERED BY: SHERRIE LONGORIA     PROCEDURE DATE:  05/19/2025          INTERPRETATION:  CLINICAL INFORMATION: Abnormal liver function tests.   Evaluate for biliary ductal dilatation.    COMPARISON: CT scan 5/13/2025. MRI 3/1/2025 and 1/6/2025.    CONTRAST/COMPLICATIONS:  IV Contrast: Gadavist  6 cc administered   1.5 cc discarded  Oral Contrast: NONE  .    PROCEDURE:  MRI of the abdomen was performed.  MRCP was performed.    FINDINGS:  LOWER CHEST: Within normal limits.    LIVER: Multiloculated cystic lesion in segment 7 with thin peripheral rim   of enhancement and measuring 3.0 x 2.5 cm, not significantly changed in   size (25:21), likely reflecting mucinous metastasis. Ablation cavity at   the periphery of segment 4 measures 1.6 x 1.5 cm, unchanged. Stable   postablation changes in segments 2/3 .    BILE DUCTS: Intrahepatic and extrahepatic biliary ductal dilatation.   Extrahepatic bile duct measures up to 10 mm, unchanged from prior   examination. Filling defect in the distal CBD measures 3 mm, suspicious   for stone (5:22). Dilated peripheral bile duct in the left hepatic lobe   central to the segment 2/3 ablation cavity is unchanged.    GALLBLADDER: Surgically removed.  SPLEEN: Within normal limits.  PANCREAS: Within normal limits.  ADRENALS: Within normal limits.  KIDNEYS/URETERS: Within normal limits.    VISUALIZED PORTIONS:  BOWEL: Within normal limits.  PERITONEUM: T1 hyperintense signal surrounding the course of the hepatic   arterial infusion catheter at the falciform ligament is stable.  VESSELS: Atheromatous changes in the aorta. Portal veins are patent.  RETROPERITONEUM/LYMPH NODES: No lymphadenopathy.  ABDOMINAL WALL: Susceptibility artifact related to hepatic infusion pump.   Postoperative changes.  BONES: Degenerative changes.    IMPRESSION:  *  3 mm filling defect in the distal CBD, consistent with   choledocholithiasis.  *  Intrahepatic and extrahepatic biliary ductal dilatation is similar to   findings present on prior exam.  *  Stable liver lesions.        Electrocardiogram Personally Reviewed:    COORDINATION OF CARE:  Care Discussed with Consultants/Other Providers [Y/N]:  Prior or Outpatient Records Reviewed [Y/N]:   Dinesh Silvestre MD  Academic Hospitalist  Pager [phone]  Email: [email or web address]  Available on Microsoft Teams        PROGRESS NOTE:     Patient is a 69y old  Female who presents with a chief complaint of Transaminitis, Direct hyperbilirubinemia, Right upper quadrant pain (18 May 2025 17:38)      SUBJECTIVE / OVERNIGHT EVENTS:  Patient seen and examined this morning. Mandarin  [id number]. No acute events overnight or new complaints.  ADDITIONAL REVIEW OF SYSTEMS:  No f/c    MEDICATIONS  (STANDING):  bisacodyl 5 milliGRAM(s) Oral <User Schedule>  chlorhexidine 2% Cloths 1 Application(s) Topical daily  enoxaparin Injectable 40 milliGRAM(s) SubCutaneous every 24 hours  gabapentin 300 milliGRAM(s) Oral at bedtime  losartan 25 milliGRAM(s) Oral daily    MEDICATIONS  (PRN):  lidocaine   4% Patch 1 Patch Transdermal every 24 hours PRN Pain  traMADol 25 milliGRAM(s) Oral three times a day PRN Moderate Pain (4 - 6) and severe pain      CAPILLARY BLOOD GLUCOSE        I&O's Summary    18 May 2025 07:01  -  19 May 2025 07:00  --------------------------------------------------------  IN: 600 mL / OUT: 0 mL / NET: 600 mL        PHYSICAL EXAM:  Vital Signs Last 24 Hrs  T(C): 36.6 (19 May 2025 06:00), Max: 36.6 (19 May 2025 06:00)  T(F): 97.9 (19 May 2025 06:00), Max: 97.9 (19 May 2025 06:00)  HR: 67 (19 May 2025 06:00) (67 - 75)  BP: 109/67 (19 May 2025 06:00) (102/60 - 109/67)  BP(mean): --  RR: 16 (19 May 2025 06:00) (16 - 18)  SpO2: 96% (19 May 2025 06:00) (96% - 99%)    Parameters below as of 19 May 2025 06:00  Patient On (Oxygen Delivery Method): room air    CONSTITUTIONAL: NAD, pleasant, cachectic  RESPIRATORY: Normal respiratory effort; no respiratory distress, CTAB  CARDIOVASCULAR: No visible JVD, No lower extremity edema; S1S2, no m,r,g  ABDOMEN: Not guarding, does not appear distended, BS+  MUSCLOSKELETAL: no clubbing or cyanosis of digits; no joint swelling   PSYCH: AOx3        LABS:                        8.5    1.79  )-----------( 226      ( 19 May 2025 05:20 )             28.2     05-19    140  |  109[H]  |  26[H]  ----------------------------<  92  3.9   |  22  |  0.60    Ca    8.9      19 May 2025 05:20  Phos  2.7     05-19  Mg     2.00     05-19    TPro  6.0  /  Alb  3.1[L]  /  TBili  0.7  /  DBili  x   /  AST  44[H]  /  ALT  77[H]  /  AlkPhos  359[H]  05-19          Urinalysis Basic - ( 19 May 2025 05:20 )    Color: x / Appearance: x / SG: x / pH: x  Gluc: 92 mg/dL / Ketone: x  / Bili: x / Urobili: x   Blood: x / Protein: x / Nitrite: x   Leuk Esterase: x / RBC: x / WBC x   Sq Epi: x / Non Sq Epi: x / Bacteria: x          RADIOLOGY & ADDITIONAL TESTS:  Results Reviewed:   Imaging Personally Reviewed:  ACC: [id number] EXAM:  MR MRCP WAW    ORDERED BY: SHERRIE LONGORIA     PROCEDURE DATE:  05/19/2025          INTERPRETATION:  CLINICAL INFORMATION: Abnormal liver function tests.   Evaluate for biliary ductal dilatation.    COMPARISON: CT scan 5/13/2025. MRI 3/1/2025 and 1/6/2025.    CONTRAST/COMPLICATIONS:  IV Contrast: Gadavist  6 cc administered   1.5 cc discarded  Oral Contrast: NONE  .    PROCEDURE:  MRI of the abdomen was performed.  MRCP was performed.    FINDINGS:  LOWER CHEST: Within normal limits.    LIVER: Multiloculated cystic lesion in segment 7 with thin peripheral rim   of enhancement and measuring 3.0 x 2.5 cm, not significantly changed in   size (25:21), likely reflecting mucinous metastasis. Ablation cavity at   the periphery of segment 4 measures 1.6 x 1.5 cm, unchanged. Stable   postablation changes in segments 2/3 .    BILE DUCTS: Intrahepatic and extrahepatic biliary ductal dilatation.   Extrahepatic bile duct measures up to 10 mm, unchanged from prior   examination. Filling defect in the distal CBD measures 3 mm, suspicious   for stone (5:22). Dilated peripheral bile duct in the left hepatic lobe   central to the segment 2/3 ablation cavity is unchanged.    GALLBLADDER: Surgically removed.  SPLEEN: Within normal limits.  PANCREAS: Within normal limits.  ADRENALS: Within normal limits.  KIDNEYS/URETERS: Within normal limits.    VISUALIZED PORTIONS:  BOWEL: Within normal limits.  PERITONEUM: T1 hyperintense signal surrounding the course of the hepatic   arterial infusion catheter at the falciform ligament is stable.  VESSELS: Atheromatous changes in the aorta. Portal veins are patent.  RETROPERITONEUM/LYMPH NODES: No lymphadenopathy.  ABDOMINAL WALL: Susceptibility artifact related to hepatic infusion pump.   Postoperative changes.  BONES: Degenerative changes.    IMPRESSION:  *  3 mm filling defect in the distal CBD, consistent with   choledocholithiasis.  *  Intrahepatic and extrahepatic biliary ductal dilatation is similar to   findings present on prior exam.  *  Stable liver lesions.        Electrocardiogram Personally Reviewed:    COORDINATION OF CARE:  Care Discussed with Consultants/Other Providers [Y/N]:  Prior or Outpatient Records Reviewed [Y/N]:

## 2025-05-20 ENCOUNTER — TRANSCRIPTION ENCOUNTER (OUTPATIENT)
Age: 70
End: 2025-05-20

## 2025-05-20 PROBLEM — E78.5 HYPERLIPIDEMIA, UNSPECIFIED: Chronic | Status: ACTIVE | Noted: 2025-05-13

## 2025-05-20 PROBLEM — K21.9 GASTRO-ESOPHAGEAL REFLUX DISEASE WITHOUT ESOPHAGITIS: Chronic | Status: ACTIVE | Noted: 2025-05-13

## 2025-05-20 PROBLEM — D64.9 ANEMIA, UNSPECIFIED: Chronic | Status: ACTIVE | Noted: 2025-05-13

## 2025-05-20 PROBLEM — K59.00 CONSTIPATION, UNSPECIFIED: Chronic | Status: ACTIVE | Noted: 2025-05-13

## 2025-05-20 PROBLEM — R26.9 UNSPECIFIED ABNORMALITIES OF GAIT AND MOBILITY: Chronic | Status: ACTIVE | Noted: 2025-05-14

## 2025-05-20 PROBLEM — H35.30 UNSPECIFIED MACULAR DEGENERATION: Chronic | Status: ACTIVE | Noted: 2025-05-13

## 2025-05-20 PROBLEM — M48.50XA COLLAPSED VERTEBRA, NOT ELSEWHERE CLASSIFIED, SITE UNSPECIFIED, INITIAL ENCOUNTER FOR FRACTURE: Chronic | Status: ACTIVE | Noted: 2025-05-13

## 2025-05-20 PROBLEM — Z85.038 PERSONAL HISTORY OF OTHER MALIGNANT NEOPLASM OF LARGE INTESTINE: Chronic | Status: ACTIVE | Noted: 2025-05-13

## 2025-05-20 PROBLEM — D73.5 INFARCTION OF SPLEEN: Chronic | Status: ACTIVE | Noted: 2025-05-13

## 2025-05-20 LAB
ALBUMIN SERPL ELPH-MCNC: 3.1 G/DL — LOW (ref 3.3–5)
ALP SERPL-CCNC: 338 U/L — HIGH (ref 40–120)
ALT FLD-CCNC: 63 U/L — HIGH (ref 4–33)
ANION GAP SERPL CALC-SCNC: 11 MMOL/L — SIGNIFICANT CHANGE UP (ref 7–14)
AST SERPL-CCNC: 44 U/L — HIGH (ref 4–32)
BASOPHILS # BLD AUTO: 0.02 K/UL — SIGNIFICANT CHANGE UP (ref 0–0.2)
BASOPHILS NFR BLD AUTO: 0.7 % — SIGNIFICANT CHANGE UP (ref 0–2)
BILIRUB SERPL-MCNC: 0.7 MG/DL — SIGNIFICANT CHANGE UP (ref 0.2–1.2)
BUN SERPL-MCNC: 20 MG/DL — SIGNIFICANT CHANGE UP (ref 7–23)
CALCIUM SERPL-MCNC: 9.4 MG/DL — SIGNIFICANT CHANGE UP (ref 8.4–10.5)
CHLORIDE SERPL-SCNC: 110 MMOL/L — HIGH (ref 98–107)
CO2 SERPL-SCNC: 21 MMOL/L — LOW (ref 22–31)
CREAT SERPL-MCNC: 0.63 MG/DL — SIGNIFICANT CHANGE UP (ref 0.5–1.3)
EGFR: 96 ML/MIN/1.73M2 — SIGNIFICANT CHANGE UP
EGFR: 96 ML/MIN/1.73M2 — SIGNIFICANT CHANGE UP
EOSINOPHIL # BLD AUTO: 0.15 K/UL — SIGNIFICANT CHANGE UP (ref 0–0.5)
EOSINOPHIL NFR BLD AUTO: 5.3 % — SIGNIFICANT CHANGE UP (ref 0–6)
GLUCOSE SERPL-MCNC: 88 MG/DL — SIGNIFICANT CHANGE UP (ref 70–99)
HCT VFR BLD CALC: 27.4 % — LOW (ref 34.5–45)
HGB BLD-MCNC: 8.3 G/DL — LOW (ref 11.5–15.5)
IANC: 1.23 K/UL — LOW (ref 1.8–7.4)
IMM GRANULOCYTES NFR BLD AUTO: 4.9 % — HIGH (ref 0–0.9)
LYMPHOCYTES # BLD AUTO: 0.96 K/UL — LOW (ref 1–3.3)
LYMPHOCYTES # BLD AUTO: 33.9 % — SIGNIFICANT CHANGE UP (ref 13–44)
MAGNESIUM SERPL-MCNC: 1.9 MG/DL — SIGNIFICANT CHANGE UP (ref 1.6–2.6)
MCHC RBC-ENTMCNC: 29.4 PG — SIGNIFICANT CHANGE UP (ref 27–34)
MCHC RBC-ENTMCNC: 30.3 G/DL — LOW (ref 32–36)
MCV RBC AUTO: 97.2 FL — SIGNIFICANT CHANGE UP (ref 80–100)
MONOCYTES # BLD AUTO: 0.33 K/UL — SIGNIFICANT CHANGE UP (ref 0–0.9)
MONOCYTES NFR BLD AUTO: 11.7 % — SIGNIFICANT CHANGE UP (ref 2–14)
NEUTROPHILS # BLD AUTO: 1.23 K/UL — LOW (ref 1.8–7.4)
NEUTROPHILS NFR BLD AUTO: 43.5 % — SIGNIFICANT CHANGE UP (ref 43–77)
NRBC # BLD AUTO: 0 K/UL — SIGNIFICANT CHANGE UP (ref 0–0)
NRBC # FLD: 0 K/UL — SIGNIFICANT CHANGE UP (ref 0–0)
NRBC BLD AUTO-RTO: 0 /100 WBCS — SIGNIFICANT CHANGE UP (ref 0–0)
PHOSPHATE SERPL-MCNC: 3.2 MG/DL — SIGNIFICANT CHANGE UP (ref 2.5–4.5)
PLATELET # BLD AUTO: 211 K/UL — SIGNIFICANT CHANGE UP (ref 150–400)
POTASSIUM SERPL-MCNC: 4 MMOL/L — SIGNIFICANT CHANGE UP (ref 3.5–5.3)
POTASSIUM SERPL-SCNC: 4 MMOL/L — SIGNIFICANT CHANGE UP (ref 3.5–5.3)
PROT SERPL-MCNC: 5.9 G/DL — LOW (ref 6–8.3)
RBC # BLD: 2.82 M/UL — LOW (ref 3.8–5.2)
RBC # FLD: 18.3 % — HIGH (ref 10.3–14.5)
SODIUM SERPL-SCNC: 142 MMOL/L — SIGNIFICANT CHANGE UP (ref 135–145)
WBC # BLD: 2.83 K/UL — LOW (ref 3.8–10.5)
WBC # FLD AUTO: 2.83 K/UL — LOW (ref 3.8–10.5)

## 2025-05-20 PROCEDURE — 99233 SBSQ HOSP IP/OBS HIGH 50: CPT

## 2025-05-20 PROCEDURE — 99222 1ST HOSP IP/OBS MODERATE 55: CPT | Mod: GC

## 2025-05-20 RX ADMIN — Medication 1 APPLICATION(S): at 12:03

## 2025-05-20 RX ADMIN — LOSARTAN POTASSIUM 25 MILLIGRAM(S): 100 TABLET, FILM COATED ORAL at 05:32

## 2025-05-20 RX ADMIN — ENOXAPARIN SODIUM 40 MILLIGRAM(S): 100 INJECTION SUBCUTANEOUS at 07:39

## 2025-05-20 RX ADMIN — GABAPENTIN 300 MILLIGRAM(S): 400 CAPSULE ORAL at 21:25

## 2025-05-20 RX ADMIN — Medication 5 MILLIGRAM(S): at 21:25

## 2025-05-20 NOTE — DISCHARGE NOTE NURSING/CASE MANAGEMENT/SOCIAL WORK - PATIENT PORTAL LINK FT
You can access the FollowMyHealth Patient Portal offered by [facility] by registering at the following website: [email or web address]. By joining [employer] [facility] [employer]’s FollowMyHealth portal, you will also be able to view your health information using other applications (apps) compatible with our system.

## 2025-05-20 NOTE — DISCHARGE NOTE NURSING/CASE MANAGEMENT/SOCIAL WORK - FINANCIAL ASSISTANCE
[facility] provides services at a reduced cost to those who are determined to be eligible through [facility]’s financial assistance program. Information regarding [facility]’s financial assistance program can be found by going to [email or web address] or by calling [phone].

## 2025-05-20 NOTE — PROGRESS NOTE ADULT - SUBJECTIVE AND OBJECTIVE BOX
Dinesh Silvestre MD  Academic Hospitalist  Pager [phone]  Email: [email or web address]  Available on [employer] Teams        PROGRESS NOTE:     Patient is a 69y old  Female who presents with a chief complaint of Transaminitis, Direct hyperbilirubinemia, Right upper quadrant pain (20 May 2025 07:08)      SUBJECTIVE / OVERNIGHT EVENTS:  Patient seen and examined this morning. Abdominal pain subsided.   ADDITIONAL REVIEW OF SYSTEMS:  No f/c    MEDICATIONS  (STANDING):  bisacodyl 5 milliGRAM(s) Oral <User Schedule>  chlorhexidine 2% Cloths 1 Application(s) Topical daily  enoxaparin Injectable 40 milliGRAM(s) SubCutaneous every 24 hours  gabapentin 300 milliGRAM(s) Oral at bedtime  losartan 25 milliGRAM(s) Oral daily    MEDICATIONS  (PRN):  lidocaine   4% Patch 1 Patch Transdermal every 24 hours PRN Pain  traMADol 25 milliGRAM(s) Oral three times a day PRN Moderate Pain (4 - 6) and severe pain      CAPILLARY BLOOD GLUCOSE        I&O's Summary      PHYSICAL EXAM:  Vital Signs Last 24 Hrs  T(C): 36.4 (20 May 2025 12:48), Max: 36.9 (19 May 2025 20:49)  T(F): 97.5 (20 May 2025 12:48), Max: 98.4 (19 May 2025 20:49)  HR: 73 (20 May 2025 12:48) (72 - 97)  BP: 121/58 (20 May 2025 12:48) (105/66 - 140/82)  BP(mean): --  RR: 17 (20 May 2025 12:48) (17 - 18)  SpO2: 98% (20 May 2025 12:48) (95% - 98%)    Parameters below as of 20 May 2025 12:48  Patient On (Oxygen Delivery Method): room air        CONSTITUTIONAL: NAD, pleasant, cachectic  RESPIRATORY: Normal respiratory effort; no respiratory distress, CTAB  CARDIOVASCULAR: No visible JVD, No lower extremity edema; S1S2, no m,r,g  ABDOMEN: Not guarding, does not appear distended, BS+  MUSCLOSKELETAL: no clubbing or cyanosis of digits; no joint swelling   PSYCH: AOx3      LABS:                        8.3    2.83  )-----------( 211      ( 20 May 2025 07:03 )             27.4     05-20    142  |  110[H]  |  20  ----------------------------<  88  4.0   |  21[L]  |  0.63    Ca    9.4      20 May 2025 07:03  Phos  3.2     05-20  Mg     1.90     05-20    TPro  5.9[L]  /  Alb  3.1[L]  /  TBili  0.7  /  DBili  x   /  AST  44[H]  /  ALT  63[H]  /  AlkPhos  338[H]  05-20          Urinalysis Basic - ( 20 May 2025 07:03 )    Color: x / Appearance: x / SG: x / pH: x  Gluc: 88 mg/dL / Ketone: x  / Bili: x / Urobili: x   Blood: x / Protein: x / Nitrite: x   Leuk Esterase: x / RBC: x / WBC x   Sq Epi: x / Non Sq Epi: x / Bacteria: x          RADIOLOGY & ADDITIONAL TESTS:  Results Reviewed:   Imaging Personally Reviewed:  Electrocardiogram Personally Reviewed:    COORDINATION OF CARE:  Care Discussed with Consultants/Other Providers [Y/N]:  Prior or Outpatient Records Reviewed [Y/N]:   Dinesh Silvestre MD  Academic Hospitalist  Pager [phone]  Email: [email or web address]  Available on Microsoft Teams        PROGRESS NOTE:     Patient is a 69y old  Female who presents with a chief complaint of Transaminitis, Direct hyperbilirubinemia, Right upper quadrant pain (20 May 2025 07:08)      SUBJECTIVE / OVERNIGHT EVENTS:  Patient seen and examined this morning. Abdominal pain subsided. Mandarin  used ID [id number].  ADDITIONAL REVIEW OF SYSTEMS:  No f/c    MEDICATIONS  (STANDING):  bisacodyl 5 milliGRAM(s) Oral <User Schedule>  chlorhexidine 2% Cloths 1 Application(s) Topical daily  enoxaparin Injectable 40 milliGRAM(s) SubCutaneous every 24 hours  gabapentin 300 milliGRAM(s) Oral at bedtime  losartan 25 milliGRAM(s) Oral daily    MEDICATIONS  (PRN):  lidocaine   4% Patch 1 Patch Transdermal every 24 hours PRN Pain  traMADol 25 milliGRAM(s) Oral three times a day PRN Moderate Pain (4 - 6) and severe pain      CAPILLARY BLOOD GLUCOSE        I&O's Summary      PHYSICAL EXAM:  Vital Signs Last 24 Hrs  T(C): 36.4 (20 May 2025 12:48), Max: 36.9 (19 May 2025 20:49)  T(F): 97.5 (20 May 2025 12:48), Max: 98.4 (19 May 2025 20:49)  HR: 73 (20 May 2025 12:48) (72 - 97)  BP: 121/58 (20 May 2025 12:48) (105/66 - 140/82)  BP(mean): --  RR: 17 (20 May 2025 12:48) (17 - 18)  SpO2: 98% (20 May 2025 12:48) (95% - 98%)    Parameters below as of 20 May 2025 12:48  Patient On (Oxygen Delivery Method): room air        CONSTITUTIONAL: NAD, pleasant, cachectic  RESPIRATORY: Normal respiratory effort; no respiratory distress, CTAB  CARDIOVASCULAR: No visible JVD, No lower extremity edema; S1S2, no m,r,g  ABDOMEN: Not guarding, does not appear distended, BS+  MUSCLOSKELETAL: no clubbing or cyanosis of digits; no joint swelling   PSYCH: AOx3      LABS:                        8.3    2.83  )-----------( 211      ( 20 May 2025 07:03 )             27.4     05-20    142  |  110[H]  |  20  ----------------------------<  88  4.0   |  21[L]  |  0.63    Ca    9.4      20 May 2025 07:03  Phos  3.2     05-20  Mg     1.90     05-20    TPro  5.9[L]  /  Alb  3.1[L]  /  TBili  0.7  /  DBili  x   /  AST  44[H]  /  ALT  63[H]  /  AlkPhos  338[H]  05-20          Urinalysis Basic - ( 20 May 2025 07:03 )    Color: x / Appearance: x / SG: x / pH: x  Gluc: 88 mg/dL / Ketone: x  / Bili: x / Urobili: x   Blood: x / Protein: x / Nitrite: x   Leuk Esterase: x / RBC: x / WBC x   Sq Epi: x / Non Sq Epi: x / Bacteria: x          RADIOLOGY & ADDITIONAL TESTS:  Results Reviewed:   Imaging Personally Reviewed:  Electrocardiogram Personally Reviewed:    COORDINATION OF CARE:  Care Discussed with Consultants/Other Providers [Y/N]:  Prior or Outpatient Records Reviewed [Y/N]:

## 2025-05-20 NOTE — PROGRESS NOTE ADULT - SUBJECTIVE AND OBJECTIVE BOX
D SURGERY PROGRESS NOTE ([id number])    SUBJECTIVE:  No acute events overnight. MRCP showing choledocholithiasis, Patient denies concerns.     OBJECTIVE:  Vital Signs Last 24 Hrs  T(C): 36.4 (20 May 2025 05:24), Max: 36.9 (19 May 2025 20:49)  T(F): 97.6 (20 May 2025 05:24), Max: 98.4 (19 May 2025 20:49)  HR: 72 (20 May 2025 05:24) (72 - 97)  BP: 114/61 (20 May 2025 05:24) (105/66 - 140/82)  BP(mean): --  RR: 18 (20 May 2025 05:24) (17 - 18)  SpO2: 95% (20 May 2025 05:24) (95% - 97%)    Parameters below as of 20 May 2025 05:24  Patient On (Oxygen Delivery Method): room air        Physical Examination:  GEN: NAD, resting quietly  NEURO: AAOx3, CN II-XII grossly intact, no focal deficits  PULM: symmetric chest rise bilaterally, no increased WOB  ABD: Soft, nondistended, nontender. well healed laparotomy scar, HAIP in LLQ  EXTR: no lower extremity edema, moving all extremities    MRCP:     IMPRESSION:  *  3 mm filling defect in the distal CBD, consistent with   choledocholithiasis.  *  Intrahepatic and extrahepatic biliary ductal dilatation is similar to   findings present on prior exam.  *  Stable liver lesions.

## 2025-05-20 NOTE — CONSULT NOTE ADULT - ASSESSMENT
69-year-old female, with past history significant for Hypertension, Dyslipidemia, Interstitial lung disease, Iron deficiency anemia, Back pain, Rheumatoid arthritis, Normocytic anemia, Scleroderma, Pulmonary hypertension (mild), Stage IV right colon cancer with metastases to liver and lung,  s/p R hemicolectomy, ANGELI, SBRT in January 2025 and multiple rounds of chemotherapy, most recently been restarted on maintenance therapy with 5FU/LV which was restarted in January.     #choledocholithiasis   - MRCP with 3 mm filling defect in the distal CBD, consistent with choledocholithiasis.  - Tbili 0.7, Alk 338, AST 44, ALT 63 (peak 5/14: Tbili 2.5, Alk 552, , )     #pulmonary hypertension   - last TTE with EF 62%  - PASP 33mmHg     Recommendations:   - keep NPO after midnight for ERCP   - please call pulm for optimization given pulmonary hypertension   - CMP, CBC, INR at 4am   - rest of care per primary team     All recommendations are tentative until note is attested by attending.     Lucita Ruiz, PGY-6  Gastroenterology/Hepatology Fellow  Available on [employer] Teams  [phone] ([facility] Short Range Pager)  [phone] ([facility] Long Range Pager)    On Weekends/Holidays (All day) and Weekdays after 5 PM to 8AM:  For non-urgent consults, please email [email or web address]  For emergent consults, please contact on call GI team.  See Amion schedule ([facility]), [employer] paging system ([facility]), or call hospital  ([facility])

## 2025-05-20 NOTE — CONSULT NOTE ADULT - SUBJECTIVE AND OBJECTIVE BOX
HPI: 69-year-old female, with past history significant for Hypertension, Dyslipidemia, Interstitial lung disease, Iron deficiency anemia, Back pain, Rheumatoid arthritis, Normocytic anemia, Scleroderma, Pulmonary hypertension (mild), Stage IV right colon cancer with metastases to liver and lung,  s/p R hemicolectomy, ANGELI, SBRT in January 2025 and multiple rounds of chemotherapy, most recently been restarted on maintenence therapy with 5FU/LV which was restarted in January    Allergies:  No Known Allergies      Home Medications:    Hospital Medications:  bisacodyl 5 milliGRAM(s) Oral <User Schedule>  chlorhexidine 2% Cloths 1 Application(s) Topical daily  enoxaparin Injectable 40 milliGRAM(s) SubCutaneous every 24 hours  gabapentin 300 milliGRAM(s) Oral at bedtime  lidocaine   4% Patch 1 Patch Transdermal every 24 hours PRN  losartan 25 milliGRAM(s) Oral daily  traMADol 25 milliGRAM(s) Oral three times a day PRN      PMHX/PSHX:  No pertinent past medical history    H/O pulmonary hypertension    H/O pulmonary hypertension    H/O scleroderma    HTN (hypertension)    HLD (hyperlipidemia)    H/O colon cancer, stage IV    Normocytic anemia    Macular degeneration    Interstitial lung disease    ILD (interstitial lung disease)    Constipation    Collapsed vertebra    Infarction of spleen    GERD without esophagitis    Hyperlipidemia    Gait difficulty    Migration of percutaneous cholecystostomy tube    H/O right hemicolectomy    History of laparoscopic cholecystectomy    History of left hip replacement    History of right hip replacement    H/O insertion of hepatic arterial infusion pump        Family history:  Family history of malignant neoplasm of digestive organ (Grandparent, Uncle)    Family history of rheumatoid arthritis (Grandparent)    Family history of genital cancer    Family history of cancer of genital organ (Grandparent)    Family history of malignant neoplasm of digestive organ (Uncle)        Denies family history of colon cancer/polyps, stomach cancer/polyps, pancreatic cancer/masses, liver cancer/disease, ovarian cancer and endometrial cancer.    Social History:   Tob: Denies  EtOH: Denies  Illicit Drugs: Denies    ROS:     General:  No wt loss, fevers, chills, night sweats, fatigue  Eyes:  Good vision, no reported pain  ENT:  No sore throat, pain, runny nose, dysphagia  CV:  No pain, palpitations, hypo/hypertension  Pulm:  No dyspnea, cough, tachypnea, wheezing  GI:  see HPI  :  No pain, bleeding, incontinence, nocturia  Muscle:  No pain, weakness  Neuro:  No weakness, tingling, memory problems  Psych:  No fatigue, insomnia, mood problems, depression  Endocrine:  No polyuria, polydipsia, cold/heat intolerance  Heme:  No petechiae, ecchymosis, easy bruisability  Skin:  No rash, tattoos, scars, edema    PHYSICAL EXAM:     GENERAL:  No acute distress  HEENT:  NCAT, no scleral icterus   CHEST:  no respiratory distress  HEART:  Regular rate and rhythm  ABDOMEN:  Soft, non-tender, non-distended, normoactive bowel sounds,  no masses  EXTREMITIES: No edema  SKIN:  No rash/erythema/ecchymoses/petechiae/wounds/abscess/warm/dry  NEURO:  Alert and oriented x 3, no asterixis    Vital Signs:  Vital Signs Last 24 Hrs  T(C): 36.4 (20 May 2025 12:48), Max: 36.9 (19 May 2025 20:49)  T(F): 97.5 (20 May 2025 12:48), Max: 98.4 (19 May 2025 20:49)  HR: 73 (20 May 2025 12:48) (72 - 97)  BP: 121/58 (20 May 2025 12:48) (105/66 - 140/82)  BP(mean): --  RR: 17 (20 May 2025 12:48) (17 - 18)  SpO2: 98% (20 May 2025 12:48) (95% - 98%)    Parameters below as of 20 May 2025 12:48  Patient On (Oxygen Delivery Method): room air      Daily     Daily     LABS:                        8.3    2.83  )-----------( 211      ( 20 May 2025 07:03 )             27.4     Mean Cell Volume: 97.2 fL (05-20-25 @ 07:03)    05-20    142  |  110[H]  |  20  ----------------------------<  88  4.0   |  21[L]  |  0.63    Ca    9.4      20 May 2025 07:03  Phos  3.2     05-20  Mg     1.90     05-20    TPro  5.9[L]  /  Alb  3.1[L]  /  TBili  0.7  /  DBili  x   /  AST  44[H]  /  ALT  63[H]  /  AlkPhos  338[H]  05-20    LIVER FUNCTIONS - ( 20 May 2025 07:03 )  Alb: 3.1 g/dL / Pro: 5.9 g/dL / ALK PHOS: 338 U/L / ALT: 63 U/L / AST: 44 U/L / GGT: x             Urinalysis Basic - ( 20 May 2025 07:03 )    Color: x / Appearance: x / SG: x / pH: x  Gluc: 88 mg/dL / Ketone: x  / Bili: x / Urobili: x   Blood: x / Protein: x / Nitrite: x   Leuk Esterase: x / RBC: x / WBC x   Sq Epi: x / Non Sq Epi: x / Bacteria: x                              8.3    2.83  )-----------( 211      ( 20 May 2025 07:03 )             27.4                         8.5    1.79  )-----------( 226      ( 19 May 2025 05:20 )             28.2                         8.8    1.99  )-----------( 261      ( 18 May 2025 05:40 )             29.0       Imaging:             HPI: 69-year-old female, with past history significant for Hypertension, Dyslipidemia, Interstitial lung disease, Iron deficiency anemia, Back pain, Rheumatoid arthritis, Normocytic anemia, Scleroderma, Pulmonary hypertension (mild), Stage IV right colon cancer with metastases to liver and lung,  s/p R hemicolectomy, ANGELI, SBRT in January 2025 and multiple rounds of chemotherapy, most recently been restarted on maintenence therapy with 5FU/LV which was restarted in January.     Patient reported to the hospital for elevated transaminases and abd pain. Patient reports that she had abd pain for a few days and then had resolution of abd pain. Transaminases improved.     Allergies:  No Known Allergies      Home Medications:    Hospital Medications:  bisacodyl 5 milliGRAM(s) Oral <User Schedule>  chlorhexidine 2% Cloths 1 Application(s) Topical daily  enoxaparin Injectable 40 milliGRAM(s) SubCutaneous every 24 hours  gabapentin 300 milliGRAM(s) Oral at bedtime  lidocaine   4% Patch 1 Patch Transdermal every 24 hours PRN  losartan 25 milliGRAM(s) Oral daily  traMADol 25 milliGRAM(s) Oral three times a day PRN      PMHX/PSHX:  No pertinent past medical history    H/O pulmonary hypertension    H/O pulmonary hypertension    H/O scleroderma    HTN (hypertension)    HLD (hyperlipidemia)    H/O colon cancer, stage IV    Normocytic anemia    Macular degeneration    Interstitial lung disease    ILD (interstitial lung disease)    Constipation    Collapsed vertebra    Infarction of spleen    GERD without esophagitis    Hyperlipidemia    Gait difficulty    Migration of percutaneous cholecystostomy tube    H/O right hemicolectomy    History of laparoscopic cholecystectomy    History of left hip replacement    History of right hip replacement    H/O insertion of hepatic arterial infusion pump        Family history:  Family history of malignant neoplasm of digestive organ (Grandparent, Uncle)    Family history of rheumatoid arthritis (Grandparent)    Family history of genital cancer    Family history of cancer of genital organ (Grandparent)    Family history of malignant neoplasm of digestive organ (Uncle)        Denies family history of colon cancer/polyps, stomach cancer/polyps, pancreatic cancer/masses, liver cancer/disease, ovarian cancer and endometrial cancer.    Social History:   Tob: Denies  EtOH: Denies  Illicit Drugs: Denies    ROS:     General:  No wt loss, fevers, chills, night sweats, fatigue  Eyes:  Good vision, no reported pain  ENT:  No sore throat, pain, runny nose, dysphagia  CV:  No pain, palpitations, hypo/hypertension  Pulm:  No dyspnea, cough, tachypnea, wheezing  GI:  see HPI  :  No pain, bleeding, incontinence, nocturia  Muscle:  No pain, weakness  Neuro:  No weakness, tingling, memory problems  Psych:  No fatigue, insomnia, mood problems, depression  Endocrine:  No polyuria, polydipsia, cold/heat intolerance  Heme:  No petechiae, ecchymosis, easy bruisability  Skin:  No rash, tattoos, scars, edema    PHYSICAL EXAM:     GENERAL:  No acute distress  HEENT:  NCAT, no scleral icterus   CHEST:  no respiratory distress  HEART:  Regular rate and rhythm  ABDOMEN:  Soft, non-tender, non-distended, normoactive bowel sounds,  no masses  EXTREMITIES: No edema  SKIN:  No rash/erythema/ecchymoses/petechiae/wounds/abscess/warm/dry  NEURO:  Alert and oriented x 3, no asterixis    Vital Signs:  Vital Signs Last 24 Hrs  T(C): 36.4 (20 May 2025 12:48), Max: 36.9 (19 May 2025 20:49)  T(F): 97.5 (20 May 2025 12:48), Max: 98.4 (19 May 2025 20:49)  HR: 73 (20 May 2025 12:48) (72 - 97)  BP: 121/58 (20 May 2025 12:48) (105/66 - 140/82)  BP(mean): --  RR: 17 (20 May 2025 12:48) (17 - 18)  SpO2: 98% (20 May 2025 12:48) (95% - 98%)    Parameters below as of 20 May 2025 12:48  Patient On (Oxygen Delivery Method): room air      Daily     Daily     LABS:                        8.3    2.83  )-----------( 211      ( 20 May 2025 07:03 )             27.4     Mean Cell Volume: 97.2 fL (05-20-25 @ 07:03)    05-20    142  |  110[H]  |  20  ----------------------------<  88  4.0   |  21[L]  |  0.63    Ca    9.4      20 May 2025 07:03  Phos  3.2     05-20  Mg     1.90     05-20    TPro  5.9[L]  /  Alb  3.1[L]  /  TBili  0.7  /  DBili  x   /  AST  44[H]  /  ALT  63[H]  /  AlkPhos  338[H]  05-20    LIVER FUNCTIONS - ( 20 May 2025 07:03 )  Alb: 3.1 g/dL / Pro: 5.9 g/dL / ALK PHOS: 338 U/L / ALT: 63 U/L / AST: 44 U/L / GGT: x             Urinalysis Basic - ( 20 May 2025 07:03 )    Color: x / Appearance: x / SG: x / pH: x  Gluc: 88 mg/dL / Ketone: x  / Bili: x / Urobili: x   Blood: x / Protein: x / Nitrite: x   Leuk Esterase: x / RBC: x / WBC x   Sq Epi: x / Non Sq Epi: x / Bacteria: x                              8.3    2.83  )-----------( 211      ( 20 May 2025 07:03 )             27.4                         8.5    1.79  )-----------( 226      ( 19 May 2025 05:20 )             28.2                         8.8    1.99  )-----------( 261      ( 18 May 2025 05:40 )             29.0       Imaging:

## 2025-05-21 ENCOUNTER — RESULT REVIEW (OUTPATIENT)
Age: 70
End: 2025-05-21

## 2025-05-21 LAB
BASOPHILS # BLD AUTO: 0.02 K/UL — SIGNIFICANT CHANGE UP (ref 0–0.2)
BASOPHILS NFR BLD AUTO: 0.6 % — SIGNIFICANT CHANGE UP (ref 0–2)
EOSINOPHIL # BLD AUTO: 0.13 K/UL — SIGNIFICANT CHANGE UP (ref 0–0.5)
EOSINOPHIL NFR BLD AUTO: 3.7 % — SIGNIFICANT CHANGE UP (ref 0–6)
HCT VFR BLD CALC: 29 % — LOW (ref 34.5–45)
HGB BLD-MCNC: 8.4 G/DL — LOW (ref 11.5–15.5)
IANC: 1.8 K/UL — SIGNIFICANT CHANGE UP (ref 1.8–7.4)
IMM GRANULOCYTES NFR BLD AUTO: 3.1 % — HIGH (ref 0–0.9)
LYMPHOCYTES # BLD AUTO: 1.05 K/UL — SIGNIFICANT CHANGE UP (ref 1–3.3)
LYMPHOCYTES # BLD AUTO: 29.8 % — SIGNIFICANT CHANGE UP (ref 13–44)
MCHC RBC-ENTMCNC: 29 G/DL — LOW (ref 32–36)
MCHC RBC-ENTMCNC: 29.3 PG — SIGNIFICANT CHANGE UP (ref 27–34)
MCV RBC AUTO: 101 FL — HIGH (ref 80–100)
MONOCYTES # BLD AUTO: 0.41 K/UL — SIGNIFICANT CHANGE UP (ref 0–0.9)
MONOCYTES NFR BLD AUTO: 11.6 % — SIGNIFICANT CHANGE UP (ref 2–14)
NEUTROPHILS # BLD AUTO: 1.8 K/UL — SIGNIFICANT CHANGE UP (ref 1.8–7.4)
NEUTROPHILS NFR BLD AUTO: 51.2 % — SIGNIFICANT CHANGE UP (ref 43–77)
NRBC # BLD AUTO: 0 K/UL — SIGNIFICANT CHANGE UP (ref 0–0)
NRBC # FLD: 0 K/UL — SIGNIFICANT CHANGE UP (ref 0–0)
NRBC BLD AUTO-RTO: 0 /100 WBCS — SIGNIFICANT CHANGE UP (ref 0–0)
PLATELET # BLD AUTO: 185 K/UL — SIGNIFICANT CHANGE UP (ref 150–400)
RBC # BLD: 2.87 M/UL — LOW (ref 3.8–5.2)
RBC # FLD: 18.6 % — HIGH (ref 10.3–14.5)
WBC # BLD: 3.52 K/UL — LOW (ref 3.8–10.5)
WBC # FLD AUTO: 3.52 K/UL — LOW (ref 3.8–10.5)

## 2025-05-21 PROCEDURE — 76376 3D RENDER W/INTRP POSTPROCES: CPT | Mod: 26

## 2025-05-21 PROCEDURE — 99223 1ST HOSP IP/OBS HIGH 75: CPT | Mod: GC

## 2025-05-21 PROCEDURE — 93356 MYOCRD STRAIN IMG SPCKL TRCK: CPT

## 2025-05-21 PROCEDURE — 99232 SBSQ HOSP IP/OBS MODERATE 35: CPT

## 2025-05-21 PROCEDURE — 93306 TTE W/DOPPLER COMPLETE: CPT | Mod: 26

## 2025-05-21 RX ORDER — OXYCODONE HYDROCHLORIDE 30 MG/1
2.5 TABLET ORAL ONCE
Refills: 0 | Status: DISCONTINUED | OUTPATIENT
Start: 2025-05-21 | End: 2025-05-21

## 2025-05-21 RX ORDER — ONDANSETRON HCL/PF 4 MG/2 ML
4 VIAL (ML) INJECTION EVERY 8 HOURS
Refills: 0 | Status: DISCONTINUED | OUTPATIENT
Start: 2025-05-21 | End: 2025-05-25

## 2025-05-21 RX ORDER — ONDANSETRON HCL/PF 4 MG/2 ML
4 VIAL (ML) INJECTION ONCE
Refills: 0 | Status: COMPLETED | OUTPATIENT
Start: 2025-05-21 | End: 2025-05-21

## 2025-05-21 RX ADMIN — OXYCODONE HYDROCHLORIDE 2.5 MILLIGRAM(S): 30 TABLET ORAL at 19:41

## 2025-05-21 RX ADMIN — Medication 4 MILLIGRAM(S): at 15:39

## 2025-05-21 RX ADMIN — LOSARTAN POTASSIUM 25 MILLIGRAM(S): 100 TABLET, FILM COATED ORAL at 05:50

## 2025-05-21 RX ADMIN — Medication 1 APPLICATION(S): at 15:39

## 2025-05-21 RX ADMIN — Medication 4 MILLIGRAM(S): at 19:41

## 2025-05-21 RX ADMIN — ENOXAPARIN SODIUM 40 MILLIGRAM(S): 100 INJECTION SUBCUTANEOUS at 07:13

## 2025-05-21 RX ADMIN — GABAPENTIN 300 MILLIGRAM(S): 400 CAPSULE ORAL at 22:23

## 2025-05-21 NOTE — CONSULT NOTE ADULT - ASSESSMENT
69F with PMH RA+scleroderma, mild/early CTD-ILD, hx of PH (was dxed in [place], was on unknown medication but then was told she didn't need it - since being in [place] has not had any evidence of PH by multiple echo), HTN, HLD, metastatic colon ca with mets to liver/lung presenting for abdominal pain found to have choledocholithiasis planning for ERCP. Pulmonary consulted for ILD, possible PH.     # Mild CTD-ILD  # Concern for PH    CT 3/1/25: mild basilar reticular changes with subpleural cysts on R    Echo 10/25/23 - normal RV function, minimal TR.  Echo 6/19/24 - G1DD, normal RV size and function, RAP 3, TR Vmax 2.8, PASP 33, no echo evidence of PH    PFTs 10/24 - FVC 86%, FEV1 75%, FEV1/FVC 66%, TLC 84%, DLCO 40% underestimate Mild obstructive defect with decreased DLCO.   6MWT ok.     Outpatient records reviewed. Seen by Dr. Menezes - assessment minimal ILD, no PH      Overall patient certainly at risk for PH given scleroderma and there are indirect aspects (hx of reported PH, decreased DLCO) that might point towards PH but there is NO concrete evidence of PH.    Plan:  - avoid hypervolemia  - no optimization needed prior to planned ERCP  - titrate oxygen to O2 >92%, use humidified oxygen; incentive spirometry; OOBTC; PT; aspiration precautions and airway clearance as necessary; DVT ppx as tolerated     NOTE INCOMPLETE UNTIL SIGNED BY ATTENDING  69F with PMH RA+scleroderma, mild/early CTD-ILD, hx of PH (was dxed in [place], was on unknown medication but then was told she didn't need it - since [place] has not had any evidence of PH by multiple echo), HTN, HLD, metastatic colon ca with mets to liver/lung presenting for abdominal pain found to have choledocholithiasis planning for ERCP. Pulmonary consulted for ILD, possible PH.     # Mild CTD-ILD  # Concern for PH    CT 3/1/25: mild basilar reticular changes with subpleural cysts on R    Echo 10/25/23 - normal RV function, minimal TR.  Echo 6/19/24 - G1DD, normal RV size and function, RAP 3, TR Vmax 2.8, PASP 33, no echo evidence of PH  Echo today with PASP 40     PFTs 10/24 - FVC 86%, FEV1 75%, FEV1/FVC 66%, TLC 84%, DLCO 40% underestimate Mild obstructive defect with decreased DLCO.   6MWT ok.     Outpatient records reviewed. Seen by Dr. Menezes - assessment minimal ILD, no PH      Overall patient certainly at risk for PH given scleroderma and there are indirect aspects (hx of reported PH, decreased DLCO). PASP 40 on recent TTE marking extremely mild PH    Plan:  - avoid hypervolemia  - no optimization needed prior to planned ERCP  - titrate oxygen to O2 >92%, use humidified oxygen; incentive spirometry; OOBTC; PT; aspiration precautions and airway clearance as necessary; DVT ppx as tolerated   - This patient will need to follow up with the pulmonary clinic after discharge:  Please put "Pulmonary HOME Program" in the discharge token and [facility] will schedule the followup.  Please discuss the appointment details with the patient and include the appointment details in the patients discharge summary.

## 2025-05-21 NOTE — CONSULT NOTE ADULT - ATTENDING COMMENTS
Stage IV right colon cancer with metastases to liver and lung s/p R hemicolectomy, ANGELI, SBRT in January 2025 and multiple rounds of chemotherapy . . Admitted for abdominal pain and elevated liver chemistries of unclear etiology.  Reassuringly, her liver chemistries are improving with no intervention and her abdominal pain is resolved.  Concern for possible passed stone although she is status post CCY so the likelihood is usually lower.    Send liver serologies as documented in the fellow's note above.    Continue to trend liver chemistries daily and if it goes up in the wrong direction, will plan for MRCP for further evaluation.      Angie Campos MD/MPH  Transplant Hep
Agree with above  Choledocholithiasis    Plan for ERCP tomorrow  Place NPO @ [facility]
69F with PMH RA+scleroderma, CTD-ILD, phtn ), HTN, HLD, metastatic colon ca with mets to liver/lung p/w choledocholithiasis planning for ERCP.     Pulmonary consulted for ILD, possible PH.     CTD-ILD present but w/ very minimal reticulation/honeycomb at base  PFT w/ normal tlc slight abnormal ratio and mod dlco decrease      Concern for PH was reportedly on sildenafil in [place] but stopped  echo now w/ mild elevation in pasp 41 but normal tapse         Pt optimized from pulmonary perspective for ERCP  Does not have significant ILD or phtn (or RV dysfxn) based on studies and clinically pt appears well on ra    - maintain oxygen to O2 90-95%  - OOB and PT as tolerated  - dvt ppx
per above

## 2025-05-21 NOTE — CONSULT NOTE ADULT - SUBJECTIVE AND OBJECTIVE BOX
PULMONARY CONSULTATION NOTE    NAME: NATALIA VELA  MEDICAL RECORD NUMBER: [id number]    CHIEF COMPLAINT: Patient is a 69y old  Female who presents with a chief complaint of Transaminitis, Direct hyperbilirubinemia, Right upper quadrant pain (20 May 2025 15:40)      HISTORY OF PRESENT ILLNESS:   HPI:  69-year-old female, with past history significant for Hypertension, Dyslipidemia, Interstitial lung disease, Iron deficiency anemia, Back pain, Rheumatoid arthritis, Normocytic anemia, Scleroderma, Pulmonary hypertension (mild), Stage IV right colon cancer with metastases to liver and lung, Constipation, GERD, and Hepatic infusion pumps, presented to the ED, aft er being referred by outpatient oncologist, secondary to right upper quadrant pain and elevated liver function test.  Seen and evaluated at bedside with daughter present; NAD.  Patient reports onset of mid abdominal pain a few days ago, and more recent onset of right upper extremity pain - to the extent that there is difficulty raising the RUE against gravity.  Pain of the abdomen is rated at ~ 6/10 maximum intensity, while that or the RUE is rated at around 8/10 maximum intensity.  No inciting factor identified, and no prior occurrence.  No associated nausea, vomiting, fever, chills, diaphoresis.  Does suffer with intermittent constipation (treated with increased consumption of fruits and vegetables) and diarrhea.    Reports pain of the right lateral anterior foot, which has been occurring for the past few months.    Vital signs upon ED presentation as follows: BP = 106/73, HR = 86, RR = 16, T = 36.4 C (97.5 F), O2 Sat = 95% on RA.  Diagnosed with Transaminitis, Direct Hyperbilirubinemia and Right Upper Quadrant Pain, and prescribed LR one liter in the ED. (13 May 2025 21:26)      ====================BACKGROUND INFORMATION====================    FAMILY HISTORY: FAMILY HISTORY:  Family history of rheumatoid arthritis (Grandparent)    Family history of cancer of genital organ (Grandparent)    Family history of malignant neoplasm of digestive organ (Uncle)        PAST MEDICAL AND SURGICAL HISTORY: PAST MEDICAL & SURGICAL HISTORY:  H/O pulmonary hypertension      H/O scleroderma      HTN (hypertension)      HLD (hyperlipidemia)      H/O colon cancer, stage IV      Normocytic anemia      Macular degeneration      ILD (interstitial lung disease)      Constipation      Collapsed vertebra      Infarction of spleen      GERD without esophagitis      Hyperlipidemia      Gait difficulty      Migration of percutaneous cholecystostomy tube      H/O right hemicolectomy      History of laparoscopic cholecystectomy      History of left hip replacement      History of right hip replacement      H/O insertion of hepatic arterial infusion pump          ALLERGIES:Allergies    No Known Allergies    Intolerances        HOME MEDICATIONS:     OUTPATIENT PULMONARY DOCTOR:     PFTs:     SOCIAL HISTORY:  TOBACCO USE:  ALCOHOL:  DRUGS:  MARITAL STATUS:  EMPLOYMENT:  EXPOSURES:  RECENT TRAVELS:  PETS:  CODE STATUS:    ========================REVIEW OF SYSTEMS========================  [x] ROS negative except as per HPI    ========================MEDICATIONS=============================  NEURO  gabapentin 300 milliGRAM(s) Oral at bedtime    CARDIO  losartan 25 milliGRAM(s) Oral daily    PULM    FEN/GI  bisacodyl 5 milliGRAM(s) Oral <User Schedule>    HEME/ONC  enoxaparin Injectable 40 milliGRAM(s) SubCutaneous every 24 hours    ANTIMICROBIALS    ENDO    OTHER  chlorhexidine 2% Cloths 1 Application(s) Topical daily      PRN      Drips      ========================PHYSICAL EXAM============================    VITALS: Vital Signs Last 24 Hrs  T(C): 36.7 (21 May 2025 05:48), Max: 36.7 (20 May 2025 22:27)  T(F): 98.1 (21 May 2025 05:48), Max: 98.1 (21 May 2025 05:48)  HR: 90 (21 May 2025 05:48) (73 - 90)  BP: 118/70 (21 May 2025 05:48) (118/70 - 143/72)  BP(mean): --  RR: 18 (21 May 2025 05:48) (17 - 18)  SpO2: 96% (21 May 2025 05:48) (96% - 98%)    Parameters below as of 21 May 2025 05:48  Patient On (Oxygen Delivery Method): room air        INTAKE and OUTPUT: I&O's Detail      WEIGHT    VENTILATOR SETTINGS:     Physical Exam  CONST:       ENMT:         RESP :         CHEST:        CARDS:      VASC:           ABD:            MSK:            NEURO:       SKIN:           ======================LABORATORY RESULTS AND IMAGING=============                        8.3    2.83  )-----------( 211      ( 20 May 2025 07:03 )             27.4                                  05-20    142  |  110[H]  |  20  ----------------------------<  88  4.0   |  21[L]  |  0.63    Ca    9.4      20 May 2025 07:03  Phos  3.2     05-20  Mg     1.90     05-20    TPro  5.9[L]  /  Alb  3.1[L]  /  TBili  0.7  /  DBili  x   /  AST  44[H]  /  ALT  63[H]  /  AlkPhos  338[H]  05-20      Ref-range: <3 normal, >9 elevated, >18 very elevated    ABG Trend  05-02-24 @ 17:30 [id number]  - 7.41/38/89/24/98.4 Lactate --  05-02-24 @ 14:00 [id number]  - 7.39/40/65/24/94.8 Lactate --  05-02-24 @ 10:25 [id number]  - 7.39/39/71/24/95.9 Lactate --  05-01-24 @ 13:35 FiO2--  - 7.35/43/232/24/100.0 Lactate --  05-01-24 @ 11:47 FiO2--  - 7.35/40/357/22/100.0 Lactate --    VBG Trend  01-10-25 @ 22:52 FiO2--  - 7.42/32/82/21/97.8 Lactate 2.5  03-28-24 @ 16:29 FiO2--  - 7.34/45/28/24/31.0 Lactate 1.5  11-08-23 @ 01:53 FiO2--  - 7.34/46/28/25/34.6 Lactate 1.0  10-30-23 @ 01:15 FiO2--  - --/--/--/--/-- Lactate 1.4  09-13-23 @ 07:16 FiO2--  - 7.41/37/95/24/98.4 Lactate 0.9      Creatinine Trend: 0.63<--, 0.60<--, 0.66<--, 0.55<--, 0.57<--, 0.61<--    [x] RADIOLOGY REVIEWED AND INTERPRETED BY ME     PULMONARY CONSULTATION NOTE    NAME: NATALIA VELA  MEDICAL RECORD NUMBER: [id number]    CHIEF COMPLAINT: Patient is a 69y old  Female who presents with a chief complaint of Transaminitis, Direct hyperbilirubinemia, Right upper quadrant pain (20 May 2025 15:40)      HISTORY OF PRESENT ILLNESS:   HPI:  69-year-old female, with past history significant for Hypertension, Dyslipidemia, Interstitial lung disease, Iron deficiency anemia, Back pain, Rheumatoid arthritis, Normocytic anemia, Scleroderma, Pulmonary hypertension (mild), Stage IV right colon cancer with metastases to liver and lung, Constipation, GERD, and Hepatic infusion pumps, presented to the ED, aft er being referred by outpatient oncologist, secondary to right upper quadrant pain and elevated liver function test.  Seen and evaluated at bedside with daughter present; NAD.  Patient reports onset of mid abdominal pain a few days ago, and more recent onset of right upper extremity pain - to the extent that there is difficulty raising the RUE against gravity.  Pain of the abdomen is rated at ~ 6/10 maximum intensity, while that or the RUE is rated at around 8/10 maximum intensity.  No inciting factor identified, and no prior occurrence.  No associated nausea, vomiting, fever, chills, diaphoresis.  Does suffer with intermittent constipation (treated with increased consumption of fruits and vegetables) and diarrhea.    Reports pain of the right lateral anterior foot, which has been occurring for the past few months.    Vital signs upon ED presentation as follows: BP = 106/73, HR = 86, RR = 16, T = 36.4 C (97.5 F), O2 Sat = 95% on RA.  Diagnosed with Transaminitis, Direct Hyperbilirubinemia and Right Upper Quadrant Pain, and prescribed LR one liter in the ED. (13 May 2025 21:26)      ====================BACKGROUND INFORMATION====================    FAMILY HISTORY: FAMILY HISTORY:  Family history of rheumatoid arthritis (Grandparent)    Family history of cancer of genital organ (Grandparent)    Family history of malignant neoplasm of digestive organ (Uncle)        PAST MEDICAL AND SURGICAL HISTORY: PAST MEDICAL & SURGICAL HISTORY:  H/O pulmonary hypertension      H/O scleroderma      HTN (hypertension)      HLD (hyperlipidemia)      H/O colon cancer, stage IV      Normocytic anemia      Macular degeneration      ILD (interstitial lung disease)      Constipation      Collapsed vertebra      Infarction of spleen      GERD without esophagitis      Hyperlipidemia      Gait difficulty      Migration of percutaneous cholecystostomy tube      H/O right hemicolectomy      History of laparoscopic cholecystectomy      History of left hip replacement      History of right hip replacement      H/O insertion of hepatic arterial infusion pump          ALLERGIES:Allergies    No Known Allergies    Intolerances        HOME MEDICATIONS:     OUTPATIENT PULMONARY DOCTOR:     PFTs:     SOCIAL HISTORY:  TOBACCO USE:  ALCOHOL:  DRUGS:  MARITAL STATUS:  EMPLOYMENT:  EXPOSURES:  RECENT TRAVELS:  PETS:  CODE STATUS:    ========================REVIEW OF SYSTEMS========================  [x] ROS negative except as per HPI    ========================MEDICATIONS=============================  NEURO  gabapentin 300 milliGRAM(s) Oral at bedtime    CARDIO  losartan 25 milliGRAM(s) Oral daily    PULM    FEN/GI  bisacodyl 5 milliGRAM(s) Oral <User Schedule>    HEME/ONC  enoxaparin Injectable 40 milliGRAM(s) SubCutaneous every 24 hours    ANTIMICROBIALS    ENDO    OTHER  chlorhexidine 2% Cloths 1 Application(s) Topical daily      PRN      Drips      ========================PHYSICAL EXAM============================    VITALS: Vital Signs Last 24 Hrs  T(C): 36.7 (21 May 2025 05:48), Max: 36.7 (20 May 2025 22:27)  T(F): 98.1 (21 May 2025 05:48), Max: 98.1 (21 May 2025 05:48)  HR: 90 (21 May 2025 05:48) (73 - 90)  BP: 118/70 (21 May 2025 05:48) (118/70 - 143/72)  BP(mean): --  RR: 18 (21 May 2025 05:48) (17 - 18)  SpO2: 96% (21 May 2025 05:48) (96% - 98%)    Parameters below as of 21 May 2025 05:48  Patient On (Oxygen Delivery Method): room air        INTAKE and OUTPUT: I&O's Detail      WEIGHT    VENTILATOR SETTINGS:     Physical Exam  CONST:        NAD, well-developed, awake and alert  ENMT:         MMM, no pharyngeal injection or exudates; no LAD  RESP:           Normal effort and expansion. Normal and equal air entry. No WRR.  CHEST:        No tenderness. No cardiac devices, lines, or chest tubes.   CARD:          RRR, normal S1,S2. no MRG.  VASC:          No appreciable JVD; no LE edema  ABD:            Soft, nontender, nondistended  MSK:            No clubbing or cyanosis of digits  EXT:             WWP; cap refill <2s; pulses are 2+ B/L  PSYCH:         Mood and affect appropriate  NEURO:       Non-focal; moving all extremities   SKIN:           facial and hand telangiectasis      ======================LABORATORY RESULTS AND IMAGING=============                        8.3    2.83  )-----------( 211      ( 20 May 2025 07:03 )             27.4                                  05-20    142  |  110[H]  |  20  ----------------------------<  88  4.0   |  21[L]  |  0.63    Ca    9.4      20 May 2025 07:03  Phos  3.2     05-20  Mg     1.90     05-20    TPro  5.9[L]  /  Alb  3.1[L]  /  TBili  0.7  /  DBili  x   /  AST  44[H]  /  ALT  63[H]  /  AlkPhos  338[H]  05-20      Ref-range: <3 normal, >9 elevated, >18 very elevated    ABG Trend  05-02-24 @ 17:30 [id number]  - 7.41/38/89/24/98.4 Lactate --  05-02-24 @ 14:00 [id number]  - 7.39/40/65/24/94.8 Lactate --  05-02-24 @ 10:25 [id number]  - 7.39/39/71/24/95.9 Lactate --  05-01-24 @ 13:35 FiO2--  - 7.35/43/232/24/100.0 Lactate --  05-01-24 @ 11:47 FiO2--  - 7.35/40/357/22/100.0 Lactate --    VBG Trend  01-10-25 @ 22:52 FiO2--  - 7.42/32/82/21/97.8 Lactate 2.5  03-28-24 @ 16:29 FiO2--  - 7.34/45/28/24/31.0 Lactate 1.5  11-08-23 @ 01:53 FiO2--  - 7.34/46/28/25/34.6 Lactate 1.0  10-30-23 @ 01:15 FiO2--  - --/--/--/--/-- Lactate 1.4  09-13-23 @ 07:16 FiO2--  - 7.41/37/95/24/98.4 Lactate 0.9      Creatinine Trend: 0.63<--, 0.60<--, 0.66<--, 0.55<--, 0.57<--, 0.61<--    [x] RADIOLOGY REVIEWED AND INTERPRETED BY ME

## 2025-05-21 NOTE — CONSULT NOTE ADULT - REASON FOR ADMISSION
Transaminitis, Direct hyperbilirubinemia, Right upper quadrant pain

## 2025-05-21 NOTE — CONSULT NOTE ADULT - CONSULT REASON
Concern for pulmonary hypertension
hx of RA/ scleroderma
choledocholithiasis
elevated liver enzymes
transaminitis

## 2025-05-21 NOTE — PROGRESS NOTE ADULT - SUBJECTIVE AND OBJECTIVE BOX
Dinesh Silvestre MD  Academic Hospitalist  Pager [phone]  Email: [email or web address]  Available on [employer] Teams        PROGRESS NOTE:     Patient is a 69y old  Female who presents with a chief complaint of Transaminitis, Direct hyperbilirubinemia, Right upper quadrant pain (21 May 2025 07:45)      SUBJECTIVE / OVERNIGHT EVENTS:  Patient seen and examined this morning. Pending ERCP today, later notified that procedure cancelled because she ate a cookie. [place]arin  used [facility].   ADDITIONAL REVIEW OF SYSTEMS:  No f/c    MEDICATIONS  (STANDING):  bisacodyl 5 milliGRAM(s) Oral <User Schedule>  chlorhexidine 2% Cloths 1 Application(s) Topical daily  enoxaparin Injectable 40 milliGRAM(s) SubCutaneous every 24 hours  gabapentin 300 milliGRAM(s) Oral at bedtime  losartan 25 milliGRAM(s) Oral daily  ondansetron Injectable 4 milliGRAM(s) IV Push once    MEDICATIONS  (PRN):  lidocaine   4% Patch 1 Patch Transdermal every 24 hours PRN Pain  traMADol 25 milliGRAM(s) Oral three times a day PRN Moderate Pain (4 - 6) and severe pain      CAPILLARY BLOOD GLUCOSE        I&O's Summary      PHYSICAL EXAM:  Vital Signs Last 24 Hrs  T(C): 36.6 (21 May 2025 12:08), Max: 36.7 (20 May 2025 22:27)  T(F): 97.8 (21 May 2025 12:08), Max: 98.1 (21 May 2025 05:48)  HR: 82 (21 May 2025 12:08) (82 - 90)  BP: 129/70 (21 May 2025 12:08) (118/70 - 143/72)  BP(mean): --  RR: 12 (21 May 2025 12:08) (12 - 18)  SpO2: 94% (21 May 2025 12:08) (94% - 98%)    Parameters below as of 21 May 2025 12:08  Patient On (Oxygen Delivery Method): room air        CONSTITUTIONAL: NAD, pleasant, cachectic  RESPIRATORY: Normal respiratory effort; no respiratory distress, CTAB  CARDIOVASCULAR: No visible JVD, No lower extremity edema; S1S2, no m,r,g  ABDOMEN: Not guarding, does not appear distended, BS+  MUSCLOSKELETAL: no clubbing or cyanosis of digits; no joint swelling   PSYCH: AOx3    LABS:                        8.4    3.52  )-----------( 185      ( 21 May 2025 06:00 )             29.0     05-20    142  |  110[H]  |  20  ----------------------------<  88  4.0   |  21[L]  |  0.63    Ca    9.4      20 May 2025 07:03  Phos  3.2     05-20  Mg     1.90     05-20    TPro  5.9[L]  /  Alb  3.1[L]  /  TBili  0.7  /  DBili  x   /  AST  44[H]  /  ALT  63[H]  /  AlkPhos  338[H]  05-20          Urinalysis Basic - ( 20 May 2025 07:03 )    Color: x / Appearance: x / SG: x / pH: x  Gluc: 88 mg/dL / Ketone: x  / Bili: x / Urobili: x   Blood: x / Protein: x / Nitrite: x   Leuk Esterase: x / RBC: x / WBC x   Sq Epi: x / Non Sq Epi: x / Bacteria: x          RADIOLOGY & ADDITIONAL TESTS:  Results Reviewed:   Imaging Personally Reviewed:  Electrocardiogram Personally Reviewed:    COORDINATION OF CARE:  Care Discussed with Consultants/Other Providers [Y/N]:   Prior or Outpatient Records Reviewed [Y/N]:

## 2025-05-22 LAB
ALBUMIN SERPL ELPH-MCNC: 3.4 G/DL — SIGNIFICANT CHANGE UP (ref 3.3–5)
ALP SERPL-CCNC: 750 U/L — HIGH (ref 40–120)
ALT FLD-CCNC: 327 U/L — HIGH (ref 4–33)
ANION GAP SERPL CALC-SCNC: 15 MMOL/L — HIGH (ref 7–14)
APTT BLD: 26.5 SEC — SIGNIFICANT CHANGE UP (ref 26.1–36.8)
AST SERPL-CCNC: 400 U/L — HIGH (ref 4–32)
BASOPHILS # BLD AUTO: 0.02 K/UL — SIGNIFICANT CHANGE UP (ref 0–0.2)
BASOPHILS NFR BLD AUTO: 0.3 % — SIGNIFICANT CHANGE UP (ref 0–2)
BILIRUB SERPL-MCNC: 3.7 MG/DL — HIGH (ref 0.2–1.2)
BUN SERPL-MCNC: 14 MG/DL — SIGNIFICANT CHANGE UP (ref 7–23)
CALCIUM SERPL-MCNC: 9.7 MG/DL — SIGNIFICANT CHANGE UP (ref 8.4–10.5)
CHLORIDE SERPL-SCNC: 104 MMOL/L — SIGNIFICANT CHANGE UP (ref 98–107)
CO2 SERPL-SCNC: 20 MMOL/L — LOW (ref 22–31)
CREAT SERPL-MCNC: 0.72 MG/DL — SIGNIFICANT CHANGE UP (ref 0.5–1.3)
EGFR: 90 ML/MIN/1.73M2 — SIGNIFICANT CHANGE UP
EGFR: 90 ML/MIN/1.73M2 — SIGNIFICANT CHANGE UP
EOSINOPHIL # BLD AUTO: 0.04 K/UL — SIGNIFICANT CHANGE UP (ref 0–0.5)
EOSINOPHIL NFR BLD AUTO: 0.6 % — SIGNIFICANT CHANGE UP (ref 0–6)
GLUCOSE SERPL-MCNC: 104 MG/DL — HIGH (ref 70–99)
HCT VFR BLD CALC: 30.6 % — LOW (ref 34.5–45)
HGB BLD-MCNC: 9.3 G/DL — LOW (ref 11.5–15.5)
IANC: 4.73 K/UL — SIGNIFICANT CHANGE UP (ref 1.8–7.4)
IMM GRANULOCYTES NFR BLD AUTO: 0.7 % — SIGNIFICANT CHANGE UP (ref 0–0.9)
LYMPHOCYTES # BLD AUTO: 1.47 K/UL — SIGNIFICANT CHANGE UP (ref 1–3.3)
LYMPHOCYTES # BLD AUTO: 21.4 % — SIGNIFICANT CHANGE UP (ref 13–44)
MAGNESIUM SERPL-MCNC: 1.8 MG/DL — SIGNIFICANT CHANGE UP (ref 1.6–2.6)
MCHC RBC-ENTMCNC: 29.2 PG — SIGNIFICANT CHANGE UP (ref 27–34)
MCHC RBC-ENTMCNC: 30.4 G/DL — LOW (ref 32–36)
MCV RBC AUTO: 95.9 FL — SIGNIFICANT CHANGE UP (ref 80–100)
MONOCYTES # BLD AUTO: 0.57 K/UL — SIGNIFICANT CHANGE UP (ref 0–0.9)
MONOCYTES NFR BLD AUTO: 8.3 % — SIGNIFICANT CHANGE UP (ref 2–14)
NEUTROPHILS # BLD AUTO: 4.73 K/UL — SIGNIFICANT CHANGE UP (ref 1.8–7.4)
NEUTROPHILS NFR BLD AUTO: 68.7 % — SIGNIFICANT CHANGE UP (ref 43–77)
NRBC # BLD AUTO: 0 K/UL — SIGNIFICANT CHANGE UP (ref 0–0)
NRBC # FLD: 0 K/UL — SIGNIFICANT CHANGE UP (ref 0–0)
NRBC BLD AUTO-RTO: 0 /100 WBCS — SIGNIFICANT CHANGE UP (ref 0–0)
PHOSPHATE SERPL-MCNC: 3.4 MG/DL — SIGNIFICANT CHANGE UP (ref 2.5–4.5)
PLATELET # BLD AUTO: 198 K/UL — SIGNIFICANT CHANGE UP (ref 150–400)
POTASSIUM SERPL-MCNC: 3.7 MMOL/L — SIGNIFICANT CHANGE UP (ref 3.5–5.3)
POTASSIUM SERPL-SCNC: 3.7 MMOL/L — SIGNIFICANT CHANGE UP (ref 3.5–5.3)
PROT SERPL-MCNC: 6.7 G/DL — SIGNIFICANT CHANGE UP (ref 6–8.3)
RBC # BLD: 3.19 M/UL — LOW (ref 3.8–5.2)
RBC # FLD: 18.3 % — HIGH (ref 10.3–14.5)
SODIUM SERPL-SCNC: 139 MMOL/L — SIGNIFICANT CHANGE UP (ref 135–145)
WBC # BLD: 6.88 K/UL — SIGNIFICANT CHANGE UP (ref 3.8–10.5)
WBC # FLD AUTO: 6.88 K/UL — SIGNIFICANT CHANGE UP (ref 3.8–10.5)

## 2025-05-22 PROCEDURE — 43264 ERCP REMOVE DUCT CALCULI: CPT

## 2025-05-22 PROCEDURE — 99233 SBSQ HOSP IP/OBS HIGH 50: CPT

## 2025-05-22 PROCEDURE — 43239 EGD BIOPSY SINGLE/MULTIPLE: CPT

## 2025-05-22 PROCEDURE — 88305 TISSUE EXAM BY PATHOLOGIST: CPT | Mod: 26

## 2025-05-22 PROCEDURE — 43274 ERCP DUCT STENT PLACEMENT: CPT

## 2025-05-22 DEVICE — STENT PANC ZIMMON 5FR 5CM: Type: IMPLANTABLE DEVICE | Status: FUNCTIONAL

## 2025-05-22 DEVICE — CATH BLLN EXTRACT DIST GUIDE WIRE 15MM 3LUM: Type: IMPLANTABLE DEVICE | Status: FUNCTIONAL

## 2025-05-22 DEVICE — HYDRATOME 44: Type: IMPLANTABLE DEVICE | Status: FUNCTIONAL

## 2025-05-22 DEVICE — STENT BIL ADVANIX 10FRX7CM PRELOADED: Type: IMPLANTABLE DEVICE | Status: FUNCTIONAL

## 2025-05-22 DEVICE — STENT PANCREAS CATH PUSH 5FRX170CM: Type: IMPLANTABLE DEVICE | Status: FUNCTIONAL

## 2025-05-22 DEVICE — GWIRE JAG REVOLUTION 260CM: Type: IMPLANTABLE DEVICE | Status: FUNCTIONAL

## 2025-05-22 DEVICE — GWIRE JAGTOME REVOLUTION RX 260CM/0.025IN: Type: IMPLANTABLE DEVICE | Status: FUNCTIONAL

## 2025-05-22 RX ADMIN — Medication 1 APPLICATION(S): at 12:27

## 2025-05-22 RX ADMIN — GABAPENTIN 300 MILLIGRAM(S): 400 CAPSULE ORAL at 21:18

## 2025-05-22 RX ADMIN — LOSARTAN POTASSIUM 25 MILLIGRAM(S): 100 TABLET, FILM COATED ORAL at 05:13

## 2025-05-22 RX ADMIN — ENOXAPARIN SODIUM 40 MILLIGRAM(S): 100 INJECTION SUBCUTANEOUS at 07:21

## 2025-05-22 NOTE — PROGRESS NOTE ADULT - SUBJECTIVE AND OBJECTIVE BOX
Dinesh Silvestre MD  Academic Hospitalist  Pager [phone]  Email: [email or web address]  Available on [employer] Teams        PROGRESS NOTE:     Patient is a 69y old  Female who presents with a chief complaint of Transaminitis, Direct hyperbilirubinemia, Right upper quadrant pain (21 May 2025 15:17)      SUBJECTIVE / OVERNIGHT EVENTS:  Patient seen and examined this morning. Pending ERCP, worsening abdominal pain overnight.   ADDITIONAL REVIEW OF SYSTEMS:  No f/c    MEDICATIONS  (STANDING):  bisacodyl 5 milliGRAM(s) Oral <User Schedule>  chlorhexidine 2% Cloths 1 Application(s) Topical daily  enoxaparin Injectable 40 milliGRAM(s) SubCutaneous every 24 hours  gabapentin 300 milliGRAM(s) Oral at bedtime  losartan 25 milliGRAM(s) Oral daily    MEDICATIONS  (PRN):  lidocaine   4% Patch 1 Patch Transdermal every 24 hours PRN Pain  ondansetron   Disintegrating Tablet 4 milliGRAM(s) Oral every 8 hours PRN Nausea and/or Vomiting  traMADol 25 milliGRAM(s) Oral three times a day PRN Moderate Pain (4 - 6) and severe pain      CAPILLARY BLOOD GLUCOSE        I&O's Summary      PHYSICAL EXAM:  Vital Signs Last 24 Hrs  T(C): 36.6 (22 May 2025 13:20), Max: 37.3 (21 May 2025 23:40)  T(F): 97.9 (22 May 2025 13:20), Max: 99.1 (21 May 2025 23:40)  HR: 78 (22 May 2025 13:20) (77 - 120)  BP: 107/61 (22 May 2025 13:20) (101/62 - 138/73)  BP(mean): --  RR: 12 (22 May 2025 13:20) (12 - 18)  SpO2: 97% (22 May 2025 13:20) (96% - 100%)    Parameters below as of 22 May 2025 12:41  Patient On (Oxygen Delivery Method): room air      CONSTITUTIONAL: NAD, pleasant  RESPIRATORY: Normal respiratory effort; no respiratory distress, CTAB  CARDIOVASCULAR: No visible JVD, No lower extremity edema; S1S2, no m,r,g  ABDOMEN: Not guarding, does not appear distended, BS+  MUSCLOSKELETAL: no clubbing or cyanosis of digits; no joint swelling   PSYCH: AOx3    LABS:                        9.3    6.88  )-----------( 198      ( 22 May 2025 05:33 )             30.6     05-22    139  |  104  |  14  ----------------------------<  104[H]  3.7   |  20[L]  |  0.72    Ca    9.7      22 May 2025 05:33  Phos  3.4     05-22  Mg     1.80     05-22    TPro  6.7  /  Alb  3.4  /  TBili  3.7[H]  /  DBili  x   /  AST  400[H]  /  ALT  327[H]  /  AlkPhos  750[H]  05-22    PTT - ( 22 May 2025 05:33 )  PTT:26.5 sec      Urinalysis Basic - ( 22 May 2025 05:33 )    Color: x / Appearance: x / SG: x / pH: x  Gluc: 104 mg/dL / Ketone: x  / Bili: x / Urobili: x   Blood: x / Protein: x / Nitrite: x   Leuk Esterase: x / RBC: x / WBC x   Sq Epi: x / Non Sq Epi: x / Bacteria: x          RADIOLOGY & ADDITIONAL TESTS:  Results Reviewed:   Imaging Personally Reviewed:  Electrocardiogram Personally Reviewed:    COORDINATION OF CARE:  Care Discussed with Consultants/Other Providers [Y/N]:  Prior or Outpatient Records Reviewed [Y/N]:   Dinesh Silvestre MD  Academic Hospitalist  Pager [phone]  Email: [email or web address]  Available on [employer] Teams        PROGRESS NOTE:     Patient is a 69y old  Female who presents with a chief complaint of Transaminitis, Direct hyperbilirubinemia, Right upper quadrant pain (21 May 2025 15:17)      SUBJECTIVE / OVERNIGHT EVENTS:  Patient seen and examined this morning. Pending ERCP, worsening abdominal pain overnight. Mandarin  ID [id number].   ADDITIONAL REVIEW OF SYSTEMS:  No f/c    MEDICATIONS  (STANDING):  bisacodyl 5 milliGRAM(s) Oral <User Schedule>  chlorhexidine 2% Cloths 1 Application(s) Topical daily  enoxaparin Injectable 40 milliGRAM(s) SubCutaneous every 24 hours  gabapentin 300 milliGRAM(s) Oral at bedtime  losartan 25 milliGRAM(s) Oral daily    MEDICATIONS  (PRN):  lidocaine   4% Patch 1 Patch Transdermal every 24 hours PRN Pain  ondansetron   Disintegrating Tablet 4 milliGRAM(s) Oral every 8 hours PRN Nausea and/or Vomiting  traMADol 25 milliGRAM(s) Oral three times a day PRN Moderate Pain (4 - 6) and severe pain      CAPILLARY BLOOD GLUCOSE        I&O's Summary      PHYSICAL EXAM:  Vital Signs Last 24 Hrs  T(C): 36.6 (22 May 2025 13:20), Max: 37.3 (21 May 2025 23:40)  T(F): 97.9 (22 May 2025 13:20), Max: 99.1 (21 May 2025 23:40)  HR: 78 (22 May 2025 13:20) (77 - 120)  BP: 107/61 (22 May 2025 13:20) (101/62 - 138/73)  BP(mean): --  RR: 12 (22 May 2025 13:20) (12 - 18)  SpO2: 97% (22 May 2025 13:20) (96% - 100%)    Parameters below as of 22 May 2025 12:41  Patient On (Oxygen Delivery Method): room air      CONSTITUTIONAL: NAD, pleasant  RESPIRATORY: Normal respiratory effort; no respiratory distress, CTAB  CARDIOVASCULAR: No visible JVD, No lower extremity edema; S1S2, no m,r,g  ABDOMEN: Not guarding, does not appear distended, BS+  MUSCLOSKELETAL: no clubbing or cyanosis of digits; no joint swelling   PSYCH: AOx3    LABS:                        9.3    6.88  )-----------( 198      ( 22 May 2025 05:33 )             30.6     05-22    139  |  104  |  14  ----------------------------<  104[H]  3.7   |  20[L]  |  0.72    Ca    9.7      22 May 2025 05:33  Phos  3.4     05-22  Mg     1.80     05-22    TPro  6.7  /  Alb  3.4  /  TBili  3.7[H]  /  DBili  x   /  AST  400[H]  /  ALT  327[H]  /  AlkPhos  750[H]  05-22    PTT - ( 22 May 2025 05:33 )  PTT:26.5 sec      Urinalysis Basic - ( 22 May 2025 05:33 )    Color: x / Appearance: x / SG: x / pH: x  Gluc: 104 mg/dL / Ketone: x  / Bili: x / Urobili: x   Blood: x / Protein: x / Nitrite: x   Leuk Esterase: x / RBC: x / WBC x   Sq Epi: x / Non Sq Epi: x / Bacteria: x          RADIOLOGY & ADDITIONAL TESTS:  Results Reviewed:   Imaging Personally Reviewed:  Electrocardiogram Personally Reviewed:    COORDINATION OF CARE:  Care Discussed with Consultants/Other Providers [Y/N]:  Prior or Outpatient Records Reviewed [Y/N]:

## 2025-05-23 ENCOUNTER — TRANSCRIPTION ENCOUNTER (OUTPATIENT)
Age: 70
End: 2025-05-23

## 2025-05-23 DIAGNOSIS — C18.9 MALIGNANT NEOPLASM OF COLON, UNSPECIFIED: ICD-10-CM

## 2025-05-23 DIAGNOSIS — C78.00 MALIGNANT NEOPLASM OF COLON, UNSPECIFIED: ICD-10-CM

## 2025-05-23 LAB
ALBUMIN SERPL ELPH-MCNC: 3.1 G/DL — LOW (ref 3.3–5)
ALP SERPL-CCNC: 669 U/L — HIGH (ref 40–120)
ALT FLD-CCNC: 238 U/L — HIGH (ref 4–33)
ANION GAP SERPL CALC-SCNC: 14 MMOL/L — SIGNIFICANT CHANGE UP (ref 7–14)
ANISOCYTOSIS BLD QL: SLIGHT — SIGNIFICANT CHANGE UP
AST SERPL-CCNC: 189 U/L — HIGH (ref 4–32)
BASOPHILS # BLD AUTO: 0 K/UL — SIGNIFICANT CHANGE UP (ref 0–0.2)
BASOPHILS NFR BLD AUTO: 0 % — SIGNIFICANT CHANGE UP (ref 0–2)
BILIRUB SERPL-MCNC: 3.9 MG/DL — HIGH (ref 0.2–1.2)
BUN SERPL-MCNC: 14 MG/DL — SIGNIFICANT CHANGE UP (ref 7–23)
CALCIUM SERPL-MCNC: 9.7 MG/DL — SIGNIFICANT CHANGE UP (ref 8.4–10.5)
CHLORIDE SERPL-SCNC: 107 MMOL/L — SIGNIFICANT CHANGE UP (ref 98–107)
CO2 SERPL-SCNC: 20 MMOL/L — LOW (ref 22–31)
CREAT SERPL-MCNC: 0.65 MG/DL — SIGNIFICANT CHANGE UP (ref 0.5–1.3)
EGFR: 95 ML/MIN/1.73M2 — SIGNIFICANT CHANGE UP
EGFR: 95 ML/MIN/1.73M2 — SIGNIFICANT CHANGE UP
EOSINOPHIL # BLD AUTO: 0 K/UL — SIGNIFICANT CHANGE UP (ref 0–0.5)
EOSINOPHIL NFR BLD AUTO: 0 % — SIGNIFICANT CHANGE UP (ref 0–6)
GLUCOSE SERPL-MCNC: 133 MG/DL — HIGH (ref 70–99)
HCT VFR BLD CALC: 28.7 % — LOW (ref 34.5–45)
HGB BLD-MCNC: 8.5 G/DL — LOW (ref 11.5–15.5)
IANC: 2.48 K/UL — SIGNIFICANT CHANGE UP (ref 1.8–7.4)
LYMPHOCYTES # BLD AUTO: 0.42 K/UL — LOW (ref 1–3.3)
LYMPHOCYTES # BLD AUTO: 14 % — SIGNIFICANT CHANGE UP (ref 13–44)
MACROCYTES BLD QL: SLIGHT — SIGNIFICANT CHANGE UP
MAGNESIUM SERPL-MCNC: 2 MG/DL — SIGNIFICANT CHANGE UP (ref 1.6–2.6)
MANUAL SMEAR VERIFICATION: SIGNIFICANT CHANGE UP
MCHC RBC-ENTMCNC: 29 PG — SIGNIFICANT CHANGE UP (ref 27–34)
MCHC RBC-ENTMCNC: 29.6 G/DL — LOW (ref 32–36)
MCV RBC AUTO: 98 FL — SIGNIFICANT CHANGE UP (ref 80–100)
MONOCYTES # BLD AUTO: 0.05 K/UL — SIGNIFICANT CHANGE UP (ref 0–0.9)
MONOCYTES NFR BLD AUTO: 1.7 % — LOW (ref 2–14)
NEUTROPHILS # BLD AUTO: 2.51 K/UL — SIGNIFICANT CHANGE UP (ref 1.8–7.4)
NEUTROPHILS NFR BLD AUTO: 82.5 % — HIGH (ref 43–77)
NEUTS BAND # BLD: 1.8 % — SIGNIFICANT CHANGE UP (ref 0–6)
NEUTS BAND NFR BLD: 1.8 % — SIGNIFICANT CHANGE UP (ref 0–6)
OVALOCYTES BLD QL SMEAR: SIGNIFICANT CHANGE UP
PHOSPHATE SERPL-MCNC: 3.6 MG/DL — SIGNIFICANT CHANGE UP (ref 2.5–4.5)
PLAT MORPH BLD: NORMAL — SIGNIFICANT CHANGE UP
PLATELET # BLD AUTO: 172 K/UL — SIGNIFICANT CHANGE UP (ref 150–400)
PLATELET COUNT - ESTIMATE: NORMAL — SIGNIFICANT CHANGE UP
POIKILOCYTOSIS BLD QL AUTO: SIGNIFICANT CHANGE UP
POLYCHROMASIA BLD QL SMEAR: SIGNIFICANT CHANGE UP
POTASSIUM SERPL-MCNC: 4 MMOL/L — SIGNIFICANT CHANGE UP (ref 3.5–5.3)
POTASSIUM SERPL-SCNC: 4 MMOL/L — SIGNIFICANT CHANGE UP (ref 3.5–5.3)
PROT SERPL-MCNC: 6.1 G/DL — SIGNIFICANT CHANGE UP (ref 6–8.3)
RBC # BLD: 2.93 M/UL — LOW (ref 3.8–5.2)
RBC # FLD: 18 % — HIGH (ref 10.3–14.5)
RBC BLD AUTO: ABNORMAL
SODIUM SERPL-SCNC: 141 MMOL/L — SIGNIFICANT CHANGE UP (ref 135–145)
SURGICAL PATHOLOGY STUDY: SIGNIFICANT CHANGE UP
TARGETS BLD QL SMEAR: SLIGHT — SIGNIFICANT CHANGE UP
WBC # BLD: 2.98 K/UL — LOW (ref 3.8–10.5)
WBC # FLD AUTO: 2.98 K/UL — LOW (ref 3.8–10.5)

## 2025-05-23 PROCEDURE — 99233 SBSQ HOSP IP/OBS HIGH 50: CPT | Mod: GC

## 2025-05-23 PROCEDURE — 99233 SBSQ HOSP IP/OBS HIGH 50: CPT

## 2025-05-23 PROCEDURE — 99232 SBSQ HOSP IP/OBS MODERATE 35: CPT | Mod: GC

## 2025-05-23 RX ADMIN — GABAPENTIN 300 MILLIGRAM(S): 400 CAPSULE ORAL at 22:00

## 2025-05-23 RX ADMIN — Medication 1 APPLICATION(S): at 22:00

## 2025-05-23 RX ADMIN — LOSARTAN POTASSIUM 25 MILLIGRAM(S): 100 TABLET, FILM COATED ORAL at 05:48

## 2025-05-23 RX ADMIN — ENOXAPARIN SODIUM 40 MILLIGRAM(S): 100 INJECTION SUBCUTANEOUS at 07:12

## 2025-05-23 NOTE — DISCHARGE NOTE PROVIDER - NSDCFUSCHEDAPPT_GEN_ALL_CORE_FT
[facility]  [facility]  Scheduled Appointment: 06/24/2025    [facility]  [facility]  Scheduled Appointment: 06/24/2025    [facility]  Scheduled Appointment: 06/26/2025    [facility]  Scheduled Appointment: 07/08/2025    [facility]  Scheduled Appointment: 07/08/2025    [facility]  [facility]  Scheduled Appointment: 07/08/2025    [facility]  [facility]  Scheduled Appointment: 07/10/2025    [facility]  Scheduled Appointment: 07/22/2025    [facility]  Scheduled Appointment: 07/22/2025    [facility]  [facility]  Scheduled Appointment: 07/24/2025

## 2025-05-23 NOTE — PROGRESS NOTE ADULT - TIME BILLING
Refill approved as requested.  
- Ordering, reviewing, and interpreting labs, testing, and imaging  - Independently obtaining a review of systems and performing a physical exam  - Reviewing consultant documentation/recommendations  - Counselling and educating patient regarding interpretation of aforementioned items and plan of care  - Documentation of encounter
review of laboratory data, radiology results, discussion with primary team\patient, and monitoring for potential decompensation. Interventions were performed as documented above
- Ordering, reviewing, and interpreting labs, testing, and imaging  - Independently obtaining a review of systems and performing a physical exam  - Reviewing consultant documentation/recommendations in addition to discussing plan of care with consultants  - Counselling and educating patient regarding interpretation of aforementioned items and plan of care  - Documentation of encounter
minutes spent on total encounter; more than 50% of the visit was spent counseling and / or coordinating care by the attending physician.  The necessity of the time spent during the encounter on this date of service was due to:     review of laboratory data, radiology results, consultants' recommendations, documentation in [place], discussion with patient/ACP and interdisciplinary staff (such as , social workers, etc). Interventions were performed as documented above.
- Ordering, reviewing, and interpreting labs, testing, and imaging  - Independently obtaining a review of systems and performing a physical exam  - Reviewing consultant documentation/recommendations  - Counselling and educating patient regarding interpretation of aforementioned items and plan of care  - Documentation of encounter
- Ordering, reviewing, and interpreting labs, testing, and imaging  - Independently obtaining a review of systems and performing a physical exam  - Reviewing consultant documentation/recommendations in addition to discussing plan of care with consultants  - Counselling and educating patient regarding interpretation of aforementioned items and plan of care  - Documentation of encounter
minutes spent on total encounter; more than 50% of the visit was spent counseling and / or coordinating care by the attending physician.  The necessity of the time spent during the encounter on this date of service was due to:     review of laboratory data, radiology results, consultants' recommendations, documentation in [place], discussion with patient/ACP and interdisciplinary staff (such as , social workers, etc). Interventions were performed as documented above.
minutes spent on total encounter; more than 50% of the visit was spent counseling and / or coordinating care by the attending physician.  The necessity of the time spent during the encounter on this date of service was due to:     review of laboratory data, radiology results, consultants' recommendations, documentation in [place], discussion with patient/ACP and interdisciplinary staff (such as , social workers, etc). Interventions were performed as documented above.
- Ordering, reviewing, and interpreting labs, testing, and imaging  - Independently obtaining a review of systems and performing a physical exam  - Reviewing consultant documentation/recommendations in addition to discussing plan of care with consultants  - Counselling and educating patient regarding interpretation of aforementioned items and plan of care  - Documentation of encounter
minutes spent on total encounter; more than 50% of the visit was spent counseling and / or coordinating care by the attending physician.  The necessity of the time spent during the encounter on this date of service was due to:     review of laboratory data, radiology results, consultants' recommendations, documentation in [place], discussion with patient/ACP and interdisciplinary staff (such as , social workers, etc). Interventions were performed as documented above.

## 2025-05-23 NOTE — PROGRESS NOTE ADULT - SUBJECTIVE AND OBJECTIVE BOX
Dinesh Silvestre MD  Academic Hospitalist  Pager [phone]  Email: [email or web address]  Available on Microsoft Teams        PROGRESS NOTE:     Patient is a 69y old  Female who presents with a chief complaint of Transaminitis, Direct hyperbilirubinemia, Right upper quadrant pain (23 May 2025 11:52)      SUBJECTIVE / OVERNIGHT EVENTS:  Patient seen and examined this morning.   ADDITIONAL REVIEW OF SYSTEMS:    MEDICATIONS  (STANDING):  bisacodyl 5 milliGRAM(s) Oral <User Schedule>  chlorhexidine 2% Cloths 1 Application(s) Topical daily  enoxaparin Injectable 40 milliGRAM(s) SubCutaneous every 24 hours  gabapentin 300 milliGRAM(s) Oral at bedtime  losartan 25 milliGRAM(s) Oral daily    MEDICATIONS  (PRN):  lidocaine   4% Patch 1 Patch Transdermal every 24 hours PRN Pain  ondansetron   Disintegrating Tablet 4 milliGRAM(s) Oral every 8 hours PRN Nausea and/or Vomiting  traMADol 25 milliGRAM(s) Oral three times a day PRN Moderate Pain (4 - 6) and severe pain      CAPILLARY BLOOD GLUCOSE        I&O's Summary      PHYSICAL EXAM:  Vital Signs Last 24 Hrs  T(C): 36.9 (23 May 2025 13:49), Max: 36.9 (22 May 2025 20:30)  T(F): 98.4 (23 May 2025 13:49), Max: 98.5 (22 May 2025 20:30)  HR: 88 (23 May 2025 13:49) (58 - 88)  BP: 122/76 (23 May 2025 13:49) (101/64 - 139/78)  BP(mean): --  RR: 17 (23 May 2025 13:49) (13 - 18)  SpO2: 96% (23 May 2025 13:49) (96% - 100%)    Parameters below as of 23 May 2025 13:49  Patient On (Oxygen Delivery Method): room air      CONSTITUTIONAL: NAD, pleasant  RESPIRATORY: Normal respiratory effort; no respiratory distress, CTAB  CARDIOVASCULAR: No visible JVD, No lower extremity edema; S1S2, no m,r,g  ABDOMEN: Not guarding, does not appear distended, BS+  MUSCLOSKELETAL: no clubbing or cyanosis of digits; no joint swelling   PSYCH: AOx3  LABS:                        8.5    2.98  )-----------( 172      ( 23 May 2025 05:53 )             28.7     05-23    141  |  107  |  14  ----------------------------<  133[H]  4.0   |  20[L]  |  0.65    Ca    9.7      23 May 2025 05:53  Phos  3.6     05-23  Mg     2.00     05-23    TPro  6.1  /  Alb  3.1[L]  /  TBili  3.9[H]  /  DBili  x   /  AST  189[H]  /  ALT  238[H]  /  AlkPhos  669[H]  05-23    PTT - ( 22 May 2025 05:33 )  PTT:26.5 sec      Urinalysis Basic - ( 23 May 2025 05:53 )    Color: x / Appearance: x / SG: x / pH: x  Gluc: 133 mg/dL / Ketone: x  / Bili: x / Urobili: x   Blood: x / Protein: x / Nitrite: x   Leuk Esterase: x / RBC: x / WBC x   Sq Epi: x / Non Sq Epi: x / Bacteria: x          RADIOLOGY & ADDITIONAL TESTS:  Results Reviewed:   Imaging Personally Reviewed:  n:          EGD:                       - Normal esophagus.                       - Gastritis. Biopsied.                       - Duodenal erosion                       - Narrowing of D3.                       ERCP:                       - Cannulation achieved via needle knife sphincterotomy                        over PD stent.                       - A biliary sphincterotomy was performed.                       - The biliary tree was swept and sludge was found.                       - One plastic stent was placed into the common bile duct                        due poor drainage of contrast.  Recommendation:      - Return patient to hospital foster for ongoing care.                       - Watch for pancreatitis, bleeding, perforation, and                        cholangitis.                       - Clear liquid diet                       - Await path results.                       - Xray in 2 weeks to assess PD stent                       - ERCP in 2-3 months for CBD stent removal.    Electrocardiogram Personally Reviewed:    COORDINATION OF CARE:  Care Discussed with Consultants/Other Providers [Y/N]:  Prior or Outpatient Records Reviewed [Y/N]:   Dinesh Silvestre MD  Academic Hospitalist  Pager [phone]  Email: [email or web address]  Available on Microsoft Teams        PROGRESS NOTE:     Patient is a 69y old  Female who presents with a chief complaint of Transaminitis, Direct hyperbilirubinemia, Right upper quadrant pain (23 May 2025 11:52)      SUBJECTIVE / OVERNIGHT EVENTS:  Patient seen and examined this morning. Advancing diet, abdominal pain subsiding. Mandarin  used, ID [id number].   ADDITIONAL REVIEW OF SYSTEMS:    MEDICATIONS  (STANDING):  bisacodyl 5 milliGRAM(s) Oral <User Schedule>  chlorhexidine 2% Cloths 1 Application(s) Topical daily  enoxaparin Injectable 40 milliGRAM(s) SubCutaneous every 24 hours  gabapentin 300 milliGRAM(s) Oral at bedtime  losartan 25 milliGRAM(s) Oral daily    MEDICATIONS  (PRN):  lidocaine   4% Patch 1 Patch Transdermal every 24 hours PRN Pain  ondansetron   Disintegrating Tablet 4 milliGRAM(s) Oral every 8 hours PRN Nausea and/or Vomiting  traMADol 25 milliGRAM(s) Oral three times a day PRN Moderate Pain (4 - 6) and severe pain      CAPILLARY BLOOD GLUCOSE        I&O's Summary      PHYSICAL EXAM:  Vital Signs Last 24 Hrs  T(C): 36.9 (23 May 2025 13:49), Max: 36.9 (22 May 2025 20:30)  T(F): 98.4 (23 May 2025 13:49), Max: 98.5 (22 May 2025 20:30)  HR: 88 (23 May 2025 13:49) (58 - 88)  BP: 122/76 (23 May 2025 13:49) (101/64 - 139/78)  BP(mean): --  RR: 17 (23 May 2025 13:49) (13 - 18)  SpO2: 96% (23 May 2025 13:49) (96% - 100%)    Parameters below as of 23 May 2025 13:49  Patient On (Oxygen Delivery Method): room air      CONSTITUTIONAL: NAD, pleasant  RESPIRATORY: Normal respiratory effort; no respiratory distress, CTAB  CARDIOVASCULAR: No visible JVD, No lower extremity edema; S1S2, no m,r,g  ABDOMEN: Not guarding, does not appear distended, BS+  MUSCLOSKELETAL: no clubbing or cyanosis of digits; no joint swelling   PSYCH: AOx3  LABS:                        8.5    2.98  )-----------( 172      ( 23 May 2025 05:53 )             28.7     05-23    141  |  107  |  14  ----------------------------<  133[H]  4.0   |  20[L]  |  0.65    Ca    9.7      23 May 2025 05:53  Phos  3.6     05-23  Mg     2.00     05-23    TPro  6.1  /  Alb  3.1[L]  /  TBili  3.9[H]  /  DBili  x   /  AST  189[H]  /  ALT  238[H]  /  AlkPhos  669[H]  05-23    PTT - ( 22 May 2025 05:33 )  PTT:26.5 sec      Urinalysis Basic - ( 23 May 2025 05:53 )    Color: x / Appearance: x / SG: x / pH: x  Gluc: 133 mg/dL / Ketone: x  / Bili: x / Urobili: x   Blood: x / Protein: x / Nitrite: x   Leuk Esterase: x / RBC: x / WBC x   Sq Epi: x / Non Sq Epi: x / Bacteria: x          RADIOLOGY & ADDITIONAL TESTS:  Results Reviewed:   Imaging Personally Reviewed:  n:          EGD:                       - Normal esophagus.                       - Gastritis. Biopsied.                       - Duodenal erosion                       - Narrowing of D3.                       ERCP:                       - Cannulation achieved via needle knife sphincterotomy                        over PD stent.                       - A biliary sphincterotomy was performed.                       - The biliary tree was swept and sludge was found.                       - One plastic stent was placed into the common bile duct                        due poor drainage of contrast.  Recommendation:      - Return patient to hospital foster for ongoing care.                       - Watch for pancreatitis, bleeding, perforation, and                        cholangitis.                       - Clear liquid diet                       - Await path results.                       - Xray in 2 weeks to assess PD stent                       - ERCP in 2-3 months for CBD stent removal.    Electrocardiogram Personally Reviewed:    COORDINATION OF CARE:  Care Discussed with Consultants/Other Providers [Y/N]:  Prior or Outpatient Records Reviewed [Y/N]:

## 2025-05-23 NOTE — DISCHARGE NOTE PROVIDER - NSDCCPCAREPLAN_GEN_ALL_CORE_FT
PRINCIPAL DISCHARGE DIAGNOSIS  Diagnosis: Transaminitis  Assessment and Plan of Treatment: Ultrasound of your stomach showed likely liver involvement.  CT scan showed unchanged liver lesions.  GI was consulted, MRI showed choledochlithiasis.  You underwent ERCP with GI on 5/22 and a common bile duct stent was placed.  You will follow up with them in 2-3 months for a stent evaluation and likely removal      SECONDARY DISCHARGE DIAGNOSES  Diagnosis: Prophylactic measure  Assessment and Plan of Treatment: Take as needed    Diagnosis: Metastatic colon cancer to liver  Assessment and Plan of Treatment: You will continue to follow up with Dr. Guadalupe    Diagnosis: Direct hyperbilirubinemia  Assessment and Plan of Treatment:     Diagnosis: Right upper quadrant pain  Assessment and Plan of Treatment:      PRINCIPAL DISCHARGE DIAGNOSIS  Diagnosis: Transaminitis  Assessment and Plan of Treatment: Ultrasound of your stomach showed likely liver involvement.  CT scan showed unchanged liver lesions.  GI was consulted, MRI showed choledochlithiasis.  You underwent ERCP with GI on 5/22 and a common bile duct stent was placed.  You will follow up with them in 2-3 months for a stent evaluation and likely removal      SECONDARY DISCHARGE DIAGNOSES  Diagnosis: Prophylactic measure  Assessment and Plan of Treatment: Take as needed    Diagnosis: Metastatic colon cancer to liver  Assessment and Plan of Treatment: You will continue to follow up with Dr. Guadalupe    Diagnosis: Direct hyperbilirubinemia  Assessment and Plan of Treatment: see above    Diagnosis: Right upper quadrant pain  Assessment and Plan of Treatment: see above

## 2025-05-23 NOTE — DISCHARGE NOTE PROVIDER - HOSPITAL COURSE
69F with PMHx of HTN, RA, scleroderma, pulm HTN, and stage IV R colon cancer with hepatic mets s.p R hemicolectomy with cholecystectomy (11/1/23), liver ablation and ANGELI pump (5/2024), C6 FOLFOX (ld 4/1/24) and 6C FUDR (10/29/24), RT to liver 3 fx (ld 1/15/25), currently on maintenance 5FU (last 5/13/25?). P/w RUQ pain and transaminitis in outpt oncology office. CTAP and RUQ US with known mets and post ablation cavities. MRCP showing choledocholithiasis, s/p ERCP 5/22 with CBD stent placed. C/c/b R shoulder pain and R foot pain, no trauma or fx on xray, per rheum likely not RA as mostly muscular, R foot pain likely 2/2 callous.     --HEMEONC--  #Stage IV R colon cancer  -- Hepatic mets   -- S/p R hemicolectomy with cholecystectomy (11/1/23), liver ablation and ANGELI pump (5/2024), C6 FOLFOX (ld 4/1/24) and 6C FUDR (10/29/24), RT to liver 3 fx (ld 1/15/25)  -- Currently on maintenance 5FU (last 5/13/25?)  -- HB surgeon Dr. Franklin  -- Overall plan per Dr. Marshall and Dr. Guadalupe    --GI--  #Abd pain  #Transaminitis and hyperbilirubinemia  -- RUQ US with no acute pathology, heterogeneous hepatic echogenicity likely related to mets or post ablation cavities  -- CTAP with unchanged hypoattenuating hepatic lesion and unchanged wedge-shaped hypodensity, no other acute findings  -- RUQ US with no acute patholo  -- Pt also notes hx of freq constipation  -- Per GI potentially d/t gallstone vs ischemia  -- MRCP showing choledocholithiasis  - s/p ERCP 5/22 with CBD stent   - As per GI Await path results, Xray in 2 weeks to assess PD stent, ERCP in 2-3 months for CBD stent removal  -- Hep panel neg    --RHEUM--  #R shoulder pain  #R foot pain  -- Examination notes calluses over the R foot - findings suggestive of Tailor's Bunion and callous  -- Has hx of RA and hasn't seen rheumatologist since getting cancer tx  -- Xray R shoulder with no fx  -- Per PT and OT no skilled needs  -- Standing tylenol ordered, pt can refuse  --  Per rheum likely myositis vs deconditioning vs structural (tendinopathy), less likely RA flare, rec lidocaine patch for L upper arm and PT  -- Myoglobin -< 21, aldolase 18.5 (H), ESR 38.2 (H), CRP 38.2 (H)     --CV--  #HTN  -- On losartan 24 mg qd while inpt, takes irbesartan 75 mg qd at home 69F with PMHx of HTN, RA, scleroderma, pulm HTN, and stage IV R colon cancer with hepatic mets s.p R hemicolectomy with cholecystectomy (11/1/23), liver ablation and ANGELI pump (5/2024), C6 FOLFOX (ld 4/1/24) and 6C FUDR (10/29/24), RT to liver 3 fx (ld 1/15/25), currently on maintenance 5FU (last 5/13/25?). P/w RUQ pain and transaminitis in outpt oncology office. CTAP and RUQ US with known mets and post ablation cavities. MRCP showing choledocholithiasis, s/p ERCP 5/22 with CBD stent placed. C/c/b R shoulder pain and R foot pain, no trauma or fx on xray, per rheum likely not RA as mostly muscular, R foot pain likely 2/2 callous.     --HEMEONC--  #Stage IV R colon cancer  -- Hepatic mets   -- S/p R hemicolectomy with cholecystectomy (11/1/23), liver ablation and ANGELI pump (5/2024), C6 FOLFOX (ld 4/1/24) and 6C FUDR (10/29/24), RT to liver 3 fx (ld 1/15/25)  -- Currently on maintenance 5FU (last 5/13/25?)  -- HB surgeon Dr. Franklin  -- Overall plan per Dr. Marshall and Dr. Guadalupe    --GI--  #Abd pain  #Transaminitis and hyperbilirubinemia  -- RUQ US with no acute pathology, heterogeneous hepatic echogenicity likely related to mets or post ablation cavities  -- CTAP with unchanged hypoattenuating hepatic lesion and unchanged wedge-shaped hypodensity, no other acute findings  -- RUQ US with no acute patholo  -- Pt also notes hx of freq constipation  -- Per GI potentially d/t gallstone vs ischemia  -- MRCP showing choledocholithiasis  - s/p ERCP 5/22 with CBD stent   - As per GI Await path results, Xray in 2 weeks to assess PD stent, ERCP in 2-3 months for CBD stent removal  -- Hep panel neg    --RHEUM--  #R shoulder pain  #R foot pain  -- Examination notes calluses over the R foot - findings suggestive of Tailor's Bunion and callous  -- Has hx of RA and hasn't seen rheumatologist since getting cancer tx  -- Xray R shoulder with no fx  -- Per PT and OT no skilled needs  -- Standing tylenol ordered, pt can refuse  --  Per rheum likely myositis vs deconditioning vs structural (tendinopathy), less likely RA flare, rec lidocaine patch for L upper arm and PT  -- Myoglobin -< 21, aldolase 18.5 (H), ESR 38.2 (H), CRP 38.2 (H)     --CV--  #HTN  -- On losartan 24 mg qd while inpt, takes irbesartan 75 mg qd at home      ACTIVE PROBLEMS  Stage IV right colon cancer with metastases to liver and lung, on hepatic infusion pump  Immunosuppressed status due to cancer  choledocholithiasis.  Transaminitis/hyperbilirubinemia  Hypernatremia  Severe protein calorie malnutrition  Hypercoagulable state 2/2 malignancy  Hypertension  Interstitial lung disease  Iron deficiency anemia  Rheumatoid arthritis/Scleroderma  Pulmonary hypertension (mild)

## 2025-05-23 NOTE — DISCHARGE NOTE PROVIDER - NSDCFUADDAPPT_GEN_ALL_CORE_FT
APPTS ARE READY TO BE MADE: [X] YES    Best Family or Patient Contact (if needed):    Additional Information about above appointments (if needed):    1: Pulm Home for ILD-PH follow up   2:   3:     Other comments or requests:    APPTS ARE READY TO BE MADE: [X] YES    Best Family or Patient Contact (if needed):    Additional Information about above appointments (if needed):    1: Pulm Home for ILD-PH follow up   2:   3:     Other comments or requests:     Pulmonary Disease:  Patient was outreached but did not answer. A voicemail was left for the patient to return our call. LM for patients daughter.

## 2025-05-23 NOTE — DISCHARGE NOTE PROVIDER - CARE PROVIDER_API CALL
Taz Menezes  Pulmonary Disease  [address] [facility], [facility] [address]  [place] [address]  Phone: [phone]  Fax: [phone]  Follow Up Time:

## 2025-05-23 NOTE — PROGRESS NOTE ADULT - ATTENDING COMMENTS
69F with PMH RA+scleroderma, CTD-ILD, phtn ), HTN, HLD, metastatic colon ca with mets to liver/lung p/w choledocholithiasis planning for ERCP.     Pulmonary consulted for ILD, possible PH.     CTD-ILD present but w/ very minimal reticulation/honeycomb at base  PFT w/ normal tlc slight abnormal ratio and mod dlco decrease      Concern for PH was reportedly on sildenafil in [place] but stopped  echo now w/ mild elevation in pasp 41 but normal tapse         Does not have severe ILD or phtn (or RV dysfxn) based on studies   clinically pt appears well on ra    Tolerated procedure well and denies complaints via      will sign off please reconsult as needed  outpt f/u w/ Dr Menezes

## 2025-05-23 NOTE — PROGRESS NOTE ADULT - SUBJECTIVE AND OBJECTIVE BOX
Gastroenterology/Hepatology Progress Note    Interval Events:   - no acute ON events   - pt s/p ERCP with cannulation via needle knife sphincterotomy over PD stent, plastic stent in CBD for drainage     Allergies:  No Known Allergies      Hospital Medications:  bisacodyl 5 milliGRAM(s) Oral <User Schedule>  chlorhexidine 2% Cloths 1 Application(s) Topical daily  enoxaparin Injectable 40 milliGRAM(s) SubCutaneous every 24 hours  gabapentin 300 milliGRAM(s) Oral at bedtime  lidocaine   4% Patch 1 Patch Transdermal every 24 hours PRN  losartan 25 milliGRAM(s) Oral daily  ondansetron   Disintegrating Tablet 4 milliGRAM(s) Oral every 8 hours PRN  traMADol 25 milliGRAM(s) Oral three times a day PRN      ROS: 14 point ROS negative unless otherwise state in subjective    PHYSICAL EXAM:   Vital Signs:  Vital Signs Last 24 Hrs  T(C): 36.4 (23 May 2025 05:28), Max: 36.9 (22 May 2025 20:30)  T(F): 97.6 (23 May 2025 05:28), Max: 98.5 (22 May 2025 20:30)  HR: 58 (23 May 2025 05:28) (58 - 78)  BP: 139/78 (23 May 2025 05:28) (101/62 - 139/78)  BP(mean): --  RR: 18 (23 May 2025 05:28) (12 - 18)  SpO2: 96% (23 May 2025 05:28) (96% - 100%)    Parameters below as of 23 May 2025 05:28  Patient On (Oxygen Delivery Method): room air      Daily Height in cm: 152.4 (22 May 2025 13:20)    Daily     GENERAL:  No acute distress  HEENT:  NCAT, no scleral icterus  CHEST: no resp distress  HEART:  RRR  ABDOMEN:  Soft, non-tender, non-distended, no masses  EXTREMITIES:  No cyanosis, clubbing, or edema  SKIN:  No rash/erythema/ecchymoses/petechiae/wounds/abscess/warm/dry  NEURO:  Alert and oriented x 3     LABS:                        8.5    2.98  )-----------( 172      ( 23 May 2025 05:53 )             28.7     Mean Cell Volume: 98.0 fL (05-23-25 @ 05:53)    05-23    141  |  107  |  14  ----------------------------<  133[H]  4.0   |  20[L]  |  0.65    Ca    9.7      23 May 2025 05:53  Phos  3.6     05-23  Mg     2.00     05-23    TPro  6.1  /  Alb  3.1[L]  /  TBili  3.9[H]  /  DBili  x   /  AST  189[H]  /  ALT  238[H]  /  AlkPhos  669[H]  05-23    LIVER FUNCTIONS - ( 23 May 2025 05:53 )  Alb: 3.1 g/dL / Pro: 6.1 g/dL / ALK PHOS: 669 U/L / ALT: 238 U/L / AST: 189 U/L / GGT: x           PTT - ( 22 May 2025 05:33 )  PTT:26.5 sec  Urinalysis Basic - ( 23 May 2025 05:53 )    Color: x / Appearance: x / SG: x / pH: x  Gluc: 133 mg/dL / Ketone: x  / Bili: x / Urobili: x   Blood: x / Protein: x / Nitrite: x   Leuk Esterase: x / RBC: x / WBC x   Sq Epi: x / Non Sq Epi: x / Bacteria: x            Imaging:

## 2025-05-23 NOTE — CHART NOTE - NSCHARTNOTEFT_GEN_A_CORE
69 F w/ Stage IV colon cancer with oligometastatic disease to liver s/p ANGELI pump, has been on chemotherapy since Jan 2024. Pt has received 6 doses of FUDR, last dose in Oct 2024. Currently just receiving glycerin via ANGELI pump. In terms of systemic therapy, had FOLFOX x 3 mos (Jan -April 2024) followed by 5FU/LV maintenance Q 2 weeks. Also received SBRT to posterior liver lesion in Jan x 3 fractions. Recent scans in March reviewed without evidence of active disease, ctDNA negative, CEA has been decreasing.   Today came in for Q 2 week 5FU infusion. c/o abdominal pain. Noted to be icteric. Labs notable for acute rise in Tbili 4, rest of LFTs are up as well. Denies any fevers/chills, n/v, recent infections.     Recommendations  - CT A/P w/ co, liver protocol to eval liver mets, ANGELI pump, any other pathology   - infectious work up   will need admission and likely MRCP    I am available on teams. HB surgeon is Dr. Franklin.
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 Diagnosed with Transaminitis, Direct Hyperbilirubinemia and Right Upper Quadrant Pain in the ED per chart    Diet Prescription: Diet, Full Liquid (05-23-25 @ 14:52)  Diet, Regular (05-23-25 @ 08:51)      Nutrition Course: Spoke to patient using mandarin language line  (ID [id number]). Pt noted on clear liquid today. pt s/p ERCP 5/22 with cannulation, plastic stent in CBD for drainage. Now advanced to full liquid diet. Pt reports completing clear liquid meal items (jello, juice specified). Previously noted on DASH/TLC diet with overall fair (51-75%) to Good (%) intake noted per RN flowsheet. Previously ordered for Ensure Plus HP, pt reported good acceptance to it. Denied concerns for N/V/D or abdominal pain at this time. Pt however believes last BM 5/20. On bisacodyl. may benefit from adjusted bowel regimen. Discussed recommendations to increase kcal/protein with inclusion of protein rich foods, increased intake of F/V and tips to maintain diet tolerance. Recommend adding Ensure plus high protein (350 kcal, 20g pro) BID to adequately meet nutritional needs.         Pertinent Medications: MEDICATIONS  (STANDING):  bisacodyl 5 milliGRAM(s) Oral <User Schedule>  chlorhexidine 2% Cloths 1 Application(s) Topical daily  enoxaparin Injectable 40 milliGRAM(s) SubCutaneous every 24 hours  gabapentin 300 milliGRAM(s) Oral at bedtime  losartan 25 milliGRAM(s) Oral daily    MEDICATIONS  (PRN):  lidocaine   4% Patch 1 Patch Transdermal every 24 hours PRN Pain  ondansetron   Disintegrating Tablet 4 milliGRAM(s) Oral every 8 hours PRN Nausea and/or Vomiting  traMADol 25 milliGRAM(s) Oral three times a day PRN Moderate Pain (4 - 6) and severe pain    [] Relevant notes on medications:     Pertinent Labs: 05-23 Na141 mmol/L Glu 133 mg/dL[H] K+ 4.0 mmol/L Cr  0.65 mg/dL BUN 14 mg/dL 05-23 Phos 3.6 mg/dL 05-23 Alb 3.1 g/dL[L]    Weight: Weight (kg): 48.2 (05-22 @ 13:20) 48.2 (5/13)  Weight Assessment: N/A- no changes since admit noted  Height: 60 in / 152.4 cm  IBW: 100 lbs /45.4 kg +/-10%  BMI: 20.7 kg/m^2    Physical Assessment, per flowsheets:  Edema: none noted per RN flowsheet  Pressure Injury: No noted pressure injuries per RN flowsheets    Estimated Needs:   [X] No change since previous assessment,   based on dosing weight 110 lbs /49.8 kg  1302-6583 kcal daily @ 30-32 kcal/kg,    59-69 gm protein daily @1.2-1.4 gm/kg       Previous Nutrition Diagnosis:   [X] Increased Nutrient Needs    [X] Malnutrition   Nutrition Diagnosis are [X] ongoing      New Nutrition Diagnosis: [X] not applicable     Education:  [X] Provided pt with verbal education as noted above today  [X] Provided on previous assessment by RD      Interventions:   1) Recommend addition of oral nutrition supplement Ensure Plus HP (350 kcal, 20g pro) BID to supplement intake, diet progression when medically feasible to DASH/TLC diet  2) Please monitor % PO intake on flowsheets  3) Honor food preferences as able within therapeutic diet order.  4) Fluid/electrolyte management per medical team  5) Monitor bowel function, consider escalating bowel regimen as appropriate (as per medical team discretion)    Monitor & Evaluate:  PO intake, tolerance to diet/supplement, nutrition related lab values, weight trends, BMs/GI distress, hydration status, skin integrity.    Natasha Lovelace, MS, RD, CDN, CNSC pg [phone]  Also available on [employer] Teams
This is a 70 y/o F with PMHx of HTN, dyslipidemia, interstitial lung disease, iron deficiency anemia, back pain, RA, normocytic anemia, scleroderma, mild pulmonary HTN, GERD, constipation, stage IV right colon cx with metastases to liver and lung s/p lap lj and R hemicolectomy (11/1/23, Dr. Thornton) and HAIP + microwave ablation of liver (5/1/24, Dr. Franklin, Dr. Nelson) who was sent in by outpatient oncologist due to RUQ pain and elevated LFTs. Patient with uptrending transminitis, for which surgical oncology has been called to comment. MRCP showing choledocholithiasis. Patient is now s/p ERCP with sphincterotomy and PD/CBD stent placement.     Recs:   - no acute surgical oncology intervention  - appreciate GI/hepatology recs  - global care per primary  - please recall surgical oncology as needed    MD MARIA Evans Team/Surg Onc  [id number]

## 2025-05-23 NOTE — PROGRESS NOTE ADULT - SUBJECTIVE AND OBJECTIVE BOX
PULMONARY PROGRESS NOTE    PATIENT INFORMATION:  NAME: NATALIA VELA:  MRN: [id number]    CHIEF COMPLAINT: Patient is a 69y old  Female who presents with a chief complaint of Transaminitis, Direct hyperbilirubinemia, Right upper quadrant pain (23 May 2025 08:40)     services utilized. [employer] - (Mandarin ) ID# [id number]    [x] INITIAL CONSULT, H&P, FAMILY HISTORY and PAST MEDICAL AND SURGICAL HISTORY REVIEWED    OVERNIGHT EVENTS, CHANGES TO HPI, SUBJECTIVE:   - Patient seen and examined at bedside  - No overnight events  - Symptoms stable/improving    ========================REVIEW OF SYSTEMS========================  [x] ROS negative except as per HPI    ========================MEDICATIONS=============================  NEURO  gabapentin 300 milliGRAM(s) Oral at bedtime    CARDIO  losartan 25 milliGRAM(s) Oral daily    PULM    FEN/GI  bisacodyl 5 milliGRAM(s) Oral <User Schedule>    HEME/ONC  enoxaparin Injectable 40 milliGRAM(s) SubCutaneous every 24 hours    ANTIMICROBIALS    ENDO    OTHER  chlorhexidine 2% Cloths 1 Application(s) Topical daily      PRN      Drips      ========================PHYSICAL EXAM============================    VITALS: Vital Signs Last 24 Hrs  T(C): 36.4 (23 May 2025 05:28), Max: 36.9 (22 May 2025 20:30)  T(F): 97.6 (23 May 2025 05:28), Max: 98.5 (22 May 2025 20:30)  HR: 58 (23 May 2025 05:28) (58 - 78)  BP: 139/78 (23 May 2025 05:28) (101/62 - 139/78)  BP(mean): --  RR: 18 (23 May 2025 05:28) (12 - 18)  SpO2: 96% (23 May 2025 05:28) (96% - 100%)    Parameters below as of 23 May 2025 05:28  Patient On (Oxygen Delivery Method): room air        INTAKE and OUTPUT: I&O's Detail      VENTILATOR SETTINGS:     Physical Exam  CONST:       ENMT:         RESP :         CHEST:        CARDS:      VASC:           ABD:            MSK:            NEURO:       SKIN:           ======================LABORATORY RESULTS AND IMAGING=============                        8.5    2.98  )-----------( 172      ( 23 May 2025 05:53 )             28.7                                  05-23    141  |  107  |  14  ----------------------------<  133[H]  4.0   |  20[L]  |  0.65    Ca    9.7      23 May 2025 05:53  Phos  3.6     05-23  Mg     2.00     05-23    TPro  6.1  /  Alb  3.1[L]  /  TBili  3.9[H]  /  DBili  x   /  AST  189[H]  /  ALT  238[H]  /  AlkPhos  669[H]  05-23      Ref-range: <3 normal, >9 elevated, >18 very elevated          ABG Trend  05-02-24 @ 17:30 [id number]  - 7.41/38/89/24/98.4 Lactate --  05-02-24 @ 14:00 [id number]  - 7.39/40/65/24/94.8 Lactate --  05-02-24 @ 10:25 [id number]  - 7.39/39/71/24/95.9 Lactate --  05-01-24 @ 13:35 FiO2--  - 7.35/43/232/24/100.0 Lactate --  05-01-24 @ 11:47 FiO2--  - 7.35/40/357/22/100.0 Lactate --    VBG Trend  01-10-25 @ 22:52 FiO2--  - 7.42/32/82/21/97.8 Lactate 2.5  03-28-24 @ 16:29 FiO2--  - 7.34/45/28/24/31.0 Lactate 1.5  11-08-23 @ 01:53 FiO2--  - 7.34/46/28/25/34.6 Lactate 1.0  10-30-23 @ 01:15 FiO2--  - --/--/--/--/-- Lactate 1.4  09-13-23 @ 07:16 FiO2--  - 7.41/37/95/24/98.4 Lactate 0.9      Creatinine Trend: 0.65<--, 0.72<--, 0.63<--, 0.60<--, 0.66<--, 0.55<--    [x] RADIOLOGY REVIEWED AND INTERPRETED BY ME

## 2025-05-23 NOTE — PROGRESS NOTE ADULT - ATTENDING COMMENTS
Doing well post procedure.  Advance diet as tolerate. Elevated LFT likely from edema from sphincterotomy and is expected to come down.

## 2025-05-23 NOTE — DISCHARGE NOTE PROVIDER - NSDCMRMEDTOKEN_GEN_ALL_CORE_FT
acetaminophen 325 mg oral tablet: 2 tab(s) orally every 6 hours As needed Mild Pain (1 - 3)  citicoline 1000 mg oral tablet: 1 tab(s) orally 3 times a day  Fish Oil oral capsule: 2 tab(s) orally once a day  irbesartan 75 mg oral tablet: 1 tab(s) orally once a day  meloxicam 15 mg oral tablet: 1 tab(s) orally once a day ~ with food  pantoprazole 40 mg oral delayed release tablet: 1 tab(s) orally once a day (before a meal) MDD: 1  rosuvastatin 10 mg oral capsule: 1 cap(s) orally once a day   citicoline 1000 mg oral tablet: 1 tab(s) orally 3 times a day  Fish Oil oral capsule: 2 tab(s) orally once a day  gabapentin 300 mg oral capsule: 1 cap(s) orally once a day (at bedtime) MDD: 1  irbesartan 75 mg oral tablet: 1 tab(s) orally once a day  pantoprazole 40 mg oral delayed release tablet: 1 tab(s) orally once a day (before a meal) MDD: 1  Salonpas Flex Patch 4% topical film: Apply topically to affected area once a day

## 2025-05-23 NOTE — DISCHARGE NOTE PROVIDER - DETAILS OF MALNUTRITION DIAGNOSIS/DIAGNOSES
This patient has been assessed with a concern for Malnutrition and was treated during this hospitalization for the following Nutrition diagnosis/diagnoses:     -  05/16/2025: Severe protein-calorie malnutrition

## 2025-05-24 LAB
ALBUMIN SERPL ELPH-MCNC: 2.9 G/DL — LOW (ref 3.3–5)
ALP SERPL-CCNC: 522 U/L — HIGH (ref 40–120)
ALT FLD-CCNC: 173 U/L — HIGH (ref 4–33)
ANION GAP SERPL CALC-SCNC: 11 MMOL/L — SIGNIFICANT CHANGE UP (ref 7–14)
AST SERPL-CCNC: 102 U/L — HIGH (ref 4–32)
BASOPHILS # BLD AUTO: 0.01 K/UL — SIGNIFICANT CHANGE UP (ref 0–0.2)
BASOPHILS NFR BLD AUTO: 0.2 % — SIGNIFICANT CHANGE UP (ref 0–2)
BILIRUB SERPL-MCNC: 1.4 MG/DL — HIGH (ref 0.2–1.2)
BUN SERPL-MCNC: 17 MG/DL — SIGNIFICANT CHANGE UP (ref 7–23)
CALCIUM SERPL-MCNC: 9 MG/DL — SIGNIFICANT CHANGE UP (ref 8.4–10.5)
CHLORIDE SERPL-SCNC: 110 MMOL/L — HIGH (ref 98–107)
CO2 SERPL-SCNC: 23 MMOL/L — SIGNIFICANT CHANGE UP (ref 22–31)
CREAT SERPL-MCNC: 0.63 MG/DL — SIGNIFICANT CHANGE UP (ref 0.5–1.3)
EGFR: 96 ML/MIN/1.73M2 — SIGNIFICANT CHANGE UP
EGFR: 96 ML/MIN/1.73M2 — SIGNIFICANT CHANGE UP
EOSINOPHIL # BLD AUTO: 0.02 K/UL — SIGNIFICANT CHANGE UP (ref 0–0.5)
EOSINOPHIL NFR BLD AUTO: 0.3 % — SIGNIFICANT CHANGE UP (ref 0–6)
GLUCOSE SERPL-MCNC: 105 MG/DL — HIGH (ref 70–99)
HCT VFR BLD CALC: 26.9 % — LOW (ref 34.5–45)
HGB BLD-MCNC: 8.2 G/DL — LOW (ref 11.5–15.5)
IANC: 4.89 K/UL — SIGNIFICANT CHANGE UP (ref 1.8–7.4)
IMM GRANULOCYTES NFR BLD AUTO: 0.5 % — SIGNIFICANT CHANGE UP (ref 0–0.9)
LYMPHOCYTES # BLD AUTO: 0.7 K/UL — LOW (ref 1–3.3)
LYMPHOCYTES # BLD AUTO: 11.5 % — LOW (ref 13–44)
MAGNESIUM SERPL-MCNC: 2 MG/DL — SIGNIFICANT CHANGE UP (ref 1.6–2.6)
MCHC RBC-ENTMCNC: 29 PG — SIGNIFICANT CHANGE UP (ref 27–34)
MCHC RBC-ENTMCNC: 30.5 G/DL — LOW (ref 32–36)
MCV RBC AUTO: 95.1 FL — SIGNIFICANT CHANGE UP (ref 80–100)
MONOCYTES # BLD AUTO: 0.44 K/UL — SIGNIFICANT CHANGE UP (ref 0–0.9)
MONOCYTES NFR BLD AUTO: 7.2 % — SIGNIFICANT CHANGE UP (ref 2–14)
NEUTROPHILS # BLD AUTO: 4.89 K/UL — SIGNIFICANT CHANGE UP (ref 1.8–7.4)
NEUTROPHILS NFR BLD AUTO: 80.3 % — HIGH (ref 43–77)
NRBC # BLD AUTO: 0 K/UL — SIGNIFICANT CHANGE UP (ref 0–0)
NRBC # FLD: 0 K/UL — SIGNIFICANT CHANGE UP (ref 0–0)
NRBC BLD AUTO-RTO: 0 /100 WBCS — SIGNIFICANT CHANGE UP (ref 0–0)
PHOSPHATE SERPL-MCNC: 2.4 MG/DL — LOW (ref 2.5–4.5)
PLATELET # BLD AUTO: 182 K/UL — SIGNIFICANT CHANGE UP (ref 150–400)
POTASSIUM SERPL-MCNC: 3.4 MMOL/L — LOW (ref 3.5–5.3)
POTASSIUM SERPL-SCNC: 3.4 MMOL/L — LOW (ref 3.5–5.3)
PROT SERPL-MCNC: 5.5 G/DL — LOW (ref 6–8.3)
RBC # BLD: 2.83 M/UL — LOW (ref 3.8–5.2)
RBC # FLD: 17.9 % — HIGH (ref 10.3–14.5)
SODIUM SERPL-SCNC: 144 MMOL/L — SIGNIFICANT CHANGE UP (ref 135–145)
WBC # BLD: 6.09 K/UL — SIGNIFICANT CHANGE UP (ref 3.8–10.5)
WBC # FLD AUTO: 6.09 K/UL — SIGNIFICANT CHANGE UP (ref 3.8–10.5)

## 2025-05-24 PROCEDURE — 99232 SBSQ HOSP IP/OBS MODERATE 35: CPT

## 2025-05-24 RX ORDER — SOD PHOS DI, MONO/K PHOS MONO 250 MG
1 TABLET ORAL ONCE
Refills: 0 | Status: COMPLETED | OUTPATIENT
Start: 2025-05-24 | End: 2025-05-24

## 2025-05-24 RX ADMIN — LOSARTAN POTASSIUM 25 MILLIGRAM(S): 100 TABLET, FILM COATED ORAL at 07:21

## 2025-05-24 RX ADMIN — GABAPENTIN 300 MILLIGRAM(S): 400 CAPSULE ORAL at 22:07

## 2025-05-24 RX ADMIN — ENOXAPARIN SODIUM 40 MILLIGRAM(S): 100 INJECTION SUBCUTANEOUS at 07:22

## 2025-05-24 RX ADMIN — Medication 40 MILLIEQUIVALENT(S): at 11:54

## 2025-05-24 RX ADMIN — Medication 1 TABLET(S): at 11:54

## 2025-05-24 RX ADMIN — Medication 1 APPLICATION(S): at 11:54

## 2025-05-24 NOTE — PROGRESS NOTE ADULT - PROBLEM SELECTOR PLAN 7
And to the lung, per chart review. Has Hepatic Artery infusion (ANGELI) pump in place  Follows with oncologist Leena Guadalupe at [facility] as outpatient, d/w her today  - C/w outpatient f/up at [facility]
And to the lung, per chart review. Has Hepatic Artery infusion (ANGELI) pump in place  Follows with oncologist Leena Guadalupe at [facility] as outpatient, d/w her today  - C/w outpatient f/up at [facility]
And to the lung, per chart review. Has Hepatic Artery infusion (ANGELI) pump in place  Follows with oncologist Leena Guadalupe at [facility] as outpatient  - C/w outpatient f/up at [facility]
And to the lung, per chart review. Has Hepatic Artery infusion (ANGELI) pump in place  Follows with oncologist Leena Guadalupe at [facility] as outpatient, d/w her today  - C/w outpatient f/up at [facility]
And to the lung, per chart review. Has Hepatic Artery infusion (ANGELI) pump in place  Follows with oncologist Leena Guadalupe at [facility] as outpatient  - C/w outpatient f/up at [facility] with Dr. Leena Guadalupe
And to the lung, per chart review  Follows with oncologist Leena Guadalupe at [facility] as outpatient  - C/w outpatient f/up at [facility]
And to the lung, per chart review  Follows with oncologist Leena Guadalupe at [facility] as outpatient  - C/w outpatient f/up at [facility]
And to the lung, per chart review. Has Hepatic Artery infusion (ANGELI) pump in place  Follows with oncologist Leena Guadalupe at [facility] as outpatient, d/w her today  - C/w outpatient f/up at [facility]
And to the lung, per chart review. Has Hepatic Artery infusion (ANGELI) pump in place  Follows with oncologist Leena Guadalupe at [facility] as outpatient  - C/w outpatient f/up at [facility] with Dr. Leena Guadalupe
And to the lung, per chart review. Has Hepatic Artery infusion (ANGELI) pump in place  Follows with oncologist Leena Guadalupe at [facility] as outpatient, d/w her today  - C/w outpatient f/up at [facility]
And to the lung, per chart review. Has Hepatic Artery infusion (ANGELI) pump in place  Follows with oncologist Leena Guadalupe at [facility] as outpatient  - C/w outpatient f/up at [facility]

## 2025-05-24 NOTE — PROGRESS NOTE ADULT - NUTRITIONAL ASSESSMENT
This patient has been assessed with a concern for Malnutrition and has been determined to have a diagnosis/diagnoses of Severe protein-calorie malnutrition.    This patient is being managed with:   Diet DASH/TLC-  Sodium & Cholesterol Restricted  Supplement Feeding Modality:  Oral  Ensure Plus High Protein Cans or Servings Per Day:  1       Frequency:  Two Times a day  Entered: May 16 2025 12:51PM  
This patient has been assessed with a concern for Malnutrition and has been determined to have a diagnosis/diagnoses of Severe protein-calorie malnutrition.    This patient is being managed with:   Diet DASH/TLC-  Sodium & Cholesterol Restricted  Supplement Feeding Modality:  Oral  Ensure Plus High Protein Cans or Servings Per Day:  1       Frequency:  Two Times a day  Entered: May 16 2025 12:51PM  
This patient has been assessed with a concern for Malnutrition and has been determined to have a diagnosis/diagnoses of Severe protein-calorie malnutrition.    This patient is being managed with:   Diet Full Liquid-  Supplement Feeding Modality:  Oral  Ensure Plus High Protein Cans or Servings Per Day:  2       Frequency:  Daily  Entered: May 23 2025  5:56PM  
This patient has been assessed with a concern for Malnutrition and has been determined to have a diagnosis/diagnoses of Severe protein-calorie malnutrition.    This patient is being managed with:   Diet DASH/TLC-  Sodium & Cholesterol Restricted  Supplement Feeding Modality:  Oral  Ensure Plus High Protein Cans or Servings Per Day:  1       Frequency:  Two Times a day  Entered: May 16 2025 12:51PM  
This patient has been assessed with a concern for Malnutrition and has been determined to have a diagnosis/diagnoses of Severe protein-calorie malnutrition.    This patient is being managed with:   Diet DASH/TLC-  Sodium & Cholesterol Restricted  Supplement Feeding Modality:  Oral  Ensure Plus High Protein Cans or Servings Per Day:  1       Frequency:  Two Times a day  Entered: May 16 2025 12:51PM  
This patient has been assessed with a concern for Malnutrition and has been determined to have a diagnosis/diagnoses of Severe protein-calorie malnutrition.    This patient is being managed with:   Diet NPO after Midnight-     NPO Start Date: 21-May-2025   NPO Start Time: 23:59  Except Medications  Entered: May 21 2025  6:07PM    Diet DASH/TLC-  Sodium & Cholesterol Restricted  Supplement Feeding Modality:  Oral  Ensure Plus High Protein Cans or Servings Per Day:  1       Frequency:  Two Times a day  Entered: May 16 2025 12:51PM  
This patient has been assessed with a concern for Malnutrition and has been determined to have a diagnosis/diagnoses of Severe protein-calorie malnutrition.    This patient is being managed with:   Diet DASH/TLC-  Sodium & Cholesterol Restricted  Supplement Feeding Modality:  Oral  Ensure Plus High Protein Cans or Servings Per Day:  1       Frequency:  Two Times a day  Entered: May 16 2025 12:51PM  
This patient has been assessed with a concern for Malnutrition and has been determined to have a diagnosis/diagnoses of Severe protein-calorie malnutrition.    This patient is being managed with:   Diet Clear Liquid-  Entered: May 22 2025  6:32PM  
This patient has been assessed with a concern for Malnutrition and has been determined to have a diagnosis/diagnoses of Severe protein-calorie malnutrition.    This patient is being managed with:   Diet DASH/TLC-  Sodium & Cholesterol Restricted  Supplement Feeding Modality:  Oral  Ensure Plus High Protein Cans or Servings Per Day:  1       Frequency:  Two Times a day  Entered: May 16 2025 12:51PM

## 2025-05-24 NOTE — PROGRESS NOTE ADULT - PROBLEM SELECTOR PROBLEM 3
Direct hyperbilirubinemia

## 2025-05-24 NOTE — PROGRESS NOTE ADULT - PROBLEM SELECTOR PLAN 8
No acute issues presently  - Losartan 25mg QD resumed as interchange for irbesartan while in-hospital  - Monitor BP, at goal
No acute issues presently  - Losartan 25mg QD resumed as interchange for irbesartan while in-hospital  - Monitor BP, at goal
No acute issues presently, BP readings are elevated  - Losartan 25mg QD resumed as interchange for irbesartan while in-hospital  - Monitor BP closely
No acute issues presently  - Losartan 25mg QD resumed as interchange for irbesartan while in-hospital  - Monitor BP, at goal

## 2025-05-24 NOTE — PROGRESS NOTE ADULT - PROBLEM SELECTOR PLAN 4
Recent onset and causes limitations in range of motion  X-ray negative for any acute findings  - Standing tylenol d/c in the setting of rising LFT's  - C/w lidocaine patch to L upper arm (although pt states is not really helping)  - C/w Tramadol 25mg prn, gapabentin 300mg qhs, pain is better today for first time  - Seen by Rheum given h/o RA/scleroderma overlap, apprec recs  - DDx includes muscular strain/tendinopathy vs deconditioning  - ESR/CRP elevated as expected  - CK/myoglobin are WNL  - F/up aldolase  - OT eval --> No needs
Recent onset and causes limitations in range of motion  X-ray negative for any acute findings  - Tylenol ATC for few days, lidocaine patch to L upper arm  - Seen by Rheum given h/o RA/scleroderma overlap, apprec recs  - DDx includes muscular strain/tendinopathy vs deconditioning  - F/up myoglobin and aldolase; ESR/CRP  - OT eval --> No needs
Recent onset and causes limitations in range of motion  X-ray negative for any acute findings  - Will d/c standing tylenol in the setting of rising LFT's as above  - C/w lidocaine patch to L upper arm (although pt states is not really helping)  - Seen by Rheum given h/o RA/scleroderma overlap, apprec recs  - DDx includes muscular strain/tendinopathy vs deconditioning  - ESR/CRP elevated as expected  - CK/myoglobin are WNL  - F/up aldolase  - OT eval --> No needs
Recent onset and causes limitations in range of motion  X-ray negative for any acute findings  - Standing tylenol d/c in the setting of rising LFT's  - C/w lidocaine patch to L upper arm (although pt states is not really helping)  - C/w Tramadol 25mg prn, gapabentin 300mg qhs, pain is better today for first time  - Seen by Rheum given h/o RA/scleroderma overlap, apprec recs  - DDx includes muscular strain/tendinopathy vs deconditioning  - ESR/CRP elevated as expected  - CK/myoglobin are WNL  - F/up aldolase  - OT eval --> No needs
Recent onset and causes limitations in range of motion  X-ray negative for any acute findings  - Standing tylenol d/c in the setting of rising LFT's  - C/w lidocaine patch to L upper arm (although pt states is not really helping)  - C/w Tramadol 25mg prn, gapabentin 300mg qhs, pain is better today for first time  - Seen by Rheum given h/o RA/scleroderma overlap, apprec recs  - DDx includes muscular strain/tendinopathy vs deconditioning  - ESR/CRP elevated as expected  - CK/myoglobin are WNL  - F/up aldolase  - OT eval --> No needs
Recent onset and causes limitations in range of motion  X-ray negative for any acute findings  - Standing tylenol d/c in the setting of rising LFT's  - C/w lidocaine patch to L upper arm (although pt states is not really helping)  - Starting Tramadol 25mg prn, gapabentin 300mg qhs  - Seen by Rheum given h/o RA/scleroderma overlap, apprec recs  - DDx includes muscular strain/tendinopathy vs deconditioning  - ESR/CRP elevated as expected  - CK/myoglobin are WNL  - F/up aldolase  - OT eval --> No needs
Recent onset and causes limitations in range of motion  X-ray negative for any acute findings  - Standing tylenol d/c in the setting of rising LFT's  - C/w lidocaine patch to L upper arm (although pt states is not really helping)  - C/w Tramadol 25mg prn, gapabentin 300mg qhs, pain is better today for first time  - Seen by Rheum given h/o RA/scleroderma overlap, apprec recs  - DDx includes muscular strain/tendinopathy vs deconditioning  - ESR/CRP elevated as expected  - CK/myoglobin are WNL  - F/up aldolase  - OT eval --> No needs
Recent onset and causes limitations in range of motion  X-ray negative for any acute findings  - Standing tylenol d/c in the setting of rising LFT's  - C/w lidocaine patch to L upper arm (although pt states is not really helping)  - C/w Tramadol 25mg prn, gapabentin 300mg qhs, pain is better today for first time  - Seen by Rheum given h/o RA/scleroderma overlap, apprec recs  - DDx includes muscular strain/tendinopathy vs deconditioning  - ESR/CRP elevated as expected  - CK/myoglobin are WNL  - F/up aldolase  - OT eval --> No needs
Recent onset and causes limitations in range of motion  X-ray negative for any acute findings  - Tylenol PRN mild pain  - Rheumatology being consulted given h/o RA/Scleroderma, f/up recs  - OT evaluation

## 2025-05-24 NOTE — PROGRESS NOTE ADULT - PROBLEM SELECTOR PROBLEM 5
Dehydration

## 2025-05-24 NOTE — PROGRESS NOTE ADULT - PROBLEM SELECTOR PLAN 9
Lovenox  PT/OT evals --> No needs
Lovenox  PT/OT evals --> No needs
Patient/family unable to recall name of medication for macular degeneration  - Will f/up with patient/family regarding this
Lovenox  PT/OT evals --> No needs
Patient/family unable to recall name of medication for macular degeneration  - Will f/up with patient/family regarding this
Lovenox  PT/OT evals --> No needs
Patient/family unable to recall name of medication for macular degeneration  - Will f/up with patient/family regarding this

## 2025-05-24 NOTE — PROGRESS NOTE ADULT - PROBLEM SELECTOR PROBLEM 2
Transaminitis
Cephalic
Transaminitis

## 2025-05-24 NOTE — PROGRESS NOTE ADULT - SUBJECTIVE AND OBJECTIVE BOX
Dinesh Silvestre MD  [employer]ist  Pager [phone]  Email: [email or web address]  Available on [employer] Teams        PROGRESS NOTE:     Patient is a 69y old  Female who presents with a chief complaint of Transaminitis, Direct hyperbilirubinemia, Right upper quadrant pain (23 May 2025 20:23)      SUBJECTIVE / OVERNIGHT EVENTS:  Patient seen and examined this morning. Mandarin  used, ID [id number]. Occasional abdominal pain, but much improved.   ADDITIONAL REVIEW OF SYSTEMS:  No f/c    MEDICATIONS  (STANDING):  bisacodyl 5 milliGRAM(s) Oral <User Schedule>  chlorhexidine 2% Cloths 1 Application(s) Topical daily  enoxaparin Injectable 40 milliGRAM(s) SubCutaneous every 24 hours  gabapentin 300 milliGRAM(s) Oral at bedtime  losartan 25 milliGRAM(s) Oral daily    MEDICATIONS  (PRN):  lidocaine   4% Patch 1 Patch Transdermal every 24 hours PRN Pain  ondansetron   Disintegrating Tablet 4 milliGRAM(s) Oral every 8 hours PRN Nausea and/or Vomiting  traMADol 25 milliGRAM(s) Oral three times a day PRN Moderate Pain (4 - 6) and severe pain      CAPILLARY BLOOD GLUCOSE        I&O's Summary      PHYSICAL EXAM:  Vital Signs Last 24 Hrs  T(C): 36.4 (24 May 2025 05:31), Max: 36.9 (23 May 2025 13:49)  T(F): 97.5 (24 May 2025 05:31), Max: 98.4 (23 May 2025 13:49)  HR: 53 (24 May 2025 05:31) (53 - 88)  BP: 133/80 (24 May 2025 05:31) (122/76 - 133/80)  BP(mean): --  RR: 17 (24 May 2025 05:31) (17 - 17)  SpO2: 99% (24 May 2025 05:31) (95% - 99%)    Parameters below as of 24 May 2025 05:31  Patient On (Oxygen Delivery Method): room air        CONSTITUTIONAL: NAD, pleasant  RESPIRATORY: Normal respiratory effort; no respiratory distress, CTAB  CARDIOVASCULAR: No visible JVD, No lower extremity edema; S1S2, no m,r,g  ABDOMEN: Not guarding, does not appear distended, BS+  MUSCLOSKELETAL: no clubbing or cyanosis of digits; no joint swelling   PSYCH: AOx3  LABS:                        8.2    6.09  )-----------( 182      ( 24 May 2025 10:06 )             26.9     05-24    144  |  110[H]  |  17  ----------------------------<  105[H]  3.4[L]   |  23  |  0.63    Ca    9.0      24 May 2025 10:06  Phos  2.4     05-24  Mg     2.00     05-24    TPro  5.5[L]  /  Alb  2.9[L]  /  TBili  1.4[H]  /  DBili  x   /  AST  102[H]  /  ALT  173[H]  /  AlkPhos  522[H]  05-24          Urinalysis Basic - ( 24 May 2025 10:06 )    Color: x / Appearance: x / SG: x / pH: x  Gluc: 105 mg/dL / Ketone: x  / Bili: x / Urobili: x   Blood: x / Protein: x / Nitrite: x   Leuk Esterase: x / RBC: x / WBC x   Sq Epi: x / Non Sq Epi: x / Bacteria: x          RADIOLOGY & ADDITIONAL TESTS:  Results Reviewed:   Imaging Personally Reviewed:  Electrocardiogram Personally Reviewed:    COORDINATION OF CARE:  Care Discussed with Consultants/Other Providers [Y/N]:  Prior or Outpatient Records Reviewed [Y/N]:

## 2025-05-24 NOTE — PROGRESS NOTE ADULT - PROBLEM SELECTOR PLAN 3
Direct bili = 4.3 (total = 5)  RUQ US is negative for any acute findings; cites "heterogeneous hepatic echogenicity, likely related to metastatic disease/post ablation cavities seen on prior imaging"  - GI/Hepatology recs as above, apprec assistance
Direct bili = 4.3 (total = 5)  RUQ US is negative for any acute findings; cites "heterogeneous hepatic echogenicity, likely related to metastatic disease/post ablation cavities seen on prior imaging"  - GI/Hepatology recs as above, apprec assistance  - Seen by Surg-Onc, apprec recs and d/w them, f/up MRCP as she has an ANGELI pump and Tbili cody suddenly
Direct bili = 4.3 (total = 5)  RUQ US is negative for any acute findings; cites "heterogeneous hepatic echogenicity, likely related to metastatic disease/post ablation cavities seen on prior imaging"  - GI/Hepatology recs as above, apprec assistance  - Seen by Surg-Onc, apprec recs, f/up MRCP as she has an ANGELI pump and Tbili cody suddenly  - Bilirubin is WNL since 5/15
Direct bili = 4.3 (total = 5)  RUQ US is negative for any acute findings; cites "heterogeneous hepatic echogenicity, likely related to metastatic disease/post ablation cavities seen on prior imaging"  - GI/Hepatology recs as above, apprec assistance  - Seen by Surg-Onc, apprec recs, f/up MRCP as she has an ANGELI pump and Tbili cody suddenly  Worsened again on 5/22
Direct bili = 4.3 (total = 5)  RUQ US is negative for any acute findings; cites "heterogeneous hepatic echogenicity, likely related to metastatic disease/post ablation cavities seen on prior imaging"  - GI/Hepatology recs as above, apprec assistance  - Seen by Surg-Onc, apprec recs, f/up MRCP as she has an ANGELI pump and Tbili cody suddenly  - Bilirubin is WNL since 5/15
Direct bili = 4.3 (total = 5)  In the setting of liver failure  RUQ US is negative for any acute findings; cites "heterogeneous hepatic echogenicity, likely related to metastatic disease/post ablation cavities seen on prior imaging"  - GI/Hepatology recs as above, apprec assistance
Direct bili = 4.3 (total = 5)  RUQ US is negative for any acute findings; cites "heterogeneous hepatic echogenicity, likely related to metastatic disease/post ablation cavities seen on prior imaging"  - GI/Hepatology recs as above, apprec assistance  - Seen by Surg-Onc, apprec recs, f/up MRCP as she has an ANGELI pump and Tbili cody suddenly  - Bilirubin is WNL since 5/15
Direct bili = 4.3 (total = 5)  RUQ US is negative for any acute findings; cites "heterogeneous hepatic echogenicity, likely related to metastatic disease/post ablation cavities seen on prior imaging"  - GI/Hepatology recs as above, apprec assistance
Direct bili = 4.3 (total = 5)  In the setting of liver failure  RUQ US is negative for any acute findings; cites "heterogeneous hepatic echogenicity, likely related to metastatic disease/post ablation cavities seen on prior imaging"  - GI/Hepatology recs as above, apprec assistance

## 2025-05-24 NOTE — PROGRESS NOTE ADULT - PROBLEM SELECTOR PROBLEM 7
Metastatic colon cancer to liver

## 2025-05-24 NOTE — PROGRESS NOTE ADULT - PROBLEM SELECTOR PROBLEM 1
Acute abdominal pain

## 2025-05-24 NOTE — PROGRESS NOTE ADULT - PROBLEM SELECTOR PROBLEM 6
Foot pain, right

## 2025-05-24 NOTE — PROGRESS NOTE ADULT - PROBLEM SELECTOR PLAN 6
Pain at the right lateral anterior foot, during ambulation, over the recent past  Examination notes calluses over the areas - findings suggestive of Tailor's Bunion  - Rheumatology being consulted given h/o RA/Scleroderma, f/up recs  - Podiatry consult as outpatient  - Tylenol PRN mild pain
Pain at the right lateral anterior foot, during ambulation, over the recent past  Examination notes calluses over the areas - findings suggestive of Tailor's Bunion  - Rheum recs apprec, R fifth toe lateral pain likely from callus  - Podiatry consult as outpatient  - Tylenol d/c due to transaminitis as above  - Tramadol prn, bowel regimen while on opioids (dulcolax every 2-3 days given chronic on/off diarrhea)
Pain at the right lateral anterior foot, during ambulation, over the recent past  Examination notes calluses over the areas - findings suggestive of Tailor's Bunion  - Rheum recs apprec, R fifth toe lateral pain likely from callus.  - Podiatry consult as outpatient  - Tylenol ATC for few days
Pain at the right lateral anterior foot, during ambulation, over the recent past  Examination notes calluses over the areas - findings suggestive of Tailor's Bunion  - Rheum recs apprec, R fifth toe lateral pain likely from callus  - Podiatry consult as outpatient  - Tylenol d/c due to transaminitis as above  - Tramadol prn, bowel regimen while on opioids (dulcolax every 2-3 days given chronic on/off diarrhea)
Pain at the right lateral anterior foot, during ambulation, over the recent past  Examination notes calluses over the areas - findings suggestive of Tailor's Bunion  - Rheum recs apprec, R fifth toe lateral pain likely from callus.  - Podiatry consult as outpatient  - Tylenol d/c due to transaminitis as above
Pain at the right lateral anterior foot, during ambulation, over the recent past  Examination notes calluses over the areas - findings suggestive of Tailor's Bunion  - Rheum recs apprec, R fifth toe lateral pain likely from callus  - Podiatry consult as outpatient  - Tylenol d/c due to transaminitis as above  - Tramadol prn, bowel regimen while on opioids (dulcolax every 2-3 days given chronic on/off diarrhea)

## 2025-05-24 NOTE — PROGRESS NOTE ADULT - PROBLEM SELECTOR PLAN 2
AST = 341, ALT = 241; chart review indicates fluctuating elevations since ~ 09/2024, but has highest values presently  INR = 0.92  History of colon cancer metastatic to the liver  - LFT's are trending down  - Avoid medications that may worsen liver function  - GI/Hepatology has been consulted, apprec recs, concern for possible passed stone although she is status post CCY so the likelihood is usually lower  - Acute hepatitis panel/CMV are negative  - EBV serology is positive, suggestive of either recent/reactivation but DNA/log is undetectable  - Seen by Surg-Onc, apprec recs,    MRCP findings choledocholithiasis, patient possibly passed stone since marked improvements in LFTs.
AST = 341, ALT = 241; chart review indicates fluctuating elevations since ~ 09/2024, but has highest values presently  INR = 0.92  History of colon cancer metastatic to the liver  - LFT's are trending down  - Avoid medications that may worsen liver function  - GI/Hepatology has been consulted, apprec recs, concern for possible passed stone although she is status post CCY so the likelihood is usually lower  - Acute hepatitis panel/CMV are negative  - EBV serology is positive, suggestive of either recent/reactivation but DNA/log is undetectable  - Seen by Surg-Onc, apprec recs,  f/up MRCP for further evaluation
AST = 341, ALT = 241; chart review indicates fluctuating elevations since ~ 09/2024, but has highest values presently  INR = 0.92  History of colon cancer metastatic to the liver  - LFT's trending down at this time  - Avoid medications that may worsen liver function  - GI/Hepatology has been consulted, apprec recs, concern for possible passed stone although she is status post CCY so the likelihood is usually lower  - Acute hepatitis panel is negative  - F/up CMV, EBV IgG/IgM and PCR  - Trend daily LFT's, if it goes up in the wrong direction, plan is for MRCP for further evaluation
AST = 341, ALT = 241; chart review indicates fluctuating elevations since ~ 09/2024, but has highest values presently  INR = 0.92  History of colon cancer metastatic to the liver  - LFT's were trending down but slightly up again 5/16, will d/c standing tylenol and CTM LFT's  - Avoid medications that may worsen liver function  - GI/Hepatology has been consulted, apprec recs, concern for possible passed stone although she is status post CCY so the likelihood is usually lower  - Acute hepatitis panel/CMV are negative  - EBV serology is positive, suggestive of either recent/reactivation but DNA/log is undetectable  - Seen by Surg-Onc, apprec recs and d/w them, to f/up MRCP for further evaluation
AST = 341, ALT = 241; chart review indicates fluctuating elevations since ~ 09/2024, but has highest values presently  INR = 0.92  History of colon cancer metastatic to the liver  - LFT's trending down at this time  - Avoid medications that may worsen liver function  - GI/Hepatology has been consulted, apprec recs, concern for possible passed stone although she is status post CCY so the likelihood is usually lower  - F/up acute hepatitis panel   - F/up CMV, EBV IgG/IgM and PCR  - Trend daily LFT's, if it goes up in the wrong direction, plan is for MRCP for further evaluation.
AST = 341, ALT = 241; chart review indicates fluctuating elevations since ~ 09/2024, but has highest values presently  INR = 0.92  History of colon cancer metastatic to the liver  - LFT's are trending down  - Avoid medications that may worsen liver function  - GI/Hepatology has been consulted, apprec recs, concern for possible passed stone although she is status post CCY so the likelihood is usually lower  - Acute hepatitis panel/CMV are negative  - EBV serology is positive, suggestive of either recent/reactivation but DNA/log is undetectable  - Seen by Surg-Onc, apprec recs,  f/up MRCP for further evaluation
Improved, as above  MRCP findings choledocholithiasis, patient possibly passed stone since marked improvements in LFTs.
MRCP findings choledocholithiasis, patient possibly passed stone since marked improvements in LFTs.  Improved and suddenly worsened on 5/22  S/P ERCP
MRCP findings choledocholithiasis, patient possibly passed stone since marked improvements in LFTs.  Improved and suddenly worsened on 5/22
MRCP findings choledocholithiasis, patient possibly passed stone since marked improvements in LFTs.  Improved and suddenly worsened on 5/22  S/P ERCP
AST = 341, ALT = 241; chart review indicates fluctuating elevations since ~ 09/2024, but has highest values presently  INR = 0.92  History of colon cancer metastatic to the liver  - LFT's are trending down  - Avoid medications that may worsen liver function  - GI/Hepatology has been consulted, apprec recs, concern for possible passed stone although she is status post CCY so the likelihood is usually lower  - Acute hepatitis panel/CMV are negative  - EBV serology is positive, suggestive of either recent/reactivation but DNA/log is undetectable  - Seen by Surg-Onc, apprec recs,  f/up MRCP for further evaluation

## 2025-05-24 NOTE — PROGRESS NOTE ADULT - PROBLEM SELECTOR PROBLEM 9
Medication management
Medication management
Prophylactic measure
Medication management
Prophylactic measure
Prophylactic measure
Medication management
Prophylactic measure
Medication management

## 2025-05-24 NOTE — PROGRESS NOTE ADULT - PROVIDER SPECIALTY LIST ADULT
Hospitalist
Pulmonology
Surgery
Gastroenterology
Hospitalist

## 2025-05-24 NOTE — PROGRESS NOTE ADULT - ASSESSMENT
This is a 70 y/o F with PMHx of HTN, dyslipidemia, interstitial lung disease, iron deficiency anemia, back pain, RA, normocytic anemia, scleroderma, mild pulmonary HTN, GERD, constipation, stage IV right colon cx with metastases to liver and lung s/p lap lj and R hemicolectomy (11/1/23, Dr. Thornton) and HAIP + microwave ablation of liver (5/1/24, Dr. Franklin, Dr. Nelson) who was sent in by outpatient oncologist due to RUQ pain and elevated LFTs. Patient with uptrending transminitis, for which surgical oncology has been called to comment. MRCP showing choledocholithiasis.     Recommendations:   - recommend GI evaluation for choledocholithiasis  - please trend LFTs  - appreciate hepatology recommendations  - global care per primary  - Surgical Oncology team to follow    D Team   [id number]  
69F with PMH RA+scleroderma, mild/early CTD-ILD, hx of PH (was dxed in [place], was on unknown medication but then was told she didn't need it - since being in [place] has not had any evidence of PH by multiple echo), HTN, HLD, metastatic colon ca with mets to liver/lung presenting for abdominal pain found to have choledocholithiasis planning for ERCP. Pulmonary consulted for ILD, possible PH.     # Mild CTD-ILD  # Mild PH    CT 3/1/25: mild basilar reticular changes with subpleural cysts on R  Echo 10/25/23 - normal RV function, minimal TR.  Echo 6/19/24 - G1DD, normal RV size and function, RAP 3, TR Vmax 2.8, PASP 33, no echo evidence of PH  Echo 5/21/25 with PASP 40     PFTs 10/24 - FVC 86%, FEV1 75%, FEV1/FVC 66%, TLC 84%, DLCO 40% underestimate Mild obstructive defect with decreased DLCO.   6MWT ok.     Outpatient records reviewed. Seen by Dr. Menezes - assessment minimal ILD, no PH  Now s/p ERCP. breathing well, no chest pain, palpitations, leg swelling, dyspnea or hypoxemia    Plan:  - avoid hypervolemia  - titrate oxygen to O2 >92%, use humidified oxygen; incentive spirometry; OOBTC; PT; aspiration precautions and airway clearance as necessary; DVT ppx as tolerated     This patient will need to follow up with the pulmonary clinic after discharge:  Please put "Pulmonary HOME Program" in the discharge token and PAS will schedule the followup.  Please discuss the appointment details with the patient and include the appointment details in the patients discharge summary.     Pulmonary team to sign off, please call back with questions 
69-year-old female, with past history significant for Hypertension, Dyslipidemia, Interstitial lung disease, Iron deficiency anemia, Back pain, Rheumatoid arthritis, Normocytic anemia, Scleroderma, Pulmonary hypertension (mild), Stage IV right colon cancer with metastases to liver and lung, s/p R hemicolectomy, ANGELI, SBRT in January 2025 and multiple rounds of chemotherapy, most recently been restarted on maintenance therapy with 5FU/LV which was restarted in January.     #choledocholithiasis   - MRCP with 3 mm filling defect in the distal CBD, consistent with choledocholithiasis.  - Tbili 0.7, Alk 338, AST 44, ALT 63 (peak 5/14: Tbili 2.5, Alk 552, , )   - s/p ERCP with cannulation via needle knife sphincterotomy over PD stent, plastic stent in CBD for drainage     #pulmonary hypertension   - last TTE with EF 62%  - PASP 33mmHg     Recommendations:   - advance diet as tolerated   - CMP, CBC, INR at 4am   - Await path results.  - Xray in 2 weeks to assess PD stent  - ERCP in 2-3 months for CBD stent removal.  - Please provide patient with Gastroenterology Clinic outpatient follow up number for an appointment [phone] ([facility] [place])   - rest of care per primary team     All recommendations are tentative until note is attested by attending.     Lucita Ruiz, PGY-6  Gastroenterology/Hepatology Fellow  Available on [employer] Teams  [phone] ([facility] Short Range Pager)  [phone] ([facility] Long Range Pager)    On Weekends/Holidays (All day) and Weekdays after 5 PM to 8AM:  For non-urgent consults, please email [email or web address]  For emergent consults, please contact on call GI team.  See Amion schedule ([facility]), [employer] paging system ([facility]), or call hospital  ([facility])       
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 Diagnosed with Transaminitis, Direct Hyperbilirubinemia and Right Upper Quadrant Pain in the ED.    ACTIVE PROBLEMS  Hypertension  Interstitial lung disease  Iron deficiency anemia  Rheumatoid arthritis/Scleroderma  Pulmonary hypertension (mild)  Stage IV right colon cancer with metastases to liver and lung, on hepatic infusion pump  Immunosuppressed status due to cancer  Transaminitis/hyperbilirubinemia  Hypernatremia  Severe protein calorie malnutrition
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 Diagnosed with Transaminitis, Direct Hyperbilirubinemia and Right Upper Quadrant Pain in the ED.    ACTIVE PROBLEMS  Stage IV right colon cancer with metastases to liver and lung, on hepatic infusion pump  Immunosuppressed status due to cancer  choledocholithiasis.  Transaminitis/hyperbilirubinemia  Hypernatremia  Severe protein calorie malnutrition  Hypercoagulable state 2/2 malignancy  Hypertension  Interstitial lung disease  Iron deficiency anemia  Rheumatoid arthritis/Scleroderma  Pulmonary hypertension (mild)
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 Diagnosed with Transaminitis, Direct Hyperbilirubinemia and Right Upper Quadrant Pain in the ED.    ACTIVE PROBLEMS  Hypertension  Interstitial lung disease  Iron deficiency anemia  Rheumatoid arthritis/Scleroderma  Pulmonary hypertension (mild)  Stage IV right colon cancer with metastases to liver and lung, on hepatic infusion pump  Immunosuppressed status due to cancer  choledocholithiasis.  Transaminitis/hyperbilirubinemia  Hypernatremia  Severe protein calorie malnutrition  Hypercoagulable state 2/2 malignancy
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 Diagnosed with Transaminitis, Direct Hyperbilirubinemia and Right Upper Quadrant Pain in the ED.    ACTIVE PROBLEMS  Stage IV right colon cancer with metastases to liver and lung, on hepatic infusion pump  Immunosuppressed status due to cancer  choledocholithiasis.  Transaminitis/hyperbilirubinemia  Hypernatremia  Severe protein calorie malnutrition  Hypercoagulable state 2/2 malignancy  Hypertension  Interstitial lung disease  Iron deficiency anemia  Rheumatoid arthritis/Scleroderma  Pulmonary hypertension (mild)
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 Diagnosed with Transaminitis, Direct Hyperbilirubinemia and Right Upper Quadrant Pain in the ED.    ACTIVE PROBLEMS  Hypertension  Interstitial lung disease  Iron deficiency anemia  Rheumatoid arthritis/Scleroderma  Pulmonary hypertension (mild)  Stage IV right colon cancer with metastases to liver and lung, on hepatic infusion pump  Immunosuppressed status due to cancer  choledocholithiasis.  Transaminitis/hyperbilirubinemia  Hypernatremia  Severe protein calorie malnutrition  Hypercoagulable state 2/2 malignancy
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 Diagnosed with Transaminitis, Direct Hyperbilirubinemia and Right Upper Quadrant Pain in the ED.    ACTIVE PROBLEMS  Hypertension  Interstitial lung disease  Iron deficiency anemia  Rheumatoid arthritis/Scleroderma  Pulmonary hypertension (mild)  Stage IV right colon cancer with metastases to liver and lung, on hepatic infusion pump  Immunosuppressed status due to cancer  Transaminitis/hyperbilirubinemia
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 Diagnosed with Transaminitis, Direct Hyperbilirubinemia and Right Upper Quadrant Pain in the ED.    ACTIVE PROBLEMS  Hypertension  Interstitial lung disease  Iron deficiency anemia  Rheumatoid arthritis/Scleroderma  Pulmonary hypertension (mild)  Stage IV right colon cancer with metastases to liver and lung, on hepatic infusion pump  Immunosuppressed status due to cancer  choledocholithiasis.  Transaminitis/hyperbilirubinemia  Hypernatremia  Severe protein calorie malnutrition  Hypercoagulable state 2/2 malignancy
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 Diagnosed with Transaminitis, Direct Hyperbilirubinemia and Right Upper Quadrant Pain in the ED.    ACTIVE PROBLEMS  Hypertension  Interstitial lung disease  Iron deficiency anemia  Rheumatoid arthritis/Scleroderma  Pulmonary hypertension (mild)  Stage IV right colon cancer with metastases to liver and lung, on hepatic infusion pump  Immunosuppressed status due to cancer  choledocholithiasis.  Transaminitis/hyperbilirubinemia  Hypernatremia  Severe protein calorie malnutrition  Hypercoagulable state 2/2 malignancy
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 Diagnosed with Transaminitis, Direct Hyperbilirubinemia and Right Upper Quadrant Pain in the ED.    ACTIVE PROBLEMS  Hypertension  Interstitial lung disease  Iron deficiency anemia  Rheumatoid arthritis/Scleroderma  Pulmonary hypertension (mild)  Stage IV right colon cancer with metastases to liver and lung, on hepatic infusion pump  Immunosuppressed status due to cancer  Transaminitis/hyperbilirubinemia  Hypernatremia  Severe protein calorie malnutrition
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 Diagnosed with Transaminitis, Direct Hyperbilirubinemia and Right Upper Quadrant Pain in the ED.    ACTIVE PROBLEMS  Hypertension  Interstitial lung disease  Iron deficiency anemia  Rheumatoid arthritis/Scleroderma  Pulmonary hypertension (mild)  Stage IV right colon cancer with metastases to liver and lung, on hepatic infusion pump  Immunosuppressed status due to cancer  Transaminitis/hyperbilirubinemia  Hypernatremia
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 Diagnosed with Transaminitis, Direct Hyperbilirubinemia and Right Upper Quadrant Pain in the ED.    ACTIVE PROBLEMS  Hypertension  Interstitial lung disease  Iron deficiency anemia  Rheumatoid arthritis/Scleroderma  Pulmonary hypertension (mild)  Stage IV right colon cancer with metastases to liver and lung, on hepatic infusion pump  Immunosuppressed status due to cancer  Transaminitis/hyperbilirubinemia  Hypernatremia  Severe protein calorie malnutrition

## 2025-05-24 NOTE — PROGRESS NOTE ADULT - PROBLEM SELECTOR PLAN 5
Very dry, peeling lips, dry oral mucosa, hemoconcentrated lab-work, increased urine specific gravity on admission  - S/p IVF's  - Encouraged oral hydration, as tolerated  - Hypernatremia resolved  RESOLVED
Very dry, peeling lips, dry oral mucosa, hemoconcentrated lab-work, increased urine specific gravity on admission  - S/p IVF's  - encourage oral hydration, as tolerated  - Seems to be improving
Very dry, peeling lips, dry oral mucosa, hemoconcentrated lab-work, increased urine specific gravity on admission  - S/p IVF's  - Encouraged oral hydration, as tolerated  - Hypernatremia resolved  RESOLVED
Very dry, peeling lips, dry oral mucosa, hemoconcentrated lab-work, increased urine specific gravity on admission  - S/p IVF's  - Encouraged oral hydration, as tolerated  - Hypernatremia, monitor for now, f/up BMP in AM
Very dry, peeling lips, dry oral mucosa, hemoconcentrated lab-work, increased urine specific gravity on admission  - S/p IVF's  - Encouraged oral hydration, as tolerated  - Hypernatremia resolved  RESOLVED
Very dry, peeling lips, dry oral mucosa, hemoconcentrated lab-work, increased urine specific gravity on admission  - S/p IVF's  - Encouraged oral hydration, as tolerated  - Hypernatremia resolved  RESOLVED

## 2025-05-24 NOTE — PROGRESS NOTE ADULT - PROBLEM SELECTOR PROBLEM 4
Right shoulder pain

## 2025-05-24 NOTE — PROGRESS NOTE ADULT - PROBLEM SELECTOR PLAN 1
Acute onset a few days ago, with intensity of ~ 6/10, in patient with elevated bilirubin, transaminitis, history of Stage IV colon cancer metastatic to the liver and lung  US = "No acute findings in the abd or pelvis.  Hepatic findings unchanged since CT abd and pelvis dated 1/11/2025  - history of constipation; likely that this could be also impacting. Tends to treat same w/ fruits/vegetables  - +BM 5/14, will add bowel regimen  - Could be aggravated by dehydration; ensure optimal hydration, s/p IVF's  - GI/Hepatology has been consulted, apprec recs  - No further abdominal pain
Acute onset a few days ago, with intensity of ~ 6/10, in patient with elevated bilirubin, transaminitis, history of Stage IV colon cancer metastatic to the liver and lung  US = "No acute findings in the abd or pelvis.  Hepatic findings unchanged since CT abd and pelvis dated 1/11/2025  - history of constipation; likely that this could be also impacting. Tends to treat same w/ fruits/vegetables  - Could be aggravated by dehydration; ensure optimal hydration, s/p IVF's  - GI/Hepatology has been consulted, will follow up given MRCP findings choledocholithiasis, patient possibly passed stone since marked improvements in LFTs, but suddenly worsened on 5/22.  - No further abdominal pain  - Chronic on/off diarrhea at baseline    MRCP findings choledocholithiasis, patient possibly passed stone since marked improvements in LFTs. - Planning on ERCP (delayed 5/21 because patient ate a cookie)
Acute onset a few days ago, with intensity of ~ 6/10, in patient with elevated bilirubin, transaminitis, history of Stage IV colon cancer metastatic to the liver and lung  US = "No acute findings in the abd or pelvis.  Hepatic findings unchanged since CT abd and pelvis dated 1/11/2025  - history of constipation; likely that this could be also impacting. Tends to treat same w/ fruits/vegetables  - Could be aggravated by dehydration; ensure optimal hydration, s/p IVF's  - GI/Hepatology has been consulted, will follow up given MRCP findings choledocholithiasis, patient possibly passed stone since marked improvements in LFTs.  - No further abdominal pain  - Chronic on/off diarrhea at baseline
intractable abdominal pain, likely multifactorial:  Cancer associated pain  Choledocholithiasis  Improved after ERCP  - The biliary tree was swept and sludge was found.  - One plastic stent was placed into the common bile duct due poor drainage of contrast.   Diet being advanced, no new pain  As per GI Await path results,, Xray in 2 weeks to assess PD stent, ERCP in 2-3 months for CBD stent removal.  Will need to ensure correction of bili and transaminitis before discharge.
Acute onset a few days ago, with intensity of ~ 6/10, in patient with elevated bilirubin, transaminitis, history of Stage IV colon cancer metastatic to the liver and lung  US = "No acute findings in the abd or pelvis.  Hepatic findings unchanged since CT abd and pelvis dated 1/11/2025  - history of constipation; likely that this could be also impacting. Tends to treat same w/ fruits/vegetables  - Could be aggravated by dehydration; ensure optimal hydration, s/p IVF's  - GI/Hepatology has been consulted, will follow up given MRCP findings choledocholithiasis.  - No further abdominal pain  - Chronic on/off diarrhea at baseline
Acute onset a few days ago, with intensity of ~ 6/10, in patient with elevated bilirubin, transaminitis, history of Stage IV colon cancer metastatic to the liver and lung  US = "No acute findings in the abd or pelvis.  Hepatic findings unchanged since CT abd and pelvis dated 1/11/2025  - history of constipation; likely that this could be also impacting. Tends to treat same w/ fruits/vegetables  - +BM 5/14, will add bowel regimen  - Could be aggravated by dehydration; ensure optimal hydration, s/p IVF's  - GI/Hepatology has been consulted, apprec recs  - No further abdominal pain  - Chronic on/off diarrhea at baseline
Acute onset a few days ago, with intensity of ~ 6/10, in patient with elevated bilirubin, transaminitis, history of Stage IV colon cancer metastatic to the liver and lung  US = "No acute findings in the abd or pelvis.  Hepatic findings unchanged since CT abd and pelvis dated 1/11/2025  - history of constipation; likely that this could be also impacting. Tends to treat same w/ fruits/vegetables  - Could be aggravated by dehydration; ensure optimal hydration, s/p IVF's  - GI/Hepatology has been consulted, will follow up given MRCP findings choledocholithiasis, patient possibly passed stone since marked improvements in LFTs.  - No further abdominal pain  - Chronic on/off diarrhea at baseline    MRCP findings choledocholithiasis, patient possibly passed stone since marked improvements in LFTs. - Planning on ERCP (delayed today because patient ate a cookie)
Acute onset a few days ago, with intensity of ~ 6/10, in patient with elevated bilirubin, transaminitis, history of Stage IV colon cancer metastatic to the liver and lung  US = "No acute findings in the abd or pelvis.  Hepatic findings unchanged since CT abd and pelvis dated 1/11/2025  - history of constipation; likely that this could be also impacting. Tends to treat same w/ fruits/vegetables  - Could be aggravated by dehydration; ensure optimal hydration, s/p IVF's  - GI/Hepatology has been consulted, apprec recs  - No further abdominal pain  - Chronic on/off diarrhea at baseline
Acute onset a few days ago, with intensity of ~ 6/10, in patient with elevated bilirubin, transaminitis, history of Stage IV colon cancer metastatic to the liver and lung  History of scleroderma, hepatic infusion pumps  US = "No acute findings in the abd or pelvis.  Hepatic findings unchanged since CT abd and pelvis dated 1/11/2025  - history of constipation; likely that this could be also impacting. Tends to treat same w/ fruits/vegetables  - f/u stool count  - could be aggravated by dehydration; ensure optimal hydration, s/p IVF's  - GI/Hepatology has been consulted, apprec recs
intractable abdominal pain, likely multifactorial:  Cancer associated pain  Choledocholithiasis  Improved after ERCP  - The biliary tree was swept and sludge was found.  - One plastic stent was placed into the common bile duct due poor drainage of contrast.   Diet being advanced, no new pain  As per GI Await path results,, Xray in 2 weeks to assess PD stent, ERCP in 2-3 months for CBD stent removal.  Will need to ensure correction of bili and transaminitis before discharge, if downtrending tomorrow can likely discharge.
Acute onset a few days ago, with intensity of ~ 6/10, in patient with elevated bilirubin, transaminitis, history of Stage IV colon cancer metastatic to the liver and lung  History of scleroderma, hepatic infusion pumps  US = "No acute findings in the abd or pelvis.  Hepatic findings unchanged since CT abd and pelvis dated 1/11/2025  - history of constipation; likely that this could be also impacting. Tends to treat same w/ fruits/vegetables  - +BM 5/14, will add bowel regimen  - could be aggravated by dehydration; ensure optimal hydration, s/p IVF's  - GI/Hepatology has been consulted, apprec recs

## 2025-05-24 NOTE — PROGRESS NOTE ADULT - REASON FOR ADMISSION
Transaminitis, Direct hyperbilirubinemia, Right upper quadrant pain

## 2025-05-25 VITALS
OXYGEN SATURATION: 95 % | SYSTOLIC BLOOD PRESSURE: 115 MMHG | RESPIRATION RATE: 18 BRPM | HEART RATE: 95 BPM | DIASTOLIC BLOOD PRESSURE: 66 MMHG | TEMPERATURE: 98 F

## 2025-05-25 LAB
ALBUMIN SERPL ELPH-MCNC: 3 G/DL — LOW (ref 3.3–5)
ALP SERPL-CCNC: 521 U/L — HIGH (ref 40–120)
ALT FLD-CCNC: 153 U/L — HIGH (ref 4–33)
ANION GAP SERPL CALC-SCNC: 13 MMOL/L — SIGNIFICANT CHANGE UP (ref 7–14)
AST SERPL-CCNC: 94 U/L — HIGH (ref 4–32)
BASOPHILS # BLD AUTO: 0.01 K/UL — SIGNIFICANT CHANGE UP (ref 0–0.2)
BASOPHILS NFR BLD AUTO: 0.2 % — SIGNIFICANT CHANGE UP (ref 0–2)
BILIRUB SERPL-MCNC: 1.5 MG/DL — HIGH (ref 0.2–1.2)
BUN SERPL-MCNC: 16 MG/DL — SIGNIFICANT CHANGE UP (ref 7–23)
CALCIUM SERPL-MCNC: 9.6 MG/DL — SIGNIFICANT CHANGE UP (ref 8.4–10.5)
CHLORIDE SERPL-SCNC: 109 MMOL/L — HIGH (ref 98–107)
CO2 SERPL-SCNC: 20 MMOL/L — LOW (ref 22–31)
CREAT SERPL-MCNC: 0.66 MG/DL — SIGNIFICANT CHANGE UP (ref 0.5–1.3)
EGFR: 95 ML/MIN/1.73M2 — SIGNIFICANT CHANGE UP
EGFR: 95 ML/MIN/1.73M2 — SIGNIFICANT CHANGE UP
EOSINOPHIL # BLD AUTO: 0.08 K/UL — SIGNIFICANT CHANGE UP (ref 0–0.5)
EOSINOPHIL NFR BLD AUTO: 1.8 % — SIGNIFICANT CHANGE UP (ref 0–6)
GLUCOSE SERPL-MCNC: 84 MG/DL — SIGNIFICANT CHANGE UP (ref 70–99)
HCT VFR BLD CALC: 32.9 % — LOW (ref 34.5–45)
HGB BLD-MCNC: 9.8 G/DL — LOW (ref 11.5–15.5)
IANC: 3.21 K/UL — SIGNIFICANT CHANGE UP (ref 1.8–7.4)
IMM GRANULOCYTES NFR BLD AUTO: 0.7 % — SIGNIFICANT CHANGE UP (ref 0–0.9)
LYMPHOCYTES # BLD AUTO: 0.78 K/UL — LOW (ref 1–3.3)
LYMPHOCYTES # BLD AUTO: 17.2 % — SIGNIFICANT CHANGE UP (ref 13–44)
MAGNESIUM SERPL-MCNC: 2 MG/DL — SIGNIFICANT CHANGE UP (ref 1.6–2.6)
MCHC RBC-ENTMCNC: 28.9 PG — SIGNIFICANT CHANGE UP (ref 27–34)
MCHC RBC-ENTMCNC: 29.8 G/DL — LOW (ref 32–36)
MCV RBC AUTO: 97.1 FL — SIGNIFICANT CHANGE UP (ref 80–100)
MONOCYTES # BLD AUTO: 0.42 K/UL — SIGNIFICANT CHANGE UP (ref 0–0.9)
MONOCYTES NFR BLD AUTO: 9.3 % — SIGNIFICANT CHANGE UP (ref 2–14)
NEUTROPHILS # BLD AUTO: 3.21 K/UL — SIGNIFICANT CHANGE UP (ref 1.8–7.4)
NEUTROPHILS NFR BLD AUTO: 70.8 % — SIGNIFICANT CHANGE UP (ref 43–77)
NRBC # BLD AUTO: 0 K/UL — SIGNIFICANT CHANGE UP (ref 0–0)
NRBC # FLD: 0 K/UL — SIGNIFICANT CHANGE UP (ref 0–0)
NRBC BLD AUTO-RTO: 0 /100 WBCS — SIGNIFICANT CHANGE UP (ref 0–0)
PHOSPHATE SERPL-MCNC: 3.9 MG/DL — SIGNIFICANT CHANGE UP (ref 2.5–4.5)
PLATELET # BLD AUTO: 210 K/UL — SIGNIFICANT CHANGE UP (ref 150–400)
POTASSIUM SERPL-MCNC: 4.6 MMOL/L — SIGNIFICANT CHANGE UP (ref 3.5–5.3)
POTASSIUM SERPL-SCNC: 4.6 MMOL/L — SIGNIFICANT CHANGE UP (ref 3.5–5.3)
PROT SERPL-MCNC: 6.1 G/DL — SIGNIFICANT CHANGE UP (ref 6–8.3)
RBC # BLD: 3.39 M/UL — LOW (ref 3.8–5.2)
RBC # FLD: 18.2 % — HIGH (ref 10.3–14.5)
SODIUM SERPL-SCNC: 142 MMOL/L — SIGNIFICANT CHANGE UP (ref 135–145)
WBC # BLD: 4.53 K/UL — SIGNIFICANT CHANGE UP (ref 3.8–10.5)
WBC # FLD AUTO: 4.53 K/UL — SIGNIFICANT CHANGE UP (ref 3.8–10.5)

## 2025-05-25 PROCEDURE — 99239 HOSP IP/OBS DSCHRG MGMT >30: CPT

## 2025-05-25 RX ORDER — MELOXICAM 15 MG/1
1 TABLET ORAL
Refills: 0 | DISCHARGE

## 2025-05-25 RX ORDER — GABAPENTIN 400 MG/1
1 CAPSULE ORAL
Qty: 30 | Refills: 0
Start: 2025-05-25 | End: 2025-06-23

## 2025-05-25 RX ORDER — LIDOCAINE HYDROCHLORIDE 20 MG/ML
1 JELLY TOPICAL
Qty: 5 | Refills: 0
Start: 2025-05-25 | End: 2025-06-23

## 2025-05-25 RX ADMIN — GABAPENTIN 300 MILLIGRAM(S): 400 CAPSULE ORAL at 21:32

## 2025-05-25 RX ADMIN — ENOXAPARIN SODIUM 40 MILLIGRAM(S): 100 INJECTION SUBCUTANEOUS at 07:45

## 2025-05-25 RX ADMIN — Medication 1 APPLICATION(S): at 11:18

## 2025-05-25 RX ADMIN — LOSARTAN POTASSIUM 25 MILLIGRAM(S): 100 TABLET, FILM COATED ORAL at 06:08

## 2025-05-27 ENCOUNTER — NON-APPOINTMENT (OUTPATIENT)
Age: 70
End: 2025-05-27

## 2025-05-27 ENCOUNTER — APPOINTMENT (OUTPATIENT)
Dept: HEMATOLOGY ONCOLOGY | Facility: CLINIC | Age: 70
End: 2025-05-27

## 2025-05-27 ENCOUNTER — APPOINTMENT (OUTPATIENT)
Dept: INFUSION THERAPY | Facility: HOSPITAL | Age: 70
End: 2025-05-27

## 2025-05-29 ENCOUNTER — APPOINTMENT (OUTPATIENT)
Dept: INFUSION THERAPY | Facility: HOSPITAL | Age: 70
End: 2025-05-29

## 2025-06-09 ENCOUNTER — TRANSCRIPTION ENCOUNTER (OUTPATIENT)
Age: 70
End: 2025-06-09

## 2025-06-10 ENCOUNTER — RESULT REVIEW (OUTPATIENT)
Age: 70
End: 2025-06-10

## 2025-06-10 ENCOUNTER — APPOINTMENT (OUTPATIENT)
Dept: HEMATOLOGY ONCOLOGY | Facility: CLINIC | Age: 70
End: 2025-06-10
Payer: MEDICAID

## 2025-06-10 ENCOUNTER — APPOINTMENT (OUTPATIENT)
Dept: INFUSION THERAPY | Facility: HOSPITAL | Age: 70
End: 2025-06-10

## 2025-06-10 LAB
ALBUMIN SERPL ELPH-MCNC: 3.2 G/DL — LOW (ref 3.3–5)
ALP SERPL-CCNC: 193 U/L — HIGH (ref 40–120)
ALT FLD-CCNC: 18 U/L — SIGNIFICANT CHANGE UP (ref 10–45)
ANION GAP SERPL CALC-SCNC: 14 MMOL/L — SIGNIFICANT CHANGE UP (ref 5–17)
ANISOCYTOSIS BLD QL: SLIGHT — SIGNIFICANT CHANGE UP
AST SERPL-CCNC: 28 U/L — SIGNIFICANT CHANGE UP (ref 10–40)
BASOPHILS # BLD AUTO: 0.02 K/UL — SIGNIFICANT CHANGE UP (ref 0–0.2)
BASOPHILS # BLD AUTO: 0.06 K/UL — SIGNIFICANT CHANGE UP (ref 0–0.2)
BASOPHILS NFR BLD AUTO: 0.3 % — SIGNIFICANT CHANGE UP (ref 0–2)
BASOPHILS NFR BLD AUTO: 0.7 % — SIGNIFICANT CHANGE UP (ref 0–2)
BILIRUB SERPL-MCNC: 0.7 MG/DL — SIGNIFICANT CHANGE UP (ref 0.2–1.2)
BUN SERPL-MCNC: 15 MG/DL — SIGNIFICANT CHANGE UP (ref 7–23)
CALCIUM SERPL-MCNC: 9.3 MG/DL — SIGNIFICANT CHANGE UP (ref 8.4–10.5)
CEA SERPL-MCNC: 7.5 NG/ML — HIGH (ref 0–3.8)
CHLORIDE SERPL-SCNC: 110 MMOL/L — HIGH (ref 96–108)
CO2 SERPL-SCNC: 19 MMOL/L — LOW (ref 22–31)
CREAT SERPL-MCNC: 0.7 MG/DL — SIGNIFICANT CHANGE UP (ref 0.5–1.3)
EGFR: 94 ML/MIN/1.73M2 — SIGNIFICANT CHANGE UP
EGFR: 94 ML/MIN/1.73M2 — SIGNIFICANT CHANGE UP
ELLIPTOCYTES BLD QL SMEAR: SLIGHT — SIGNIFICANT CHANGE UP
EOSINOPHIL # BLD AUTO: 0.18 K/UL — SIGNIFICANT CHANGE UP (ref 0–0.5)
EOSINOPHIL # BLD AUTO: 0.23 K/UL — SIGNIFICANT CHANGE UP (ref 0–0.5)
EOSINOPHIL NFR BLD AUTO: 2.5 % — SIGNIFICANT CHANGE UP (ref 0–6)
EOSINOPHIL NFR BLD AUTO: 2.9 % — SIGNIFICANT CHANGE UP (ref 0–6)
GLUCOSE SERPL-MCNC: 109 MG/DL — HIGH (ref 70–99)
HCT VFR BLD CALC: 28.1 % — LOW (ref 34.5–45)
HCT VFR BLD CALC: 30.9 % — LOW (ref 34.5–45)
HGB BLD-MCNC: 8.5 G/DL — LOW (ref 11.5–15.5)
HGB BLD-MCNC: 9.7 G/DL — LOW (ref 11.5–15.5)
IMM GRANULOCYTES NFR BLD AUTO: 0.4 % — SIGNIFICANT CHANGE UP (ref 0–0.9)
IMM GRANULOCYTES NFR BLD AUTO: 3.6 % — HIGH (ref 0–0.9)
LYMPHOCYTES # BLD AUTO: 0.64 K/UL — LOW (ref 1–3.3)
LYMPHOCYTES # BLD AUTO: 1.15 K/UL — SIGNIFICANT CHANGE UP (ref 1–3.3)
LYMPHOCYTES # BLD AUTO: 14.3 % — SIGNIFICANT CHANGE UP (ref 13–44)
LYMPHOCYTES # BLD AUTO: 8.8 % — LOW (ref 13–44)
MCHC RBC-ENTMCNC: 27.7 PG — SIGNIFICANT CHANGE UP (ref 27–34)
MCHC RBC-ENTMCNC: 28 PG — SIGNIFICANT CHANGE UP (ref 27–34)
MCHC RBC-ENTMCNC: 30.2 G/DL — LOW (ref 32–36)
MCHC RBC-ENTMCNC: 31.4 G/DL — LOW (ref 32–36)
MCV RBC AUTO: 88.6 FL — SIGNIFICANT CHANGE UP (ref 80–100)
MCV RBC AUTO: 91.5 FL — SIGNIFICANT CHANGE UP (ref 80–100)
MONOCYTES # BLD AUTO: 0.27 K/UL — SIGNIFICANT CHANGE UP (ref 0–0.9)
MONOCYTES # BLD AUTO: 0.43 K/UL — SIGNIFICANT CHANGE UP (ref 0–0.9)
MONOCYTES NFR BLD AUTO: 3.7 % — SIGNIFICANT CHANGE UP (ref 2–14)
MONOCYTES NFR BLD AUTO: 5.4 % — SIGNIFICANT CHANGE UP (ref 2–14)
NEUTROPHILS # BLD AUTO: 5.86 K/UL — SIGNIFICANT CHANGE UP (ref 1.8–7.4)
NEUTROPHILS # BLD AUTO: 6.16 K/UL — SIGNIFICANT CHANGE UP (ref 1.8–7.4)
NEUTROPHILS NFR BLD AUTO: 73.1 % — SIGNIFICANT CHANGE UP (ref 43–77)
NEUTROPHILS NFR BLD AUTO: 84.3 % — HIGH (ref 43–77)
NRBC BLD AUTO-RTO: 0 /100 WBCS — SIGNIFICANT CHANGE UP (ref 0–0)
NRBC BLD AUTO-RTO: 0 /100 WBCS — SIGNIFICANT CHANGE UP (ref 0–0)
PLAT MORPH BLD: NORMAL — SIGNIFICANT CHANGE UP
PLATELET # BLD AUTO: 228 K/UL — SIGNIFICANT CHANGE UP (ref 150–400)
PLATELET # BLD AUTO: 230 K/UL — SIGNIFICANT CHANGE UP (ref 150–400)
POIKILOCYTOSIS BLD QL AUTO: SLIGHT — SIGNIFICANT CHANGE UP
POTASSIUM SERPL-MCNC: 3.6 MMOL/L — SIGNIFICANT CHANGE UP (ref 3.5–5.3)
POTASSIUM SERPL-SCNC: 3.6 MMOL/L — SIGNIFICANT CHANGE UP (ref 3.5–5.3)
PROT SERPL-MCNC: 6.3 G/DL — SIGNIFICANT CHANGE UP (ref 6–8.3)
RBC # BLD: 3.07 M/UL — LOW (ref 3.8–5.2)
RBC # BLD: 3.47 M/UL — LOW (ref 3.8–5.2)
RBC # FLD: 17.5 % — HIGH (ref 10.3–14.5)
RBC # FLD: 17.5 % — HIGH (ref 10.3–14.5)
RBC BLD AUTO: ABNORMAL
SODIUM SERPL-SCNC: 143 MMOL/L — SIGNIFICANT CHANGE UP (ref 135–145)
WBC # BLD: 7.3 K/UL — SIGNIFICANT CHANGE UP (ref 3.8–10.5)
WBC # BLD: 8.02 K/UL — SIGNIFICANT CHANGE UP (ref 3.8–10.5)
WBC # FLD AUTO: 7.3 K/UL — SIGNIFICANT CHANGE UP (ref 3.8–10.5)
WBC # FLD AUTO: 8.02 K/UL — SIGNIFICANT CHANGE UP (ref 3.8–10.5)

## 2025-06-10 PROCEDURE — G2211 COMPLEX E/M VISIT ADD ON: CPT | Mod: NC

## 2025-06-10 PROCEDURE — 99214 OFFICE O/P EST MOD 30 MIN: CPT

## 2025-06-11 ENCOUNTER — NON-APPOINTMENT (OUTPATIENT)
Age: 70
End: 2025-06-11

## 2025-06-12 ENCOUNTER — APPOINTMENT (OUTPATIENT)
Dept: INFUSION THERAPY | Facility: HOSPITAL | Age: 70
End: 2025-06-12

## 2025-06-24 ENCOUNTER — RESULT REVIEW (OUTPATIENT)
Age: 70
End: 2025-06-24

## 2025-06-24 ENCOUNTER — OUTPATIENT (OUTPATIENT)
Dept: OUTPATIENT SERVICES | Facility: HOSPITAL | Age: 70
LOS: 1 days | End: 2025-06-24
Payer: MEDICAID

## 2025-06-24 ENCOUNTER — APPOINTMENT (OUTPATIENT)
Dept: HEMATOLOGY ONCOLOGY | Facility: CLINIC | Age: 70
End: 2025-06-24

## 2025-06-24 ENCOUNTER — APPOINTMENT (OUTPATIENT)
Dept: INFUSION THERAPY | Facility: HOSPITAL | Age: 70
End: 2025-06-24

## 2025-06-24 ENCOUNTER — APPOINTMENT (OUTPATIENT)
Dept: CT IMAGING | Facility: IMAGING CENTER | Age: 70
End: 2025-06-24
Payer: MEDICAID

## 2025-06-24 DIAGNOSIS — C18.9 MALIGNANT NEOPLASM OF COLON, UNSPECIFIED: ICD-10-CM

## 2025-06-24 LAB
ALBUMIN SERPL ELPH-MCNC: 3.4 G/DL — SIGNIFICANT CHANGE UP (ref 3.3–5)
ALP SERPL-CCNC: 186 U/L — HIGH (ref 40–120)
ALT FLD-CCNC: 18 U/L — SIGNIFICANT CHANGE UP (ref 10–45)
ANION GAP SERPL CALC-SCNC: 14 MMOL/L — SIGNIFICANT CHANGE UP (ref 5–17)
AST SERPL-CCNC: 29 U/L — SIGNIFICANT CHANGE UP (ref 10–40)
BASOPHILS # BLD AUTO: 0.01 K/UL — SIGNIFICANT CHANGE UP (ref 0–0.2)
BASOPHILS NFR BLD AUTO: 0.2 % — SIGNIFICANT CHANGE UP (ref 0–2)
BILIRUB SERPL-MCNC: 0.6 MG/DL — SIGNIFICANT CHANGE UP (ref 0.2–1.2)
BUN SERPL-MCNC: 16 MG/DL — SIGNIFICANT CHANGE UP (ref 7–23)
CALCIUM SERPL-MCNC: 9.2 MG/DL — SIGNIFICANT CHANGE UP (ref 8.4–10.5)
CHLORIDE SERPL-SCNC: 111 MMOL/L — HIGH (ref 96–108)
CO2 SERPL-SCNC: 16 MMOL/L — LOW (ref 22–31)
CREAT SERPL-MCNC: 0.69 MG/DL — SIGNIFICANT CHANGE UP (ref 0.5–1.3)
EGFR: 94 ML/MIN/1.73M2 — SIGNIFICANT CHANGE UP
EGFR: 94 ML/MIN/1.73M2 — SIGNIFICANT CHANGE UP
EOSINOPHIL # BLD AUTO: 0.14 K/UL — SIGNIFICANT CHANGE UP (ref 0–0.5)
EOSINOPHIL NFR BLD AUTO: 2.8 % — SIGNIFICANT CHANGE UP (ref 0–6)
GLUCOSE SERPL-MCNC: 94 MG/DL — SIGNIFICANT CHANGE UP (ref 70–99)
HCT VFR BLD CALC: 29.3 % — LOW (ref 34.5–45)
HGB BLD-MCNC: 8.9 G/DL — LOW (ref 11.5–15.5)
IMM GRANULOCYTES NFR BLD AUTO: 0.2 % — SIGNIFICANT CHANGE UP (ref 0–0.9)
LYMPHOCYTES # BLD AUTO: 1.04 K/UL — SIGNIFICANT CHANGE UP (ref 1–3.3)
LYMPHOCYTES # BLD AUTO: 20.4 % — SIGNIFICANT CHANGE UP (ref 13–44)
MCHC RBC-ENTMCNC: 26.7 PG — LOW (ref 27–34)
MCHC RBC-ENTMCNC: 30.4 G/DL — LOW (ref 32–36)
MCV RBC AUTO: 88 FL — SIGNIFICANT CHANGE UP (ref 80–100)
MONOCYTES # BLD AUTO: 0.37 K/UL — SIGNIFICANT CHANGE UP (ref 0–0.9)
MONOCYTES NFR BLD AUTO: 7.3 % — SIGNIFICANT CHANGE UP (ref 2–14)
NEUTROPHILS # BLD AUTO: 3.52 K/UL — SIGNIFICANT CHANGE UP (ref 1.8–7.4)
NEUTROPHILS NFR BLD AUTO: 69.1 % — SIGNIFICANT CHANGE UP (ref 43–77)
NRBC BLD AUTO-RTO: 0 /100 WBCS — SIGNIFICANT CHANGE UP (ref 0–0)
PLATELET # BLD AUTO: SIGNIFICANT CHANGE UP K/UL (ref 150–400)
PLATELET # BLD MANUAL: 180 K/UL — SIGNIFICANT CHANGE UP (ref 150–400)
POTASSIUM SERPL-MCNC: 3.7 MMOL/L — SIGNIFICANT CHANGE UP (ref 3.5–5.3)
POTASSIUM SERPL-SCNC: 3.7 MMOL/L — SIGNIFICANT CHANGE UP (ref 3.5–5.3)
PROT SERPL-MCNC: 6.2 G/DL — SIGNIFICANT CHANGE UP (ref 6–8.3)
RBC # BLD: 3.33 M/UL — LOW (ref 3.8–5.2)
RBC # FLD: 18.2 % — HIGH (ref 10.3–14.5)
SODIUM SERPL-SCNC: 141 MMOL/L — SIGNIFICANT CHANGE UP (ref 135–145)
WBC # BLD: 5.09 K/UL — SIGNIFICANT CHANGE UP (ref 3.8–10.5)
WBC # FLD AUTO: 5.09 K/UL — SIGNIFICANT CHANGE UP (ref 3.8–10.5)

## 2025-06-24 PROCEDURE — 71260 CT THORAX DX C+: CPT

## 2025-06-24 PROCEDURE — 71260 CT THORAX DX C+: CPT | Mod: 26

## 2025-06-25 ENCOUNTER — OUTPATIENT (OUTPATIENT)
Dept: OUTPATIENT SERVICES | Facility: HOSPITAL | Age: 70
LOS: 1 days | Discharge: HOME | End: 2025-06-25

## 2025-06-25 DIAGNOSIS — C18.9 MALIGNANT NEOPLASM OF COLON, UNSPECIFIED: ICD-10-CM

## 2025-06-25 DIAGNOSIS — Z96.642 PRESENCE OF LEFT ARTIFICIAL HIP JOINT: Chronic | ICD-10-CM

## 2025-06-25 DIAGNOSIS — Z90.49 ACQUIRED ABSENCE OF OTHER SPECIFIED PARTS OF DIGESTIVE TRACT: Chronic | ICD-10-CM

## 2025-06-26 ENCOUNTER — APPOINTMENT (OUTPATIENT)
Dept: INFUSION THERAPY | Facility: HOSPITAL | Age: 70
End: 2025-06-26

## 2025-07-07 ENCOUNTER — APPOINTMENT (OUTPATIENT)
Dept: INTERVENTIONAL RADIOLOGY/VASCULAR | Facility: CLINIC | Age: 70
End: 2025-07-07

## 2025-07-07 PROBLEM — R97.0 ELEVATED CEA: Status: ACTIVE | Noted: 2025-07-02

## 2025-07-07 RX ORDER — MV-MN/OM3/DHA/EPA/FISH/LUT/ZEA 250-5-1 MG
CAPSULE ORAL
Refills: 0 | Status: ACTIVE | COMMUNITY

## 2025-07-07 RX ORDER — UBIDECARENONE 200 MG
CAPSULE ORAL
Refills: 0 | Status: ACTIVE | COMMUNITY

## 2025-07-07 RX ORDER — PSYLLIUM HUSK 0.4 G
CAPSULE ORAL
Refills: 0 | Status: ACTIVE | COMMUNITY

## 2025-07-07 RX ORDER — ACETAMINOPHEN ER 650 MG TABLET,EXTENDED RELEASE 650 MG
650 TABLET, EXTENDED RELEASE ORAL
Refills: 0 | Status: ACTIVE | COMMUNITY

## 2025-07-08 ENCOUNTER — RESULT REVIEW (OUTPATIENT)
Age: 70
End: 2025-07-08

## 2025-07-08 ENCOUNTER — APPOINTMENT (OUTPATIENT)
Dept: INFUSION THERAPY | Facility: HOSPITAL | Age: 70
End: 2025-07-08

## 2025-07-08 ENCOUNTER — NON-APPOINTMENT (OUTPATIENT)
Age: 70
End: 2025-07-08

## 2025-07-08 ENCOUNTER — APPOINTMENT (OUTPATIENT)
Dept: HEMATOLOGY ONCOLOGY | Facility: CLINIC | Age: 70
End: 2025-07-08
Payer: MEDICAID

## 2025-07-08 ENCOUNTER — APPOINTMENT (OUTPATIENT)
Dept: HEMATOLOGY ONCOLOGY | Facility: CLINIC | Age: 70
End: 2025-07-08

## 2025-07-08 DIAGNOSIS — Z51.11 ENCOUNTER FOR ANTINEOPLASTIC CHEMOTHERAPY: ICD-10-CM

## 2025-07-08 LAB
ALBUMIN SERPL ELPH-MCNC: 3.6 G/DL — SIGNIFICANT CHANGE UP (ref 3.3–5)
ALP SERPL-CCNC: 210 U/L — HIGH (ref 40–120)
ALT FLD-CCNC: 20 U/L — SIGNIFICANT CHANGE UP (ref 10–45)
ANION GAP SERPL CALC-SCNC: 16 MMOL/L — SIGNIFICANT CHANGE UP (ref 5–17)
AST SERPL-CCNC: 26 U/L — SIGNIFICANT CHANGE UP (ref 10–40)
BASOPHILS # BLD AUTO: 0.03 K/UL — SIGNIFICANT CHANGE UP (ref 0–0.2)
BASOPHILS NFR BLD AUTO: 0.6 % — SIGNIFICANT CHANGE UP (ref 0–2)
BILIRUB SERPL-MCNC: 0.6 MG/DL — SIGNIFICANT CHANGE UP (ref 0.2–1.2)
BUN SERPL-MCNC: 23 MG/DL — SIGNIFICANT CHANGE UP (ref 7–23)
CALCIUM SERPL-MCNC: 9.6 MG/DL — SIGNIFICANT CHANGE UP (ref 8.4–10.5)
CEA SERPL-MCNC: 9.9 NG/ML — HIGH (ref 0–3.8)
CHLORIDE SERPL-SCNC: 108 MMOL/L — SIGNIFICANT CHANGE UP (ref 96–108)
CO2 SERPL-SCNC: 17 MMOL/L — LOW (ref 22–31)
CREAT SERPL-MCNC: 0.72 MG/DL — SIGNIFICANT CHANGE UP (ref 0.5–1.3)
EGFR: 90 ML/MIN/1.73M2 — SIGNIFICANT CHANGE UP
EGFR: 90 ML/MIN/1.73M2 — SIGNIFICANT CHANGE UP
EOSINOPHIL # BLD AUTO: 0.1 K/UL — SIGNIFICANT CHANGE UP (ref 0–0.5)
EOSINOPHIL NFR BLD AUTO: 2 % — SIGNIFICANT CHANGE UP (ref 0–6)
GLUCOSE SERPL-MCNC: 96 MG/DL — SIGNIFICANT CHANGE UP (ref 70–99)
HCT VFR BLD CALC: 32.1 % — LOW (ref 34.5–45)
HGB BLD-MCNC: 9.5 G/DL — LOW (ref 11.5–15.5)
IMM GRANULOCYTES NFR BLD AUTO: 0.4 % — SIGNIFICANT CHANGE UP (ref 0–0.9)
LYMPHOCYTES # BLD AUTO: 1.27 K/UL — SIGNIFICANT CHANGE UP (ref 1–3.3)
LYMPHOCYTES # BLD AUTO: 25 % — SIGNIFICANT CHANGE UP (ref 13–44)
MCHC RBC-ENTMCNC: 26 PG — LOW (ref 27–34)
MCHC RBC-ENTMCNC: 29.6 G/DL — LOW (ref 32–36)
MCV RBC AUTO: 87.9 FL — SIGNIFICANT CHANGE UP (ref 80–100)
MONOCYTES # BLD AUTO: 0.61 K/UL — SIGNIFICANT CHANGE UP (ref 0–0.9)
MONOCYTES NFR BLD AUTO: 12 % — SIGNIFICANT CHANGE UP (ref 2–14)
NEUTROPHILS # BLD AUTO: 3.06 K/UL — SIGNIFICANT CHANGE UP (ref 1.8–7.4)
NEUTROPHILS NFR BLD AUTO: 60 % — SIGNIFICANT CHANGE UP (ref 43–77)
NRBC BLD AUTO-RTO: 0 /100 WBCS — SIGNIFICANT CHANGE UP (ref 0–0)
PLATELET # BLD AUTO: 205 K/UL — SIGNIFICANT CHANGE UP (ref 150–400)
POTASSIUM SERPL-MCNC: 3.9 MMOL/L — SIGNIFICANT CHANGE UP (ref 3.5–5.3)
POTASSIUM SERPL-SCNC: 3.9 MMOL/L — SIGNIFICANT CHANGE UP (ref 3.5–5.3)
PROT SERPL-MCNC: 6.6 G/DL — SIGNIFICANT CHANGE UP (ref 6–8.3)
RBC # BLD: 3.65 M/UL — LOW (ref 3.8–5.2)
RBC # FLD: 19.3 % — HIGH (ref 10.3–14.5)
SODIUM SERPL-SCNC: 141 MMOL/L — SIGNIFICANT CHANGE UP (ref 135–145)
WBC # BLD: 5.09 K/UL — SIGNIFICANT CHANGE UP (ref 3.8–10.5)
WBC # FLD AUTO: 5.09 K/UL — SIGNIFICANT CHANGE UP (ref 3.8–10.5)

## 2025-07-08 PROCEDURE — 99213 OFFICE O/P EST LOW 20 MIN: CPT

## 2025-07-10 ENCOUNTER — APPOINTMENT (OUTPATIENT)
Dept: INFUSION THERAPY | Facility: HOSPITAL | Age: 70
End: 2025-07-10

## 2025-07-17 ENCOUNTER — TRANSCRIPTION ENCOUNTER (OUTPATIENT)
Age: 70
End: 2025-07-17

## 2025-07-22 ENCOUNTER — RESULT REVIEW (OUTPATIENT)
Age: 70
End: 2025-07-22

## 2025-07-22 ENCOUNTER — APPOINTMENT (OUTPATIENT)
Dept: HEMATOLOGY ONCOLOGY | Facility: CLINIC | Age: 70
End: 2025-07-22

## 2025-07-22 ENCOUNTER — APPOINTMENT (OUTPATIENT)
Dept: INFUSION THERAPY | Facility: HOSPITAL | Age: 70
End: 2025-07-22

## 2025-07-22 DIAGNOSIS — R11.2 NAUSEA WITH VOMITING, UNSPECIFIED: ICD-10-CM

## 2025-07-22 LAB
ALBUMIN SERPL ELPH-MCNC: 3.5 G/DL — SIGNIFICANT CHANGE UP (ref 3.3–5)
ALP SERPL-CCNC: 206 U/L — HIGH (ref 40–120)
ALT FLD-CCNC: 19 U/L — SIGNIFICANT CHANGE UP (ref 10–45)
ANION GAP SERPL CALC-SCNC: 12 MMOL/L — SIGNIFICANT CHANGE UP (ref 5–17)
AST SERPL-CCNC: 26 U/L — SIGNIFICANT CHANGE UP (ref 10–40)
BASOPHILS # BLD AUTO: 0.03 K/UL — SIGNIFICANT CHANGE UP (ref 0–0.2)
BASOPHILS NFR BLD AUTO: 0.6 % — SIGNIFICANT CHANGE UP (ref 0–2)
BILIRUB SERPL-MCNC: 0.5 MG/DL — SIGNIFICANT CHANGE UP (ref 0.2–1.2)
BUN SERPL-MCNC: 19 MG/DL — SIGNIFICANT CHANGE UP (ref 7–23)
CALCIUM SERPL-MCNC: 9.3 MG/DL — SIGNIFICANT CHANGE UP (ref 8.4–10.5)
CHLORIDE SERPL-SCNC: 110 MMOL/L — HIGH (ref 96–108)
CO2 SERPL-SCNC: 19 MMOL/L — LOW (ref 22–31)
CREAT SERPL-MCNC: 0.77 MG/DL — SIGNIFICANT CHANGE UP (ref 0.5–1.3)
EGFR: 83 ML/MIN/1.73M2 — SIGNIFICANT CHANGE UP
EGFR: 83 ML/MIN/1.73M2 — SIGNIFICANT CHANGE UP
EOSINOPHIL # BLD AUTO: 0.12 K/UL — SIGNIFICANT CHANGE UP (ref 0–0.5)
EOSINOPHIL NFR BLD AUTO: 2.2 % — SIGNIFICANT CHANGE UP (ref 0–6)
GLUCOSE SERPL-MCNC: 97 MG/DL — SIGNIFICANT CHANGE UP (ref 70–99)
HCT VFR BLD CALC: 29.6 % — LOW (ref 34.5–45)
HGB BLD-MCNC: 9.1 G/DL — LOW (ref 11.5–15.5)
IMM GRANULOCYTES NFR BLD AUTO: 0.4 % — SIGNIFICANT CHANGE UP (ref 0–0.9)
LYMPHOCYTES # BLD AUTO: 0.95 K/UL — LOW (ref 1–3.3)
LYMPHOCYTES # BLD AUTO: 17.5 % — SIGNIFICANT CHANGE UP (ref 13–44)
MCHC RBC-ENTMCNC: 26.2 PG — LOW (ref 27–34)
MCHC RBC-ENTMCNC: 30.7 G/DL — LOW (ref 32–36)
MCV RBC AUTO: 85.3 FL — SIGNIFICANT CHANGE UP (ref 80–100)
MONOCYTES # BLD AUTO: 0.56 K/UL — SIGNIFICANT CHANGE UP (ref 0–0.9)
MONOCYTES NFR BLD AUTO: 10.3 % — SIGNIFICANT CHANGE UP (ref 2–14)
NEUTROPHILS # BLD AUTO: 3.74 K/UL — SIGNIFICANT CHANGE UP (ref 1.8–7.4)
NEUTROPHILS NFR BLD AUTO: 69 % — SIGNIFICANT CHANGE UP (ref 43–77)
NRBC BLD AUTO-RTO: 0 /100 WBCS — SIGNIFICANT CHANGE UP (ref 0–0)
PLATELET # BLD AUTO: 189 K/UL — SIGNIFICANT CHANGE UP (ref 150–400)
POTASSIUM SERPL-MCNC: 4.1 MMOL/L — SIGNIFICANT CHANGE UP (ref 3.5–5.3)
POTASSIUM SERPL-SCNC: 4.1 MMOL/L — SIGNIFICANT CHANGE UP (ref 3.5–5.3)
PROT SERPL-MCNC: 6.2 G/DL — SIGNIFICANT CHANGE UP (ref 6–8.3)
RBC # BLD: 3.47 M/UL — LOW (ref 3.8–5.2)
RBC # FLD: 20.2 % — HIGH (ref 10.3–14.5)
SODIUM SERPL-SCNC: 141 MMOL/L — SIGNIFICANT CHANGE UP (ref 135–145)
WBC # BLD: 5.42 K/UL — SIGNIFICANT CHANGE UP (ref 3.8–10.5)
WBC # FLD AUTO: 5.42 K/UL — SIGNIFICANT CHANGE UP (ref 3.8–10.5)

## 2025-07-24 ENCOUNTER — APPOINTMENT (OUTPATIENT)
Dept: INFUSION THERAPY | Facility: HOSPITAL | Age: 70
End: 2025-07-24

## 2025-07-29 ENCOUNTER — NON-APPOINTMENT (OUTPATIENT)
Age: 70
End: 2025-07-29

## 2025-08-01 ENCOUNTER — NON-APPOINTMENT (OUTPATIENT)
Age: 70
End: 2025-08-01

## 2025-08-04 ENCOUNTER — APPOINTMENT (OUTPATIENT)
Dept: GASTROENTEROLOGY | Facility: CLINIC | Age: 70
End: 2025-08-04
Payer: MEDICAID

## 2025-08-04 ENCOUNTER — APPOINTMENT (OUTPATIENT)
Dept: RHEUMATOLOGY | Facility: CLINIC | Age: 70
End: 2025-08-04
Payer: MEDICAID

## 2025-08-04 VITALS
HEART RATE: 88 BPM | HEIGHT: 59 IN | WEIGHT: 105 LBS | OXYGEN SATURATION: 96 % | RESPIRATION RATE: 16 BRPM | SYSTOLIC BLOOD PRESSURE: 124 MMHG | BODY MASS INDEX: 21.17 KG/M2 | DIASTOLIC BLOOD PRESSURE: 75 MMHG

## 2025-08-04 DIAGNOSIS — Z46.89 ENCOUNTER FOR FITTING AND ADJUSTMENT OF OTHER SPECIFIED DEVICES: ICD-10-CM

## 2025-08-04 PROCEDURE — T1013A: CUSTOM

## 2025-08-04 PROCEDURE — 99202 OFFICE O/P NEW SF 15 MIN: CPT | Mod: 93

## 2025-08-04 PROCEDURE — G2211 COMPLEX E/M VISIT ADD ON: CPT | Mod: NC

## 2025-08-04 PROCEDURE — 99215 OFFICE O/P EST HI 40 MIN: CPT

## 2025-08-04 RX ORDER — PREDNISONE 2.5 MG/1
2.5 TABLET ORAL
Qty: 120 | Refills: 0 | Status: ACTIVE | COMMUNITY
Start: 2025-08-04 | End: 1900-01-01

## 2025-08-05 ENCOUNTER — NON-APPOINTMENT (OUTPATIENT)
Age: 70
End: 2025-08-05

## 2025-08-05 ENCOUNTER — APPOINTMENT (OUTPATIENT)
Dept: INFUSION THERAPY | Facility: HOSPITAL | Age: 70
End: 2025-08-05

## 2025-08-05 ENCOUNTER — RESULT REVIEW (OUTPATIENT)
Age: 70
End: 2025-08-05

## 2025-08-05 ENCOUNTER — APPOINTMENT (OUTPATIENT)
Dept: HEMATOLOGY ONCOLOGY | Facility: CLINIC | Age: 70
End: 2025-08-05

## 2025-08-05 LAB
ALBUMIN SERPL ELPH-MCNC: 3.6 G/DL — SIGNIFICANT CHANGE UP (ref 3.3–5)
ALP SERPL-CCNC: 257 U/L — HIGH (ref 40–120)
ALT FLD-CCNC: 47 U/L — HIGH (ref 10–45)
ANION GAP SERPL CALC-SCNC: 14 MMOL/L — SIGNIFICANT CHANGE UP (ref 5–17)
AST SERPL-CCNC: 58 U/L — HIGH (ref 10–40)
BASOPHILS # BLD AUTO: 0.01 K/UL — SIGNIFICANT CHANGE UP (ref 0–0.2)
BASOPHILS NFR BLD AUTO: 0.1 % — SIGNIFICANT CHANGE UP (ref 0–2)
BILIRUB SERPL-MCNC: 0.7 MG/DL — SIGNIFICANT CHANGE UP (ref 0.2–1.2)
BUN SERPL-MCNC: 18 MG/DL — SIGNIFICANT CHANGE UP (ref 7–23)
CALCIUM SERPL-MCNC: 9.7 MG/DL — SIGNIFICANT CHANGE UP (ref 8.4–10.5)
CEA SERPL-MCNC: 11.1 NG/ML — HIGH (ref 0–3.8)
CHLORIDE SERPL-SCNC: 110 MMOL/L — HIGH (ref 96–108)
CO2 SERPL-SCNC: 17 MMOL/L — LOW (ref 22–31)
CREAT SERPL-MCNC: 0.66 MG/DL — SIGNIFICANT CHANGE UP (ref 0.5–1.3)
EGFR: 95 ML/MIN/1.73M2 — SIGNIFICANT CHANGE UP
EGFR: 95 ML/MIN/1.73M2 — SIGNIFICANT CHANGE UP
EOSINOPHIL # BLD AUTO: 0.09 K/UL — SIGNIFICANT CHANGE UP (ref 0–0.5)
EOSINOPHIL NFR BLD AUTO: 1.1 % — SIGNIFICANT CHANGE UP (ref 0–6)
GLUCOSE SERPL-MCNC: 106 MG/DL — HIGH (ref 70–99)
HCT VFR BLD CALC: 30 % — LOW (ref 34.5–45)
HGB BLD-MCNC: 9.3 G/DL — LOW (ref 11.5–15.5)
IMM GRANULOCYTES NFR BLD AUTO: 1 % — HIGH (ref 0–0.9)
LYMPHOCYTES # BLD AUTO: 1.38 K/UL — SIGNIFICANT CHANGE UP (ref 1–3.3)
LYMPHOCYTES # BLD AUTO: 16.6 % — SIGNIFICANT CHANGE UP (ref 13–44)
MCHC RBC-ENTMCNC: 26.3 PG — LOW (ref 27–34)
MCHC RBC-ENTMCNC: 31 G/DL — LOW (ref 32–36)
MCV RBC AUTO: 84.7 FL — SIGNIFICANT CHANGE UP (ref 80–100)
MONOCYTES # BLD AUTO: 0.41 K/UL — SIGNIFICANT CHANGE UP (ref 0–0.9)
MONOCYTES NFR BLD AUTO: 4.9 % — SIGNIFICANT CHANGE UP (ref 2–14)
NEUTROPHILS # BLD AUTO: 6.35 K/UL — SIGNIFICANT CHANGE UP (ref 1.8–7.4)
NEUTROPHILS NFR BLD AUTO: 76.3 % — SIGNIFICANT CHANGE UP (ref 43–77)
NRBC BLD AUTO-RTO: 0 /100 WBCS — SIGNIFICANT CHANGE UP (ref 0–0)
PLATELET # BLD AUTO: 185 K/UL — SIGNIFICANT CHANGE UP (ref 150–400)
POTASSIUM SERPL-MCNC: 3.9 MMOL/L — SIGNIFICANT CHANGE UP (ref 3.5–5.3)
POTASSIUM SERPL-SCNC: 3.9 MMOL/L — SIGNIFICANT CHANGE UP (ref 3.5–5.3)
PROT SERPL-MCNC: 6.6 G/DL — SIGNIFICANT CHANGE UP (ref 6–8.3)
RBC # BLD: 3.54 M/UL — LOW (ref 3.8–5.2)
RBC # FLD: 20.5 % — HIGH (ref 10.3–14.5)
SODIUM SERPL-SCNC: 141 MMOL/L — SIGNIFICANT CHANGE UP (ref 135–145)
WBC # BLD: 8.32 K/UL — SIGNIFICANT CHANGE UP (ref 3.8–10.5)
WBC # FLD AUTO: 8.32 K/UL — SIGNIFICANT CHANGE UP (ref 3.8–10.5)

## 2025-08-07 ENCOUNTER — APPOINTMENT (OUTPATIENT)
Dept: INFUSION THERAPY | Facility: HOSPITAL | Age: 70
End: 2025-08-07

## 2025-08-07 ENCOUNTER — APPOINTMENT (OUTPATIENT)
Dept: HEMATOLOGY ONCOLOGY | Facility: CLINIC | Age: 70
End: 2025-08-07

## 2025-08-08 ENCOUNTER — RESULT REVIEW (OUTPATIENT)
Age: 70
End: 2025-08-08

## 2025-08-11 ENCOUNTER — OUTPATIENT (OUTPATIENT)
Dept: OUTPATIENT SERVICES | Facility: HOSPITAL | Age: 70
LOS: 1 days | End: 2025-08-11
Payer: MEDICAID

## 2025-08-11 ENCOUNTER — APPOINTMENT (OUTPATIENT)
Dept: MRI IMAGING | Facility: IMAGING CENTER | Age: 70
End: 2025-08-11
Payer: MEDICAID

## 2025-08-11 ENCOUNTER — APPOINTMENT (OUTPATIENT)
Dept: CT IMAGING | Facility: IMAGING CENTER | Age: 70
End: 2025-08-11
Payer: MEDICAID

## 2025-08-11 DIAGNOSIS — Z96.641 PRESENCE OF RIGHT ARTIFICIAL HIP JOINT: Chronic | ICD-10-CM

## 2025-08-11 DIAGNOSIS — T85.528A DISPLACEMENT OF OTHER GASTROINTESTINAL PROSTHETIC DEVICES, IMPLANTS AND GRAFTS, INITIAL ENCOUNTER: Chronic | ICD-10-CM

## 2025-08-11 DIAGNOSIS — Z90.49 ACQUIRED ABSENCE OF OTHER SPECIFIED PARTS OF DIGESTIVE TRACT: Chronic | ICD-10-CM

## 2025-08-11 DIAGNOSIS — Z96.89 PRESENCE OF OTHER SPECIFIED FUNCTIONAL IMPLANTS: Chronic | ICD-10-CM

## 2025-08-11 DIAGNOSIS — C18.9 MALIGNANT NEOPLASM OF COLON, UNSPECIFIED: ICD-10-CM

## 2025-08-11 PROCEDURE — 72197 MRI PELVIS W/O & W/DYE: CPT | Mod: 26

## 2025-08-11 PROCEDURE — 74183 MRI ABD W/O CNTR FLWD CNTR: CPT

## 2025-08-11 PROCEDURE — 74183 MRI ABD W/O CNTR FLWD CNTR: CPT | Mod: 26

## 2025-08-11 PROCEDURE — 71250 CT THORAX DX C-: CPT | Mod: 26

## 2025-08-11 PROCEDURE — 72197 MRI PELVIS W/O & W/DYE: CPT

## 2025-08-11 PROCEDURE — 71250 CT THORAX DX C-: CPT

## 2025-08-12 ENCOUNTER — TRANSCRIPTION ENCOUNTER (OUTPATIENT)
Age: 70
End: 2025-08-12

## 2025-08-12 ENCOUNTER — OUTPATIENT (OUTPATIENT)
Dept: OUTPATIENT SERVICES | Facility: HOSPITAL | Age: 70
LOS: 1 days | End: 2025-08-12
Payer: MEDICAID

## 2025-08-12 ENCOUNTER — APPOINTMENT (OUTPATIENT)
Dept: GASTROENTEROLOGY | Facility: HOSPITAL | Age: 70
End: 2025-08-12

## 2025-08-12 VITALS
OXYGEN SATURATION: 99 % | TEMPERATURE: 97 F | DIASTOLIC BLOOD PRESSURE: 60 MMHG | RESPIRATION RATE: 18 BRPM | HEART RATE: 57 BPM | SYSTOLIC BLOOD PRESSURE: 138 MMHG | HEIGHT: 64.96 IN | WEIGHT: 98.11 LBS

## 2025-08-12 VITALS
RESPIRATION RATE: 14 BRPM | OXYGEN SATURATION: 100 % | SYSTOLIC BLOOD PRESSURE: 169 MMHG | DIASTOLIC BLOOD PRESSURE: 73 MMHG | HEART RATE: 48 BPM

## 2025-08-12 DIAGNOSIS — Z46.89 ENCOUNTER FOR FITTING AND ADJUSTMENT OF OTHER SPECIFIED DEVICES: ICD-10-CM

## 2025-08-12 DIAGNOSIS — Z96.642 PRESENCE OF LEFT ARTIFICIAL HIP JOINT: Chronic | ICD-10-CM

## 2025-08-12 DIAGNOSIS — Z90.49 ACQUIRED ABSENCE OF OTHER SPECIFIED PARTS OF DIGESTIVE TRACT: Chronic | ICD-10-CM

## 2025-08-12 DIAGNOSIS — T85.528A DISPLACEMENT OF OTHER GASTROINTESTINAL PROSTHETIC DEVICES, IMPLANTS AND GRAFTS, INITIAL ENCOUNTER: Chronic | ICD-10-CM

## 2025-08-12 DIAGNOSIS — Z96.89 PRESENCE OF OTHER SPECIFIED FUNCTIONAL IMPLANTS: Chronic | ICD-10-CM

## 2025-08-12 DIAGNOSIS — Z96.641 PRESENCE OF RIGHT ARTIFICIAL HIP JOINT: Chronic | ICD-10-CM

## 2025-08-12 PROCEDURE — 43264 ERCP REMOVE DUCT CALCULI: CPT

## 2025-08-12 PROCEDURE — 43264 ERCP REMOVE DUCT CALCULI: CPT | Mod: GC

## 2025-08-12 PROCEDURE — 43275 ERCP REMOVE FORGN BODY DUCT: CPT

## 2025-08-12 PROCEDURE — 74330 X-RAY BILE/PANC ENDOSCOPY: CPT

## 2025-08-12 PROCEDURE — C1769: CPT

## 2025-08-12 PROCEDURE — 43273 ENDOSCOPIC PANCREATOSCOPY: CPT

## 2025-08-12 PROCEDURE — 43275 ERCP REMOVE FORGN BODY DUCT: CPT | Mod: GC

## 2025-08-12 PROCEDURE — 74328 X-RAY BILE DUCT ENDOSCOPY: CPT | Mod: 26,GC

## 2025-08-12 DEVICE — IMPLANTABLE DEVICE: Type: IMPLANTABLE DEVICE | Status: FUNCTIONAL

## 2025-08-12 DEVICE — AUTOTOME CANNULATING SPHINCTEROTOME RX 44 20MM: Type: IMPLANTABLE DEVICE | Status: FUNCTIONAL

## 2025-08-12 DEVICE — GWIRE VISIGLIDE STRT TIP 270CM .035: Type: IMPLANTABLE DEVICE | Status: FUNCTIONAL

## 2025-08-12 DEVICE — BLLN EXTRACT FUSION QUATRO 8.5 10 12 15MM: Type: IMPLANTABLE DEVICE | Status: FUNCTIONAL

## 2025-08-12 RX ORDER — INDOMETHACIN 50 MG
100 CAPSULE ORAL ONCE
Refills: 0 | Status: DISCONTINUED | OUTPATIENT
Start: 2025-08-12 | End: 2025-08-27

## 2025-08-12 RX ORDER — HEPARIN SODIUM,PORCINE/NS/PF 20/20 ML
300 SYRINGE (ML) INTRAVENOUS ONCE
Refills: 0 | Status: DISCONTINUED | OUTPATIENT
Start: 2025-08-12 | End: 2025-08-27

## 2025-08-13 ENCOUNTER — APPOINTMENT (OUTPATIENT)
Dept: INTERNAL MEDICINE | Facility: CLINIC | Age: 70
End: 2025-08-13
Payer: MEDICAID

## 2025-08-13 VITALS
HEART RATE: 88 BPM | BODY MASS INDEX: 20.36 KG/M2 | HEIGHT: 59 IN | WEIGHT: 101 LBS | DIASTOLIC BLOOD PRESSURE: 81 MMHG | OXYGEN SATURATION: 99 % | SYSTOLIC BLOOD PRESSURE: 134 MMHG

## 2025-08-13 VITALS — SYSTOLIC BLOOD PRESSURE: 126 MMHG | DIASTOLIC BLOOD PRESSURE: 76 MMHG

## 2025-08-13 DIAGNOSIS — C78.00 MALIGNANT NEOPLASM OF COLON, UNSPECIFIED: ICD-10-CM

## 2025-08-13 DIAGNOSIS — Z02.9 ENCOUNTER FOR ADMINISTRATIVE EXAMINATIONS, UNSPECIFIED: ICD-10-CM

## 2025-08-13 DIAGNOSIS — M48.50XA COLLAPSED VERTEBRA, NOT ELSEWHERE CLASSIFIED, SITE UNSPECIFIED, INITIAL ENCOUNTER FOR FRACTURE: ICD-10-CM

## 2025-08-13 DIAGNOSIS — I27.20 PULMONARY HYPERTENSION, UNSPECIFIED: ICD-10-CM

## 2025-08-13 DIAGNOSIS — J84.9 INTERSTITIAL PULMONARY DISEASE, UNSPECIFIED: ICD-10-CM

## 2025-08-13 DIAGNOSIS — E78.5 HYPERLIPIDEMIA, UNSPECIFIED: ICD-10-CM

## 2025-08-13 DIAGNOSIS — M34.9 SYSTEMIC SCLEROSIS, UNSPECIFIED: ICD-10-CM

## 2025-08-13 DIAGNOSIS — M06.9 RHEUMATOID ARTHRITIS, UNSPECIFIED: ICD-10-CM

## 2025-08-13 DIAGNOSIS — C18.9 MALIGNANT NEOPLASM OF COLON, UNSPECIFIED: ICD-10-CM

## 2025-08-13 PROCEDURE — 99213 OFFICE O/P EST LOW 20 MIN: CPT

## 2025-08-13 PROCEDURE — G2211 COMPLEX E/M VISIT ADD ON: CPT | Mod: NC

## 2025-08-15 DIAGNOSIS — C78.7 MALIGNANT NEOPLASM OF COLON, UNSPECIFIED: ICD-10-CM

## 2025-08-15 DIAGNOSIS — C18.9 MALIGNANT NEOPLASM OF COLON, UNSPECIFIED: ICD-10-CM

## 2025-08-19 ENCOUNTER — RESULT REVIEW (OUTPATIENT)
Age: 70
End: 2025-08-19

## 2025-08-19 ENCOUNTER — APPOINTMENT (OUTPATIENT)
Dept: HEMATOLOGY ONCOLOGY | Facility: CLINIC | Age: 70
End: 2025-08-19

## 2025-08-19 ENCOUNTER — APPOINTMENT (OUTPATIENT)
Dept: INFUSION THERAPY | Facility: HOSPITAL | Age: 70
End: 2025-08-19

## 2025-08-19 LAB
ALBUMIN SERPL ELPH-MCNC: 3.7 G/DL — SIGNIFICANT CHANGE UP (ref 3.3–5)
ALP SERPL-CCNC: 187 U/L — HIGH (ref 40–120)
ALT FLD-CCNC: 28 U/L — SIGNIFICANT CHANGE UP (ref 10–45)
ANION GAP SERPL CALC-SCNC: 13 MMOL/L — SIGNIFICANT CHANGE UP (ref 5–17)
AST SERPL-CCNC: 27 U/L — SIGNIFICANT CHANGE UP (ref 10–40)
BASOPHILS # BLD AUTO: 0.04 K/UL — SIGNIFICANT CHANGE UP (ref 0–0.2)
BASOPHILS NFR BLD AUTO: 0.6 % — SIGNIFICANT CHANGE UP (ref 0–2)
BILIRUB SERPL-MCNC: 0.6 MG/DL — SIGNIFICANT CHANGE UP (ref 0.2–1.2)
BUN SERPL-MCNC: 15 MG/DL — SIGNIFICANT CHANGE UP (ref 7–23)
CALCIUM SERPL-MCNC: 9.6 MG/DL — SIGNIFICANT CHANGE UP (ref 8.4–10.5)
CEA SERPL-MCNC: 13.7 NG/ML — HIGH (ref 0–3.8)
CHLORIDE SERPL-SCNC: 106 MMOL/L — SIGNIFICANT CHANGE UP (ref 96–108)
CO2 SERPL-SCNC: 22 MMOL/L — SIGNIFICANT CHANGE UP (ref 22–31)
CREAT SERPL-MCNC: 0.6 MG/DL — SIGNIFICANT CHANGE UP (ref 0.5–1.3)
EGFR: 97 ML/MIN/1.73M2 — SIGNIFICANT CHANGE UP
EGFR: 97 ML/MIN/1.73M2 — SIGNIFICANT CHANGE UP
EOSINOPHIL # BLD AUTO: 0.06 K/UL — SIGNIFICANT CHANGE UP (ref 0–0.5)
EOSINOPHIL NFR BLD AUTO: 0.9 % — SIGNIFICANT CHANGE UP (ref 0–6)
GLUCOSE SERPL-MCNC: 98 MG/DL — SIGNIFICANT CHANGE UP (ref 70–99)
HCT VFR BLD CALC: 31.3 % — LOW (ref 34.5–45)
HGB BLD-MCNC: 9.6 G/DL — LOW (ref 11.5–15.5)
IMM GRANULOCYTES NFR BLD AUTO: 0.8 % — SIGNIFICANT CHANGE UP (ref 0–0.9)
LYMPHOCYTES # BLD AUTO: 1.26 K/UL — SIGNIFICANT CHANGE UP (ref 1–3.3)
LYMPHOCYTES # BLD AUTO: 19.8 % — SIGNIFICANT CHANGE UP (ref 13–44)
MCHC RBC-ENTMCNC: 26.3 PG — LOW (ref 27–34)
MCHC RBC-ENTMCNC: 30.7 G/DL — LOW (ref 32–36)
MCV RBC AUTO: 85.8 FL — SIGNIFICANT CHANGE UP (ref 80–100)
MONOCYTES # BLD AUTO: 0.53 K/UL — SIGNIFICANT CHANGE UP (ref 0–0.9)
MONOCYTES NFR BLD AUTO: 8.3 % — SIGNIFICANT CHANGE UP (ref 2–14)
NEUTROPHILS # BLD AUTO: 4.41 K/UL — SIGNIFICANT CHANGE UP (ref 1.8–7.4)
NEUTROPHILS NFR BLD AUTO: 69.6 % — SIGNIFICANT CHANGE UP (ref 43–77)
NRBC BLD AUTO-RTO: 0 /100 WBCS — SIGNIFICANT CHANGE UP (ref 0–0)
PLATELET # BLD AUTO: 175 K/UL — SIGNIFICANT CHANGE UP (ref 150–400)
POTASSIUM SERPL-MCNC: 4.2 MMOL/L — SIGNIFICANT CHANGE UP (ref 3.5–5.3)
POTASSIUM SERPL-SCNC: 4.2 MMOL/L — SIGNIFICANT CHANGE UP (ref 3.5–5.3)
PROT SERPL-MCNC: 6.5 G/DL — SIGNIFICANT CHANGE UP (ref 6–8.3)
RBC # BLD: 3.65 M/UL — LOW (ref 3.8–5.2)
RBC # FLD: 20.2 % — HIGH (ref 10.3–14.5)
SODIUM SERPL-SCNC: 141 MMOL/L — SIGNIFICANT CHANGE UP (ref 135–145)
WBC # BLD: 6.35 K/UL — SIGNIFICANT CHANGE UP (ref 3.8–10.5)
WBC # FLD AUTO: 6.35 K/UL — SIGNIFICANT CHANGE UP (ref 3.8–10.5)

## 2025-08-21 ENCOUNTER — APPOINTMENT (OUTPATIENT)
Dept: INFUSION THERAPY | Facility: HOSPITAL | Age: 70
End: 2025-08-21

## 2025-09-02 ENCOUNTER — RESULT REVIEW (OUTPATIENT)
Age: 70
End: 2025-09-02

## 2025-09-02 ENCOUNTER — APPOINTMENT (OUTPATIENT)
Dept: INFUSION THERAPY | Facility: HOSPITAL | Age: 70
End: 2025-09-02

## 2025-09-02 ENCOUNTER — APPOINTMENT (OUTPATIENT)
Dept: HEMATOLOGY ONCOLOGY | Facility: CLINIC | Age: 70
End: 2025-09-02

## 2025-09-02 ENCOUNTER — APPOINTMENT (OUTPATIENT)
Dept: HEMATOLOGY ONCOLOGY | Facility: CLINIC | Age: 70
End: 2025-09-02
Payer: MEDICAID

## 2025-09-02 DIAGNOSIS — C78.7 MALIGNANT NEOPLASM OF COLON, UNSPECIFIED: ICD-10-CM

## 2025-09-02 DIAGNOSIS — C18.9 MALIGNANT NEOPLASM OF COLON, UNSPECIFIED: ICD-10-CM

## 2025-09-02 PROCEDURE — 99213 OFFICE O/P EST LOW 20 MIN: CPT

## 2025-09-04 ENCOUNTER — NON-APPOINTMENT (OUTPATIENT)
Age: 70
End: 2025-09-04

## 2025-09-04 ENCOUNTER — APPOINTMENT (OUTPATIENT)
Dept: INFUSION THERAPY | Facility: HOSPITAL | Age: 70
End: 2025-09-04

## 2025-09-08 ENCOUNTER — APPOINTMENT (OUTPATIENT)
Dept: RHEUMATOLOGY | Facility: CLINIC | Age: 70
End: 2025-09-08
Payer: MEDICAID

## 2025-09-08 VITALS
BODY MASS INDEX: 20.36 KG/M2 | SYSTOLIC BLOOD PRESSURE: 120 MMHG | HEIGHT: 59 IN | OXYGEN SATURATION: 99 % | DIASTOLIC BLOOD PRESSURE: 75 MMHG | HEART RATE: 66 BPM | WEIGHT: 101 LBS

## 2025-09-08 PROCEDURE — T1013A: CUSTOM

## 2025-09-08 PROCEDURE — 99215 OFFICE O/P EST HI 40 MIN: CPT

## 2025-09-08 PROCEDURE — G2211 COMPLEX E/M VISIT ADD ON: CPT | Mod: NC

## 2025-09-16 ENCOUNTER — RESULT REVIEW (OUTPATIENT)
Age: 70
End: 2025-09-16

## 2025-09-16 ENCOUNTER — APPOINTMENT (OUTPATIENT)
Dept: INFUSION THERAPY | Facility: HOSPITAL | Age: 70
End: 2025-09-16

## 2025-09-16 ENCOUNTER — APPOINTMENT (OUTPATIENT)
Dept: HEMATOLOGY ONCOLOGY | Facility: CLINIC | Age: 70
End: 2025-09-16

## 2025-09-18 ENCOUNTER — APPOINTMENT (OUTPATIENT)
Dept: INFUSION THERAPY | Facility: HOSPITAL | Age: 70
End: 2025-09-18

## (undated) DEVICE — SOL INJ NS 0.9% 500ML 2 PORT

## (undated) DEVICE — INSUFFLATION NDL COVIDIEN STEP 14G SHORT FOR STEP/VERSASTEP

## (undated) DEVICE — BASIN EMESIS 10IN GRADUATED MAUVE

## (undated) DEVICE — TROCAR APPLIED MEDICAL KII BALLOON BLUNT TIP 12MM X 100MM

## (undated) DEVICE — DRSG STERISTRIPS 0.5 X 4"

## (undated) DEVICE — DRAPE TOWEL BLUE 17" X 24"

## (undated) DEVICE — URETERAL CATH RED RUBBER 14FR (GREEN)

## (undated) DEVICE — BRUSH CYTO RAP EXCHG 3MM

## (undated) DEVICE — OMNIPAQUE 300  30ML

## (undated) DEVICE — OSTOMY KIT 2-PIECE 4" NS (YELLOW)

## (undated) DEVICE — STAPLER COVIDIEN ENDO GIA STANDARD HANDLE

## (undated) DEVICE — FOLEY HOLDER STATLOCK 2 WAY ADULT

## (undated) DEVICE — SPECIMEN CONTAINER 100ML

## (undated) DEVICE — DRSG TELFA 3 X 8

## (undated) DEVICE — ELCTR GROUNDING PAD ADULT COVIDIEN

## (undated) DEVICE — STAPLER COVIDIEN ENDO GIA SHORT HANDLE

## (undated) DEVICE — Device

## (undated) DEVICE — SOL INJ NS 0.9% 500ML 1-PORT

## (undated) DEVICE — GLV 7.5 PROTEXIS (WHITE)

## (undated) DEVICE — CLAMP BX HOT RAD JAW 3

## (undated) DEVICE — PACK CARDIOVASCULAR UNIVERSAL

## (undated) DEVICE — TROCAR COVIDIEN VERSAONE BLADED FIXATION 11MM STANDARD

## (undated) DEVICE — DRAPE MAYO STAND 30"

## (undated) DEVICE — GLV 8.5 PROTEXIS (WHITE)

## (undated) DEVICE — TUBING SUCTION 20FT

## (undated) DEVICE — SUT BOOT STANDARD (YELLOW) 5 PAIR

## (undated) DEVICE — DISSECTOR ENDO PEANUT 5MM

## (undated) DEVICE — SNARE CAPTIVATOR II RND STIFF 15MM

## (undated) DEVICE — SOL IRR POUR NS 0.9% 500ML

## (undated) DEVICE — DRAPE IOBAN 23" X 23"

## (undated) DEVICE — CATH IV SAFE BC 22G X 1" (BLUE)

## (undated) DEVICE — UNDERPAD LINEN SAVER 17 X 24"

## (undated) DEVICE — SENSOR O2 FINGER ADULT

## (undated) DEVICE — POSITIONER FOAM HEAD CRADLE (PINK)

## (undated) DEVICE — SYR LUER LOK 3CC

## (undated) DEVICE — SOL IRR POUR H2O 1500ML

## (undated) DEVICE — POSITIONER FOAM EGG CRATE ULNAR 2PCS (PINK)

## (undated) DEVICE — BLADE SCALPEL SAFETYLOCK #15

## (undated) DEVICE — CATH IV SAFE BC 20G X 1.16" (PINK)

## (undated) DEVICE — GLV 7 PROTEXIS (WHITE)

## (undated) DEVICE — FORCEP RADIAL JAW 4 JUMBO 2.8MM 3.2MM 240CM ORANGE DISP

## (undated) DEVICE — STAPLER COVIDIEN ENDO GIA XL HANDLE

## (undated) DEVICE — ORGANIZER MIO MEDICAL DEVICE DISP

## (undated) DEVICE — GOWN LG

## (undated) DEVICE — ELCTR BOVIE BLADE .75"

## (undated) DEVICE — DRSG CURITY GAUZE SPONGE 4 X 4" 12-PLY NON-STERILE

## (undated) DEVICE — PAPIL BILRTH II 6-5FRX0.035IN

## (undated) DEVICE — VALVE YELLOW PORT SEAL PLUS 5MM

## (undated) DEVICE — BRUSH COLONOSCOPY CYTOLOGY

## (undated) DEVICE — DRAIN PENROSE .25" X 18" LATEX

## (undated) DEVICE — TROCAR COVIDIEN VERSAPORT BLADELESS OPTICAL 5MM STANDARD

## (undated) DEVICE — STAPLER SKIN VISI-STAT 35 WIDE

## (undated) DEVICE — VENODYNE/SCD SLEEVE CALF LARGE

## (undated) DEVICE — PREP CHLORAPREP HI-LITE ORANGE 26ML

## (undated) DEVICE — TUBING SUCTION NONCONDUCTIVE 6MM X 12FT

## (undated) DEVICE — IRRIGATOR BIO SHIELD

## (undated) DEVICE — ELCTR ECG CONDUCTIVE ADHESIVE

## (undated) DEVICE — SPHINCTEROTOME CLEVERCUT WIRE 25MM  2.8MM X 170CM

## (undated) DEVICE — SUT SILK 3-0 18" TIES

## (undated) DEVICE — SYR LUER LOK 50CC

## (undated) DEVICE — TUBING SUCTION CONN 6FT STERILE

## (undated) DEVICE — GELPORT LAPAROSCOPIC SYSTEM

## (undated) DEVICE — SHEARS HARMONIC ACE 5MM X 36CM CURVED TIP

## (undated) DEVICE — LIGASURE MARYLAND 37CM

## (undated) DEVICE — WARMING BLANKET UPPER ADULT

## (undated) DEVICE — BIOPSY FORCEP RADIAL JAW 4 STANDARD WITH NEEDLE

## (undated) DEVICE — DRSG BANDAID 0.75X3"

## (undated) DEVICE — LITHOTRIPY BASKET TRAPEZOID 2.5CM

## (undated) DEVICE — TOURNIQUET SET SURE-SNARE 22FR (2 TUBES, 2 UMBILICAL TAPES, 2 PLASTIC SNARES) 5"

## (undated) DEVICE — POSITIONER PATIENT SAFETY STRAP 3X60"

## (undated) DEVICE — ENDOCATCH 10MM SPECIMEN POUCH

## (undated) DEVICE — SUT SOFSILK 3-0 18" V-20 (POP-OFF)

## (undated) DEVICE — INSUFFLATION NDL COVIDIEN STEP 14G FOR STEP/VERSASTEP

## (undated) DEVICE — DVC AUTO CAP RX LOKG

## (undated) DEVICE — ELCTR BOVIE PENCIL SMOKE EVACUATION

## (undated) DEVICE — SUT CLIP LAPRA-TY ABSORBABLE SIZE 0.118 TO 0.12" VIOLET

## (undated) DEVICE — SUT SILK 0 18" TIES

## (undated) DEVICE — POOLE SUCTION TIP

## (undated) DEVICE — TROCAR COVIDIEN VERSASTEP PLUS 12MM LONG

## (undated) DEVICE — NDL HYPO SAFE 22G X 1" (BLACK)

## (undated) DEVICE — ELCTR AQUAMANTYS BIPOLAR SEALER 6.0

## (undated) DEVICE — SUT SILK 2-0 30" TIES

## (undated) DEVICE — SUT PROLENE 5-0 36" RB-1

## (undated) DEVICE — DRSG 2X2

## (undated) DEVICE — GOWN TRIMAX LG

## (undated) DEVICE — LIJ/LIA-ARGON BEAM COAGULATOR #4 2016L253: Type: DURABLE MEDICAL EQUIPMENT

## (undated) DEVICE — DRSG TAPE UMBILICAL COTTON 2" X 30 X 1/8"

## (undated) DEVICE — SNARE POLYP MINI ACCUSNR 1.5 X 3CM

## (undated) DEVICE — DRAPE 1/2 SHEET 40X57"

## (undated) DEVICE — WARMING BLANKET LOWER ADULT

## (undated) DEVICE — LIGASURE IMPACT

## (undated) DEVICE — PROTECTOR HEEL / ELBOW FLUFFY

## (undated) DEVICE — OLYMPUS DISTAL COVER ENDOSCOPE

## (undated) DEVICE — SUT SILK 3-0 18" SH (POP-OFF)

## (undated) DEVICE — NDL BOSTON SCIENTIFIC RX TRIPLE LUMEN NDL KNIFE XL 5.5F

## (undated) DEVICE — TUBING IV SET GRAVITY 3Y 100" MACRO

## (undated) DEVICE — TROCAR COVIDIEN VERSASTEP PLUS 12MM STANDARD

## (undated) DEVICE — INJ SYS RAP REFIL

## (undated) DEVICE — SUT CHROMIC 1 27" BP

## (undated) DEVICE — DRSG OPSITE 13.75 X 4"

## (undated) DEVICE — ELCTR BOVIE BLADE 3/4" EXTENDED LENGTH 6"

## (undated) DEVICE — SUT PROLENE 4-0 36" RB-1

## (undated) DEVICE — SUCTION YANKAUER NO CONTROL VENT

## (undated) DEVICE — TROCAR ETHICON ENDOPATH XCEL BLADELESS 12MM X 100MM STABILITY

## (undated) DEVICE — TROCAR COVIDIEN BLUNT TIP HASSAN 10MM

## (undated) DEVICE — SYR LUER LOK 10CC

## (undated) DEVICE — TUBING INSUFFLATION LAP FILTER 10FT

## (undated) DEVICE — DRAPE INSTRUMENT POUCH 6.75" X 11"

## (undated) DEVICE — SOL IRR POUR NS 0.9% 1500ML

## (undated) DEVICE — DENTURE CUP PINK

## (undated) DEVICE — DRAIN PENROSE .25" X 12" LATEX

## (undated) DEVICE — DRSG TEGADERM 6"X8"

## (undated) DEVICE — TROCAR COVIDIEN STEP 5MM SHORT 70MM

## (undated) DEVICE — BITE BLOCK ADULT 20 X 27MM (GREEN)

## (undated) DEVICE — PACK IV START WITH CHG

## (undated) DEVICE — CONTAINER FORMALIN 10% 20ML

## (undated) DEVICE — DRSG PREVENA PLUS SYSTEM

## (undated) DEVICE — FOLEY TRAY 16FR 5CC LTX UMETER CLOSED

## (undated) DEVICE — SALIVA EJECTOR (BLUE)

## (undated) DEVICE — KIT SPINAL DRUGS NDL 25G X 3.5IN

## (undated) DEVICE — POLY TRAP ETRAP

## (undated) DEVICE — DRAPE GENERAL ENDOSCOPY

## (undated) DEVICE — SUCTION YANKAUER OPEN TIP NO VENT CURVE

## (undated) DEVICE — PACK BASIN SPECIAL PROCEDURE

## (undated) DEVICE — SUT MONOCRYL 4-0 27" PS-2 UNDYED

## (undated) DEVICE — GLV 6.5 PROTEXIS (WHITE)

## (undated) DEVICE — SUT CHROMIC 0 27" CT-1

## (undated) DEVICE — APPLICATOR FOR ARISTA XL 38CM

## (undated) DEVICE — SUT VICRYL 3-0 27" SH UNDYED

## (undated) DEVICE — TUBING IRRIGATION DAVOL SYSTEM X STREAM

## (undated) DEVICE — SOL IRR POUR H2O 250ML

## (undated) DEVICE — SUT SILK 2-0 18" TIES

## (undated) DEVICE — SUT PDS II 1 48" TP-1

## (undated) DEVICE — BLADE SCALPEL SAFETYLOCK #10

## (undated) DEVICE — NDL INJ SCLERO INTERJECT 25G

## (undated) DEVICE — DURABLE MEDICAL EQUIPMENT: Type: DURABLE MEDICAL EQUIPMENT

## (undated) DEVICE — PACK MAJOR ABDOMINAL WITH LAP

## (undated) DEVICE — GLV 8 PROTEXIS (WHITE)

## (undated) DEVICE — DRSG TEGADERM 4X4.75"

## (undated) DEVICE — PACK ADVANCED LAPAROSCOPIC NS

## (undated) DEVICE — SHEARS COVIDIEN ENDO SHEAR 5MM X 31CM W UNIPOLAR CAUTERY

## (undated) DEVICE — TROCAR COVIDIEN VERSASTEP 5MM STANDARD

## (undated) DEVICE — HANDPIECE SINGLE FUNCTION ABC

## (undated) DEVICE — SUT PROLENE 3-0 36" SH

## (undated) DEVICE — GELPOINT ADVANCED

## (undated) DEVICE — SUT SILK 3-0 30" TIES

## (undated) DEVICE — TROCAR COVIDIEN VERSASTEP PLUS 11MM STANDARD

## (undated) DEVICE — TROCAR COVIDIEN VERSAONE FIXATION CANNULA 5MM

## (undated) DEVICE — SNARE POLYP SENS SM 13MM 240CM

## (undated) DEVICE — MEDICATION LABELS W MARKER

## (undated) DEVICE — SUT VICRYL 3-0 18" SH UNDYED (POP-OFF)

## (undated) DEVICE — LUBRICATING JELLY ONESHOT 1.25OZ

## (undated) DEVICE — NDL INJ SCLERO INTERJECT 23G